# Patient Record
Sex: MALE | Race: WHITE | Employment: OTHER | URBAN - METROPOLITAN AREA
[De-identification: names, ages, dates, MRNs, and addresses within clinical notes are randomized per-mention and may not be internally consistent; named-entity substitution may affect disease eponyms.]

---

## 2018-04-03 ENCOUNTER — OFFICE VISIT (OUTPATIENT)
Dept: CARDIOLOGY CLINIC | Age: 63
End: 2018-04-03
Payer: COMMERCIAL

## 2018-04-03 VITALS
BODY MASS INDEX: 34.3 KG/M2 | SYSTOLIC BLOOD PRESSURE: 108 MMHG | HEIGHT: 71 IN | HEART RATE: 64 BPM | WEIGHT: 245 LBS | DIASTOLIC BLOOD PRESSURE: 60 MMHG

## 2018-04-03 DIAGNOSIS — J44.9 CHRONIC OBSTRUCTIVE PULMONARY DISEASE, UNSPECIFIED COPD TYPE (HCC): ICD-10-CM

## 2018-04-03 DIAGNOSIS — I10 ESSENTIAL HYPERTENSION: ICD-10-CM

## 2018-04-03 DIAGNOSIS — I31.39 PERICARDIAL EFFUSION: ICD-10-CM

## 2018-04-03 DIAGNOSIS — I25.810 CORONARY ARTERY DISEASE INVOLVING CORONARY BYPASS GRAFT OF NATIVE HEART WITHOUT ANGINA PECTORIS: Primary | ICD-10-CM

## 2018-04-03 DIAGNOSIS — E66.8 MODERATE OBESITY: ICD-10-CM

## 2018-04-03 DIAGNOSIS — E78.00 PURE HYPERCHOLESTEROLEMIA: ICD-10-CM

## 2018-04-03 PROBLEM — E66.9 MODERATE OBESITY: Status: ACTIVE | Noted: 2018-04-03

## 2018-04-03 PROCEDURE — 3023F SPIROM DOC REV: CPT | Performed by: INTERNAL MEDICINE

## 2018-04-03 PROCEDURE — 99214 OFFICE O/P EST MOD 30 MIN: CPT | Performed by: INTERNAL MEDICINE

## 2018-04-03 PROCEDURE — G8598 ASA/ANTIPLAT THER USED: HCPCS | Performed by: INTERNAL MEDICINE

## 2018-04-03 PROCEDURE — 93000 ELECTROCARDIOGRAM COMPLETE: CPT | Performed by: INTERNAL MEDICINE

## 2018-04-03 PROCEDURE — G8417 CALC BMI ABV UP PARAM F/U: HCPCS | Performed by: INTERNAL MEDICINE

## 2018-04-03 PROCEDURE — G8427 DOCREV CUR MEDS BY ELIG CLIN: HCPCS | Performed by: INTERNAL MEDICINE

## 2018-04-03 PROCEDURE — 4004F PT TOBACCO SCREEN RCVD TLK: CPT | Performed by: INTERNAL MEDICINE

## 2018-04-03 PROCEDURE — G8926 SPIRO NO PERF OR DOC: HCPCS | Performed by: INTERNAL MEDICINE

## 2018-04-03 PROCEDURE — 3017F COLORECTAL CA SCREEN DOC REV: CPT | Performed by: INTERNAL MEDICINE

## 2018-04-13 ENCOUNTER — HOSPITAL ENCOUNTER (OUTPATIENT)
Dept: CARDIOLOGY | Age: 63
Discharge: HOME | End: 2018-04-13
Payer: COMMERCIAL

## 2018-04-13 DIAGNOSIS — I25.810 CORONARY ARTERY DISEASE INVOLVING CORONARY BYPASS GRAFT OF NATIVE HEART WITHOUT ANGINA PECTORIS: ICD-10-CM

## 2018-04-13 DIAGNOSIS — I31.39 PERICARDIAL EFFUSION: ICD-10-CM

## 2018-04-13 LAB
LV EF: 58 %
LVEF MODALITY: NORMAL

## 2018-04-13 PROCEDURE — 93308 TTE F-UP OR LMTD: CPT

## 2018-04-16 ENCOUNTER — TELEPHONE (OUTPATIENT)
Dept: CARDIOLOGY CLINIC | Age: 63
End: 2018-04-16

## 2018-05-07 ENCOUNTER — OFFICE VISIT (OUTPATIENT)
Dept: ORTHOPEDIC SURGERY | Age: 63
End: 2018-05-07
Payer: COMMERCIAL

## 2018-05-07 VITALS — WEIGHT: 240 LBS | HEIGHT: 71 IN | BODY MASS INDEX: 33.6 KG/M2

## 2018-05-07 DIAGNOSIS — M19.039 WRIST ARTHRITIS: ICD-10-CM

## 2018-05-07 DIAGNOSIS — G56.02 LEFT CARPAL TUNNEL SYNDROME: Primary | ICD-10-CM

## 2018-05-07 PROCEDURE — 99204 OFFICE O/P NEW MOD 45 MIN: CPT | Performed by: ORTHOPAEDIC SURGERY

## 2018-05-16 ENCOUNTER — OFFICE VISIT (OUTPATIENT)
Dept: PHYSICAL MEDICINE AND REHAB | Age: 63
End: 2018-05-16
Payer: COMMERCIAL

## 2018-05-16 VITALS — WEIGHT: 230 LBS | BODY MASS INDEX: 32.2 KG/M2 | HEIGHT: 71 IN

## 2018-05-16 DIAGNOSIS — G56.02 LEFT CARPAL TUNNEL SYNDROME: ICD-10-CM

## 2018-05-16 PROCEDURE — 95909 NRV CNDJ TST 5-6 STUDIES: CPT | Performed by: PHYSICAL MEDICINE & REHABILITATION

## 2018-05-16 PROCEDURE — 95886 MUSC TEST DONE W/N TEST COMP: CPT | Performed by: PHYSICAL MEDICINE & REHABILITATION

## 2018-05-16 PROCEDURE — 99202 OFFICE O/P NEW SF 15 MIN: CPT | Performed by: PHYSICAL MEDICINE & REHABILITATION

## 2018-05-22 ENCOUNTER — TELEPHONE (OUTPATIENT)
Dept: ADMINISTRATIVE | Age: 63
End: 2018-05-22

## 2018-05-31 ENCOUNTER — OFFICE VISIT (OUTPATIENT)
Dept: ORTHOPEDIC SURGERY | Age: 63
End: 2018-05-31
Payer: COMMERCIAL

## 2018-05-31 DIAGNOSIS — G56.02 LEFT CARPAL TUNNEL SYNDROME: Primary | ICD-10-CM

## 2018-05-31 PROCEDURE — 99214 OFFICE O/P EST MOD 30 MIN: CPT | Performed by: ORTHOPAEDIC SURGERY

## 2019-03-26 ENCOUNTER — TELEPHONE (OUTPATIENT)
Dept: ENT CLINIC | Age: 64
End: 2019-03-26

## 2019-04-01 ENCOUNTER — TELEPHONE (OUTPATIENT)
Dept: ENT CLINIC | Age: 64
End: 2019-04-01

## 2019-04-01 NOTE — TELEPHONE ENCOUNTER
Patient missed his appointment on 3/25/19 and is rescheduled for 6/3/19 at 2:00 pm.  He had previously cancelled 1/15/19 appointment.   He requests wait list.

## 2019-04-11 ENCOUNTER — APPOINTMENT (OUTPATIENT)
Dept: GENERAL RADIOLOGY | Age: 64
End: 2019-04-11
Payer: MEDICARE

## 2019-04-11 ENCOUNTER — HOSPITAL ENCOUNTER (EMERGENCY)
Age: 64
Discharge: HOME | End: 2019-04-11
Payer: MEDICARE

## 2019-04-11 VITALS
BODY MASS INDEX: 32.08 KG/M2 | WEIGHT: 230 LBS | HEART RATE: 68 BPM | RESPIRATION RATE: 22 BRPM | TEMPERATURE: 98.2 F | OXYGEN SATURATION: 95 % | SYSTOLIC BLOOD PRESSURE: 100 MMHG | DIASTOLIC BLOOD PRESSURE: 61 MMHG

## 2019-04-11 DIAGNOSIS — S60.419A ABRASION OF FINGER, INITIAL ENCOUNTER: Primary | ICD-10-CM

## 2019-04-11 DIAGNOSIS — S62.622A CLOSED DISPLACED FRACTURE OF MIDDLE PHALANX OF RIGHT MIDDLE FINGER, INITIAL ENCOUNTER: ICD-10-CM

## 2019-04-11 DIAGNOSIS — J44.9 CHRONIC OBSTRUCTIVE PULMONARY DISEASE, UNSPECIFIED COPD TYPE (HCC): ICD-10-CM

## 2019-04-11 PROCEDURE — 99213 OFFICE O/P EST LOW 20 MIN: CPT

## 2019-04-11 PROCEDURE — 6370000000 HC RX 637 (ALT 250 FOR IP): Performed by: PHYSICIAN ASSISTANT

## 2019-04-11 PROCEDURE — 94640 AIRWAY INHALATION TREATMENT: CPT

## 2019-04-11 PROCEDURE — 29130 APPL FINGER SPLINT STATIC: CPT

## 2019-04-11 PROCEDURE — 73130 X-RAY EXAM OF HAND: CPT

## 2019-04-11 PROCEDURE — 71046 X-RAY EXAM CHEST 2 VIEWS: CPT

## 2019-04-11 RX ORDER — IPRATROPIUM BROMIDE AND ALBUTEROL SULFATE 2.5; .5 MG/3ML; MG/3ML
1 SOLUTION RESPIRATORY (INHALATION) ONCE
Status: COMPLETED | OUTPATIENT
Start: 2019-04-11 | End: 2019-04-11

## 2019-04-11 RX ORDER — ACETAMINOPHEN AND CODEINE PHOSPHATE 300; 30 MG/1; MG/1
1 TABLET ORAL EVERY 8 HOURS PRN
Qty: 12 TABLET | Refills: 0 | Status: SHIPPED | OUTPATIENT
Start: 2019-04-11 | End: 2019-04-15

## 2019-04-11 RX ADMIN — IPRATROPIUM BROMIDE AND ALBUTEROL SULFATE 1 AMPULE: .5; 3 SOLUTION RESPIRATORY (INHALATION) at 16:52

## 2019-04-11 ASSESSMENT — PAIN SCALES - GENERAL: PAINLEVEL_OUTOF10: 8

## 2019-04-11 ASSESSMENT — PAIN DESCRIPTION - LOCATION: LOCATION: HAND

## 2019-04-11 ASSESSMENT — PAIN DESCRIPTION - ORIENTATION: ORIENTATION: LEFT

## 2019-04-11 NOTE — ED PROVIDER NOTES
This is a 59year old male that presents to urgent care with a complaint of injury to the left third finger today. Says he lost his balance and fell. Patient denies head or neck injury. No abdominal pain. States he has COPD. Says he has a mild cough. Is a smoker. Says he has harseness in his throat but says this has been going on for some time. Says he was supposed to see ENT last week for this but missed his appointment. Has another appointment coming up. Review of Systems   Constitutional:        Pertinent positives and negatives are stated within HPI, all other systems reviewed and are negative. Physical Exam   Constitutional: He is oriented to person, place, and time. He appears well-developed and well-nourished. HENT:   Head: Normocephalic and atraumatic. Right Ear: Hearing, tympanic membrane, external ear and ear canal normal. No mastoid tenderness. Left Ear: Hearing, tympanic membrane, external ear and ear canal normal. No mastoid tenderness. Nose: Nose normal. Right sinus exhibits no maxillary sinus tenderness and no frontal sinus tenderness. Left sinus exhibits no maxillary sinus tenderness and no frontal sinus tenderness. Mouth/Throat: Uvula is midline, oropharynx is clear and moist and mucous membranes are normal. No trismus in the jaw. No uvula swelling. Eyes: Pupils are equal, round, and reactive to light. Conjunctivae, EOM and lids are normal.   Neck: Normal range of motion. Neck supple. Cardiovascular: Normal rate, regular rhythm and normal heart sounds. No murmur heard. Pulmonary/Chest: Effort normal.   Abdominal: Soft. Bowel sounds are normal. There is no tenderness. There is no rigidity, no rebound, no guarding and no CVA tenderness. Musculoskeletal: He exhibits no edema. Tenderness, mildl swelling and small abrasion to middle finger. No deep wound. Finger has mild deformity. Brisk cap refill. No other hand pain.     Neurological: He is alert and oriented to person, place, and time. He has normal strength. No cranial nerve deficit or sensory deficit. Coordination and gait normal. GCS eye subscore is 4. GCS verbal subscore is 5. GCS motor subscore is 6. Skin: Skin is warm and dry. No abrasion and no rash noted. Nursing note and vitals reviewed. Procedures    MDM  Number of Diagnoses or Management Options  Abrasion of finger, initial encounter:   Chronic obstructive pulmonary disease, unspecified COPD type [facility]):   Closed displaced fracture of middle phalanx of right middle finger, initial encounter:   Diagnosis management comments: Splint  Patient observed and given food. Vitals improved. Told to follow up with PCP and ENT and Ortho. If worse told to go to ED.         --------------------------------------------- PAST HISTORY ---------------------------------------------  Past Medical History:  has a past medical history of CAD (coronary artery disease), Depression, and Hypertension. Past Surgical History:  has a past surgical history that includes hernia repair; Coronary artery bypass graft (3/2/05); and Diagnostic Cardiac Cath Lab Procedure (3/02/05). Social History:  reports that he has been smoking cigars. He has a 45.00 pack-year smoking history. He has never used smokeless tobacco. He reports that he drinks alcohol. He reports that he does not use drugs. Family History: family history is not on file. The patients home medications have been reviewed. Allergies: Patient has no known allergies. -------------------------------------------------- RESULTS -------------------------------------------------  No results found for this visit on 04/11/19. XR HAND LEFT (MIN 3 VIEWS)   Final Result   Addendum 1 of 1   Additional imaging of the PIP joint demonstrates bone fragmentation   consistent with a fracture  Subluxation present      Final      XR CHEST STANDARD (2 VW)   Final Result   1. No active cardiopulmonary disease. ------------------------- NURSING NOTES AND VITALS REVIEWED ---------------------------   The nursing notes within the ED encounter and vital signs as below have been reviewed. /61   Pulse 68   Temp 98.2 °F (36.8 °C) (Oral)   Resp 22   Wt 230 lb (104.3 kg)   SpO2 95%   BMI 32.08 kg/m²   Oxygen Saturation Interpretation: Normal      ------------------------------------------ PROGRESS NOTES ------------------------------------------   I have spoken with the patient and discussed todays results, in addition to providing specific details for the plan of care and counseling regarding the diagnosis and prognosis. Their questions are answered at this time and they are agreeable with the plan.      --------------------------------- ADDITIONAL PROVIDER NOTES ---------------------------------      This patient is stable for discharge. I have shared the specific conditions for return, as well as the importance of follow-up. * NOTE: This report was transcribed using voice recognition software. Every effort was made to ensure accuracy; however, inadvertent computerized transcription errors may be present.    --------------------------------- IMPRESSION AND DISPOSITION ---------------------------------    IMPRESSION  1. Abrasion of finger, initial encounter    2. Closed displaced fracture of middle phalanx of right middle finger, initial encounter    3.  Chronic obstructive pulmonary disease, unspecified COPD type ([facility]        DISPOSITION  Disposition: Discharge to home  Patient condition is good         Domo Borjas PA-C  04/13/19 7101

## 2019-04-11 NOTE — ED NOTES
Aerosol treatment ended. Pt tolerated well.  Pulse ox 92% Pulse 72     Abril Stern LPN  44/40/64 [id number]

## 2019-04-11 NOTE — ED NOTES
Wound cleansed, antibiotic ointment applied with dressing and finger splint to 3rd digit with salvador tape to 4th digit. Wound care instructions reviewed with pt. Pt also instructed to monitor BP at home and go to the ED if symptoms change or worsen. Verbalized understanding.      Tesha Martinez, FRANKLINN  47/59/61 [id number]

## 2019-04-13 ENCOUNTER — HOSPITAL ENCOUNTER (EMERGENCY)
Age: 64
Discharge: HOME | End: 2019-04-13
Payer: MEDICARE

## 2019-04-13 ENCOUNTER — APPOINTMENT (OUTPATIENT)
Dept: CT IMAGING | Age: 64
End: 2019-04-13
Payer: MEDICARE

## 2019-04-13 VITALS
RESPIRATION RATE: 16 BRPM | DIASTOLIC BLOOD PRESSURE: 109 MMHG | TEMPERATURE: 98.2 F | SYSTOLIC BLOOD PRESSURE: 155 MMHG | BODY MASS INDEX: 35 KG/M2 | HEART RATE: 90 BPM | OXYGEN SATURATION: 96 % | WEIGHT: 250 LBS | HEIGHT: 71 IN

## 2019-04-13 DIAGNOSIS — J44.1 COPD EXACERBATION (HCC): Primary | ICD-10-CM

## 2019-04-13 DIAGNOSIS — S62.623D DISPLACED FRACTURE OF MIDDLE PHALANX OF LEFT MIDDLE FINGER, SUBSEQUENT ENCOUNTER FOR FRACTURE WITH ROUTINE HEALING: ICD-10-CM

## 2019-04-13 DIAGNOSIS — J18.9 PNEUMONIA DUE TO ORGANISM: ICD-10-CM

## 2019-04-13 LAB
ANION GAP SERPL CALCULATED.3IONS-SCNC: 13 MMOL/L (ref 7–16)
BASOPHILS ABSOLUTE: 0.08 E9/L (ref 0–0.2)
BASOPHILS RELATIVE PERCENT: 1 % (ref 0–2)
BUN BLDV-MCNC: 13 MG/DL (ref 8–23)
CALCIUM SERPL-MCNC: 9.5 MG/DL (ref 8.6–10.2)
CHLORIDE BLD-SCNC: 104 MMOL/L (ref 98–107)
CO2: 23 MMOL/L (ref 22–29)
CREAT SERPL-MCNC: 1.1 MG/DL (ref 0.7–1.2)
EOSINOPHILS ABSOLUTE: 0.07 E9/L (ref 0.05–0.5)
EOSINOPHILS RELATIVE PERCENT: 0.9 % (ref 0–6)
GFR AFRICAN AMERICAN: >60
GFR NON-AFRICAN AMERICAN: >60 ML/MIN/1.73
GLUCOSE BLD-MCNC: 117 MG/DL (ref 74–99)
HCT VFR BLD CALC: 42.8 % (ref 37–54)
HEMOGLOBIN: 14.1 G/DL (ref 12.5–16.5)
IMMATURE GRANULOCYTES #: 0.04 E9/L
IMMATURE GRANULOCYTES %: 0.5 % (ref 0–5)
LACTIC ACID: 1.2 MMOL/L (ref 0.5–2.2)
LYMPHOCYTES ABSOLUTE: 1.64 E9/L (ref 1.5–4)
LYMPHOCYTES RELATIVE PERCENT: 21.5 % (ref 20–42)
MCH RBC QN AUTO: 32.1 PG (ref 26–35)
MCHC RBC AUTO-ENTMCNC: 32.9 % (ref 32–34.5)
MCV RBC AUTO: 97.5 FL (ref 80–99.9)
MONOCYTES ABSOLUTE: 0.6 E9/L (ref 0.1–0.95)
MONOCYTES RELATIVE PERCENT: 7.9 % (ref 2–12)
NEUTROPHILS ABSOLUTE: 5.21 E9/L (ref 1.8–7.3)
NEUTROPHILS RELATIVE PERCENT: 68.2 % (ref 43–80)
PDW BLD-RTO: 13.7 FL (ref 11.5–15)
PLATELET # BLD: 262 E9/L (ref 130–450)
PMV BLD AUTO: 9.5 FL (ref 7–12)
POTASSIUM SERPL-SCNC: 4.5 MMOL/L (ref 3.5–5)
PRO-BNP: 452 PG/ML (ref 0–125)
RBC # BLD: 4.39 E12/L (ref 3.8–5.8)
SODIUM BLD-SCNC: 140 MMOL/L (ref 132–146)
STREP GRP A PCR: NEGATIVE
TROPONIN: <0.01 NG/ML (ref 0–0.03)
WBC # BLD: 7.6 E9/L (ref 4.5–11.5)

## 2019-04-13 PROCEDURE — 71275 CT ANGIOGRAPHY CHEST: CPT

## 2019-04-13 PROCEDURE — 6360000004 HC RX CONTRAST MEDICATION: Performed by: RADIOLOGY

## 2019-04-13 PROCEDURE — 83605 ASSAY OF LACTIC ACID: CPT

## 2019-04-13 PROCEDURE — 80048 BASIC METABOLIC PNL TOTAL CA: CPT

## 2019-04-13 PROCEDURE — 93005 ELECTROCARDIOGRAM TRACING: CPT | Performed by: PHYSICIAN ASSISTANT

## 2019-04-13 PROCEDURE — 36415 COLL VENOUS BLD VENIPUNCTURE: CPT

## 2019-04-13 PROCEDURE — 70491 CT SOFT TISSUE NECK W/DYE: CPT

## 2019-04-13 PROCEDURE — 83880 ASSAY OF NATRIURETIC PEPTIDE: CPT

## 2019-04-13 PROCEDURE — 6370000000 HC RX 637 (ALT 250 FOR IP): Performed by: PHYSICIAN ASSISTANT

## 2019-04-13 PROCEDURE — 85025 COMPLETE CBC W/AUTO DIFF WBC: CPT

## 2019-04-13 PROCEDURE — 99285 EMERGENCY DEPT VISIT HI MDM: CPT

## 2019-04-13 PROCEDURE — 87880 STREP A ASSAY W/OPTIC: CPT

## 2019-04-13 PROCEDURE — 84484 ASSAY OF TROPONIN QUANT: CPT

## 2019-04-13 RX ORDER — PREDNISONE 20 MG/1
60 TABLET ORAL ONCE
Status: COMPLETED | OUTPATIENT
Start: 2019-04-13 | End: 2019-04-13

## 2019-04-13 RX ORDER — DOXYCYCLINE HYCLATE 100 MG
100 TABLET ORAL 2 TIMES DAILY
Qty: 20 TABLET | Refills: 0 | Status: SHIPPED | OUTPATIENT
Start: 2019-04-13 | End: 2019-04-23

## 2019-04-13 RX ORDER — CEFDINIR 300 MG/1
300 CAPSULE ORAL ONCE
Status: COMPLETED | OUTPATIENT
Start: 2019-04-13 | End: 2019-04-13

## 2019-04-13 RX ORDER — PREDNISONE 10 MG/1
TABLET ORAL
Qty: 20 TABLET | Refills: 0 | Status: SHIPPED | OUTPATIENT
Start: 2019-04-13 | End: 2019-04-23

## 2019-04-13 RX ORDER — DOXYCYCLINE HYCLATE 100 MG/1
100 CAPSULE ORAL ONCE
Status: COMPLETED | OUTPATIENT
Start: 2019-04-13 | End: 2019-04-13

## 2019-04-13 RX ORDER — CEFDINIR 300 MG/1
300 CAPSULE ORAL 2 TIMES DAILY
Qty: 20 CAPSULE | Refills: 0 | Status: SHIPPED | OUTPATIENT
Start: 2019-04-13 | End: 2019-04-23

## 2019-04-13 RX ADMIN — IOPAMIDOL 80 ML: 755 INJECTION, SOLUTION INTRAVENOUS at 18:08

## 2019-04-13 RX ADMIN — CEFDINIR 300 MG: 300 CAPSULE ORAL at 21:03

## 2019-04-13 RX ADMIN — PREDNISONE 60 MG: 20 TABLET ORAL at 21:03

## 2019-04-13 RX ADMIN — DOXYCYCLINE HYCLATE 100 MG: 100 CAPSULE ORAL at 21:03

## 2019-04-13 ASSESSMENT — PAIN SCALES - GENERAL: PAINLEVEL_OUTOF10: 4

## 2019-04-13 NOTE — ED PROVIDER NOTES
Independent MLP      HPI:  4/13/19,   Time: 4:47 PM         Kaye Heck is a 59 y.o. male presenting to the ED for sore throat, difficulty swallowing, finger fracture, beginning few days ago. The complaint has been persistent, moderate in severity, and worsened by movement of left middle finger . The patient comes to the emergency room with several complaints. He states that he was seen at urgent care today for a few days ago after falling at home. He sustained injuries to his left hand and his right upper chest. He states that he had x-rays and was told that his finger is fractured. He states that they told him to call Dr. Cid Latin he wanted to come in today here to see if he could be seen by the orthopedist sooner. The patient has ongoing pain and swelling in his left middle finger. He states that he was helping someone here could help put his finger back into place. The patient is also complaining of a sore throat. He states that it's been painful for about a month now. He's had hoarseness and a sense of swelling and pain on the left side of his neck. He reports that he was supposed to follow up with ENT but missed his appointment. He reports intermittent shortness of breath as well. The patient is a chronic smoker. He states he coughs frequently and brings up productive phlegm as well. The patient states that when he eats he sometimes coughs and chokes on his food. It feels like there is something stuck on the left side of his throat and esophagus. The other day at urgent care he states that they gave him a breathing treatment and did a chest x-ray.       ROS:     Constitutional: Negative for fever and chills  HENT:  See HPI  Eyes: Negative for pain, discharge and redness  Respiratory: See HPI  Cardiovascular: Negative for CP, edema or palpitations  Gastrointestinal: Negative for nausea, vomiting, diarrhea and abdominal distention  Genitourinary: Negative for dysuria and frequency  Musculoskeletal: Negative for back pain and arthralgia  Skin: Negative for rash and wound  Neurological: Negative for weakness and headaches  Hematological: Negative for adenopathy    All other systems reviewed and are negative      -------------------------------- PAST HISTORY ----------------------------------  Past Medical History:  has a past medical history of CAD (coronary artery disease), Depression, and Hypertension. Past Surgical History:  has a past surgical history that includes hernia repair; Coronary artery bypass graft (3/2/05); and Diagnostic Cardiac Cath Lab Procedure (3/02/05). Social History:  reports that he has been smoking cigars. He has a 45.00 pack-year smoking history. He has never used smokeless tobacco. He reports that he drinks alcohol. He reports that he does not use drugs. Family History: family history is not on file. The patients home medications have been reviewed. Allergies: Patient has no known allergies.     --------------------------------- RESULTS ------------------------------------------  All laboratory and radiology results have been personally reviewed by myself   LABS:  Results for orders placed or performed during the hospital encounter of 04/13/19   Strep Screen Group A Throat   Result Value Ref Range    Strep Grp A PCR Negative Negative   CBC Auto Differential   Result Value Ref Range    WBC 7.6 4.5 - 11.5 E9/L    RBC 4.39 3.80 - 5.80 E12/L    Hemoglobin 14.1 12.5 - 16.5 g/dL    Hematocrit 42.8 37.0 - 54.0 %    MCV 97.5 80.0 - 99.9 fL    MCH 32.1 26.0 - 35.0 pg    MCHC 32.9 32.0 - 34.5 %    RDW 13.7 11.5 - 15.0 fL    Platelets 682 487 - 272 E9/L    MPV 9.5 7.0 - 12.0 fL    Neutrophils % 68.2 43.0 - 80.0 %    Immature Granulocytes % 0.5 0.0 - 5.0 %    Lymphocytes % 21.5 20.0 - 42.0 %    Monocytes % 7.9 2.0 - 12.0 %    Eosinophils % 0.9 0.0 - 6.0 %    Basophils % 1.0 0.0 - 2.0 %    Neutrophils # 5.21 1.80 - 7.30 E9/L    Immature Granulocytes # 0.04 E9/L    Lymphocytes # 1.64 1.50 - Hoarse voice. Head: NC/AT  Eyes: PERRL, EOMI  Mouth:  No obvious masses. Mild/moderate erythema in posterior pharynx, slightly more swollen on left than right. Neck: Supple, full ROM, no meningeal signs  Pulmonary: Lungs decreased but clear to auscultation bilaterally, no wheezes, rales, or rhonchi. Not in respiratory distress  Cardiovascular:  Regular rate and rhythm, no murmurs, gallops, or rubs. 2+ distal pulses  Abdomen: Soft, + BS. No distension. Nontender. No palpable rigidity, rebound or guarding  Extremities: Moves all extremities x 4. Warm and well perfused  Skin: warm and dry without rash  Neurologic: GCS 15,  Intact. No focal deficits  Psych: Normal Affect      ------------------------ ED COURSE/MEDICAL DECISION MAKING----------------------  Medications   iopamidol (ISOVUE-370) 76 % injection 80 mL (80 mLs Intravenous Given 4/13/19 1808)   cefdinir (OMNICEF) capsule 300 mg (300 mg Oral Given 4/13/19 2103)   doxycycline hyclate (VIBRAMYCIN) capsule 100 mg (100 mg Oral Given 4/13/19 2103)   predniSONE (DELTASONE) tablet 60 mg (60 mg Oral Given 4/13/19 2103)         Medical Decision Making:    I did review the patient's x-rays of his left hand. He has a fracture of the left 3rd finger middle phalanx with subluxation. It certainly appears deformed swollen and bruised. I put a call out to Dr. Dinorah Tavarez and the orthopedic resident. They called back initially and indicated that Dr. Jim Pacheco was on-call. I had called multiple more times to let them know that this was a Dr. Lana Tse patient and unfortunately they were involved in an OR case and stated they would call when this was complete. At 910 the patient insisted that he had to leave and did not wish to stay. Certainly this finger needs put back into a better position as it is subluxed. Again the patient refused to stay. We put him in a splint and I advised him to call Dr. Lana Tse on Monday.   (The initial consult to Ortho was placed at 6:30)        The rest of the patient's workup demonstrated no evidence of mass or abscess in the left side of his neck or throat. He was reassured. There was questionable infiltrate in the lung and the patient will be discharged home with treatment for outpatient pneumonia. He is a chronic smoker. Plan discharge home with Omnicef, doxycycline, and steroids. He needs to follow up with his PCP this week as well. Was supposed to see ENT for the hoarseness which has been going on for a month. Missed the appointment. Needs to call to reschedule. Counseling: The emergency provider has spoken with the patient and discussed todays results, in addition to providing specific details for the plan of care and counseling regarding the diagnosis and prognosis. Questions are answered at this time and they are agreeable with the plan.      ------------------------ IMPRESSION AND DISPOSITION -------------------------------    IMPRESSION  1. COPD exacerbation ([address]    2. Pneumonia due to organism    3.  Displaced fracture of middle phalanx of left middle finger, subsequent encounter for fracture with routine healing        DISPOSITION  Disposition: Discharge to home  Patient condition is stable                   Homa Almeida PA-C  04/13/19 7201

## 2019-04-14 LAB
EKG ATRIAL RATE: 416 BPM
EKG Q-T INTERVAL: 374 MS
EKG QRS DURATION: 96 MS
EKG QTC CALCULATION (BAZETT): 428 MS
EKG R AXIS: -31 DEGREES
EKG T AXIS: 60 DEGREES
EKG VENTRICULAR RATE: 79 BPM

## 2019-04-17 DIAGNOSIS — M79.642 LEFT HAND PAIN: Primary | ICD-10-CM

## 2019-04-18 ENCOUNTER — OFFICE VISIT (OUTPATIENT)
Dept: ORTHOPEDIC SURGERY | Age: 64
End: 2019-04-18
Payer: COMMERCIAL

## 2019-04-18 VITALS — HEIGHT: 71 IN | WEIGHT: 240 LBS | BODY MASS INDEX: 33.6 KG/M2

## 2019-04-18 DIAGNOSIS — S53.30XA: ICD-10-CM

## 2019-04-18 DIAGNOSIS — S63.259A DISLOCATION OF FINGER, INITIAL ENCOUNTER: ICD-10-CM

## 2019-04-18 DIAGNOSIS — S63.253A CLOSED DISLOCATION OF LEFT MIDDLE FINGER: Primary | ICD-10-CM

## 2019-04-18 PROCEDURE — 99213 OFFICE O/P EST LOW 20 MIN: CPT | Performed by: ORTHOPAEDIC SURGERY

## 2019-04-18 NOTE — PROGRESS NOTES
Chief Complaint   Patient presents with    Hand Pain     Left hand middle finger fx. INjury on 4/11/19       Kishore Sawant is a 59y.o. year old  male who presents for evaluation of left hand pain. he reports this started on 4/11/2019.  he does remember a specific injury that started the pain. He notes he tripped and fell and jammed the left hand. The injury was direct trauma, fall. His pain is located mainly at the left long finger. The pain is worse with movement and better with rest.  The patient has tried splint but patient removed The treatment has not been effective. The patient is Right hand dominant. Past Medical History:   Diagnosis Date    CAD (coronary artery disease)     Depression     Hypertension      Past Surgical History:   Procedure Laterality Date    CORONARY ARTERY BYPASS GRAFT  3/2/05    x3: LIMA to LAD, SVG to RCA, SVG to diagonal    DIAGNOSTIC CARDIAC CATH LAB PROCEDURE  3/02/05    [facility]: Severe multivessel CAD in patient who presents with STEMI and total occlusion of diagonal branch. Significant disease of proximal LAD and severe disease of dominant RCA.      HERNIA REPAIR      double       Current Outpatient Medications:     cefdinir (OMNICEF) 300 MG capsule, Take 1 capsule by mouth 2 times daily for 10 days, Disp: 20 capsule, Rfl: 0    doxycycline hyclate (VIBRA-TABS) 100 MG tablet, Take 1 tablet by mouth 2 times daily for 10 days, Disp: 20 tablet, Rfl: 0    predniSONE (DELTASONE) 10 MG tablet, Take 40mg po qd x 5 days QS for 5 days, Disp: 20 tablet, Rfl: 0    busPIRone (BUSPAR) 5 MG tablet, Take 15 mg by mouth 2 times daily , Disp: , Rfl:     gabapentin (NEURONTIN) 300 MG capsule, Take 300 mg by mouth 3 times daily, Disp: , Rfl:     cyclobenzaprine (FLEXERIL) 10 MG tablet, Take 10 mg by mouth 3 times daily as needed for Muscle spasms, Disp: , Rfl:     ibuprofen (ADVIL;MOTRIN) 600 MG tablet, Take 600 mg by mouth, Disp: , Rfl:     omeprazole (PRILOSEC) 20 MG capsule, , Disp: , Rfl:     PROAIR  (90 BASE) MCG/ACT inhaler, , Disp: , Rfl:     ASPIRIN LOW DOSE 81 MG EC tablet, Take 81 mg by mouth daily , Disp: , Rfl:     nicotine (NICODERM CQ) 14 MG/24HR, , Disp: , Rfl: 0    sertraline (ZOLOFT) 100 MG tablet, , Disp: , Rfl:     sertraline (ZOLOFT) 50 MG tablet, Take 100 mg by mouth daily , Disp: , Rfl:     lisinopril (PRINIVIL;ZESTRIL) 5 MG tablet, Take 5 mg by mouth daily, Disp: , Rfl:     nitroGLYCERIN (NITROSTAT) 0.4 MG SL tablet, Place 1 tablet under the tongue every 5 minutes as needed for Chest pain, Disp: 25 tablet, Rfl: 3    pravastatin (PRAVACHOL) 80 MG tablet, Take 1 tablet by mouth daily, Disp: 30 tablet, Rfl: 3  No Known Allergies  Social History     Socioeconomic History    Marital status:      Spouse name: Not on file    Number of children: Not on file    Years of education: Not on file    Highest education level: Not on file   Occupational History    Not on file   Social Needs    Financial resource strain: Not on file    Food insecurity:     Worry: Not on file     Inability: Not on file    Transportation needs:     Medical: Not on file     Non-medical: Not on file   Tobacco Use    Smoking status: Current Every Day Smoker     Packs/day: 1.00     Years: 45.00     Pack years: 45.00     Types: Cigars    Smokeless tobacco: Never Used    Tobacco comment: SMOKES CIGARS    Substance and Sexual Activity    Alcohol use: Yes     Comment: 1 beer daily. ..roughly    Drug use: No    Sexual activity: Yes     Partners: Female   Lifestyle    Physical activity:     Days per week: Not on file     Minutes per session: Not on file    Stress: Not on file   Relationships    Social connections:     Talks on phone: Not on file     Gets together: Not on file     Attends Confucianist service: Not on file     Active member of club or organization: Not on file     Attends meetings of clubs or organizations: Not on file     Relationship status: Not on file    Intimate partner violence:     Fear of current or ex partner: Not on file     Emotionally abused: Not on file     Physically abused: Not on file     Forced sexual activity: Not on file   Other Topics Concern    Not on file   Social History Narrative    Not on file     No family history on file. REVIEW OF SYSTEMS:     General/Constitution:  (-)weight loss, (-)fever, (-)chills, (-)weakness. Skin: (-) rash,(-) psoriasis,(-) eczema, (-)skin cancer. Musculoskeletal: (-) fractures,  (-) dislocations,(-) collagen vascular disease, (-) fibromyalgia, (-) multiple sclerosis, (-) muscular dystrophy, (-) RSD,(-) joint pain (-)swelling, (-) joint pain,swelling. Neurologic: (-) epilepsy, (-)seizures,(-) brain tumor,(-) TIA, (-)stroke, (-)headaches, (-)Parkinson disease,(-) memory loss, (-) LOC. Cardiovascular: (-) Chest pain, (-) swelling in legs/feet, (-) SOB, (-) cramping in legs/feet with walking. Respiratory: (-) SOB, (-) Coughing, (-) night sweats. GI: (-) nausea, (-) vomiting, (-) diarrhea, (-) blood in stool, (-) gastric ulcer. Psychiatric: (-) Depression, (-) Anxiety, (-) bipolar disease, (-) Alzheimer's Disease  Allergic/Immunologic: (-) allergies latex, (-) allergies metal, (-) skin sensitivity. Hematlogic: (-) anemia, (-) blood transfusion, (-) DVT/PE, (-) Clotting disorders    SUBJECTIVE:    Constitution:    Ht 5' 11\" (1.803 m)   Wt 240 lb (108.9 kg)   BMI 33.47 kg/m²     Psycihatric:  The patient is alert and oriented x 3, appears to be stated age and in no distress. Respiratory:  ReSpiratory effort is not labored. Patient is not gasping. Palpation of the chest reveals no tactile fremitus. Skin:  Upon inspection: the skin appears warm, dry and intact. There is not a previous scar over the affected area. There is not any cellulitis, lymphedema or cutaneous lesions noted in the lower extremities. Upon palpation there is no induration noted.       Neurologic:    Gait: normal;  Motor index finger  Fu Monday for XR

## 2019-04-22 ENCOUNTER — OFFICE VISIT (OUTPATIENT)
Dept: ORTHOPEDIC SURGERY | Age: 64
End: 2019-04-22
Payer: COMMERCIAL

## 2019-04-22 VITALS — HEIGHT: 71 IN | WEIGHT: 240 LBS | BODY MASS INDEX: 33.6 KG/M2

## 2019-04-22 DIAGNOSIS — S63.253A CLOSED DISLOCATION OF LEFT MIDDLE FINGER: Primary | ICD-10-CM

## 2019-04-22 PROCEDURE — 99213 OFFICE O/P EST LOW 20 MIN: CPT | Performed by: ORTHOPAEDIC SURGERY

## 2019-04-22 NOTE — PROGRESS NOTES
Chief Complaint   Patient presents with    Hand Pain     Left hand XR follow up. Left hand, sharp pains up the arm. denies numbness. Kwan Pina is a 59y.o. year old  male who presents for evaluation of left hand pain. he reports this started on 4/11/2019.  he does remember a specific injury that started the pain. He notes he tripped and fell and jammed the left hand. Patient has buddy taped fingers for the last 4 days. Past Medical History:   Diagnosis Date    CAD (coronary artery disease)     Depression     Hypertension      Past Surgical History:   Procedure Laterality Date    CORONARY ARTERY BYPASS GRAFT  3/2/05    x3: LIMA to LAD, SVG to RCA, SVG to diagonal    DIAGNOSTIC CARDIAC CATH LAB PROCEDURE  3/02/05    [facility]: Severe multivessel CAD in patient who presents with STEMI and total occlusion of diagonal branch. Significant disease of proximal LAD and severe disease of dominant RCA.      HERNIA REPAIR      double       Current Outpatient Medications:     cefdinir (OMNICEF) 300 MG capsule, Take 1 capsule by mouth 2 times daily for 10 days, Disp: 20 capsule, Rfl: 0    doxycycline hyclate (VIBRA-TABS) 100 MG tablet, Take 1 tablet by mouth 2 times daily for 10 days, Disp: 20 tablet, Rfl: 0    predniSONE (DELTASONE) 10 MG tablet, Take 40mg po qd x 5 days QS for 5 days, Disp: 20 tablet, Rfl: 0    busPIRone (BUSPAR) 5 MG tablet, Take 15 mg by mouth 2 times daily , Disp: , Rfl:     gabapentin (NEURONTIN) 300 MG capsule, Take 300 mg by mouth 3 times daily, Disp: , Rfl:     cyclobenzaprine (FLEXERIL) 10 MG tablet, Take 10 mg by mouth 3 times daily as needed for Muscle spasms, Disp: , Rfl:     ibuprofen (ADVIL;MOTRIN) 600 MG tablet, Take 600 mg by mouth, Disp: , Rfl:     omeprazole (PRILOSEC) 20 MG capsule, , Disp: , Rfl:     PROAIR  (90 BASE) MCG/ACT inhaler, , Disp: , Rfl:     ASPIRIN LOW DOSE 81 MG EC tablet, Take 81 mg by mouth daily , Disp: , Rfl:     nicotine (NICODERM CQ) 14 MG/24HR, , Disp: , Rfl: 0    sertraline (ZOLOFT) 100 MG tablet, , Disp: , Rfl:     sertraline (ZOLOFT) 50 MG tablet, Take 100 mg by mouth daily , Disp: , Rfl:     lisinopril (PRINIVIL;ZESTRIL) 5 MG tablet, Take 5 mg by mouth daily, Disp: , Rfl:     nitroGLYCERIN (NITROSTAT) 0.4 MG SL tablet, Place 1 tablet under the tongue every 5 minutes as needed for Chest pain, Disp: 25 tablet, Rfl: 3    pravastatin (PRAVACHOL) 80 MG tablet, Take 1 tablet by mouth daily, Disp: 30 tablet, Rfl: 3  No Known Allergies  Social History     Socioeconomic History    Marital status:      Spouse name: Not on file    Number of children: Not on file    Years of education: Not on file    Highest education level: Not on file   Occupational History    Not on file   Social Needs    Financial resource strain: Not on file    Food insecurity:     Worry: Not on file     Inability: Not on file    Transportation needs:     Medical: Not on file     Non-medical: Not on file   Tobacco Use    Smoking status: Current Every Day Smoker     Packs/day: 1.00     Years: 45.00     Pack years: 45.00     Types: Cigars    Smokeless tobacco: Never Used    Tobacco comment: SMOKES CIGARS    Substance and Sexual Activity    Alcohol use: Yes     Comment: 1 beer daily. ..roughly    Drug use: No    Sexual activity: Yes     Partners: Female   Lifestyle    Physical activity:     Days per week: Not on file     Minutes per session: Not on file    Stress: Not on file   Relationships    Social connections:     Talks on phone: Not on file     Gets together: Not on file     Attends Druze service: Not on file     Active member of club or organization: Not on file     Attends meetings of clubs or organizations: Not on file     Relationship status: Not on file    Intimate partner violence:     Fear of current or ex partner: Not on file     Emotionally abused: Not on file     Physically abused: Not on file     Forced sexual activity: Not on file   Other Topics Concern    Not on file   Social History Narrative    Not on file     History reviewed. No pertinent family history. REVIEW OF SYSTEMS:     General/Constitution:  (-)weight loss, (-)fever, (-)chills, (-)weakness. Skin: (-) rash,(-) psoriasis,(-) eczema, (-)skin cancer. Musculoskeletal: (-) fractures,  (-) dislocations,(-) collagen vascular disease, (-) fibromyalgia, (-) multiple sclerosis, (-) muscular dystrophy, (-) RSD,(-) joint pain (-)swelling, (-) joint pain,swelling. Neurologic: (-) epilepsy, (-)seizures,(-) brain tumor,(-) TIA, (-)stroke, (-)headaches, (-)Parkinson disease,(-) memory loss, (-) LOC. Cardiovascular: (-) Chest pain, (-) swelling in legs/feet, (-) SOB, (-) cramping in legs/feet with walking. Respiratory: (-) SOB, (-) Coughing, (-) night sweats. GI: (-) nausea, (-) vomiting, (-) diarrhea, (-) blood in stool, (-) gastric ulcer. Psychiatric: (-) Depression, (-) Anxiety, (-) bipolar disease, (-) Alzheimer's Disease  Allergic/Immunologic: (-) allergies latex, (-) allergies metal, (-) skin sensitivity. Hematlogic: (-) anemia, (-) blood transfusion, (-) DVT/PE, (-) Clotting disorders    SUBJECTIVE:    Constitution:    Ht 5' 11\" (1.803 m)   Wt 240 lb (108.9 kg)   BMI 33.47 kg/m²     Psycihatric:  The patient is alert and oriented x 3, appears to be stated age and in no distress. Respiratory:  ReSpiratory effort is not labored. Patient is not gasping. Palpation of the chest reveals no tactile fremitus. Skin:  Upon inspection: the skin appears warm, dry and intact. There is not a previous scar over the affected area. There is not any cellulitis, lymphedema or cutaneous lesions noted in the lower extremities. Upon palpation there is no induration noted. Neurologic:    Gait: normal;  Motor exam of the upper extremities show: The reflexes in biceps/triceps/brachioradialis are equal and symmetric. Sensory exam C5-T1 are normal bilaterally. Cardiovascular: The vascular exam is normal and is well perfused to distal extremities. There are 2+ radial pulses bilaterally, and motor and sensation is intact to median, ulnar, and radial, musclocutaneus, and axillary nerve distribution and grossly symmetric bilaterally. There is cap refill noted less than two seconds in all digits. There is not edema of the bilateral lower extremities. There is not varicosities noted in the distal extremities. Lymph:  Upon palpation,  there is no lymphadenopathy noted in bilateral lower extremities. Musculoskeletal:  Gait: normal; examination of the nails and digits reveal no cyanosis or clubbing. Cervical Exam:  On physical exam, Nelli Castro is well-developed, well-nourished, oriented to person, place and time. his gait is normal.  On evaluation of his cervical spine, he has full range of motion of the cervical spine without pain. There is no cervical tenderness to palpation. Shoulder Exam:  On evaluation of his bilaterally upper extremities, his bilateral shoulder has no deformity. There is not evidence of scapular dyskinesis. There is not muscle atrophy in shoulder girdle. The range of motion for the Right Shoulder is 150/45/t10 and for the Left shoulder is 150/45/t10. Right shoulder Motor strength is 5/5 in the supraspinatus, 5/5 internal rotation and 5/5 in external rotation, and Left shoulder motor strength 5/5 in supraspinatus, 5/5 in internal rotation, 5/5 in external rotation. Elbow exam:  Evaluation of the elbow, reveals no signs of swelling or deformity. ROM is 0-140. There is not instability with varus/valgus stresses. Motor strength is 5/5 with flexion/extension. Wrist exam:  Inspection of the bilateral upper extremities, there is no evidence of deformity of the wrist.  ROM Wrist ROM R wrist DF 90, VF 90, L wrist DF 90, VF 90, R pronation 90/ supination 90, L pronation 90/supination 90.   Motor strength is 5/5 with Dorsiflexion/Volarflexion/Supination/Pronation. Motor and sensation is intact and symmetric throughout the bilateral upper extremities in the median, ulnar and radial , musclcutaneous, and axillary nerve distributions. Hand exam:  The skin overlying the hand is  intact. There is  evidence of scar, lesion, laceration, or abrasion. The motion in the small joints of the hand are intact with no stiffness or deformity. The ROM of left middle in the MCP flexion 90/ extension 0 , PIP flexion 30/ extension 0, DIP flexion 50/ extension 0. There is  rotational deformity. There is no masses or adenopathy in bilateral upper extremities. Radial pulses are 2+ and symmetric bilaterally. Capillary refill is intact and < 2 seconds. Motor strength is 5/5 with flexion and extension of the small finger joints. + Laxity with ulnar collateral ligament of left long finger. Moderate swelling to PIP middle finger      Right:  Phallens sign negative, Tinnells sign negative, Median nerve compression test negative ,  Finklesteins negative, CMC Grind test negative, Piano Key Test negative. Left:  Phallens sign negative, Tinnells sign negative, Median nerve compression test negative ,  Finklesteins negative, CMC Grind test negative, Piano Key Test negative. Xrays:   1. Mild arthrosis at the PIP joint of the middle finger without   evidence of acute fracture or change in alignment. 2. Mild soft tissue swelling of the middle finger. Radiographic findings reviewed with patient    Impression:   Encounter Diagnosis   Name Primary?  Closed dislocation of left middle finger Yes       Plan: Natural history and expected course discussed. Questions answered. Educational materials distributed. Rest, ice, compression, and elevation (RICE) therapy. Reduction in offending activity discussed.    Hussain tape middle and index fingers  [place] referral

## 2019-04-24 ENCOUNTER — TELEPHONE (OUTPATIENT)
Dept: ORTHOPEDIC SURGERY | Age: 64
End: 2019-04-24

## 2019-04-24 DIAGNOSIS — S63.253A CLOSED DISLOCATION OF LEFT MIDDLE FINGER: Primary | ICD-10-CM

## 2019-04-25 ENCOUNTER — OFFICE VISIT (OUTPATIENT)
Dept: ORTHOPEDIC SURGERY | Age: 64
End: 2019-04-25
Payer: COMMERCIAL

## 2019-04-25 VITALS
BODY MASS INDEX: 33.6 KG/M2 | RESPIRATION RATE: 18 BRPM | DIASTOLIC BLOOD PRESSURE: 87 MMHG | WEIGHT: 240 LBS | HEART RATE: 91 BPM | HEIGHT: 71 IN | SYSTOLIC BLOOD PRESSURE: 147 MMHG

## 2019-04-25 DIAGNOSIS — S63.253A CLOSED DISLOCATION OF LEFT MIDDLE FINGER: Primary | ICD-10-CM

## 2019-04-25 DIAGNOSIS — S62.623A CLOSED DISPLACED FRACTURE OF MIDDLE PHALANX OF LEFT MIDDLE FINGER, INITIAL ENCOUNTER: ICD-10-CM

## 2019-04-25 PROCEDURE — 99214 OFFICE O/P EST MOD 30 MIN: CPT | Performed by: ORTHOPAEDIC SURGERY

## 2019-04-25 SDOH — HEALTH STABILITY: MENTAL HEALTH: HOW OFTEN DO YOU HAVE A DRINK CONTAINING ALCOHOL?: MONTHLY OR LESS

## 2019-04-25 NOTE — PROGRESS NOTES
Department of Orthopedic Surgery/Hand Surgery  Resident Office Note        CHIEF COMPLAINT:    Chief Complaint   Patient presents with    Finger Injury     Left middle finger        History Obtained From:  patient    HISTORY OF PRESENT ILLNESS:                Sharita Felix is a 59y.o. year old  male who presents for evaluation of left middle finger pain. he reports this started 2 weeks ago. Pt states he tripped and jammed the middle finger at home Patient seen in the ER 2 weeks ago and place in a splint with no reduction. Referral by Dr. Darlin Cruz   The patient is Right hand dominant. The patients occupation is retired. Past Medical History:    Past Medical History:   Diagnosis Date    CAD (coronary artery disease)     Depression     Hypertension      Past Surgical History:    Past Surgical History:   Procedure Laterality Date    CORONARY ARTERY BYPASS GRAFT  3/2/05    x3: LIMA to LAD, SVG to RCA, SVG to diagonal    DIAGNOSTIC CARDIAC CATH LAB PROCEDURE  3/02/05    [facility]: Severe multivessel CAD in patient who presents with STEMI and total occlusion of diagonal branch. Significant disease of proximal LAD and severe disease of dominant RCA.  HERNIA REPAIR      double     Current Medications:   No current facility-administered medications for this visit.    Allergies:  No Known Allergies    Social History:   Social History     Socioeconomic History    Marital status:      Spouse name: Not on file    Number of children: Not on file    Years of education: Not on file    Highest education level: Not on file   Occupational History    Not on file   Social Needs    Financial resource strain: Not on file    Food insecurity:     Worry: Not on file     Inability: Not on file    Transportation needs:     Medical: Not on file     Non-medical: Not on file   Tobacco Use    Smoking status: Current Every Day Smoker     Packs/day: 1.00     Years: 45.00     Pack years: 45.00     Types: Cigars    Smokeless tobacco: Never Used    Tobacco comment: SMOKES CIGARS    Substance and Sexual Activity    Alcohol use: Yes     Frequency: Monthly or less     Comment: 1 beer daily. ..roughly    Drug use: No    Sexual activity: Yes     Partners: Female   Lifestyle    Physical activity:     Days per week: Not on file     Minutes per session: Not on file    Stress: Not on file   Relationships    Social connections:     Talks on phone: Not on file     Gets together: Not on file     Attends Protestant service: Not on file     Active member of club or organization: Not on file     Attends meetings of clubs or organizations: Not on file     Relationship status: Not on file    Intimate partner violence:     Fear of current or ex partner: Not on file     Emotionally abused: Not on file     Physically abused: Not on file     Forced sexual activity: Not on file   Other Topics Concern    Not on file   Social History Narrative    Not on file     Family History:   History reviewed. No pertinent family history.     REVIEW OF SYSTEMS:    CONSTITUTIONAL:  negative for  fevers, chills, sweats and fatigue  RESPIRATORY:  negative for  dry cough, cough with sputum, dyspnea, wheezing and chest pain  CARDIOVASCULAR:  negative for  chest pain, dyspnea, palpitations, syncope  GASTROINTESTINAL:  negative for nausea, vomiting, change in bowel habits, diarrhea, constipation and abdominal pain  BEHAVIOR/PSYCH:  negative for poor appetite, increased appetite, decreased sleep and poor concentration  MUSCULOSKELETAL:  positive for  arthralgias, joint swelling and stiff joints      PHYSICAL EXAM:    VITALS:  BP (!) 147/87 (Site: Right Upper Arm, Position: Sitting)   Pulse 91   Resp 18   Ht 5' 11\" (1.803 m)   Wt 240 lb (108.9 kg)   BMI 33.47 kg/m²     CONSTITUTIONAL:  awake, alert, cooperative, no apparent distress, and appears stated age    LUNGS:  No increased work of breathing, good air exchange, clear to auscultation bilaterally, no crackles or wheezing    CARDIOVASCULAR:  Normal apical impulse, regular rate and rhythm, normal S1 and S2, no S3 or S4, and no murmur noted    ABDOMEN: Soft, Non-tender to palpation     left Hand exam:    The skin overlying the hand is  intact. There is  evidence of scar, lesion, laceration, or abrasion. There is no masses or adenopathy in bilateral upper extremities. Radial pulses are 2+ and symmetric bilaterally. Capillary refill is intact and < 2 seconds. + tenderness to the PIP joint middle finger, + deformity with ulnar angulation to the middle finger, + swelling and stiffness to the middle finger       DATA:    Radiology Review:  left finger XR - 3 views - AP/Lateral/Oblique    Showing left middle pip dorsal fracture dislocation with 40 % of articular surface fracture volar lip. With ulnar subluxation   Impression : Left middle finger PIP fx / dislocation    IMPRESSION/RECOMMENDATIONS:      Impression:   Encounter Diagnosis   Name Primary?  Closed dislocation of left middle finger Yes     Discussed Diagnosis and treatment options with patient. Recommend Closed reduction vs open reduction with possible left middle finger arthroplasty. Patient is in agreement with plan. Risks, benefits, and alternatives were discussed with the patient and their family. Risks include but are not limited to infection, blood loss, damage to neurovascular and musculoskeletal structures, malunion, nonunion, symptomatic hardware, need for further surgery, possible arthritis despite surgical stabilization, stiffness, and catastrophic anesthesia complications. They understand and wish to proceed. I have seen and evaluated the patient and agree with the above assessment and plan on today's visit.  I have performed the key components of the history and physical examination with significant findings of left middle finger closed displaced fracture dislocation dorsally involving middle phalanx and proximal interphalangeal joint with significant bone loss involving the articular surface of the middle phalanx. Discussed treatment options including closed versus open reduction and possible need for a PIP joint arthroplasty. After discussion he was interested in proceeding with closed possible open reduction with volar plate arthroplasty versus PIP joint arthroplasty with Silastic implant. . I concur with the findings and plan as documented.     Yehuda Mcnally MD  4/25/2019

## 2019-04-29 ENCOUNTER — PREP FOR PROCEDURE (OUTPATIENT)
Dept: ORTHOPEDIC SURGERY | Age: 64
End: 2019-04-29

## 2019-04-29 RX ORDER — SODIUM CHLORIDE 9 MG/ML
INJECTION, SOLUTION INTRAVENOUS CONTINUOUS
Status: CANCELLED | OUTPATIENT
Start: 2019-04-29

## 2019-04-29 RX ORDER — SODIUM CHLORIDE 0.9 % (FLUSH) 0.9 %
10 SYRINGE (ML) INJECTION EVERY 12 HOURS SCHEDULED
Status: CANCELLED | OUTPATIENT
Start: 2019-04-29

## 2019-04-29 RX ORDER — SODIUM CHLORIDE 0.9 % (FLUSH) 0.9 %
10 SYRINGE (ML) INJECTION PRN
Status: CANCELLED | OUTPATIENT
Start: 2019-04-29

## 2019-04-30 NOTE — PROGRESS NOTES
Reviewed pt's EKG done on 4/13/2019 with Dr. Oswaldo Gonsalez. Pt will need cardiac clearance prior to OR. Message left on Cherelle Rios, at Dr. Hakeem Salazar office voicemail.

## 2019-05-01 ENCOUNTER — OFFICE VISIT (OUTPATIENT)
Dept: CARDIOLOGY CLINIC | Age: 64
End: 2019-05-01
Payer: MEDICARE

## 2019-05-01 VITALS
WEIGHT: 252 LBS | DIASTOLIC BLOOD PRESSURE: 60 MMHG | BODY MASS INDEX: 35.28 KG/M2 | SYSTOLIC BLOOD PRESSURE: 112 MMHG | HEART RATE: 76 BPM | HEIGHT: 71 IN

## 2019-05-01 DIAGNOSIS — I25.810 CORONARY ARTERY DISEASE INVOLVING CORONARY BYPASS GRAFT OF NATIVE HEART WITHOUT ANGINA PECTORIS: Primary | ICD-10-CM

## 2019-05-01 DIAGNOSIS — Z01.810 PREOP CARDIOVASCULAR EXAM: ICD-10-CM

## 2019-05-01 DIAGNOSIS — I31.39 PERICARDIAL EFFUSION: ICD-10-CM

## 2019-05-01 PROCEDURE — 99213 OFFICE O/P EST LOW 20 MIN: CPT | Performed by: NURSE PRACTITIONER

## 2019-05-01 PROCEDURE — 93000 ELECTROCARDIOGRAM COMPLETE: CPT | Performed by: NURSE PRACTITIONER

## 2019-05-01 NOTE — PROGRESS NOTES
[facility] PHYSICIAN CARDIOLOGY   OFFICE VISIT        PRIMARY CARE PHYSICIAN:      MARTY Cooper NP         ALLERGIES / SENSITIVITIES:      No Known Allergies       REVIEWED MEDICATIONS:       Current Outpatient Medications   Medication Sig Dispense Refill    busPIRone (BUSPAR) 5 MG tablet Take 15 mg by mouth 2 times daily       gabapentin (NEURONTIN) 300 MG capsule Take 300 mg by mouth 3 times daily      cyclobenzaprine (FLEXERIL) 10 MG tablet Take 10 mg by mouth 3 times daily as needed for Muscle spasms      ibuprofen (ADVIL;MOTRIN) 600 MG tablet Take 600 mg by mouth      omeprazole (PRILOSEC) 20 MG capsule       PROAIR  (90 BASE) MCG/ACT inhaler       ASPIRIN LOW DOSE 81 MG EC tablet Take 81 mg by mouth daily       nicotine (NICODERM CQ) 14 MG/24HR   0    sertraline (ZOLOFT) 100 MG tablet 100 mg daily       lisinopril (PRINIVIL;ZESTRIL) 5 MG tablet Take 5 mg by mouth daily      nitroGLYCERIN (NITROSTAT) 0.4 MG SL tablet Place 1 tablet under the tongue every 5 minutes as needed for Chest pain 25 tablet 3    pravastatin (PRAVACHOL) 80 MG tablet Take 1 tablet by mouth daily 30 tablet 3     No current facility-administered medications for this visit. S: REASON FOR VISIT:  Cardiac risk stratification prior to upcoming outpatient finger surgery. Management coronary artery disease history of pericardial effusion, he is followed here by Lili Rose. Since his last visit here a year ago, He was admitted to [facility] on April 13 with a sore throat difficulty swallowing and a finger fracture. He had fallen at home after tripping while walking his dog and injured his left hand. There was no syncope. He went to urgent care and there was a fracture of 3rd finger, left hand. He was sent to the emergency room for orthopedic consult he was to be evaluated by orthopedic surgery. However he refused to wait for the orthopedic consult, refused to stay , so the finger was splinted .  He was then seen by Orthopedic surgery as an outpatient and surgery is planned for Friday. He denies that there was any loss of consciousness with this fall. He denies any chest pain currently but had an episode about 2 weeks ago. He is recovering from pneumonia and had a right-sided chest pain which lasted about 10 minutes and was relieved with nitroglycerin. the pain occurred at rest and was different from his usual heart pain which is on the left side of his chest.  He denies dyspnea, palpitations, dizziness, presyncope and syncope and swelling of the lower extremities. He continues to smoke. REVIEW OF SYSTEMS:       Constitutional: no fevers or chills or fatigue   HEENT: denies changes in hearing or vision, No difficulty swallowing   Endocrine: no weight changes   Musculoskeletal: no arthralgias or myalgias   Skin: denies rashes or itching or lesions   Heme/Lymph: denies bleeding   Heart: as above   Lungs: as above   GI: denies abdominal pain, nausea, vomiting, diarrhea, rectal bleeding, tarry stools   : denies hematuria, dysuria   Psych: denies mood change, anxiety, depression. Neuro: denies numbness, tingling, tremors. CARDIOVASCULAR HISTORY:     1. Obesity   2.  tobacco abuse which is ongoing   3.  hypertension   4.   CABG in 2005 with a LIMA to the LAD saphenous vein graft to the RCA and saphenous vein graft to the diagonal   5.  stress test March 2015, treadmill without Myoview  6. 2-D echo February 5, 2018  EF 64% and mild left ventricular concentric her trip and reduced quality due to poor acoustic window and left atrium mild to moderately dilated and mild right ventricular hypertrophy and mildly enlarged right atrial size and borderline dilated aortic root small-to-moderate circumferential pericardial effusion    7. 2-D echo April 13, 2018  EF 55-60%   left ventricle is normal in eyes with mild left ventricular concentric hypertrophy and normal left ventricular systolic function and trivial pericardial effusion    8. Past Medical History:   Diagnosis Date    CAD (coronary artery disease)     COPD (chronic obstructive pulmonary disease) ([facility] [address]     Depression     Hypertension        Past Surgical History:   Procedure Laterality Date    CORONARY ARTERY BYPASS GRAFT  3/2/05    x3: LIMA to LAD, SVG to RCA, SVG to diagonal    DIAGNOSTIC CARDIAC CATH LAB PROCEDURE  3/02/05    [facility]: Severe multivessel CAD in patient who presents with STEMI and total occlusion of diagonal branch. Significant disease of proximal LAD and severe disease of dominant RCA.  HERNIA REPAIR      double       O:  COMPLETE PHYSICAL EXAM:       /60 (Cuff Size: Large Adult)   Pulse 76   Ht 5' 11\" (1.803 m)   Wt 252 lb (114.3 kg)   BMI 35.15 kg/m²       General:   in no acute distress. Well-nourished well-developed   Skin                  Warm and dry, no rashes   Head & Neck:  No JVD. No carotid bruits. Mucous membranes moist.   Chest:   No deformities. Symmetrical and nontender. No respiratory distress   Lungs:   Clear to auscultation bilaterally. Heart:  Normal S1 and S2. No S3 or S4. No Murmur. No gallops no rubs   Abdomen: Soft without organomegaly or masses. Nontender, Bowel sound     normoactive   Extremities:  No edema of lower extremities .  No cyanosis and moves all extremities   Neuro:  Alert & orient x 3 no focal deficits     REVIEW OF DIAGNOSTIC TESTS:     EKG today sinus rhythm prominent R in V1 and left axis and nonspecific T-wave inversion laterally    Chest x-ray from April 11 shows no active pulmonary disease   CT of the chest from April 13, 2009    Lab Results   Component Value Date     04/13/2019     08/08/2016     05/23/2016    K 4.5 04/13/2019    K 4.7 08/08/2016    K 4.8 05/23/2016     04/13/2019     08/08/2016    CL 98 05/23/2016    CO2 23 04/13/2019    CO2 23 08/08/2016    CO2 23 05/23/2016    BUN 13 04/13/2019    BUN 13 08/08/2016    BUN 12 05/23/2016 CREATININE 1.1 04/13/2019    CREATININE 0.8 08/08/2016    CREATININE 1.1 05/23/2016         Lab Results   Component Value Date    PROBNP 452 (H) 04/13/2019         ASSESSMENT / DIAGNOSIS:   1. Coronary artery disease is stable. Recent chest pain was atypical and in the setting of pneumonia and different from his usual angina which has not occurred in months  2. Small-to-moderate Moderate pericardial effusion in February of last year but by echocardiogram in April of last year it was trivial   3. Fractured fingers of the left hand  4. Hypertension is well controlled  5. Hyperlipidemia  6. Obesity  7. Tobacco abuse     TREATMENT PLAN:  1. He is cleared for the closed possible open left middle finger reduction/internal fixation without any further cardiac testing   2. continue current medication   3.  counseled to stop smoking   4.  return to the office in 6 months or sooner if needed  5. Consider a beta blocker in the future due to his history of coronary artery disease      The patient's current medication list, allergies, problem list and results of all previously ordered tests were reviewed at today's visit      MARTY Arango - CNP        [facility] CARDIOLOGY  [address] [facility] [address]  [phone]      Cardiology staff: Assessment and recommendations reviewed, agree low risk of ischemic events for proposed noncardiac surgery. Needs continued life style modification and risk factor modification    Ace Ordonez M.D.   [facility]) Cardiology

## 2019-05-02 NOTE — PROGRESS NOTES
[facility] PRE-ADMISSION TESTING INSTRUCTIONS    The Preadmission Testing patient is instructed accordingly using the following criteria (check applicable):    ARRIVAL INSTRUCTIONS:  [x] Parking the day of Surgery is located in the Main Entrance lot. Upon entering the door, make an immediate right to the surgery reception desk    [] [phone] is available Monday through Friday 6 am to 6 pm    [x] Bring photo ID and insurance card    [] Bring in a copy of Living will or Durable Power of  papers. [x] Please be sure to arrange for responsible adult to provide transportation to and from the hospital    [x] Please arrange for responsible adult to be with you for the 24 hour period post procedure due to having anesthesia      GENERAL INSTRUCTIONS:    [x] Nothing by mouth after midnight, including gum, candy, mints or water    [x] You may brush your teeth, but do not swallow any water    [x] Take medications as instructed with 1-2 oz of water    [] Stop herbal supplements and vitamins 5 days prior to procedure    [] Follow preop dosing of blood thinners per physician instructions    [] Take 1/2 dose of evening insulin, but no insulin after midnight    [] No oral diabetic medications after midnight    [] If diabetic and have low blood sugar or feel symptomatic, take 1-2oz apple juice only    [] Bring inhalers day of surgery    [] Bring C-PAP/ Bi-Pap day of surgery    [] Bring urine specimen day of surgery    [x] Shower or bath with soap, lather and rinse well, AM of Surgery, no lotion, powders or creams to surgical site    [] Follow bowel prep as instructed per surgeon    [x] No tobacco products within 24 hours of surgery     [x] No alcohol or illegal drug use within 24 hours of surgery.     [x] Jewelry, body piercing's, eyeglasses, contact lenses and dentures are not permitted into surgery (bring cases)      [] Please do not wear any nail polish, make up or hair products on the day of surgery    [] If not already done, you can expect a call from registration    [] You can expect a call the business day prior to procedure to notify you if your arrival time changes    [x] If you receive a survey after surgery we would greatly appreciate your comments    [] Parent/guardian of a minor must accompany their child and remain on the premises  the entire time they are under our care     [] Pediatric patients may bring favorite toy, blanket or comfort item with them    [] A caregiver or family member must remain with the patient during their stay if they are mentally handicapped, have dementia, disoriented or unable to use a call light or would be a safety concern if left unattended    [] Please notify surgeon if you develop any illness between now and time of surgery (cold, cough, sore throat, fever, nausea, vomiting) or any signs of infections  including skin, wounds, and dental.    []  The Outpatient Pharmacy is available to fill your prescription here on your day of surgery, ask your preop nurse for details    [] Other instructions  EDUCATIONAL MATERIALS PROVIDED:    [] PAT Preoperative Education Packet/Booklet     [] Medication List    [] Fluoroscopy Information Pamphlet    [] Transfusion bracelet applied with instructions    [] Joint replacement video reviewed    [] Shower with soap, lather and rinse well, and use CHG wipes provided the evening before surgery as instructed

## 2019-05-03 ENCOUNTER — HOSPITAL ENCOUNTER (OUTPATIENT)
Age: 64
Setting detail: OUTPATIENT SURGERY
Discharge: HOME | End: 2019-05-03
Attending: ORTHOPAEDIC SURGERY | Admitting: ORTHOPAEDIC SURGERY
Payer: MEDICARE

## 2019-05-03 ENCOUNTER — ANESTHESIA (OUTPATIENT)
Dept: OPERATING ROOM | Age: 64
End: 2019-05-03
Payer: MEDICARE

## 2019-05-03 ENCOUNTER — ANESTHESIA EVENT (OUTPATIENT)
Dept: OPERATING ROOM | Age: 64
End: 2019-05-03
Payer: MEDICARE

## 2019-05-03 ENCOUNTER — HOSPITAL ENCOUNTER (OUTPATIENT)
Dept: GENERAL RADIOLOGY | Age: 64
Discharge: HOME | End: 2019-05-05
Attending: ORTHOPAEDIC SURGERY
Payer: MEDICARE

## 2019-05-03 VITALS
RESPIRATION RATE: 18 BRPM | WEIGHT: 250 LBS | BODY MASS INDEX: 35 KG/M2 | DIASTOLIC BLOOD PRESSURE: 79 MMHG | OXYGEN SATURATION: 96 % | SYSTOLIC BLOOD PRESSURE: 158 MMHG | HEART RATE: 78 BPM | TEMPERATURE: 97.5 F | HEIGHT: 71 IN

## 2019-05-03 VITALS — SYSTOLIC BLOOD PRESSURE: 112 MMHG | DIASTOLIC BLOOD PRESSURE: 64 MMHG | TEMPERATURE: 96.8 F | OXYGEN SATURATION: 91 %

## 2019-05-03 DIAGNOSIS — R52 PAIN: ICD-10-CM

## 2019-05-03 DIAGNOSIS — S63.259S DISLOCATION OF FINGER, SEQUELA: Primary | ICD-10-CM

## 2019-05-03 PROCEDURE — 7100000000 HC PACU RECOVERY - FIRST 15 MIN: Performed by: ORTHOPAEDIC SURGERY

## 2019-05-03 PROCEDURE — 2709999900 HC NON-CHARGEABLE SUPPLY: Performed by: ORTHOPAEDIC SURGERY

## 2019-05-03 PROCEDURE — 7100000011 HC PHASE II RECOVERY - ADDTL 15 MIN: Performed by: ORTHOPAEDIC SURGERY

## 2019-05-03 PROCEDURE — 7100000010 HC PHASE II RECOVERY - FIRST 15 MIN: Performed by: ORTHOPAEDIC SURGERY

## 2019-05-03 PROCEDURE — 3600000012 HC SURGERY LEVEL 2 ADDTL 15MIN: Performed by: ORTHOPAEDIC SURGERY

## 2019-05-03 PROCEDURE — C1776 JOINT DEVICE (IMPLANTABLE): HCPCS | Performed by: ORTHOPAEDIC SURGERY

## 2019-05-03 PROCEDURE — 7100000001 HC PACU RECOVERY - ADDTL 15 MIN: Performed by: ORTHOPAEDIC SURGERY

## 2019-05-03 PROCEDURE — 2580000003 HC RX 258: Performed by: PHYSICIAN ASSISTANT

## 2019-05-03 PROCEDURE — 26536 REVISE/IMPLANT FINGER JOINT: CPT | Performed by: ORTHOPAEDIC SURGERY

## 2019-05-03 PROCEDURE — 6360000002 HC RX W HCPCS: Performed by: NURSE ANESTHETIST, CERTIFIED REGISTERED

## 2019-05-03 PROCEDURE — 6360000002 HC RX W HCPCS: Performed by: PHYSICIAN ASSISTANT

## 2019-05-03 PROCEDURE — 6370000000 HC RX 637 (ALT 250 FOR IP): Performed by: ANESTHESIOLOGY

## 2019-05-03 PROCEDURE — 6360000002 HC RX W HCPCS: Performed by: ANESTHESIOLOGY

## 2019-05-03 PROCEDURE — 2500000003 HC RX 250 WO HCPCS: Performed by: ORTHOPAEDIC SURGERY

## 2019-05-03 PROCEDURE — 3600000002 HC SURGERY LEVEL 2 BASE: Performed by: ORTHOPAEDIC SURGERY

## 2019-05-03 PROCEDURE — 2500000003 HC RX 250 WO HCPCS: Performed by: NURSE ANESTHETIST, CERTIFIED REGISTERED

## 2019-05-03 PROCEDURE — 3700000001 HC ADD 15 MINUTES (ANESTHESIA): Performed by: ORTHOPAEDIC SURGERY

## 2019-05-03 PROCEDURE — 3209999900 FLUORO FOR SURGICAL PROCEDURES

## 2019-05-03 PROCEDURE — 3700000000 HC ANESTHESIA ATTENDED CARE: Performed by: ORTHOPAEDIC SURGERY

## 2019-05-03 DEVICE — FLEXSPAN FINGER JOINT IMPLANT W/GROMMETS
Type: IMPLANTABLE DEVICE | Site: FINGERS | Status: FUNCTIONAL
Brand: SWANSON

## 2019-05-03 RX ORDER — KETOROLAC TROMETHAMINE 30 MG/ML
INJECTION, SOLUTION INTRAMUSCULAR; INTRAVENOUS PRN
Status: DISCONTINUED | OUTPATIENT
Start: 2019-05-03 | End: 2019-05-03 | Stop reason: SDUPTHER

## 2019-05-03 RX ORDER — PROMETHAZINE HYDROCHLORIDE 25 MG/ML
25 INJECTION, SOLUTION INTRAMUSCULAR; INTRAVENOUS PRN
Status: DISCONTINUED | OUTPATIENT
Start: 2019-05-03 | End: 2019-05-03 | Stop reason: HOSPADM

## 2019-05-03 RX ORDER — ONDANSETRON 2 MG/ML
INJECTION INTRAMUSCULAR; INTRAVENOUS PRN
Status: DISCONTINUED | OUTPATIENT
Start: 2019-05-03 | End: 2019-05-03 | Stop reason: SDUPTHER

## 2019-05-03 RX ORDER — MIDAZOLAM HYDROCHLORIDE 1 MG/ML
INJECTION INTRAMUSCULAR; INTRAVENOUS PRN
Status: DISCONTINUED | OUTPATIENT
Start: 2019-05-03 | End: 2019-05-03 | Stop reason: SDUPTHER

## 2019-05-03 RX ORDER — BUPIVACAINE HYDROCHLORIDE 5 MG/ML
INJECTION, SOLUTION EPIDURAL; INTRACAUDAL PRN
Status: DISCONTINUED | OUTPATIENT
Start: 2019-05-03 | End: 2019-05-03 | Stop reason: ALTCHOICE

## 2019-05-03 RX ORDER — SODIUM CHLORIDE 0.9 % (FLUSH) 0.9 %
10 SYRINGE (ML) INJECTION EVERY 12 HOURS SCHEDULED
Status: DISCONTINUED | OUTPATIENT
Start: 2019-05-03 | End: 2019-05-03 | Stop reason: HOSPADM

## 2019-05-03 RX ORDER — OXYCODONE HYDROCHLORIDE AND ACETAMINOPHEN 5; 325 MG/1; MG/1
1 TABLET ORAL EVERY 6 HOURS PRN
Qty: 28 TABLET | Refills: 0 | Status: SHIPPED | OUTPATIENT
Start: 2019-05-03 | End: 2019-05-10

## 2019-05-03 RX ORDER — SODIUM CHLORIDE 0.9 % (FLUSH) 0.9 %
10 SYRINGE (ML) INJECTION PRN
Status: DISCONTINUED | OUTPATIENT
Start: 2019-05-03 | End: 2019-05-03 | Stop reason: HOSPADM

## 2019-05-03 RX ORDER — LABETALOL HYDROCHLORIDE 5 MG/ML
5 INJECTION, SOLUTION INTRAVENOUS EVERY 10 MIN PRN
Status: DISCONTINUED | OUTPATIENT
Start: 2019-05-03 | End: 2019-05-03 | Stop reason: HOSPADM

## 2019-05-03 RX ORDER — PROPOFOL 10 MG/ML
INJECTION, EMULSION INTRAVENOUS PRN
Status: DISCONTINUED | OUTPATIENT
Start: 2019-05-03 | End: 2019-05-03 | Stop reason: SDUPTHER

## 2019-05-03 RX ORDER — SODIUM CHLORIDE 9 MG/ML
INJECTION, SOLUTION INTRAVENOUS CONTINUOUS
Status: DISCONTINUED | OUTPATIENT
Start: 2019-05-03 | End: 2019-05-03 | Stop reason: HOSPADM

## 2019-05-03 RX ORDER — LIDOCAINE HYDROCHLORIDE 20 MG/ML
INJECTION, SOLUTION EPIDURAL; INFILTRATION; INTRACAUDAL; PERINEURAL PRN
Status: DISCONTINUED | OUTPATIENT
Start: 2019-05-03 | End: 2019-05-03 | Stop reason: SDUPTHER

## 2019-05-03 RX ORDER — DEXAMETHASONE SODIUM PHOSPHATE 4 MG/ML
INJECTION, SOLUTION INTRA-ARTICULAR; INTRALESIONAL; INTRAMUSCULAR; INTRAVENOUS; SOFT TISSUE PRN
Status: DISCONTINUED | OUTPATIENT
Start: 2019-05-03 | End: 2019-05-03 | Stop reason: SDUPTHER

## 2019-05-03 RX ORDER — FENTANYL CITRATE 50 UG/ML
INJECTION, SOLUTION INTRAMUSCULAR; INTRAVENOUS PRN
Status: DISCONTINUED | OUTPATIENT
Start: 2019-05-03 | End: 2019-05-03 | Stop reason: SDUPTHER

## 2019-05-03 RX ORDER — MEPERIDINE HYDROCHLORIDE 25 MG/ML
12.5 INJECTION INTRAMUSCULAR; INTRAVENOUS; SUBCUTANEOUS EVERY 5 MIN PRN
Status: DISCONTINUED | OUTPATIENT
Start: 2019-05-03 | End: 2019-05-03 | Stop reason: HOSPADM

## 2019-05-03 RX ORDER — OXYCODONE HYDROCHLORIDE AND ACETAMINOPHEN 5; 325 MG/1; MG/1
1 TABLET ORAL
Status: COMPLETED | OUTPATIENT
Start: 2019-05-03 | End: 2019-05-03

## 2019-05-03 RX ADMIN — OXYCODONE HYDROCHLORIDE AND ACETAMINOPHEN 1 TABLET: 5; 325 TABLET ORAL at 14:49

## 2019-05-03 RX ADMIN — LIDOCAINE HYDROCHLORIDE 100 MG: 20 INJECTION, SOLUTION EPIDURAL; INFILTRATION; INTRACAUDAL; PERINEURAL at 12:23

## 2019-05-03 RX ADMIN — KETOROLAC TROMETHAMINE 30 MG: 30 INJECTION, SOLUTION INTRAMUSCULAR; INTRAVENOUS at 13:35

## 2019-05-03 RX ADMIN — MIDAZOLAM HYDROCHLORIDE 2 MG: 1 INJECTION, SOLUTION INTRAMUSCULAR; INTRAVENOUS at 12:19

## 2019-05-03 RX ADMIN — PROPOFOL 150 MG: 10 INJECTION, EMULSION INTRAVENOUS at 12:23

## 2019-05-03 RX ADMIN — FENTANYL CITRATE 100 MCG: 50 INJECTION, SOLUTION INTRAMUSCULAR; INTRAVENOUS at 12:23

## 2019-05-03 RX ADMIN — HYDROMORPHONE HYDROCHLORIDE 0.25 MG: 1 INJECTION, SOLUTION INTRAMUSCULAR; INTRAVENOUS; SUBCUTANEOUS at 14:07

## 2019-05-03 RX ADMIN — Medication 2 G: at 12:19

## 2019-05-03 RX ADMIN — FENTANYL CITRATE 50 MCG: 50 INJECTION, SOLUTION INTRAMUSCULAR; INTRAVENOUS at 12:40

## 2019-05-03 RX ADMIN — HYDROMORPHONE HYDROCHLORIDE: 1 INJECTION, SOLUTION INTRAMUSCULAR; INTRAVENOUS; SUBCUTANEOUS at 13:59

## 2019-05-03 RX ADMIN — SODIUM CHLORIDE: 9 INJECTION, SOLUTION INTRAVENOUS at 12:19

## 2019-05-03 RX ADMIN — FENTANYL CITRATE 50 MCG: 50 INJECTION, SOLUTION INTRAMUSCULAR; INTRAVENOUS at 12:51

## 2019-05-03 RX ADMIN — ONDANSETRON HYDROCHLORIDE 4 MG: 2 INJECTION, SOLUTION INTRAMUSCULAR; INTRAVENOUS at 13:36

## 2019-05-03 RX ADMIN — DEXAMETHASONE SODIUM PHOSPHATE 10 MG: 4 INJECTION, SOLUTION INTRAMUSCULAR; INTRAVENOUS at 12:32

## 2019-05-03 ASSESSMENT — PULMONARY FUNCTION TESTS
PIF_VALUE: 15
PIF_VALUE: 2
PIF_VALUE: 11
PIF_VALUE: 13
PIF_VALUE: 16
PIF_VALUE: 19
PIF_VALUE: 11
PIF_VALUE: 14
PIF_VALUE: 22
PIF_VALUE: 21
PIF_VALUE: 14
PIF_VALUE: 14
PIF_VALUE: 25
PIF_VALUE: 12
PIF_VALUE: 17
PIF_VALUE: 14
PIF_VALUE: 16
PIF_VALUE: 12
PIF_VALUE: 2
PIF_VALUE: 18
PIF_VALUE: 18
PIF_VALUE: 17
PIF_VALUE: 17
PIF_VALUE: 21
PIF_VALUE: 16
PIF_VALUE: 11
PIF_VALUE: 16
PIF_VALUE: 21
PIF_VALUE: 22
PIF_VALUE: 30
PIF_VALUE: 28
PIF_VALUE: 21
PIF_VALUE: 11
PIF_VALUE: 11
PIF_VALUE: 2
PIF_VALUE: 15
PIF_VALUE: 21
PIF_VALUE: 21
PIF_VALUE: 22
PIF_VALUE: 12
PIF_VALUE: 21
PIF_VALUE: 21
PIF_VALUE: 17
PIF_VALUE: 27
PIF_VALUE: 22
PIF_VALUE: 17
PIF_VALUE: 11
PIF_VALUE: 14
PIF_VALUE: 16
PIF_VALUE: 11
PIF_VALUE: 11
PIF_VALUE: 21
PIF_VALUE: 18
PIF_VALUE: 21
PIF_VALUE: 21
PIF_VALUE: 11
PIF_VALUE: 16
PIF_VALUE: 16
PIF_VALUE: 22
PIF_VALUE: 17
PIF_VALUE: 13
PIF_VALUE: 21
PIF_VALUE: 2
PIF_VALUE: 14
PIF_VALUE: 11
PIF_VALUE: 0
PIF_VALUE: 17
PIF_VALUE: 17
PIF_VALUE: 3
PIF_VALUE: 11
PIF_VALUE: 11
PIF_VALUE: 21
PIF_VALUE: 16
PIF_VALUE: 2
PIF_VALUE: 11
PIF_VALUE: 31
PIF_VALUE: 20
PIF_VALUE: 16
PIF_VALUE: 17
PIF_VALUE: 14
PIF_VALUE: 11

## 2019-05-03 ASSESSMENT — PAIN SCALES - GENERAL
PAINLEVEL_OUTOF10: 5
PAINLEVEL_OUTOF10: 5
PAINLEVEL_OUTOF10: 4
PAINLEVEL_OUTOF10: 6
PAINLEVEL_OUTOF10: 6
PAINLEVEL_OUTOF10: 5
PAINLEVEL_OUTOF10: 3

## 2019-05-03 ASSESSMENT — PAIN DESCRIPTION - FREQUENCY
FREQUENCY: CONTINUOUS
FREQUENCY: CONTINUOUS

## 2019-05-03 ASSESSMENT — PAIN DESCRIPTION - PROGRESSION
CLINICAL_PROGRESSION: NOT CHANGED
CLINICAL_PROGRESSION: GRADUALLY IMPROVING

## 2019-05-03 ASSESSMENT — PAIN DESCRIPTION - PAIN TYPE
TYPE: SURGICAL PAIN

## 2019-05-03 ASSESSMENT — PAIN DESCRIPTION - LOCATION
LOCATION: HAND;FINGER (COMMENT WHICH ONE)
LOCATION: HAND;FINGER (COMMENT WHICH ONE)
LOCATION: HAND

## 2019-05-03 ASSESSMENT — PAIN - FUNCTIONAL ASSESSMENT
PAIN_FUNCTIONAL_ASSESSMENT: 0-10
PAIN_FUNCTIONAL_ASSESSMENT: PREVENTS OR INTERFERES SOME ACTIVE ACTIVITIES AND ADLS
PAIN_FUNCTIONAL_ASSESSMENT: PREVENTS OR INTERFERES SOME ACTIVE ACTIVITIES AND ADLS

## 2019-05-03 ASSESSMENT — PAIN DESCRIPTION - ORIENTATION
ORIENTATION: LEFT

## 2019-05-03 ASSESSMENT — LIFESTYLE VARIABLES: SMOKING_STATUS: 1

## 2019-05-03 ASSESSMENT — PAIN DESCRIPTION - ONSET: ONSET: ON-GOING

## 2019-05-03 ASSESSMENT — PAIN DESCRIPTION - DESCRIPTORS
DESCRIPTORS: DISCOMFORT
DESCRIPTORS: NUMBNESS;DISCOMFORT

## 2019-05-03 NOTE — BRIEF OP NOTE
Brief Postoperative Note  ______________________________________________________________    Patient: Rosita Hebert  YOB: 1955  MRN: [id number]  Date of Procedure: 5/3/2019    Pre-Op Diagnosis: CLOSED DISLOCATION OF LEFT MIDDLE FINGER    Post-Op Diagnosis: Same       Procedure(s):  CLOSED POSSIBLE LEFT MIDDLE FINGER OPEN REDUCTION INTERNAL FIXATION POSSIBLE PROXIMAL INTERPHALANGEAL JOINT ARTHROPLASTY   ++[employer]++    Anesthesia: General    Surgeon(s):  Felicia Ayala MD    Assistant: none    Estimated Blood Loss (mL): less than 50     Complications: None    Specimens:   * No specimens in log *    Implants:  Implant Name Type Inv.  Item Serial No.  Lot No. LRB No. Used   IMPL FINGER JOINT  W/GROMMET Tristan Catching Shoulder/Arm/Wrist/Hand IMPL FINGER JOINT  W/GROMMET Tristan Catching  [address] [facility] [id number] Left 1         Drains: * No LDAs found *    Findings: see op note    Felicia Ayala MD  Date: 5/3/2019  Time: 1:36 PM

## 2019-05-03 NOTE — H&P
Department of Orthopedic Surgery/Hand Surgery  History and Physical           CHIEF COMPLAINT:         Chief Complaint   Patient presents with    Finger Injury       Left middle finger          History Obtained From:  patient     HISTORY OF PRESENT ILLNESS:                 Luciana Haas is a 59y.o. year old  male who presents for evaluation of left middle finger pain. he reports this started 2 weeks ago. Pt states he tripped and jammed the middle finger at home Patient seen in the ER 2 weeks ago and place in a splint with no reduction. Referral by Dr. Sharmila Dyer   The patient is Right hand dominant. The patients occupation is retired.     Past Medical History:    Past Medical History        Past Medical History:   Diagnosis Date    CAD (coronary artery disease)      Depression      Hypertension           Past Surgical History:    Past Surgical History         Past Surgical History:   Procedure Laterality Date    CORONARY ARTERY BYPASS GRAFT   3/2/05     x3: LIMA to LAD, SVG to RCA, SVG to diagonal    DIAGNOSTIC CARDIAC CATH LAB PROCEDURE   3/02/05     [facility]: Severe multivessel CAD in patient who presents with STEMI and total occlusion of diagonal branch. Significant disease of proximal LAD and severe disease of dominant RCA.  HERNIA REPAIR         double         Current Medications:   Current Hospital Medications   No current facility-administered medications for this visit.       Allergies:  No Known Allergies     Social History:   Social History               Socioeconomic History    Marital status:        Spouse name: Not on file    Number of children: Not on file    Years of education: Not on file    Highest education level: Not on file   Occupational History    Not on file   Social Needs    Financial resource strain: Not on file    Food insecurity:       Worry: Not on file       Inability: Not on file    Transportation needs:       Medical: Not on file       Non-medical: Not on file   Tobacco Use    Smoking status: Current Every Day Smoker       Packs/day: 1.00       Years: 45.00       Pack years: 45.00       Types: Cigars    Smokeless tobacco: Never Used    Tobacco comment: SMOKES CIGARS    Substance and Sexual Activity    Alcohol use: Yes       Frequency: Monthly or less       Comment: 1 beer daily. ..roughly    Drug use: No    Sexual activity: Yes       Partners: Female   Lifestyle    Physical activity:       Days per week: Not on file       Minutes per session: Not on file    Stress: Not on file   Relationships    Social connections:       Talks on phone: Not on file       Gets together: Not on file       Attends Tenriism service: Not on file       Active member of club or organization: Not on file       Attends meetings of clubs or organizations: Not on file       Relationship status: Not on file    Intimate partner violence:       Fear of current or ex partner: Not on file       Emotionally abused: Not on file       Physically abused: Not on file       Forced sexual activity: Not on file   Other Topics Concern    Not on file   Social History Narrative    Not on file         Family History:   Family History   History reviewed.  No pertinent family history.        REVIEW OF SYSTEMS:    CONSTITUTIONAL:  negative for  fevers, chills, sweats and fatigue  RESPIRATORY:  negative for  dry cough, cough with sputum, dyspnea, wheezing and chest pain  CARDIOVASCULAR:  negative for  chest pain, dyspnea, palpitations, syncope  GASTROINTESTINAL:  negative for nausea, vomiting, change in bowel habits, diarrhea, constipation and abdominal pain  BEHAVIOR/PSYCH:  negative for poor appetite, increased appetite, decreased sleep and poor concentration  MUSCULOSKELETAL:  positive for  arthralgias, joint swelling and stiff joints        PHYSICAL EXAM:    VITALS:  BP (!) 147/87 (Site: Right Upper Arm, Position: Sitting)   Pulse 91   Resp 18   Ht 5' 11\" (1.803 m)   Wt 240 lb (108.9 kg)   BMI with the patient. There has been no significant interval changes.       Electronically signed by Nancy Jefferson MD on 5/3/2019 at 11:38 AM

## 2019-05-03 NOTE — PROGRESS NOTES
1440 nourishment provided;ex wife to bedside  1510 assisted to restroom then to chair  1530 discharge instructions reviewed;verbalizes understanding

## 2019-05-03 NOTE — ANESTHESIA PRE PROCEDURE
Department of Anesthesiology  Preprocedure Note       Name:  Sary Parent   Age:  59 y.o.  :  1955                                          MRN:  [id number]         Date:  5/3/2019      Surgeon: Jesse Mustafa):  Tigre Onofre MD    Procedure: CLOSED POSSIBLE LEFT MIDDLE FINGER OPEN REDUCTION INTERNAL FIXATION POSSIBLE PROXIMAL INTERPHALANGEAL JOINT ARTHROPLASTY   ++[employer]++ (Left )    Medications prior to admission:   Prior to Admission medications    Medication Sig Start Date End Date Taking?  Authorizing Provider   busPIRone (BUSPAR) 5 MG tablet Take 15 mg by mouth 2 times daily  16  Yes Historical Provider, MD   gabapentin (NEURONTIN) 300 MG capsule Take 300 mg by mouth 3 times daily   Yes Historical Provider, MD   ibuprofen (ADVIL;MOTRIN) 600 MG tablet Take 600 mg by mouth 10/7/13  Yes Historical Provider, MD   omeprazole (PRILOSEC) 20 MG capsule Take 20 mg by mouth Daily  16  Yes Historical Provider, MD   PROAIR  (90 BASE) MCG/ACT inhaler  16  Yes Historical Provider, MD   ASPIRIN LOW DOSE 81 MG EC tablet Take 81 mg by mouth daily  16  Yes Historical Provider, MD   sertraline (ZOLOFT) 100 MG tablet 100 mg nightly  16  Yes Historical Provider, MD   lisinopril (PRINIVIL;ZESTRIL) 5 MG tablet Take 5 mg by mouth daily   Yes Historical Provider, MD   pravastatin (PRAVACHOL) 80 MG tablet Take 1 tablet by mouth daily 3/23/16  Yes Ron Sanchez MD   nitroGLYCERIN (NITROSTAT) 0.4 MG SL tablet Place 1 tablet under the tongue every 5 minutes as needed for Chest pain 3/23/16   Ron Sanchez MD       Current medications:    Current Facility-Administered Medications   Medication Dose Route Frequency Provider Last Rate Last Dose    0.9 % sodium chloride infusion   Intravenous Continuous TRENT Rubio        ceFAZolin (ANCEF) 2 g in dextrose 5 % 100 mL IVPB  2 g Intravenous On Call to [address]        sodium chloride flush 0.9 % injection 10 mL  10 mL Intravenous 2 times per day TRENT Amado        sodium chloride flush 0.9 % injection 10 mL  10 mL Intravenous PRN TRENT Amado           Allergies:  No Known Allergies    Problem List:    Patient Active Problem List   Diagnosis Code    Essential hypertension I10    Coronary artery disease involving coronary bypass graft of native heart without angina pectoris I25.810    Atypical chest pain R07.89    Pure hypercholesterolemia E78.00    Pericardial effusion I31.3    Chronic obstructive pulmonary disease (HCC) J44.9    Moderate obesity E66.8       Past Medical History:        Diagnosis Date    CAD (coronary artery disease)     COPD (chronic obstructive pulmonary disease) ([facility] [address]     Depression     Fracture of unspecified phalanx of left middle finger, initial encounter for closed fracture     GERD (gastroesophageal reflux disease)     Hypertension        Past Surgical History:        Procedure Laterality Date    COLONOSCOPY      CORONARY ARTERY BYPASS GRAFT  3/2/05    x3: LIMA to LAD, SVG to RCA, SVG to diagonal    DIAGNOSTIC CARDIAC CATH LAB PROCEDURE  3/02/05    [facility]: Severe multivessel CAD in patient who presents with STEMI and total occlusion of diagonal branch. Significant disease of proximal LAD and severe disease of dominant RCA.      HERNIA REPAIR      double       Social History:    Social History     Tobacco Use    Smoking status: Current Every Day Smoker     Packs/day: 1.00     Years: 45.00     Pack years: 45.00     Types: Cigars    Smokeless tobacco: Never Used    Tobacco comment: SMOKES CIGARS    Substance Use Topics    Alcohol use: Yes     Frequency: Monthly or less     Comment: occassiona                                Ready to quit: Not Answered  Counseling given: Not Answered  Comment: SMOKES CIGARS       Vital Signs (Current):   Vitals:    05/02/19 1251 05/03/19 1058   BP:  (!) 152/75   Pulse:  72   Resp:  18   Temp:  97.8 °F (36.6 °C)   TempSrc:  Temporal   SpO2:  94% Weight: 250 lb (113.4 kg)    Height: 5' 11\" (1.803 m)                                               BP Readings from Last 3 Encounters:   05/03/19 (!) 152/75   05/01/19 112/60   04/25/19 (!) 147/87       NPO Status: Time of last liquid consumption: 2200                        Time of last solid consumption: 2200                        Date of last liquid consumption: 05/02/19                        Date of last solid food consumption: 05/02/19    BMI:   Wt Readings from Last 3 Encounters:   05/02/19 250 lb (113.4 kg)   05/01/19 252 lb (114.3 kg)   04/25/19 240 lb (108.9 kg)     Body mass index is 34.87 kg/m². CBC:   Lab Results   Component Value Date    WBC 7.6 04/13/2019    RBC 4.39 04/13/2019    HGB 14.1 04/13/2019    HCT 42.8 04/13/2019    MCV 97.5 04/13/2019    RDW 13.7 04/13/2019     04/13/2019       CMP:   Lab Results   Component Value Date     04/13/2019    K 4.5 04/13/2019     04/13/2019    CO2 23 04/13/2019    BUN 13 04/13/2019    CREATININE 1.1 04/13/2019    GFRAA >60 04/13/2019    LABGLOM >60 04/13/2019    GLUCOSE 117 04/13/2019    PROT 6.6 02/27/2016    CALCIUM 9.5 04/13/2019    BILITOT 0.6 02/27/2016    ALKPHOS 75 02/27/2016    AST 11 02/27/2016    ALT 11 02/27/2016       POC Tests: No results for input(s): POCGLU, POCNA, POCK, POCCL, POCBUN, POCHEMO, POCHCT in the last 72 hours.     Coags: No results found for: PROTIME, INR, APTT    HCG (If Applicable): No results found for: PREGTESTUR, PREGSERUM, HCG, HCGQUANT     ABGs: No results found for: PHART, PO2ART, ZSI0ACN, MEL3VLY, BEART, C0JWXMNC     Type & Screen (If Applicable):  No results found for: LABABO, [address]    Anesthesia Evaluation  Patient summary reviewed no history of anesthetic complications:   Airway: Mallampati: II  TM distance: >3 FB   Neck ROM: full  Mouth opening: > = 3 FB Dental:          Pulmonary:   (+) COPD:  decreased breath sounds,  current smoker                           Cardiovascular:    (+) hypertension:, CAD:, CABG/stent (CABG):,         Rhythm: regular  Rate: normal                    Neuro/Psych:   (+) psychiatric history (Depression):            GI/Hepatic/Renal:   (+) GERD:,           Endo/Other: Negative Endo/Other ROS                    Abdominal:   (+) obese,         Vascular: negative vascular ROS. Anesthesia Plan      general     ASA 3       Induction: intravenous. MIPS: Postoperative opioids intended and Prophylactic antiemetics administered. Anesthetic plan and risks discussed with patient. Plan discussed with CRNA.                   Randolph Santos MD   5/3/2019

## 2019-05-04 NOTE — ANESTHESIA POSTPROCEDURE EVALUATION
Department of Anesthesiology  Postprocedure Note    Patient: Tanya Garza  MRN: [id number]  YOB: 1955  Date of evaluation: 5/4/2019  Time:  10:26 AM     Procedure Summary     Date:  05/03/19 Room / Location:  [facility] OR 02 / [facility] OR    Anesthesia Start:  1219 Anesthesia Stop:  6506    Procedure:  CLOSED POSSIBLE LEFT MIDDLE FINGER OPEN REDUCTION INTERNAL FIXATION POSSIBLE PROXIMAL INTERPHALANGEAL JOINT ARTHROPLASTY   ++[employer]++ (Left ) Diagnosis:  (CLOSED DISLOCATION OF LEFT MIDDLE FINGER)    Surgeon:  Gualberto Madrigal MD Responsible Provider:  Fortunato Jean MD    Anesthesia Type:  general ASA Status:  3          Anesthesia Type: general    Shalini Phase I: Shalini Score: 9    Shalini Phase II:      Last vitals: Reviewed and per EMR flowsheets.        Anesthesia Post Evaluation    Patient location during evaluation: PACU  Patient participation: complete - patient participated  Level of consciousness: awake  Airway patency: patent  Nausea & Vomiting: no nausea and no vomiting  Complications: no  Cardiovascular status: hemodynamically stable  Respiratory status: acceptable  Hydration status: stable

## 2019-05-04 NOTE — OP NOTE
[address] [address]                                OPERATIVE REPORT    PATIENT NAME: Maye Bird                   :        1955  MED REC NO:   [id number]                            ROOM:  ACCOUNT NO:   [de-identified]                           ADMIT DATE: 2019  PROVIDER:     Fallon Vidales MD    DATE OF PROCEDURE:  2019    PREOPERATIVE DIAGNOSIS:  Left middle finger chronic comminuted dorsal  proximal interphalangeal joint fracture dislocation. POSTOPERATIVE DIAGNOSIS:  Left middle finger chronic comminuted dorsal  proximal interphalangeal joint fracture dislocation. PROCEDURE PERFORMED:  Left middle finger proximal interphalangeal joint  arthroplasty using the [facility] size 3 Silastic implant. ANESTHESIA:  1. General.  2.  Local anesthetic by surgeon consisting of approximately 10 mL of  0.25% Marcaine plain. ASSISTANT:  None. TOURNIQUET TIME:  Approximately 50 minutes at 250 mmHg of brachial  tourniquet. FINDINGS:  1. Intraoperative fluoroscopy was used to confirm fracture dislocation  with a dorsal ulnar dislocation of the middle phalanx on the proximal  phalanx with severe comminution and bone loss involving the middle  phalanx. 2.  Status post debridement of the fracture, there was severe bone loss  and a [facility] Silastic implant arthroplasty was selected. 3.  Status post implant arthroplasty, the PIP joint was stable with full  extension and full flexion achieved intraoperatively as well as  correction of the ulnar deviation deformity with repair of the radial  collateral ligament. COMPLICATIONS:  None. DISPOSITION:  The patient remained stable throughout the procedure.     OPERATIVE INDICATIONS:  The patient is a 81-year-old gentleman who  sustained injury to his left middle finger, presented in the office in a  delayed fashion with a chronic PIP joint fracture dislocation. After  extensive discussion, he wished to proceed with PIP joint arthroplasty  involving a volar plate arthroplasty versus a Silastic implant  arthroplasty. All questions were answered. Risks included but not  limited to the risks of infection; damage to nerves, vessels, tendons;  failure improve his symptoms or worsening symptoms, recurrence of  symptoms; implant failure; stiffness; loss of range of motion; and  unforeseen complications. He understood and wished to proceed. DESCRIPTION OF PROCEDURE:  The patient was identified in the holding  area. The left middle finger was identified as the surgical site. He  was then seen by Anesthesia, taken to the operating room, placed supine  on the table, underwent general anesthesia per Anesthesia Department. All bony prominences were well padded. A well-padded arm tourniquet was  placed. The patient received perioperative antibiotics. Arm was  exsanguinated. Tourniquet was inflated to 250 mmHg. Total tourniquet  time was approximately 50 minutes. Fluoroscopy was then brought in. Closed reduction was unsuccessful. Fluoroscopic imaging confirmed a significant dorsal ulnar fracture  dislocation involving the PIP joint with significant volar bone loss of  the middle phalanx. A volar approach was then undertaken through a Brunner incision centered  over the PIP joint. Flaps were elevated. Neurovascular bundles were  identified and preserved. The A3 pulley sheath was opened up and the  flexor tendons mobilized to expose the scarred volar plate. This was  released distally from the scar tissue and retracted proximally. This  still did not allow dislocation reduction. A Tiline elevator was  inserted into the fracture dislocation, and bone debris and comminuted  bone fragments were removed. The collateral ligaments were taken down  particularly of the ulnar aspect and scar tissue.   This allowed  mobilization and ultimately reduction of the dislocation. However,  there was severe instability and very little remaining articular  surface. Given the patient's advanced age and significant bone loss,  PIP joint arthroplasty was undertaken. Further release of the collateral ligaments allowed _____ of the PIP  joint with hyperextension to expose the proximal distal phalanges. An  oscillating saw while protecting the neurovascular structures was used  to remove the head of the proximal phalanx followed by use of an awl  under fluoroscopic imaging and sequential broaching up to a size 3  [facility] implant. Distally, high-speed bur was used to remove the  remaining cartilage from the middle phalanx. This was then followed by  sequential broaching again under fluoroscopy to a size 3. Size 3 trial  implant was inserted and the finger reduced, found to have good  stability. However, there was still a radial to ulnar deviation  deformity to the PIP joint. The PIP joint was re-dislocated, the wound  copiously irrigated out. The radial collateral ligament was then  reached for repair after implant arthroplasty. The final implant [employer]  size 3 Silastic PIP joint implant was selected, and using a _____  forceps, the implant was seated and its joint reduced, and the radial  collateral ligament reduced and repaired. This improved overall  alignment of the PIP joint. There was full passive extension without  dislocation, and full flexion. The volar plate was repaired in a  similar fashion around the implant as well. The wound was copiously  irrigated. Tourniquet was deflated. Total tourniquet time was 50  minutes. There was brisk capillary refill to the middle finger. Digital block placed with 10 mL of 0.25% Marcaine plain. Volar soft  tissues were repaired followed by sterile dressing and a dorsal splint  with the middle, ring and small fingers in flexion.   The patient  tolerated the procedure well and was taken to the

## 2019-05-10 ENCOUNTER — TELEPHONE (OUTPATIENT)
Dept: ORTHOPEDIC SURGERY | Age: 64
End: 2019-05-10

## 2019-05-10 DIAGNOSIS — S63.253A CLOSED DISLOCATION OF LEFT MIDDLE FINGER: Primary | ICD-10-CM

## 2019-05-16 ENCOUNTER — OFFICE VISIT (OUTPATIENT)
Dept: ORTHOPEDIC SURGERY | Age: 64
End: 2019-05-16

## 2019-05-16 VITALS
RESPIRATION RATE: 18 BRPM | DIASTOLIC BLOOD PRESSURE: 99 MMHG | TEMPERATURE: 98.4 F | HEART RATE: 97 BPM | SYSTOLIC BLOOD PRESSURE: 168 MMHG

## 2019-05-16 DIAGNOSIS — S63.253A CLOSED DISLOCATION OF LEFT MIDDLE FINGER: Primary | ICD-10-CM

## 2019-05-16 PROCEDURE — 99024 POSTOP FOLLOW-UP VISIT: CPT | Performed by: ORTHOPAEDIC SURGERY

## 2019-05-16 NOTE — PROGRESS NOTES
Seen back today nearly 2 weeks out left middle finger PIP joint arthroplasty for chronic fracture dislocation. Overall is doing well. Physical exam: Volar incisions healing nicely. Diffuse swelling is present. PIP joint held in slight flexion. Passively can be nearly fully extended. Remains distally neurovascular intact. X-rays in office today: AP lateral obliques the left hand demonstrate reduced PIP joint with Silastic implant in place. Impression office x-rays: Reduced left middle finger PIP joint arthroplasty    Assessment: Two weeks out left middle finger PIP joint arthroplasty for chronic fracture dislocation    Plan    Patient is referred to therapy for active range of motion exercises. Splint fabrication. Improved PIP joint extension. Follow-up in one month with new x-rays.

## 2019-05-20 ENCOUNTER — OFFICE VISIT (OUTPATIENT)
Dept: ENT CLINIC | Age: 64
End: 2019-05-20
Payer: MEDICARE

## 2019-05-20 ENCOUNTER — PROCEDURE VISIT (OUTPATIENT)
Dept: AUDIOLOGY | Age: 64
End: 2019-05-20
Payer: MEDICARE

## 2019-05-20 VITALS
HEIGHT: 71 IN | WEIGHT: 247 LBS | DIASTOLIC BLOOD PRESSURE: 81 MMHG | HEART RATE: 55 BPM | SYSTOLIC BLOOD PRESSURE: 123 MMHG | BODY MASS INDEX: 34.58 KG/M2

## 2019-05-20 DIAGNOSIS — H93.13 TINNITUS OF BOTH EARS: ICD-10-CM

## 2019-05-20 DIAGNOSIS — H90.3 SENSORINEURAL HEARING LOSS, BILATERAL: ICD-10-CM

## 2019-05-20 DIAGNOSIS — H93.13 TINNITUS AURIUM, BILATERAL: Primary | ICD-10-CM

## 2019-05-20 DIAGNOSIS — H90.3 SENSORINEURAL HEARING LOSS (SNHL) OF BOTH EARS: Primary | ICD-10-CM

## 2019-05-20 PROCEDURE — 92567 TYMPANOMETRY: CPT | Performed by: AUDIOLOGIST

## 2019-05-20 PROCEDURE — 99203 OFFICE O/P NEW LOW 30 MIN: CPT | Performed by: OTOLARYNGOLOGY

## 2019-05-20 PROCEDURE — 92557 COMPREHENSIVE HEARING TEST: CPT | Performed by: AUDIOLOGIST

## 2019-05-20 RX ORDER — FLUTICASONE PROPIONATE 50 MCG
2 SPRAY, SUSPENSION (ML) NASAL DAILY
Qty: 1 BOTTLE | Refills: 3 | Status: ON HOLD
Start: 2019-05-20 | End: 2021-11-13 | Stop reason: HOSPADM

## 2019-05-20 NOTE — PROGRESS NOTES
[facility] Otolaryngology  Dr. Joseph Cruz. AG Talbert Ms.Ed. New Consult       Patient Name:  Rossy Vogt  :  1955     CHIEF C/O:    Chief Complaint   Patient presents with    Tinnitus     ringing in ears; for past few years consistant; ringing bad right now       HISTORY OBTAINED FROM:  patient    HISTORY OF PRESENT ILLNESS:       Senthil Marmolejo is a 59y.o. year old male, here today for seen for new onset history of bilateral ear tinnitus slightly louder in the right. Patient states is been ongoing progressive in the last 2 to 3 months without any known associated new hearing loss or vertigo. Patient states the tinnitus is single tone nonpulsatile and increasing in loudness. No associated ear drainage, no history of ear trauma, patient was recently started on gabapentin about the time the complaint started as well as the patient does take daily aspirin pill. No other complaints of nasal congestion fevers chills epistaxis sore throat nausea or vomiting at this time. There has been no audiogram at this point, no medical therapy has been initiated. Past Medical History:   Diagnosis Date    CAD (coronary artery disease)     COPD (chronic obstructive pulmonary disease) ([facility] [address]     Depression     Fracture of unspecified phalanx of left middle finger, initial encounter for closed fracture     GERD (gastroesophageal reflux disease)     Hypertension      Past Surgical History:   Procedure Laterality Date    COLONOSCOPY      CORONARY ARTERY BYPASS GRAFT  3/2/05    x3: LIMA to LAD, SVG to RCA, SVG to diagonal    DIAGNOSTIC CARDIAC CATH LAB PROCEDURE  3/02/05    [facility]: Severe multivessel CAD in patient who presents with STEMI and total occlusion of diagonal branch. Significant disease of proximal LAD and severe disease of dominant RCA.      FINGER SURGERY Left 5/3/2019    CLOSED POSSIBLE LEFT MIDDLE FINGER OPEN REDUCTION INTERNAL FIXATION POSSIBLE PROXIMAL INTERPHALANGEAL JOINT ARTHROPLASTY   ++[facility]++ performed by Margarita Roman MD at [facility].      double       Current Outpatient Medications:     fluticasone (FLONASE) 50 MCG/ACT nasal spray, 2 sprays by Nasal route daily 2 sprays each nostril once a day, Disp: 1 Bottle, Rfl: 3    busPIRone (BUSPAR) 5 MG tablet, Take 15 mg by mouth 2 times daily , Disp: , Rfl:     gabapentin (NEURONTIN) 300 MG capsule, Take 300 mg by mouth 3 times daily, Disp: , Rfl:     ibuprofen (ADVIL;MOTRIN) 600 MG tablet, Take 600 mg by mouth, Disp: , Rfl:     omeprazole (PRILOSEC) 20 MG capsule, Take 20 mg by mouth Daily , Disp: , Rfl:     PROAIR  (90 BASE) MCG/ACT inhaler, , Disp: , Rfl:     ASPIRIN LOW DOSE 81 MG EC tablet, Take 81 mg by mouth daily , Disp: , Rfl:     sertraline (ZOLOFT) 100 MG tablet, 100 mg nightly , Disp: , Rfl:     lisinopril (PRINIVIL;ZESTRIL) 5 MG tablet, Take 5 mg by mouth daily, Disp: , Rfl:     nitroGLYCERIN (NITROSTAT) 0.4 MG SL tablet, Place 1 tablet under the tongue every 5 minutes as needed for Chest pain, Disp: 25 tablet, Rfl: 3    pravastatin (PRAVACHOL) 80 MG tablet, Take 1 tablet by mouth daily, Disp: 30 tablet, Rfl: 3  Patient has no known allergies. Social History     Tobacco Use    Smoking status: Current Every Day Smoker     Packs/day: 1.00     Years: 45.00     Pack years: 45.00     Types: Cigars    Smokeless tobacco: Never Used    Tobacco comment: SMOKES CIGARS    Substance Use Topics    Alcohol use: Yes     Frequency: Monthly or less     Comment: occassiona    Drug use: No     History reviewed. No pertinent family history. Review of Systems   Constitutional: Negative for chills and fever. HENT: Negative for ear discharge and hearing loss. Respiratory: Negative for cough and shortness of breath. Cardiovascular: Negative for chest pain and palpitations. Gastrointestinal: Negative for vomiting. Skin: Negative for rash. Allergic/Immunologic: Negative for environmental allergies. Neurological: Negative for dizziness and headaches. Hematological: Does not bruise/bleed easily. All other systems reviewed and are negative. /81 (Site: Left Upper Arm, Position: Sitting, Cuff Size: Large Adult)   Pulse 55   Ht 5' 11\" (1.803 m)   Wt 247 lb (112 kg)   BMI 34.45 kg/m²   Physical Exam   Constitutional: He is oriented to person, place, and time. He appears well-developed and well-nourished. HENT:   Right Ear: External ear normal.   Left Ear: External ear normal.   Mouth/Throat: Oropharynx is clear and moist.   Eyes: Pupils are equal, round, and reactive to light. Conjunctivae and EOM are normal.   Neck: Normal range of motion. Neck supple. Cardiovascular: Normal rate and intact distal pulses. Pulmonary/Chest: Effort normal and breath sounds normal. No respiratory distress. Abdominal: He exhibits no distension. There is no tenderness. There is no guarding. Musculoskeletal: Normal range of motion. Neurological: He is alert and oriented to person, place, and time. Skin: Skin is warm and dry. Psychiatric: He has a normal mood and affect. His behavior is normal. Judgment and thought content normal.   Nursing note and vitals reviewed. IMPRESSION/PLAN:  Patient is seen and examined, bilateral high-frequency sensorineural hearing loss with associated loud and progressive tinnitus likely secondary to medical therapy. Consider possible drug holiday from both gabapentin and the aspirin if okay with the primary care physician and at the very least the aspirin as patient does not have a tremendous heart history. Patient may follow-up with oral laryngology in 1 year for repeat audiogram or sooner with any noted changes in symptoms. Dr. Reyna Frias Otolaryngology/Facial Plastic Surgery Residency  Associate Clinical Professor:  Major Urena, [facility]

## 2019-05-20 NOTE — PROGRESS NOTES
This patient was referred for audiometric/tympanometric testing by Dr. Kia Palmer due to tinnitus. Patient reported tinnitus, which started several years ago. He denied pain, pressure, and hearing loss. Audiometry revealed hearing sensitivity within normal limits through 2000 Hz sloping to a moderate sensorineural hearing loss bilaterally. Reliability was good. Speech reception thresholds were in good agreement with the pure tone averages, bilaterally. Speech discrimination scores were excellent, bilaterally. Tympanometry revealed normal middle ear peak pressure and compliance, bilaterally. The results were reviewed with the patient. Recommendations for follow up will be made pending physician consult.     [address] [facility] Audiology Doctoral Student

## 2019-06-04 ENCOUNTER — EVALUATION (OUTPATIENT)
Dept: OCCUPATIONAL THERAPY | Age: 64
End: 2019-06-04
Payer: MEDICARE

## 2019-06-04 DIAGNOSIS — S63.253A CLOSED DISLOCATION OF LEFT MIDDLE FINGER: Primary | ICD-10-CM

## 2019-06-04 PROCEDURE — 97165 OT EVAL LOW COMPLEX 30 MIN: CPT | Performed by: OCCUPATIONAL THERAPIST

## 2019-06-04 NOTE — PROGRESS NOTES
OCCUPATIONAL THERAPY INITIAL EVALUATION    Phone: [phone]  Fax: [phone]     Date:  2019  Initial Evaluation Date: 19    Patient Name:  Lee Ann Figueredo    :  1955    Restrictions/Precautions:  Digital jt arthroplasty protocol, Low fall risk  Diagnosis:  Left middle finger closed dislocation       Insurance/Certification information:  [employer] Medicare  Referring Physician:  Dr Aleida Tam, [facility], [facility]  Date of Surgery/Injury: surgery 5-3-19  Plan of care signed (Y/N):  N  Visit# / total visits:     Past Medical History:   Past Medical History:   Diagnosis Date    CAD (coronary artery disease)     COPD (chronic obstructive pulmonary disease) ([facility]     Depression     Fracture of unspecified phalanx of left middle finger, initial encounter for closed fracture     GERD (gastroesophageal reflux disease)     Hypertension      Past Surgical History:   Past Surgical History:   Procedure Laterality Date    COLONOSCOPY      CORONARY ARTERY BYPASS GRAFT  3/2/05    x3: LIMA to LAD, SVG to RCA, SVG to diagonal    DIAGNOSTIC CARDIAC CATH LAB PROCEDURE  3/02/05    [facility]: Severe multivessel CAD in patient who presents with STEMI and total occlusion of diagonal branch. Significant disease of proximal LAD and severe disease of dominant RCA.  FINGER SURGERY Left 5/3/2019    CLOSED POSSIBLE LEFT MIDDLE FINGER OPEN REDUCTION INTERNAL FIXATION POSSIBLE PROXIMAL INTERPHALANGEAL JOINT ARTHROPLASTY   ++[facility]++ performed by Eddie Garcia MD at [facility].      double       Reason for Referral: Patient presents for outpatient Occupational Therapy following an injury to his non-dominant left middle finger (DOI 2019). Pt states he tripped and fell, jamming his left middle finger. In the ED, the pt was splinted without reduction and was diagnosed with a closed dislocation.  Due to significant trauma to the finger, he later required a LMF PIP arthroplasty with Silastic implant on 5-3-19. His main concerns today include decreased ROM, digital pain and numbness in the tip of the LMF. Comments: Significant swelling is present in the LMF, especially across the PIP. A volar digital scar is present in the finger with tightness in the distal portion. Residual scabbing remains along parts of the scar as well. Home Living: Lives with his ex-wife  Prior Level of Function: Independent with some limitations due to COPD  IADL History  Homemaking Responsibility: shared  Shopping Responsibility: secondary  Mode of Transportation: car  Leisure & Hobbies: none reported  Work: Retired    Pain Level: 3 on scale of 1-10, aching, tight (pulling) and uncomfortable in the LMF/ increases to 7/10 when bumped    Cognition:   Alert/Oriented x3     ADL  Feeding: I  Grooming:  I  Bathing:  I  UE Dressing: I  LE Dressing:  I  Toileting:  I  Transfer:  I  Comments: Pt states he is doing well with his personal care. However, he has trouble doing tasks about the house that involve a resistive  and/ or lifting with the left hand, such as opening jars and managing laundry. UE Assessment: RHD    Left hand AROM: Exceptiosns to WNL  Left middle finger MCP 0-70, PIP 35-60, DIP 0-30 and DPC (cm) -3    Sensation: Severe decrease in light touch and moderate deep touch limitation noted at the LMF distal tip. Mild generalized light touch impairment noted in the remainder of the middle finger and the left index finger. Tolerance for pressure to the affected finger is decreased. Circumferential Measurements: left MF  MP-PIP 9.2 cm  X PIP 9.4 cm  PIP- DIP 7.5 cm     Dynamometer (setting 2):  TBA when appropriate         Pinch Meter: TBA when appropriate      Coordination: Fair, awkward due to limitation in the left middle finger    Assessment of current deficits   Functional mobility []  ADLs [] Strength [x]  Cognition []  Functional transfers  [] IADLs [x] Safety Awareness [] back to us at our [facility] fax # [phone].

## 2019-06-06 PROBLEM — S63.253A: Status: ACTIVE | Noted: 2019-06-06

## 2019-06-12 ENCOUNTER — TELEPHONE (OUTPATIENT)
Dept: ORTHOPEDIC SURGERY | Age: 64
End: 2019-06-12

## 2019-06-12 DIAGNOSIS — S63.253A CLOSED DISLOCATION OF LEFT MIDDLE FINGER: Primary | ICD-10-CM

## 2019-06-13 ASSESSMENT — ENCOUNTER SYMPTOMS
SHORTNESS OF BREATH: 0
VOMITING: 0
COUGH: 0

## 2019-12-05 ENCOUNTER — HOSPITAL ENCOUNTER (OUTPATIENT)
Dept: GENERAL RADIOLOGY | Age: 64
Discharge: HOME | End: 2019-12-07
Payer: MEDICARE

## 2019-12-05 ENCOUNTER — HOSPITAL ENCOUNTER (OUTPATIENT)
Age: 64
Discharge: HOME | End: 2019-12-07
Payer: MEDICARE

## 2019-12-05 DIAGNOSIS — M54.32 LEFT SIDED SCIATICA: ICD-10-CM

## 2019-12-05 PROCEDURE — 72100 X-RAY EXAM L-S SPINE 2/3 VWS: CPT

## 2021-09-30 ENCOUNTER — TELEPHONE (OUTPATIENT)
Dept: CASE MANAGEMENT | Age: 66
End: 2021-09-30

## 2021-09-30 NOTE — TELEPHONE ENCOUNTER
I called the patient and he confirmed his CT lung screening at [facility] on 10/1/2021 at 1:30 pm.  I reminded the patient to arrive at 1:00 pm, enter through the main entrance, and register. Patient confirmed.             Electronically signed by Swetha Mayfield on 9/30/21 at 9:09 AM EDT

## 2021-10-01 ENCOUNTER — HOSPITAL ENCOUNTER (OUTPATIENT)
Dept: CT IMAGING | Age: 66
Discharge: HOME | End: 2021-10-01
Payer: MEDICARE

## 2021-10-01 DIAGNOSIS — Z12.2 ENCOUNTER FOR SCREENING FOR MALIGNANT NEOPLASM OF RESPIRATORY ORGANS: ICD-10-CM

## 2021-10-01 PROCEDURE — 71271 CT THORAX LUNG CANCER SCR C-: CPT

## 2021-10-04 ENCOUNTER — TELEPHONE (OUTPATIENT)
Dept: CASE MANAGEMENT | Age: 66
End: 2021-10-04

## 2021-10-04 NOTE — TELEPHONE ENCOUNTER
No call, encounter opened to process CT Lung Screening. CT Lung Screen: 10/01/2021    Impression   No pulmonary nodules.       LUNG RADS:   Per ACR Lung-RADS Version 1.1       Lung rads 1.  Continue annual low-dose CT screening of the chest..       RECOMMENDATIONS:   If you would like to register your patient with the [facility], please contact the Nurse Navigator at   [phone]. Pack years: 39    Social History     Tobacco Use  Smoking Status: Current Every Day Smoker    Start Date:    Quit Date:    Types: Cigarettes   Packs/Day: 1   Years: 39   Pack Years: 39   Smokeless Tobacco: Never Used         Results letter sent to patient via my chart or mailed.      [address] [facility]

## 2021-10-12 ENCOUNTER — APPOINTMENT (OUTPATIENT)
Dept: GENERAL RADIOLOGY | Age: 66
DRG: 004 | End: 2021-10-12
Payer: MEDICARE

## 2021-10-12 ENCOUNTER — HOSPITAL ENCOUNTER (INPATIENT)
Age: 66
LOS: 31 days | Discharge: NURSING, REHAB OR LONG-TERM CARE | DRG: 004 | End: 2021-11-13
Attending: EMERGENCY MEDICINE | Admitting: INTERNAL MEDICINE
Payer: MEDICARE

## 2021-10-12 ENCOUNTER — APPOINTMENT (OUTPATIENT)
Dept: CT IMAGING | Age: 66
DRG: 004 | End: 2021-10-12
Payer: MEDICARE

## 2021-10-12 DIAGNOSIS — J96.00 ACUTE RESPIRATORY FAILURE, UNSPECIFIED WHETHER WITH HYPOXIA OR HYPERCAPNIA (HCC): ICD-10-CM

## 2021-10-12 DIAGNOSIS — Z46.59 ENCOUNTER FOR NASOGASTRIC (NG) TUBE PLACEMENT: ICD-10-CM

## 2021-10-12 DIAGNOSIS — F10.931 DELIRIUM TREMENS (HCC): Primary | ICD-10-CM

## 2021-10-12 DIAGNOSIS — I48.91 ATRIAL FIBRILLATION, UNSPECIFIED TYPE (HCC): ICD-10-CM

## 2021-10-12 LAB
ALBUMIN SERPL-MCNC: 4.2 G/DL (ref 3.5–5.2)
ALP BLD-CCNC: 69 U/L (ref 40–129)
ALT SERPL-CCNC: 26 U/L (ref 0–40)
AMMONIA: 27 UMOL/L (ref 16–60)
ANION GAP SERPL CALCULATED.3IONS-SCNC: 15 MMOL/L (ref 7–16)
APTT: 26.4 SEC (ref 24.5–35.1)
AST SERPL-CCNC: 40 U/L (ref 0–39)
B.E.: -5.5 MMOL/L (ref -3–3)
B.E.: -9.2 MMOL/L (ref -3–3)
BASOPHILS ABSOLUTE: 0.06 E9/L (ref 0–0.2)
BASOPHILS RELATIVE PERCENT: 0.9 % (ref 0–2)
BILIRUB SERPL-MCNC: 1 MG/DL (ref 0–1.2)
BILIRUBIN DIRECT: 0.7 MG/DL (ref 0–0.3)
BILIRUBIN, INDIRECT: 0.3 MG/DL (ref 0–1)
BUN BLDV-MCNC: 17 MG/DL (ref 6–23)
CALCIUM SERPL-MCNC: 9.8 MG/DL (ref 8.6–10.2)
CHLORIDE BLD-SCNC: 100 MMOL/L (ref 98–107)
CO2: 21 MMOL/L (ref 22–29)
COHB: 0.8 % (ref 0–1.5)
CREAT SERPL-MCNC: 1.4 MG/DL (ref 0.7–1.2)
CRITICAL NOTIFICATION: YES
CRITICAL: ABNORMAL
DATE ANALYZED: ABNORMAL
DATE OF COLLECTION: ABNORMAL
DELIVERY SYSTEMS: ABNORMAL
DEVICE: ABNORMAL
EKG ATRIAL RATE: 120 BPM
EKG Q-T INTERVAL: 286 MS
EKG QRS DURATION: 90 MS
EKG QTC CALCULATION (BAZETT): 387 MS
EKG R AXIS: -12 DEGREES
EKG T AXIS: 60 DEGREES
EKG VENTRICULAR RATE: 110 BPM
EOSINOPHILS ABSOLUTE: 0.04 E9/L (ref 0.05–0.5)
EOSINOPHILS RELATIVE PERCENT: 0.6 % (ref 0–6)
FIO2 ARTERIAL: 100
FIO2: 60 %
FOLATE: 5.9 NG/ML (ref 4.8–24.2)
GFR AFRICAN AMERICAN: >60
GFR NON-AFRICAN AMERICAN: 51 ML/MIN/1.73
GLUCOSE BLD-MCNC: 108 MG/DL (ref 74–99)
HCO3 ARTERIAL: 21.3 MMOL/L (ref 22–26)
HCO3: 19.2 MMOL/L (ref 22–26)
HCT VFR BLD CALC: 40 % (ref 37–54)
HEMOGLOBIN: 13.3 G/DL (ref 12.5–16.5)
HHB: 2.2 % (ref 0–5)
IMMATURE GRANULOCYTES #: 0.03 E9/L
IMMATURE GRANULOCYTES %: 0.5 % (ref 0–5)
INR BLD: 1.1
LAB: ABNORMAL
LACTIC ACID: 2 MMOL/L (ref 0.5–2.2)
LIPASE: 24 U/L (ref 13–60)
LYMPHOCYTES ABSOLUTE: 0.76 E9/L (ref 1.5–4)
LYMPHOCYTES RELATIVE PERCENT: 11.6 % (ref 20–42)
Lab: ABNORMAL
MAGNESIUM: 1.2 MG/DL (ref 1.6–2.6)
MCH RBC QN AUTO: 32.4 PG (ref 26–35)
MCHC RBC AUTO-ENTMCNC: 33.3 % (ref 32–34.5)
MCV RBC AUTO: 97.3 FL (ref 80–99.9)
METHB: 0 % (ref 0–1.5)
MODE: AC
MODE: AC
MONOCYTES ABSOLUTE: 0.41 E9/L (ref 0.1–0.95)
MONOCYTES RELATIVE PERCENT: 6.2 % (ref 2–12)
NEUTROPHILS ABSOLUTE: 5.28 E9/L (ref 1.8–7.3)
NEUTROPHILS RELATIVE PERCENT: 80.2 % (ref 43–80)
O2 CONTENT: 16.5 ML/DL
O2 SATURATION: 97.8 % (ref 92–98.5)
O2 SATURATION: 98.1 % (ref 92–98.5)
O2HB: 97 % (ref 94–97)
OPERATOR ID: 2658
OPERATOR ID: 2658
PATIENT TEMP: 37
PCO2 ARTERIAL: 66.3 MMHG (ref 35–45)
PCO2: 34.9 MMHG (ref 35–45)
PDW BLD-RTO: 16.3 FL (ref 11.5–15)
PEEP/CPAP: 5 CMH2O
PFO2: 2.52 MMHG/%
PH BLOOD GAS: 7.11 (ref 7.35–7.45)
PH BLOOD GAS: 7.36 (ref 7.35–7.45)
PLATELET # BLD: 185 E9/L (ref 130–450)
PMV BLD AUTO: 9.7 FL (ref 7–12)
PO2 ARTERIAL: 143.4 MMHG (ref 60–80)
PO2: 151.3 MMHG (ref 75–100)
POTASSIUM REFLEX MAGNESIUM: 4.3 MMOL/L (ref 3.5–5)
PRO-BNP: 1889 PG/ML (ref 0–125)
PROTHROMBIN TIME: 12.4 SEC (ref 9.3–12.4)
RBC # BLD: 4.11 E12/L (ref 3.8–5.8)
REASON FOR REJECTION: NORMAL
REJECTED TEST: NORMAL
RESPIRATORY RATE: 15 B/MIN
RR MECHANICAL: 17 B/MIN
SARS-COV-2, NAAT: NOT DETECTED
SODIUM BLD-SCNC: 136 MMOL/L (ref 132–146)
SOURCE, BLOOD GAS: ABNORMAL
SOURCE, BLOOD GAS: ABNORMAL
THB: 11.9 G/DL (ref 11.5–16.5)
TIDAL VOLUME: 500 ML
TIME ANALYZED: 2100
TOTAL CK: 740 U/L (ref 20–200)
TOTAL PROTEIN: 7 G/DL (ref 6.4–8.3)
TROPONIN, HIGH SENSITIVITY: 16 NG/L (ref 0–11)
VITAMIN B-12: 474 PG/ML (ref 211–946)
VT MECHANICAL: 500 ML
WBC # BLD: 6.6 E9/L (ref 4.5–11.5)

## 2021-10-12 PROCEDURE — 82140 ASSAY OF AMMONIA: CPT

## 2021-10-12 PROCEDURE — 96376 TX/PRO/DX INJ SAME DRUG ADON: CPT

## 2021-10-12 PROCEDURE — 99285 EMERGENCY DEPT VISIT HI MDM: CPT

## 2021-10-12 PROCEDURE — 2580000003 HC RX 258: Performed by: INTERNAL MEDICINE

## 2021-10-12 PROCEDURE — 70450 CT HEAD/BRAIN W/O DYE: CPT

## 2021-10-12 PROCEDURE — 6360000002 HC RX W HCPCS: Performed by: EMERGENCY MEDICINE

## 2021-10-12 PROCEDURE — 80076 HEPATIC FUNCTION PANEL: CPT

## 2021-10-12 PROCEDURE — 2500000003 HC RX 250 WO HCPCS: Performed by: EMERGENCY MEDICINE

## 2021-10-12 PROCEDURE — 2580000003 HC RX 258: Performed by: NURSE PRACTITIONER

## 2021-10-12 PROCEDURE — 80143 DRUG ASSAY ACETAMINOPHEN: CPT

## 2021-10-12 PROCEDURE — 83605 ASSAY OF LACTIC ACID: CPT

## 2021-10-12 PROCEDURE — 80048 BASIC METABOLIC PNL TOTAL CA: CPT

## 2021-10-12 PROCEDURE — 82805 BLOOD GASES W/O2 SATURATION: CPT

## 2021-10-12 PROCEDURE — 85610 PROTHROMBIN TIME: CPT

## 2021-10-12 PROCEDURE — 2580000003 HC RX 258: Performed by: EMERGENCY MEDICINE

## 2021-10-12 PROCEDURE — 51702 INSERT TEMP BLADDER CATH: CPT

## 2021-10-12 PROCEDURE — 5A1955Z RESPIRATORY VENTILATION, GREATER THAN 96 CONSECUTIVE HOURS: ICD-10-PCS | Performed by: EMERGENCY MEDICINE

## 2021-10-12 PROCEDURE — 93005 ELECTROCARDIOGRAM TRACING: CPT | Performed by: EMERGENCY MEDICINE

## 2021-10-12 PROCEDURE — 6370000000 HC RX 637 (ALT 250 FOR IP): Performed by: EMERGENCY MEDICINE

## 2021-10-12 PROCEDURE — 80307 DRUG TEST PRSMV CHEM ANLYZR: CPT

## 2021-10-12 PROCEDURE — 6360000002 HC RX W HCPCS

## 2021-10-12 PROCEDURE — 85025 COMPLETE CBC W/AUTO DIFF WBC: CPT

## 2021-10-12 PROCEDURE — 83690 ASSAY OF LIPASE: CPT

## 2021-10-12 PROCEDURE — 96375 TX/PRO/DX INJ NEW DRUG ADDON: CPT

## 2021-10-12 PROCEDURE — 96366 THER/PROPH/DIAG IV INF ADDON: CPT

## 2021-10-12 PROCEDURE — 82077 ASSAY SPEC XCP UR&BREATH IA: CPT

## 2021-10-12 PROCEDURE — 36415 COLL VENOUS BLD VENIPUNCTURE: CPT

## 2021-10-12 PROCEDURE — 71045 X-RAY EXAM CHEST 1 VIEW: CPT

## 2021-10-12 PROCEDURE — 83880 ASSAY OF NATRIURETIC PEPTIDE: CPT

## 2021-10-12 PROCEDURE — 84484 ASSAY OF TROPONIN QUANT: CPT

## 2021-10-12 PROCEDURE — 82550 ASSAY OF CK (CPK): CPT

## 2021-10-12 PROCEDURE — 6360000002 HC RX W HCPCS: Performed by: STUDENT IN AN ORGANIZED HEALTH CARE EDUCATION/TRAINING PROGRAM

## 2021-10-12 PROCEDURE — 80179 DRUG ASSAY SALICYLATE: CPT

## 2021-10-12 PROCEDURE — 2500000003 HC RX 250 WO HCPCS

## 2021-10-12 PROCEDURE — 2580000003 HC RX 258: Performed by: STUDENT IN AN ORGANIZED HEALTH CARE EDUCATION/TRAINING PROGRAM

## 2021-10-12 PROCEDURE — 94002 VENT MGMT INPAT INIT DAY: CPT

## 2021-10-12 PROCEDURE — 99223 1ST HOSP IP/OBS HIGH 75: CPT | Performed by: INTERNAL MEDICINE

## 2021-10-12 PROCEDURE — 83735 ASSAY OF MAGNESIUM: CPT

## 2021-10-12 PROCEDURE — 82803 BLOOD GASES ANY COMBINATION: CPT

## 2021-10-12 PROCEDURE — 96367 TX/PROPH/DG ADDL SEQ IV INF: CPT

## 2021-10-12 PROCEDURE — C9113 INJ PANTOPRAZOLE SODIUM, VIA: HCPCS | Performed by: STUDENT IN AN ORGANIZED HEALTH CARE EDUCATION/TRAINING PROGRAM

## 2021-10-12 PROCEDURE — 82607 VITAMIN B-12: CPT

## 2021-10-12 PROCEDURE — 96372 THER/PROPH/DIAG INJ SC/IM: CPT

## 2021-10-12 PROCEDURE — 0BH18EZ INSERTION OF ENDOTRACHEAL AIRWAY INTO TRACHEA, VIA NATURAL OR ARTIFICIAL OPENING ENDOSCOPIC: ICD-10-PCS | Performed by: EMERGENCY MEDICINE

## 2021-10-12 PROCEDURE — 85730 THROMBOPLASTIN TIME PARTIAL: CPT

## 2021-10-12 PROCEDURE — 36600 WITHDRAWAL OF ARTERIAL BLOOD: CPT

## 2021-10-12 PROCEDURE — 6360000002 HC RX W HCPCS: Performed by: NURSE PRACTITIONER

## 2021-10-12 PROCEDURE — 31500 INSERT EMERGENCY AIRWAY: CPT

## 2021-10-12 PROCEDURE — 87635 SARS-COV-2 COVID-19 AMP PRB: CPT

## 2021-10-12 PROCEDURE — 36556 INSERT NON-TUNNEL CV CATH: CPT

## 2021-10-12 PROCEDURE — 82746 ASSAY OF FOLIC ACID SERUM: CPT

## 2021-10-12 PROCEDURE — 96365 THER/PROPH/DIAG IV INF INIT: CPT

## 2021-10-12 RX ORDER — MAGNESIUM SULFATE IN WATER 40 MG/ML
2000 INJECTION, SOLUTION INTRAVENOUS ONCE
Status: COMPLETED | OUTPATIENT
Start: 2021-10-12 | End: 2021-10-12

## 2021-10-12 RX ORDER — POLYETHYLENE GLYCOL 3350 17 G/17G
17 POWDER, FOR SOLUTION ORAL DAILY PRN
Status: DISCONTINUED | OUTPATIENT
Start: 2021-10-12 | End: 2021-11-13 | Stop reason: HOSPADM

## 2021-10-12 RX ORDER — ACETAMINOPHEN 325 MG/1
650 TABLET ORAL EVERY 6 HOURS PRN
Status: DISCONTINUED | OUTPATIENT
Start: 2021-10-12 | End: 2021-11-06

## 2021-10-12 RX ORDER — LORAZEPAM 1 MG/1
3 TABLET ORAL
Status: DISCONTINUED | OUTPATIENT
Start: 2021-10-12 | End: 2021-10-14

## 2021-10-12 RX ORDER — LORAZEPAM 1 MG/1
2 TABLET ORAL
Status: DISCONTINUED | OUTPATIENT
Start: 2021-10-12 | End: 2021-10-14

## 2021-10-12 RX ORDER — SODIUM CHLORIDE 0.9 % (FLUSH) 0.9 %
5-40 SYRINGE (ML) INJECTION PRN
Status: DISCONTINUED | OUTPATIENT
Start: 2021-10-12 | End: 2021-10-19 | Stop reason: SDUPTHER

## 2021-10-12 RX ORDER — THIAMINE HYDROCHLORIDE 100 MG/ML
500 INJECTION, SOLUTION INTRAMUSCULAR; INTRAVENOUS DAILY
Status: DISCONTINUED | OUTPATIENT
Start: 2021-10-12 | End: 2021-10-12 | Stop reason: CLARIF

## 2021-10-12 RX ORDER — LORAZEPAM 1 MG/1
1 TABLET ORAL
Status: DISCONTINUED | OUTPATIENT
Start: 2021-10-12 | End: 2021-10-14

## 2021-10-12 RX ORDER — FOLIC ACID 1 MG/1
1 TABLET ORAL ONCE
Status: DISCONTINUED | OUTPATIENT
Start: 2021-10-12 | End: 2021-10-12

## 2021-10-12 RX ORDER — ONDANSETRON 2 MG/ML
4 INJECTION INTRAMUSCULAR; INTRAVENOUS EVERY 6 HOURS PRN
Status: DISCONTINUED | OUTPATIENT
Start: 2021-10-12 | End: 2021-11-13 | Stop reason: HOSPADM

## 2021-10-12 RX ORDER — PHENOBARBITAL SODIUM 65 MG/ML
130 INJECTION INTRAMUSCULAR ONCE
Status: COMPLETED | OUTPATIENT
Start: 2021-10-12 | End: 2021-10-12

## 2021-10-12 RX ORDER — SODIUM CHLORIDE 0.9 % (FLUSH) 0.9 %
5-40 SYRINGE (ML) INJECTION EVERY 12 HOURS SCHEDULED
Status: DISCONTINUED | OUTPATIENT
Start: 2021-10-12 | End: 2021-10-19 | Stop reason: SDUPTHER

## 2021-10-12 RX ORDER — DILTIAZEM HYDROCHLORIDE 5 MG/ML
15 INJECTION INTRAVENOUS ONCE
Status: COMPLETED | OUTPATIENT
Start: 2021-10-12 | End: 2021-10-12

## 2021-10-12 RX ORDER — GAUZE BANDAGE 2" X 2"
100 BANDAGE TOPICAL DAILY
Status: DISCONTINUED | OUTPATIENT
Start: 2021-10-12 | End: 2021-10-12

## 2021-10-12 RX ORDER — SODIUM CHLORIDE 9 MG/ML
25 INJECTION, SOLUTION INTRAVENOUS PRN
Status: DISCONTINUED | OUTPATIENT
Start: 2021-10-12 | End: 2021-10-19 | Stop reason: SDUPTHER

## 2021-10-12 RX ORDER — ACETAMINOPHEN 650 MG/1
650 SUPPOSITORY RECTAL EVERY 6 HOURS PRN
Status: DISCONTINUED | OUTPATIENT
Start: 2021-10-12 | End: 2021-11-06

## 2021-10-12 RX ORDER — LORAZEPAM 2 MG/ML
2 INJECTION INTRAMUSCULAR
Status: DISCONTINUED | OUTPATIENT
Start: 2021-10-12 | End: 2021-10-14

## 2021-10-12 RX ORDER — LORAZEPAM 2 MG/ML
3 INJECTION INTRAMUSCULAR
Status: DISCONTINUED | OUTPATIENT
Start: 2021-10-12 | End: 2021-10-14

## 2021-10-12 RX ORDER — PHENOBARBITAL SODIUM 65 MG/ML
130 INJECTION INTRAMUSCULAR ONCE
Status: DISCONTINUED | OUTPATIENT
Start: 2021-10-12 | End: 2021-10-12

## 2021-10-12 RX ORDER — LORAZEPAM 1 MG/1
4 TABLET ORAL
Status: DISCONTINUED | OUTPATIENT
Start: 2021-10-12 | End: 2021-10-14

## 2021-10-12 RX ORDER — IPRATROPIUM BROMIDE AND ALBUTEROL SULFATE 2.5; .5 MG/3ML; MG/3ML
1 SOLUTION RESPIRATORY (INHALATION) EVERY 4 HOURS
Status: DISCONTINUED | OUTPATIENT
Start: 2021-10-12 | End: 2021-11-13 | Stop reason: HOSPADM

## 2021-10-12 RX ORDER — FENTANYL CITRATE 50 UG/ML
100 INJECTION, SOLUTION INTRAMUSCULAR; INTRAVENOUS ONCE
Status: COMPLETED | OUTPATIENT
Start: 2021-10-12 | End: 2021-10-12

## 2021-10-12 RX ORDER — LORAZEPAM 2 MG/ML
4 INJECTION INTRAMUSCULAR
Status: DISCONTINUED | OUTPATIENT
Start: 2021-10-12 | End: 2021-10-14

## 2021-10-12 RX ORDER — LORAZEPAM 2 MG/ML
1 INJECTION INTRAMUSCULAR
Status: DISCONTINUED | OUTPATIENT
Start: 2021-10-12 | End: 2021-10-14

## 2021-10-12 RX ORDER — 0.9 % SODIUM CHLORIDE 0.9 %
1000 INTRAVENOUS SOLUTION INTRAVENOUS ONCE
Status: COMPLETED | OUTPATIENT
Start: 2021-10-12 | End: 2021-10-12

## 2021-10-12 RX ORDER — ALBUTEROL SULFATE 2.5 MG/3ML
2.5 SOLUTION RESPIRATORY (INHALATION) EVERY 4 HOURS PRN
Status: DISCONTINUED | OUTPATIENT
Start: 2021-10-12 | End: 2021-10-16

## 2021-10-12 RX ORDER — ONDANSETRON 4 MG/1
4 TABLET, ORALLY DISINTEGRATING ORAL EVERY 8 HOURS PRN
Status: DISCONTINUED | OUTPATIENT
Start: 2021-10-12 | End: 2021-11-13 | Stop reason: HOSPADM

## 2021-10-12 RX ORDER — FENTANYL CITRATE 50 UG/ML
INJECTION, SOLUTION INTRAMUSCULAR; INTRAVENOUS
Status: COMPLETED
Start: 2021-10-12 | End: 2021-10-12

## 2021-10-12 RX ADMIN — DILTIAZEM HYDROCHLORIDE 15 MG: 5 INJECTION, SOLUTION INTRAVENOUS at 13:42

## 2021-10-12 RX ADMIN — LORAZEPAM 4 MG: 2 INJECTION INTRAMUSCULAR at 12:58

## 2021-10-12 RX ADMIN — SODIUM CHLORIDE 80 MG: 9 INJECTION, SOLUTION INTRAVENOUS at 22:54

## 2021-10-12 RX ADMIN — LORAZEPAM 4 MG: 2 INJECTION INTRAMUSCULAR at 09:55

## 2021-10-12 RX ADMIN — SODIUM CHLORIDE 1000 ML: 9 INJECTION, SOLUTION INTRAVENOUS at 09:53

## 2021-10-12 RX ADMIN — THIAMINE HCL TAB 100 MG 100 MG: 100 TAB at 10:01

## 2021-10-12 RX ADMIN — FENTANYL CITRATE 100 MCG: 50 INJECTION, SOLUTION INTRAMUSCULAR; INTRAVENOUS at 21:50

## 2021-10-12 RX ADMIN — Medication 10 ML: at 22:29

## 2021-10-12 RX ADMIN — LORAZEPAM 4 MG: 2 INJECTION INTRAMUSCULAR at 11:16

## 2021-10-12 RX ADMIN — MIDAZOLAM 4 MG/HR: 5 INJECTION INTRAMUSCULAR; INTRAVENOUS at 14:57

## 2021-10-12 RX ADMIN — PHENOBARBITAL SODIUM 130 MG: 65 INJECTION INTRAMUSCULAR; INTRAVENOUS at 16:12

## 2021-10-12 RX ADMIN — KETAMINE HYDROCHLORIDE 2 MG/KG/HR: 50 INJECTION, SOLUTION INTRAMUSCULAR; INTRAVENOUS at 23:35

## 2021-10-12 RX ADMIN — FOLIC ACID 1 MG: 5 INJECTION, SOLUTION INTRAMUSCULAR; INTRAVENOUS; SUBCUTANEOUS at 15:23

## 2021-10-12 RX ADMIN — Medication 10 ML: at 10:03

## 2021-10-12 RX ADMIN — KETAMINE HYDROCHLORIDE 1 MG/KG/HR: 50 INJECTION, SOLUTION INTRAMUSCULAR; INTRAVENOUS at 14:50

## 2021-10-12 RX ADMIN — DILTIAZEM HYDROCHLORIDE 5 MG/HR: 5 INJECTION, SOLUTION INTRAVENOUS at 13:46

## 2021-10-12 RX ADMIN — MAGNESIUM SULFATE HEPTAHYDRATE 2000 MG: 40 INJECTION, SOLUTION INTRAVENOUS at 12:59

## 2021-10-12 RX ADMIN — SODIUM CHLORIDE 1000 ML: 9 INJECTION, SOLUTION INTRAVENOUS at 13:03

## 2021-10-12 RX ADMIN — PHENOBARBITAL SODIUM 130 MG: 65 INJECTION INTRAMUSCULAR; INTRAVENOUS at 21:52

## 2021-10-12 RX ADMIN — THIAMINE HYDROCHLORIDE 500 MG: 100 INJECTION, SOLUTION INTRAMUSCULAR; INTRAVENOUS at 20:41

## 2021-10-12 ASSESSMENT — ENCOUNTER SYMPTOMS
ABDOMINAL PAIN: 0
SORE THROAT: 0
VOMITING: 0
NAUSEA: 0
BACK PAIN: 0
SHORTNESS OF BREATH: 1
WHEEZING: 0
DIARRHEA: 0
SINUS PAIN: 0
EYE PAIN: 0
COUGH: 0
EYE REDNESS: 0

## 2021-10-12 ASSESSMENT — PULMONARY FUNCTION TESTS: PIF_VALUE: 38

## 2021-10-12 NOTE — ED PROVIDER NOTES
43-year-old male with past medical history of hypertension, CAD, COPD and alcohol abuse presenting with acute onset of hallucinations and tremors. This began around 6:00 this morning, has not been alleviated or worsened by anything, associated with tachycardia and shortness of breath. He admits to going through phases of heavy alcohol use, for over the past 1-2 weeks he has been drinking 1/5 of [address] a day and his last drink was on Saturday. He also admits to smoking 1-1/2 packs of cigarettes a day and no other recreational drug use. He said around 6:00 this morning he woke up with visions of his  wife walking around his house and his 2 children, he says that they were stealing from him. He sprayed him with insect spray and called the police. He said that he has gone through alcohol withdrawal before but has never felt like this, he has been having tremors that have been constant since 6:00 today. He does not have a history of seizures and denies hitting his head. He is not having any numbness or weakness anywhere. He has noticed some swelling in his legs that began on Saturday as well. He is not having any chest pain currently, no current headache or vision changes. Review of Systems   Constitutional: Negative for chills and fever. HENT: Negative for ear pain, sinus pain and sore throat. Eyes: Negative for pain and redness. Respiratory: Positive for shortness of breath. Negative for cough and wheezing. Cardiovascular: Positive for palpitations. Negative for chest pain. Gastrointestinal: Negative for abdominal pain, diarrhea, nausea and vomiting. Genitourinary: Negative for dysuria and flank pain. Musculoskeletal: Negative for back pain and neck pain. Skin: Negative for rash. Neurological: Positive for tremors. Negative for seizures and headaches. Hematological: Negative for adenopathy. Psychiatric/Behavioral: Positive for hallucinations.  The patient is nervous/anxious. All other systems reviewed and are negative. Physical Exam  Vitals and nursing note reviewed. Constitutional:       Appearance: Normal appearance. He is not ill-appearing. HENT:      Head: Normocephalic and atraumatic. Right Ear: External ear normal.      Left Ear: External ear normal.      Nose: Nose normal.      Mouth/Throat:      Mouth: Mucous membranes are moist.      Pharynx: Oropharynx is clear. Eyes:      Conjunctiva/sclera: Conjunctivae normal.      Pupils: Pupils are equal, round, and reactive to light. Cardiovascular:      Rate and Rhythm: Regular rhythm. Tachycardia present. Pulmonary:      Breath sounds: Normal breath sounds. Abdominal:      General: Abdomen is flat. Palpations: Abdomen is soft. Musculoskeletal:         General: No deformity or signs of injury. Cervical back: Neck supple. Skin:     General: Skin is warm and dry. Neurological:      General: No focal deficit present. Mental Status: He is alert. Mental status is at baseline. Procedures   [id number]: This EKG is signed and interpreted by me. Rate: 110  Rhythm: Atrial fibrillation with RVR  Interpretation: atrial fibrillation (new onset), no acute ST elevations, A. fib is new from prior studies. Comparison: changes compared to previous EKG        Intubation Procedure Note    Indication: Respiratory failure, impending airway compromise, hypoxia and airway protection    Consent: Unable to be obtained due to patient's condition. Medications Used: etomidate intravenously and rocuronium intravenously    Procedure: The patient was placed in the appropriate position. Cricoid pressure was utilized. Intubation was performed by direct laryngoscopy using a laryngoscope and a 7.0 cuffed endotracheal tube. The cuff was then inflated and the tube was secured appropriately at a distance of 23 cm to the dental ridge.   Initial confirmation of placement included bilateral breath sounds, an end tidal CO2 detector, absence of sounds over the stomach, adequate chest rise, adequate pulse oximetry reading and improved skin color. A chest x-ray to verify correct placement of the tube has been ordered but is still pending. The patient tolerated the procedure well. Complications: None    Central Line Placement Procedure Note    Indication: vascular access    Consent: Unable to be obtained due to the emergent nature of this procedure. Procedure: The patient was positioned appropriately and the skin over the right internal jugular vein was prepped with betadine and draped in a sterile fashion. Local anesthesia was obtained by infiltration using 1% Lidocaine without epinephrine. A large bore needle was used to identify the vein. A guide wire was then inserted into the vein through the needle. A triple lumen catheter was then inserted into the vessel over the guide wire using the Seldinger technique. All ports showed good, free flowing blood return and were flushed with saline solution. The catheter was then securely fastened to the skin with suture at 16 cm. Two sutures were placed into the kit included tube clamp, proximal eyelets and a suture end from each of the securing sutures was extended around the catheter and tied to the proximal eyelets as an added measure to prevent dislodgement. An antibiotic disk was placed and the site was then covered with a sterile dressing. A post procedure X-ray was ordered and is still pending at this time. The patient tolerated the procedure well. Complications: None              MDM  Number of Diagnoses or Management Options  Acute respiratory failure, unspecified whether with hypoxia or hypercapnia (HCC)  Delirium tremens ([facility] [address]  Diagnosis management comments: 70-year-old male with past medical history of hypertension, CAD, COPD and alcohol abuse presented today with acute onset of hallucinations and tremors.   He was demonstrating new onset [MM]   [id number] Dr. Nohemy Nichols at [facility] will accept the patient once a bed is available. [MM]      ED Course User Index  [MM] Kaylene Hough DO      ED Course as of Oct 13 1354   Tue Oct 12, 2021   1325 Patient agitated not tolerating oxygen, respiratory is trying to get his saturation back up. [MM]   5834 Patient's heart rate is still over 150 and irregularly irregular after the fluid boluses, we will go into Cardizem bolus and drip. [MM]   1401 Oxygen saturations are bumping all over the place, we will try phenobarbital.    [MM]   1520 Patient's heart rate and blood pressure are better controled. [MM]   [id number] Dr. Jhon Alonso will follow the patient until they get transferred. [MM]   5 Dr. Karyle Holt will admit the patient. [MM]   [id number] Patient was intubated around 1430. He is doing well. Triple-lumen was placed. [MM]   [id number] 84 12 Dr. Nohemy Nichols at [facility] will accept the patient once a bed is available. [MM]      ED Course User Index  [MM] Kaylene Hough DO       --------------------------------------------- PAST HISTORY ---------------------------------------------  Past Medical History:  has a past medical history of CAD (coronary artery disease), COPD (chronic obstructive pulmonary disease) ([facility] [address] Depression, Fracture of unspecified phalanx of left middle finger, initial encounter for closed fracture, GERD (gastroesophageal reflux disease), and Hypertension. Past Surgical History:  has a past surgical history that includes hernia repair; Coronary artery bypass graft (3/2/05); Diagnostic Cardiac Cath Lab Procedure (3/02/05); Colonoscopy; and Finger surgery (Left, 5/3/2019). Social History:  reports that he has been smoking cigars. He has a 45.00 pack-year smoking history. He has never used smokeless tobacco. He reports current alcohol use. He reports that he does not use drugs. Family History: family history is not on file. The patients home medications have been reviewed.     Allergies: Patient has no known allergies.     -------------------------------------------------- RESULTS -------------------------------------------------    LABS:  Results for orders placed or performed during the hospital encounter of 10/12/21   COVID-19, Rapid    Specimen: Nasopharyngeal Swab   Result Value Ref Range    SARS-CoV-2, NAAT Not Detected Not Detected   VITAMIN B12 & FOLATE   Result Value Ref Range    Vitamin B-12 474 211 - 946 pg/mL    Folate 5.9 4.8 - 24.2 ng/mL   CBC Auto Differential   Result Value Ref Range    WBC 6.6 4.5 - 11.5 E9/L    RBC 4.11 3.80 - 5.80 E12/L    Hemoglobin 13.3 12.5 - 16.5 g/dL    Hematocrit 40.0 37.0 - 54.0 %    MCV 97.3 80.0 - 99.9 fL    MCH 32.4 26.0 - 35.0 pg    MCHC 33.3 32.0 - 34.5 %    RDW 16.3 (H) 11.5 - 15.0 fL    Platelets 031 053 - 785 E9/L    MPV 9.7 7.0 - 12.0 fL    Neutrophils % 80.2 (H) 43.0 - 80.0 %    Immature Granulocytes % 0.5 0.0 - 5.0 %    Lymphocytes % 11.6 (L) 20.0 - 42.0 %    Monocytes % 6.2 2.0 - 12.0 %    Eosinophils % 0.6 0.0 - 6.0 %    Basophils % 0.9 0.0 - 2.0 %    Neutrophils Absolute 5.28 1.80 - 7.30 E9/L    Immature Granulocytes # 0.03 E9/L    Lymphocytes Absolute 0.76 (L) 1.50 - 4.00 E9/L    Monocytes Absolute 0.41 0.10 - 0.95 E9/L    Eosinophils Absolute 0.04 (L) 0.05 - 0.50 E9/L    Basophils Absolute 0.06 0.00 - 0.20 P3/E   Basic Metabolic Panel w/ Reflex to MG   Result Value Ref Range    Sodium 136 132 - 146 mmol/L    Potassium reflex Magnesium 4.3 3.5 - 5.0 mmol/L    Chloride 100 98 - 107 mmol/L    CO2 21 (L) 22 - 29 mmol/L    Anion Gap 15 7 - 16 mmol/L    Glucose 108 (H) 74 - 99 mg/dL    BUN 17 6 - 23 mg/dL    CREATININE 1.4 (H) 0.7 - 1.2 mg/dL    GFR Non-African American 51 >=60 mL/min/1.73    GFR African American >60     Calcium 9.8 8.6 - 10.2 mg/dL   Hepatic Function Panel   Result Value Ref Range    Total Protein 7.0 6.4 - 8.3 g/dL    Albumin 4.2 3.5 - 5.2 g/dL    Alkaline Phosphatase 69 40 - 129 U/L    ALT 26 0 - 40 U/L    AST 40 (H) 0 - 39 U/L    Total Bilirubin 1.0 0.0 - 1.2 mg/dL    Bilirubin, Direct 0.7 (H) 0.0 - 0.3 mg/dL    Bilirubin, Indirect 0.3 0.0 - 1.0 mg/dL   Troponin   Result Value Ref Range    Troponin, High Sensitivity 16 (H) 0 - 11 ng/L   Brain Natriuretic Peptide   Result Value Ref Range    Pro-BNP 1,889 (H) 0 - 125 pg/mL   Urinalysis, reflex to microscopic   Result Value Ref Range    Color, UA Yellow Straw/Yellow    Clarity, UA CLOUDY (A) Clear    Glucose, Ur Negative Negative mg/dL    Bilirubin Urine SMALL (A) Negative    Ketones, Urine TRACE (A) Negative mg/dL    Specific Gravity, UA >=1.030 1.005 - 1.030    Blood, Urine Negative Negative    pH, UA 5.5 5.0 - 9.0    Protein, UA TRACE Negative mg/dL    Urobilinogen, Urine 1.0 <2.0 E.U./dL    Nitrite, Urine POSITIVE (A) Negative    Leukocyte Esterase, Urine Negative Negative   URINE DRUG SCREEN   Result Value Ref Range    Amphetamine Screen, Urine NOT DETECTED Negative <1000 ng/mL    Barbiturate Screen, Ur POSITIVE (A) Negative < 200 ng/mL    Benzodiazepine Screen, Urine POSITIVE (A) Negative < 200 ng/mL    Cannabinoid Scrn, Ur NOT DETECTED Negative < 50ng/mL    Cocaine Metabolite Screen, Urine NOT DETECTED Negative < 300 ng/mL    Opiate Scrn, Ur NOT DETECTED Negative < 300ng/mL    PCP Screen, Urine NOT DETECTED Negative < 25 ng/mL    Methadone Screen, Urine NOT DETECTED Negative <300 ng/mL    Oxycodone Urine POSITIVE (A) Negative <100 ng/mL    FENTANYL SCREEN, URINE POSITIVE (A) Negative <1 ng/mL    Drug Screen Comment: see below    Serum Drug Screen   Result Value Ref Range    Ethanol Lvl <10 mg/dL    Acetaminophen Level <5.0 (L) 10.0 - 20.3 mcg/mL    Salicylate, Serum <3.0 0.0 - 30.0 mg/dL   Lactic Acid, Plasma   Result Value Ref Range    Lactic Acid 2.0 0.5 - 2.2 mmol/L   CK   Result Value Ref Range    Total  (H) 20 - 200 U/L   Lipase   Result Value Ref Range    Lipase 24 13 - 60 U/L   Magnesium   Result Value Ref Range    Magnesium 1.2 (L) 1.6 - 2.6 mg/dL   Ammonia   Result Value Ref Range    Ammonia 27.0 16.0 - 60.0 umol/L   Protime-INR   Result Value Ref Range    Protime 12.4 9.3 - 12.4 sec    INR 1.1    APTT   Result Value Ref Range    aPTT 26.4 24.5 - 35.1 sec   SPECIMEN REJECTION   Result Value Ref Range    Rejected Test trp5     Reason for Rejection see below    Arterial Blood Gas, Respiratory Only   Result Value Ref Range    Source: Arterial     FIO2 Arterial 100.0     pH, Blood Gas 7.114 (LL) 7.350 - 7.450    pCO2, Arterial 66.3 (H) 35.0 - 45.0 mmHg    pO2, Arterial 143.4 (H) 60.0 - 80.0 mmHg    HCO3, Arterial 21.3 (L) 22.0 - 26.0 mmol/L    B.E. -9.2 (L) -3.0 - 3.0 mmol/L    O2 Sat 98.1 92.0 - 98.5 %     ID 2,658     DEVICE [id number]     Critical Notification Yes     Mode AC     Tidal Volume 500 mL    Respiratory Rate 15 b/min    Delivery Systems Adult[employer]ent    Basic Metabolic Panel   Result Value Ref Range    Sodium 135 132 - 146 mmol/L    Potassium 4.2 3.5 - 5.0 mmol/L    Chloride 102 98 - 107 mmol/L    CO2 24 22 - 29 mmol/L    Anion Gap 9 7 - 16 mmol/L    Glucose 105 (H) 74 - 99 mg/dL    BUN 17 6 - 23 mg/dL    CREATININE 1.0 0.7 - 1.2 mg/dL    GFR Non-African American >60 >=60 mL/min/1.73    GFR African American >60     Calcium 8.4 (L) 8.6 - 10.2 mg/dL   CBC   Result Value Ref Range    WBC 8.6 4.5 - 11.5 E9/L    RBC 3.82 3.80 - 5.80 E12/L    Hemoglobin 12.3 (L) 12.5 - 16.5 g/dL    Hematocrit 38.4 37.0 - 54.0 %    .5 (H) 80.0 - 99.9 fL    MCH 32.2 26.0 - 35.0 pg    MCHC 32.0 32.0 - 34.5 %    RDW 16.6 (H) 11.5 - 15.0 fL    Platelets 695 203 - 814 E9/L    MPV 9.8 7.0 - 12.0 fL   Blood Gas, Arterial   Result Value Ref Range    Date Analyzed [id number]     Time Analyzed 2100     Source: Blood Arterial     pH, Blood Gas 7.358 7.350 - 7.450    PCO2 34.9 (L) 35.0 - 45.0 mmHg    PO2 151.3 (H) 75.0 - 100.0 mmHg    HCO3 19.2 (L) 22.0 - 26.0 mmol/L    B.E. -5.5 (L) -3.0 - 3.0 mmol/L    O2 Sat 97.8 92.0 - 98.5 %    PO2/FIO2 2.52 mmHg/%    O2Hb 97.0 94.0 - 97.0 %    COHb 0.8 0.0 - 1.5 %    MetHb 0.0 0.0 - 1.5 %    O2 Content 16.5 mL/dL    HHb 2.2 0.0 - 5.0 %    tHb (est) 11.9 11.5 - 16.5 g/dL    Mode AC     FIO2 60.0 %    Rr Mechanical 17.0 b/min    Vt Mechanical 500.0 mL    Peep/Cpap 5.0 cmH2O    Date Of Collection      Time Collected      Pt Temp 37      ID [id number]     Lab [id number]     Critical(s) Notified . No Critical Values    MAGNESIUM   Result Value Ref Range    Magnesium 1.6 1.6 - 2.6 mg/dL   Phosphorus   Result Value Ref Range    Phosphorus 2.7 2.5 - 4.5 mg/dL   Triglyceride   Result Value Ref Range    Triglycerides 123 0 - 149 mg/dL   Microscopic Urinalysis   Result Value Ref Range    WBC, UA 0-1 0 - 5 /HPF    RBC, UA NONE 0 - 2 /HPF    Epithelial Cells, UA NONE SEEN /HPF    Bacteria, UA RARE (A) None Seen /HPF    Amorphous, UA MANY    Troponin   Result Value Ref Range    Troponin, High Sensitivity 10 0 - 11 ng/L   CK   Result Value Ref Range    Total  (H) 20 - 200 U/L   EKG 12 Lead   Result Value Ref Range    Ventricular Rate 110 BPM    Atrial Rate 120 BPM    QRS Duration 90 ms    Q-T Interval 286 ms    QTc Calculation (Bazett) 387 ms    R Axis -12 degrees    T Axis 60 degrees       RADIOLOGY:  XR CHEST PORTABLE   Final Result   New right IJ line with the distal tip in the SVC and no pneumothorax. Slightly more confluent airspace disease in the right lung base. No other significant interval change. XR CHEST PORTABLE   Final Result   1. Satisfactory position of the NG tube and endotracheal tube   2. New opacity within the right lung base which could represent a pleural   effusion and or right lung base infiltrate. XR CHEST PORTABLE   Final Result   1. Cardiomegaly. There is no evidence of failure or pneumonia. CT Head WO Contrast   Final Result   1. There is no acute intracranial abnormality. Specifically, there is no   intracranial hemorrhage.    2. Atrophy and periventricular leukomalacia,   .               ------------------------- NURSING NOTES AND VITALS REVIEWED ---------------------------  Date / Time Roomed:  10/12/2021  9:01 AM  ED Bed Assignment:  02/02    The nursing notes within the ED encounter and vital signs as below have been reviewed.      Patient Vitals for the past 24 hrs:   BP Temp Temp src Pulse Resp SpO2 Height Weight   10/13/21 1311 127/87          10/13/21 1306 127/87 97.8 °F (36.6 °C) Oral 75 18 100 %     10/13/21 1238    78 18 100 %     10/13/21 1215 121/79   77 18 100 %     10/13/21 1200 126/79   71 18 100 %     10/13/21 1145 125/83   80 18 100 %     10/13/21 1130 132/83   80 18 100 %     10/13/21 1115 (!) 139/90   79 18 100 %     10/13/21 1051    84 18 100 %     10/13/21 1027 (!) 157/101   80       10/13/21 1002 121/79   74       10/13/21 0945 121/79   69 18 100 %     10/13/21 0854 115/74   84       10/13/21 0845 115/74   86 18 100 %     10/13/21 0830 117/79   78 18 100 %     10/13/21 0815 120/89   81 18 100 %     10/13/21 0800 111/76   75 18 100 %     10/13/21 0745 110/75   82 18 100 %     10/13/21 0744    74 18 100 %     10/13/21 0730 118/83   79 18 100 %     10/13/21 0715 127/85   98 14 100 %     10/13/21 0700 (!) 130/96   112 19 100 %     10/13/21 0646 129/83   84       10/13/21 0630 129/83   84 19 100 %     10/13/21 0530 124/83   75 18 100 %     10/13/21 0520 (!) 135/90   77       10/13/21 0515 (!) 135/90   77 18 100 %     10/13/21 0505    69 15 100 %     10/13/21 0445 132/84   73 19 100 %     10/13/21 0420 117/84   75 20 100 %     10/13/21 0404 127/85   69 20 100 %     10/13/21 0352 121/85 97.6 °F (36.4 °C) Oral 71 20 100 %     10/13/21 0337 131/85   74 20 100 %     10/13/21 0335 (!) 133/96   92       10/13/21 0214 132/74   87       10/13/21 0136    78 20 94 %     10/13/21 0131     14 100 %     10/13/21 0100 121/85   60 18 100 %     10/13/21 0045 124/79   61 18 100 %     10/13/21 0030 113/79   58 18 100 %     10/13/21 0015 113/81   57 18 100 %     10/13/21 0000 110/75   57 18 100 %     10/12/21 2345 108/78   61 18 100 %     10/12/21 2330 105/74   63 18 100 %     10/12/21 2030 108/75   57 18 98 %     10/12/21 2015 105/71   56 18 98 %     10/12/21 2000 87/65   55 18 98 %     10/12/21 1945 95/72   55 18 98 %     10/12/21 1930 107/75   58 18 98 %     10/12/21 1915 100/64   61 16 97 %     10/12/21 1830 100/67   60 15 98 %     10/12/21 1815 (!) 96/59   54 15 98 %     10/12/21 1800 (!) 95/58   57 15 97 %     10/12/21 1744 109/76   62 14 98 %     10/12/21 1742 108/82   69 15 97 %     10/12/21 1740 109/78   68 16 97 %     10/12/21 1738 114/79   70 15 97 %     10/12/21 1736 113/77   68 17 97 %     10/12/21 1734 116/75   61 14 97 %     10/12/21 1732 107/74   65 15 95 %     10/12/21 1730 100/81   66 20 97 %     10/12/21 1728 103/73   66 15 97 %     10/12/21 1726 101/70   59 15 97 %     10/12/21 1724 97/72   74 16 97 %     10/12/21 1721 107/74   63 15 97 %     10/12/21 1719 95/80   62 14 97 %     10/12/21 1718 95/75   69 20 95 %     10/12/21 1715 98/67   69 15 97 %     10/12/21 1713 103/70   70 16 97 %     10/12/21 1711 96/64   63 15 97 %     10/12/21 1710 103/64   68 15 97 %     10/12/21 1708 103/72   64 16 97 %     10/12/21 1706 99/70   75 13 97 %     10/12/21 1704 113/73   73 14 97 %     10/12/21 1700 109/87   78 13 97 %     10/12/21 1645 97/65   79 18 98 %     10/12/21 1630 102/78   71 14 98 %     10/12/21 1615 108/74   85 13 98 %     10/12/21 1600 (!) 129/94   86 16 97 %     10/12/21 1522 111/78   100 15 99 %     10/12/21 1520 106/82   94 15 99 %     10/12/21 1518 115/78   106 17 99 %     10/12/21 1516 107/84   105 16 98 %     10/12/21 1513 117/87   118 15 98 %     10/12/21 1510 108/82   100 15 99 %     10/12/21 1508 119/82   112 17      10/12/21 1506 124/81   113 15 98 %     10/12/21 1504 129/84   112 15 98 %     10/12/21 1502 (!) 127/95   113 18 98 %     10/12/21 1500 (!) 128/95   102 15 98 %     10/12/21 1458 (!) 130/97   118 15      10/12/21 1456 (!) 151/104   114 15 98 %     10/12/21 1454 (!) 143/91   121 15 98 %     10/12/21 1452 (!) 142/96   122 15 96 %     10/12/21 1450 (!) 143/124   113 16 99 %     10/12/21 1448 (!) 144/112   132  98 %     10/12/21 1447    141 15 97 %     10/12/21 1446 (!) 144/112   128 13 96 %     10/12/21 1445    149 16 97 %     10/12/21 1444    134 15 97 %     10/12/21 1443    131 15 97 %     10/12/21 1442 (!) 145/121          10/12/21 1436 (!) 166/107   140 17 92 %     10/12/21 1434 (!) 149/101   152 20 97 %     10/12/21 1432 (!) 158/120   137 26 95 %     10/12/21 1430 (!) 167/116   140 19 93 %     10/12/21 1427 (!) 170/110   153 20 98 %     10/12/21 1426 (!) 164/102   150 18 96 %     10/12/21 1424 (!) 173/112   145 15 96 % 5' 10.87\" (1.8 m) 277 lb 3.2 oz (125.7 kg)   10/12/21 1420 (!) 166/125   147 20 97 %     10/12/21 1419 (!) 166/125   157 20 96 %     10/12/21 1417 (!) 189/125   154  95 %     10/12/21 1415 (!) 169/112   154 19 (!) 81 %     10/12/21 1414 (!) 169/112   149  (!) 81 %     10/12/21 1412 (!) 189/149   157 14 (!) 79 %     10/12/21 1411 (!) 196/167   147 25 (!) 87 %     10/12/21 1409 (!) 186/132   135 28 (!) 81 %     10/12/21 1407 (!) 186/132   135 22 (!) 80 %         Oxygen Saturation Interpretation: Normal and Improved after treatment    ------------------------------------------ PROGRESS NOTES ------------------------------------------  Re-evaluation(s):  Time: 1600  Patients symptoms show no change  Repeat physical examination is not changed    Counseling:  I have spoken with the patient about the plan for admission earlier in the day, he had since been intubated. Their questions had been answered at the time and they were agreeable with the plan of admission.    --------------------------------- ADDITIONAL PROVIDER NOTES ---------------------------------  Consultations:  Time: 1710. Spoke with Dr. Rasheed Haas. Discussed case. They will admit the patient. Dr. Lu Carreno will follow pending transfer. This patient's ED course included: a personal history and physicial examination, re-evaluation prior to disposition, multiple bedside re-evaluations, IV medications, cardiac monitoring, continuous pulse oximetry and complex medical decision making and emergency management    This patient has remained hemodynamically stable during their ED course. Diagnosis:  1. Delirium tremens ([address]    2. Acute respiratory failure, unspecified whether with hypoxia or hypercapnia ([address]    3. Atrial fibrillation, unspecified type ([address]        Disposition:  Patient's disposition: Admit to CCU/ICU  Patient's condition is stable. Anusha Kearns DO  Resident  10/12/21 1750      ATTENDING PROVIDER ATTESTATION:     Sandy Doss presented to the emergency department for evaluation of Delirium Tremens (DTS) (Pt states he usually drinks a fifth of kilo beam a day states he quit saturday. states he has been having visual hallucinations since yesterday. )   and was initially evaluated by the Medical Resident. See Original ED Note for H&P and ED course above. I have reviewed and discussed the case, including pertinent history (medical, surgical, family and social) and exam findings with the Medical Resident assigned to Sandy Doss. I have personally performed and/or participated in the history, exam, medical decision making, and procedures and agree with all pertinent clinical information.      Critical care: 40 minutes     I have reviewed my findings and recommendations with the assigned Medical Resident, Luis Manuel Victor Paula Mendoza and members of family present at the time of disposition. My findings/plan: The primary encounter diagnosis was Delirium tremens ([facility] [address] Diagnoses of Acute respiratory failure, unspecified whether with hypoxia or hypercapnia ([facility]) and Atrial fibrillation, unspecified type [facility]) were also pertinent to this visit.   New Prescriptions    No medications on file     MD Fernandez Burns MD  10/13/21 1571

## 2021-10-12 NOTE — ED NOTES
This Rn received report, pt in bed, seizure pads in place, pt vitalized, versed rate changed, pt suctioned, and placed on intermittent suction OG tube     Huseyin Roman, RAMYA  10/12/21 1392

## 2021-10-12 NOTE — ED NOTES
Bed: H1  Expected date:   Expected time:   Means of arrival:   Comments:  [address], RN  10/12/21 8640

## 2021-10-12 NOTE — PROGRESS NOTES
Patient was waiting for transfer to ICU in ED patient did have seizure and was given versed of 4mg bolus and rate of versed was increased to 6mg patient did not have seizure activity after bolus

## 2021-10-12 NOTE — ED NOTES
Dr Wilson, please advise re: thyroid US results. Thank you.   Snoring respirations gaging coughing and flaling all extremities grunting and [facility] HEART AND SURGICAL [facility] ABOUT WORK INCOHERENT     [place]  10/12/21 6985

## 2021-10-12 NOTE — ED NOTES
FLAILING GRUNTING MOVING THRASHING ARMS LEGS DIAPHORETIC TELEMTRY st [address] dOCTOR AT BEDSIDE SUCTIONED ORALLY FOR SMALL AMT [employer] FLUID     Katie Lopez RN  10/12/21 2414

## 2021-10-12 NOTE — LETTER
[place] [employer] Medicaid  CERTIFICATION OF NECESSITY  FOR NON-EMERGENCY TRANSPORTATION   BY GROUND AMBULANCE      Individual Information   1. Name: Helen Gamble 2. [place] Medicaid Billing Number:    3. Address: [place] [address]      Transportation Provider Information   4. Provider Name: [facility]    5. [place] Provider Number: [id number] [facility] Provider Identifier (HRZ):[id number]      Certification  7. Criteria:  During transport, this individual requires:  [x] Medical treatment or continuous     supervision by an EMT. [x] The administration or regulation of oxygen by another person. [] Supervised protective restraint. 8. Period Beginning Date: 11/13/21   9. Length  [x] Not more than 1 day(s)  [] One Year     Additional Information Relevant to Certification   10. Comments or Explanations, If Necessary or Appropriate   Trach mask 6L, requires frequent suctioning, dependent for all transfers, unable to sit, poor trunk support due to extended hospital stay,debility. Certifying Practitioner Information   11. Name of Practitioner: Dr Vinod Blood MD   12. [place] Medicaid Provider Number, If Applicable:  [address] Provider Identifier (NPI):      Signature Information   14. Date of Signature: 11/12/21 15. Name of Person Signing: Dariana MEANS    16.  Signature and Professional Designation: Electronically signed by CLARY Trevizo on 11/12/2021 at 4:01 PM     OD [id number]  Rev. 7/2015

## 2021-10-12 NOTE — LETTER
[place] [employer] Medicaid  CERTIFICATION OF NECESSITY  FOR NON-EMERGENCY TRANSPORTATION   BY GROUND AMBULANCE      Individual Information   1. Name: Justin Lynch 2. [place] Medicaid Billing Number: [id number]   3. Address: [place] [address]      Transportation Provider Information   4. Provider Name:    5. [place] Provider Number:  National Provider Identifier (NPI):      Certification  7. Criteria:  During transport, this individual requires:  [x] Medical treatment or continuous     supervision by an EMT. [x] The administration or regulation of oxygen by another person. [] Supervised protective restraint. 8. Period Beginning Date:    5. Length  [x] Not more than 1 day(s)  [] One Year     Additional Information Relevant to Certification   10. Comments or Explanations, If Necessary or Appropriate   AMS, acute respiratory with hypoxia- inability to clear own secretions- tracheostomy and peg new. Ventilator, cardiac monitor enroute needed        Certifying Practitioner Information   11. Name of Practitioner: Riya Alicea MD   12. [place] Medicaid Provider Number, If Applicable:  [address] Provider Identifier (NPI): [id number]     Signature Information   14. Date of Signature: 11/2/21 15. Name of Person Signing: SIMEON Vasquez   16. Signature and Professional Designation: Electronically signed by SIMEON Vasquez on 11/2/2021 at 1:42 PM       [facility] [phone]  Rev. 7/2015       [facility] Encounter Date/Time: 10/12/2021 0901    Hospital Account: [de-identified]    MRN: [id number]    Patient: Justin Lynch    Contact Serial #: [id number]      ENCOUNTER          Patient Class: I Private Enc? No Unit RM BD: [facility] ICU IC06/IC06-01   Hospital Service:  INM   Encounter DX: Delirium tremens ([facility] [address] [*   ADM Provider: Romana Emmer, MD   Procedure:     ATT Provider: Riya Alicea MD   REF Provider:        Admission DX: Delirium tremens ([address] Seizures ([address] Acute

## 2021-10-12 NOTE — CARE COORDINATION
SS Note: Pt in ED-2, Covid negative 10/12/21. SW Consult received for \"For consideration of rehab\". SW attempted to visit pt now however staff in process of intubating pt.   Electronically signed by CLARY Granado on 10/12/2021 at 2:09 PM

## 2021-10-12 NOTE — H&P
[address] [place], [place] [address]ist Group   History and Physical      CHIEF COMPLAINT:  tremor    History of Present Illness:  73 y.o. male with a history of alcoholism depression hypertension coronary artery disease GERD COPD morbid obesity presents with signs of alcohol withdrawal.  Before he was intubated he gave the ER the history that he began having tremors and hallucinations at 6 AM.  For the past 1 or 2 weeks he has been drinking 1/5 of gin being a day but has not had any drinks since Saturday the only other substances of abuse that he admits to is smoking 1 and half packs of cigarettes per day. His tremors are getting worse on Saturday he started having leg swelling. In the ER he was known to be having tremors he received Ativan but even before that when he was grabbing at invisible objects he was snoring per the nurse. He desatted down to 70%. He was intubated to protect his airway. He was noted to have A. fib RVR and was given Cardizem drip to good effect    Informant(s) for H&P: Chart review discussion with bedside nurse and ER resident    REVIEW OF SYSTEMS:  no fevers, chills, cp, sob, n/v, ha, vision/hearing changes, wt changes, hot/cold flashes, other open skin lesions, diarrhea, constipation, dysuria/hematuria unless noted in HPI. Complete ROS performed with the patient and is otherwise negative.       PMH:  Past Medical History:   Diagnosis Date    CAD (coronary artery disease)     COPD (chronic obstructive pulmonary disease) ([facility] [address]     Depression     Fracture of unspecified phalanx of left middle finger, initial encounter for closed fracture     GERD (gastroesophageal reflux disease)     Hypertension        Surgical History:  Past Surgical History:   Procedure Laterality Date    COLONOSCOPY      CORONARY ARTERY BYPASS GRAFT  3/2/05    x3: LIMA to LAD, SVG to RCA, SVG to diagonal    DIAGNOSTIC CARDIAC CATH LAB PROCEDURE  3/02/05    [facility]: Severe multivessel CAD in patient who presents with STEMI and total occlusion of diagonal branch. Significant disease of proximal LAD and severe disease of dominant RCA.  FINGER SURGERY Left 5/3/2019    CLOSED POSSIBLE LEFT MIDDLE FINGER OPEN REDUCTION INTERNAL FIXATION POSSIBLE PROXIMAL INTERPHALANGEAL JOINT ARTHROPLASTY   ++[facility] performed by Delane Romberg, MD at [facility] [place] [address]      double       Medications Prior to Admission:    Prior to Admission medications    Medication Sig Start Date End Date Taking? Authorizing Provider   fluticasone (FLONASE) 50 MCG/ACT nasal spray 2 sprays by Nasal route daily 2 sprays each nostril once a day 5/20/19   Batool Talbert DO   busPIRone (BUSPAR) 5 MG tablet Take 15 mg by mouth 2 times daily  7/11/16   Historical Provider, MD   gabapentin (NEURONTIN) 300 MG capsule Take 300 mg by mouth 3 times daily    Historical Provider, MD   ibuprofen (ADVIL;MOTRIN) 600 MG tablet Take 600 mg by mouth 10/7/13   Historical Provider, MD   omeprazole (PRILOSEC) 20 MG capsule Take 20 mg by mouth Daily  4/21/16   Historical Provider, MD   PROAIR  (90 BASE) MCG/ACT inhaler  4/21/16   Historical Provider, MD   ASPIRIN LOW DOSE 81 MG EC tablet Take 81 mg by mouth daily  4/21/16   Historical Provider, MD   sertraline (ZOLOFT) 100 MG tablet 100 mg nightly  4/8/16   Historical Provider, MD   lisinopril (PRINIVIL;ZESTRIL) 5 MG tablet Take 5 mg by mouth daily    Historical Provider, MD   nitroGLYCERIN (NITROSTAT) 0.4 MG SL tablet Place 1 tablet under the tongue every 5 minutes as needed for Chest pain 3/23/16   Leda Mayberry MD   pravastatin (PRAVACHOL) 80 MG tablet Take 1 tablet by mouth daily 3/23/16   Leda Mayberry MD       Allergies:    Patient has no known allergies. Social History:    reports that he has been smoking cigars. He has a 45.00 pack-year smoking history. He has never used smokeless tobacco. He reports current alcohol use. He reports that he does not use drugs.       Family History:   family history is not on file. PHYSICAL EXAM:  Vitals:  /76   Pulse 62   Temp 97.3 °F (36.3 °C) (Temporal)   Resp 14   Ht 5' 10.87\" (1.8 m)   Wt 277 lb 3.2 oz (125.7 kg)   SpO2 98%   BMI 38.81 kg/m²     General Appearance: alert and oriented to person, place and time and in no acute distress  Skin: warm and dry  Head: normocephalic and atraumatic  Eyes: pupils equal, round, and reactive to light, extraocular eye movements intact, conjunctivae normal  Neck: neck supple and non tender without mass   Pulmonary/Chest: clear to auscultation bilaterally- no wheezes, rales or rhonchi, normal air movement, no respiratory distress  Cardiovascular: normal rate, normal S1 and S2 and no carotid bruits  Abdomen: soft, non-tender, non-distended, normal bowel sounds, no masses or organomegaly  Extremities: no cyanosis, no clubbing and no edema  Neurologic: no cranial nerve deficit and speech normal    LABS:  Recent Labs     10/12/21  0934      K 4.3      CO2 21*   BUN 17   CREATININE 1.4*   GLUCOSE 108*   CALCIUM 9.8       Recent Labs     10/12/21  0934   WBC 6.6   RBC 4.11   HGB 13.3   HCT 40.0   MCV 97.3   MCH 32.4   MCHC 33.3   RDW 16.3*      MPV 9.7       No results for input(s): POCGLU in the last 72 hours. Radiology: CT Head WO Contrast    Result Date: 10/12/2021  EXAMINATION: CT OF THE HEAD WITHOUT CONTRAST  10/12/2021 9:34 am TECHNIQUE: CT of the head was performed without the administration of intravenous contrast. Dose modulation, iterative reconstruction, and/or weight based adjustment of the mA/kV was utilized to reduce the radiation dose to as low as reasonably achievable. COMPARISON: None. HISTORY: ORDERING SYSTEM PROVIDED HISTORY: hallucinations, new onset TECHNOLOGIST PROVIDED HISTORY: Reason for exam:->hallucinations, new onset Has a \"code stroke\" or \"stroke alert\" been called? ->No Decision Support Exception - unselect if not a suspected or confirmed emergency medical condition->Emergency Medical Condition (MA) FINDINGS: BRAIN/VENTRICLES: There is no acute intracranial hemorrhage, mass effect or midline shift. No abnormal extra-axial fluid collection. The gray-white differentiation is maintained without evidence of an acute infarct. There is no evidence of hydrocephalus. The ventricles, cisterns and sulci are prominent consistent with atrophy. There is decreased attenuation within the periventricular white matter consistent with periventricular leukomalacia. ORBITS: The visualized portion of the orbits demonstrate no acute abnormality. SINUSES: The visualized paranasal sinuses and mastoid air cells demonstrate no acute abnormality. SOFT TISSUES/SKULL:  No acute abnormality of the visualized skull or soft tissues. 1. There is no acute intracranial abnormality. Specifically, there is no intracranial hemorrhage. 2. Atrophy and periventricular leukomalacia, . XR CHEST PORTABLE    Result Date: 10/12/2021  EXAMINATION: ONE XRAY VIEW OF THE CHEST 10/12/2021 4:10 pm COMPARISON: October 12 at 1428 hours HISTORY: ORDERING SYSTEM PROVIDED HISTORY: central line palcement TECHNOLOGIST PROVIDED HISTORY: Reason for exam:->central line palcement FINDINGS: There is a new right IJ line with the distal tip in the SVC. No evidence of pneumothorax. ET tube and NG tube remain in place. Prominent cardiomegaly, unchanged. Status post median sternotomy. Mild elevation of the right hemidiaphragm and airspace disease in the right lung base somewhat more confluent than on the prior examination. The pulmonary vasculature is within normal limits. No other significant interval change. New right IJ line with the distal tip in the SVC and no pneumothorax. Slightly more confluent airspace disease in the right lung base. No other significant interval change.      XR CHEST PORTABLE    Result Date: 10/12/2021  EXAMINATION: ONE XRAY VIEW OF THE CHEST 10/12/2021 2:51 pm COMPARISON: Prior chest x-ray obtained earlier today. HISTORY: ORDERING SYSTEM PROVIDED HISTORY: tube placement TECHNOLOGIST PROVIDED HISTORY: Reason for exam:->tube placement FINDINGS: The endotracheal tube is located 6 cm proximal to the karo. There is an NG tube within the stomach. The heart is enlarged. There is new airspace disease seen within the right lung base when compared with the earlier study. This could represent an effusion and or infiltrate. 1. Satisfactory position of the NG tube and endotracheal tube 2. New opacity within the right lung base which could represent a pleural effusion and or right lung base infiltrate. XR CHEST PORTABLE    Result Date: 10/12/2021  EXAMINATION: ONE XRAY VIEW OF THE CHEST 10/12/2021 11:50 am COMPARISON: Previous CT of the chest of 10/01/2021 HISTORY: ORDERING SYSTEM PROVIDED HISTORY: altered mental status TECHNOLOGIST PROVIDED HISTORY: Reason for exam:->altered mental status FINDINGS: The heart is enlarged. Postoperative sternotomy changes are noted. The lungs are clear. There is no focal infiltrate or effusion. 1. Cardiomegaly. There is no evidence of failure or pneumonia. EKG: A. fib RVR    ASSESSMENT:      Active Problems:    * No active hospital problems. *  Resolved Problems:    * No resolved hospital problems. *      PLAN:    1. Acute alcohol withdrawal now intubated to protect his airway per intensivist he is awaiting a bed here and elsewhere for the ICU more being housed in the emergency room. 2.  A. fib RVR Cardizem drip likely due to alcohol consult cardiology. 3.No change to outpatient treatment regimen for the existing stable combordities including: depression hypertension coronary artery disease GERD COPD morbid obesity  4. Thrombocytopenia related to alcohol use monitor  5. Full code  6. DVT prophylaxis with SCDs nonpharmacological due to thrombocytopenia  7.   Disposition Home when ready he would benefit from 12-step program      NOTE: This report was transcribed using voice recognition software.  Every effort was made to ensure accuracy; however, inadvertent computerized transcription errors may be present.     Electronically signed by Sadaf Ugalde MD on 10/12/2021 at 5:57 PM

## 2021-10-13 ENCOUNTER — APPOINTMENT (OUTPATIENT)
Dept: GENERAL RADIOLOGY | Age: 66
DRG: 004 | End: 2021-10-13
Payer: MEDICARE

## 2021-10-13 PROBLEM — R56.9 SEIZURES (HCC): Status: ACTIVE | Noted: 2021-10-13

## 2021-10-13 LAB
ACETAMINOPHEN LEVEL: <5 MCG/ML (ref 10–30)
AMORPHOUS: ABNORMAL
AMPHETAMINE SCREEN, URINE: NOT DETECTED
ANION GAP SERPL CALCULATED.3IONS-SCNC: 9 MMOL/L (ref 7–16)
BACTERIA: ABNORMAL /HPF
BARBITURATE SCREEN URINE: POSITIVE
BENZODIAZEPINE SCREEN, URINE: POSITIVE
BILIRUBIN URINE: ABNORMAL
BLOOD, URINE: NEGATIVE
BUN BLDV-MCNC: 17 MG/DL (ref 6–23)
CALCIUM SERPL-MCNC: 8.4 MG/DL (ref 8.6–10.2)
CANNABINOID SCREEN URINE: NOT DETECTED
CHLORIDE BLD-SCNC: 102 MMOL/L (ref 98–107)
CLARITY: ABNORMAL
CO2: 24 MMOL/L (ref 22–29)
COCAINE METABOLITE SCREEN URINE: NOT DETECTED
COLOR: YELLOW
CREAT SERPL-MCNC: 1 MG/DL (ref 0.7–1.2)
EPITHELIAL CELLS, UA: ABNORMAL /HPF
ETHANOL: <10 MG/DL (ref 0–0.08)
FENTANYL SCREEN, URINE: POSITIVE
GFR AFRICAN AMERICAN: >60
GFR NON-AFRICAN AMERICAN: >60 ML/MIN/1.73
GLUCOSE BLD-MCNC: 105 MG/DL (ref 74–99)
GLUCOSE URINE: NEGATIVE MG/DL
HCT VFR BLD CALC: 38.4 % (ref 37–54)
HEMOGLOBIN: 12.3 G/DL (ref 12.5–16.5)
KETONES, URINE: ABNORMAL MG/DL
LEUKOCYTE ESTERASE, URINE: NEGATIVE
Lab: ABNORMAL
MAGNESIUM: 1.6 MG/DL (ref 1.6–2.6)
MCH RBC QN AUTO: 32.2 PG (ref 26–35)
MCHC RBC AUTO-ENTMCNC: 32 % (ref 32–34.5)
MCV RBC AUTO: 100.5 FL (ref 80–99.9)
METHADONE SCREEN, URINE: NOT DETECTED
NITRITE, URINE: POSITIVE
OPIATE SCREEN URINE: NOT DETECTED
OXYCODONE URINE: POSITIVE
PDW BLD-RTO: 16.6 FL (ref 11.5–15)
PH UA: 5.5 (ref 5–9)
PHENCYCLIDINE SCREEN URINE: NOT DETECTED
PHOSPHORUS: 2.7 MG/DL (ref 2.5–4.5)
PLATELET # BLD: 137 E9/L (ref 130–450)
PMV BLD AUTO: 9.8 FL (ref 7–12)
POTASSIUM SERPL-SCNC: 4.2 MMOL/L (ref 3.5–5)
PROTEIN UA: ABNORMAL MG/DL
RBC # BLD: 3.82 E12/L (ref 3.8–5.8)
RBC UA: ABNORMAL /HPF (ref 0–2)
SALICYLATE, SERUM: <0.3 MG/DL (ref 0–30)
SODIUM BLD-SCNC: 135 MMOL/L (ref 132–146)
SPECIFIC GRAVITY UA: >=1.03 (ref 1–1.03)
TOTAL CK: 415 U/L (ref 20–200)
TRICYCLIC ANTIDEPRESSANTS SCREEN SERUM: NEGATIVE NG/ML
TRIGL SERPL-MCNC: 123 MG/DL (ref 0–149)
TROPONIN, HIGH SENSITIVITY: 10 NG/L (ref 0–11)
UROBILINOGEN, URINE: 1 E.U./DL
WBC # BLD: 8.6 E9/L (ref 4.5–11.5)
WBC UA: ABNORMAL /HPF (ref 0–5)

## 2021-10-13 PROCEDURE — 84100 ASSAY OF PHOSPHORUS: CPT

## 2021-10-13 PROCEDURE — APPSS60 APP SPLIT SHARED TIME 46-60 MINUTES: Performed by: PHYSICIAN ASSISTANT

## 2021-10-13 PROCEDURE — 71045 X-RAY EXAM CHEST 1 VIEW: CPT

## 2021-10-13 PROCEDURE — 6360000002 HC RX W HCPCS: Performed by: EMERGENCY MEDICINE

## 2021-10-13 PROCEDURE — 99232 SBSQ HOSP IP/OBS MODERATE 35: CPT | Performed by: INTERNAL MEDICINE

## 2021-10-13 PROCEDURE — 2580000003 HC RX 258: Performed by: NURSE PRACTITIONER

## 2021-10-13 PROCEDURE — 85027 COMPLETE CBC AUTOMATED: CPT

## 2021-10-13 PROCEDURE — 2580000003 HC RX 258: Performed by: EMERGENCY MEDICINE

## 2021-10-13 PROCEDURE — 87088 URINE BACTERIA CULTURE: CPT

## 2021-10-13 PROCEDURE — 83735 ASSAY OF MAGNESIUM: CPT

## 2021-10-13 PROCEDURE — 2580000003 HC RX 258: Performed by: INTERNAL MEDICINE

## 2021-10-13 PROCEDURE — 2500000003 HC RX 250 WO HCPCS: Performed by: INTERNAL MEDICINE

## 2021-10-13 PROCEDURE — 81001 URINALYSIS AUTO W/SCOPE: CPT

## 2021-10-13 PROCEDURE — 84478 ASSAY OF TRIGLYCERIDES: CPT

## 2021-10-13 PROCEDURE — C9113 INJ PANTOPRAZOLE SODIUM, VIA: HCPCS | Performed by: NURSE PRACTITIONER

## 2021-10-13 PROCEDURE — 6360000002 HC RX W HCPCS: Performed by: NURSE PRACTITIONER

## 2021-10-13 PROCEDURE — 94640 AIRWAY INHALATION TREATMENT: CPT

## 2021-10-13 PROCEDURE — 2500000003 HC RX 250 WO HCPCS

## 2021-10-13 PROCEDURE — 6370000000 HC RX 637 (ALT 250 FOR IP): Performed by: PHYSICIAN ASSISTANT

## 2021-10-13 PROCEDURE — 80048 BASIC METABOLIC PNL TOTAL CA: CPT

## 2021-10-13 PROCEDURE — 80307 DRUG TEST PRSMV CHEM ANLYZR: CPT

## 2021-10-13 PROCEDURE — 2500000003 HC RX 250 WO HCPCS: Performed by: EMERGENCY MEDICINE

## 2021-10-13 PROCEDURE — 94003 VENT MGMT INPAT SUBQ DAY: CPT

## 2021-10-13 PROCEDURE — 84484 ASSAY OF TROPONIN QUANT: CPT

## 2021-10-13 PROCEDURE — 2580000003 HC RX 258

## 2021-10-13 PROCEDURE — 6370000000 HC RX 637 (ALT 250 FOR IP): Performed by: NURSE PRACTITIONER

## 2021-10-13 PROCEDURE — 82550 ASSAY OF CK (CPK): CPT

## 2021-10-13 PROCEDURE — 99223 1ST HOSP IP/OBS HIGH 75: CPT | Performed by: INTERNAL MEDICINE

## 2021-10-13 PROCEDURE — 2000000000 HC ICU R&B

## 2021-10-13 RX ORDER — SUCCINYLCHOLINE CHLORIDE 20 MG/ML
100 INJECTION INTRAMUSCULAR; INTRAVENOUS ONCE
Status: DISCONTINUED | OUTPATIENT
Start: 2021-10-13 | End: 2021-10-14

## 2021-10-13 RX ORDER — SUCCINYLCHOLINE/SOD CL,ISO/PF 200MG/10ML
SYRINGE (ML) INTRAVENOUS
Status: COMPLETED
Start: 2021-10-13 | End: 2021-10-13

## 2021-10-13 RX ORDER — PANTOPRAZOLE SODIUM 40 MG/10ML
40 INJECTION, POWDER, LYOPHILIZED, FOR SOLUTION INTRAVENOUS 2 TIMES DAILY
Status: DISCONTINUED | OUTPATIENT
Start: 2021-10-13 | End: 2021-10-30

## 2021-10-13 RX ORDER — SODIUM CHLORIDE, SODIUM LACTATE, POTASSIUM CHLORIDE, CALCIUM CHLORIDE 600; 310; 30; 20 MG/100ML; MG/100ML; MG/100ML; MG/100ML
INJECTION, SOLUTION INTRAVENOUS
Status: COMPLETED
Start: 2021-10-13 | End: 2021-10-13

## 2021-10-13 RX ORDER — PROPOFOL 10 MG/ML
5-50 INJECTION, EMULSION INTRAVENOUS
Status: DISCONTINUED | OUTPATIENT
Start: 2021-10-13 | End: 2021-10-14

## 2021-10-13 RX ORDER — SODIUM CHLORIDE, SODIUM LACTATE, POTASSIUM CHLORIDE, CALCIUM CHLORIDE 600; 310; 30; 20 MG/100ML; MG/100ML; MG/100ML; MG/100ML
INJECTION, SOLUTION INTRAVENOUS CONTINUOUS
Status: DISCONTINUED | OUTPATIENT
Start: 2021-10-13 | End: 2021-10-14

## 2021-10-13 RX ORDER — SODIUM CHLORIDE 9 MG/ML
10 INJECTION INTRAVENOUS 2 TIMES DAILY
Status: DISCONTINUED | OUTPATIENT
Start: 2021-10-13 | End: 2021-10-30

## 2021-10-13 RX ORDER — THIAMINE HYDROCHLORIDE 100 MG/ML
500 INJECTION, SOLUTION INTRAMUSCULAR; INTRAVENOUS DAILY
Status: DISCONTINUED | OUTPATIENT
Start: 2021-10-13 | End: 2021-10-13 | Stop reason: SDUPTHER

## 2021-10-13 RX ADMIN — LORAZEPAM 3 MG: 2 INJECTION INTRAMUSCULAR; INTRAVENOUS at 13:17

## 2021-10-13 RX ADMIN — IPRATROPIUM BROMIDE AND ALBUTEROL SULFATE 1 AMPULE: .5; 2.5 SOLUTION RESPIRATORY (INHALATION) at 18:01

## 2021-10-13 RX ADMIN — LORAZEPAM 2 MG: 2 INJECTION INTRAMUSCULAR; INTRAVENOUS at 02:19

## 2021-10-13 RX ADMIN — METOPROLOL TARTRATE 12.5 MG: 25 TABLET, FILM COATED ORAL at 10:27

## 2021-10-13 RX ADMIN — IPRATROPIUM BROMIDE AND ALBUTEROL SULFATE 1 AMPULE: .5; 2.5 SOLUTION RESPIRATORY (INHALATION) at 07:49

## 2021-10-13 RX ADMIN — SODIUM CHLORIDE, SODIUM LACTATE, POTASSIUM CHLORIDE, CALCIUM CHLORIDE 1000 ML: 600; 310; 30; 20 INJECTION, SOLUTION INTRAVENOUS at 16:49

## 2021-10-13 RX ADMIN — Medication 50 MCG/HR: at 16:43

## 2021-10-13 RX ADMIN — MIDAZOLAM 10 MG/HR: 5 INJECTION INTRAMUSCULAR; INTRAVENOUS at 03:38

## 2021-10-13 RX ADMIN — THIAMINE HYDROCHLORIDE 500 MG: 100 INJECTION, SOLUTION INTRAMUSCULAR; INTRAVENOUS at 10:14

## 2021-10-13 RX ADMIN — Medication 100 MG: at 15:55

## 2021-10-13 RX ADMIN — IPRATROPIUM BROMIDE AND ALBUTEROL SULFATE 1 AMPULE: .5; 2.5 SOLUTION RESPIRATORY (INHALATION) at 10:51

## 2021-10-13 RX ADMIN — KETAMINE HYDROCHLORIDE 3 MG/KG/HR: 50 INJECTION, SOLUTION INTRAMUSCULAR; INTRAVENOUS at 05:04

## 2021-10-13 RX ADMIN — LORAZEPAM 3 MG: 2 INJECTION INTRAMUSCULAR; INTRAVENOUS at 06:50

## 2021-10-13 RX ADMIN — KETAMINE HYDROCHLORIDE 3 MG/KG/HR: 50 INJECTION, SOLUTION INTRAMUSCULAR; INTRAVENOUS at 03:53

## 2021-10-13 RX ADMIN — Medication 10 ML: at 08:22

## 2021-10-13 RX ADMIN — SODIUM CHLORIDE, PRESERVATIVE FREE 10 ML: 5 INJECTION INTRAVENOUS at 08:22

## 2021-10-13 RX ADMIN — ENOXAPARIN SODIUM 40 MG: 100 INJECTION SUBCUTANEOUS at 08:17

## 2021-10-13 RX ADMIN — Medication 10 ML: at 08:21

## 2021-10-13 RX ADMIN — FOLIC ACID 1 MG: 5 INJECTION, SOLUTION INTRAMUSCULAR; INTRAVENOUS; SUBCUTANEOUS at 09:23

## 2021-10-13 RX ADMIN — MIDAZOLAM 10 MG/HR: 5 INJECTION INTRAMUSCULAR; INTRAVENOUS at 12:33

## 2021-10-13 RX ADMIN — IPRATROPIUM BROMIDE AND ALBUTEROL SULFATE 1 AMPULE: .5; 2.5 SOLUTION RESPIRATORY (INHALATION) at 01:31

## 2021-10-13 RX ADMIN — LORAZEPAM 2 MG: 2 INJECTION INTRAMUSCULAR; INTRAVENOUS at 16:20

## 2021-10-13 RX ADMIN — PANTOPRAZOLE SODIUM 40 MG: 40 INJECTION, POWDER, FOR SOLUTION INTRAVENOUS at 08:15

## 2021-10-13 RX ADMIN — LORAZEPAM 4 MG: 2 INJECTION INTRAMUSCULAR at 05:32

## 2021-10-13 RX ADMIN — PROPOFOL INJECTABLE EMULSION 10 MCG/KG/MIN: 10 INJECTION, EMULSION INTRAVENOUS at 06:08

## 2021-10-13 RX ADMIN — SODIUM CHLORIDE, POTASSIUM CHLORIDE, SODIUM LACTATE AND CALCIUM CHLORIDE 1000 ML: 600; 310; 30; 20 INJECTION, SOLUTION INTRAVENOUS at 16:49

## 2021-10-13 RX ADMIN — IPRATROPIUM BROMIDE AND ALBUTEROL SULFATE 1 AMPULE: .5; 2.5 SOLUTION RESPIRATORY (INHALATION) at 22:31

## 2021-10-13 RX ADMIN — LORAZEPAM 4 MG: 2 INJECTION INTRAMUSCULAR at 03:39

## 2021-10-13 RX ADMIN — PROPOFOL INJECTABLE EMULSION 20 MCG/KG/MIN: 10 INJECTION, EMULSION INTRAVENOUS at 18:05

## 2021-10-13 RX ADMIN — LORAZEPAM 2 MG: 2 INJECTION INTRAMUSCULAR; INTRAVENOUS at 21:51

## 2021-10-13 RX ADMIN — IPRATROPIUM BROMIDE AND ALBUTEROL SULFATE 1 AMPULE: .5; 2.5 SOLUTION RESPIRATORY (INHALATION) at 12:53

## 2021-10-13 ASSESSMENT — PULMONARY FUNCTION TESTS
PIF_VALUE: 43
PIF_VALUE: 34
PIF_VALUE: 35
PIF_VALUE: 37
PIF_VALUE: 32
PIF_VALUE: 44
PIF_VALUE: 43
PIF_VALUE: 50

## 2021-10-13 NOTE — ED NOTES
Dr. Trisha Guo would like pt's ET tube advanced from 21 at the lip to 24 at the lip. RT notified.      Phoebe Chacon RN  10/13/21 1653

## 2021-10-13 NOTE — CONSULTS
Inpatient [id number] [facility] Cardiology Consultation      Reason for Consult: Atrial fibrillation with RVR    Consulting Physician: Dr. Gabrielle Chacon    Requesting Physician: Dr. Janay Stubbs    Date of Consultation: 10/13/2021    HISTORY OF PRESENT ILLNESS:   Patient is a 73year old WM who is known to Dr. Champ Centeno. Patient is being seen in consultation this hospital admission by Dr. Gabrielle Chacon for evaluation and recommendations regarding atrial fibrillation with RVR. Of note, patient is currently sedated and intubated in the ED. No family present at bedside during my time of interview/exam.  As a result, most of history obtained through chart review and discussion with medical staff. Case was discussed with patient's nurse at bedside. Patient has a known past medical history of obesity, tobacco abuse, alcohol abuse, hypertension, hyperlipidemia, history of pericardial effusion by echocardiogram, GERD, depression, medical non-compliance, chronic obstructive pulmonary disease and coronary artery disease s/p CABG x 3 (LIMA-LAD, SVG-RCA, and SVG-diagonal) in . Patient presented to [address] [place] [address] on 2021 with acute onset tremors and hallucinations. He reportedly woke up the day of hospital presentation with hallucinations of his wife and two children walking around the house and attempting to steal from him. His wife is supposedly . He additionally admitted to shortness of breath, peripheral edema and palpitations. Reportedly through chart review, patient admitted to drinking heavily as of recent, typically drinking \"1/5 of [address] [place] per day with his last drink being this past Saturday\". While in the ED, patient was noted to be hypoxic, and was intubated and sedated in the ED in order to protect his airway. He has been noted to have seizure like activity while in the ED as well per patient's nurse. No known prior history of seizure disorder or cardiac arrhythmia.   Also noted to have hematuria in Woo catheter. I am unable to obtain further review of systems, family history or social history at this time due to patient being sedated and intubated. Labs and diagnostic testing as noted below. Please note: past medical records were reviewed per electronic medical record (EMR) - see detailed reports under Past Medical/ Surgical History. PAST MEDICAL HISTORY:    1. Obesity. 2. Current tobacco abuse. 3. Alcohol abuse. 4. Hypertension. 5. Hyperlipidemia, on statin therapy. 6. History of pericardial effusion by echocardiogram.   7. Coronary artery disease s/p CABG x 3 (LIMA-LAD, SVG-RCA, and SVG-diagonal) in 2005. 8. GERD. 9. Depression. 10. Medical non-compliance. 11. Chronic obstructive pulmonary disease. CARDIAC TESTING    Left heart catheterization ([facility], 2005)  CORONARY ARTERIOGRAMS:   LEFT MAIN:  The left main is of normal size and caliber, it originated off   the left sinus of Valsalva.  There was mild disease noted within the left   main with eccentric obstructed noted up to 20%. LEFT ANTERIOR DESCENDING:   The left anterior descending was a   moderate-sized vessel.  The left anterior descending revealed an 80%   obstruction proximally within the left anterior descending at the site of   the first diagonal branch.  The first diagonal branch was totally occluded   in its midportion, but did fill slightly.  The distal LAD also showed   multiple tandem lesions near the apex. LEFT CIRCUMFLEX:  The left circumflex was nondominant.  The left   circumflex revealed no significant obstruction for occlusive disease   within the left circumflex. RIGHT CORONARY ARTERY:  Was dominant.  There was diffuse disease noted   throughout the right coronary artery.  There was a tight, 90%, discreet   lesion noted after the take-off of the acute marginal branch.  There was   an additional 70-80% lesion noted beyond this more proximal tight lesion.    Both lesions were noted to be prior to the takeoff of the posterior   descending coronary artery.  The rest of the distal right coronary artery   and posterolateral branches were free of any significant obstructive or   occlusive disease. CONCLUSION:   Severe multi-vessel coronary artery disease in a patient who presents with   ST elevation myocardial infarction and total occlusion of his diagonal   branch.  The patient also has significant disease of his proximal LAD as   well as severe disease of his dominant right coronary artery. RECOMMENDATIONS:  At this time are for coronary artery bypass surgery. Treadmill Non-Nuclear Stress Test (3/2016)  Exercised 7 minutes. Reached 76% MPHR. 8.5 METS. [facility] treadmill score 7 - low risk of ischemic events. Shortness of breath and fatigue noted with exercise. Non-diagnostic EKG, submaximal HR was recorded. Normal exercise stress with clinically non-ischemic response. TTE (Dr. Sal Hebert, 4/2018)   Summary:   Left ventricle is normal in size . Mild left ventricular concentric hypertrophy noted. No regional wall motion abnormalities seen. Normal left ventricular ejection fraction. There is a trivial circumferential pericardial effusion noted. Ejection fraction is visually estimated at 55-60%. PAST SURGICAL HISTORY:    Past Surgical History:   Procedure Laterality Date    COLONOSCOPY      CORONARY ARTERY BYPASS GRAFT  3/2/05    x3: LIMA to LAD, SVG to RCA, SVG to diagonal    DIAGNOSTIC CARDIAC CATH LAB PROCEDURE  3/02/05    [facility]: Severe multivessel CAD in patient who presents with STEMI and total occlusion of diagonal branch. Significant disease of proximal LAD and severe disease of dominant RCA.      FINGER SURGERY Left 5/3/2019    CLOSED POSSIBLE LEFT MIDDLE FINGER OPEN REDUCTION INTERNAL FIXATION POSSIBLE PROXIMAL INTERPHALANGEAL JOINT ARTHROPLASTY   ++[facility]++ performed by Wyatt Holt MD at [facility].      double       HOME MEDICATIONS:  Prior to Admission medications    Medication Sig Start Date End Date Taking?  Authorizing Provider   fluticasone (FLONASE) 50 MCG/ACT nasal spray 2 sprays by Nasal route daily 2 sprays each nostril once a day 5/20/19   Munira Talbert DO   busPIRone (BUSPAR) 5 MG tablet Take 15 mg by mouth 2 times daily  7/11/16   Historical Provider, MD   gabapentin (NEURONTIN) 300 MG capsule Take 300 mg by mouth 3 times daily    Historical Provider, MD   ibuprofen (ADVIL;MOTRIN) 600 MG tablet Take 600 mg by mouth 10/7/13   Historical Provider, MD   omeprazole (PRILOSEC) 20 MG capsule Take 20 mg by mouth Daily  4/21/16   Historical Provider, MD   PROAIR  (90 BASE) MCG/ACT inhaler  4/21/16   Historical Provider, MD   ASPIRIN LOW DOSE 81 MG EC tablet Take 81 mg by mouth daily  4/21/16   Historical Provider, MD   sertraline (ZOLOFT) 100 MG tablet 100 mg nightly  4/8/16   Historical Provider, MD   lisinopril (PRINIVIL;ZESTRIL) 5 MG tablet Take 5 mg by mouth daily    Historical Provider, MD   nitroGLYCERIN (NITROSTAT) 0.4 MG SL tablet Place 1 tablet under the tongue every 5 minutes as needed for Chest pain 3/23/16   Elvia Cuellar MD   pravastatin (PRAVACHOL) 80 MG tablet Take 1 tablet by mouth daily 3/23/16   Elvia Cuellar MD       CURRENT MEDICATIONS:      Current Facility-Administered Medications:     propofol injection, 5-50 mcg/kg/min, IntraVENous, Titrated, MARTY Zambrano CNP, Last Rate: 7.5 mL/hr at 10/13/21 0608, 10 mcg/kg/min at 10/13/21 0608    thiamine (B-1) 500 mg in sodium chloride 0.9 % 100 mL IVPB, 500 mg, IntraVENous, Q24H, MARTY Jay - CNP    pantoprazole (PROTONIX) injection 40 mg, 40 mg, IntraVENous, BID **AND** sodium chloride (PF) 0.9 % injection 10 mL, 10 mL, IntraVENous, BID, Artem Mcleod APRN - CNP    enoxaparin (LOVENOX) injection 40 mg, 40 mg, SubCUTAneous, Daily, MARTY Jay - CNP    sodium chloride flush 0.9 % injection 5-40 mL, 5-40 mL, IntraVENous, 2 times per day, Thelma Bravo DO Stefany, 10 mL at 10/12/21 2229    sodium chloride flush 0.9 % injection 5-40 mL, 5-40 mL, IntraVENous, PRN, Joyce Mccall DO    0.9 % sodium chloride infusion, 25 mL, IntraVENous, PRN, Joyce Mccall DO    LORazepam (ATIVAN) tablet 1 mg, 1 mg, Oral, Q1H PRN **OR** LORazepam (ATIVAN) injection 1 mg, 1 mg, IntraVENous, Q1H PRN **OR** LORazepam (ATIVAN) tablet 2 mg, 2 mg, Oral, Q1H PRN **OR** LORazepam (ATIVAN) injection 2 mg, 2 mg, IntraVENous, Q1H PRN, 2 mg at 10/13/21 0219 **OR** LORazepam (ATIVAN) tablet 3 mg, 3 mg, Oral, Q1H PRN **OR** LORazepam (ATIVAN) injection 3 mg, 3 mg, IntraVENous, Q1H PRN, 3 mg at 10/13/21 0650 **OR** LORazepam (ATIVAN) tablet 4 mg, 4 mg, Oral, Q1H PRN **OR** LORazepam (ATIVAN) injection 4 mg, 4 mg, IntraVENous, Q1H PRN, Joyce Mccall DO, 4 mg at 10/13/21 0532    [COMPLETED] dilTIAZem injection 15 mg, 15 mg, IntraVENous, Once, 15 mg at 10/12/21 1342 **FOLLOWED BY** dilTIAZem 125 mg in dextrose 5 % 125 mL infusion, 5-15 mg/hr, IntraVENous, Continuous, Joyce Mccall DO, Stopped at 11/81/49 8837    folic acid 1 mg in dextrose 5 % 50 mL IVPB, 1 mg, IntraVENous, Daily, Magdaleno Leventhal, MD, Stopped at 10/12/21 1823    midazolam (VERSED) 100 mg in dextrose 5 % 100 mL infusion, 1-10 mg/hr, IntraVENous, Continuous, Magdaleno Leventhal, MD, Last Rate: 10 mL/hr at 10/13/21 0338, 10 mg/hr at 10/13/21 0338    sodium chloride flush 0.9 % injection 5-40 mL, 5-40 mL, IntraVENous, 2 times per day, Tatyana Herndon MD, 10 mL at 10/12/21 2229    sodium chloride flush 0.9 % injection 5-40 mL, 5-40 mL, IntraVENous, PRN, Tatyana Herndno MD    0.9 % sodium chloride infusion, 25 mL, IntraVENous, PRN, Tatyana Herndon MD    ondansetron (ZOFRAN-ODT) disintegrating tablet 4 mg, 4 mg, Oral, Q8H PRN **OR** ondansetron (ZOFRAN) injection 4 mg, 4 mg, IntraVENous, Q6H PRN, Tatyana Herndon MD    polyethylene glycol (GLYCOLAX) packet 17 g, 17 g, Oral, Daily PRN, Tatyana Herndon MD    acetaminophen (TYLENOL) tablet 650 mg, 650 mg, Oral, Q6H PRN **OR** acetaminophen (TYLENOL) suppository 650 mg, 650 mg, Rectal, Q6H PRN, Harsha Pugh MD    albuterol (PROVENTIL) nebulizer solution 2.5 mg, 2.5 mg, Nebulization, Q4H PRN, Harsha Pugh MD    ipratropium-albuterol (DUONEB) nebulizer solution 1 ampule, 1 ampule, Inhalation, Q4H, MARTY Escalante - CNP, 1 ampule at 10/13/21 0131    Current Outpatient Medications:     fluticasone (FLONASE) 50 MCG/ACT nasal spray, 2 sprays by Nasal route daily 2 sprays each nostril once a day, Disp: 1 Bottle, Rfl: 3    busPIRone (BUSPAR) 5 MG tablet, Take 15 mg by mouth 2 times daily , Disp: , Rfl:     gabapentin (NEURONTIN) 300 MG capsule, Take 300 mg by mouth 3 times daily, Disp: , Rfl:     ibuprofen (ADVIL;MOTRIN) 600 MG tablet, Take 600 mg by mouth, Disp: , Rfl:     omeprazole (PRILOSEC) 20 MG capsule, Take 20 mg by mouth Daily , Disp: , Rfl:     PROAIR  (90 BASE) MCG/ACT inhaler, , Disp: , Rfl:     ASPIRIN LOW DOSE 81 MG EC tablet, Take 81 mg by mouth daily , Disp: , Rfl:     sertraline (ZOLOFT) 100 MG tablet, 100 mg nightly , Disp: , Rfl:     lisinopril (PRINIVIL;ZESTRIL) 5 MG tablet, Take 5 mg by mouth daily, Disp: , Rfl:     nitroGLYCERIN (NITROSTAT) 0.4 MG SL tablet, Place 1 tablet under the tongue every 5 minutes as needed for Chest pain, Disp: 25 tablet, Rfl: 3    pravastatin (PRAVACHOL) 80 MG tablet, Take 1 tablet by mouth daily, Disp: 30 tablet, Rfl: 3      ALLERGIES:  Patient has no known allergies. SOCIAL HISTORY:    Unable to be obtained at this time as patient is sedated and intubated. FAMILY HISTORY:   Unable to be obtained at this time as patient is sedated and intubated. REVIEW OF SYSTEMS:     Unable to be obtained at this time as patient is sedated and intubated.       PHYSICAL EXAM:   /83   Pulse 84   Temp 97.3 °F (36.3 °C) (Temporal)   Resp 19   Ht 5' 10.87\" (1.8 m)   Wt 277 lb 3.2 oz (125.7 kg)   SpO2 100%   BMI 38.81 kg/m²   CONST:  Well developed, obese WM who appears stated age. Sedated and intubated. HEENT:   Head- Normocephalic, atraumatic. Neck-  No stridor, trachea midline, no apparent jugular venous distention. CHEST: Chest symmetrical and non-tender to palpation. No accessory muscle use or intercostal retractions. RESPIRATORY: Lung sounds - mechanically ventilated. CARDIOVASCULAR:     No noted carotid bruit. Heart Ausculation- Irregularly irregular rhythm with normal rate, no apparent murmur. PV: Trace -1+ bilateral lower extremity edema. Pedal pulses palpable, no clubbing or cyanosis. ABDOMEN: Soft, non-tender to light palpation. Bowel sounds present. SKIN: Warm and dry. NEURO / PSYCH: Sedated and intubated. DATA:    Telemetry: Atrial fibrillation with HR in the 70-80s    Diagnostic:  All diagnostic testing and lab work thus far this admission reviewed in detail. CT Head 10/12/2021:  Impression:  1. There is no acute intracranial abnormality.  Specifically, there is no  intracranial hemorrhage. 2. Atrophy and periventricular leukomalacia,    CXR 10/12/2021:  Impression:  1. Cardiomegaly. Ambreen Evy is no evidence of failure or pneumonia. CXR 10/12/2021:  Impression:  1. Satisfactory position of the NG tube and endotracheal tube  2. New opacity within the right lung base which could represent a pleural  effusion and or right lung base infiltrate.         Intake/Output Summary (Last 24 hours) at 10/13/2021 0729  Last data filed at 10/12/2021 1319  Gross per 24 hour   Intake 1050 ml   Output    Net 1050 ml       Labs:   CBC:   Recent Labs     10/12/21  0934 10/13/21  0559   WBC 6.6 8.6   HGB 13.3 12.3*   HCT 40.0 38.4    137     BMP:   Recent Labs     10/12/21  0934 10/13/21  0559    135   K 4.3 4.2   CO2 21* 24   BUN 17 17   CREATININE 1.4* 1.0   LABGLOM 51 >60   CALCIUM 9.8 8.4*     Mag:   Recent Labs     10/12/21  0934 10/13/21  0559   MG 1.2* 1.6     Phos:   Recent Labs 10/13/21  0559   PHOS 2.7     PT/INR:   Recent Labs     10/12/21  0934   PROTIME 12.4   INR 1.1     APTT:  Recent Labs     10/12/21  0934   APTT 26.4     CARDIAC ENZYMES:  Recent Labs     10/12/21  0934   CKTOTAL 740*     FASTING LIPID PANEL:  Lab Results   Component Value Date    TRIG 123 10/13/2021     LIVER PROFILE:  Recent Labs     10/12/21  0934   AST 40*   ALT 26   LABALBU 4.2     Ref Range & Units 10/12/21 0934    Troponin, High Sensitivity 0 - 11 ng/L 16High        Ref Range & Units 10/12/21 0934   Pro-BNP 0 - 125 pg/mL 1,889High        Ref Range & Units 10/12/21 0934   Total CK 20 - 200 U/L 740High        Ref Range & Units 10/12/21 0934   Lactic Acid 0.5 - 2.2 mmol/L 2.0         Total Bilirubin 0.0 - 1.2 mg/dL 1.0    Bilirubin, Direct 0.0 - 0.3 mg/dL 0.7High     Bilirubin, Indirect 0.0 - 1.0 mg/dL 0.3       Ref Range & Units 10/12/21 0934   Ethanol Lvl mg/dL <10    Comment: Not Detected   Acetaminophen Level 10.0 - 30.0 mcg/mL <8.2EHI     Salicylate, Serum 0.0 - 30.0 mg/dL <0.3          Ref Range & Units 10/13/21 0559   Amphetamine Screen, Urine Negative <1000 ng/mL NOT DETECTED    Barbiturate Screen, Ur Negative < 200 ng/mL POSITIVEAbnormal     Benzodiazepine Screen, Urine Negative < 200 ng/mL POSITIVEAbnormal     Cannabinoid Scrn, Ur Negative < 50ng/mL NOT DETECTED    Cocaine Metabolite Screen, Urine Negative < 300 ng/mL NOT DETECTED    Opiate Scrn, Ur Negative < 300ng/mL NOT DETECTED    Comment: Note:  The Opiate Screen is not intended to detect Oxycodone.    PCP Screen, Urine Negative < 25 ng/mL NOT DETECTED    Methadone Screen, Urine Negative <300 ng/mL NOT DETECTED    Oxycodone Urine Negative <100 ng/mL POSITIVEAbnormal     FENTANYL SCREEN, URINE Negative <1 ng/mL POSITIVEAbnormal       Ref Range & Units 10/12/21 1005    SARS-CoV-2, NAAT Not Detected Not Detected       10/13/21 0559    Color, UA Straw/Yellow Yellow    Clarity, UA Clear CLOUDYAbnormal     Glucose, Ur Negative mg/dL Negative    Bilirubin Urine Negative SMALLAbnormal     Ketones, Urine Negative mg/dL TRACEAbnormal     Specific Gravity, UA 1.005 - 1.030 >=1.030    Blood, Urine Negative Negative    pH, UA 5.0 - 9.0 5.5    Protein, UA Negative mg/dL TRACE    Urobilinogen, Urine <2.0 E.U./dL 1.0    Nitrite, Urine Negative POSITIVEAbnormal     Leukocyte Esterase, Urine Negative Negative       10/12/21 2100 10/12/21 1508    Date Analyzed  [id number]     Time Analyzed  2100     Source:  Blood Arterial  Arterial    pH, Blood Gas 7.350 - 7.450 7.358  7.114Low Panic     PCO2 35.0 - 45.0 mmHg 34. 9Low      PO2 75.0 - 100.0 mmHg 151. 3High      HCO3 22.0 - 26.0 mmol/L 19.2Low      B.E. -3.0 - 3.0 mmol/L -5.5Low   -9.2Low     O2 Sat 92.0 - 98.5 % 97.8  98.1        ASSESSMENT:  1. Atrial fibrillation with RVR, now HR controlled off Cardizem drip. Newly diagnosed, onset unknown. Likely in setting of alcohol abuse/withdrawal.  CHADsVASc score of at least 3 (age, HTN, CAD). 2. Acute hypoxic respiratory failure, currently sedated and intubated. 3. Alcohol abuse and withdrawal.   4. Acute kidney injury, improved. 5. Elevated total CK. 6. Elevated AST. 7. Hypomagnesemia, supplemented. 8. Anemia. 9. Coronary artery disease s/p CABG x 3 (LIMA-LAD, SVG-RCA, and SVG-diagonal) in 2005. Non-diagnostic Exercise Non-nuclear Stress Test in 2016. 10. Trivial pericardial effusion, mild LVH, with normal LV size and EF 55-60% on TTE in April 2018. 11. Hypertension, now decently controlled. 12. Hyperlipidemia, on statin therapy. 13. Current tobacco abuse. 14. GERD. 15. Depression. 16. COPD. 17. Obesity. 18. Medical non-compliance with follow-up. RECOMMENDATIONS:  1. Lopressor 12.5 mg BID through NG tube. 2. Recommend initiation of oral anticoagulation due to high CHADsVASc score once H/H stable. 3. Echocardiogram once patient is extubated. 4. Continue other cardiac medications the same at this time.    5. Further recommendations to follow as per Dr. Lazara Norwood. The above case and recommendations have been discussed and made in collaboration with Dr. Lazara Norwood. Further recommendations to follow as per him. Cameron Roach, [address] [place] [address], [facility] Cardiology    Electronically signed by Elieser Anderson PA-C on 10/13/2021 at 7:29 AM   ______________________________________________________________________  Cardiology attending attestation:  I have independently interviewed and examined the patient. I personally reviewed pertinent laboratory values and diagnostic testing (including, if applicable, chest xray, electrocardiogram, telemetry, echocardiogram, stress testing, and coronary angiography). I have reviewed the above documentation completed by Cameron Roach PA-C including past medical, social, and family history and agree with the findings, assessment and plan except where noted. Plan formulated under my direct supervision. I participated in all aspects of the medical decision making. Please see my additional contributions to the HPI, physical exam, and assessment / medical decision making:  _______________________________________________________________________    Patient remains intubated and sedated, history obtained from review of the medical record. Remains in atrial fibrillation but rates are controlled. He is not on diltiazem infusion. Physical Exam:  BP (!) 157/101   Pulse 84   Temp 97.6 °F (36.4 °C) (Oral)   Resp 18   Ht 5' 10.87\" (1.8 m)   Wt 277 lb 3.2 oz (125.7 kg)   SpO2 100%   BMI 38.81 kg/m²   General appearance: Intubated and sedated  Skin: Intact, no rash  ENMT: Moist mucous membranes  Neck: Supple, no carotid bruits. Normal jugular venous pressure  Lungs: Clear to auscultation bilaterally.  No wheezes, rales, or rhonchi  Cardiac: Irregular rhythm with a normal rate, normal S1&S2, no murmurs apparent  Abdomen: Soft, positive bowel sounds, nontender  : Red-tinged urine in Woo catheter  Extremities: Moves all extremities x 4, trace lower extremity edema  Neurologic: No focal motor deficits apparent, normal mood and affect    Telemetry: A. fib 70s and 80s currently, up to 160s initially  EKG:  10/12/2021: Atrial fibrillation 110 bpm.  Normal axis normal intervals. Nonspecific T wave changes    Impression:   1. New diagnosis of A. fib, unknown duration. Now rate controlled. Elevated stroke risk  2. CAD with CABG  3. Alcohol withdrawal/respiratory failure/intubated    Recommendations:     Beta-blocker via OG tube to help rate control   Diltiazem infusion as needed   Anticoagulation if no contraindication   Eventual echo once extubated, not urgent   Outpatient follow-up with Dr. Ricardo Tovar Patient awaiting transfer to somewhere with an ICU bed   Aggressive risk factor modification    Thank you for the consultation. Please do not hesitate to call with questions.     Hansa Neri MD, [facility] Cardiology

## 2021-10-13 NOTE — ED NOTES
Xray notified for tube placement. Dr. Alex Finch would like to be notified of xray results. Ginny Warner RN aware.      Luisito Floyd RN  10/13/21 8604

## 2021-10-13 NOTE — ED NOTES
Per Elissa Castrejon Np, turn fio2 down in increments of 10, and keep pt SPo2 above 90%     Jesus Arechiga RN  10/13/21 0015

## 2021-10-13 NOTE — ED NOTES
Pt Spo2 86%, Pt suctioned for scant return, fio2 increased to 100%, resp notified     Clary Morel RN  10/12/21 4844

## 2021-10-13 NOTE — ED NOTES
Dr. Carlean Schilder at bedside with resp therapist advancing tube to 26 at the lip, xray called for portable, this RN soft restrained pt according to orders, pt having visual tremors, CIWA completed and 2 mg of ativan were given, restraint assessment in flowsheet      RAMYA Fernando  10/13/21 [address]  10/13/21 3229

## 2021-10-13 NOTE — PROGRESS NOTES
Admitting Date and Time: 10/12/2021  9:01 AM  Admit Dx: Seizures ([facility] [R56.9]    Subjective: Intubated sedated    ROS: denies fever, chills, cp, sob, n/v, HA unless stated above.  thiamine (VITAMIN B1) IVPB  500 mg IntraVENous Q24H    pantoprazole  40 mg IntraVENous BID    And    sodium chloride (PF)  10 mL IntraVENous BID    enoxaparin  40 mg SubCUTAneous BID    metoprolol tartrate  25 mg Per NG tube Q6H    succinylcholine  100 mg IntraVENous Once    sodium chloride flush  5-40 mL IntraVENous 2 times per day    folic acid IVPB  1 mg IntraVENous Daily    sodium chloride flush  5-40 mL IntraVENous 2 times per day    ipratropium-albuterol  1 ampule Inhalation Q4H     sodium chloride flush, 5-40 mL, PRN  sodium chloride, 25 mL, PRN  LORazepam, 1 mg, Q1H PRN   Or  LORazepam, 1 mg, Q1H PRN   Or  LORazepam, 2 mg, Q1H PRN   Or  LORazepam, 2 mg, Q1H PRN   Or  LORazepam, 3 mg, Q1H PRN   Or  LORazepam, 3 mg, Q1H PRN   Or  LORazepam, 4 mg, Q1H PRN   Or  LORazepam, 4 mg, Q1H PRN  sodium chloride flush, 5-40 mL, PRN  sodium chloride, 25 mL, PRN  ondansetron, 4 mg, Q8H PRN   Or  ondansetron, 4 mg, Q6H PRN  polyethylene glycol, 17 g, Daily PRN  acetaminophen, 650 mg, Q6H PRN   Or  acetaminophen, 650 mg, Q6H PRN  albuterol, 2.5 mg, Q4H PRN         Objective:    BP (!) 143/70   Pulse 95   Temp 97.8 °F (36.6 °C) (Oral)   Resp 18   Ht 5' 10.87\" (1.8 m)   Wt 277 lb 3.2 oz (125.7 kg)   SpO2 99%   BMI 38.81 kg/m²   Patient is sedated and ventilated  Normocephalic, without obvious abnormality, atraumatic, external ears without lesions,   Neck  cervical lymphadenopathy with limited exam for motion due to ET tube  Heart has a regular rate and rhythm no murmur  Lungs are clear to auscultation bilaterally with equal movements with the additional sounds of transmitted airway sounds from the ventilator. Abdomen is soft nontender nondistended no rebound or guarding. edema good peripheral perfusion.   No significant having tremors and hallucinations at 6 AM.  For the past 1 or 2 weeks he has been drinking 1/5 of gin being a day but has not had any drinks since Saturday the only other substances of abuse that he admits to is smoking 1 and half packs of cigarettes per day. His tremors are getting worse on Saturday he started having leg swelling. In the ER he was known to be having tremors he received Ativan but even before that when he was grabbing at invisible objects he was snoring per the nurse. He desatted down to 70%. He was intubated to protect his airway. He was noted to have A. fib RVR and was given Cardizem drip to good effect    1. Acute alcohol withdrawal now intubated to protect his airway per intensivist he is awaiting a bed here and elsewhere for the ICU more being housed in the emergency room. 2.  A. fib RVR Cardizem drip likely due to alcohol cardiology on. 3.No change to outpatient treatment regimen for the existing stable combordities including: depression hypertension coronary artery disease GERD COPD morbid obesity  4. Thrombocytopenia related to alcohol use monitor  5. Full code  6. DVT prophylaxis with SCDs nonpharmacological due to thrombocytopenia  7. Disposition Home when ready he would benefit from 12-step program    NOTE: This report was transcribed using voice recognition software. Every effort was made to ensure accuracy; however, inadvertent computerized transcription errors may be present.      Electronically signed by Alessandra Kan MD on 10/13/2021 at 5:07 PM

## 2021-10-13 NOTE — CARE COORDINATION
SS Note: Covid negative 10/12/21. Pt is Inpatient in ED. SW Consult received for \"For consideration of rehab\". Pt is still sedated and intubated. SW was unable to locate any previous SW/CM notes in Epic. SW/CM will continue to follow for transition of care planning when pt is stable.   Electronically signed by CLARY Alanis on 10/13/2021 at 10:55 AM

## 2021-10-13 NOTE — CONSULTS
Pulmonary/Critical Care Consult Note    CHIEF COMPLAINT: Acute alcohol withdrawal and delirium tremens    HISTORY OF PRESENT ILLNESS: Patient is a 21-year-old with history of hypertension, CAD, COPD, and alcohol abuse. Patient presented to the ED on 10/12/2021 after developing hallucinations and shortness of breath. Patient apparently called police after seeing his  wife and 2 children walking in his house. He apparently attempted to spray them with bug spray before calling police. Patient has a history of alcohol abuse and drinks 1/5 of them being daily. According to ED physician's note the patient's last drink was on Saturday. The patient is currently intubated, sedated, and unable to provide any history. Plans are to transfer this patient to [facility] and he is awaiting a bed there. Pulmonology was consulted for vent management after his initial ABG showed respiratory acidosis pH of 7.114, PCO2 66.3, PO2 143.4, HCO3 21.3, and SPO2 98.1 onn AC mode rate-15 PEEP of 5 tidal volume 500 and FiO2 100%. .    ALLERGY:  Patient has no known allergies. FAMILY HISTORY:  No family history on file.     SOCIAL HISTORY:  Social History     Socioeconomic History    Marital status:      Spouse name: Not on file    Number of children: Not on file    Years of education: Not on file    Highest education level: Not on file   Occupational History    Not on file   Tobacco Use    Smoking status: Current Every Day Smoker     Packs/day: 1.00     Years: 45.00     Pack years: 45.00     Types: Cigars    Smokeless tobacco: Never Used    Tobacco comment: SMOKES CIGARS    Vaping Use    Vaping Use: Never used   Substance and Sexual Activity    Alcohol use: Yes     Comment: occassiona    Drug use: No    Sexual activity: Not on file   Other Topics Concern    Not on file   Social History Narrative    Not on file     Social Determinants of Health     Financial Resource Strain:     Difficulty of Paying Living patient is sedated and intubated    PHYSICAL EXAM:  Vitals:    10/12/21 1830   BP: 100/67   Pulse: 60   Resp: 15   Temp:    SpO2: 98%     FiO2 : 90 %  O2 Flow Rate (L/min): 5 L/min  O2 Device: Ventilator    Constitutional: No fever, chills, diaphoresis  HEENT: No head lesions, PERRL, EOMI, mouth without lesions, no nasal lesions, no cervical adenopathy palpated   Respiratory: Patient is mechanically ventilated, lungs with inspiratory and expiratory wheezes bilaterally,  no accessory muscle use   CV: Regular rate and irregular rhythm, no murmurs, JVD, no leg edema   Abdomen: Soft, obese, increased bowel sounds, no lesions   Skin: Adequate turgor, no rash, capillary refill <2 seconds   Extremities: Able to assess extremity strength due to sedation, distal pulses present   Neurology: Sedated,  neck is supple, no meningitic signs present.      LABS:  WBC   Date Value Ref Range Status   10/12/2021 6.6 4.5 - 11.5 E9/L Final   04/13/2019 7.6 4.5 - 11.5 E9/L Final   05/23/2016 7.1 4.5 - 11.5 E9/L Final     Hemoglobin   Date Value Ref Range Status   10/12/2021 13.3 12.5 - 16.5 g/dL Final   04/13/2019 14.1 12.5 - 16.5 g/dL Final   05/23/2016 13.9 12.5 - 16.5 g/dL Final     Hematocrit   Date Value Ref Range Status   10/12/2021 40.0 37.0 - 54.0 % Final   04/13/2019 42.8 37.0 - 54.0 % Final   05/23/2016 40.1 37.0 - 54.0 % Final     MCV   Date Value Ref Range Status   10/12/2021 97.3 80.0 - 99.9 fL Final   04/13/2019 97.5 80.0 - 99.9 fL Final   05/23/2016 94.2 80.0 - 99.9 fL Final     Platelets   Date Value Ref Range Status   10/12/2021 185 130 - 450 E9/L Final   04/13/2019 262 130 - 450 E9/L Final   05/23/2016 189 130 - 450 E9/L Final     Sodium   Date Value Ref Range Status   10/12/2021 136 132 - 146 mmol/L Final   04/13/2019 140 132 - 146 mmol/L Final   08/08/2016 136 132 - 146 mmol/L Final     Potassium   Date Value Ref Range Status   04/13/2019 4.5 3.5 - 5.0 mmol/L Final   08/08/2016 4.7 3.5 - 5.0 mmol/L Final   05/23/2016 4.8 3.5 - 5.0 mmol/L Final     Potassium reflex Magnesium   Date Value Ref Range Status   10/12/2021 4.3 3.5 - 5.0 mmol/L Final     Chloride   Date Value Ref Range Status   10/12/2021 100 98 - 107 mmol/L Final   04/13/2019 104 98 - 107 mmol/L Final   08/08/2016 100 98 - 107 mmol/L Final     CO2   Date Value Ref Range Status   10/12/2021 21 (L) 22 - 29 mmol/L Final   04/13/2019 23 22 - 29 mmol/L Final   08/08/2016 23 22 - 29 mmol/L Final     BUN   Date Value Ref Range Status   10/12/2021 17 6 - 23 mg/dL Final   04/13/2019 13 8 - 23 mg/dL Final   08/08/2016 13 6 - 20 mg/dL Final     CREATININE   Date Value Ref Range Status   10/12/2021 1.4 (H) 0.7 - 1.2 mg/dL Final   04/13/2019 1.1 0.7 - 1.2 mg/dL Final   08/08/2016 0.8 0.7 - 1.2 mg/dL Final     Glucose   Date Value Ref Range Status   10/12/2021 108 (H) 74 - 99 mg/dL Final   04/13/2019 117 (H) 74 - 99 mg/dL Final   08/08/2016 100 74 - 109 mg/dL Final     Calcium   Date Value Ref Range Status   10/12/2021 9.8 8.6 - 10.2 mg/dL Final   04/13/2019 9.5 8.6 - 10.2 mg/dL Final   08/08/2016 9.1 8.6 - 10.2 mg/dL Final     Total Protein   Date Value Ref Range Status   10/12/2021 7.0 6.4 - 8.3 g/dL Final   02/27/2016 6.6 6.4 - 8.3 g/dL Final     Albumin   Date Value Ref Range Status   10/12/2021 4.2 3.5 - 5.2 g/dL Final   02/27/2016 4.2 3.5 - 5.2 g/dL Final     Total Bilirubin   Date Value Ref Range Status   10/12/2021 1.0 0.0 - 1.2 mg/dL Final   02/27/2016 0.6 0.0 - 1.2 mg/dL Final     Alkaline Phosphatase   Date Value Ref Range Status   10/12/2021 69 40 - 129 U/L Final   02/27/2016 75 40 - 129 U/L Final     AST   Date Value Ref Range Status   10/12/2021 40 (H) 0 - 39 U/L Final   02/27/2016 11 0 - 39 U/L Final     ALT   Date Value Ref Range Status   10/12/2021 26 0 - 40 U/L Final   02/27/2016 11 0 - 40 U/L Final     GFR Non-   Date Value Ref Range Status   10/12/2021 51 >=60 mL/min/1.73 Final     Comment:     Chronic Kidney Disease: less than 60 ml/min/1.73 sq.m.          Kidney Failure: less than 15 ml/min/1.73 sq.m. Results valid for patients 18 years and older. 04/13/2019 >60 >=60 mL/min/1.73 Final     Comment:     Chronic Kidney Disease: less than 60 ml/min/1.73 sq.m. Kidney Failure: less than 15 ml/min/1.73 sq.m. Results valid for patients 18 years and older. 08/08/2016 >60 >=60 mL/min/1.73 Final     Comment:     Chronic Kidney Disease: less than 60 ml/min/1.73 sq.m. Kidney Failure: less than 15 ml/min/1.73 sq.m. Results valid for patients 18 years and older. GFR    Date Value Ref Range Status   10/12/2021 >60  Final   04/13/2019 >60  Final   08/08/2016 >60  Final     Magnesium   Date Value Ref Range Status   10/12/2021 1.2 (L) 1.6 - 2.6 mg/dL Final     No results found for: PHOS  Recent Labs     10/12/21  1508   PH 7.114*   BE -9.2*   O2SAT 98.1       RADIOLOGY:  XR CHEST PORTABLE   Final Result   New right IJ line with the distal tip in the SVC and no pneumothorax. Slightly more confluent airspace disease in the right lung base. No other significant interval change. XR CHEST PORTABLE   Final Result   1. Satisfactory position of the NG tube and endotracheal tube   2. New opacity within the right lung base which could represent a pleural   effusion and or right lung base infiltrate. XR CHEST PORTABLE   Final Result   1. Cardiomegaly. There is no evidence of failure or pneumonia. CT Head WO Contrast   Final Result   1. There is no acute intracranial abnormality. Specifically, there is no   intracranial hemorrhage. 2. Atrophy and periventricular leukomalacia,   . IMPRESSION:  1. Acute hypercapnic respiratory failure  2. Respiratory acidosis  3. COPD  4. Nicotine abuse    PLAN:  1. Continue mechanical ventilation with lung protective strategy  2. Increase respiratory rate to 18 and decrease FiO2 to 60%, obtain ABG after 1 hour on the settings  3.  DuoNeb every 4 hours around-the-clock  4.  We will intervene if the patient's respiratory status changes      Electronically signed by MARTY Ross CNP on 10/12/2021 at 8:21 PM

## 2021-10-13 NOTE — PROGRESS NOTES
Assessment and Plan  Patient is a 73 y.o. male with the following medical Problems:   1. Acute on chronic hypoxic and hypercapnic respiratory failure  2. Alcohol withdrawal  3. New onset atrial fibrillation  4. Thrombocytopenia  5. Morbid obesity with obesity hypoventilation syndrome  6. Metabolic encephalopathy  7. Acute kidney injury between (resolved)  8. Aspiration pneumonia    Plan of care:  1. C/W IV fluid  2. Continue with thiamine and folic acid  3. Continue sedation  4. Advance ET tube 2 cm and repeat chest x-ray  5. Increase metoprolol to 25 every 6 hours and wean Cardizem to off  6. IV fluid  7. Discontinue Versed  8. Daily sedation vacation, awakening trial, and spontaneous breathing trials. 9.  GI prophylaxis and DVT prophylaxis  10. Unasyn for aspiration pneumonia    History of Present Illness:   Patient is a 56-year-old with history of hypertension, CAD, COPD, and alcohol abuse. Patient presented to the ED on 10/12/2021 after developing hallucinations and shortness of breath. Patient apparently called police after seeing his  wife and 2 children walking in his house. He apparently attempted to spray them with bug spray before calling police. Patient has a history of alcohol abuse and drinks 1/5 of them being daily. According to ED physician's note the patient's last drink was on Saturday. The patient is currently intubated, sedated, and unable to provide any history. Daily Progress:  2021: Patient remains sedated intubated and mechanically ventilated. He had an uneventful night. He is currently on a Cardizem drip for atrial fibrillation with RVR.   He has no fever, chills, or rigors.         Past Medical History:  Past Medical History:   Diagnosis Date    CAD (coronary artery disease)     COPD (chronic obstructive pulmonary disease) ([facility]     Depression     Fracture of unspecified phalanx of left middle finger, initial encounter for closed fracture  GERD (gastroesophageal reflux disease)     Hypertension         Family History:   No family history on file. Allergies:         Patient has no known allergies. Social history:  Social History     Socioeconomic History    Marital status:      Spouse name: Not on file    Number of children: Not on file    Years of education: Not on file    Highest education level: Not on file   Occupational History    Not on file   Tobacco Use    Smoking status: Current Every Day Smoker     Packs/day: 1.00     Years: 45.00     Pack years: 45.00     Types: Cigars    Smokeless tobacco: Never Used    Tobacco comment: SMOKES CIGARS    Vaping Use    Vaping Use: Never used   Substance and Sexual Activity    Alcohol use: Yes     Comment: occassiona    Drug use: No    Sexual activity: Not on file   Other Topics Concern    Not on file   Social History Narrative    Not on file     Social Determinants of Health     Financial Resource Strain:     Difficulty of Paying Living Expenses:    Food Insecurity:     Worried About Running Out of Food in the Last Year:     Ran Out of Food in the Last Year:    Transportation Needs:     Lack of Transportation (Medical):      Lack of Transportation (Non-Medical):    Physical Activity:     Days of Exercise per Week:     Minutes of Exercise per Session:    Stress:     Feeling of Stress :    Social Connections:     Frequency of Communication with Friends and Family:     Frequency of Social Gatherings with Friends and Family:     Attends Scientologist Services:     Active Member of Clubs or Organizations:     Attends Club or Organization Meetings:     Marital Status:    Intimate Partner Violence:     Fear of Current or Ex-Partner:     Emotionally Abused:     Physically Abused:     Sexually Abused:        Current Medications:     Current Facility-Administered Medications:     propofol injection, 5-50 mcg/kg/min, IntraVENous, Titrated, AMRTY Pham CNP, Last Rate: 7.5 mL/hr at 10/13/21 0608, 10 mcg/kg/min at 10/13/21 0608    thiamine (B-1) 500 mg in sodium chloride 0.9 % 100 mL IVPB, 500 mg, IntraVENous, Q24H, Rozareji Memos, APRN - CNP, Stopped at 10/13/21 1114    pantoprazole (PROTONIX) injection 40 mg, 40 mg, IntraVENous, BID, 40 mg at 10/13/21 0815 **AND** sodium chloride (PF) 0.9 % injection 10 mL, 10 mL, IntraVENous, BID, Rozann Memos, APRN - CNP, 10 mL at 10/13/21 9163    enoxaparin (LOVENOX) injection 40 mg, 40 mg, SubCUTAneous, Daily, Rozann Memos, APRN - CNP, 40 mg at 10/13/21 0817    metoprolol tartrate (LOPRESSOR) tablet 12.5 mg, 12.5 mg, Per NG tube, BID, [facility] AALIYAH Sun, 12.5 mg at 10/13/21 1027    sodium chloride flush 0.9 % injection 5-40 mL, 5-40 mL, IntraVENous, 2 times per day, Joyce Mccall, DO, 10 mL at 10/13/21 0822    sodium chloride flush 0.9 % injection 5-40 mL, 5-40 mL, IntraVENous, PRN, Joyce Mccall,     0.9 % sodium chloride infusion, 25 mL, IntraVENous, PRN, Joyce Mccall, DO    LORazepam (ATIVAN) tablet 1 mg, 1 mg, Oral, Q1H PRN **OR** LORazepam (ATIVAN) injection 1 mg, 1 mg, IntraVENous, Q1H PRN **OR** LORazepam (ATIVAN) tablet 2 mg, 2 mg, Oral, Q1H PRN **OR** LORazepam (ATIVAN) injection 2 mg, 2 mg, IntraVENous, Q1H PRN, 2 mg at 10/13/21 0219 **OR** LORazepam (ATIVAN) tablet 3 mg, 3 mg, Oral, Q1H PRN **OR** LORazepam (ATIVAN) injection 3 mg, 3 mg, IntraVENous, Q1H PRN, 3 mg at 10/13/21 1317 **OR** LORazepam (ATIVAN) tablet 4 mg, 4 mg, Oral, Q1H PRN **OR** LORazepam (ATIVAN) injection 4 mg, 4 mg, IntraVENous, Q1H PRN, Joyce Mccall DO, 4 mg at 10/13/21 0532    [COMPLETED] dilTIAZem injection 15 mg, 15 mg, IntraVENous, Once, 15 mg at 10/12/21 1342 **FOLLOWED BY** dilTIAZem 125 mg in dextrose 5 % 125 mL infusion, 5-15 mg/hr, IntraVENous, Continuous, Joyce Mccall DO, Stopped at 61/18/76 5986    folic acid 1 mg in dextrose 5 % 50 mL IVPB, 1 mg, IntraVENous, Daily, Rosemarie Smith MD, Stopped at 10/13/21 1013    midazolam (VERSED) 100 mg in dextrose 5 % 100 mL infusion, 1-10 mg/hr, IntraVENous, Continuous, Cabrera Resendez MD, Last Rate: 10 mL/hr at 10/13/21 1233, 10 mg/hr at 10/13/21 1233    sodium chloride flush 0.9 % injection 5-40 mL, 5-40 mL, IntraVENous, 2 times per day, Brennan Downey MD, 10 mL at 10/13/21 3722    sodium chloride flush 0.9 % injection 5-40 mL, 5-40 mL, IntraVENous, PRN, Brennan Downey MD    0.9 % sodium chloride infusion, 25 mL, IntraVENous, PRN, Brennan Downey MD    ondansetron (ZOFRAN-ODT) disintegrating tablet 4 mg, 4 mg, Oral, Q8H PRN **OR** ondansetron (ZOFRAN) injection 4 mg, 4 mg, IntraVENous, Q6H PRN, Brennan Downey MD    polyethylene glycol (GLYCOLAX) packet 17 g, 17 g, Oral, Daily PRN, Brennan Downey MD    acetaminophen (TYLENOL) tablet 650 mg, 650 mg, Oral, Q6H PRN **OR** acetaminophen (TYLENOL) suppository 650 mg, 650 mg, Rectal, Q6H PRN, Brennan Downey MD    albuterol (PROVENTIL) nebulizer solution 2.5 mg, 2.5 mg, Nebulization, Q4H PRN, Brennan Downey MD    ipratropium-albuterol (DUONEB) nebulizer solution 1 ampule, 1 ampule, Inhalation, Q4H, Clois Avers, APRN - CNP, 1 ampule at 10/13/21 1253    Current Outpatient Medications:     fluticasone (FLONASE) 50 MCG/ACT nasal spray, 2 sprays by Nasal route daily 2 sprays each nostril once a day, Disp: 1 Bottle, Rfl: 3    busPIRone (BUSPAR) 5 MG tablet, Take 15 mg by mouth 2 times daily , Disp: , Rfl:     gabapentin (NEURONTIN) 300 MG capsule, Take 300 mg by mouth 3 times daily, Disp: , Rfl:     ibuprofen (ADVIL;MOTRIN) 600 MG tablet, Take 600 mg by mouth, Disp: , Rfl:     omeprazole (PRILOSEC) 20 MG capsule, Take 20 mg by mouth Daily , Disp: , Rfl:     PROAIR  (90 BASE) MCG/ACT inhaler, , Disp: , Rfl:     ASPIRIN LOW DOSE 81 MG EC tablet, Take 81 mg by mouth daily , Disp: , Rfl:     sertraline (ZOLOFT) 100 MG tablet, 100 mg nightly , Disp: , Rfl:     lisinopril (PRINIVIL;ZESTRIL) 5 MG tablet, Take 5 mg by mouth daily, Disp: , Rfl:     nitroGLYCERIN (NITROSTAT) 0.4 MG SL tablet, Place 1 tablet under the tongue every 5 minutes as needed for Chest pain, Disp: 25 tablet, Rfl: 3    pravastatin (PRAVACHOL) 80 MG tablet, Take 1 tablet by mouth daily, Disp: 30 tablet, Rfl: 3    Review of Systems:   Unable to obtain due to medical condition    Physical Exam:   Vital Signs:  BP (!) 154/83   Pulse 94   Temp 97.8 °F (36.6 °C) (Oral)   Resp 20   Ht 5' 10.87\" (1.8 m)   Wt 277 lb 3.2 oz (125.7 kg)   SpO2 100%   BMI 38.81 kg/m²     Input/Output: In: 30 [I.V.:30]  Out: 700     Oxygen requirements: MV    Ventilator Information:  Vent Information  $Ventilation: $Subsequent Day  Vent Type: 980  Vent Mode: AC/VC  Vt Ordered: 500 mL  Rate Set: 18 bmp  Peak Flow: 78 L/min  Pressure Support: 0 cmH20  FiO2 : 50 %  SpO2: 100 %  SpO2/FiO2 ratio: 200  Sensitivity: 3  PEEP/CPAP: 5  I Time/ I Time %: 0 s  Humidification Source: Heated wire    General appearance: , not in pain or distress, in no respiratory distress    HEENT: Intubated  Neck: Supple, no jugular venous distension, lymphadenopathy, thyromegaly or carotid bruits  Chest: Decreased breath sounds, no wheezing, no crackles and no tenderness over ribs   Cardiovascular: Normal S1 , S2, regular rate and rhythm, no murmur, rub or gallop  Abdomen: Normal sounds present, soft, lax with no tenderness, no hepatosplenomegaly, and no masses  Extremities: No edema. Pulses are equally present. Skin: intact, no rashes   Neurologic: Sedated and mechanically ventilated     Investigations:  Labs, radiological imaging and cardiac work up were reviewed        ICU STAFF PHYSICIAN NOTE OF PERSONAL INVOLVEMENT IN CARE  As the attending physician, I certify that I personally reviewed the patient's history and personally examined the patient to confirm the physical findings described above, and that I reviewed the relevant imaging studies and available reports.   I also discussed the differential diagnosis and all of the proposed management plans with the patient and individuals accompanying the patient to this visit. They had the opportunity to ask questions about the proposed management plans and to have those questions answered. This patient has a high probability of sudden, clinically significant deterioration, which requires the highest level of physician preparedness to intervene urgently. I managed/supervised life or organ supporting interventions that required frequent physician assessment. I devoted my full attention to the direct care of this patient for the amount of time indicated below. Time I spent with family or surrogate(s) is included only if the patient was incapable of providing the necessary information or participating in medical decision-making. Time devoted to teaching and to any procedures I billed separately is not included.   Critical Care Time: 35 minutes      Electronically signed by Og Carney MD on 10/13/2021 at 2:39 PM

## 2021-10-13 NOTE — ED NOTES
Spoke with pts daughter Eleno Neighbours), daughter wants updated on any pt change.  Contact information is in demographics ([phone])     Brandee Maier RN  10/13/21 0041

## 2021-10-14 ENCOUNTER — APPOINTMENT (OUTPATIENT)
Dept: GENERAL RADIOLOGY | Age: 66
DRG: 004 | End: 2021-10-14
Payer: MEDICARE

## 2021-10-14 LAB
AMMONIA: 14 UMOL/L (ref 16–60)
ANION GAP SERPL CALCULATED.3IONS-SCNC: 8 MMOL/L (ref 7–16)
B.E.: -2.8 MMOL/L (ref -3–3)
BUN BLDV-MCNC: 14 MG/DL (ref 6–23)
CALCIUM SERPL-MCNC: 8.7 MG/DL (ref 8.6–10.2)
CHLORIDE BLD-SCNC: 99 MMOL/L (ref 98–107)
CO2: 24 MMOL/L (ref 22–29)
COHB: 0.7 % (ref 0–1.5)
CREAT SERPL-MCNC: 0.8 MG/DL (ref 0.7–1.2)
CRITICAL: ABNORMAL
DATE ANALYZED: ABNORMAL
DATE OF COLLECTION: ABNORMAL
FIO2: 40 %
GFR AFRICAN AMERICAN: >60
GFR NON-AFRICAN AMERICAN: >60 ML/MIN/1.73
GLUCOSE BLD-MCNC: 106 MG/DL (ref 74–99)
HCO3: 21.7 MMOL/L (ref 22–26)
HCT VFR BLD CALC: 38.1 % (ref 37–54)
HEMOGLOBIN: 12.2 G/DL (ref 12.5–16.5)
HHB: 4.7 % (ref 0–5)
LAB: ABNORMAL
LACTIC ACID: 1.3 MMOL/L (ref 0.5–2.2)
LV EF: 55 %
LVEF MODALITY: NORMAL
Lab: ABNORMAL
MCH RBC QN AUTO: 31.9 PG (ref 26–35)
MCHC RBC AUTO-ENTMCNC: 32 % (ref 32–34.5)
MCV RBC AUTO: 99.5 FL (ref 80–99.9)
METHB: 0.1 % (ref 0–1.5)
MODE: AC
O2 CONTENT: 17.2 ML/DL
O2 SATURATION: 95.3 % (ref 92–98.5)
O2HB: 94.5 % (ref 94–97)
OPERATOR ID: 274
PATIENT TEMP: 37
PCO2: 37 MMHG (ref 35–45)
PDW BLD-RTO: 16.7 FL (ref 11.5–15)
PEEP/CPAP: 5 CMH2O
PFO2: 2.14 MMHG/%
PH BLOOD GAS: 7.39 (ref 7.35–7.45)
PLATELET # BLD: 144 E9/L (ref 130–450)
PMV BLD AUTO: 9.6 FL (ref 7–12)
PO2: 85.6 MMHG (ref 75–100)
POTASSIUM SERPL-SCNC: 3.7 MMOL/L (ref 3.5–5)
RBC # BLD: 3.83 E12/L (ref 3.8–5.8)
RR MECHANICAL: 18 B/MIN
SODIUM BLD-SCNC: 131 MMOL/L (ref 132–146)
SOURCE, BLOOD GAS: ABNORMAL
THB: 12.9 G/DL (ref 11.5–16.5)
TIME ANALYZED: 1050
VT MECHANICAL: 500 ML
WBC # BLD: 7 E9/L (ref 4.5–11.5)

## 2021-10-14 PROCEDURE — 2580000003 HC RX 258: Performed by: INTERNAL MEDICINE

## 2021-10-14 PROCEDURE — 85027 COMPLETE CBC AUTOMATED: CPT

## 2021-10-14 PROCEDURE — C8929 TTE W OR WO FOL WCON,DOPPLER: HCPCS

## 2021-10-14 PROCEDURE — 2580000003 HC RX 258: Performed by: NURSE PRACTITIONER

## 2021-10-14 PROCEDURE — 6360000002 HC RX W HCPCS: Performed by: INTERNAL MEDICINE

## 2021-10-14 PROCEDURE — 82805 BLOOD GASES W/O2 SATURATION: CPT

## 2021-10-14 PROCEDURE — C9113 INJ PANTOPRAZOLE SODIUM, VIA: HCPCS | Performed by: NURSE PRACTITIONER

## 2021-10-14 PROCEDURE — 83605 ASSAY OF LACTIC ACID: CPT

## 2021-10-14 PROCEDURE — 87081 CULTURE SCREEN ONLY: CPT

## 2021-10-14 PROCEDURE — 51702 INSERT TEMP BLADDER CATH: CPT

## 2021-10-14 PROCEDURE — 87186 SC STD MICRODIL/AGAR DIL: CPT

## 2021-10-14 PROCEDURE — 6360000002 HC RX W HCPCS: Performed by: NURSE PRACTITIONER

## 2021-10-14 PROCEDURE — 87206 SMEAR FLUORESCENT/ACID STAI: CPT

## 2021-10-14 PROCEDURE — 87070 CULTURE OTHR SPECIMN AEROBIC: CPT

## 2021-10-14 PROCEDURE — 6370000000 HC RX 637 (ALT 250 FOR IP): Performed by: INTERNAL MEDICINE

## 2021-10-14 PROCEDURE — 2580000003 HC RX 258: Performed by: EMERGENCY MEDICINE

## 2021-10-14 PROCEDURE — 71045 X-RAY EXAM CHEST 1 VIEW: CPT

## 2021-10-14 PROCEDURE — 6370000000 HC RX 637 (ALT 250 FOR IP): Performed by: NURSE PRACTITIONER

## 2021-10-14 PROCEDURE — 2000000000 HC ICU R&B

## 2021-10-14 PROCEDURE — 99232 SBSQ HOSP IP/OBS MODERATE 35: CPT | Performed by: INTERNAL MEDICINE

## 2021-10-14 PROCEDURE — 2580000003 HC RX 258

## 2021-10-14 PROCEDURE — 94640 AIRWAY INHALATION TREATMENT: CPT

## 2021-10-14 PROCEDURE — 36556 INSERT NON-TUNNEL CV CATH: CPT

## 2021-10-14 PROCEDURE — 82140 ASSAY OF AMMONIA: CPT

## 2021-10-14 PROCEDURE — 87077 CULTURE AEROBIC IDENTIFY: CPT

## 2021-10-14 PROCEDURE — 2500000003 HC RX 250 WO HCPCS: Performed by: EMERGENCY MEDICINE

## 2021-10-14 PROCEDURE — 99233 SBSQ HOSP IP/OBS HIGH 50: CPT | Performed by: INTERNAL MEDICINE

## 2021-10-14 PROCEDURE — 6360000002 HC RX W HCPCS: Performed by: EMERGENCY MEDICINE

## 2021-10-14 PROCEDURE — 80048 BASIC METABOLIC PNL TOTAL CA: CPT

## 2021-10-14 PROCEDURE — 94003 VENT MGMT INPAT SUBQ DAY: CPT

## 2021-10-14 RX ORDER — BACLOFEN 10 MG/1
5 TABLET ORAL 2 TIMES DAILY
Status: DISCONTINUED | OUTPATIENT
Start: 2021-10-14 | End: 2021-10-26

## 2021-10-14 RX ORDER — GABAPENTIN 250 MG/5ML
600 SOLUTION ORAL 3 TIMES DAILY
Status: DISCONTINUED | OUTPATIENT
Start: 2021-10-14 | End: 2021-10-22

## 2021-10-14 RX ORDER — SODIUM CHLORIDE, SODIUM LACTATE, POTASSIUM CHLORIDE, CALCIUM CHLORIDE 600; 310; 30; 20 MG/100ML; MG/100ML; MG/100ML; MG/100ML
INJECTION, SOLUTION INTRAVENOUS
Status: COMPLETED
Start: 2021-10-14 | End: 2021-10-14

## 2021-10-14 RX ORDER — DIAZEPAM 5 MG/1
5 TABLET ORAL EVERY 12 HOURS
Status: DISCONTINUED | OUTPATIENT
Start: 2021-10-14 | End: 2021-10-26

## 2021-10-14 RX ORDER — PROPOFOL 10 MG/ML
5-50 INJECTION, EMULSION INTRAVENOUS
Status: DISCONTINUED | OUTPATIENT
Start: 2021-10-14 | End: 2021-10-20 | Stop reason: SDUPTHER

## 2021-10-14 RX ORDER — CLONIDINE 0.2 MG/24H
1 PATCH, EXTENDED RELEASE TRANSDERMAL WEEKLY
Status: DISCONTINUED | OUTPATIENT
Start: 2021-10-14 | End: 2021-10-14

## 2021-10-14 RX ORDER — VALPROIC ACID 250 MG/5ML
500 SOLUTION ORAL EVERY 6 HOURS SCHEDULED
Status: DISCONTINUED | OUTPATIENT
Start: 2021-10-14 | End: 2021-10-16

## 2021-10-14 RX ORDER — TOPIRAMATE 25 MG/1
50 TABLET ORAL 2 TIMES DAILY
Status: DISCONTINUED | OUTPATIENT
Start: 2021-10-14 | End: 2021-10-26

## 2021-10-14 RX ADMIN — IPRATROPIUM BROMIDE AND ALBUTEROL SULFATE 1 AMPULE: .5; 2.5 SOLUTION RESPIRATORY (INHALATION) at 06:39

## 2021-10-14 RX ADMIN — Medication 10 ML: at 02:30

## 2021-10-14 RX ADMIN — LORAZEPAM 2 MG: 2 INJECTION INTRAMUSCULAR; INTRAVENOUS at 02:04

## 2021-10-14 RX ADMIN — PROPOFOL INJECTABLE EMULSION 30 MCG/KG/MIN: 10 INJECTION, EMULSION INTRAVENOUS at 06:04

## 2021-10-14 RX ADMIN — Medication 10 ML: at 20:21

## 2021-10-14 RX ADMIN — PANTOPRAZOLE SODIUM 40 MG: 40 INJECTION, POWDER, FOR SOLUTION INTRAVENOUS at 02:07

## 2021-10-14 RX ADMIN — FOLIC ACID 1 MG: 5 INJECTION, SOLUTION INTRAMUSCULAR; INTRAVENOUS; SUBCUTANEOUS at 11:39

## 2021-10-14 RX ADMIN — PROPOFOL INJECTABLE EMULSION 50 MCG/KG/MIN: 10 INJECTION, EMULSION INTRAVENOUS at 11:55

## 2021-10-14 RX ADMIN — PROPOFOL 40 MCG/KG/MIN: 10 INJECTION, EMULSION INTRAVENOUS at 17:58

## 2021-10-14 RX ADMIN — PROPOFOL INJECTABLE EMULSION 40 MCG/KG/MIN: 10 INJECTION, EMULSION INTRAVENOUS at 14:38

## 2021-10-14 RX ADMIN — Medication 10 ML: at 10:25

## 2021-10-14 RX ADMIN — Medication 50 MCG/HR: at 15:32

## 2021-10-14 RX ADMIN — ENOXAPARIN SODIUM 40 MG: 40 INJECTION SUBCUTANEOUS at 09:36

## 2021-10-14 RX ADMIN — DIAZEPAM 5 MG: 5 TABLET ORAL at 22:23

## 2021-10-14 RX ADMIN — TOPIRAMATE 50 MG: 25 TABLET, FILM COATED ORAL at 11:44

## 2021-10-14 RX ADMIN — THIAMINE HYDROCHLORIDE 500 MG: 100 INJECTION, SOLUTION INTRAMUSCULAR; INTRAVENOUS at 11:39

## 2021-10-14 RX ADMIN — PROPOFOL INJECTABLE EMULSION 30 MCG/KG/MIN: 10 INJECTION, EMULSION INTRAVENOUS at 02:11

## 2021-10-14 RX ADMIN — SODIUM CHLORIDE, POTASSIUM CHLORIDE, SODIUM LACTATE AND CALCIUM CHLORIDE: 600; 310; 30; 20 INJECTION, SOLUTION INTRAVENOUS at 05:56

## 2021-10-14 RX ADMIN — APIXABAN 5 MG: 5 TABLET, FILM COATED ORAL at 20:21

## 2021-10-14 RX ADMIN — VALPROIC ACID 500 MG: 250 SOLUTION ORAL at 11:44

## 2021-10-14 RX ADMIN — IPRATROPIUM BROMIDE AND ALBUTEROL SULFATE 1 AMPULE: .5; 2.5 SOLUTION RESPIRATORY (INHALATION) at 01:27

## 2021-10-14 RX ADMIN — PROPOFOL INJECTABLE EMULSION 50 MCG/KG/MIN: 10 INJECTION, EMULSION INTRAVENOUS at 09:36

## 2021-10-14 RX ADMIN — DIAZEPAM 5 MG: 5 TABLET ORAL at 11:44

## 2021-10-14 RX ADMIN — TOPIRAMATE 50 MG: 25 TABLET, FILM COATED ORAL at 20:20

## 2021-10-14 RX ADMIN — Medication 10 ML: at 19:59

## 2021-10-14 RX ADMIN — BACLOFEN 5 MG: 10 TABLET ORAL at 20:20

## 2021-10-14 RX ADMIN — GABAPENTIN 600 MG: 250 SOLUTION ORAL at 17:58

## 2021-10-14 RX ADMIN — LORAZEPAM 4 MG: 2 INJECTION INTRAMUSCULAR at 03:59

## 2021-10-14 RX ADMIN — IPRATROPIUM BROMIDE AND ALBUTEROL SULFATE 1 AMPULE: .5; 2.5 SOLUTION RESPIRATORY (INHALATION) at 15:35

## 2021-10-14 RX ADMIN — SODIUM CHLORIDE, SODIUM LACTATE, POTASSIUM CHLORIDE, CALCIUM CHLORIDE: 600; 310; 30; 20 INJECTION, SOLUTION INTRAVENOUS at 05:56

## 2021-10-14 RX ADMIN — VALPROIC ACID 500 MG: 250 SOLUTION ORAL at 17:54

## 2021-10-14 RX ADMIN — IPRATROPIUM BROMIDE AND ALBUTEROL SULFATE 1 AMPULE: .5; 2.5 SOLUTION RESPIRATORY (INHALATION) at 09:41

## 2021-10-14 RX ADMIN — IPRATROPIUM BROMIDE AND ALBUTEROL SULFATE 1 AMPULE: .5; 2.5 SOLUTION RESPIRATORY (INHALATION) at 18:48

## 2021-10-14 RX ADMIN — SODIUM CHLORIDE, PRESERVATIVE FREE 10 ML: 5 INJECTION INTRAVENOUS at 20:00

## 2021-10-14 RX ADMIN — ENOXAPARIN SODIUM 40 MG: 40 INJECTION SUBCUTANEOUS at 02:09

## 2021-10-14 RX ADMIN — IPRATROPIUM BROMIDE AND ALBUTEROL SULFATE 1 AMPULE: .5; 2.5 SOLUTION RESPIRATORY (INHALATION) at 22:20

## 2021-10-14 RX ADMIN — PANTOPRAZOLE SODIUM 40 MG: 40 INJECTION, POWDER, FOR SOLUTION INTRAVENOUS at 09:36

## 2021-10-14 RX ADMIN — BACLOFEN 5 MG: 10 TABLET ORAL at 11:43

## 2021-10-14 RX ADMIN — PANTOPRAZOLE SODIUM 40 MG: 40 INJECTION, POWDER, FOR SOLUTION INTRAVENOUS at 19:59

## 2021-10-14 RX ADMIN — SODIUM CHLORIDE, PRESERVATIVE FREE 10 ML: 5 INJECTION INTRAVENOUS at 10:25

## 2021-10-14 RX ADMIN — GABAPENTIN 600 MG: 250 SOLUTION ORAL at 11:42

## 2021-10-14 ASSESSMENT — PULMONARY FUNCTION TESTS
PIF_VALUE: 22
PIF_VALUE: 23
PIF_VALUE: 44
PIF_VALUE: 25
PIF_VALUE: 32
PIF_VALUE: 31
PIF_VALUE: 13
PIF_VALUE: 49
PIF_VALUE: 23
PIF_VALUE: 49
PIF_VALUE: 32
PIF_VALUE: 19
PIF_VALUE: 35
PIF_VALUE: 29
PIF_VALUE: 22
PIF_VALUE: 34
PIF_VALUE: 25
PIF_VALUE: 22

## 2021-10-14 ASSESSMENT — PAIN SCALES - GENERAL
PAINLEVEL_OUTOF10: 0
PAINLEVEL_OUTOF10: 5

## 2021-10-14 NOTE — CARE COORDINATION
10/14/21 Negative covid 10/12/21. Cm transition of care: pt transferred from Emergency dept to ICU. Vent/sedation/isolation. fio2 at 40%, peep 5. SS consult for rehab consideration. Will review with pt and/or family once off the vent. CM/SS following.  Electronically signed by SIMEON Moreno on 10/14/2021 at 11:26 AM

## 2021-10-14 NOTE — ED NOTES
Suctioned (PO) for large amount thick mucous. Po care given. Pt. Tolerating vent well.      Jose Nova RN  10/14/21 8937

## 2021-10-14 NOTE — PROGRESS NOTES
sodium chloride (PF) 0.9 % injection 10 mL, 10 mL, IntraVENous, BID, Leon Hernadez, APRN - CNP, 10 mL at 10/13/21 4724    enoxaparin (LOVENOX) injection 40 mg, 40 mg, SubCUTAneous, BID, Getachew Cherry MD, 40 mg at 10/14/21 0209    lactated ringers infusion, , IntraVENous, Continuous, Getachew Cherry MD, Last Rate: 100 mL/hr at 10/14/21 0556, New Bag at 10/14/21 0556    metoprolol tartrate (LOPRESSOR) tablet 25 mg, 25 mg, Per NG tube, Q6H, Getachew Cherry MD    succinylcholine (ANECTINE) injection 100 mg, 100 mg, IntraVENous, Once, Getachew Cherry MD    fentaNYL 5 mcg/ml in 0.9%  ml infusion, 12.5-200 mcg/hr, IntraVENous, Continuous, Getachew Cherry MD, Last Rate: 10 mL/hr at 10/13/21 1643, 50 mcg/hr at 10/13/21 1643    sodium chloride flush 0.9 % injection 5-40 mL, 5-40 mL, IntraVENous, 2 times per day, Milla Breath, DO, 10 mL at 10/13/21 0822    sodium chloride flush 0.9 % injection 5-40 mL, 5-40 mL, IntraVENous, PRN, Joyce Mccall DO    0.9 % sodium chloride infusion, 25 mL, IntraVENous, PRN, Milla Breath, DO    [COMPLETED] dilTIAZem injection 15 mg, 15 mg, IntraVENous, Once, 15 mg at 10/12/21 1342 **FOLLOWED BY** dilTIAZem 125 mg in dextrose 5 % 125 mL infusion, 5-15 mg/hr, IntraVENous, Continuous, Joyce Mccall DO, Stopped at 95/60/71 1415    folic acid 1 mg in dextrose 5 % 50 mL IVPB, 1 mg, IntraVENous, Daily, Navi Manzano MD, Stopped at 10/13/21 1013    sodium chloride flush 0.9 % injection 5-40 mL, 5-40 mL, IntraVENous, 2 times per day, Patti Lugo MD, 10 mL at 10/14/21 0230    sodium chloride flush 0.9 % injection 5-40 mL, 5-40 mL, IntraVENous, PRN, Patti Lugo MD    0.9 % sodium chloride infusion, 25 mL, IntraVENous, PRN, Patti Lugo MD    ondansetron (ZOFRAN-ODT) disintegrating tablet 4 mg, 4 mg, Oral, Q8H PRN **OR** ondansetron (ZOFRAN) injection 4 mg, 4 mg, IntraVENous, Q6H PRN, Patti Lugo MD  [facility] polyethylene glycol (GLYCOLAX) packet 17 g, 17 g, Oral, Daily PRN, Edi Townsend MD    acetaminophen (TYLENOL) tablet 650 mg, 650 mg, Oral, Q6H PRN **OR** acetaminophen (TYLENOL) suppository 650 mg, 650 mg, Rectal, Q6H PRN, Edi Townsend MD    albuterol (PROVENTIL) nebulizer solution 2.5 mg, 2.5 mg, Nebulization, Q4H PRN, Edi Townsend MD    ipratropium-albuterol (DUONEB) nebulizer solution 1 ampule, 1 ampule, Inhalation, Q4H, Lilly Stephen, APRN - CNP, 1 ampule at 10/14/21 3880    Physical Exam:  BP (!) 137/105   Pulse 99   Temp 98.7 °F (37.1 °C) (Axillary)   Resp 18   Ht 5' 10.87\" (1.8 m)   Wt 277 lb 3.2 oz (125.7 kg)   SpO2 98%   BMI 38.81 kg/m²   Wt Readings from Last 3 Encounters:   10/12/21 277 lb 3.2 oz (125.7 kg)   05/20/19 247 lb (112 kg)   05/02/19 250 lb (113.4 kg)     Appearance: Intubated, sedated  Skin: Intact, no rash  Head: Normocephalic, atraumatic  Eyes: EOMI, no conjunctival erythema  ENMT: No pharyngeal erythema, MMM, no rhinorrhea. Thick mucus noted in the nares  Neck: Supple, no elevated JVP, no carotid bruits  Lungs: Wheezing  Cardiac: PMI nondisplaced, irregular rhythm with a normal rate, S1 & S2 normal, no murmurs  Abdomen: Soft, nontender, +bowel sounds  Extremities: Moves all extremities x 4, 1+ pitting lower extremity edema  Neurologic: Twitching of the extremities noted  Peripheral Pulses: Intact posterior tibial pulses bilaterally    Intake/Output:    Intake/Output Summary (Last 24 hours) at 10/14/2021 0932  Last data filed at 10/13/2021 1320  Gross per 24 hour   Intake    Output 700 ml   Net -700 ml     No intake/output data recorded.     Laboratory Tests:  Recent Labs     10/12/21  0934 10/13/21  0559 10/14/21  0456    135 131*   K 4.3 4.2 3.7    102 99   CO2 21* 24 24   BUN 17 17 14   CREATININE 1.4* 1.0 0.8   GLUCOSE 108* 105* 106*   CALCIUM 9.8 8.4* 8.7     Lab Results   Component Value Date    MG 1.6 10/13/2021     Recent Labs     10/12/21  0934 ALKPHOS 69   ALT 26   AST 40*   PROT 7.0   BILITOT 1.0   BILIDIR 0.7*   LABALBU 4.2     Recent Labs     10/12/21  0934 10/13/21  0559 10/14/21  0456   WBC 6.6 8.6 7.0   RBC 4.11 3.82 3.83   HGB 13.3 12.3* 12.2*   HCT 40.0 38.4 38.1   MCV 97.3 100.5* 99.5   MCH 32.4 32.2 31.9   MCHC 33.3 32.0 32.0   RDW 16.3* 16.6* 16.7*    137 144   MPV 9.7 9.8 9.6     Lab Results   Component Value Date    CKTOTAL 415 (H) 10/13/2021    TROPONINI <0.01 04/13/2019    TROPONINI <0.01 05/23/2016    TROPONINI <0.01 02/27/2016     Lab Results   Component Value Date    INR 1.1 10/12/2021    PROTIME 12.4 10/12/2021     No results found for: TSHFT4, TSH  No results found for: LABA1C  No results found for: EAG  No results found for: CHOL  Lab Results   Component Value Date    TRIG 123 10/13/2021     No results found for: HDL  No results found for: LDLCALC, LDLCHOLESTEROL  No results found for: LABVLDL, VLDL  No results found for: CHOLHDLRATIO  Recent Labs     10/12/21  0934   PROBNP 1,889*       Cardiac Tests:    EKG:   10/12/2021: Atrial fibrillation 110 bpm.  Normal axis normal intervals. Nonspecific T wave changes    Telemetry: Atrial fibrillation 60s to 80s    Chest X-ray:   10/13/21  Impression   ET tube terminates between the clavicular heads and the karo.       Enteric tube is poorly visualized at the GE junction and below; if position   of enteric tube tip needs to be known then KUB would better evaluate for it.       Stable mild pulmonary vascular congestion.       Stable small pleural effusions.       Stable cardiomegaly. Echocardiogram:    Summary   Left ventricle is normal in size . Mild left ventricular concentric hypertrophy noted. No regional wall motion abnormalities seen. Normal left ventricular ejection fraction. EF 55-60%   There is a trivial circumferential pericardial effusion noted. Stress test:      Treadmill Non-Nuclear Stress Test (3/2016)  Exercised 7 minutes. Reached 76% MPHR.  8.5 METS.  [facility] treadmill score 7 - low risk of ischemic events. Shortness of breath and fatigue noted with exercise. Non-diagnostic EKG, submaximal HR was recorded. Normal exercise stress with clinically non-ischemic response. Cardiac catheterization:     Left heart catheterization ([facility], 2005)  CORONARY ARTERIOGRAMS:   LEFT MAIN:  The left main is of normal size and caliber, it originated off   the left sinus of Valsalva.  There was mild disease noted within the left   main with eccentric obstructed noted up to 20%. LEFT ANTERIOR DESCENDING:   The left anterior descending was a   moderate-sized vessel.  The left anterior descending revealed an 80%   obstruction proximally within the left anterior descending at the site of   the first diagonal branch.  The first diagonal branch was totally occluded   in its midportion, but did fill slightly.  The distal LAD also showed   multiple tandem lesions near the apex. LEFT CIRCUMFLEX:  The left circumflex was nondominant.  The left   circumflex revealed no significant obstruction for occlusive disease   within the left circumflex. RIGHT CORONARY ARTERY:  Was dominant.  There was diffuse disease noted   throughout the right coronary artery.  There was a tight, 90%, discreet   lesion noted after the take-off of the acute marginal branch.  There was   an additional 70-80% lesion noted beyond this more proximal tight lesion. Both lesions were noted to be prior to the takeoff of the posterior   descending coronary artery.  The rest of the distal right coronary artery   and posterolateral branches were free of any significant obstructive or   occlusive disease. CONCLUSION:   Severe multi-vessel coronary artery disease in a patient who presents with   ST elevation myocardial infarction and total occlusion of his diagonal   branch.  The patient also has significant disease of his proximal LAD as   well as severe disease of his dominant right coronary artery. RECOMMENDATIONS:  At this time are for coronary artery bypass surgery. ----------------------------------------------------------------------------------------------------------------------------------------------------------------  IMPRESSION:  1. Atrial fibrillation with RVR, new onset. Initially RVR now rate controlled. Duration unknown. CHADsVASc score of at least 3 (age, HTN, CAD). 2. Coronary artery disease s/p CABG x 3 (LIMA-LAD, SVG-RCA, and SVG-diagonal) in 2005. 3. Trivial pericardial effusion 2018  4. Acute hypoxic respiratory failure, currently sedated and intubated. 5. Alcohol abuse and withdrawal.   6. Acute kidney injury, resolved. 7. Elevated total CK, trending down. 8. Hypomagnesemia, supplemented. 9. Anemia Hgb 12.2  10. Hypertension, now decently controlled. 11. Hyperlipidemia, on statin therapy. 12. Current tobacco abuse. 13. GERD. 14. Depression. 15. COPD. 16. Obesity. 17. Medical non-compliance with follow-up. RECOMMENDATIONS:     Continue metoprolol via OG tube for rate control, increased to 25 mg every 6 hours per critical care   Given elevated stroke risk, recommend anticoagulation if hemoglobin remains stable   Nonurgent echocardiogram, should be done when he is extubated   Further care deferred to  primary, critical care   Outpatient follow-up with Nury Rojas   Will see as needed, please call with questions    Hansa Neri MD, [address] [facility]    NOTE: This report was transcribed using voice recognition software. Every effort was made to ensure accuracy; however, inadvertent computerized transcription errors may be present.

## 2021-10-14 NOTE — PROGRESS NOTES
Assessment and Plan  Patient is a 73 y.o. male with the following medical Problems:   1. Acute on chronic hypoxic and hypercapnic respiratory failure  2. Alcohol withdrawal  3. New onset atrial fibrillation  4. Thrombocytopenia  5. Morbid obesity with obesity hypoventilation syndrome  6. Metabolic encephalopathy  7. Acute kidney injury between (resolved)  8. Aspiration pneumonia  9. UTI    Plan of care:  1. Daily sedation vacation and spontaneous breathing trial  2. Continue with thiamine and folic acid  3. Patient will be started on valproic acid, gabapentin, baclofen, scheduled diazepam, clonidine, and Topamax for alcohol withdrawal.  4.  Cardizem drip for atrial fibrillation. 5.  Increase metoprolol to 25 every 6 hours and wean Cardizem to off  6. Discontinue IV fluid and start tube feeding. 7.  Propofol and fentanyl for sedation   8. Daily sedation vacation, awakening trial, and spontaneous breathing trials. 9.  GI prophylaxis and DVT prophylaxis  10. Echocardiography. 11.  Eliquis for atrial fibrillation with RVR  12. Ammonia level. History of Present Illness:   Patient is a 50-year-old with history of hypertension, CAD, COPD, and alcohol abuse. Patient presented to the ED on 10/12/2021 after developing hallucinations and shortness of breath. Patient apparently called police after seeing his  wife and 2 children walking in his house. He apparently attempted to spray them with bug spray before calling police. Patient has a history of alcohol abuse and drinks 1/5 of them being daily. According to ED physician's note the patient's last drink was on Saturday. The patient is currently intubated, sedated, and unable to provide any history. Daily Progress:  2021: Patient remains sedated intubated and mechanically ventilated. He had an uneventful night. He is currently on a Cardizem drip for atrial fibrillation with RVR.   He has no fever, chills, or rigors. October 14, 2021: Patient remained sedated intubated and mechanically ventilated. He had atrial fibrillation with RVR. Patient failed awakening trial today. He was agitated and confused. He has no fever, chills, rigors.         Past Medical History:  Past Medical History:   Diagnosis Date    CAD (coronary artery disease)     COPD (chronic obstructive pulmonary disease) ([facility] [address]     Depression     Fracture of unspecified phalanx of left middle finger, initial encounter for closed fracture     GERD (gastroesophageal reflux disease)     Hypertension         Family History:   No family history on file. Allergies:         Patient has no known allergies. Social history:  Social History     Socioeconomic History    Marital status:      Spouse name: Not on file    Number of children: Not on file    Years of education: Not on file    Highest education level: Not on file   Occupational History    Not on file   Tobacco Use    Smoking status: Current Every Day Smoker     Packs/day: 1.00     Years: 45.00     Pack years: 45.00     Types: Cigars    Smokeless tobacco: Never Used    Tobacco comment: SMOKES CIGARS    Vaping Use    Vaping Use: Never used   Substance and Sexual Activity    Alcohol use: Yes     Comment: occassiona    Drug use: No    Sexual activity: Not on file   Other Topics Concern    Not on file   Social History Narrative    Not on file     Social Determinants of Health     Financial Resource Strain:     Difficulty of Paying Living Expenses:    Food Insecurity:     Worried About Running Out of Food in the Last Year:     Ran Out of Food in the Last Year:    Transportation Needs:     Lack of Transportation (Medical):      Lack of Transportation (Non-Medical):    Physical Activity:     Days of Exercise per Week:     Minutes of Exercise per Session:    Stress:     Feeling of Stress :    Social Connections:     Frequency of Communication with Friends and Family:     Frequency of Social Gatherings with Friends and Family:     Attends [employer] Services:     Active Member of Clubs or Organizations:     Attends Club or Organization Meetings:     Marital Status:    Intimate Partner Violence:     Fear of Current or Ex-Partner:     Emotionally Abused:     Physically Abused:     Sexually Abused:        Current Medications:     Current Facility-Administered Medications:     Gabapentin SOLN 600 mg, 600 mg, Oral, TID, Pepe Linares MD, 600 mg at 10/14/21 1142    valproic acid (DEPAKENE) 250 MG/5ML oral solution 500 mg, 500 mg, Oral, 4 times per day, Pepe Linares MD, 500 mg at 10/14/21 1144    cloNIDine (CATAPRES) 0.2 MG/24HR 1 patch, 1 patch, TransDERmal, Weekly, Pepe Linares MD    topiramate (TOPAMAX) tablet 50 mg, 50 mg, Oral, BID, Pepe Linares MD, 50 mg at 10/14/21 1144    baclofen (LIORESAL) tablet 5 mg, 5 mg, Oral, BID, Pepe Linares MD, 5 mg at 10/14/21 1143    diazePAM (VALIUM) tablet 5 mg, 5 mg, Oral, Q12H, Pepe Linares MD, 5 mg at 10/14/21 1144    propofol injection, 5-50 mcg/kg/min, IntraVENous, Titrated, Artem Mcleod APRN - CNP, Last Rate: 37.7 mL/hr at 10/14/21 0936, 50 mcg/kg/min at 10/14/21 0936    thiamine (B-1) 500 mg in sodium chloride 0.9 % 100 mL IVPB, 500 mg, IntraVENous, Q24H, Artem Mcleod APRN - CNP, Last Rate: 200 mL/hr at 10/14/21 1139, 500 mg at 10/14/21 1139    pantoprazole (PROTONIX) injection 40 mg, 40 mg, IntraVENous, BID, 40 mg at 10/14/21 0936 **AND** sodium chloride (PF) 0.9 % injection 10 mL, 10 mL, IntraVENous, BID, Artem Mcleod APRN - CNP, 10 mL at 10/14/21 1025    enoxaparin (LOVENOX) injection 40 mg, 40 mg, SubCUTAneous, BID, Pepe Linares MD, 40 mg at 10/14/21 0755    lactated ringers infusion, , IntraVENous, Continuous, Pepe Linares MD, Last Rate: 100 mL/hr at 10/14/21 0902, Rate Verify at 10/14/21 0902   metoprolol tartrate (LOPRESSOR) tablet 25 mg, 25 mg, Per NG tube, Q6H, Marsha Dumont MD    succinylcholine (ANECTINE) injection 100 mg, 100 mg, IntraVENous, Once, Marsha Dumont MD    fentaNYL 5 mcg/ml in 0.9%  ml infusion, 12.5-200 mcg/hr, IntraVENous, Continuous, Marsha Dumont MD, Last Rate: 10 mL/hr at 10/14/21 0902, 50 mcg/hr at 10/14/21 0902    sodium chloride flush 0.9 % injection 5-40 mL, 5-40 mL, IntraVENous, 2 times per day, Sonny Aleman DO, 10 mL at 10/14/21 1025    sodium chloride flush 0.9 % injection 5-40 mL, 5-40 mL, IntraVENous, PRN, Joyce Mccall DO    0.9 % sodium chloride infusion, 25 mL, IntraVENous, PRN, Sonny Aleman DO    [COMPLETED] dilTIAZem injection 15 mg, 15 mg, IntraVENous, Once, 15 mg at 10/12/21 1342 **FOLLOWED BY** dilTIAZem 125 mg in dextrose 5 % 125 mL infusion, 5-15 mg/hr, IntraVENous, Continuous, Joyce Mccall DO, Stopped at 18/60/88 5281    folic acid 1 mg in dextrose 5 % 50 mL IVPB, 1 mg, IntraVENous, Daily, Lio Durand MD, Last Rate: 100 mL/hr at 10/14/21 1139, 1 mg at 10/14/21 1139    sodium chloride flush 0.9 % injection 5-40 mL, 5-40 mL, IntraVENous, 2 times per day, Gemma Fallon MD, 10 mL at 10/14/21 1025    sodium chloride flush 0.9 % injection 5-40 mL, 5-40 mL, IntraVENous, PRN, Gemma Fallon MD    0.9 % sodium chloride infusion, 25 mL, IntraVENous, PRN, Gemma Fallon MD    ondansetron (ZOFRAN-ODT) disintegrating tablet 4 mg, 4 mg, Oral, Q8H PRN **OR** ondansetron (ZOFRAN) injection 4 mg, 4 mg, IntraVENous, Q6H PRN, Gemma Fallon MD    polyethylene glycol (GLYCOLAX) packet 17 g, 17 g, Oral, Daily PRN, Gemma Fallon MD    acetaminophen (TYLENOL) tablet 650 mg, 650 mg, Oral, Q6H PRN **OR** acetaminophen (TYLENOL) suppository 650 mg, 650 mg, Rectal, Q6H PRN, Gemma Fallon MD    albuterol (PROVENTIL) nebulizer solution 2.5 mg, 2.5 mg, Nebulization, Q4H PRN, MD Francisco Corrales Cha ipratropium-albuterol (DUONEB) nebulizer solution 1 ampule, 1 ampule, Inhalation, Q4H, Palomo Russell APRN - CNP, 1 ampule at 10/14/21 0941    Review of Systems:   Unable to obtain due to medical condition    Physical Exam:   Vital Signs:  BP 97/61   Pulse 98   Temp 98.7 °F (37.1 °C) (Axillary)   Resp 18   Ht 5' 11\" (1.803 m)   Wt 270 lb 1.6 oz (122.5 kg)   SpO2 100%   BMI 37.67 kg/m²     Input/Output:  No intake/output data recorded. Oxygen requirements: MV    Ventilator Information:  Vent Information  $Ventilation: $Subsequent Day  Vent Type: 980  Vent Mode: AC/VC  Vt Ordered: 500 mL  Rate Set: 18 bmp  Peak Flow: 88 L/min  Pressure Support: 0 cmH20  FiO2 : 40 %  SpO2: 100 %  SpO2/FiO2 ratio: 250  Sensitivity: 3  PEEP/CPAP: 5  I Time/ I Time %: 0 s  Humidification Source: Heated wire    General appearance: , not in pain or distress, in no respiratory distress    HEENT: Intubated  Neck: Supple, no jugular venous distension, lymphadenopathy, thyromegaly or carotid bruits  Chest: Decreased breath sounds, no wheezing, no crackles and no tenderness over ribs   Cardiovascular: Normal S1 , S2, regular rate and rhythm, no murmur, rub or gallop  Abdomen: Normal sounds present, soft, lax with no tenderness, no hepatosplenomegaly, and no masses  Extremities: No edema. Pulses are equally present. Skin: intact, no rashes   Neurologic: Sedated and mechanically ventilated     Investigations:  Labs, radiological imaging and cardiac work up were reviewed        ICU STAFF PHYSICIAN NOTE OF PERSONAL INVOLVEMENT IN CARE  As the attending physician, I certify that I personally reviewed the patient's history and personally examined the patient to confirm the physical findings described above, and that I reviewed the relevant imaging studies and available reports. I also discussed the differential diagnosis and all of the proposed management plans with the patient and individuals accompanying the patient to this visit. They had the opportunity to ask questions about the proposed management plans and to have those questions answered. This patient has a high probability of sudden, clinically significant deterioration, which requires the highest level of physician preparedness to intervene urgently. I managed/supervised life or organ supporting interventions that required frequent physician assessment. I devoted my full attention to the direct care of this patient for the amount of time indicated below. Time I spent with family or surrogate(s) is included only if the patient was incapable of providing the necessary information or participating in medical decision-making. Time devoted to teaching and to any procedures I billed separately is not included.   Critical Care Time: 35 minutes      Electronically signed by Chuyita Osborn MD on 10/14/2021 at 11:46 AM

## 2021-10-14 NOTE — PROGRESS NOTES
Admitting Date and Time: 10/12/2021  9:01 AM  Admit Dx: Delirium tremens ([facility] [F10.231]  Seizures ([facility] [R56.9]  Acute respiratory failure, unspecified whether with hypoxia or hypercapnia ([facility] [J96.00]  Atrial fibrillation, unspecified type ([facility] [I48.91]    Subjective: Intubated sedated    ROS: denies fever, chills, cp, sob, n/v, HA unless stated above.  Gabapentin  600 mg Oral TID    valproic acid  500 mg Oral 4 times per day    topiramate  50 mg Oral BID    baclofen  5 mg Oral BID    diazePAM  5 mg Oral Q12H    apixaban  5 mg Oral BID    thiamine (VITAMIN B1) IVPB  500 mg IntraVENous Q24H    pantoprazole  40 mg IntraVENous BID    And    sodium chloride (PF)  10 mL IntraVENous BID    sodium chloride flush  5-40 mL IntraVENous 2 times per day    folic acid IVPB  1 mg IntraVENous Daily    sodium chloride flush  5-40 mL IntraVENous 2 times per day    ipratropium-albuterol  1 ampule Inhalation Q4H     perflutren lipid microspheres, 1.5 mL, ONCE PRN  sodium chloride flush, 5-40 mL, PRN  sodium chloride, 25 mL, PRN  sodium chloride flush, 5-40 mL, PRN  sodium chloride, 25 mL, PRN  ondansetron, 4 mg, Q8H PRN   Or  ondansetron, 4 mg, Q6H PRN  polyethylene glycol, 17 g, Daily PRN  acetaminophen, 650 mg, Q6H PRN   Or  acetaminophen, 650 mg, Q6H PRN  albuterol, 2.5 mg, Q4H PRN         Objective:    BP (!) 131/93   Pulse 99   Temp 98.4 °F (36.9 °C) (Bladder)   Resp 18   Ht 5' 11\" (1.803 m)   Wt 270 lb 1.6 oz (122.5 kg)   SpO2 100%   BMI 37.67 kg/m²   Patient is sedated and ventilated  Normocephalic, without obvious abnormality, atraumatic, external ears without lesions,   Neck  cervical lymphadenopathy with limited exam for motion due to ET tube  Heart has a regular rate and rhythm no murmur  Lungs are clear to auscultation bilaterally with equal movements with the additional sounds of transmitted airway sounds from the ventilator.   Abdomen is soft nontender nondistended no rebound or guarding. Some edema good peripheral perfusion. No significant rashes or new skin lesions. Affect and neuro exam limited since he is sedated on the ventilator        Recent Labs     10/12/21  0934 10/13/21  0559 10/14/21  0456    135 131*   K 4.3 4.2 3.7    102 99   CO2 21* 24 24   BUN 17 17 14   CREATININE 1.4* 1.0 0.8   GLUCOSE 108* 105* 106*   CALCIUM 9.8 8.4* 8.7       Recent Labs     10/12/21  0934   ALKPHOS 69   PROT 7.0   LABALBU 4.2   BILITOT 1.0   AST 40*   ALT 26       Recent Labs     10/12/21  0934 10/13/21  0559 10/14/21  0456   WBC 6.6 8.6 7.0   RBC 4.11 3.82 3.83   HGB 13.3 12.3* 12.2*   HCT 40.0 38.4 38.1   MCV 97.3 100.5* 99.5   MCH 32.4 32.2 31.9   MCHC 33.3 32.0 32.0   RDW 16.3* 16.6* 16.7*    137 144   MPV 9.7 9.8 9.6           Radiology:   XR CHEST ABDOMEN NG PLACEMENT   Final Result   The enteric tube terminates in the region of the proximal stomach. Recommend   consideration of advancement by at least 6 cm to ensure intragastric location   of the side hole. XR CHEST PORTABLE   Final Result   ET tube terminates between the clavicular heads and the karo. Enteric tube is poorly visualized at the GE junction and below; if position   of enteric tube tip needs to be known then KUB would better evaluate for it. Stable mild pulmonary vascular congestion. Stable small pleural effusions. Stable cardiomegaly. XR CHEST PORTABLE   Final Result   No interval change compared to prior exam.      Persistent small bilateral pleural effusions. Ongoing mild-to-moderate pulmonary edema. XR CHEST PORTABLE   Final Result   New right IJ line with the distal tip in the SVC and no pneumothorax. Slightly more confluent airspace disease in the right lung base. No other significant interval change. XR CHEST PORTABLE   Final Result   1. Satisfactory position of the NG tube and endotracheal tube   2.  New opacity within the right lung base which could represent a pleural   effusion and or right lung base infiltrate. XR CHEST PORTABLE   Final Result   1. Cardiomegaly. There is no evidence of failure or pneumonia. CT Head WO Contrast   Final Result   1. There is no acute intracranial abnormality. Specifically, there is no   intracranial hemorrhage. 2. Atrophy and periventricular leukomalacia,   . Assessment:  Active Problems:    Seizures ([address]  Resolved Problems:    * No resolved hospital problems. *      Plan: 73 y.o. male with a history of alcoholism depression hypertension coronary artery disease GERD COPD morbid obesity presents with signs of alcohol withdrawal.  Before he was intubated he gave the ER the history that he began having tremors and hallucinations at 6 AM.  For the past 1 or 2 weeks he has been drinking 1/5 of gin being a day but has not had any drinks since Saturday the only other substances of abuse that he admits to is smoking 1 and half packs of cigarettes per day. His tremors are getting worse on Saturday he started having leg swelling. In the ER he was known to be having tremors he received Ativan but even before that when he was grabbing at invisible objects he was snoring per the nurse. He desatted down to 70%. He was intubated to protect his airway. He was noted to have A. fib RVR and was given Cardizem drip to good effect    1. Acute alcohol withdrawal  intubated  per intensivist who is adjusting his sedation   2. A. fib RVR Cardizem drip likely due to alcohol. cardiology on. 3.No change to outpatient treatment regimen for the existing stable combordities including: depression hypertension coronary artery disease GERD COPD morbid obesity  4. Thrombocytopenia related to alcohol use monitor  5. Full code  6. DVT prophylaxis with SCDs nonpharmacological due to thrombocytopenia  7.   Disposition Home when ready he would benefit from 12-step program    NOTE: This report was transcribed using voice recognition software. Every effort was made to ensure accuracy; however, inadvertent computerized transcription errors may be present.      Electronically signed by Jose Luis Alcantar MD on 10/14/2021 at 5:49 PM

## 2021-10-15 ENCOUNTER — APPOINTMENT (OUTPATIENT)
Dept: GENERAL RADIOLOGY | Age: 66
DRG: 004 | End: 2021-10-15
Payer: MEDICARE

## 2021-10-15 LAB
ANION GAP SERPL CALCULATED.3IONS-SCNC: 9 MMOL/L (ref 7–16)
BUN BLDV-MCNC: 14 MG/DL (ref 6–23)
CALCIUM SERPL-MCNC: 8.5 MG/DL (ref 8.6–10.2)
CHLORIDE BLD-SCNC: 99 MMOL/L (ref 98–107)
CO2: 24 MMOL/L (ref 22–29)
CREAT SERPL-MCNC: 0.8 MG/DL (ref 0.7–1.2)
GFR AFRICAN AMERICAN: >60
GFR NON-AFRICAN AMERICAN: >60 ML/MIN/1.73
GLUCOSE BLD-MCNC: 140 MG/DL (ref 74–99)
HCT VFR BLD CALC: 37.2 % (ref 37–54)
HEMOGLOBIN: 12.1 G/DL (ref 12.5–16.5)
MAGNESIUM: 1.7 MG/DL (ref 1.6–2.6)
MCH RBC QN AUTO: 31.8 PG (ref 26–35)
MCHC RBC AUTO-ENTMCNC: 32.5 % (ref 32–34.5)
MCV RBC AUTO: 97.6 FL (ref 80–99.9)
MRSA CULTURE ONLY: NORMAL
PDW BLD-RTO: 16.7 FL (ref 11.5–15)
PHOSPHORUS: 2.2 MG/DL (ref 2.5–4.5)
PLATELET # BLD: 148 E9/L (ref 130–450)
PMV BLD AUTO: 9.6 FL (ref 7–12)
POTASSIUM SERPL-SCNC: 3.9 MMOL/L (ref 3.5–5)
RBC # BLD: 3.81 E12/L (ref 3.8–5.8)
SODIUM BLD-SCNC: 132 MMOL/L (ref 132–146)
TRIGL SERPL-MCNC: 116 MG/DL (ref 0–149)
URINE CULTURE, ROUTINE: NORMAL
WBC # BLD: 6.8 E9/L (ref 4.5–11.5)

## 2021-10-15 PROCEDURE — 2580000003 HC RX 258: Performed by: EMERGENCY MEDICINE

## 2021-10-15 PROCEDURE — 2580000003 HC RX 258: Performed by: NURSE PRACTITIONER

## 2021-10-15 PROCEDURE — 36592 COLLECT BLOOD FROM PICC: CPT

## 2021-10-15 PROCEDURE — 2580000003 HC RX 258: Performed by: INTERNAL MEDICINE

## 2021-10-15 PROCEDURE — C9113 INJ PANTOPRAZOLE SODIUM, VIA: HCPCS | Performed by: NURSE PRACTITIONER

## 2021-10-15 PROCEDURE — 99232 SBSQ HOSP IP/OBS MODERATE 35: CPT | Performed by: INTERNAL MEDICINE

## 2021-10-15 PROCEDURE — 36415 COLL VENOUS BLD VENIPUNCTURE: CPT

## 2021-10-15 PROCEDURE — 84100 ASSAY OF PHOSPHORUS: CPT

## 2021-10-15 PROCEDURE — 2500000003 HC RX 250 WO HCPCS: Performed by: EMERGENCY MEDICINE

## 2021-10-15 PROCEDURE — 6370000000 HC RX 637 (ALT 250 FOR IP): Performed by: NURSE PRACTITIONER

## 2021-10-15 PROCEDURE — 85027 COMPLETE CBC AUTOMATED: CPT

## 2021-10-15 PROCEDURE — 74018 RADEX ABDOMEN 1 VIEW: CPT

## 2021-10-15 PROCEDURE — 6360000002 HC RX W HCPCS: Performed by: INTERNAL MEDICINE

## 2021-10-15 PROCEDURE — 94003 VENT MGMT INPAT SUBQ DAY: CPT

## 2021-10-15 PROCEDURE — 83735 ASSAY OF MAGNESIUM: CPT

## 2021-10-15 PROCEDURE — 2500000003 HC RX 250 WO HCPCS: Performed by: NURSE PRACTITIONER

## 2021-10-15 PROCEDURE — 80048 BASIC METABOLIC PNL TOTAL CA: CPT

## 2021-10-15 PROCEDURE — 84478 ASSAY OF TRIGLYCERIDES: CPT

## 2021-10-15 PROCEDURE — 6370000000 HC RX 637 (ALT 250 FOR IP): Performed by: INTERNAL MEDICINE

## 2021-10-15 PROCEDURE — 6360000002 HC RX W HCPCS: Performed by: NURSE PRACTITIONER

## 2021-10-15 PROCEDURE — 94640 AIRWAY INHALATION TREATMENT: CPT

## 2021-10-15 PROCEDURE — 2000000000 HC ICU R&B

## 2021-10-15 PROCEDURE — 2500000003 HC RX 250 WO HCPCS: Performed by: INTERNAL MEDICINE

## 2021-10-15 RX ORDER — METOPROLOL TARTRATE 5 MG/5ML
5 INJECTION INTRAVENOUS ONCE
Status: DISCONTINUED | OUTPATIENT
Start: 2021-10-15 | End: 2021-10-16

## 2021-10-15 RX ORDER — METOCLOPRAMIDE HYDROCHLORIDE 5 MG/ML
10 INJECTION INTRAMUSCULAR; INTRAVENOUS EVERY 6 HOURS
Status: DISCONTINUED | OUTPATIENT
Start: 2021-10-15 | End: 2021-10-17

## 2021-10-15 RX ORDER — MAGNESIUM SULFATE IN WATER 40 MG/ML
4000 INJECTION, SOLUTION INTRAVENOUS ONCE
Status: COMPLETED | OUTPATIENT
Start: 2021-10-15 | End: 2021-10-15

## 2021-10-15 RX ORDER — METHYLPREDNISOLONE SODIUM SUCCINATE 40 MG/ML
40 INJECTION, POWDER, LYOPHILIZED, FOR SOLUTION INTRAMUSCULAR; INTRAVENOUS EVERY 6 HOURS
Status: DISCONTINUED | OUTPATIENT
Start: 2021-10-15 | End: 2021-10-18

## 2021-10-15 RX ADMIN — METHYLPREDNISOLONE SODIUM SUCCINATE 40 MG: 40 INJECTION, POWDER, FOR SOLUTION INTRAMUSCULAR; INTRAVENOUS at 23:25

## 2021-10-15 RX ADMIN — SODIUM CHLORIDE, PRESERVATIVE FREE 10 ML: 5 INJECTION INTRAVENOUS at 21:30

## 2021-10-15 RX ADMIN — IPRATROPIUM BROMIDE AND ALBUTEROL SULFATE 1 AMPULE: .5; 2.5 SOLUTION RESPIRATORY (INHALATION) at 01:59

## 2021-10-15 RX ADMIN — PROPOFOL 50 MCG/KG/MIN: 10 INJECTION, EMULSION INTRAVENOUS at 14:46

## 2021-10-15 RX ADMIN — MAGNESIUM SULFATE HEPTAHYDRATE 4000 MG: 40 INJECTION, SOLUTION INTRAVENOUS at 11:57

## 2021-10-15 RX ADMIN — THIAMINE HYDROCHLORIDE 500 MG: 100 INJECTION, SOLUTION INTRAMUSCULAR; INTRAVENOUS at 11:25

## 2021-10-15 RX ADMIN — IPRATROPIUM BROMIDE AND ALBUTEROL SULFATE 1 AMPULE: .5; 2.5 SOLUTION RESPIRATORY (INHALATION) at 09:02

## 2021-10-15 RX ADMIN — VALPROIC ACID 500 MG: 250 SOLUTION ORAL at 05:57

## 2021-10-15 RX ADMIN — TOPIRAMATE 50 MG: 25 TABLET, FILM COATED ORAL at 11:28

## 2021-10-15 RX ADMIN — PROPOFOL 35 MCG/KG/MIN: 10 INJECTION, EMULSION INTRAVENOUS at 04:30

## 2021-10-15 RX ADMIN — FOLIC ACID 1 MG: 5 INJECTION, SOLUTION INTRAMUSCULAR; INTRAVENOUS; SUBCUTANEOUS at 11:25

## 2021-10-15 RX ADMIN — VALPROIC ACID 500 MG: 250 SOLUTION ORAL at 13:07

## 2021-10-15 RX ADMIN — Medication 10 ML: at 09:03

## 2021-10-15 RX ADMIN — GABAPENTIN 600 MG: 250 SOLUTION ORAL at 11:26

## 2021-10-15 RX ADMIN — Medication 10 ML: at 21:41

## 2021-10-15 RX ADMIN — SODIUM CHLORIDE, PRESERVATIVE FREE 10 ML: 5 INJECTION INTRAVENOUS at 09:01

## 2021-10-15 RX ADMIN — DIAZEPAM 5 MG: 5 TABLET ORAL at 21:30

## 2021-10-15 RX ADMIN — Medication 75 MCG/HR: at 14:45

## 2021-10-15 RX ADMIN — Medication 10 ML: at 09:02

## 2021-10-15 RX ADMIN — IPRATROPIUM BROMIDE AND ALBUTEROL SULFATE 1 AMPULE: .5; 2.5 SOLUTION RESPIRATORY (INHALATION) at 22:24

## 2021-10-15 RX ADMIN — VALPROIC ACID 500 MG: 250 SOLUTION ORAL at 17:52

## 2021-10-15 RX ADMIN — BACLOFEN 5 MG: 10 TABLET ORAL at 11:26

## 2021-10-15 RX ADMIN — VALPROIC ACID 500 MG: 250 SOLUTION ORAL at 00:16

## 2021-10-15 RX ADMIN — IPRATROPIUM BROMIDE AND ALBUTEROL SULFATE 1 AMPULE: .5; 2.5 SOLUTION RESPIRATORY (INHALATION) at 05:47

## 2021-10-15 RX ADMIN — BACLOFEN 5 MG: 10 TABLET ORAL at 21:41

## 2021-10-15 RX ADMIN — PROPOFOL 50 MCG/KG/MIN: 10 INJECTION, EMULSION INTRAVENOUS at 22:31

## 2021-10-15 RX ADMIN — METOCLOPRAMIDE HYDROCHLORIDE 10 MG: 5 INJECTION INTRAMUSCULAR; INTRAVENOUS at 17:53

## 2021-10-15 RX ADMIN — GABAPENTIN 600 MG: 250 SOLUTION ORAL at 17:53

## 2021-10-15 RX ADMIN — IPRATROPIUM BROMIDE AND ALBUTEROL SULFATE 1 AMPULE: .5; 2.5 SOLUTION RESPIRATORY (INHALATION) at 16:20

## 2021-10-15 RX ADMIN — Medication 10 ML: at 21:42

## 2021-10-15 RX ADMIN — METOCLOPRAMIDE HYDROCHLORIDE 10 MG: 5 INJECTION INTRAMUSCULAR; INTRAVENOUS at 22:31

## 2021-10-15 RX ADMIN — METOCLOPRAMIDE HYDROCHLORIDE 10 MG: 5 INJECTION INTRAMUSCULAR; INTRAVENOUS at 11:27

## 2021-10-15 RX ADMIN — APIXABAN 5 MG: 5 TABLET, FILM COATED ORAL at 09:01

## 2021-10-15 RX ADMIN — POTASSIUM PHOSPHATE, MONOBASIC POTASSIUM PHOSPHATE, DIBASIC 30 MMOL: 224; 236 INJECTION, SOLUTION, CONCENTRATE INTRAVENOUS at 11:29

## 2021-10-15 RX ADMIN — DIAZEPAM 5 MG: 5 TABLET ORAL at 09:01

## 2021-10-15 RX ADMIN — PANTOPRAZOLE SODIUM 40 MG: 40 INJECTION, POWDER, FOR SOLUTION INTRAVENOUS at 21:30

## 2021-10-15 RX ADMIN — IPRATROPIUM BROMIDE AND ALBUTEROL SULFATE 1 AMPULE: .5; 2.5 SOLUTION RESPIRATORY (INHALATION) at 12:41

## 2021-10-15 RX ADMIN — TOPIRAMATE 50 MG: 25 TABLET, FILM COATED ORAL at 21:30

## 2021-10-15 RX ADMIN — GABAPENTIN 600 MG: 250 SOLUTION ORAL at 05:57

## 2021-10-15 RX ADMIN — PANTOPRAZOLE SODIUM 40 MG: 40 INJECTION, POWDER, FOR SOLUTION INTRAVENOUS at 09:01

## 2021-10-15 ASSESSMENT — PULMONARY FUNCTION TESTS
PIF_VALUE: 38
PIF_VALUE: 24
PIF_VALUE: 22
PIF_VALUE: 20
PIF_VALUE: 20
PIF_VALUE: 51
PIF_VALUE: 60
PIF_VALUE: 29
PIF_VALUE: 23
PIF_VALUE: 19
PIF_VALUE: 57
PIF_VALUE: 49
PIF_VALUE: 52
PIF_VALUE: 39
PIF_VALUE: 20
PIF_VALUE: 51
PIF_VALUE: 26
PIF_VALUE: 31
PIF_VALUE: 19
PIF_VALUE: 47
PIF_VALUE: 31

## 2021-10-15 ASSESSMENT — PAIN SCALES - GENERAL
PAINLEVEL_OUTOF10: 0

## 2021-10-15 NOTE — PROGRESS NOTES
Admitting Date and Time: 10/12/2021  9:01 AM  Admit Dx: Delirium tremens ([facility] [F10.231]  Seizures ([facility] [R56.9]  Acute respiratory failure, unspecified whether with hypoxia or hypercapnia ([facility] [J96.00]  Atrial fibrillation, unspecified type ([facility] [I48.91]    Subjective: Intubated sedated    ROS: denies fever, chills, cp, sob, n/v, HA unless stated above.      metoclopramide  10 mg IntraVENous Q6H    magnesium sulfate  4,000 mg IntraVENous Once    potassium phosphate IVPB  30 mmol IntraVENous Once    Gabapentin  600 mg Oral TID    valproic acid  500 mg Oral 4 times per day    topiramate  50 mg Oral BID    baclofen  5 mg Oral BID    diazePAM  5 mg Oral Q12H    [Held by provider] apixaban  5 mg Oral BID    thiamine (VITAMIN B1) IVPB  500 mg IntraVENous Q24H    pantoprazole  40 mg IntraVENous BID    And    sodium chloride (PF)  10 mL IntraVENous BID    sodium chloride flush  5-40 mL IntraVENous 2 times per day    folic acid IVPB  1 mg IntraVENous Daily    sodium chloride flush  5-40 mL IntraVENous 2 times per day    ipratropium-albuterol  1 ampule Inhalation Q4H     perflutren lipid microspheres, 1.5 mL, ONCE PRN  sodium chloride flush, 5-40 mL, PRN  sodium chloride, 25 mL, PRN  sodium chloride flush, 5-40 mL, PRN  sodium chloride, 25 mL, PRN  ondansetron, 4 mg, Q8H PRN   Or  ondansetron, 4 mg, Q6H PRN  polyethylene glycol, 17 g, Daily PRN  acetaminophen, 650 mg, Q6H PRN   Or  acetaminophen, 650 mg, Q6H PRN  albuterol, 2.5 mg, Q4H PRN         Objective:    BP 98/67   Pulse 111   Temp 100.2 °F (37.9 °C) (Bladder)   Resp 18   Ht 5' 11\" (1.803 m)   Wt 270 lb 1.6 oz (122.5 kg)   SpO2 95%   BMI 37.67 kg/m²   Patient is sedated and ventilated  Normocephalic, without obvious abnormality, atraumatic, external ears without lesions,   Neck  cervical lymphadenopathy with limited exam for motion due to ET tube  Heart has a regular rate and rhythm no murmur  Lungs are clear to auscultation bilaterally with equal movements with the additional sounds of transmitted airway sounds from the ventilator. Abdomen is soft nontender nondistended no rebound or guarding. Some edema good peripheral perfusion. No significant rashes or new skin lesions. Affect and neuro exam limited since he is sedated on the ventilator        Recent Labs     10/13/21  0559 10/14/21  0456 10/15/21  0548    131* 132   K 4.2 3.7 3.9    99 99   CO2 24 24 24   BUN 17 14 14   CREATININE 1.0 0.8 0.8   GLUCOSE 105* 106* 140*   CALCIUM 8.4* 8.7 8.5*       No results for input(s): ALKPHOS, PROT, LABALBU, BILITOT, AST, ALT in the last 72 hours. Recent Labs     10/13/21  0559 10/14/21  0456 10/15/21  0548   WBC 8.6 7.0 6.8   RBC 3.82 3.83 3.81   HGB 12.3* 12.2* 12.1*   HCT 38.4 38.1 37.2   .5* 99.5 97.6   MCH 32.2 31.9 31.8   MCHC 32.0 32.0 32.5   RDW 16.6* 16.7* 16.7*    144 148   MPV 9.8 9.6 9.6           Radiology:   XR CHEST ABDOMEN NG PLACEMENT   Final Result   The enteric tube terminates in the region of the proximal stomach. Recommend   consideration of advancement by at least 6 cm to ensure intragastric location   of the side hole. XR CHEST PORTABLE   Final Result   ET tube terminates between the clavicular heads and the karo. Enteric tube is poorly visualized at the GE junction and below; if position   of enteric tube tip needs to be known then KUB would better evaluate for it. Stable mild pulmonary vascular congestion. Stable small pleural effusions. Stable cardiomegaly. XR CHEST PORTABLE   Final Result   No interval change compared to prior exam.      Persistent small bilateral pleural effusions. Ongoing mild-to-moderate pulmonary edema. XR CHEST PORTABLE   Final Result   New right IJ line with the distal tip in the SVC and no pneumothorax. Slightly more confluent airspace disease in the right lung base. No other significant interval change.          XR CHEST PORTABLE   Final Result   1. Satisfactory position of the NG tube and endotracheal tube   2. New opacity within the right lung base which could represent a pleural   effusion and or right lung base infiltrate. XR CHEST PORTABLE   Final Result   1. Cardiomegaly. There is no evidence of failure or pneumonia. CT Head WO Contrast   Final Result   1. There is no acute intracranial abnormality. Specifically, there is no   intracranial hemorrhage. 2. Atrophy and periventricular leukomalacia,   . Assessment:  Active Problems:    Seizures ([address]  Resolved Problems:    * No resolved hospital problems. *      Plan: 73 y.o. male with a history of alcoholism depression hypertension coronary artery disease GERD COPD morbid obesity presents with signs of alcohol withdrawal.  Before he was intubated he gave the ER the history that he began having tremors and hallucinations at 6 AM.  For the past 1 or 2 weeks he has been drinking 1/5 of gin being a day but has not had any drinks since Saturday the only other substances of abuse that he admits to is smoking 1 and half packs of cigarettes per day. His tremors are getting worse on Saturday he started having leg swelling. In the ER he was known to be having tremors he received Ativan but even before that when he was grabbing at invisible objects he was snoring per the nurse. He desatted down to 70%. He was intubated to protect his airway. He was noted to have A. fib RVR and was given Cardizem drip to good effect    1. Acute alcohol withdrawal  intubated  per intensivist who is adjusting his sedation of fentanyl and propofol, but considering changing to Precedex later  2. A. fib RVR Cardizem drip likely due to alcohol. cardiology adjsuting, plan for echo when extubated, recommendaing anticoagulation  If hb stable  3. No change to outpatient treatment regimen for the existing stable combordities including: depression hypertension coronary artery disease GERD COPD morbid obesity  4. Thrombocytopenia related to alcohol use monitor  5. Full code  6. DVT prophylaxis with SCDs nonpharmacological due to thrombocytopenia  7. Disposition Home when ready he would benefit from 12-step program    NOTE: This report was transcribed using voice recognition software. Every effort was made to ensure accuracy; however, inadvertent computerized transcription errors may be present.      Electronically signed by Moises Barger MD on 10/15/2021 at 11:38 AM

## 2021-10-15 NOTE — PLAN OF CARE
Problem: Non-Violent Restraints  Goal: No harm/injury to patient while restraints in use  10/15/2021 0421 by Ally Chawla RN  Outcome: Met This Shift     Problem: Non-Violent Restraints  Goal: Patient's dignity will be maintained  10/15/2021 0421 by Ally Chawla RN  Outcome: Met This Shift     Problem: Non-Violent Restraints  Goal: Removal from restraints as soon as assessed to be safe  10/15/2021 0421 by Ally Chawla RN  Outcome: Not Met This Shift     Problem: Skin Integrity:  Goal: Absence of new skin breakdown  Description: Absence of new skin breakdown  Outcome: Met This Shift

## 2021-10-15 NOTE — CARE COORDINATION
10/15/21 Negative covid 10/12/21. Cm transition of care: vent/sedation/icu. fio2 at 40%, peep 5. New A-fib, Eliquis free savings card with directions in soft blue chart for discharge needs. Pt with history of alcoholism- SS consulted for rehab consideration. Will review with patient when off vent- for meeting with [facility]. Left message with first listed contact Rachana-other. Waiting return call for informational session. CM/SS to follow.  Electronically signed by Mario Gaitan RN-BC on 10/15/2021 at 9:58 AM

## 2021-10-15 NOTE — PROGRESS NOTES
Assessment and Plan  Patient is a 73 y.o. male with the following medical Problems:   1. Acute on chronic hypoxic and hypercapnic respiratory failure  2. Alcohol withdrawal  3. New onset atrial fibrillation  4. Thrombocytopenia  5. Morbid obesity with obesity hypoventilation syndrome  6. Metabolic encephalopathy  7. Acute kidney injury between (resolved)  8. Aspiration pneumonia  9. UTI  10. Small pericardial effusion  11. Hematuria    Plan of care:  1. Daily sedation vacation and spontaneous breathing trial if possible  2. Continue with thiamine and folic acid  3. C/W valproic acid, gabapentin, baclofen, scheduled diazepam, clonidine, and Topamax for alcohol withdrawal.  4.  Off Cardizem drip for atrial fibrillation. 5.  Continue with metoprolol to 25 every 6 hours   6. Discontinue IV fluid and start tube feeding. 7.  Propofol and fentanyl for sedation   8. Daily sedation vacation, awakening trial, and spontaneous breathing trials. 9.  GI prophylaxis and DVT prophylaxis  10. Echocardiography with normal EF  11. Hold Eliquis for atrial fibrillation with RVR  12. Ammonia level within normal limit    History of Present Illness:   Patient is a 71-year-old with history of hypertension, CAD, COPD, and alcohol abuse. Patient presented to the ED on 10/12/2021 after developing hallucinations and shortness of breath. Patient apparently called police after seeing his  wife and 2 children walking in his house. He apparently attempted to spray them with bug spray before calling police. Patient has a history of alcohol abuse and drinks 1/5 of them being daily. According to ED physician's note the patient's last drink was on Saturday. The patient is currently intubated, sedated, and unable to provide any history. Daily Progress:  2021: Patient remains sedated intubated and mechanically ventilated. He had an uneventful night.   He is currently on a Cardizem drip for atrial fibrillation with RVR. He has no fever, chills, or rigors. October 14, 2021: Patient remained sedated intubated and mechanically ventilated. He had atrial fibrillation with RVR. Patient failed awakening trial today. He was agitated and confused. He has no fever, chills, rigors. October 15, 2021: Patient remains sedated intubated and mechanically ventilated. He gets easily agitated and combative off sedation. He failed awakening trial.  He has no fever, chills, or rigors. Heart rate is controlled. He developed hematuria overnight and Eliquis was put on hold. Past Medical History:  Past Medical History:   Diagnosis Date    CAD (coronary artery disease)     COPD (chronic obstructive pulmonary disease) ([facility]     Depression     Fracture of unspecified phalanx of left middle finger, initial encounter for closed fracture     GERD (gastroesophageal reflux disease)     Hypertension         Family History:   No family history on file. Allergies:         Patient has no known allergies.     Social history:  Social History     Socioeconomic History    Marital status:      Spouse name: Not on file    Number of children: Not on file    Years of education: Not on file    Highest education level: Not on file   Occupational History    Not on file   Tobacco Use    Smoking status: Current Every Day Smoker     Packs/day: 1.00     Years: 45.00     Pack years: 45.00     Types: Cigars    Smokeless tobacco: Never Used    Tobacco comment: SMOKES CIGARS    Vaping Use    Vaping Use: Never used   Substance and Sexual Activity    Alcohol use: Yes     Comment: occassiona    Drug use: No    Sexual activity: Not on file   Other Topics Concern    Not on file   Social History Narrative    Not on file     Social Determinants of Health     Financial Resource Strain:     Difficulty of Paying Living Expenses:    Food Insecurity:     Worried About Running Out of Food in the Last Year:     Sheri of [employer] in the Last Year:    Transportation Needs:     Lack of Transportation (Medical):      Lack of Transportation (Non-Medical):    Physical Activity:     Days of Exercise per Week:     Minutes of Exercise per Session:    Stress:     Feeling of Stress :    Social Connections:     Frequency of Communication with Friends and Family:     Frequency of Social Gatherings with Friends and Family:     Attends [employer] Services:     Active Member of Clubs or Organizations:     Attends Club or Organization Meetings:     Marital Status:    Intimate Partner Violence:     Fear of Current or Ex-Partner:     Emotionally Abused:     Physically Abused:     Sexually Abused:        Current Medications:     Current Facility-Administered Medications:     metoclopramide (REGLAN) injection 10 mg, 10 mg, IntraVENous, Q6H, [facility] MD Jet, 10 mg at 10/15/21 1127    magnesium sulfate 4000 mg in 100 mL IVPB premix, 4,000 mg, IntraVENous, Once, Luis Purdy MD, Last Rate: 25 mL/hr at 10/15/21 1157, 4,000 mg at 10/15/21 1157    potassium phosphate 30 mmol in dextrose 5 % 250 mL IVPB, 30 mmol, IntraVENous, Once, Luis Purdy MD, Last Rate: 41.7 mL/hr at 10/15/21 1129, 30 mmol at 10/15/21 1129    Gabapentin SOLN 600 mg, 600 mg, Oral, TID, Luis Purdy MD, 600 mg at 10/15/21 1126    valproic acid (DEPAKENE) 250 MG/5ML oral solution 500 mg, 500 mg, Oral, 4 times per day, Luis Purdy MD, 500 mg at 10/15/21 0557    topiramate (TOPAMAX) tablet 50 mg, 50 mg, Oral, BID, Luis Purdy MD, 50 mg at 10/15/21 1128    baclofen (LIORESAL) tablet 5 mg, 5 mg, Oral, BID, Luis Purdy MD, 5 mg at 10/15/21 1126    diazePAM (VALIUM) tablet 5 mg, 5 mg, Oral, Q12H, Luis Purdy MD, 5 mg at 10/15/21 0901    perflutren lipid microspheres (DEFINITY) injection 1.65 mg, 1.5 mL, IntraVENous, ONCE PRN, Luis Purdy MD  [facility] [Held by provider] apixaban (ELIQUIS) tablet 5 mg, 5 mg, Oral, BID, Chuyita Osborn MD, 5 mg at 10/15/21 0901    propofol 3,000 mg in 300 mL infusion, 5-50 mcg/kg/min, IntraVENous, Titrated, Chuyita Osborn MD, Last Rate: 29.4 mL/hr at 10/15/21 0800, 40 mcg/kg/min at 10/15/21 0800    thiamine (B-1) 500 mg in sodium chloride 0.9 % 100 mL IVPB, 500 mg, IntraVENous, Q24H, MARTY Jay CNP, Last Rate: 200 mL/hr at 10/15/21 1125, 500 mg at 10/15/21 1125    pantoprazole (PROTONIX) injection 40 mg, 40 mg, IntraVENous, BID, 40 mg at 10/15/21 0901 **AND** sodium chloride (PF) 0.9 % injection 10 mL, 10 mL, IntraVENous, BID, MARTY Jay CNP, 10 mL at 10/15/21 0901    fentaNYL 5 mcg/ml in 0.9%  ml infusion, 12.5-200 mcg/hr, IntraVENous, Continuous, Chuyita Osborn MD, Last Rate: 15 mL/hr at 10/15/21 0800, 75 mcg/hr at 10/15/21 0800    sodium chloride flush 0.9 % injection 5-40 mL, 5-40 mL, IntraVENous, 2 times per day, Tiffanie Amin DO, 10 mL at 10/15/21 0903    sodium chloride flush 0.9 % injection 5-40 mL, 5-40 mL, IntraVENous, PRN, Joyce Mccall DO    0.9 % sodium chloride infusion, 25 mL, IntraVENous, PRN, Tiffanie Amin DO    [COMPLETED] dilTIAZem injection 15 mg, 15 mg, IntraVENous, Once, 15 mg at 10/12/21 1342 **FOLLOWED BY** dilTIAZem 125 mg in dextrose 5 % 125 mL infusion, 5-15 mg/hr, IntraVENous, Continuous, Joyce Mccall DO, Stopped at 78/83/98 3538    folic acid 1 mg in dextrose 5 % 50 mL IVPB, 1 mg, IntraVENous, Daily, Piero Gonzalez MD, Last Rate: 100 mL/hr at 10/15/21 1125, 1 mg at 10/15/21 1125    sodium chloride flush 0.9 % injection 5-40 mL, 5-40 mL, IntraVENous, 2 times per day, Reshma Rothman MD, 10 mL at 10/15/21 0902    sodium chloride flush 0.9 % injection 5-40 mL, 5-40 mL, IntraVENous, PRN, Reshma Rothman MD    0.9 % sodium chloride infusion, 25 mL, IntraVENous, PRN, Reshma Rothman MD    ondansetron (ZOFRAN-ODT) radiological imaging and cardiac work up were reviewed        ICU STAFF PHYSICIAN NOTE OF PERSONAL INVOLVEMENT IN CARE  As the attending physician, I certify that I personally reviewed the patient's history and personally examined the patient to confirm the physical findings described above, and that I reviewed the relevant imaging studies and available reports. I also discussed the differential diagnosis and all of the proposed management plans with the patient and individuals accompanying the patient to this visit. They had the opportunity to ask questions about the proposed management plans and to have those questions answered. This patient has a high probability of sudden, clinically significant deterioration, which requires the highest level of physician preparedness to intervene urgently. I managed/supervised life or organ supporting interventions that required frequent physician assessment. I devoted my full attention to the direct care of this patient for the amount of time indicated below. Time I spent with family or surrogate(s) is included only if the patient was incapable of providing the necessary information or participating in medical decision-making. Time devoted to teaching and to any procedures I billed separately is not included.   Critical Care Time: 35 minutes      Electronically signed by Krish Bridges MD on 10/15/2021 at 12:41 PM

## 2021-10-16 ENCOUNTER — APPOINTMENT (OUTPATIENT)
Dept: ULTRASOUND IMAGING | Age: 66
DRG: 004 | End: 2021-10-16
Payer: MEDICARE

## 2021-10-16 ENCOUNTER — APPOINTMENT (OUTPATIENT)
Dept: CT IMAGING | Age: 66
DRG: 004 | End: 2021-10-16
Payer: MEDICARE

## 2021-10-16 ENCOUNTER — APPOINTMENT (OUTPATIENT)
Dept: GENERAL RADIOLOGY | Age: 66
DRG: 004 | End: 2021-10-16
Payer: MEDICARE

## 2021-10-16 LAB
ADENOVIRUS BY PCR: NOT DETECTED
ANION GAP SERPL CALCULATED.3IONS-SCNC: 12 MMOL/L (ref 7–16)
BORDETELLA PARAPERTUSSIS BY PCR: NOT DETECTED
BORDETELLA PERTUSSIS BY PCR: NOT DETECTED
BUN BLDV-MCNC: 15 MG/DL (ref 6–23)
CALCIUM SERPL-MCNC: 8.5 MG/DL (ref 8.6–10.2)
CHLAMYDOPHILIA PNEUMONIAE BY PCR: NOT DETECTED
CHLORIDE BLD-SCNC: 97 MMOL/L (ref 98–107)
CO2: 21 MMOL/L (ref 22–29)
CORONAVIRUS 229E BY PCR: NOT DETECTED
CORONAVIRUS HKU1 BY PCR: NOT DETECTED
CORONAVIRUS NL63 BY PCR: NOT DETECTED
CORONAVIRUS OC43 BY PCR: NOT DETECTED
CREAT SERPL-MCNC: 1 MG/DL (ref 0.7–1.2)
GFR AFRICAN AMERICAN: >60
GFR NON-AFRICAN AMERICAN: >60 ML/MIN/1.73
GLUCOSE BLD-MCNC: 130 MG/DL (ref 74–99)
HCT VFR BLD CALC: 36 % (ref 37–54)
HEMOGLOBIN: 12 G/DL (ref 12.5–16.5)
HUMAN METAPNEUMOVIRUS BY PCR: NOT DETECTED
HUMAN RHINOVIRUS/ENTEROVIRUS BY PCR: NOT DETECTED
INFLUENZA A BY PCR: NOT DETECTED
INFLUENZA B BY PCR: NOT DETECTED
MCH RBC QN AUTO: 32.1 PG (ref 26–35)
MCHC RBC AUTO-ENTMCNC: 33.3 % (ref 32–34.5)
MCV RBC AUTO: 96.3 FL (ref 80–99.9)
MYCOPLASMA PNEUMONIAE BY PCR: NOT DETECTED
PARAINFLUENZA VIRUS 1 BY PCR: NOT DETECTED
PARAINFLUENZA VIRUS 2 BY PCR: NOT DETECTED
PARAINFLUENZA VIRUS 3 BY PCR: NOT DETECTED
PARAINFLUENZA VIRUS 4 BY PCR: NOT DETECTED
PDW BLD-RTO: 16.4 FL (ref 11.5–15)
PHOSPHORUS: 4.7 MG/DL (ref 2.5–4.5)
PLATELET # BLD: 153 E9/L (ref 130–450)
PMV BLD AUTO: 9.3 FL (ref 7–12)
POTASSIUM SERPL-SCNC: 5 MMOL/L (ref 3.5–5)
RBC # BLD: 3.74 E12/L (ref 3.8–5.8)
RESPIRATORY SYNCYTIAL VIRUS BY PCR: NOT DETECTED
SARS-COV-2, PCR: NOT DETECTED
SODIUM BLD-SCNC: 130 MMOL/L (ref 132–146)
WBC # BLD: 7.6 E9/L (ref 4.5–11.5)

## 2021-10-16 PROCEDURE — 84100 ASSAY OF PHOSPHORUS: CPT

## 2021-10-16 PROCEDURE — 99222 1ST HOSP IP/OBS MODERATE 55: CPT | Performed by: INTERNAL MEDICINE

## 2021-10-16 PROCEDURE — 94003 VENT MGMT INPAT SUBQ DAY: CPT

## 2021-10-16 PROCEDURE — 2580000003 HC RX 258: Performed by: EMERGENCY MEDICINE

## 2021-10-16 PROCEDURE — 2580000003 HC RX 258: Performed by: INTERNAL MEDICINE

## 2021-10-16 PROCEDURE — 80048 BASIC METABOLIC PNL TOTAL CA: CPT

## 2021-10-16 PROCEDURE — 6360000002 HC RX W HCPCS: Performed by: INTERNAL MEDICINE

## 2021-10-16 PROCEDURE — 2000000000 HC ICU R&B

## 2021-10-16 PROCEDURE — 74177 CT ABD & PELVIS W/CONTRAST: CPT

## 2021-10-16 PROCEDURE — 6370000000 HC RX 637 (ALT 250 FOR IP): Performed by: NURSE PRACTITIONER

## 2021-10-16 PROCEDURE — 6360000004 HC RX CONTRAST MEDICATION: Performed by: RADIOLOGY

## 2021-10-16 PROCEDURE — 2500000003 HC RX 250 WO HCPCS: Performed by: INTERNAL MEDICINE

## 2021-10-16 PROCEDURE — 71045 X-RAY EXAM CHEST 1 VIEW: CPT

## 2021-10-16 PROCEDURE — 94640 AIRWAY INHALATION TREATMENT: CPT

## 2021-10-16 PROCEDURE — 36592 COLLECT BLOOD FROM PICC: CPT

## 2021-10-16 PROCEDURE — 6360000002 HC RX W HCPCS: Performed by: NURSE PRACTITIONER

## 2021-10-16 PROCEDURE — C9113 INJ PANTOPRAZOLE SODIUM, VIA: HCPCS | Performed by: NURSE PRACTITIONER

## 2021-10-16 PROCEDURE — 2500000003 HC RX 250 WO HCPCS: Performed by: EMERGENCY MEDICINE

## 2021-10-16 PROCEDURE — 6370000000 HC RX 637 (ALT 250 FOR IP): Performed by: INTERNAL MEDICINE

## 2021-10-16 PROCEDURE — 2580000003 HC RX 258: Performed by: NURSE PRACTITIONER

## 2021-10-16 PROCEDURE — 85027 COMPLETE CBC AUTOMATED: CPT

## 2021-10-16 PROCEDURE — 76705 ECHO EXAM OF ABDOMEN: CPT

## 2021-10-16 PROCEDURE — 0202U NFCT DS 22 TRGT SARS-COV-2: CPT

## 2021-10-16 PROCEDURE — 2500000003 HC RX 250 WO HCPCS

## 2021-10-16 RX ORDER — VECURONIUM BROMIDE 20 MG/20ML
INJECTION, POWDER, LYOPHILIZED, FOR SOLUTION INTRAVENOUS
Status: COMPLETED
Start: 2021-10-16 | End: 2021-10-16

## 2021-10-16 RX ORDER — SODIUM CHLORIDE 9 MG/ML
25 INJECTION, SOLUTION INTRAVENOUS EVERY 8 HOURS
Status: DISCONTINUED | OUTPATIENT
Start: 2021-10-16 | End: 2021-11-03

## 2021-10-16 RX ORDER — FONDAPARINUX SODIUM 10 MG/.8ML
10 INJECTION SUBCUTANEOUS DAILY
Status: DISCONTINUED | OUTPATIENT
Start: 2021-10-16 | End: 2021-10-17

## 2021-10-16 RX ORDER — SODIUM CHLORIDE, SODIUM LACTATE, POTASSIUM CHLORIDE, CALCIUM CHLORIDE 600; 310; 30; 20 MG/100ML; MG/100ML; MG/100ML; MG/100ML
INJECTION, SOLUTION INTRAVENOUS CONTINUOUS
Status: DISCONTINUED | OUTPATIENT
Start: 2021-10-16 | End: 2021-10-18

## 2021-10-16 RX ORDER — VECURONIUM BROMIDE 1 MG/ML
10 INJECTION, POWDER, LYOPHILIZED, FOR SOLUTION INTRAVENOUS ONCE
Status: COMPLETED | OUTPATIENT
Start: 2021-10-16 | End: 2021-10-16

## 2021-10-16 RX ADMIN — VALPROATE SODIUM 500 MG: 100 INJECTION, SOLUTION INTRAVENOUS at 14:56

## 2021-10-16 RX ADMIN — IPRATROPIUM BROMIDE AND ALBUTEROL SULFATE 1 AMPULE: .5; 2.5 SOLUTION RESPIRATORY (INHALATION) at 02:00

## 2021-10-16 RX ADMIN — SODIUM CHLORIDE, PRESERVATIVE FREE 10 ML: 5 INJECTION INTRAVENOUS at 21:40

## 2021-10-16 RX ADMIN — METHYLPREDNISOLONE SODIUM SUCCINATE 40 MG: 40 INJECTION, POWDER, FOR SOLUTION INTRAMUSCULAR; INTRAVENOUS at 05:30

## 2021-10-16 RX ADMIN — IPRATROPIUM BROMIDE AND ALBUTEROL SULFATE 1 AMPULE: .5; 2.5 SOLUTION RESPIRATORY (INHALATION) at 09:19

## 2021-10-16 RX ADMIN — VECURONIUM BROMIDE 10 MG: 1 INJECTION, POWDER, LYOPHILIZED, FOR SOLUTION INTRAVENOUS at 10:54

## 2021-10-16 RX ADMIN — CEFEPIME HYDROCHLORIDE 1000 MG: 1 INJECTION, POWDER, FOR SOLUTION INTRAMUSCULAR; INTRAVENOUS at 12:16

## 2021-10-16 RX ADMIN — PANTOPRAZOLE SODIUM 40 MG: 40 INJECTION, POWDER, FOR SOLUTION INTRAVENOUS at 21:40

## 2021-10-16 RX ADMIN — Medication 10 ML: at 09:01

## 2021-10-16 RX ADMIN — Medication 125 MCG/HR: at 04:39

## 2021-10-16 RX ADMIN — VANCOMYCIN HYDROCHLORIDE 1250 MG: 10 INJECTION, POWDER, LYOPHILIZED, FOR SOLUTION INTRAVENOUS at 22:12

## 2021-10-16 RX ADMIN — PROPOFOL 50 MCG/KG/MIN: 10 INJECTION, EMULSION INTRAVENOUS at 15:51

## 2021-10-16 RX ADMIN — METHYLPREDNISOLONE SODIUM SUCCINATE 40 MG: 40 INJECTION, POWDER, FOR SOLUTION INTRAMUSCULAR; INTRAVENOUS at 23:30

## 2021-10-16 RX ADMIN — METOCLOPRAMIDE HYDROCHLORIDE 10 MG: 5 INJECTION INTRAMUSCULAR; INTRAVENOUS at 18:26

## 2021-10-16 RX ADMIN — VALPROATE SODIUM 500 MG: 100 INJECTION, SOLUTION INTRAVENOUS at 22:12

## 2021-10-16 RX ADMIN — IPRATROPIUM BROMIDE AND ALBUTEROL SULFATE 1 AMPULE: .5; 2.5 SOLUTION RESPIRATORY (INHALATION) at 13:43

## 2021-10-16 RX ADMIN — SODIUM CHLORIDE 25 ML: 9 INJECTION, SOLUTION INTRAVENOUS at 22:02

## 2021-10-16 RX ADMIN — IPRATROPIUM BROMIDE AND ALBUTEROL SULFATE 1 AMPULE: .5; 2.5 SOLUTION RESPIRATORY (INHALATION) at 17:01

## 2021-10-16 RX ADMIN — THIAMINE HYDROCHLORIDE 500 MG: 100 INJECTION, SOLUTION INTRAMUSCULAR; INTRAVENOUS at 09:34

## 2021-10-16 RX ADMIN — SODIUM CHLORIDE, POTASSIUM CHLORIDE, SODIUM LACTATE AND CALCIUM CHLORIDE: 600; 310; 30; 20 INJECTION, SOLUTION INTRAVENOUS at 23:30

## 2021-10-16 RX ADMIN — METHYLPREDNISOLONE SODIUM SUCCINATE 40 MG: 40 INJECTION, POWDER, FOR SOLUTION INTRAMUSCULAR; INTRAVENOUS at 18:27

## 2021-10-16 RX ADMIN — SODIUM CHLORIDE, PRESERVATIVE FREE 10 ML: 5 INJECTION INTRAVENOUS at 09:35

## 2021-10-16 RX ADMIN — METOCLOPRAMIDE HYDROCHLORIDE 10 MG: 5 INJECTION INTRAMUSCULAR; INTRAVENOUS at 12:56

## 2021-10-16 RX ADMIN — FONDAPARINUX SODIUM 10 MG: 10 INJECTION SUBCUTANEOUS at 14:56

## 2021-10-16 RX ADMIN — METOCLOPRAMIDE HYDROCHLORIDE 10 MG: 5 INJECTION INTRAMUSCULAR; INTRAVENOUS at 05:30

## 2021-10-16 RX ADMIN — FOLIC ACID 1 MG: 5 INJECTION, SOLUTION INTRAMUSCULAR; INTRAVENOUS; SUBCUTANEOUS at 09:35

## 2021-10-16 RX ADMIN — Medication 10 ML: at 21:40

## 2021-10-16 RX ADMIN — SODIUM CHLORIDE, POTASSIUM CHLORIDE, SODIUM LACTATE AND CALCIUM CHLORIDE: 600; 310; 30; 20 INJECTION, SOLUTION INTRAVENOUS at 15:51

## 2021-10-16 RX ADMIN — PANTOPRAZOLE SODIUM 40 MG: 40 INJECTION, POWDER, FOR SOLUTION INTRAVENOUS at 09:35

## 2021-10-16 RX ADMIN — IPRATROPIUM BROMIDE AND ALBUTEROL SULFATE 1 AMPULE: .5; 2.5 SOLUTION RESPIRATORY (INHALATION) at 21:23

## 2021-10-16 RX ADMIN — METHYLPREDNISOLONE SODIUM SUCCINATE 40 MG: 40 INJECTION, POWDER, FOR SOLUTION INTRAMUSCULAR; INTRAVENOUS at 12:15

## 2021-10-16 RX ADMIN — PROPOFOL 50 MCG/KG/MIN: 10 INJECTION, EMULSION INTRAVENOUS at 06:11

## 2021-10-16 RX ADMIN — METOCLOPRAMIDE HYDROCHLORIDE 10 MG: 5 INJECTION INTRAMUSCULAR; INTRAVENOUS at 23:30

## 2021-10-16 RX ADMIN — SODIUM CHLORIDE 25 ML: 9 INJECTION, SOLUTION INTRAVENOUS at 14:57

## 2021-10-16 RX ADMIN — Medication 10 ML: at 21:00

## 2021-10-16 RX ADMIN — IOPAMIDOL 75 ML: 755 INJECTION, SOLUTION INTRAVENOUS at 12:01

## 2021-10-16 RX ADMIN — VANCOMYCIN HYDROCHLORIDE 1250 MG: 10 INJECTION, POWDER, LYOPHILIZED, FOR SOLUTION INTRAVENOUS at 12:15

## 2021-10-16 RX ADMIN — SODIUM CHLORIDE, POTASSIUM CHLORIDE, SODIUM LACTATE AND CALCIUM CHLORIDE: 600; 310; 30; 20 INJECTION, SOLUTION INTRAVENOUS at 06:06

## 2021-10-16 RX ADMIN — Medication 100 MCG/HR: at 16:08

## 2021-10-16 RX ADMIN — IPRATROPIUM BROMIDE AND ALBUTEROL SULFATE 1 AMPULE: .5; 2.5 SOLUTION RESPIRATORY (INHALATION) at 04:39

## 2021-10-16 RX ADMIN — Medication 10 ML: at 09:00

## 2021-10-16 RX ADMIN — VECURONIUM BROMIDE 10 MG: 20 INJECTION, POWDER, LYOPHILIZED, FOR SOLUTION INTRAVENOUS at 10:54

## 2021-10-16 ASSESSMENT — PULMONARY FUNCTION TESTS
PIF_VALUE: 48
PIF_VALUE: 46
PIF_VALUE: 30
PIF_VALUE: 46
PIF_VALUE: 24
PIF_VALUE: 36
PIF_VALUE: 37
PIF_VALUE: 28
PIF_VALUE: 34
PIF_VALUE: 29
PIF_VALUE: 33
PIF_VALUE: 47
PIF_VALUE: 24
PIF_VALUE: 34
PIF_VALUE: 34
PIF_VALUE: 40
PIF_VALUE: 55
PIF_VALUE: 34
PIF_VALUE: 49

## 2021-10-16 NOTE — PROGRESS NOTES
Admitting Date and Time: 10/12/2021  9:01 AM  Admit Dx: Delirium tremens ([facility] [F10.231]  Seizures ([facility] [R56.9]  Acute respiratory failure, unspecified whether with hypoxia or hypercapnia ([facility] [J96.00]  Atrial fibrillation, unspecified type ([facility] [I48.91]    Subjective: Intubated sedated    ROS: denies fever, chills, cp, sob, n/v, HA unless stated above.      cefepime  1,000 mg IntraVENous Q8H    vancomycin  1,250 mg IntraVENous Q12H    valproate sodium (DEPACON) IVPB  500 mg IntraVENous Q8H    fondaparinux  10 mg SubCUTAneous Daily    metoclopramide  10 mg IntraVENous Q6H    methylPREDNISolone  40 mg IntraVENous Q6H    [Held by provider] Gabapentin  600 mg Oral TID    topiramate  50 mg Oral BID    [Held by provider] baclofen  5 mg Oral BID    [Held by provider] diazePAM  5 mg Oral Q12H    thiamine (VITAMIN B1) IVPB  500 mg IntraVENous Q24H    pantoprazole  40 mg IntraVENous BID    And    sodium chloride (PF)  10 mL IntraVENous BID    sodium chloride flush  5-40 mL IntraVENous 2 times per day    folic acid IVPB  1 mg IntraVENous Daily    sodium chloride flush  5-40 mL IntraVENous 2 times per day    ipratropium-albuterol  1 ampule Inhalation Q4H     perflutren lipid microspheres, 1.5 mL, ONCE PRN  sodium chloride flush, 5-40 mL, PRN  sodium chloride, 25 mL, PRN  sodium chloride flush, 5-40 mL, PRN  sodium chloride, 25 mL, PRN  ondansetron, 4 mg, Q8H PRN   Or  ondansetron, 4 mg, Q6H PRN  polyethylene glycol, 17 g, Daily PRN  acetaminophen, 650 mg, Q6H PRN   Or  acetaminophen, 650 mg, Q6H PRN         Objective:    BP (!) 93/57   Pulse 84   Temp 99 °F (37.2 °C) (Core)   Resp 25   Ht 5' 11\" (1.803 m)   Wt 270 lb 1.6 oz (122.5 kg)   SpO2 92%   BMI 37.67 kg/m²   Patient is sedated and ventilated  Normocephalic, without obvious abnormality, atraumatic, external ears without lesions,   Neck  cervical lymphadenopathy with limited exam for motion due to ET tube  Heart has a regular rate and rhythm no murmur  Lungs are clear to auscultation bilaterally with equal movements with the additional sounds of transmitted airway sounds from the ventilator. Abdomen is soft nontender nondistended no rebound or guarding. Some edema good peripheral perfusion. No significant rashes or new skin lesions. Affect and neuro exam limited since he is sedated on the ventilator        Recent Labs     10/14/21  0456 10/15/21  0548 10/16/21  0519   * 132 130*   K 3.7 3.9 5.0   CL 99 99 97*   CO2 24 24 21*   BUN 14 14 15   CREATININE 0.8 0.8 1.0   GLUCOSE 106* 140* 130*   CALCIUM 8.7 8.5* 8.5*       No results for input(s): ALKPHOS, PROT, LABALBU, BILITOT, AST, ALT in the last 72 hours. Recent Labs     10/14/21  0456 10/15/21  0548 10/16/21  0519   WBC 7.0 6.8 7.6   RBC 3.83 3.81 3.74*   HGB 12.2* 12.1* 12.0*   HCT 38.1 37.2 36.0*   MCV 99.5 97.6 96.3   MCH 31.9 31.8 32.1   MCHC 32.0 32.5 33.3   RDW 16.7* 16.7* 16.4*    148 153   MPV 9.6 9.6 9.3           Radiology:   US GALLBLADDER RUQ   Final Result   Mild fatty liver. No sonographic evidence of acute cholecystitis. CT ABDOMEN PELVIS W IV CONTRAST Additional Contrast? None   Final Result   Cardiomegaly with infiltrates and pleural effusions in the lung bases likely   pneumonia or mild CHF. There is no hiatal hernia. Distended gallbladder with mild wall thickening. Consider ultrasonography to   evaluate for cholecystitis. Diverticulosis of the colon with mild thickening of the sigmoid colon which   could be due to spasm or uncomplicated diverticulitis. 2.8 x 2.8 cm abdominal aortic aneurysm. RECOMMENDATIONS:   Abdominal aortic aneurysm. 5 year surveillance is recommended. XR CHEST PORTABLE   Final Result   Worsening left greater than right perihilar opacities which may represent   asymmetric edema or pneumonia. Continued follow-up recommended.          XR ABDOMEN (KUB) (SINGLE AP VIEW)   Final Result   No significant changes detailed above. XR CHEST ABDOMEN NG PLACEMENT   Final Result   The enteric tube terminates in the region of the proximal stomach. Recommend   consideration of advancement by at least 6 cm to ensure intragastric location   of the side hole. XR CHEST PORTABLE   Final Result   ET tube terminates between the clavicular heads and the karo. Enteric tube is poorly visualized at the GE junction and below; if position   of enteric tube tip needs to be known then KUB would better evaluate for it. Stable mild pulmonary vascular congestion. Stable small pleural effusions. Stable cardiomegaly. XR CHEST PORTABLE   Final Result   No interval change compared to prior exam.      Persistent small bilateral pleural effusions. Ongoing mild-to-moderate pulmonary edema. XR CHEST PORTABLE   Final Result   New right IJ line with the distal tip in the SVC and no pneumothorax. Slightly more confluent airspace disease in the right lung base. No other significant interval change. XR CHEST PORTABLE   Final Result   1. Satisfactory position of the NG tube and endotracheal tube   2. New opacity within the right lung base which could represent a pleural   effusion and or right lung base infiltrate. XR CHEST PORTABLE   Final Result   1. Cardiomegaly. There is no evidence of failure or pneumonia. CT Head WO Contrast   Final Result   1. There is no acute intracranial abnormality. Specifically, there is no   intracranial hemorrhage. 2. Atrophy and periventricular leukomalacia,   . Assessment:  Active Problems:    Seizures ([address]  Resolved Problems:    * No resolved hospital problems.  *      Plan: 73 y.o. male with a history of alcoholism depression hypertension coronary artery disease GERD COPD morbid obesity presents with signs of alcohol withdrawal.  Before he was intubated he gave the ER the history that he began having tremors and hallucinations at 6 AM.  For the past 1 or 2 weeks he has been drinking 1/5 of gin being a day but has not had any drinks since Saturday the only other substances of abuse that he admits to is smoking 1 and half packs of cigarettes per day. His tremors are getting worse on Saturday he started having leg swelling. In the ER he was known to be having tremors he received Ativan but even before that when he was grabbing at invisible objects he was snoring per the nurse. He desatted down to 70%. He was intubated to protect his airway. He was noted to have A. fib RVR and was given Cardizem drip to good effect    1. Acute alcohol withdrawal  intubated  per intensivist who is adjusting his sedation still fentanyl and propofol. 2.  A. fib RVR Cardizem drip likely due to alcohol. cardiology adjusting, plan for echo when extubated, recommendaing anticoagulation  If hb stable  3. Acute on chronic hypoxic hypercapnic respiratory failure: vented per pulmonology. FIO2 and CXR worse. Now trying to r/o covid  4. No change to outpatient treatment regimen for the existing stable combordities including: depression hypertension coronary artery disease GERD COPD morbid obesity  5. Thrombocytopenia related to alcohol use monitor  6. uti (+) UA on 10/13, with foster? Already on vanco and cefipime  7. Full code  8. DVT prophylaxis with SCDs non-pharmacological due to thrombocytopenia  9. Disposition facillity? when ready he would benefit from 12-step program    NOTE: This report was transcribed using voice recognition software. Every effort was made to ensure accuracy; however, inadvertent computerized transcription errors may be present.      Electronically signed by August Ordoñez MD on 10/16/2021 at 3:59 PM

## 2021-10-16 NOTE — PROGRESS NOTES
Pharmacy Consultation Note  (Antibiotic Dosing and Monitoring)    Initial consult date: 10/16/2021  Consulting physician/provider: Dr. Bellamy Proper  Drug: Vancomycin  Indication: Pneumonia (HAP)    Age/  Gender Height Weight IBW  Allergy Information   66 y.o./male 5' 10.87\" (180 cm) 277 lb 3.2 oz (125.7 kg)     Ideal body weight: 75.3 kg (166 lb 0.1 oz)  Adjusted ideal body weight: 94.2 kg (207 lb 10.3 oz)   Patient has no known allergies. Renal Function:  Recent Labs     10/14/21  0456 10/15/21  0548 10/16/21  0519   BUN 14 14 15   CREATININE 0.8 0.8 1.0       Intake/Output Summary (Last 24 hours) at 10/16/2021 0932  Last data filed at 10/16/2021 0534  Gross per 24 hour   Intake 1240.76 ml   Output 725 ml   Net 515.76 ml       Vancomycin Monitoring:  Trough:  No results for input(s): VANCOTROUGH in the last 72 hours. Random:  No results for input(s): VANCORANDOM in the last 72 hours. Vancomycin Administration Times:  Recent vancomycin administrations      No vancomycin IV orders with administrations found. Assessment:  · Patient is a 73 y.o. male who has been initiated on vancomycin for pneumonia (respiratory cx growing Staph aureus, S pending). · Estimated Creatinine Clearance: 98 mL/min (based on SCr of 1 mg/dL).   · To dose vancomycin, pharmacy will be utilizing Kaola[employer] calculation software for goal AUC/GRIFFIN 400-600 mg/L-hr    Plan:  · Will initiate vancomycin 1250 IV every 12 hours  · Will check vancomycin levels when appropriate  · Will continue to monitor renal function   · Clinical pharmacy to follow    Mandi Ordaz PharmD, BCPS 10/16/2021 9:32 AM   Ext: [phone]

## 2021-10-16 NOTE — PLAN OF CARE
Problem: Skin Integrity:  Goal: Will show no infection signs and symptoms  Description: Will show no infection signs and symptoms  Outcome: Met This Shift     Problem: Skin Integrity:  Goal: Absence of new skin breakdown  Description: Absence of new skin breakdown  Outcome: Met This Shift     Problem: Non-Violent Restraints  Goal: No harm/injury to patient while restraints in use  10/16/2021 1114 by Tasia Sal RN  Outcome: Met This Shift     Problem: Non-Violent Restraints  Goal: Patient's dignity will be maintained  10/16/2021 1114 by Tasia Sal RN  Outcome: Met This Shift     Problem: Falls - Risk of:  Goal: Will remain free from falls  Description: Will remain free from falls  Outcome: Met This Shift     Problem: Falls - Risk of:  Goal: Absence of physical injury  Description: Absence of physical injury  Outcome: Met This Shift     Problem: Non-Violent Restraints  Goal: Removal from restraints as soon as assessed to be safe  10/16/2021 1114 by Tasia Sal RN  Outcome: Not Met This Shift

## 2021-10-16 NOTE — PROGRESS NOTES
Assessment and Plan  Patient is a 73 y.o. male with the following medical Problems:   1. Acute on chronic hypoxic and hypercapnic respiratory failure  2. Alcohol withdrawal  3. New onset atrial fibrillation  4. History of thrombocytopenia  5. Morbid obesity with obesity hypoventilation syndrome  6. Metabolic encephalopathy  7. Acute kidney injury  (resolved)  8. Aspiration pneumonia  9. UTI  10. Small pericardial effusion  11. Hematuria  12. 2.8 X 2.8 cm AAA  13. Small bilateral pleural effusions    Plan of care:  1. Daily sedation vacation and spontaneous breathing trial if possible  2. Continue with thiamine and folic acid  3. C/W valproic acid, gabapentin, baclofen, scheduled diazepam, clonidine, and Topamax for alcohol withdrawal.  4.  Off Cardizem drip for atrial fibrillation. 5.  Continue with metoprolol to 25 every 6 hours   6. Discontinue IV fluid and start tube feeding. 7.  Propofol and fentanyl for sedation   8.  GI consult for OGT placement. 9.  GI prophylaxis and DVT prophylaxis  10. Echocardiography with normal EF  11. Resume Eliquis  12. Ammonia level within normal limit    History of Present Illness:   Patient is a 72-year-old with history of hypertension, CAD, COPD, and alcohol abuse. Patient presented to the ED on 10/12/2021 after developing hallucinations and shortness of breath. Patient apparently called police after seeing his  wife and 2 children walking in his house. He apparently attempted to spray them with bug spray before calling police. Patient has a history of alcohol abuse and drinks 1/5 of them being daily. According to ED physician's note the patient's last drink was on Saturday. The patient is currently intubated, sedated, and unable to provide any history. Daily Progress:  2021: Patient remains sedated intubated and mechanically ventilated. He had an uneventful night.   He is currently on a Cardizem drip for atrial fibrillation with RVR. He has no fever, chills, or rigors. October 14, 2021: Patient remained sedated intubated and mechanically ventilated. He had atrial fibrillation with RVR. Patient failed awakening trial today. He was agitated and confused. He has no fever, chills, rigors. October 15, 2021: Patient remains sedated intubated and mechanically ventilated. He gets easily agitated and combative off sedation. He failed awakening trial.  He has no fever, chills, or rigors. Heart rate is controlled. He developed hematuria overnight and Eliquis was put on hold. October 16, 2021: Patient had repetitive episodes of vomiting. Multiple attempts to place an OGT probably failed. CT scan of the abdomen and pelvis was obtained. There was no evidence of hiatal hernia. Patient has no fever, chills, rigors. He becomes agitated and combative off sedation. He has no fever, chills, rigors. He is currently on 75% FiO2. Past Medical History:  Past Medical History:   Diagnosis Date    CAD (coronary artery disease)     COPD (chronic obstructive pulmonary disease) ([facility]     Depression     Fracture of unspecified phalanx of left middle finger, initial encounter for closed fracture     GERD (gastroesophageal reflux disease)     Hypertension         Family History:   No family history on file. Allergies:         Patient has no known allergies.     Social history:  Social History     Socioeconomic History    Marital status:      Spouse name: Not on file    Number of children: Not on file    Years of education: Not on file    Highest education level: Not on file   Occupational History    Not on file   Tobacco Use    Smoking status: Current Every Day Smoker     Packs/day: 1.00     Years: 45.00     Pack years: 45.00     Types: Cigars    Smokeless tobacco: Never Used    Tobacco comment: SMOKES CIGARS    Vaping Use    Vaping Use: Never used   Substance and Sexual Activity    Alcohol use: Yes     Comment: Yared Badillo Drug use: No    Sexual activity: Not on file   Other Topics Concern    Not on file   Social History Narrative    Not on file     Social Determinants of Health     Financial Resource Strain:     Difficulty of Paying Living Expenses:    Food Insecurity:     Worried About Running Out of Food in the Last Year:     [address] in the Last Year:    Transportation Needs:     Lack of Transportation (Medical):      Lack of Transportation (Non-Medical):    Physical Activity:     Days of Exercise per Week:     Minutes of Exercise per Session:    Stress:     Feeling of Stress :    Social Connections:     Frequency of Communication with Friends and Family:     Frequency of Social Gatherings with Friends and Family:     Attends [employer] Services:     Active Member of Clubs or Organizations:     Attends Club or Organization Meetings:     Marital Status:    Intimate Partner Violence:     Fear of Current or Ex-Partner:     Emotionally Abused:     Physically Abused:     Sexually Abused:        Current Medications:     Current Facility-Administered Medications:     lactated ringers infusion, , IntraVENous, Continuous, MARTY Jay CNP, Last Rate: 100 mL/hr at 10/16/21 0606, New Bag at 10/16/21 0606    cefepime (MAXIPIME) 1000 mg IVPB minibag, 1,000 mg, IntraVENous, Q8H, Nelson Benitez MD, Last Rate: 12.5 mL/hr at 10/16/21 1216, 1,000 mg at 10/16/21 1216    0.9 % sodium chloride infusion, 25 mL, IntraVENous, Q8H, Nelson Benitez MD    vancomycin (VANCOCIN) 1,250 mg in dextrose 5 % 250 mL IVPB, 1,250 mg, IntraVENous, Q12H, Nelson Benitez MD, Last Rate: 166.7 mL/hr at 10/16/21 1215, 1,250 mg at 10/16/21 1215    metoclopramide (REGLAN) injection 10 mg, 10 mg, IntraVENous, Q6H, Nelson Benitez MD, 10 mg at 10/16/21 1256    metoprolol (LOPRESSOR) injection 5 mg, 5 mg, IntraVENous, Once, MARTY Gray CNP    methylPREDNISolone sodium (SOLU-MEDROL) injection 40 mg, 40 mg, IntraVENous, Q6H, MARTY Jay - CNP, 40 mg at 10/16/21 1215    Gabapentin SOLN 600 mg, 600 mg, Oral, TID, Getachew Cherry MD, 600 mg at 10/15/21 1753    valproic acid (DEPAKENE) 250 MG/5ML oral solution 500 mg, 500 mg, Oral, 4 times per day, Getachew Cehrry MD, 500 mg at 10/15/21 1752    topiramate (TOPAMAX) tablet 50 mg, 50 mg, Oral, BID, Getachew Cherry MD, 50 mg at 10/15/21 2130    baclofen (LIORESAL) tablet 5 mg, 5 mg, Oral, BID, Getachew Cherry MD, 5 mg at 10/15/21 2141    diazePAM (VALIUM) tablet 5 mg, 5 mg, Oral, Q12H, Getachew Cherry MD, 5 mg at 10/15/21 2130    perflutren lipid microspheres (DEFINITY) injection 1.65 mg, 1.5 mL, IntraVENous, ONCE PRN, Getachew Cherry MD    apixaban [facility]) tablet 5 mg, 5 mg, Oral, BID, Getachew Cherry MD, 5 mg at 10/15/21 0901    propofol 3,000 mg in 300 mL infusion, 5-50 mcg/kg/min, IntraVENous, Titrated, Getachew Cherry MD, Last Rate: 36.8 mL/hr at 10/16/21 0611, 50 mcg/kg/min at 10/16/21 0611    thiamine (B-1) 500 mg in sodium chloride 0.9 % 100 mL IVPB, 500 mg, IntraVENous, Q24H, MARTY Starkey - CNP, Stopped at 10/16/21 1117    pantoprazole (PROTONIX) injection 40 mg, 40 mg, IntraVENous, BID, 40 mg at 10/16/21 0935 **AND** sodium chloride (PF) 0.9 % injection 10 mL, 10 mL, IntraVENous, BID, Artem Mcleod APRN - CNP, 10 mL at 10/16/21 0935    fentaNYL 5 mcg/ml in 0.9%  ml infusion, 12.5-200 mcg/hr, IntraVENous, Continuous, Getachew Cherry MD, Last Rate: 25 mL/hr at 10/16/21 0439, 125 mcg/hr at 10/16/21 0439    sodium chloride flush 0.9 % injection 5-40 mL, 5-40 mL, IntraVENous, 2 times per day, Joyce Mccall DO, 10 mL at 10/16/21 0901    sodium chloride flush 0.9 % injection 5-40 mL, 5-40 mL, IntraVENous, PRN, Joyce Mccall DO    0.9 % sodium chloride infusion, 25 mL, IntraVENous, PRN, Carmen Morrow DO    folic acid 1 mg in dextrose 5 % 50 mL IVPB, 1 mg, IntraVENous, Daily, Shanita Ramos MD, Stopped at 10/16/21 1117    sodium chloride flush 0.9 % injection 5-40 mL, 5-40 mL, IntraVENous, 2 times per day, Hernesto Mcneal MD, 10 mL at 10/16/21 0900    sodium chloride flush 0.9 % injection 5-40 mL, 5-40 mL, IntraVENous, PRN, Hernesto Mcneal MD    0.9 % sodium chloride infusion, 25 mL, IntraVENous, PRN, Hernesto Mcneal MD    ondansetron (ZOFRAN-ODT) disintegrating tablet 4 mg, 4 mg, Oral, Q8H PRN **OR** ondansetron (ZOFRAN) injection 4 mg, 4 mg, IntraVENous, Q6H PRN, Hernesto Mcneal MD    polyethylene glycol (GLYCOLAX) packet 17 g, 17 g, Oral, Daily PRN, Hernesto Mcneal MD    acetaminophen (TYLENOL) tablet 650 mg, 650 mg, Oral, Q6H PRN **OR** acetaminophen (TYLENOL) suppository 650 mg, 650 mg, Rectal, Q6H PRN, Hernesto Mcneal MD, 650 mg at 10/16/21 0044    ipratropium-albuterol (DUONEB) nebulizer solution 1 ampule, 1 ampule, Inhalation, Q4H, MARTY Paez - CNP, 1 ampule at 10/16/21 0919    Review of Systems:   Unable to obtain due to medical condition    Physical Exam:   Vital Signs:  BP (!) 86/55   Pulse 95   Temp 99.3 °F (37.4 °C) (Core)   Resp 25   Ht 5' 11\" (1.803 m)   Wt 270 lb 1.6 oz (122.5 kg)   SpO2 95%   BMI 37.67 kg/m²     Input/Output:   In: 1240.8 [I.V.:1240.8]  Out: 625     Oxygen requirements: MV    Ventilator Information:  Vent Information  $Ventilation: $Subsequent Day  Skin Assessment: Clean, dry, & intact  Vent Type: 980  Vent Mode: AC/VC  Vt Ordered: 500 mL  Rate Set: 25 bmp  Peak Flow: 50 L/min  Pressure Support: 0 cmH20  FiO2 : 75 %  SpO2: 95 %  SpO2/FiO2 ratio: 126.67  Sensitivity: 4  PEEP/CPAP: 5  I Time/ I Time %: 0 s  Humidification Source: Heated wire    General appearance: , not in pain or distress, in no respiratory distress    HEENT: Intubated  Neck: Supple, no jugular venous distension, lymphadenopathy, thyromegaly or carotid bruits  Chest: Decreased breath sounds, no wheezing, no crackles and no tenderness over ribs   Cardiovascular: Normal S1 , S2, regular rate and rhythm, no murmur, rub or gallop  Abdomen: Normal sounds present, soft, lax with no tenderness, no hepatosplenomegaly, and no masses  Extremities: No edema. Pulses are equally present. Skin: intact, no rashes   Neurologic: Sedated and mechanically ventilated     Investigations:  Labs, radiological imaging and cardiac work up were reviewed        ICU STAFF PHYSICIAN NOTE OF PERSONAL INVOLVEMENT IN CARE  As the attending physician, I certify that I personally reviewed the patient's history and personally examined the patient to confirm the physical findings described above, and that I reviewed the relevant imaging studies and available reports. I also discussed the differential diagnosis and all of the proposed management plans with the patient and individuals accompanying the patient to this visit. They had the opportunity to ask questions about the proposed management plans and to have those questions answered. This patient has a high probability of sudden, clinically significant deterioration, which requires the highest level of physician preparedness to intervene urgently. I managed/supervised life or organ supporting interventions that required frequent physician assessment. I devoted my full attention to the direct care of this patient for the amount of time indicated below. Time I spent with family or surrogate(s) is included only if the patient was incapable of providing the necessary information or participating in medical decision-making. Time devoted to teaching and to any procedures I billed separately is not included.   Critical Care Time: 35 minutes      Electronically signed by Isis King MD on 10/16/2021 at 1:00 PM

## 2021-10-17 LAB
ANION GAP SERPL CALCULATED.3IONS-SCNC: 9 MMOL/L (ref 7–16)
BUN BLDV-MCNC: 24 MG/DL (ref 6–23)
CALCIUM SERPL-MCNC: 8.5 MG/DL (ref 8.6–10.2)
CHLORIDE BLD-SCNC: 96 MMOL/L (ref 98–107)
CO2: 24 MMOL/L (ref 22–29)
CREAT SERPL-MCNC: 1 MG/DL (ref 0.7–1.2)
CULTURE, RESPIRATORY: ABNORMAL
GFR AFRICAN AMERICAN: >60
GFR NON-AFRICAN AMERICAN: >60 ML/MIN/1.73
GLUCOSE BLD-MCNC: 132 MG/DL (ref 74–99)
HCT VFR BLD CALC: 31.8 % (ref 37–54)
HEMOGLOBIN: 10.5 G/DL (ref 12.5–16.5)
MCH RBC QN AUTO: 31.4 PG (ref 26–35)
MCHC RBC AUTO-ENTMCNC: 33 % (ref 32–34.5)
MCV RBC AUTO: 95.2 FL (ref 80–99.9)
ORGANISM: ABNORMAL
ORGANISM: ABNORMAL
PDW BLD-RTO: 16.4 FL (ref 11.5–15)
PHOSPHORUS: 3 MG/DL (ref 2.5–4.5)
PLATELET # BLD: 148 E9/L (ref 130–450)
PMV BLD AUTO: 9.4 FL (ref 7–12)
POTASSIUM SERPL-SCNC: 5.3 MMOL/L (ref 3.5–5)
RBC # BLD: 3.34 E12/L (ref 3.8–5.8)
SMEAR, RESPIRATORY: ABNORMAL
SODIUM BLD-SCNC: 129 MMOL/L (ref 132–146)
TRIGL SERPL-MCNC: 153 MG/DL (ref 0–149)
WBC # BLD: 6.3 E9/L (ref 4.5–11.5)

## 2021-10-17 PROCEDURE — 99232 SBSQ HOSP IP/OBS MODERATE 35: CPT | Performed by: INTERNAL MEDICINE

## 2021-10-17 PROCEDURE — 2580000003 HC RX 258: Performed by: INTERNAL MEDICINE

## 2021-10-17 PROCEDURE — 2500000003 HC RX 250 WO HCPCS: Performed by: INTERNAL MEDICINE

## 2021-10-17 PROCEDURE — 2580000003 HC RX 258: Performed by: NURSE PRACTITIONER

## 2021-10-17 PROCEDURE — 94003 VENT MGMT INPAT SUBQ DAY: CPT

## 2021-10-17 PROCEDURE — 85027 COMPLETE CBC AUTOMATED: CPT

## 2021-10-17 PROCEDURE — 6360000002 HC RX W HCPCS: Performed by: INTERNAL MEDICINE

## 2021-10-17 PROCEDURE — 6360000002 HC RX W HCPCS: Performed by: NURSE PRACTITIONER

## 2021-10-17 PROCEDURE — 84100 ASSAY OF PHOSPHORUS: CPT

## 2021-10-17 PROCEDURE — 2580000003 HC RX 258: Performed by: EMERGENCY MEDICINE

## 2021-10-17 PROCEDURE — 84478 ASSAY OF TRIGLYCERIDES: CPT

## 2021-10-17 PROCEDURE — C9113 INJ PANTOPRAZOLE SODIUM, VIA: HCPCS | Performed by: NURSE PRACTITIONER

## 2021-10-17 PROCEDURE — 2000000000 HC ICU R&B

## 2021-10-17 PROCEDURE — 2500000003 HC RX 250 WO HCPCS: Performed by: EMERGENCY MEDICINE

## 2021-10-17 PROCEDURE — 36592 COLLECT BLOOD FROM PICC: CPT

## 2021-10-17 PROCEDURE — 94640 AIRWAY INHALATION TREATMENT: CPT

## 2021-10-17 PROCEDURE — 6370000000 HC RX 637 (ALT 250 FOR IP): Performed by: NURSE PRACTITIONER

## 2021-10-17 PROCEDURE — 80048 BASIC METABOLIC PNL TOTAL CA: CPT

## 2021-10-17 RX ORDER — PROPOFOL 10 MG/ML
INJECTION, EMULSION INTRAVENOUS
Status: DISPENSED
Start: 2021-10-17 | End: 2021-10-18

## 2021-10-17 RX ADMIN — IPRATROPIUM BROMIDE AND ALBUTEROL SULFATE 1 AMPULE: .5; 2.5 SOLUTION RESPIRATORY (INHALATION) at 22:32

## 2021-10-17 RX ADMIN — METOCLOPRAMIDE HYDROCHLORIDE 10 MG: 5 INJECTION INTRAMUSCULAR; INTRAVENOUS at 05:56

## 2021-10-17 RX ADMIN — CEFEPIME HYDROCHLORIDE 1000 MG: 1 INJECTION, POWDER, FOR SOLUTION INTRAMUSCULAR; INTRAVENOUS at 02:00

## 2021-10-17 RX ADMIN — PROPOFOL 45 MCG/KG/MIN: 10 INJECTION, EMULSION INTRAVENOUS at 02:00

## 2021-10-17 RX ADMIN — SODIUM CHLORIDE 25 ML: 9 INJECTION, SOLUTION INTRAVENOUS at 15:11

## 2021-10-17 RX ADMIN — THIAMINE HYDROCHLORIDE 500 MG: 100 INJECTION, SOLUTION INTRAMUSCULAR; INTRAVENOUS at 10:43

## 2021-10-17 RX ADMIN — SODIUM CHLORIDE 25 ML: 9 INJECTION, SOLUTION INTRAVENOUS at 22:57

## 2021-10-17 RX ADMIN — CEFEPIME HYDROCHLORIDE 1000 MG: 1 INJECTION, POWDER, FOR SOLUTION INTRAMUSCULAR; INTRAVENOUS at 17:30

## 2021-10-17 RX ADMIN — METHYLPREDNISOLONE SODIUM SUCCINATE 40 MG: 40 INJECTION, POWDER, FOR SOLUTION INTRAMUSCULAR; INTRAVENOUS at 22:58

## 2021-10-17 RX ADMIN — PROPOFOL 35 MCG/KG/MIN: 10 INJECTION, EMULSION INTRAVENOUS at 16:07

## 2021-10-17 RX ADMIN — ENOXAPARIN SODIUM 120 MG: 150 INJECTION SUBCUTANEOUS at 10:28

## 2021-10-17 RX ADMIN — VALPROATE SODIUM 500 MG: 100 INJECTION, SOLUTION INTRAVENOUS at 22:30

## 2021-10-17 RX ADMIN — Medication 10 ML: at 10:40

## 2021-10-17 RX ADMIN — METOCLOPRAMIDE HYDROCHLORIDE 10 MG: 5 INJECTION INTRAMUSCULAR; INTRAVENOUS at 10:37

## 2021-10-17 RX ADMIN — VALPROATE SODIUM 500 MG: 100 INJECTION, SOLUTION INTRAVENOUS at 05:56

## 2021-10-17 RX ADMIN — IPRATROPIUM BROMIDE AND ALBUTEROL SULFATE 1 AMPULE: .5; 2.5 SOLUTION RESPIRATORY (INHALATION) at 06:27

## 2021-10-17 RX ADMIN — PANTOPRAZOLE SODIUM 40 MG: 40 INJECTION, POWDER, FOR SOLUTION INTRAVENOUS at 20:45

## 2021-10-17 RX ADMIN — IPRATROPIUM BROMIDE AND ALBUTEROL SULFATE 1 AMPULE: .5; 2.5 SOLUTION RESPIRATORY (INHALATION) at 13:29

## 2021-10-17 RX ADMIN — IPRATROPIUM BROMIDE AND ALBUTEROL SULFATE 1 AMPULE: .5; 2.5 SOLUTION RESPIRATORY (INHALATION) at 09:17

## 2021-10-17 RX ADMIN — CEFEPIME HYDROCHLORIDE 1000 MG: 1 INJECTION, POWDER, FOR SOLUTION INTRAMUSCULAR; INTRAVENOUS at 10:42

## 2021-10-17 RX ADMIN — FOLIC ACID 1 MG: 5 INJECTION, SOLUTION INTRAMUSCULAR; INTRAVENOUS; SUBCUTANEOUS at 10:43

## 2021-10-17 RX ADMIN — PANTOPRAZOLE SODIUM 40 MG: 40 INJECTION, POWDER, FOR SOLUTION INTRAVENOUS at 10:35

## 2021-10-17 RX ADMIN — Medication 100 MCG/HR: at 05:39

## 2021-10-17 RX ADMIN — SODIUM CHLORIDE, PRESERVATIVE FREE 10 ML: 5 INJECTION INTRAVENOUS at 20:46

## 2021-10-17 RX ADMIN — IPRATROPIUM BROMIDE AND ALBUTEROL SULFATE 1 AMPULE: .5; 2.5 SOLUTION RESPIRATORY (INHALATION) at 01:20

## 2021-10-17 RX ADMIN — METHYLPREDNISOLONE SODIUM SUCCINATE 40 MG: 40 INJECTION, POWDER, FOR SOLUTION INTRAMUSCULAR; INTRAVENOUS at 17:29

## 2021-10-17 RX ADMIN — VANCOMYCIN HYDROCHLORIDE 1250 MG: 10 INJECTION, POWDER, LYOPHILIZED, FOR SOLUTION INTRAVENOUS at 22:30

## 2021-10-17 RX ADMIN — METHYLPREDNISOLONE SODIUM SUCCINATE 40 MG: 40 INJECTION, POWDER, FOR SOLUTION INTRAMUSCULAR; INTRAVENOUS at 05:56

## 2021-10-17 RX ADMIN — IPRATROPIUM BROMIDE AND ALBUTEROL SULFATE 1 AMPULE: .5; 2.5 SOLUTION RESPIRATORY (INHALATION) at 17:25

## 2021-10-17 RX ADMIN — SODIUM CHLORIDE 25 ML: 9 INJECTION, SOLUTION INTRAVENOUS at 06:59

## 2021-10-17 RX ADMIN — PROPOFOL 35 MCG/KG/MIN: 10 INJECTION, EMULSION INTRAVENOUS at 12:05

## 2021-10-17 RX ADMIN — VALPROATE SODIUM 500 MG: 100 INJECTION, SOLUTION INTRAVENOUS at 15:11

## 2021-10-17 RX ADMIN — SODIUM CHLORIDE, POTASSIUM CHLORIDE, SODIUM LACTATE AND CALCIUM CHLORIDE: 600; 310; 30; 20 INJECTION, SOLUTION INTRAVENOUS at 10:26

## 2021-10-17 RX ADMIN — ENOXAPARIN SODIUM 120 MG: 150 INJECTION SUBCUTANEOUS at 20:45

## 2021-10-17 RX ADMIN — SODIUM CHLORIDE, PRESERVATIVE FREE 10 ML: 5 INJECTION INTRAVENOUS at 10:49

## 2021-10-17 RX ADMIN — METHYLPREDNISOLONE SODIUM SUCCINATE 40 MG: 40 INJECTION, POWDER, FOR SOLUTION INTRAMUSCULAR; INTRAVENOUS at 10:30

## 2021-10-17 RX ADMIN — Medication 10 ML: at 21:00

## 2021-10-17 RX ADMIN — Medication 10 ML: at 20:45

## 2021-10-17 RX ADMIN — VANCOMYCIN HYDROCHLORIDE 1250 MG: 10 INJECTION, POWDER, LYOPHILIZED, FOR SOLUTION INTRAVENOUS at 10:42

## 2021-10-17 ASSESSMENT — PULMONARY FUNCTION TESTS
PIF_VALUE: 29
PIF_VALUE: 32
PIF_VALUE: 39
PIF_VALUE: 25
PIF_VALUE: 27
PIF_VALUE: 28
PIF_VALUE: 33
PIF_VALUE: 33
PIF_VALUE: 22
PIF_VALUE: 31
PIF_VALUE: 39
PIF_VALUE: 32
PIF_VALUE: 28
PIF_VALUE: 32
PIF_VALUE: 29
PIF_VALUE: 25
PIF_VALUE: 22
PIF_VALUE: 29

## 2021-10-17 NOTE — PROGRESS NOTES
with the additional sounds of transmitted airway sounds from the ventilator. Abdomen is soft nontender nondistended no rebound or guarding. Some edema good peripheral perfusion. No significant rashes or new skin lesions. Affect and neuro exam limited since he is sedated on the ventilator        Recent Labs     10/15/21  0548 10/16/21  0519 10/17/21  0508    130* 129*   K 3.9 5.0 5.3*   CL 99 97* 96*   CO2 24 21* 24   BUN 14 15 24*   CREATININE 0.8 1.0 1.0   GLUCOSE 140* 130* 132*   CALCIUM 8.5* 8.5* 8.5*       No results for input(s): ALKPHOS, PROT, LABALBU, BILITOT, AST, ALT in the last 72 hours. Recent Labs     10/15/21  0548 10/16/21  0519 10/17/21  0508   WBC 6.8 7.6 6.3   RBC 3.81 3.74* 3.34*   HGB 12.1* 12.0* 10.5*   HCT 37.2 36.0* 31.8*   MCV 97.6 96.3 95.2   MCH 31.8 32.1 31.4   MCHC 32.5 33.3 33.0   RDW 16.7* 16.4* 16.4*    153 148   MPV 9.6 9.3 9.4           Radiology:   US GALLBLADDER RUQ   Final Result   Mild fatty liver. No sonographic evidence of acute cholecystitis. CT ABDOMEN PELVIS W IV CONTRAST Additional Contrast? None   Final Result   Cardiomegaly with infiltrates and pleural effusions in the lung bases likely   pneumonia or mild CHF. There is no hiatal hernia. Distended gallbladder with mild wall thickening. Consider ultrasonography to   evaluate for cholecystitis. Diverticulosis of the colon with mild thickening of the sigmoid colon which   could be due to spasm or uncomplicated diverticulitis. 2.8 x 2.8 cm abdominal aortic aneurysm. RECOMMENDATIONS:   Abdominal aortic aneurysm. 5 year surveillance is recommended. XR CHEST PORTABLE   Final Result   Worsening left greater than right perihilar opacities which may represent   asymmetric edema or pneumonia. Continued follow-up recommended. XR ABDOMEN (KUB) (SINGLE AP VIEW)   Final Result   No significant changes detailed above.          XR CHEST ABDOMEN NG PLACEMENT   Final Result   The enteric tube terminates in the region of the proximal stomach. Recommend   consideration of advancement by at least 6 cm to ensure intragastric location   of the side hole. XR CHEST PORTABLE   Final Result   ET tube terminates between the clavicular heads and the karo. Enteric tube is poorly visualized at the GE junction and below; if position   of enteric tube tip needs to be known then KUB would better evaluate for it. Stable mild pulmonary vascular congestion. Stable small pleural effusions. Stable cardiomegaly. XR CHEST PORTABLE   Final Result   No interval change compared to prior exam.      Persistent small bilateral pleural effusions. Ongoing mild-to-moderate pulmonary edema. XR CHEST PORTABLE   Final Result   New right IJ line with the distal tip in the SVC and no pneumothorax. Slightly more confluent airspace disease in the right lung base. No other significant interval change. XR CHEST PORTABLE   Final Result   1. Satisfactory position of the NG tube and endotracheal tube   2. New opacity within the right lung base which could represent a pleural   effusion and or right lung base infiltrate. XR CHEST PORTABLE   Final Result   1. Cardiomegaly. There is no evidence of failure or pneumonia. CT Head WO Contrast   Final Result   1. There is no acute intracranial abnormality. Specifically, there is no   intracranial hemorrhage. 2. Atrophy and periventricular leukomalacia,   . Assessment:  Active Problems:    Seizures ([address]  Resolved Problems:    * No resolved hospital problems.  *      Plan: 73 y.o. male with a history of alcoholism depression hypertension coronary artery disease GERD COPD morbid obesity presents with signs of alcohol withdrawal.  Before he was intubated he gave the ER the history that he began having tremors and hallucinations at 6 AM.  For the past 1 or 2 weeks he has been drinking 1/5 of gin being a day but has not had any drinks since Saturday the only other substances of abuse that he admits to is smoking 1 and half packs of cigarettes per day. His tremors are getting worse on Saturday he started having leg swelling. In the ER he was known to be having tremors he received Ativan but even before that when he was grabbing at invisible objects he was snoring per the nurse. He desatted down to 70%. He was intubated to protect his airway. He was noted to have A. fib RVR and was given Cardizem drip to good effect    1. Acute alcohol withdrawal  intubated  per intensivist who is adjusting his sedation still fentanyl and propofol. 2.  A. fib RVR s/p Cardizem drip likely due to alcohol. cardiology on, plan for echo when extubated, recommendaing anticoagulation  If hb stable so on lovenox 120 bid  3. Acute on chronic hypoxic hypercapnic respiratory failure: vented per pulmonology. FIO2 and CXR worse. Now trying to r/o covid  4. No change to outpatient treatment regimen for the existing stable combordities including: depression hypertension coronary artery disease GERD COPD morbid obesity  5. Thrombocytopenia related to alcohol use monitor  6. uti (+) UA on 10/13, with foster? Already on vanco and cefipime  7. Full code  8. DVT prophylaxis vis lovenox9. Disposition facillity? when ready he would benefit from 12-step program    NOTE: This report was transcribed using voice recognition software. Every effort was made to ensure accuracy; however, inadvertent computerized transcription errors may be present.      Electronically signed by Linda Matute MD on 10/17/2021 at 6:20 PM

## 2021-10-17 NOTE — PROGRESS NOTES
Assessment and Plan  Patient is a 73 y.o. male with the following medical Problems:   1. Acute on chronic hypoxic and hypercapnic respiratory failure  2. Alcohol withdrawal  3. New onset atrial fibrillation  4. History of thrombocytopenia  5. Morbid obesity with obesity hypoventilation syndrome  6. Metabolic encephalopathy  7. Acute kidney injury  (resolved)  8. Aspiration pneumonia  9. UTI  10. Small pericardial effusion  11. Hematuria  12. 2.8 X 2.8 cm AAA  13. Small bilateral pleural effusions    Plan of care:  1. Daily sedation vacation and spontaneous breathing trial if possible  2. Continue with thiamine and folic acid  3. Hold oral medication since the patient is unable to take orally. NG could not be advanced. .  4.  Off Cardizem drip for atrial fibrillation. 5.  Continue with metoprolol to 25 every 6 hours   6. Discontinue IV fluid and start tube feeding. 7.  Propofol and fentanyl for sedation and analgesia. 8.  GI consult for OGT placement. Patient may have esophageal stricture. Inserting OGT failed multiple times even with the use of glide scope. 9.  GI prophylaxis and DVT prophylaxis  10. Echocardiography with normal EF  11. Patient is currently on Lovenox 1 mg/kg twice a day for atrial fibrillation. 12.  Ammonia level within normal limit    History of Present Illness:   Patient is a 26-year-old with history of hypertension, CAD, COPD, and alcohol abuse. Patient presented to the ED on 10/12/2021 after developing hallucinations and shortness of breath. Patient apparently called police after seeing his  wife and 2 children walking in his house. He apparently attempted to spray them with bug spray before calling police. Patient has a history of alcohol abuse and drinks 1/5 of them being daily. According to ED physician's note the patient's last drink was on Saturday. The patient is currently intubated, sedated, and unable to provide any history.       Daily Progress:  October 13, 2021: Patient remains sedated intubated and mechanically ventilated. He had an uneventful night. He is currently on a Cardizem drip for atrial fibrillation with RVR. He has no fever, chills, or rigors. October 14, 2021: Patient remained sedated intubated and mechanically ventilated. He had atrial fibrillation with RVR. Patient failed awakening trial today. He was agitated and confused. He has no fever, chills, rigors. October 15, 2021: Patient remains sedated intubated and mechanically ventilated. He gets easily agitated and combative off sedation. He failed awakening trial.  He has no fever, chills, or rigors. Heart rate is controlled. He developed hematuria overnight and Eliquis was put on hold. October 16, 2021: Patient had repetitive episodes of vomiting. Multiple attempts to place an OGT probably failed. CT scan of the abdomen and pelvis was obtained. There was no evidence of hiatal hernia. Patient has no fever, chills, rigors. He becomes agitated and combative off sedation. He has no fever, chills, rigors. He is currently on 75% FiO2. October 17, 2021: Patient remains sedated intubated and mechanically ventilated. We will attempt sedation vacation and spontaneous breathing trials today if tolerated. He remains n.p.o. since he has no NG tube. NG tube could not be advanced. He remained hemodynamically stable. He has no fever, chills, rigors. Sugar is mostly controlled. Past Medical History:  Past Medical History:   Diagnosis Date    CAD (coronary artery disease)     COPD (chronic obstructive pulmonary disease) ([facility] [address]     Depression     Fracture of unspecified phalanx of left middle finger, initial encounter for closed fracture     GERD (gastroesophageal reflux disease)     Hypertension         Family History:   No family history on file. Allergies:         Patient has no known allergies.     Social history:  Social History     Socioeconomic History    Marital status:      Spouse name: Not on file    Number of children: Not on file    Years of education: Not on file    Highest education level: Not on file   Occupational History    Not on file   Tobacco Use    Smoking status: Current Every Day Smoker     Packs/day: 1.00     Years: 45.00     Pack years: 45.00     Types: Cigars    Smokeless tobacco: Never Used    Tobacco comment: SMOKES CIGARS    Vaping Use    Vaping Use: Never used   Substance and Sexual Activity    Alcohol use: Yes     Comment: occassiona    Drug use: No    Sexual activity: Not on file   Other Topics Concern    Not on file   Social History Narrative    Not on file     Social Determinants of Health     Financial Resource Strain:     Difficulty of Paying Living Expenses:    Food Insecurity:     Worried About Running Out of Food in the Last Year:     [address] in the Last Year:    Transportation Needs:     Lack of Transportation (Medical):      Lack of Transportation (Non-Medical):    Physical Activity:     Days of Exercise per Week:     Minutes of Exercise per Session:    Stress:     Feeling of Stress :    Social Connections:     Frequency of Communication with Friends and Family:     Frequency of Social Gatherings with Friends and Family:     Attends Islam Services:     Active Member of Clubs or Organizations:     Attends Club or Organization Meetings:     Marital Status:    Intimate Partner Violence:     Fear of Current or Ex-Partner:     Emotionally Abused:     Physically Abused:     Sexually Abused:        Current Medications:     Current Facility-Administered Medications:     enoxaparin (LOVENOX) injection 120 mg, 1 mg/kg, SubCUTAneous, BID, Nichol Grubbs MD, 120 mg at 10/17/21 1028    lactated ringers infusion, , IntraVENous, Continuous, MARTY Jay CNP, Last Rate: 100 mL/hr at 10/17/21 1026, New Bag at 10/17/21 1026    cefepime (MAXIPIME) 1000 mg IVPB minibag, 1,000 mg, IntraVENous, Q8H, Charli Penaloza MD, Stopped at 10/17/21 7491    0.9 % sodium chloride infusion, 25 mL, IntraVENous, Q8H, Charli Penaloza MD, Stopped at 10/17/21 0900    vancomycin (VANCOCIN) 1,250 mg in dextrose 5 % 250 mL IVPB, 1,250 mg, IntraVENous, Q12H, Charli Penaloza MD, Stopped at 10/16/21 2334    valproate (DEPACON) 500 mg in dextrose 5 % 100 mL IVPB, 500 mg, IntraVENous, Q8H, Charli Penaloza MD, Stopped at 10/17/21 0712    metoclopramide (REGLAN) injection 10 mg, 10 mg, IntraVENous, Q6H, Charli Penaloza MD, 10 mg at 10/17/21 1037    methylPREDNISolone sodium (SOLU-MEDROL) injection 40 mg, 40 mg, IntraVENous, Q6H, MARTY Jay - CNP, 40 mg at 10/17/21 1030    [Held by provider] Gabapentin SOLN 600 mg, 600 mg, Oral, TID, Charli Penaloza MD, 600 mg at 10/15/21 1753    topiramate (TOPAMAX) tablet 50 mg, 50 mg, Oral, BID, Charli Penaloza MD, 50 mg at 10/15/21 2130    [Held by provider] baclofen (LIORESAL) tablet 5 mg, 5 mg, Oral, BID, Charli Penaloza MD, 5 mg at 10/15/21 2141    [Held by provider] diazePAM (VALIUM) tablet 5 mg, 5 mg, Oral, Q12H, Charli Penaloza MD, 5 mg at 10/15/21 2130    perflutren lipid microspheres (DEFINITY) injection 1.65 mg, 1.5 mL, IntraVENous, ONCE PRN, Charli Penaloza MD    propofol 3,000 mg in 300 mL infusion, 5-50 mcg/kg/min, IntraVENous, Titrated, Charli Penaloza MD, Last Rate: 25.7 mL/hr at 10/17/21 0748, 35 mcg/kg/min at 10/17/21 0748    thiamine (B-1) 500 mg in sodium chloride 0.9 % 100 mL IVPB, 500 mg, IntraVENous, Q24H, MARTY Pham - CNP, Stopped at 10/16/21 1117    pantoprazole (PROTONIX) injection 40 mg, 40 mg, IntraVENous, BID, 40 mg at 10/17/21 1035 **AND** sodium chloride (PF) 0.9 % injection 10 mL, 10 mL, IntraVENous, BID, MARTY Pham - CNP, 10 mL at 10/16/21 2140    fentaNYL 5 mcg/ml in 0.9%  ml infusion, 12.5-200 mcg/hr, IntraVENous, Continuous, Og Carney MD, Last Rate: 15 mL/hr at 10/17/21 0747, 75 mcg/hr at 10/17/21 0747    sodium chloride flush 0.9 % injection 5-40 mL, 5-40 mL, IntraVENous, 2 times per day, Sobia Hidalgo DO, 10 mL at 10/17/21 1040    sodium chloride flush 0.9 % injection 5-40 mL, 5-40 mL, IntraVENous, PRN, Joyce Mccall DO    0.9 % sodium chloride infusion, 25 mL, IntraVENous, PRN, Sobia Hidalgo DO    folic acid 1 mg in dextrose 5 % 50 mL IVPB, 1 mg, IntraVENous, Daily, Annalise Hinton MD, Stopped at 10/16/21 1117    sodium chloride flush 0.9 % injection 5-40 mL, 5-40 mL, IntraVENous, 2 times per day, Joe Alfonso MD, 10 mL at 10/17/21 1040    sodium chloride flush 0.9 % injection 5-40 mL, 5-40 mL, IntraVENous, PRN, Joe Alfonso MD    0.9 % sodium chloride infusion, 25 mL, IntraVENous, PRN, Joe Alfonso MD    ondansetron (ZOFRAN-ODT) disintegrating tablet 4 mg, 4 mg, Oral, Q8H PRN **OR** ondansetron (ZOFRAN) injection 4 mg, 4 mg, IntraVENous, Q6H PRN, Joe Alfonso MD    polyethylene glycol (GLYCOLAX) packet 17 g, 17 g, Oral, Daily PRN, Joe Alfonso MD    acetaminophen (TYLENOL) tablet 650 mg, 650 mg, Oral, Q6H PRN **OR** acetaminophen (TYLENOL) suppository 650 mg, 650 mg, Rectal, Q6H PRN, Joe Alfonso MD, 650 mg at 10/16/21 0044    ipratropium-albuterol (DUONEB) nebulizer solution 1 ampule, 1 ampule, Inhalation, Q4H, Gino Kerns, APRN - CNP, 1 ampule at 10/17/21 4284    Review of Systems:   Unable to obtain due to medical condition    Physical Exam:   Vital Signs:  BP 99/67   Pulse 103   Temp 98.1 °F (36.7 °C) (Core)   Resp 21   Ht 5' 11\" (1.803 m)   Wt 270 lb 1.6 oz (122.5 kg)   SpO2 94%   BMI 37.67 kg/m²     Input/Output:   In: 3529 [I.V.:2675.4]  Out: 1550     Oxygen requirements: MV    Ventilator Information:  Vent Information  $Ventilation: $Subsequent Day  Skin Assessment: Clean, dry, & intact  Vent Type: 980  Vent Mode: AC/VC  Vt Ordered: 500 mL  Rate Set: 25 bmp  Peak Flow: 60 L/min  Pressure Support: 0 cmH20  FiO2 : 60 %  SpO2: 94 %  SpO2/FiO2 ratio: 125.33  Sensitivity: 4  PEEP/CPAP: 5  I Time/ I Time %: 0 s  Humidification Source: Heated wire    General appearance: , not in pain or distress, in no respiratory distress    HEENT: Intubated  Neck: Supple, no jugular venous distension, lymphadenopathy, thyromegaly or carotid bruits  Chest: Decreased breath sounds, no wheezing, no crackles and no tenderness over ribs   Cardiovascular: Normal S1 , S2, regular rate and rhythm, no murmur, rub or gallop  Abdomen: Normal sounds present, soft, lax with no tenderness, no hepatosplenomegaly, and no masses  Extremities: No edema. Pulses are equally present. Skin: intact, no rashes   Neurologic: Sedated and mechanically ventilated     Investigations:  Labs, radiological imaging and cardiac work up were reviewed        ICU STAFF PHYSICIAN NOTE OF PERSONAL INVOLVEMENT IN CARE  As the attending physician, I certify that I personally reviewed the patient's history and personally examined the patient to confirm the physical findings described above, and that I reviewed the relevant imaging studies and available reports. I also discussed the differential diagnosis and all of the proposed management plans with the patient and individuals accompanying the patient to this visit. They had the opportunity to ask questions about the proposed management plans and to have those questions answered. This patient has a high probability of sudden, clinically significant deterioration, which requires the highest level of physician preparedness to intervene urgently. I managed/supervised life or organ supporting interventions that required frequent physician assessment. I devoted my full attention to the direct care of this patient for the amount of time indicated below.   Time I spent with family or surrogate(s) is included only if the patient was incapable of providing the necessary information or participating in medical decision-making. Time devoted to teaching and to any procedures I billed separately is not included.   Critical Care Time: 35 minutes      Electronically signed by Luis Purdy MD on 10/17/2021 at 10:41 AM

## 2021-10-17 NOTE — CONSULTS
GI CONSULT NOTE    [facility]  Dr. Yohana Lott M.D., Dr. Elizabeth Pulido M.D., Dr. Mimi Griffiths D.O., Dr. Farhana Kohli M.D., Dr. Kenneth Tao D.O., GI fellow      Date:11:55 AM 10/17/2021    Cora Hoffmaning  73 y.o.  male    HPI:    81-year-old male with PMHx of hypertension, CAD, COPD, GERD, morbid obesity, and alcohol abuse who presented to the ED with new onset hallucinations and tremors. Per records, patient endorses a history of heavy drinking for the past few weeks up to 1/5 of gin a day and stated he has had withdrawals. Once he presented to the ED patient was given Ativan and continued to hallucinate and started to desat and required intubation to protect his airway. He was also noted to have A. fib and was in RVR. Over the course of his hospital stay patient remains intubated and sedated, multiple attempts to insert OG tube have been unsuccessful and GI consulted for endoscopic placement. Last colonoscopy: Unable to obtain  Last EGD: Unable to obtain    PAST MEDICAL Hx:  Past Medical History:   Diagnosis Date    CAD (coronary artery disease)     COPD (chronic obstructive pulmonary disease) ([facility] [address]     Depression     Fracture of unspecified phalanx of left middle finger, initial encounter for closed fracture     GERD (gastroesophageal reflux disease)     Hypertension        PAST SURGICAL Hx:   Past Surgical History:   Procedure Laterality Date    COLONOSCOPY      CORONARY ARTERY BYPASS GRAFT  3/2/05    x3: LIMA to LAD, SVG to RCA, SVG to diagonal    DIAGNOSTIC CARDIAC CATH LAB PROCEDURE  3/02/05    [facility]: Severe multivessel CAD in patient who presents with STEMI and total occlusion of diagonal branch. Significant disease of proximal LAD and severe disease of dominant RCA.      FINGER SURGERY Left 5/3/2019    CLOSED POSSIBLE LEFT MIDDLE FINGER OPEN REDUCTION INTERNAL FIXATION POSSIBLE PROXIMAL INTERPHALANGEAL JOINT ARTHROPLASTY   ++[facility]++ performed by Jennifer Newell MD at [facility].      double       FAMILY Hx:  No family history on file. HOME MEDICATIONS:  Prior to Admission medications    Medication Sig Start Date End Date Taking? Authorizing Provider   fluticasone (FLONASE) 50 MCG/ACT nasal spray 2 sprays by Nasal route daily 2 sprays each nostril once a day 5/20/19   Shayna GildariusDO   busPIRone (BUSPAR) 5 MG tablet Take 15 mg by mouth 2 times daily  7/11/16   Historical Provider, MD   gabapentin (NEURONTIN) 300 MG capsule Take 300 mg by mouth 3 times daily    Historical Provider, MD   ibuprofen (ADVIL;MOTRIN) 600 MG tablet Take 600 mg by mouth 10/7/13   Historical Provider, MD   omeprazole (PRILOSEC) 20 MG capsule Take 20 mg by mouth Daily  4/21/16   Historical Provider, MD   PROAIR  (90 BASE) MCG/ACT inhaler  4/21/16   Historical Provider, MD   ASPIRIN LOW DOSE 81 MG EC tablet Take 81 mg by mouth daily  4/21/16   Historical Provider, MD   sertraline (ZOLOFT) 100 MG tablet 100 mg nightly  4/8/16   Historical Provider, MD   lisinopril (PRINIVIL;ZESTRIL) 5 MG tablet Take 5 mg by mouth daily    Historical Provider, MD   nitroGLYCERIN (NITROSTAT) 0.4 MG SL tablet Place 1 tablet under the tongue every 5 minutes as needed for Chest pain 3/23/16   Judy Rivera MD   pravastatin (PRAVACHOL) 80 MG tablet Take 1 tablet by mouth daily 3/23/16   Judy Rivera MD       ALLERGIES:  Patient has no known allergies.     SOCIAL Hx:  Social History     Socioeconomic History    Marital status:      Spouse name: Not on file    Number of children: Not on file    Years of education: Not on file    Highest education level: Not on file   Occupational History    Not on file   Tobacco Use    Smoking status: Current Every Day Smoker     Packs/day: 1.00     Years: 45.00     Pack years: 45.00     Types: Cigars    Smokeless tobacco: Never Used    Tobacco comment: SMOKES CIGARS    Vaping Use    Vaping Use: Never used   Substance and Sexual Activity    Alcohol use: Yes     Comment: occassiona    Drug use: No    Sexual activity: Not on file   Other Topics Concern    Not on file   Social History Narrative    Not on file     Social Determinants of Health     Financial Resource Strain:     Difficulty of Paying Living Expenses:    Food Insecurity:     Worried About Running Out of Food in the Last Year:     [address] in the Last Year:    Transportation Needs:     Lack of Transportation (Medical):  Lack of Transportation (Non-Medical):    Physical Activity:     Days of Exercise per Week:     Minutes of Exercise per Session:    Stress:     Feeling of Stress :    Social Connections:     Frequency of Communication with Friends and Family:     Frequency of Social Gatherings with Friends and Family:     Attends Adventism Services:     Active Member of Clubs or Organizations:     Attends Club or Organization Meetings:     Marital Status:    Intimate Partner Violence:     Fear of Current or Ex-Partner:     Emotionally Abused:     Physically Abused:     Sexually Abused:        PE:  BP 99/67   Pulse 103   Temp 98.1 °F (36.7 °C) (Core)   Resp 21   Ht 5' 11\" (1.803 m)   Wt 270 lb 1.6 oz (122.5 kg)   SpO2 94%   BMI 37.67 kg/m²     General: A&Ox 3, friendly, NAD  HEENT: Atraumatic, symmetric, no anterior/posterior lymphadenopathy, moist mucous membranes, PERRL, EOM intact, fair dentition. Pulm: CTAB, Neg w/r/r, normal chest expansion, no crackles noted  Cardio: RRR, neg m/r/g, nl S1 and S2, no extra heart sounds  Abd.: soft, NT, ND, BS+, no G/R, no HSM  Ext: +2/4 pulse Dp and radial b/l, LE and UE ROM intact,   Skin: No lesions, excoriations, petechiae, or ecchymoses noted    Neuro: normal sensation throughout, DTRs patellar, tricept, and bicept 2/4 b/l and equal, normal muscle strength throughout.       DATA:     Lab Results   Component Value Date    WBC 6.3 10/17/2021    RBC 3.34 10/17/2021    HGB 10.5 10/17/2021    HCT 31.8 10/17/2021    MCV 95.2 10/17/2021    MCH 31.4 10/17/2021    MCHC 33.0 10/17/2021    RDW 16.4 10/17/2021     10/17/2021    MPV 9.4 10/17/2021     Lab Results   Component Value Date     10/17/2021    K 5.3 10/17/2021    K 4.3 10/12/2021    CL 96 10/17/2021    CO2 24 10/17/2021    BUN 24 10/17/2021    CREATININE 1.0 10/17/2021    CALCIUM 8.5 10/17/2021    PROT 7.0 10/12/2021    LABALBU 4.2 10/12/2021    BILITOT 1.0 10/12/2021    ALKPHOS 69 10/12/2021    AST 40 10/12/2021    ALT 26 10/12/2021     Lab Results   Component Value Date    LIPASE 24 10/12/2021     No results found for: AMYLASE      ASSESSMENT/PLAN:  1. Encounter for endoscopic nasogastric tube placement  Patient requires NG tube placement for enteral nutrition after multiple unsuccessful bedside attempts  Currently on therapeutic Lovenox every 12. Hold a.m. Lovenox  N. p.o.  Plan for EGD and tube placement tomorrow    2. Acute on chronic hypoxic and hypercapnic respiratory failure  Per primary and intensivist    3. Alcohol withdrawal  Per primary    4. A. fib with RVR  Per primary    5. Aspiration pneumonia  Per primary    6.   Metabolic encephalopathy  Per primary      Will discuss with attending     Florencia Villa DO  10/17/2021  11:55 AM

## 2021-10-17 NOTE — PROGRESS NOTES
Pharmacy Consultation Note  (Antibiotic Dosing and Monitoring)    Initial consult date: 10/16/2021  Consulting physician/provider: Dr. Jhon Alonso  Drug: Vancomycin  Indication: Pneumonia (HAP)    Age/  Gender Height Weight IBW  Allergy Information   66 y.o./male 5' 10.87\" (180 cm) 277 lb 3.2 oz (125.7 kg)     Ideal body weight: 75.3 kg (166 lb 0.1 oz)  Adjusted ideal body weight: 94.2 kg (207 lb 10.3 oz)   Patient has no known allergies. Renal Function:  Recent Labs     10/15/21  0548 10/16/21  0519 10/17/21  0508   BUN 14 15 24*   CREATININE 0.8 1.0 1.0       Intake/Output Summary (Last 24 hours) at 10/17/2021 1455  Last data filed at 10/17/2021 0657  Gross per 24 hour   Intake 3585.03 ml   Output 1550 ml   Net 2035.03 ml       Vancomycin Monitoring:  Trough:  No results for input(s): VANCOTROUGH in the last 72 hours. Random:  No results for input(s): VANCORANDOM in the last 72 hours. Vancomycin Administration Times:  Recent vancomycin administrations      No vancomycin IV orders with administrations found. Assessment:  · Patient is a 73 y.o. male who has been initiated on vancomycin for pneumonia (respiratory cx growing Staph aureus, S pending). Estimated Creatinine Clearance: 98 mL/min (based on SCr of 1 mg/dL).   · To dose vancomycin, pharmacy will be utilizing [employer] calculation software for goal AUC/GRIFFIN 400-600 mg/L-hr    Plan:  · Vancomycin 1250 IV every 12 hours  · Trough tonight @ 2130; Please hold the dose if level > 20 mcg/mL  · Will continue to monitor renal function   · Clinical pharmacy to follow    Thank you for the consult,    Lauren Lazaro, PharmD 10/17/2021 2:55 PM   [phone]

## 2021-10-17 NOTE — PLAN OF CARE
Problem: Non-Violent Restraints  Goal: Removal from restraints as soon as assessed to be safe  10/17/2021 0107 by Nathan Busby RN  Outcome: Not Met This Shift     Problem: Non-Violent Restraints  Goal: No harm/injury to patient while restraints in use  10/17/2021 0107 by Nathan Busby RN  Outcome: Met This Shift     Problem: Non-Violent Restraints  Goal: Patient's dignity will be maintained  10/17/2021 0107 by Nathan Busby RN  Outcome: Met This Shift

## 2021-10-18 LAB
ANION GAP SERPL CALCULATED.3IONS-SCNC: 9 MMOL/L (ref 7–16)
BUN BLDV-MCNC: 25 MG/DL (ref 6–23)
CALCIUM SERPL-MCNC: 8.7 MG/DL (ref 8.6–10.2)
CHLORIDE BLD-SCNC: 97 MMOL/L (ref 98–107)
CO2: 24 MMOL/L (ref 22–29)
CREAT SERPL-MCNC: 0.9 MG/DL (ref 0.7–1.2)
GFR AFRICAN AMERICAN: >60
GFR NON-AFRICAN AMERICAN: >60 ML/MIN/1.73
GLUCOSE BLD-MCNC: 114 MG/DL (ref 74–99)
HCT VFR BLD CALC: 32.1 % (ref 37–54)
HEMOGLOBIN: 10.9 G/DL (ref 12.5–16.5)
MCH RBC QN AUTO: 31.9 PG (ref 26–35)
MCHC RBC AUTO-ENTMCNC: 34 % (ref 32–34.5)
MCV RBC AUTO: 93.9 FL (ref 80–99.9)
PDW BLD-RTO: 15.9 FL (ref 11.5–15)
PHOSPHORUS: 2.8 MG/DL (ref 2.5–4.5)
PLATELET # BLD: 200 E9/L (ref 130–450)
PMV BLD AUTO: 9.2 FL (ref 7–12)
POTASSIUM SERPL-SCNC: 5.2 MMOL/L (ref 3.5–5)
RBC # BLD: 3.42 E12/L (ref 3.8–5.8)
SODIUM BLD-SCNC: 130 MMOL/L (ref 132–146)
VANCOMYCIN TROUGH: 14 MCG/ML (ref 5–16)
WBC # BLD: 7 E9/L (ref 4.5–11.5)

## 2021-10-18 PROCEDURE — 2580000003 HC RX 258: Performed by: INTERNAL MEDICINE

## 2021-10-18 PROCEDURE — 85027 COMPLETE CBC AUTOMATED: CPT

## 2021-10-18 PROCEDURE — 94640 AIRWAY INHALATION TREATMENT: CPT

## 2021-10-18 PROCEDURE — 99232 SBSQ HOSP IP/OBS MODERATE 35: CPT | Performed by: INTERNAL MEDICINE

## 2021-10-18 PROCEDURE — 6360000002 HC RX W HCPCS: Performed by: INTERNAL MEDICINE

## 2021-10-18 PROCEDURE — 2000000000 HC ICU R&B

## 2021-10-18 PROCEDURE — 80202 ASSAY OF VANCOMYCIN: CPT

## 2021-10-18 PROCEDURE — C9113 INJ PANTOPRAZOLE SODIUM, VIA: HCPCS | Performed by: NURSE PRACTITIONER

## 2021-10-18 PROCEDURE — 6370000000 HC RX 637 (ALT 250 FOR IP): Performed by: NURSE PRACTITIONER

## 2021-10-18 PROCEDURE — 2500000003 HC RX 250 WO HCPCS: Performed by: INTERNAL MEDICINE

## 2021-10-18 PROCEDURE — 2580000003 HC RX 258: Performed by: EMERGENCY MEDICINE

## 2021-10-18 PROCEDURE — 2500000003 HC RX 250 WO HCPCS

## 2021-10-18 PROCEDURE — 2580000003 HC RX 258: Performed by: NURSE PRACTITIONER

## 2021-10-18 PROCEDURE — 84100 ASSAY OF PHOSPHORUS: CPT

## 2021-10-18 PROCEDURE — 94003 VENT MGMT INPAT SUBQ DAY: CPT

## 2021-10-18 PROCEDURE — 80048 BASIC METABOLIC PNL TOTAL CA: CPT

## 2021-10-18 PROCEDURE — 6360000002 HC RX W HCPCS: Performed by: NURSE PRACTITIONER

## 2021-10-18 PROCEDURE — 36592 COLLECT BLOOD FROM PICC: CPT

## 2021-10-18 PROCEDURE — 2500000003 HC RX 250 WO HCPCS: Performed by: EMERGENCY MEDICINE

## 2021-10-18 RX ORDER — METOPROLOL TARTRATE 5 MG/5ML
INJECTION INTRAVENOUS
Status: COMPLETED
Start: 2021-10-18 | End: 2021-10-18

## 2021-10-18 RX ORDER — DIAZEPAM 5 MG/ML
5 INJECTION, SOLUTION INTRAMUSCULAR; INTRAVENOUS EVERY 8 HOURS
Status: DISCONTINUED | OUTPATIENT
Start: 2021-10-18 | End: 2021-10-21

## 2021-10-18 RX ORDER — METOPROLOL TARTRATE 5 MG/5ML
5 INJECTION INTRAVENOUS ONCE
Status: COMPLETED | OUTPATIENT
Start: 2021-10-18 | End: 2021-10-18

## 2021-10-18 RX ORDER — PROPOFOL 10 MG/ML
INJECTION, EMULSION INTRAVENOUS
Status: DISPENSED
Start: 2021-10-18 | End: 2021-10-18

## 2021-10-18 RX ORDER — FUROSEMIDE 10 MG/ML
40 INJECTION INTRAMUSCULAR; INTRAVENOUS ONCE
Status: COMPLETED | OUTPATIENT
Start: 2021-10-18 | End: 2021-10-18

## 2021-10-18 RX ADMIN — IPRATROPIUM BROMIDE AND ALBUTEROL SULFATE 1 AMPULE: .5; 2.5 SOLUTION RESPIRATORY (INHALATION) at 07:09

## 2021-10-18 RX ADMIN — VANCOMYCIN HYDROCHLORIDE 1250 MG: 10 INJECTION, POWDER, LYOPHILIZED, FOR SOLUTION INTRAVENOUS at 11:01

## 2021-10-18 RX ADMIN — DIAZEPAM 5 MG: 5 INJECTION, SOLUTION INTRAMUSCULAR; INTRAVENOUS at 19:44

## 2021-10-18 RX ADMIN — IPRATROPIUM BROMIDE AND ALBUTEROL SULFATE 1 AMPULE: .5; 2.5 SOLUTION RESPIRATORY (INHALATION) at 22:10

## 2021-10-18 RX ADMIN — Medication 10 ML: at 21:14

## 2021-10-18 RX ADMIN — PROPOFOL 50 MCG/KG/MIN: 10 INJECTION, EMULSION INTRAVENOUS at 01:27

## 2021-10-18 RX ADMIN — THIAMINE HYDROCHLORIDE 500 MG: 100 INJECTION, SOLUTION INTRAMUSCULAR; INTRAVENOUS at 10:27

## 2021-10-18 RX ADMIN — SODIUM CHLORIDE 25 ML: 9 INJECTION, SOLUTION INTRAVENOUS at 14:10

## 2021-10-18 RX ADMIN — SODIUM CHLORIDE 25 ML: 9 INJECTION, SOLUTION INTRAVENOUS at 22:51

## 2021-10-18 RX ADMIN — PANTOPRAZOLE SODIUM 40 MG: 40 INJECTION, POWDER, FOR SOLUTION INTRAVENOUS at 21:14

## 2021-10-18 RX ADMIN — PROPOFOL 50 MCG/KG/MIN: 10 INJECTION, EMULSION INTRAVENOUS at 11:44

## 2021-10-18 RX ADMIN — VALPROATE SODIUM 500 MG: 100 INJECTION, SOLUTION INTRAVENOUS at 05:47

## 2021-10-18 RX ADMIN — DIAZEPAM 5 MG: 5 INJECTION, SOLUTION INTRAMUSCULAR; INTRAVENOUS at 11:43

## 2021-10-18 RX ADMIN — PROPOFOL 50 MCG/KG/MIN: 10 INJECTION, EMULSION INTRAVENOUS at 19:16

## 2021-10-18 RX ADMIN — SODIUM CHLORIDE 25 ML: 9 INJECTION, SOLUTION INTRAVENOUS at 05:56

## 2021-10-18 RX ADMIN — IPRATROPIUM BROMIDE AND ALBUTEROL SULFATE 1 AMPULE: .5; 2.5 SOLUTION RESPIRATORY (INHALATION) at 10:40

## 2021-10-18 RX ADMIN — METOPROLOL TARTRATE 5 MG: 1 INJECTION, SOLUTION INTRAVENOUS at 01:37

## 2021-10-18 RX ADMIN — VANCOMYCIN HYDROCHLORIDE 1250 MG: 10 INJECTION, POWDER, LYOPHILIZED, FOR SOLUTION INTRAVENOUS at 22:15

## 2021-10-18 RX ADMIN — Medication 25 MCG/HR: at 01:23

## 2021-10-18 RX ADMIN — Medication 10 ML: at 08:29

## 2021-10-18 RX ADMIN — PROPOFOL 50 MCG/KG/MIN: 10 INJECTION, EMULSION INTRAVENOUS at 09:02

## 2021-10-18 RX ADMIN — METHYLPREDNISOLONE SODIUM SUCCINATE 40 MG: 40 INJECTION, POWDER, FOR SOLUTION INTRAMUSCULAR; INTRAVENOUS at 05:47

## 2021-10-18 RX ADMIN — FUROSEMIDE 40 MG: 10 INJECTION, SOLUTION INTRAMUSCULAR; INTRAVENOUS at 10:56

## 2021-10-18 RX ADMIN — PANTOPRAZOLE SODIUM 40 MG: 40 INJECTION, POWDER, FOR SOLUTION INTRAVENOUS at 08:30

## 2021-10-18 RX ADMIN — CEFEPIME HYDROCHLORIDE 1000 MG: 1 INJECTION, POWDER, FOR SOLUTION INTRAMUSCULAR; INTRAVENOUS at 18:41

## 2021-10-18 RX ADMIN — FOLIC ACID 1 MG: 5 INJECTION, SOLUTION INTRAMUSCULAR; INTRAVENOUS; SUBCUTANEOUS at 10:27

## 2021-10-18 RX ADMIN — IPRATROPIUM BROMIDE AND ALBUTEROL SULFATE 1 AMPULE: .5; 2.5 SOLUTION RESPIRATORY (INHALATION) at 14:28

## 2021-10-18 RX ADMIN — SODIUM CHLORIDE, PRESERVATIVE FREE 10 ML: 5 INJECTION INTRAVENOUS at 21:14

## 2021-10-18 RX ADMIN — CEFEPIME HYDROCHLORIDE 1000 MG: 1 INJECTION, POWDER, FOR SOLUTION INTRAMUSCULAR; INTRAVENOUS at 10:27

## 2021-10-18 RX ADMIN — SODIUM CHLORIDE, POTASSIUM CHLORIDE, SODIUM LACTATE AND CALCIUM CHLORIDE: 600; 310; 30; 20 INJECTION, SOLUTION INTRAVENOUS at 05:47

## 2021-10-18 RX ADMIN — SODIUM CHLORIDE, PRESERVATIVE FREE 10 ML: 5 INJECTION INTRAVENOUS at 08:30

## 2021-10-18 RX ADMIN — METOPROLOL TARTRATE 5 MG: 5 INJECTION INTRAVENOUS at 01:37

## 2021-10-18 RX ADMIN — ENOXAPARIN SODIUM 120 MG: 150 INJECTION SUBCUTANEOUS at 21:14

## 2021-10-18 RX ADMIN — VALPROATE SODIUM 500 MG: 100 INJECTION, SOLUTION INTRAVENOUS at 14:10

## 2021-10-18 RX ADMIN — CEFEPIME HYDROCHLORIDE 1000 MG: 1 INJECTION, POWDER, FOR SOLUTION INTRAMUSCULAR; INTRAVENOUS at 01:41

## 2021-10-18 RX ADMIN — ENOXAPARIN SODIUM 120 MG: 150 INJECTION SUBCUTANEOUS at 10:55

## 2021-10-18 RX ADMIN — IPRATROPIUM BROMIDE AND ALBUTEROL SULFATE 1 AMPULE: .5; 2.5 SOLUTION RESPIRATORY (INHALATION) at 01:09

## 2021-10-18 RX ADMIN — VALPROATE SODIUM 500 MG: 100 INJECTION, SOLUTION INTRAVENOUS at 21:19

## 2021-10-18 RX ADMIN — IPRATROPIUM BROMIDE AND ALBUTEROL SULFATE 1 AMPULE: .5; 2.5 SOLUTION RESPIRATORY (INHALATION) at 18:24

## 2021-10-18 ASSESSMENT — PULMONARY FUNCTION TESTS
PIF_VALUE: 33
PIF_VALUE: 26
PIF_VALUE: 28
PIF_VALUE: 25
PIF_VALUE: 26
PIF_VALUE: 25
PIF_VALUE: 26
PIF_VALUE: 31

## 2021-10-18 NOTE — PROGRESS NOTES
Pharmacy Consultation Note  (Antibiotic Dosing and Monitoring)    Initial consult date: 10/16/2021  Consulting physician/provider: Dr. Burns   Drug: Vancomycin  Indication: Pneumonia (HAP)    Age/  Gender Height Weight IBW  Allergy Information   66 y.o./male 5' 10.87\" (180 cm) 277 lb 3.2 oz (125.7 kg)     Ideal body weight: 75.3 kg (166 lb 0.1 oz)  Adjusted ideal body weight: 94.2 kg (207 lb 10.3 oz)   Patient has no known allergies. Renal Function:  Recent Labs     10/16/21  0519 10/17/21  0508 10/18/21  0405   BUN 15 24* 25*   CREATININE 1.0 1.0 0.9       Intake/Output Summary (Last 24 hours) at 10/18/2021 1541  Last data filed at 10/18/2021 1410  Gross per 24 hour   Intake 3378 ml   Output 6325 ml   Net -2947 ml       Vancomycin Monitoring:  Trough:  No results for input(s): VANCOTROUGH in the last 72 hours. Random:  No results for input(s): VANCORANDOM in the last 72 hours. Recent vancomycin administrations                   vancomycin (VANCOCIN) 1,250 mg in dextrose 5 % 250 mL IVPB (mg) 1,250 mg New Bag 10/18/21 1101     1,250 mg New Bag 10/17/21 2230     1,250 mg New Bag  1042     1,250 mg New Bag 10/16/21 2212     1,250 mg New Bag  1215              Assessment:  · Patient is a 73 y.o. male who has been initiated on vancomycin for pneumonia (respiratory cx growing Staph aureus, S pending). Estimated Creatinine Clearance: 109 mL/min (based on SCr of 0.9 mg/dL). · To dose vancomycin, pharmacy will be utilizing [employer] calculation software for goal AUC/GRIFFIN 400-600 mg/L-hr   · Trough ordered for last night was never drawn.      Plan:  · Vancomycin 1250 IV every 12 hours  · Trough tonight @ 2130; Please hold the dose if level > 20 mcg/mL  · Will continue to monitor renal function   · Clinical pharmacy to follow    Thank you for the consult,    Annetta Thomas PharmD, BCPS 10/18/2021 3:46 PM   Ext: [phone]

## 2021-10-18 NOTE — PLAN OF CARE
Problem: Non-Violent Restraints  Goal: Removal from restraints as soon as assessed to be safe  10/18/2021 0040 by Samantha Gibbs RN  Outcome: Not Met This Shift     Problem: Non-Violent Restraints  Goal: No harm/injury to patient while restraints in use  10/18/2021 0040 by Samantha Gibbs RN  Outcome: Met This Shift     Problem: Non-Violent Restraints  Goal: Patient's dignity will be maintained  10/18/2021 0040 by Samantha Gibbs RN  Outcome: Met This Shift

## 2021-10-18 NOTE — PLAN OF CARE
Problem: Non-Violent Restraints  Goal: No harm/injury to patient while restraints in use  Outcome: Met This Shift  Goal: Patient's dignity will be maintained  Outcome: Met This Shift     Problem: Non-Violent Restraints  Goal: Removal from restraints as soon as assessed to be safe  Outcome: Not Met This Shift

## 2021-10-18 NOTE — H&P
Patient seen and examined. Pertinent notes/labs reviewed. Remains intubated and sedated. H&P reviewed -no new changes.   Vitals:    10/18/21 1100   BP: (!) 157/105   Pulse: 108   Resp: 28   Temp:    SpO2: 96%       Plan: Proceed with endoscopic placement of NG tube    Keerthi Abreu MD

## 2021-10-18 NOTE — PROGRESS NOTES
Assessment and Plan  Patient is a 73 y.o. male with the following medical Problems:   1. Acute on chronic hypoxic and hypercapnic respiratory failure  2. Alcohol withdrawal  3. New onset atrial fibrillation  4. History of thrombocytopenia  5. Morbid obesity with obesity hypoventilation syndrome  6. Metabolic encephalopathy  7. Acute kidney injury  (resolved)  8. Aspiration pneumonia  9. UTI  10. Small pericardial effusion  11. Hematuria  12. 2.8 X 2.8 cm AAA  13. Small bilateral pleural effusions    Plan of care:  1. Daily sedation vacation and spontaneous breathing trial if possible  2. Continue with thiamine and folic acid  3.  GI consult for OGT placement  4. Off Cardizem drip for atrial fibrillation. 5.  Continue with metoprolol to 25 every 6 hours   6. Discontinue IV fluid and start tube feeding. , diuresed   7. Propofol and fentanyl for sedation and analgesia. 9.  GI prophylaxis and DVT prophylaxis  10. Echocardiography with normal EF  11. Patient is currently on Lovenox 1 mg/kg twice a day for atrial fibrillation. 12.  Ammonia level within normal limit    History of Present Illness:   Patient is a 71-year-old with history of hypertension, CAD, COPD, and alcohol abuse. Patient presented to the ED on 10/12/2021 after developing hallucinations and shortness of breath. Patient apparently called police after seeing his  wife and 2 children walking in his house. He apparently attempted to spray them with bug spray before calling police. Patient has a history of alcohol abuse and drinks 1/5 of them being daily. According to ED physician's note the patient's last drink was on Saturday. The patient is currently intubated, sedated, and unable to provide any history. Daily Progress:  2021: Patient remains sedated intubated and mechanically ventilated. He had an uneventful night. He is currently on a Cardizem drip for atrial fibrillation with RVR.   He has no fever, chills, or rigors. October 14, 2021: Patient remained sedated intubated and mechanically ventilated. He had atrial fibrillation with RVR. Patient failed awakening trial today. He was agitated and confused. He has no fever, chills, rigors. October 15, 2021: Patient remains sedated intubated and mechanically ventilated. He gets easily agitated and combative off sedation. He failed awakening trial.  He has no fever, chills, or rigors. Heart rate is controlled. He developed hematuria overnight and Eliquis was put on hold. October 16, 2021: Patient had repetitive episodes of vomiting. Multiple attempts to place an OGT probably failed. CT scan of the abdomen and pelvis was obtained. There was no evidence of hiatal hernia. Patient has no fever, chills, rigors. He becomes agitated and combative off sedation. He has no fever, chills, rigors. He is currently on 75% FiO2. October 17, 2021: Patient remains sedated intubated and mechanically ventilated. We will attempt sedation vacation and spontaneous breathing trials today if tolerated. He remains n.p.o. since he has no NG tube. NG tube could not be advanced. He remained hemodynamically stable. He has no fever, chills, rigors. Sugar is mostly controlled. October 18, 2021:vent 60%, plan for EGD today re feeding tube insertion, SBT in am if stable    Past Medical History:  Past Medical History:   Diagnosis Date    CAD (coronary artery disease)     COPD (chronic obstructive pulmonary disease) ([facility] [address]     Depression     Fracture of unspecified phalanx of left middle finger, initial encounter for closed fracture     GERD (gastroesophageal reflux disease)     Hypertension         Family History:   No family history on file. Allergies:         Patient has no known allergies.     Social history:  Social History     Socioeconomic History    Marital status:      Spouse name: Not on file    Number of children: Not on file    Years of education: Not on file    Highest education level: Not on file   Occupational History    Not on file   Tobacco Use    Smoking status: Current Every Day Smoker     Packs/day: 1.00     Years: 45.00     Pack years: 45.00     Types: Cigars    Smokeless tobacco: Never Used    Tobacco comment: SMOKES CIGARS    Vaping Use    Vaping Use: Never used   Substance and Sexual Activity    Alcohol use: Yes     Comment: occassiona    Drug use: No    Sexual activity: Not on file   Other Topics Concern    Not on file   Social History Narrative    Not on file     Social Determinants of Health     Financial Resource Strain:     Difficulty of Paying Living Expenses:    Food Insecurity:     Worried About Running Out of Food in the Last Year:     Ran Out of Food in the Last Year:    Transportation Needs:     Lack of Transportation (Medical):      Lack of Transportation (Non-Medical):    Physical Activity:     Days of Exercise per Week:     Minutes of Exercise per Session:    Stress:     Feeling of Stress :    Social Connections:     Frequency of Communication with Friends and Family:     Frequency of Social Gatherings with Friends and Family:     Attends Bahai Services:     Active Member of Clubs or Organizations:     Attends Club or Organization Meetings:     Marital Status:    Intimate Partner Violence:     Fear of Current or Ex-Partner:     Emotionally Abused:     Physically Abused:     Sexually Abused:        Current Medications:     Current Facility-Administered Medications:     propofol 1000 MG/100ML injection, , , ,     diazePAM (VALIUM) injection 5 mg, 5 mg, IntraVENous, Q8H, Dmitriy Newell MD, 5 mg at 10/18/21 1143    enoxaparin (LOVENOX) injection 120 mg, 1 mg/kg, SubCUTAneous, BID, Mary Villanueva MD, 120 mg at 10/18/21 1055    cefepime (MAXIPIME) 1000 mg IVPB minibag, 1,000 mg, IntraVENous, Q8H, Mary Villanueva MD, Last Rate: 12.5 mL/hr at 10/18/21 1841, 1,000 mg at 10/18/21 1841    0.9 % sodium chloride infusion, 25 mL, IntraVENous, Q8H, Ingrid Diaz MD, Stopped at 10/18/21 1714    vancomycin (VANCOCIN) 1,250 mg in dextrose 5 % 250 mL IVPB, 1,250 mg, IntraVENous, Q12H, Ingrid Diaz MD, Stopped at 10/18/21 1250    valproate (DEPACON) 500 mg in dextrose 5 % 100 mL IVPB, 500 mg, IntraVENous, Q8H, Ingrid Diaz MD, Stopped at 10/18/21 1547    [Held by provider] Gabapentin SOLN 600 mg, 600 mg, Oral, TID, Ingrid Diaz MD, 600 mg at 10/15/21 1753    [Held by provider] topiramate (TOPAMAX) tablet 50 mg, 50 mg, Oral, BID, Ingrid Diaz MD, 50 mg at 10/15/21 2130    [Held by provider] baclofen (LIORESAL) tablet 5 mg, 5 mg, Oral, BID, Ingrid Diaz MD, 5 mg at 10/15/21 2141    [Held by provider] diazePAM (VALIUM) tablet 5 mg, 5 mg, Oral, Q12H, Ingrid Diaz MD, 5 mg at 10/15/21 2130    perflutren lipid microspheres (DEFINITY) injection 1.65 mg, 1.5 mL, IntraVENous, ONCE PRN, Ingrid Diaz MD    propofol 3,000 mg in 300 mL infusion, 5-50 mcg/kg/min, IntraVENous, Titrated, Ingrid Diaz MD, Last Rate: 36.8 mL/hr at 10/18/21 1916, 50 mcg/kg/min at 10/18/21 1916    thiamine (B-1) 500 mg in sodium chloride 0.9 % 100 mL IVPB, 500 mg, IntraVENous, Q24H, Severo Bills, APRN - CNP, Stopped at 10/18/21 1139    pantoprazole (PROTONIX) injection 40 mg, 40 mg, IntraVENous, BID, 40 mg at 10/18/21 0830 **AND** sodium chloride (PF) 0.9 % injection 10 mL, 10 mL, IntraVENous, BID, MARTY Jay CNP, 10 mL at 10/18/21 0830    fentaNYL 5 mcg/ml in 0.9%  ml infusion, 12.5-200 mcg/hr, IntraVENous, Continuous, Ingrid Diaz MD, Last Rate: 5 mL/hr at 10/18/21 0123, 25 mcg/hr at 10/18/21 0123    sodium chloride flush 0.9 % injection 5-40 mL, 5-40 mL, IntraVENous, 2 times per day, Anusha Kearns DO, 10 mL at 10/18/21 0829    sodium chloride flush 0.9 % injection 5-40 mL, 5-40 mL, IntraVENous, PRN, Joyce Mccall DO    0.9 % sodium chloride infusion, 25 mL, IntraVENous, PRN, Gricelda Galloway DO    folic acid 1 mg in dextrose 5 % 50 mL IVPB, 1 mg, IntraVENous, Daily, Víctor Herrera MD, Stopped at 10/18/21 1139    sodium chloride flush 0.9 % injection 5-40 mL, 5-40 mL, IntraVENous, 2 times per day, Kaela Dunbar MD, 10 mL at 10/18/21 0829    sodium chloride flush 0.9 % injection 5-40 mL, 5-40 mL, IntraVENous, PRN, Kaela Dunbar MD    0.9 % sodium chloride infusion, 25 mL, IntraVENous, PRN, Kaela Dunbar MD    ondansetron (ZOFRAN-ODT) disintegrating tablet 4 mg, 4 mg, Oral, Q8H PRN **OR** ondansetron (ZOFRAN) injection 4 mg, 4 mg, IntraVENous, Q6H PRN, Kaela Dunbar MD    polyethylene glycol (GLYCOLAX) packet 17 g, 17 g, Oral, Daily PRN, Kaela Dunbar MD    acetaminophen (TYLENOL) tablet 650 mg, 650 mg, Oral, Q6H PRN **OR** acetaminophen (TYLENOL) suppository 650 mg, 650 mg, Rectal, Q6H PRN, Kaela Dunbar MD, 650 mg at 10/16/21 0044    ipratropium-albuterol (DUONEB) nebulizer solution 1 ampule, 1 ampule, Inhalation, Q4H, MARTY Gray CNP, 1 ampule at 10/18/21 1824    Review of Systems:   Unable to obtain due to medical condition    Physical Exam:   Vital Signs:  /82   Pulse 85   Temp 99.5 °F (37.5 °C) (Core)   Resp 25   Ht 5' 11\" (1.803 m)   Wt 270 lb 1.6 oz (122.5 kg)   SpO2 95%   BMI 37.67 kg/m²     Input/Output:   In: 3428 [I.V.:2828]  Out: 4775     Oxygen requirements: MV    Ventilator Information:  Vent Information  $Ventilation: $Subsequent Day  Skin Assessment: Clean, dry, & intact  Equipment Changed: Humidification  Vent Type: 980  Vent Mode: AC/VC  Vt Ordered: 500 mL  Rate Set: 25 bmp  Peak Flow: 70 L/min  Pressure Support: 0 cmH20  FiO2 : 40 %  SpO2: 95 %  SpO2/FiO2 ratio: 237.5  Sensitivity: 4  PEEP/CPAP: 5  I Time/ I Time %: 0 s  Humidification Source: Heated wire  Humidification Temp: 37  Humidification Temp Measured: 37    General appearance: , vented, sedated   HEENT: Intubated  Neck: Supple, no jugular venous distension, lymphadenopathy, thyromegaly or carotid bruits  Chest: Decreased breath sounds, + crackles and no tenderness over ribs   Cardiovascular: Normal S1 , S2, irregular , tachy, no murmur, rub or gallop  Abdomen: Normal sounds present, soft, lax with no tenderness, no hepatosplenomegaly, and no masses  Extremities: +edema. Pulses are equally present. Skin: intact, warm, dry   Neurologic: Sedated and mechanically ventilated     Investigations:  Labs, radiological imaging and cardiac work up were reviewed        ICU STAFF PHYSICIAN NOTE OF PERSONAL INVOLVEMENT IN CARE  As the attending physician, I certify that I personally reviewed the patient's history and personally examined the patient to confirm the physical findings described above, and that I reviewed the relevant imaging studies and available reports. I also discussed the differential diagnosis and all of the proposed management plans with the patient and individuals accompanying the patient to this visit. They had the opportunity to ask questions about the proposed management plans and to have those questions answered. This patient has a high probability of sudden, clinically significant deterioration, which requires the highest level of physician preparedness to intervene urgently. I managed/supervised life or organ supporting interventions that required frequent physician assessment. I devoted my full attention to the direct care of this patient for the amount of time indicated below. Time I spent with family or surrogate(s) is included only if the patient was incapable of providing the necessary information or participating in medical decision-making. Time devoted to teaching and to any procedures I billed separately is not included.   Critical Care Time: 42 minutes      Electronically signed by Christine Lund MD

## 2021-10-18 NOTE — PROGRESS NOTES
0.9 % injection 5-40 mL, 5-40 mL, IntraVENous, 2 times per day  sodium chloride flush 0.9 % injection 5-40 mL, 5-40 mL, IntraVENous, PRN  0.9 % sodium chloride infusion, 25 mL, IntraVENous, PRN  ondansetron (ZOFRAN-ODT) disintegrating tablet 4 mg, 4 mg, Oral, Q8H PRN **OR** ondansetron (ZOFRAN) injection 4 mg, 4 mg, IntraVENous, Q6H PRN  polyethylene glycol (GLYCOLAX) packet 17 g, 17 g, Oral, Daily PRN  acetaminophen (TYLENOL) tablet 650 mg, 650 mg, Oral, Q6H PRN **OR** acetaminophen (TYLENOL) suppository 650 mg, 650 mg, Rectal, Q6H PRN  ipratropium-albuterol (DUONEB) nebulizer solution 1 ampule, 1 ampule, Inhalation, Q4H  Physical    VITALS:  /76   Pulse 77   Temp 98.8 °F (37.1 °C) (Core)   Resp 25   Ht 5' 11\" (1.803 m)   Wt 270 lb 1.6 oz (122.5 kg)   SpO2 96%   BMI 37.67 kg/m²   CONSTITUTIONAL:  unarousable  EYES:  sclera clear  ENT:  normocepalic, without obvious abnormality  BACK:  symmetric  LUNGS:  no increased work of breathing and wheeze right anterior and left anterior  CARDIOVASCULAR:  normal S1 and S2 and no edema  ABDOMEN:  soft, non-tender and no masses palpated  MUSCULOSKELETAL:  there is no redness, warmth, or swelling of the joints  NEUROLOGIC:  Mental Status Exam:  Level of Alertness:   awake  SKIN:  no bruising or bleeding  Data    CBC:   Lab Results   Component Value Date    WBC 7.0 10/18/2021    RBC 3.42 10/18/2021    HGB 10.9 10/18/2021    HCT 32.1 10/18/2021    MCV 93.9 10/18/2021    MCH 31.9 10/18/2021    MCHC 34.0 10/18/2021    RDW 15.9 10/18/2021     10/18/2021    MPV 9.2 10/18/2021     BMP:    Lab Results   Component Value Date     10/18/2021    K 5.2 10/18/2021    K 4.3 10/12/2021    CL 97 10/18/2021    CO2 24 10/18/2021    BUN 25 10/18/2021    LABALBU 4.2 10/12/2021    CREATININE 0.9 10/18/2021    CALCIUM 8.7 10/18/2021    GFRAA >60 10/18/2021    LABGLOM >60 10/18/2021    GLUCOSE 114 10/18/2021       ASSESSMENT AND PLAN      1.  Acute Alcohol withdrawal/DT: Currently intubated in ICU. Sedation and vent management by intensivist  Withdrawal protocol. Continue to monitor    2. Atrial fibrillation with RVR:  Rate is better controlled  Continue to monitor    3. Acute respiratory failure with hypoxia: currently on vent    4. Thrombocytopenia: related to etoh use    5.   GERD/COPD/Hypertension Essential/ Depression:  Resume home medications

## 2021-10-18 NOTE — PLAN OF CARE
Problem: Skin Integrity:  Goal: Will show no infection signs and symptoms  Description: Will show no infection signs and symptoms  Outcome: Met This Shift     Problem: Non-Violent Restraints  Goal: Removal from restraints as soon as assessed to be safe  Outcome: Not Met This Shift  Goal: No harm/injury to patient while restraints in use  Outcome: Met This Shift  Goal: Patient's dignity will be maintained  Outcome: Met This Shift

## 2021-10-18 NOTE — CARE COORDINATION
10/18/21 Negative covid. Infection: Haemophilus influenza. CM transition of care: icu/vent (10/13/21)/sedation. SW Consult received for \"For consideration of rehab\". Pt is still sedated and intubated.  SW/CM will continue to follow for transition of care planning when pt is stable.  Electronically signed by SIMEON Avalos on 10/18/2021 at 10:49 AM

## 2021-10-19 LAB
ANION GAP SERPL CALCULATED.3IONS-SCNC: 9 MMOL/L (ref 7–16)
BUN BLDV-MCNC: 25 MG/DL (ref 6–23)
CALCIUM SERPL-MCNC: 8.7 MG/DL (ref 8.6–10.2)
CHLORIDE BLD-SCNC: 96 MMOL/L (ref 98–107)
CO2: 25 MMOL/L (ref 22–29)
CREAT SERPL-MCNC: 0.9 MG/DL (ref 0.7–1.2)
GFR AFRICAN AMERICAN: >60
GFR NON-AFRICAN AMERICAN: >60 ML/MIN/1.73
GLUCOSE BLD-MCNC: 96 MG/DL (ref 74–99)
HCT VFR BLD CALC: 33.8 % (ref 37–54)
HEMOGLOBIN: 11.5 G/DL (ref 12.5–16.5)
MCH RBC QN AUTO: 31.4 PG (ref 26–35)
MCHC RBC AUTO-ENTMCNC: 34 % (ref 32–34.5)
MCV RBC AUTO: 92.3 FL (ref 80–99.9)
PDW BLD-RTO: 15.9 FL (ref 11.5–15)
PHOSPHORUS: 3.1 MG/DL (ref 2.5–4.5)
PLATELET # BLD: 242 E9/L (ref 130–450)
PMV BLD AUTO: 8.7 FL (ref 7–12)
POTASSIUM SERPL-SCNC: 4 MMOL/L (ref 3.5–5)
RBC # BLD: 3.66 E12/L (ref 3.8–5.8)
SODIUM BLD-SCNC: 130 MMOL/L (ref 132–146)
TRIGL SERPL-MCNC: 253 MG/DL (ref 0–149)
WBC # BLD: 5.5 E9/L (ref 4.5–11.5)

## 2021-10-19 PROCEDURE — 94640 AIRWAY INHALATION TREATMENT: CPT

## 2021-10-19 PROCEDURE — 2580000003 HC RX 258: Performed by: INTERNAL MEDICINE

## 2021-10-19 PROCEDURE — 6360000002 HC RX W HCPCS: Performed by: NURSE PRACTITIONER

## 2021-10-19 PROCEDURE — 2580000003 HC RX 258: Performed by: EMERGENCY MEDICINE

## 2021-10-19 PROCEDURE — 2000000000 HC ICU R&B

## 2021-10-19 PROCEDURE — 2580000003 HC RX 258: Performed by: NURSE PRACTITIONER

## 2021-10-19 PROCEDURE — 80048 BASIC METABOLIC PNL TOTAL CA: CPT

## 2021-10-19 PROCEDURE — 2500000003 HC RX 250 WO HCPCS: Performed by: EMERGENCY MEDICINE

## 2021-10-19 PROCEDURE — 84478 ASSAY OF TRIGLYCERIDES: CPT

## 2021-10-19 PROCEDURE — 6360000002 HC RX W HCPCS: Performed by: INTERNAL MEDICINE

## 2021-10-19 PROCEDURE — 2500000003 HC RX 250 WO HCPCS

## 2021-10-19 PROCEDURE — 94003 VENT MGMT INPAT SUBQ DAY: CPT

## 2021-10-19 PROCEDURE — 99232 SBSQ HOSP IP/OBS MODERATE 35: CPT | Performed by: INTERNAL MEDICINE

## 2021-10-19 PROCEDURE — 85027 COMPLETE CBC AUTOMATED: CPT

## 2021-10-19 PROCEDURE — 84100 ASSAY OF PHOSPHORUS: CPT

## 2021-10-19 PROCEDURE — 36415 COLL VENOUS BLD VENIPUNCTURE: CPT

## 2021-10-19 PROCEDURE — C9113 INJ PANTOPRAZOLE SODIUM, VIA: HCPCS | Performed by: NURSE PRACTITIONER

## 2021-10-19 PROCEDURE — 6370000000 HC RX 637 (ALT 250 FOR IP): Performed by: INTERNAL MEDICINE

## 2021-10-19 PROCEDURE — 6370000000 HC RX 637 (ALT 250 FOR IP): Performed by: NURSE PRACTITIONER

## 2021-10-19 PROCEDURE — 2580000003 HC RX 258: Performed by: SURGERY

## 2021-10-19 PROCEDURE — 2500000003 HC RX 250 WO HCPCS: Performed by: INTERNAL MEDICINE

## 2021-10-19 RX ORDER — FUROSEMIDE 10 MG/ML
40 INJECTION INTRAMUSCULAR; INTRAVENOUS ONCE
Status: COMPLETED | OUTPATIENT
Start: 2021-10-19 | End: 2021-10-19

## 2021-10-19 RX ORDER — METOPROLOL TARTRATE 5 MG/5ML
5 INJECTION INTRAVENOUS ONCE
Status: COMPLETED | OUTPATIENT
Start: 2021-10-19 | End: 2021-10-19

## 2021-10-19 RX ORDER — SODIUM CHLORIDE 0.9 % (FLUSH) 0.9 %
5-40 SYRINGE (ML) INJECTION PRN
Status: DISCONTINUED | OUTPATIENT
Start: 2021-10-19 | End: 2021-10-26 | Stop reason: SDUPTHER

## 2021-10-19 RX ORDER — METOPROLOL TARTRATE 5 MG/5ML
INJECTION INTRAVENOUS
Status: COMPLETED
Start: 2021-10-19 | End: 2021-10-19

## 2021-10-19 RX ORDER — SODIUM CHLORIDE 0.9 % (FLUSH) 0.9 %
5-40 SYRINGE (ML) INJECTION EVERY 12 HOURS SCHEDULED
Status: DISCONTINUED | OUTPATIENT
Start: 2021-10-19 | End: 2021-10-26 | Stop reason: SDUPTHER

## 2021-10-19 RX ORDER — SODIUM CHLORIDE 9 MG/ML
25 INJECTION, SOLUTION INTRAVENOUS PRN
Status: DISCONTINUED | OUTPATIENT
Start: 2021-10-19 | End: 2021-10-26 | Stop reason: SDUPTHER

## 2021-10-19 RX ADMIN — VALPROATE SODIUM 500 MG: 100 INJECTION, SOLUTION INTRAVENOUS at 21:35

## 2021-10-19 RX ADMIN — PANTOPRAZOLE SODIUM 40 MG: 40 INJECTION, POWDER, FOR SOLUTION INTRAVENOUS at 11:05

## 2021-10-19 RX ADMIN — DIAZEPAM 5 MG: 5 INJECTION, SOLUTION INTRAMUSCULAR; INTRAVENOUS at 20:20

## 2021-10-19 RX ADMIN — ENOXAPARIN SODIUM 120 MG: 150 INJECTION SUBCUTANEOUS at 20:27

## 2021-10-19 RX ADMIN — Medication 10 ML: at 11:06

## 2021-10-19 RX ADMIN — IPRATROPIUM BROMIDE AND ALBUTEROL SULFATE 1 AMPULE: .5; 2.5 SOLUTION RESPIRATORY (INHALATION) at 09:48

## 2021-10-19 RX ADMIN — FOLIC ACID 1 MG: 5 INJECTION, SOLUTION INTRAMUSCULAR; INTRAVENOUS; SUBCUTANEOUS at 11:12

## 2021-10-19 RX ADMIN — VALPROATE SODIUM 500 MG: 100 INJECTION, SOLUTION INTRAVENOUS at 16:06

## 2021-10-19 RX ADMIN — PANTOPRAZOLE SODIUM 40 MG: 40 INJECTION, POWDER, FOR SOLUTION INTRAVENOUS at 20:20

## 2021-10-19 RX ADMIN — VALPROATE SODIUM 500 MG: 100 INJECTION, SOLUTION INTRAVENOUS at 05:58

## 2021-10-19 RX ADMIN — FUROSEMIDE 40 MG: 10 INJECTION, SOLUTION INTRAMUSCULAR; INTRAVENOUS at 11:05

## 2021-10-19 RX ADMIN — PROPOFOL 20 MCG/KG/MIN: 10 INJECTION, EMULSION INTRAVENOUS at 15:50

## 2021-10-19 RX ADMIN — SODIUM CHLORIDE, PRESERVATIVE FREE 10 ML: 5 INJECTION INTRAVENOUS at 11:06

## 2021-10-19 RX ADMIN — ENOXAPARIN SODIUM 120 MG: 150 INJECTION SUBCUTANEOUS at 11:05

## 2021-10-19 RX ADMIN — IPRATROPIUM BROMIDE AND ALBUTEROL SULFATE 1 AMPULE: .5; 2.5 SOLUTION RESPIRATORY (INHALATION) at 01:28

## 2021-10-19 RX ADMIN — IPRATROPIUM BROMIDE AND ALBUTEROL SULFATE 1 AMPULE: .5; 2.5 SOLUTION RESPIRATORY (INHALATION) at 14:20

## 2021-10-19 RX ADMIN — SODIUM CHLORIDE 25 ML: 9 INJECTION, SOLUTION INTRAVENOUS at 05:58

## 2021-10-19 RX ADMIN — IPRATROPIUM BROMIDE AND ALBUTEROL SULFATE 1 AMPULE: .5; 2.5 SOLUTION RESPIRATORY (INHALATION) at 04:43

## 2021-10-19 RX ADMIN — METOPROLOL TARTRATE 5 MG: 1 INJECTION, SOLUTION INTRAVENOUS at 02:10

## 2021-10-19 RX ADMIN — PROPOFOL 50 MCG/KG/MIN: 10 INJECTION, EMULSION INTRAVENOUS at 04:14

## 2021-10-19 RX ADMIN — Medication 25 MCG/HR: at 11:00

## 2021-10-19 RX ADMIN — IPRATROPIUM BROMIDE AND ALBUTEROL SULFATE 1 AMPULE: .5; 2.5 SOLUTION RESPIRATORY (INHALATION) at 22:07

## 2021-10-19 RX ADMIN — CEFTRIAXONE 2000 MG: 2 INJECTION, POWDER, FOR SOLUTION INTRAMUSCULAR; INTRAVENOUS at 11:05

## 2021-10-19 RX ADMIN — Medication 10 ML: at 20:28

## 2021-10-19 RX ADMIN — THIAMINE HYDROCHLORIDE 500 MG: 100 INJECTION, SOLUTION INTRAMUSCULAR; INTRAVENOUS at 11:07

## 2021-10-19 RX ADMIN — DIAZEPAM 5 MG: 5 INJECTION, SOLUTION INTRAMUSCULAR; INTRAVENOUS at 11:06

## 2021-10-19 RX ADMIN — METOPROLOL TARTRATE 5 MG: 5 INJECTION INTRAVENOUS at 02:10

## 2021-10-19 RX ADMIN — CEFEPIME HYDROCHLORIDE 1000 MG: 1 INJECTION, POWDER, FOR SOLUTION INTRAMUSCULAR; INTRAVENOUS at 01:51

## 2021-10-19 RX ADMIN — DIAZEPAM 5 MG: 5 INJECTION, SOLUTION INTRAMUSCULAR; INTRAVENOUS at 01:51

## 2021-10-19 RX ADMIN — IPRATROPIUM BROMIDE AND ALBUTEROL SULFATE 1 AMPULE: .5; 2.5 SOLUTION RESPIRATORY (INHALATION) at 18:23

## 2021-10-19 ASSESSMENT — PAIN SCALES - GENERAL
PAINLEVEL_OUTOF10: 0

## 2021-10-19 ASSESSMENT — PULMONARY FUNCTION TESTS
PIF_VALUE: 21
PIF_VALUE: 26
PIF_VALUE: 23
PIF_VALUE: 26
PIF_VALUE: 25
PIF_VALUE: 22

## 2021-10-19 NOTE — PROGRESS NOTES
(1500) phone consent witnessed by 2 RN's for signature from patients daughter. I did not explain procedure nor did I explain benefits/risks of procedure to family member. Witness was for signature only.     Electronically signed by Farideh Aguilar RN on 10/19/2021 at 3:17 PM

## 2021-10-19 NOTE — H&P
Patient seen and examined. Remains intubated and sedated. Pertinent notes/labs reviewed. H&P reviewed -no new changes except as described above.   Vitals:    10/19/21 0949   BP:    Pulse: 99   Resp: 25   Temp:    SpO2: 95%     Plan: Proceed with endoscopic assistance for NG tube placement    Pelon Mendoza MD

## 2021-10-19 NOTE — CARE COORDINATION
10-19-Cm note: ( covid neg 10-16) pt will undergo SBT today, CM will follow for possible ETOH rehab as outpt when able to discuss with pt.  Electronically signed by Mireille Anderson RN on 10/19/2021 at 11:37 AM

## 2021-10-19 NOTE — PROGRESS NOTES
Patient unable to be put on SBT. HR in the 140's, BP up and patent not awake enough to follow commands. Think copious secretions continuously being suctioned out.      Electronically signed by Uriel Miles RN on 10/19/2021 at 1:30 PM

## 2021-10-19 NOTE — PROGRESS NOTES
Department of Internal Medicine  General Internal Medicine  Attending Progress Note  Chief Complaint   Patient presents with    Delirium Tremens (DTS)     Pt states he usually drinks a fifth of kilo beam a day states he quit saturday. states he has been having visual hallucinations since yesterday. SUBJECTIVE:    Still intubated and sedated. Unable to obtain history.     OBJECTIVE      Medications    Current Facility-Administered Medications: sodium chloride flush 0.9 % injection 5-40 mL, 5-40 mL, IntraVENous, 2 times per day  sodium chloride flush 0.9 % injection 5-40 mL, 5-40 mL, IntraVENous, PRN  0.9 % sodium chloride infusion, 25 mL, IntraVENous, PRN  cefTRIAXone (ROCEPHIN) 2,000 mg in sterile water 20 mL IV syringe, 2,000 mg, IntraVENous, Q24H  diazePAM (VALIUM) injection 5 mg, 5 mg, IntraVENous, Q8H  enoxaparin (LOVENOX) injection 120 mg, 1 mg/kg, SubCUTAneous, BID  0.9 % sodium chloride infusion, 25 mL, IntraVENous, Q8H  valproate (DEPACON) 500 mg in dextrose 5 % 100 mL IVPB, 500 mg, IntraVENous, Q8H  [Held by provider] Gabapentin SOLN 600 mg, 600 mg, Oral, TID  [Held by provider] topiramate (TOPAMAX) tablet 50 mg, 50 mg, Oral, BID  [Held by provider] baclofen (LIORESAL) tablet 5 mg, 5 mg, Oral, BID  [Held by provider] diazePAM (VALIUM) tablet 5 mg, 5 mg, Oral, Q12H  perflutren lipid microspheres (DEFINITY) injection 1.65 mg, 1.5 mL, IntraVENous, ONCE PRN  propofol 3,000 mg in 300 mL infusion, 5-50 mcg/kg/min, IntraVENous, Titrated  thiamine (B-1) 500 mg in sodium chloride 0.9 % 100 mL IVPB, 500 mg, IntraVENous, Q24H  pantoprazole (PROTONIX) injection 40 mg, 40 mg, IntraVENous, BID **AND** sodium chloride (PF) 0.9 % injection 10 mL, 10 mL, IntraVENous, BID  fentaNYL 5 mcg/ml in 0.9%  ml infusion, 12.5-200 mcg/hr, IntraVENous, Continuous  folic acid 1 mg in dextrose 5 % 50 mL IVPB, 1 mg, IntraVENous, Daily  ondansetron (ZOFRAN-ODT) disintegrating tablet 4 mg, 4 mg, Oral, Q8H PRN **OR** ondansetron

## 2021-10-19 NOTE — PROGRESS NOTES
Pharmacy Consultation Note  (Antibiotic Dosing and Monitoring)    Vancomycin has been discontinued; pharmacy will sign-off. Please reconsult if needed.     Thank you,  Alcira KingD, BCPS 10/19/2021 10:48 AM

## 2021-10-19 NOTE — PROGRESS NOTES
Assessment and Plan  Patient is a 73 y.o. male with the following medical Problems:   1. Acute on chronic hypoxic and hypercapnic respiratory failure  2. Alcohol withdrawal  3. New onset atrial fibrillation  4. History of thrombocytopenia  5. Morbid obesity with obesity hypoventilation syndrome  6. Metabolic encephalopathy  7. Acute kidney injury  (resolved)  8. Aspiration pneumonia  9. UTI  10. Small pericardial effusion  11. Hematuria  12. 2.8 X 2.8 cm AAA  13. Small bilateral pleural effusions    Plan of care:  1. Daily sedation vacation and spontaneous breathing trial if possible  2. Continue with thiamine and folic acid  3.  GI consult for OGT placement  4. Off Cardizem drip for atrial fibrillation. 5.  Continue with metoprolol to 25 every 6 hours   6. Discontinue IV fluid and start tube feeding. , diuresed   7. Propofol and fentanyl for sedation and analgesia. 9.  GI prophylaxis and DVT prophylaxis  10. Echocardiography with normal EF  11. Patient is currently on Lovenox 1 mg/kg twice a day for atrial fibrillation. 12.  Ammonia level within normal limit    History of Present Illness:   Patient is a 60-year-old with history of hypertension, CAD, COPD, and alcohol abuse. Patient presented to the ED on 10/12/2021 after developing hallucinations and shortness of breath. Patient apparently called police after seeing his  wife and 2 children walking in his house. He apparently attempted to spray them with bug spray before calling police. Patient has a history of alcohol abuse and drinks 1/5 of them being daily. According to ED physician's note the patient's last drink was on Saturday. The patient is currently intubated, sedated, and unable to provide any history. Daily Progress:  2021: Patient remains sedated intubated and mechanically ventilated. He had an uneventful night. He is currently on a Cardizem drip for atrial fibrillation with RVR.   He has no fever, chills, or rigors. October 14, 2021: Patient remained sedated intubated and mechanically ventilated. He had atrial fibrillation with RVR. Patient failed awakening trial today. He was agitated and confused. He has no fever, chills, rigors. October 15, 2021: Patient remains sedated intubated and mechanically ventilated. He gets easily agitated and combative off sedation. He failed awakening trial.  He has no fever, chills, or rigors. Heart rate is controlled. He developed hematuria overnight and Eliquis was put on hold. October 16, 2021: Patient had repetitive episodes of vomiting. Multiple attempts to place an OGT probably failed. CT scan of the abdomen and pelvis was obtained. There was no evidence of hiatal hernia. Patient has no fever, chills, rigors. He becomes agitated and combative off sedation. He has no fever, chills, rigors. He is currently on 75% FiO2. October 17, 2021: Patient remains sedated intubated and mechanically ventilated. We will attempt sedation vacation and spontaneous breathing trials today if tolerated. He remains n.p.o. since he has no NG tube. NG tube could not be advanced. He remained hemodynamically stable. He has no fever, chills, rigors. Sugar is mostly controlled. October 18, 2021:vent 60%, plan for EGD today re feeding tube insertion, SBT in am if stable    October 19, 2021:vent 40% better, diuresing, SBT today    Past Medical History:  Past Medical History:   Diagnosis Date    CAD (coronary artery disease)     COPD [facility]chronic obstructive pulmonary disease) ([facility] [address]     Depression     Fracture of unspecified phalanx of left middle finger, initial encounter for closed fracture     GERD (gastroesophageal reflux disease)     Hypertension         Family History:   No family history on file. Allergies:         Patient has no known allergies.     Social history:  Social History     Socioeconomic History    Marital status:      Spouse name: Not on file    Number of children: Not on file    Years of education: Not on file    Highest education level: Not on file   Occupational History    Not on file   Tobacco Use    Smoking status: Current Every Day Smoker     Packs/day: 1.00     Years: 45.00     Pack years: 45.00     Types: Cigars    Smokeless tobacco: Never Used    Tobacco comment: SMOKES CIGARS    Vaping Use    Vaping Use: Never used   Substance and Sexual Activity    Alcohol use: Yes     Comment: occassiona    Drug use: No    Sexual activity: Not on file   Other Topics Concern    Not on file   Social History Narrative    Not on file     Social Determinants of Health     Financial Resource Strain:     Difficulty of Paying Living Expenses:    Food Insecurity:     Worried About Running Out of Food in the Last Year:     Ran Out of Food in the Last Year:    Transportation Needs:     Lack of Transportation (Medical):      Lack of Transportation (Non-Medical):    Physical Activity:     Days of Exercise per Week:     Minutes of Exercise per Session:    Stress:     Feeling of Stress :    Social Connections:     Frequency of Communication with Friends and Family:     Frequency of Social Gatherings with Friends and Family:     Attends [employer] Services:     Active Member of Clubs or Organizations:     Attends Club or Organization Meetings:     Marital Status:    Intimate Partner Violence:     Fear of Current or Ex-Partner:     Emotionally Abused:     Physically Abused:     Sexually Abused:        Current Medications:     Current Facility-Administered Medications:     cefTRIAXone (ROCEPHIN) 2,000 mg in sterile water 20 mL IV syringe, 2,000 mg, IntraVENous, Q24H, Dmitriy Newell MD, 2,000 mg at 10/19/21 1105    diazePAM (VALIUM) injection 5 mg, 5 mg, IntraVENous, Q8H, Dmitriy Newell MD, 5 mg at 10/19/21 1106    enoxaparin (LOVENOX) injection 120 mg, 1 mg/kg, SubCUTAneous, BID, Brenda Roach MD, 120 mg at 10/19/21 1105    0.9 % sodium chloride infusion, 25 mL, IntraVENous, Q8H, Brenda Roach MD, Stopped at 10/19/21 0708    valproate (DEPACON) 500 mg in dextrose 5 % 100 mL IVPB, 500 mg, IntraVENous, Q8H, Brenda Roach MD, Stopped at 10/19/21 0708    [Held by provider] Gabapentin SOLN 600 mg, 600 mg, Oral, TID, Brenda Roach MD, 600 mg at 10/15/21 1753    [Held by provider] topiramate (TOPAMAX) tablet 50 mg, 50 mg, Oral, BID, Brenda Roach MD, 50 mg at 10/15/21 2130    [Held by provider] baclofen (LIORESAL) tablet 5 mg, 5 mg, Oral, BID, Brenda Roach MD, 5 mg at 10/15/21 2141    [Held by provider] diazePAM (VALIUM) tablet 5 mg, 5 mg, Oral, Q12H, Brenda Roach MD, 5 mg at 10/15/21 2130    perflutren lipid microspheres (DEFINITY) injection 1.65 mg, 1.5 mL, IntraVENous, ONCE PRN, Brenda Roach MD    propofol 3,000 mg in 300 mL infusion, 5-50 mcg/kg/min, IntraVENous, Titrated, Brenda Roach MD, Last Rate: 36.8 mL/hr at 10/19/21 0414, 50 mcg/kg/min at 10/19/21 0414    thiamine (B-1) 500 mg in sodium chloride 0.9 % 100 mL IVPB, 500 mg, IntraVENous, Q24H, MARTY Kahn CNP, Stopped at 10/18/21 1139    pantoprazole (PROTONIX) injection 40 mg, 40 mg, IntraVENous, BID, 40 mg at 10/19/21 1105 **AND** sodium chloride (PF) 0.9 % injection 10 mL, 10 mL, IntraVENous, BID, MARTY Jay CNP, 10 mL at 10/19/21 1106    fentaNYL 5 mcg/ml in 0.9%  ml infusion, 12.5-200 mcg/hr, IntraVENous, Continuous, Brenda Roach MD, Last Rate: 5 mL/hr at 10/18/21 0123, 25 mcg/hr at 10/18/21 0123    sodium chloride flush 0.9 % injection 5-40 mL, 5-40 mL, IntraVENous, 2 times per day, Joyce Mccall DO, 10 mL at 10/19/21 1106    sodium chloride flush 0.9 % injection 5-40 mL, 5-40 mL, IntraVENous, PRN, Joyce Mccall DO    0.9 % sodium chloride infusion, 25 mL, IntraVENous, PRN, Kaylene Hough DO   folic acid 1 mg in dextrose 5 % 50 mL IVPB, 1 mg, IntraVENous, Daily, Bairon Cabrera MD, Stopped at 10/18/21 1139    sodium chloride flush 0.9 % injection 5-40 mL, 5-40 mL, IntraVENous, 2 times per day, Shawnee Carson MD, 10 mL at 10/19/21 1106    sodium chloride flush 0.9 % injection 5-40 mL, 5-40 mL, IntraVENous, PRN, Shawnee Carson MD    0.9 % sodium chloride infusion, 25 mL, IntraVENous, PRN, Shawnee Carson MD    ondansetron (ZOFRAN-ODT) disintegrating tablet 4 mg, 4 mg, Oral, Q8H PRN **OR** ondansetron (ZOFRAN) injection 4 mg, 4 mg, IntraVENous, Q6H PRN, Shawnee Carson MD    polyethylene glycol (GLYCOLAX) packet 17 g, 17 g, Oral, Daily PRN, Shawnee Carson MD    acetaminophen (TYLENOL) tablet 650 mg, 650 mg, Oral, Q6H PRN **OR** acetaminophen (TYLENOL) suppository 650 mg, 650 mg, Rectal, Q6H PRN, Shawnee Carson MD, 650 mg at 10/16/21 0044    ipratropium-albuterol (DUONEB) nebulizer solution 1 ampule, 1 ampule, Inhalation, Q4H, MARTY Glasgow - CNP, 1 ampule at 10/19/21 0948    Review of Systems:   Unable to obtain due to medical condition    Physical Exam:   Vital Signs:  /81   Pulse 99   Temp 98.2 °F (36.8 °C) (Bladder)   Resp 25   Ht 5' 11\" (1.803 m)   Wt 281 lb (127.5 kg)   SpO2 95%   BMI 39.19 kg/m²     Input/Output:   In: 425 [I.V.:25]  Out: 1850     Oxygen requirements: MV    Ventilator Information:  Vent Information  $Ventilation: $Subsequent Day  Skin Assessment: Clean, dry, & intact  Equipment Changed: Humidification  Vent Type: 980  Vent Mode: AC/VC  Vt Ordered: 500 mL  Rate Set: 25 bmp  Peak Flow: 70 L/min  Pressure Support: 0 cmH20  FiO2 : 40 %  SpO2: 95 %  SpO2/FiO2 ratio: 237.5  Sensitivity: 4  PEEP/CPAP: 5  I Time/ I Time %: 0 s  Humidification Source: Heated wire  Humidification Temp: 37  Humidification Temp Measured: 37  Circuit Condensation: Drained    General appearance: , vented, sedated   HEENT: Intubated  Neck: Supple, no jugular venous distension, lymphadenopathy, thyromegaly or carotid bruits  Chest: Decreased breath sounds, + crackles and no tenderness over ribs   Cardiovascular: Normal S1 , S2, irregular , tachy, no murmur, rub or gallop  Abdomen: Normal sounds present, soft, lax with no tenderness, no hepatosplenomegaly, and no masses  Extremities: +edema. Pulses are equally present. Skin: intact, warm, dry   Neurologic: Sedated and mechanically ventilated     Investigations:  Labs, radiological imaging and cardiac work up were reviewed        ICU STAFF PHYSICIAN NOTE OF PERSONAL INVOLVEMENT IN CARE  As the attending physician, I certify that I personally reviewed the patient's history and personally examined the patient to confirm the physical findings described above, and that I reviewed the relevant imaging studies and available reports. I also discussed the differential diagnosis and all of the proposed management plans with the patient and individuals accompanying the patient to this visit. They had the opportunity to ask questions about the proposed management plans and to have those questions answered. This patient has a high probability of sudden, clinically significant deterioration, which requires the highest level of physician preparedness to intervene urgently. I managed/supervised life or organ supporting interventions that required frequent physician assessment. I devoted my full attention to the direct care of this patient for the amount of time indicated below. Time I spent with family or surrogate(s) is included only if the patient was incapable of providing the necessary information or participating in medical decision-making. Time devoted to teaching and to any procedures I billed separately is not included.   Critical Care Time: 39 minutes      Electronically signed by Stewart Herrera MD on 10/19/2021 at 11:07 AM

## 2021-10-20 ENCOUNTER — APPOINTMENT (OUTPATIENT)
Dept: GENERAL RADIOLOGY | Age: 66
DRG: 004 | End: 2021-10-20
Payer: MEDICARE

## 2021-10-20 LAB
ANION GAP SERPL CALCULATED.3IONS-SCNC: 8 MMOL/L (ref 7–16)
BUN BLDV-MCNC: 24 MG/DL (ref 6–23)
CALCIUM SERPL-MCNC: 8.7 MG/DL (ref 8.6–10.2)
CHLORIDE BLD-SCNC: 95 MMOL/L (ref 98–107)
CO2: 29 MMOL/L (ref 22–29)
CREAT SERPL-MCNC: 0.8 MG/DL (ref 0.7–1.2)
GFR AFRICAN AMERICAN: >60
GFR NON-AFRICAN AMERICAN: >60 ML/MIN/1.73
GLUCOSE BLD-MCNC: 108 MG/DL (ref 74–99)
HBA1C MFR BLD: 5.6 % (ref 4–5.6)
PHOSPHORUS: 4 MG/DL (ref 2.5–4.5)
POTASSIUM SERPL-SCNC: 4.1 MMOL/L (ref 3.5–5)
SODIUM BLD-SCNC: 132 MMOL/L (ref 132–146)
TSH SERPL DL<=0.05 MIU/L-ACNC: 7.45 UIU/ML (ref 0.27–4.2)

## 2021-10-20 PROCEDURE — 6360000002 HC RX W HCPCS: Performed by: NURSE PRACTITIONER

## 2021-10-20 PROCEDURE — 2580000003 HC RX 258: Performed by: INTERNAL MEDICINE

## 2021-10-20 PROCEDURE — 0DH68UZ INSERTION OF FEEDING DEVICE INTO STOMACH, VIA NATURAL OR ARTIFICIAL OPENING ENDOSCOPIC: ICD-10-PCS | Performed by: INTERNAL MEDICINE

## 2021-10-20 PROCEDURE — 2709999900 HC NON-CHARGEABLE SUPPLY: Performed by: INTERNAL MEDICINE

## 2021-10-20 PROCEDURE — 2580000003 HC RX 258: Performed by: SURGERY

## 2021-10-20 PROCEDURE — 83036 HEMOGLOBIN GLYCOSYLATED A1C: CPT

## 2021-10-20 PROCEDURE — C9113 INJ PANTOPRAZOLE SODIUM, VIA: HCPCS | Performed by: NURSE PRACTITIONER

## 2021-10-20 PROCEDURE — 2500000003 HC RX 250 WO HCPCS: Performed by: EMERGENCY MEDICINE

## 2021-10-20 PROCEDURE — 2500000003 HC RX 250 WO HCPCS: Performed by: INTERNAL MEDICINE

## 2021-10-20 PROCEDURE — 71045 X-RAY EXAM CHEST 1 VIEW: CPT

## 2021-10-20 PROCEDURE — 84100 ASSAY OF PHOSPHORUS: CPT

## 2021-10-20 PROCEDURE — 94003 VENT MGMT INPAT SUBQ DAY: CPT

## 2021-10-20 PROCEDURE — 74018 RADEX ABDOMEN 1 VIEW: CPT

## 2021-10-20 PROCEDURE — 80048 BASIC METABOLIC PNL TOTAL CA: CPT

## 2021-10-20 PROCEDURE — 94640 AIRWAY INHALATION TREATMENT: CPT

## 2021-10-20 PROCEDURE — 99232 SBSQ HOSP IP/OBS MODERATE 35: CPT | Performed by: INTERNAL MEDICINE

## 2021-10-20 PROCEDURE — 36415 COLL VENOUS BLD VENIPUNCTURE: CPT

## 2021-10-20 PROCEDURE — 2580000003 HC RX 258: Performed by: NURSE PRACTITIONER

## 2021-10-20 PROCEDURE — 6360000002 HC RX W HCPCS: Performed by: INTERNAL MEDICINE

## 2021-10-20 PROCEDURE — 6360000002 HC RX W HCPCS

## 2021-10-20 PROCEDURE — 2000000000 HC ICU R&B

## 2021-10-20 PROCEDURE — 84443 ASSAY THYROID STIM HORMONE: CPT

## 2021-10-20 PROCEDURE — 2580000003 HC RX 258: Performed by: EMERGENCY MEDICINE

## 2021-10-20 PROCEDURE — 3E0G76Z INTRODUCTION OF NUTRITIONAL SUBSTANCE INTO UPPER GI, VIA NATURAL OR ARTIFICIAL OPENING: ICD-10-PCS | Performed by: INTERNAL MEDICINE

## 2021-10-20 PROCEDURE — 3609013300 HC EGD TUBE PLACEMENT: Performed by: INTERNAL MEDICINE

## 2021-10-20 PROCEDURE — 6370000000 HC RX 637 (ALT 250 FOR IP): Performed by: NURSE PRACTITIONER

## 2021-10-20 RX ORDER — DEXMEDETOMIDINE HYDROCHLORIDE 4 UG/ML
.2-1.4 INJECTION, SOLUTION INTRAVENOUS CONTINUOUS
Status: DISCONTINUED | OUTPATIENT
Start: 2021-10-20 | End: 2021-11-05

## 2021-10-20 RX ORDER — PROPOFOL 10 MG/ML
5-50 INJECTION, EMULSION INTRAVENOUS
Status: DISCONTINUED | OUTPATIENT
Start: 2021-10-20 | End: 2021-10-21

## 2021-10-20 RX ORDER — FUROSEMIDE 10 MG/ML
40 INJECTION INTRAMUSCULAR; INTRAVENOUS ONCE
Status: COMPLETED | OUTPATIENT
Start: 2021-10-20 | End: 2021-10-20

## 2021-10-20 RX ADMIN — VALPROATE SODIUM 500 MG: 100 INJECTION, SOLUTION INTRAVENOUS at 06:07

## 2021-10-20 RX ADMIN — FUROSEMIDE 40 MG: 10 INJECTION, SOLUTION INTRAMUSCULAR; INTRAVENOUS at 09:48

## 2021-10-20 RX ADMIN — ENOXAPARIN SODIUM 120 MG: 150 INJECTION SUBCUTANEOUS at 09:38

## 2021-10-20 RX ADMIN — PROPOFOL 30 MCG/KG/MIN: 10 INJECTION, EMULSION INTRAVENOUS at 03:58

## 2021-10-20 RX ADMIN — SODIUM CHLORIDE, PRESERVATIVE FREE 10 ML: 5 INJECTION INTRAVENOUS at 09:46

## 2021-10-20 RX ADMIN — DIAZEPAM 5 MG: 5 INJECTION, SOLUTION INTRAMUSCULAR; INTRAVENOUS at 17:16

## 2021-10-20 RX ADMIN — Medication 1.4 MCG/KG/HR: at 18:48

## 2021-10-20 RX ADMIN — Medication 10 ML: at 09:46

## 2021-10-20 RX ADMIN — DIAZEPAM 5 MG: 5 INJECTION, SOLUTION INTRAMUSCULAR; INTRAVENOUS at 02:29

## 2021-10-20 RX ADMIN — IPRATROPIUM BROMIDE AND ALBUTEROL SULFATE 1 AMPULE: .5; 2.5 SOLUTION RESPIRATORY (INHALATION) at 08:36

## 2021-10-20 RX ADMIN — Medication 1.4 MCG/KG/HR: at 15:48

## 2021-10-20 RX ADMIN — Medication 1.4 MCG/KG/HR: at 23:59

## 2021-10-20 RX ADMIN — PANTOPRAZOLE SODIUM 40 MG: 40 INJECTION, POWDER, FOR SOLUTION INTRAVENOUS at 20:38

## 2021-10-20 RX ADMIN — THIAMINE HYDROCHLORIDE 500 MG: 100 INJECTION, SOLUTION INTRAMUSCULAR; INTRAVENOUS at 09:46

## 2021-10-20 RX ADMIN — Medication 75 MCG/HR: at 14:52

## 2021-10-20 RX ADMIN — CEFTRIAXONE 2000 MG: 2 INJECTION, POWDER, FOR SOLUTION INTRAMUSCULAR; INTRAVENOUS at 11:37

## 2021-10-20 RX ADMIN — Medication 1.4 MCG/KG/HR: at 21:12

## 2021-10-20 RX ADMIN — Medication 0.6 MCG/KG/HR: at 09:45

## 2021-10-20 RX ADMIN — IPRATROPIUM BROMIDE AND ALBUTEROL SULFATE 1 AMPULE: .5; 2.5 SOLUTION RESPIRATORY (INHALATION) at 01:38

## 2021-10-20 RX ADMIN — IPRATROPIUM BROMIDE AND ALBUTEROL SULFATE 1 AMPULE: .5; 2.5 SOLUTION RESPIRATORY (INHALATION) at 04:39

## 2021-10-20 RX ADMIN — FOLIC ACID 1 MG: 5 INJECTION, SOLUTION INTRAMUSCULAR; INTRAVENOUS; SUBCUTANEOUS at 09:46

## 2021-10-20 RX ADMIN — VALPROATE SODIUM 500 MG: 100 INJECTION, SOLUTION INTRAVENOUS at 21:07

## 2021-10-20 RX ADMIN — ALTEPLASE 1 MG: 2.2 INJECTION, POWDER, LYOPHILIZED, FOR SOLUTION INTRAVENOUS at 23:00

## 2021-10-20 RX ADMIN — Medication 1.4 MCG/KG/HR: at 12:47

## 2021-10-20 RX ADMIN — IPRATROPIUM BROMIDE AND ALBUTEROL SULFATE 1 AMPULE: .5; 2.5 SOLUTION RESPIRATORY (INHALATION) at 12:31

## 2021-10-20 RX ADMIN — PANTOPRAZOLE SODIUM 40 MG: 40 INJECTION, POWDER, FOR SOLUTION INTRAVENOUS at 09:38

## 2021-10-20 RX ADMIN — VALPROATE SODIUM 500 MG: 100 INJECTION, SOLUTION INTRAVENOUS at 14:37

## 2021-10-20 RX ADMIN — ENOXAPARIN SODIUM 120 MG: 150 INJECTION SUBCUTANEOUS at 20:38

## 2021-10-20 RX ADMIN — IPRATROPIUM BROMIDE AND ALBUTEROL SULFATE 1 AMPULE: .5; 2.5 SOLUTION RESPIRATORY (INHALATION) at 16:27

## 2021-10-20 RX ADMIN — DIAZEPAM 5 MG: 5 INJECTION, SOLUTION INTRAMUSCULAR; INTRAVENOUS at 11:05

## 2021-10-20 RX ADMIN — SODIUM CHLORIDE, PRESERVATIVE FREE 10 ML: 5 INJECTION INTRAVENOUS at 20:38

## 2021-10-20 RX ADMIN — IPRATROPIUM BROMIDE AND ALBUTEROL SULFATE 1 AMPULE: .5; 2.5 SOLUTION RESPIRATORY (INHALATION) at 22:36

## 2021-10-20 ASSESSMENT — PULMONARY FUNCTION TESTS
PIF_VALUE: 21
PIF_VALUE: 18
PIF_VALUE: 25
PIF_VALUE: 20
PIF_VALUE: 19
PIF_VALUE: 24
PIF_VALUE: 26
PIF_VALUE: 19
PIF_VALUE: 23
PIF_VALUE: 20
PIF_VALUE: 23
PIF_VALUE: 23
PIF_VALUE: 22
PIF_VALUE: 21
PIF_VALUE: 19
PIF_VALUE: 23
PIF_VALUE: 23
PIF_VALUE: 21
PIF_VALUE: 24
PIF_VALUE: 22
PIF_VALUE: 19
PIF_VALUE: 19
PIF_VALUE: 20
PIF_VALUE: 21

## 2021-10-20 ASSESSMENT — PAIN SCALES - GENERAL
PAINLEVEL_OUTOF10: 0
PAINLEVEL_OUTOF10: 0

## 2021-10-20 NOTE — PROGRESS NOTES
Comprehensive Nutrition Assessment    Type and Reason for Visit:  Reassess/Consult order 10/21 for TF/order/managemnt    Nutrition Recommendations/Plan: Immune Enhancing(Pivot 1.5) @45ml/hr w/1 protein modular/d to provide 1080mlTV/2307 includes propofol&prot. Mod./1gm pro/128gm pro/820ml FW. Additional water flushes per critical care    Nutrition Assessment:  Pt admits w/ Dx: Seizures, Hallucinations/Tremors, New Onset A-fib w/ H/o ETOH abuse, COPD, CAD. GI consult for NGT placement. TF order and manage. Malnutrition Assessment:  Malnutrition Status: At risk for malnutrition (Comment)    Context:  Chronic Illness     Findings of the 6 clinical characteristics of malnutrition:  Energy Intake:  Mild decrease in energy intake (Comment)  Weight Loss:  Unable to assess (no wt hx)     Body Fat Loss:  No significant body fat loss     Muscle Mass Loss:  No significant muscle mass loss    Fluid Accumulation:        Strength:  Not Performed    Estimated Daily Nutrient Needs:  Energy (kcal):  2350-2450kcal/d  Protein (g):  115-130 (x1.5-1.7gm/kg);  Weight Used for Protein Requirements:  Ideal        Fluid (ml/day):  per critical care; Method Used for Fluid Requirements:         Nutrition Related Findings:  Intubated, NPO at this time, Propofol on hold per physician order, abd WDL, hypoactive BS, BLE +2 pitting edema, -I/O -2.3L      Wounds:  None       Current Nutrition Therapies:    Diet NPO Exceptions are: Sips of Water with Meds    Anthropometric Measures:  · Height: 5' 11\" (180.3 cm)  · Current Body Weight: 271 lb (122.9 kg) (10/20 bed scale)   · Usual Body Weight:  (No wt hx per EMR)     · Ideal Body Weight: 172 lbs; % Ideal Body Weight 157.6 %   · BMI: 37.8  · Adjusted Body Weight:  ; No Adjustment   · BMI Categories: Obese Class 2 (BMI 35.0 -39.9)       Nutrition Diagnosis:   · Inadequate protein-energy intake related to impaired respiratory function as evidenced by NPO or clear liquid status due to medical condition, intubation      Nutrition Interventions:   Food and/or Nutrient Delivery:  Continue NPO (advance diet/or tube feeding when medically feasible) SEE ABOVE  Nutrition Education/Counseling:  No recommendation at this time   Coordination of Nutrition Care:  Continue to monitor while inpatient    Goals:  Nutrittion Progression       Nutrition Monitoring and Evaluation:   Behavioral-Environmental Outcomes:  None Identified   Food/Nutrient Intake Outcomes:  Diet Advancement/Tolerance, Enteral Nutrition Intake/Tolerance  Physical Signs/Symptoms Outcomes:  Biochemical Data, Fluid Status or Edema, Hemodynamic Status, Nutrition Focused Physical Findings, Skin, Weight     Discharge Planning:     Too soon to determine     Electronically signed by Danielle Ansari RD, LD on 10/20/21 at 9:32 AM EDT    Contact: [phone]

## 2021-10-20 NOTE — OP NOTE
Operative Note      Patient: Alisha Thorpe  YOB: 1955  MRN: [id number]    Date of Procedure: 10/20/2021    Pre-Op Diagnosis: DT, NEED OF NG TUBE    Post-Op Diagnosis: Same       Procedure(s):  EGD WITH NG TUBE PLACEMENT **BEDSIDE**    Surgeon(s):  RENETTA Minor MD    Assistant:   Surgical Assistant: Kelly Hoffman RN  Fellow: Tyrell Gilmore DO    Anesthesia: None    Estimated Blood Loss (mL): Minimal    Complications: None    Specimens:   * No specimens in log *    Implants:  * No implants in log *      Drains:   Urethral Catheter (Active)   $ Urethral catheter insertion $ Not inserted for procedure 10/14/21 0902   Catheter Indications Need for fluid volume management of the critically ill patient in a critical care setting 10/20/21 1600   Site Assessment No urethral drainage 10/20/21 1600   Urine Color Yellow;Brown 10/20/21 1600   Urine Appearance Clear 10/20/21 1600   Output (mL) 1525 mL 10/20/21 1444       [REMOVED] NG/OG/NJ/NE Tube Orogastric 16 fr Right mouth (Removed)       [REMOVED] NG/OG/NJ/NE Tube Orogastric Right nostril (Removed)   Surrounding Skin Dry; Intact 10/15/21 0800   Securement device Yes 10/15/21 0800   Status Other (Comment) 10/15/21 0800   Placement Verified by X-Ray (Initial);by External Catheter Length 10/15/21 0800   NG/OG/NJ/NE External Measurement (cm) 77 cm 10/15/21 1200   Drainage Appearance Tan 10/15/21 0800   Tube Feeding Immune Enhancing 10/15/21 0800   Tube Feeding Status Continuous 10/15/21 0800   Rate/Schedule 0 mL/hr 10/15/21 1600   Tube Feeding Intake (mL) 423 ml 10/15/21 0612   Free Water Flush (mL) 30 mL 10/15/21 0612   Free Water Rate 30 cc q/6hr 10/15/21 0800   Residual Volume (ml) 0 ml 10/15/21 0800       Findings: Normal EGD    Detailed Description of Procedure:   Procedure:  Esophagogastroduodenoscopy    Indication: Endoscopic placement of NG tube    Consent:   Informed consent was obtained from the patient including and not limited to risk of perforation, aspiration of gastric contents or teeth, bleeding, infection, dental breakage, ileus, need for surgery, or worst case death. Sedation: 5 mg of diazepam while patient was sedated and intubated for mechanical ventilation with Precedex and fentanyl    Estimated Blood Loss: Minimal    Endoscope was advanced easily through mouth to second portion of duodenum      Oropharynx views are limited but grossly normal.    Esophagus:   Mucosa is normal.  GEJ at 38 cm. Stomach:   Antrum is normal    Gastric body is normal.    Retroflexed views show normal fundus and cardia. Duodenum: Bulb is normal.    Second portion of duodenum is normal.    IMPRESSION AND PLAN:     1. Normal upper endoscopy. 2.  No mechanical obstruction or strictures were seen in today's exam.  NG tube was easily advanced through the nasopharyngeal cavity all the way to the esophagus. NG tube was seen via the endoscope and advancing to the gastric body without any difficulties. NG tube was secure and stat chest x-ray was ordered to confirm placement of the NG tube.       Pt was seen and procedure was performed with Dr. Alem Bailey present for the entire procedure      Electronically signed by Radha Hastings DO on 10/20/2021 at 5:57 PM

## 2021-10-20 NOTE — PROGRESS NOTES
Department of Internal Medicine  General Internal Medicine  Attending Progress Note  Chief Complaint   Patient presents with    Delirium Tremens (DTS)     Pt states he usually drinks a fifth of kilo beam a day states he quit saturday. states he has been having visual hallucinations since yesterday. SUBJECTIVE:    Still intubated and sedated.      OBJECTIVE      Medications    Current Facility-Administered Medications: [Held by provider] propofol 2,000 mg in 200 mL infusion, 5-50 mcg/kg/min, IntraVENous, Titrated  dexmedetomidine (PRECEDEX) 400 mcg in sodium chloride 0.9 % 100 mL infusion, 0.2-1.4 mcg/kg/hr, IntraVENous, Continuous  sodium chloride flush 0.9 % injection 5-40 mL, 5-40 mL, IntraVENous, 2 times per day  sodium chloride flush 0.9 % injection 5-40 mL, 5-40 mL, IntraVENous, PRN  0.9 % sodium chloride infusion, 25 mL, IntraVENous, PRN  cefTRIAXone (ROCEPHIN) 2,000 mg in sterile water 20 mL IV syringe, 2,000 mg, IntraVENous, Q24H  diazePAM (VALIUM) injection 5 mg, 5 mg, IntraVENous, Q8H  enoxaparin (LOVENOX) injection 120 mg, 1 mg/kg, SubCUTAneous, BID  0.9 % sodium chloride infusion, 25 mL, IntraVENous, Q8H  valproate (DEPACON) 500 mg in dextrose 5 % 100 mL IVPB, 500 mg, IntraVENous, Q8H  [Held by provider] Gabapentin SOLN 600 mg, 600 mg, Oral, TID  [Held by provider] topiramate (TOPAMAX) tablet 50 mg, 50 mg, Oral, BID  [Held by provider] baclofen (LIORESAL) tablet 5 mg, 5 mg, Oral, BID  [Held by provider] diazePAM (VALIUM) tablet 5 mg, 5 mg, Oral, Q12H  perflutren lipid microspheres (DEFINITY) injection 1.65 mg, 1.5 mL, IntraVENous, ONCE PRN  thiamine (B-1) 500 mg in sodium chloride 0.9 % 100 mL IVPB, 500 mg, IntraVENous, Q24H  pantoprazole (PROTONIX) injection 40 mg, 40 mg, IntraVENous, BID **AND** sodium chloride (PF) 0.9 % injection 10 mL, 10 mL, IntraVENous, BID  fentaNYL 5 mcg/ml in 0.9%  ml infusion, 12.5-200 mcg/hr, IntraVENous, Continuous  folic acid 1 mg in dextrose 5 % 50 mL IVPB, 1 mg, IntraVENous, Daily  ondansetron (ZOFRAN-ODT) disintegrating tablet 4 mg, 4 mg, Oral, Q8H PRN **OR** ondansetron (ZOFRAN) injection 4 mg, 4 mg, IntraVENous, Q6H PRN  polyethylene glycol (GLYCOLAX) packet 17 g, 17 g, Oral, Daily PRN  acetaminophen (TYLENOL) tablet 650 mg, 650 mg, Oral, Q6H PRN **OR** acetaminophen (TYLENOL) suppository 650 mg, 650 mg, Rectal, Q6H PRN  ipratropium-albuterol (DUONEB) nebulizer solution 1 ampule, 1 ampule, Inhalation, Q4H  Physical    VITALS:  /70   Pulse 77   Temp 99.5 °F (37.5 °C) (Core)   Resp 18   Ht 5' 11\" (1.803 m)   Wt 271 lb (122.9 kg)   SpO2 96%   BMI 37.80 kg/m²   CONSTITUTIONAL: intubated  EYES:  extra-ocular muscles intact and vision intact  ENT:  normocepalic, without obvious abnormality  NECK:  skin normal  LUNGS:  no increased work of breathing and clear to auscultation  CARDIOVASCULAR:  normal apical pulses, normal S1 and S2 and no edema  ABDOMEN:  normal bowel sounds, non-tender and no masses palpated  MUSCULOSKELETAL:  there is no redness, warmth, or swelling of the joints  NEUROLOGIC:  Unable to assess    Data    CBC:   Lab Results   Component Value Date    WBC 5.5 10/19/2021    RBC 3.66 10/19/2021    HGB 11.5 10/19/2021    HCT 33.8 10/19/2021    MCV 92.3 10/19/2021    MCH 31.4 10/19/2021    MCHC 34.0 10/19/2021    RDW 15.9 10/19/2021     10/19/2021    MPV 8.7 10/19/2021     BMP:    Lab Results   Component Value Date     10/19/2021    K 4.0 10/19/2021    K 4.3 10/12/2021    CL 96 10/19/2021    CO2 25 10/19/2021    BUN 25 10/19/2021    LABALBU 4.2 10/12/2021    CREATININE 0.9 10/19/2021    CALCIUM 8.7 10/19/2021    GFRAA >60 10/19/2021    LABGLOM >60 10/19/2021    GLUCOSE 96 10/19/2021       ASSESSMENT AND PLAN      1.  Seizures ([address]  2. Acute respiratory failure with hypoxia  3. Alcohol withdrawal  4. Acute encephalopathy ( metabolic):  5.  ? PNA. Likely due to aspiration:  6. Morbid Obesity:  7.   Mild Hyponatremia:    Plans:  pulm hygiene   Vent management per ICU  Continue withdrawal protocol  Continue current abx  Continue to monitor na.  Check TSH

## 2021-10-20 NOTE — CARE COORDINATION
10-21-Cm note: ( covid neg 10-16) ptremains on vent support, undergoing daily sedation vacation with SBT. Cm/ss will follow for dc planning, possible ETOH rehab as outpt when able to discuss with pt.  Electronically signed by Gita Beckwith RN on 10/21/2021 at 11:11 AM

## 2021-10-20 NOTE — PROGRESS NOTES
Assessment and Plan  Patient is a 73 y.o. male with the following medical Problems:   1. Acute on chronic hypoxic and hypercapnic respiratory failure  2. Alcohol withdrawal  3. New onset atrial fibrillation  4. History of thrombocytopenia  5. Morbid obesity with obesity hypoventilation syndrome  6. Metabolic encephalopathy  7. Acute kidney injury  (resolved)  8. Aspiration pneumonia  9. UTI  10. Small pericardial effusion  11. Hematuria  12. 2.8 X 2.8 cm AAA  13. Small bilateral pleural effusions    Plan of care:  1. Daily sedation vacation and spontaneous breathing trial if possible  2. Continue with thiamine and folic acid  3.  GI consult for OGT placement  4. Off Cardizem drip for atrial fibrillation. 5.  Continue with metoprolol to 25 every 6 hours   6. Discontinue IV fluid and start tube feeding. , diuresed   7. Propofol and fentanyl for sedation and analgesia. 9.  GI prophylaxis and DVT prophylaxis  10. Echocardiography with normal EF  11. Patient is currently on Lovenox 1 mg/kg twice a day for atrial fibrillation. 12.  Ammonia level within normal limit    History of Present Illness:   Patient is a 60-year-old with history of hypertension, CAD, COPD, and alcohol abuse. Patient presented to the ED on 10/12/2021 after developing hallucinations and shortness of breath. Patient apparently called police after seeing his  wife and 2 children walking in his house. He apparently attempted to spray them with bug spray before calling police. Patient has a history of alcohol abuse and drinks 1/5 of them being daily. According to ED physician's note the patient's last drink was on Saturday. The patient is currently intubated, sedated, and unable to provide any history. Daily Progress:  2021: Patient remains sedated intubated and mechanically ventilated. He had an uneventful night. He is currently on a Cardizem drip for atrial fibrillation with RVR.   He has no fever, chills, or rigors. October 14, 2021: Patient remained sedated intubated and mechanically ventilated. He had atrial fibrillation with RVR. Patient failed awakening trial today. He was agitated and confused. He has no fever, chills, rigors. October 15, 2021: Patient remains sedated intubated and mechanically ventilated. He gets easily agitated and combative off sedation. He failed awakening trial.  He has no fever, chills, or rigors. Heart rate is controlled. He developed hematuria overnight and Eliquis was put on hold. October 16, 2021: Patient had repetitive episodes of vomiting. Multiple attempts to place an OGT probably failed. CT scan of the abdomen and pelvis was obtained. There was no evidence of hiatal hernia. Patient has no fever, chills, rigors. He becomes agitated and combative off sedation. He has no fever, chills, rigors. He is currently on 75% FiO2. October 17, 2021: Patient remains sedated intubated and mechanically ventilated. We will attempt sedation vacation and spontaneous breathing trials today if tolerated. He remains n.p.o. since he has no NG tube. NG tube could not be advanced. He remained hemodynamically stable. He has no fever, chills, rigors. Sugar is mostly controlled. October 18, 2021:vent 60%, plan for EGD today re feeding tube insertion, SBT in am if stable    October 19, 2021:vent 40% better, diuresing, SBT today    October 20, 2021:vent 40% , diuresing, high secreations from ETT, trial of precedex sedation, OG per GI eventually, SBT once less secretions      Past Medical History:  Past Medical History:   Diagnosis Date    CAD (coronary artery disease)     COPD (chronic obstructive pulmonary disease) ([facility] [address]     Depression     Fracture of unspecified phalanx of left middle finger, initial encounter for closed fracture     GERD (gastroesophageal reflux disease)     Hypertension         Family History:   No family history on file.     Allergies: (PRECEDEX) 400 mcg in sodium chloride 0.9 % 100 mL infusion, 0.2-1.4 mcg/kg/hr, IntraVENous, Continuous, Dmitriy Newell MD, Last Rate: 43 mL/hr at 10/20/21 1044, 1.4 mcg/kg/hr at 10/20/21 1044    sodium chloride flush 0.9 % injection 5-40 mL, 5-40 mL, IntraVENous, 2 times per day, Thadeus Dapash, DO, 10 mL at 10/20/21 0946    sodium chloride flush 0.9 % injection 5-40 mL, 5-40 mL, IntraVENous, PRN, Thadeus Dapash, DO    0.9 % sodium chloride infusion, 25 mL, IntraVENous, PRN, Thadeus Dapash, DO    cefTRIAXone (ROCEPHIN) 2,000 mg in sterile water 20 mL IV syringe, 2,000 mg, IntraVENous, Q24H, Carlos Enrique Everett MD, 2,000 mg at 10/19/21 1105    diazePAM (VALIUM) injection 5 mg, 5 mg, IntraVENous, Q8H, Dmitriy Newell MD, 5 mg at 10/20/21 0229    enoxaparin (LOVENOX) injection 120 mg, 1 mg/kg, SubCUTAneous, BID, Jeni Chaudhary MD, 120 mg at 10/20/21 0938    0.9 % sodium chloride infusion, 25 mL, IntraVENous, Q8H, Brenda Gómez MD, Stopped at 10/19/21 0708    valproate (DEPACON) 500 mg in dextrose 5 % 100 mL IVPB, 500 mg, IntraVENous, Q8H, Brenda Gómez MD, Stopped at 10/20/21 0704    [Held by provider] Gabapentin SOLN 600 mg, 600 mg, Oral, TID, Brenda Gómez MD, 600 mg at 10/15/21 1753    [Held by provider] topiramate (TOPAMAX) tablet 50 mg, 50 mg, Oral, BID, Brenda Gómez MD, 50 mg at 10/15/21 2130    [Held by provider] baclofen (LIORESAL) tablet 5 mg, 5 mg, Oral, BID, Brenda Gómez MD, 5 mg at 10/15/21 2141    [Held by provider] diazePAM (VALIUM) tablet 5 mg, 5 mg, Oral, Q12H, Brenda Gómez MD, 5 mg at 10/15/21 2130    perflutren lipid microspheres (DEFINITY) injection 1.65 mg, 1.5 mL, IntraVENous, ONCE PRN, Brenda Gómez MD    thiamine (B-1) 500 mg in sodium chloride 0.9 % 100 mL IVPB, 500 mg, IntraVENous, Q24H, MARTY Jay - CNP, Last Rate: 200 mL/hr at 10/20/21 0946, 500 mg at 10/20/21 4839    pantoprazole (PROTONIX) injection 40 mg, 40 mg, IntraVENous, BID, 40 mg at 10/20/21 0938 **AND** sodium chloride (PF) 0.9 % injection 10 mL, 10 mL, IntraVENous, BID, MARTY Jay CNP, 10 mL at 10/20/21 0946    fentaNYL 5 mcg/ml in 0.9%  ml infusion, 12.5-200 mcg/hr, IntraVENous, Continuous, Gutierrez Cassidy MD, Last Rate: 15 mL/hr at 10/20/21 1042, 75 mcg/hr at 30/17/89 7645    folic acid 1 mg in dextrose 5 % 50 mL IVPB, 1 mg, IntraVENous, Daily, Bairon Cabrera MD, Last Rate: 100 mL/hr at 10/20/21 0946, 1 mg at 10/20/21 0946    ondansetron (ZOFRAN-ODT) disintegrating tablet 4 mg, 4 mg, Oral, Q8H PRN **OR** ondansetron (ZOFRAN) injection 4 mg, 4 mg, IntraVENous, Q6H PRN, Shawnee Carson MD    polyethylene glycol (GLYCOLAX) packet 17 g, 17 g, Oral, Daily PRN, Shawnee Carson MD    acetaminophen (TYLENOL) tablet 650 mg, 650 mg, Oral, Q6H PRN **OR** acetaminophen (TYLENOL) suppository 650 mg, 650 mg, Rectal, Q6H PRN, Shawnee Carson MD, 650 mg at 10/19/21 2020    ipratropium-albuterol (DUONEB) nebulizer solution 1 ampule, 1 ampule, Inhalation, Q4H, MARTY Glasgow CNP, 1 ampule at 10/20/21 6520    Review of Systems:   Unable to obtain due to medical condition    Physical Exam:   Vital Signs:  BP (!) 126/99   Pulse 109   Temp 99.1 °F (37.3 °C) (Core)   Resp 25   Ht 5' 11\" (1.803 m)   Wt 271 lb (122.9 kg)   SpO2 97%   BMI 37.80 kg/m²     Input/Output:   In: 100   Out: 700     Oxygen requirements: MV    Ventilator Information:  Vent Information  $Ventilation: $Subsequent Day  Skin Assessment: Clean, dry, & intact  Equipment Changed: Humidification  Vent Type: 980  Vent Mode: AC/VC  Vt Ordered: 500 mL  Rate Set: 25 bmp  Peak Flow: 70 L/min  Pressure Support: 0 cmH20  FiO2 : 40 %  SpO2: 97 %  SpO2/FiO2 ratio: 242.5  Sensitivity: 4  PEEP/CPAP: 5  I Time/ I Time %: 0 s  Humidification Source: Heated wire  Humidification Temp: 37  Humidification Temp Measured: on 10/20/2021 at 10:55 AM

## 2021-10-21 LAB
ANION GAP SERPL CALCULATED.3IONS-SCNC: 10 MMOL/L (ref 7–16)
B.E.: 1.2 MMOL/L (ref -3–3)
BASOPHILS ABSOLUTE: 0.15 E9/L (ref 0–0.2)
BASOPHILS RELATIVE PERCENT: 2 % (ref 0–2)
BUN BLDV-MCNC: 28 MG/DL (ref 6–23)
BURR CELLS: ABNORMAL
CALCIUM SERPL-MCNC: 9.3 MG/DL (ref 8.6–10.2)
CHLORIDE BLD-SCNC: 97 MMOL/L (ref 98–107)
CO2: 27 MMOL/L (ref 22–29)
COHB: 1 % (ref 0–1.5)
CREAT SERPL-MCNC: 0.9 MG/DL (ref 0.7–1.2)
CRITICAL: ABNORMAL
DATE ANALYZED: ABNORMAL
DATE OF COLLECTION: ABNORMAL
EOSINOPHILS ABSOLUTE: 0.08 E9/L (ref 0.05–0.5)
EOSINOPHILS RELATIVE PERCENT: 1 % (ref 0–6)
FIO2: 40 %
GFR AFRICAN AMERICAN: >60
GFR NON-AFRICAN AMERICAN: >60 ML/MIN/1.73
GLUCOSE BLD-MCNC: 104 MG/DL (ref 74–99)
HCO3: 23.5 MMOL/L (ref 22–26)
HCT VFR BLD CALC: 35.9 % (ref 37–54)
HEMOGLOBIN: 12.1 G/DL (ref 12.5–16.5)
HHB: 4.9 % (ref 0–5)
LAB: ABNORMAL
LYMPHOCYTES ABSOLUTE: 1.37 E9/L (ref 1.5–4)
LYMPHOCYTES RELATIVE PERCENT: 18 % (ref 20–42)
Lab: ABNORMAL
MCH RBC QN AUTO: 31.3 PG (ref 26–35)
MCHC RBC AUTO-ENTMCNC: 33.7 % (ref 32–34.5)
MCV RBC AUTO: 92.8 FL (ref 80–99.9)
METHB: 0.3 % (ref 0–1.5)
MODE: AC
MONOCYTES ABSOLUTE: 0.53 E9/L (ref 0.1–0.95)
MONOCYTES RELATIVE PERCENT: 7 % (ref 2–12)
MYELOCYTE PERCENT: 1 % (ref 0–0)
NEUTROPHILS ABSOLUTE: 5.47 E9/L (ref 1.8–7.3)
NEUTROPHILS RELATIVE PERCENT: 71 % (ref 43–80)
O2 CONTENT: 17.1 ML/DL
O2 SATURATION: 95 % (ref 92–98.5)
O2HB: 93.8 % (ref 94–97)
OPERATOR ID: 516
OVALOCYTES: ABNORMAL
PATIENT TEMP: 37 C
PCO2: 30.5 MMHG (ref 35–45)
PDW BLD-RTO: 15.8 FL (ref 11.5–15)
PEEP/CPAP: 5 CMH2O
PFO2: 2.05 MMHG/%
PH BLOOD GAS: 7.5 (ref 7.35–7.45)
PHOSPHORUS: 3 MG/DL (ref 2.5–4.5)
PLATELET # BLD: 271 E9/L (ref 130–450)
PMV BLD AUTO: 9.1 FL (ref 7–12)
PO2: 82 MMHG (ref 75–100)
POIKILOCYTES: ABNORMAL
POTASSIUM SERPL-SCNC: 4 MMOL/L (ref 3.5–5)
RBC # BLD: 3.87 E12/L (ref 3.8–5.8)
RI(T): 1.93
RR MECHANICAL: 18 B/MIN
SODIUM BLD-SCNC: 134 MMOL/L (ref 132–146)
SOURCE, BLOOD GAS: ABNORMAL
T4 FREE: 0.82 NG/DL (ref 0.93–1.7)
THB: 12.9 G/DL (ref 11.5–16.5)
TIME ANALYZED: 1933
TRIGL SERPL-MCNC: 214 MG/DL (ref 0–149)
VT MECHANICAL: 500 ML
WBC # BLD: 7.6 E9/L (ref 4.5–11.5)

## 2021-10-21 PROCEDURE — 2580000003 HC RX 258: Performed by: SURGERY

## 2021-10-21 PROCEDURE — 6370000000 HC RX 637 (ALT 250 FOR IP): Performed by: INTERNAL MEDICINE

## 2021-10-21 PROCEDURE — 80048 BASIC METABOLIC PNL TOTAL CA: CPT

## 2021-10-21 PROCEDURE — 84439 ASSAY OF FREE THYROXINE: CPT

## 2021-10-21 PROCEDURE — 94640 AIRWAY INHALATION TREATMENT: CPT

## 2021-10-21 PROCEDURE — 2580000003 HC RX 258: Performed by: INTERNAL MEDICINE

## 2021-10-21 PROCEDURE — 2500000003 HC RX 250 WO HCPCS: Performed by: INTERNAL MEDICINE

## 2021-10-21 PROCEDURE — 82805 BLOOD GASES W/O2 SATURATION: CPT

## 2021-10-21 PROCEDURE — 2580000003 HC RX 258: Performed by: NURSE PRACTITIONER

## 2021-10-21 PROCEDURE — 2580000003 HC RX 258: Performed by: EMERGENCY MEDICINE

## 2021-10-21 PROCEDURE — 85025 COMPLETE CBC W/AUTO DIFF WBC: CPT

## 2021-10-21 PROCEDURE — 6360000002 HC RX W HCPCS: Performed by: NURSE PRACTITIONER

## 2021-10-21 PROCEDURE — 6360000002 HC RX W HCPCS: Performed by: INTERNAL MEDICINE

## 2021-10-21 PROCEDURE — 94003 VENT MGMT INPAT SUBQ DAY: CPT

## 2021-10-21 PROCEDURE — 36592 COLLECT BLOOD FROM PICC: CPT

## 2021-10-21 PROCEDURE — 84478 ASSAY OF TRIGLYCERIDES: CPT

## 2021-10-21 PROCEDURE — C9113 INJ PANTOPRAZOLE SODIUM, VIA: HCPCS | Performed by: NURSE PRACTITIONER

## 2021-10-21 PROCEDURE — 2000000000 HC ICU R&B

## 2021-10-21 PROCEDURE — 99232 SBSQ HOSP IP/OBS MODERATE 35: CPT | Performed by: INTERNAL MEDICINE

## 2021-10-21 PROCEDURE — 6370000000 HC RX 637 (ALT 250 FOR IP): Performed by: NURSE PRACTITIONER

## 2021-10-21 PROCEDURE — 36415 COLL VENOUS BLD VENIPUNCTURE: CPT

## 2021-10-21 PROCEDURE — 84100 ASSAY OF PHOSPHORUS: CPT

## 2021-10-21 PROCEDURE — 2500000003 HC RX 250 WO HCPCS: Performed by: EMERGENCY MEDICINE

## 2021-10-21 RX ORDER — DIAZEPAM 5 MG/1
5 TABLET ORAL EVERY 8 HOURS
Status: DISCONTINUED | OUTPATIENT
Start: 2021-10-21 | End: 2021-10-23

## 2021-10-21 RX ORDER — FUROSEMIDE 10 MG/ML
40 INJECTION INTRAMUSCULAR; INTRAVENOUS ONCE
Status: COMPLETED | OUTPATIENT
Start: 2021-10-21 | End: 2021-10-21

## 2021-10-21 RX ADMIN — Medication 0.6 MCG/KG/HR: at 18:40

## 2021-10-21 RX ADMIN — Medication 0.8 MCG/KG/HR: at 23:13

## 2021-10-21 RX ADMIN — SODIUM CHLORIDE, PRESERVATIVE FREE 10 ML: 5 INJECTION INTRAVENOUS at 20:16

## 2021-10-21 RX ADMIN — VALPROATE SODIUM 500 MG: 100 INJECTION, SOLUTION INTRAVENOUS at 22:46

## 2021-10-21 RX ADMIN — ENOXAPARIN SODIUM 120 MG: 150 INJECTION SUBCUTANEOUS at 20:14

## 2021-10-21 RX ADMIN — FOLIC ACID 1 MG: 5 INJECTION, SOLUTION INTRAMUSCULAR; INTRAVENOUS; SUBCUTANEOUS at 08:28

## 2021-10-21 RX ADMIN — IPRATROPIUM BROMIDE AND ALBUTEROL SULFATE 1 AMPULE: .5; 2.5 SOLUTION RESPIRATORY (INHALATION) at 10:24

## 2021-10-21 RX ADMIN — IPRATROPIUM BROMIDE AND ALBUTEROL SULFATE 1 AMPULE: .5; 2.5 SOLUTION RESPIRATORY (INHALATION) at 02:48

## 2021-10-21 RX ADMIN — VALPROATE SODIUM 500 MG: 100 INJECTION, SOLUTION INTRAVENOUS at 14:37

## 2021-10-21 RX ADMIN — PANTOPRAZOLE SODIUM 40 MG: 40 INJECTION, POWDER, FOR SOLUTION INTRAVENOUS at 20:14

## 2021-10-21 RX ADMIN — SODIUM CHLORIDE, PRESERVATIVE FREE 10 ML: 5 INJECTION INTRAVENOUS at 08:28

## 2021-10-21 RX ADMIN — IPRATROPIUM BROMIDE AND ALBUTEROL SULFATE 1 AMPULE: .5; 2.5 SOLUTION RESPIRATORY (INHALATION) at 19:05

## 2021-10-21 RX ADMIN — Medication 1.4 MCG/KG/HR: at 03:01

## 2021-10-21 RX ADMIN — DIAZEPAM 5 MG: 5 TABLET ORAL at 17:56

## 2021-10-21 RX ADMIN — IPRATROPIUM BROMIDE AND ALBUTEROL SULFATE 1 AMPULE: .5; 2.5 SOLUTION RESPIRATORY (INHALATION) at 07:04

## 2021-10-21 RX ADMIN — DIAZEPAM 5 MG: 5 TABLET ORAL at 10:44

## 2021-10-21 RX ADMIN — Medication 10 ML: at 08:30

## 2021-10-21 RX ADMIN — IPRATROPIUM BROMIDE AND ALBUTEROL SULFATE 1 AMPULE: .5; 2.5 SOLUTION RESPIRATORY (INHALATION) at 23:02

## 2021-10-21 RX ADMIN — Medication 1.4 MCG/KG/HR: at 05:54

## 2021-10-21 RX ADMIN — FUROSEMIDE 40 MG: 10 INJECTION, SOLUTION INTRAMUSCULAR; INTRAVENOUS at 10:44

## 2021-10-21 RX ADMIN — DIAZEPAM 5 MG: 5 INJECTION, SOLUTION INTRAMUSCULAR; INTRAVENOUS at 03:41

## 2021-10-21 RX ADMIN — VALPROATE SODIUM 500 MG: 100 INJECTION, SOLUTION INTRAVENOUS at 05:14

## 2021-10-21 RX ADMIN — ENOXAPARIN SODIUM 120 MG: 150 INJECTION SUBCUTANEOUS at 08:30

## 2021-10-21 RX ADMIN — CEFTRIAXONE 2000 MG: 2 INJECTION, POWDER, FOR SOLUTION INTRAMUSCULAR; INTRAVENOUS at 11:06

## 2021-10-21 RX ADMIN — PANTOPRAZOLE SODIUM 40 MG: 40 INJECTION, POWDER, FOR SOLUTION INTRAVENOUS at 08:28

## 2021-10-21 RX ADMIN — THIAMINE HYDROCHLORIDE 500 MG: 100 INJECTION, SOLUTION INTRAMUSCULAR; INTRAVENOUS at 09:16

## 2021-10-21 ASSESSMENT — PULMONARY FUNCTION TESTS
PIF_VALUE: 20
PIF_VALUE: 17
PIF_VALUE: 22
PIF_VALUE: 23
PIF_VALUE: 20
PIF_VALUE: 20
PIF_VALUE: 22
PIF_VALUE: 20
PIF_VALUE: 22
PIF_VALUE: 21
PIF_VALUE: 21
PIF_VALUE: 19
PIF_VALUE: 22
PIF_VALUE: 24
PIF_VALUE: 21
PIF_VALUE: 23
PIF_VALUE: 23
PIF_VALUE: 18
PIF_VALUE: 21
PIF_VALUE: 20
PIF_VALUE: 21
PIF_VALUE: 20
PIF_VALUE: 17
PIF_VALUE: 20
PIF_VALUE: 21
PIF_VALUE: 23
PIF_VALUE: 22
PIF_VALUE: 23
PIF_VALUE: 18

## 2021-10-21 ASSESSMENT — PAIN SCALES - GENERAL: PAINLEVEL_OUTOF10: 0

## 2021-10-21 NOTE — PROGRESS NOTES
Assessment and Plan  Patient is a 73 y.o. male with the following medical Problems:   1. Acute on chronic hypoxic and hypercapnic respiratory failure  2. Alcohol withdrawal  3. New onset atrial fibrillation  4. History of thrombocytopenia  5. Morbid obesity with obesity hypoventilation syndrome  6. Metabolic encephalopathy  7. Acute kidney injury  (resolved)  8. Aspiration pneumonia  9. UTI  10. Small pericardial effusion  11. Hematuria  12. 2.8 X 2.8 cm AAA  13. Small bilateral pleural effusions    Plan of care:  1. Daily sedation vacation and spontaneous breathing trial if possible  2. Continue with thiamine and folic acid  3.  GI consult for OGT placement  4. Off Cardizem drip for atrial fibrillation. 5.  Continue with metoprolol to 25 every 6 hours   6. Discontinue IV fluid and start tube feeding. , diuresed   7. Propofol and fentanyl for sedation and analgesia. 9.  GI prophylaxis and DVT prophylaxis  10. Echocardiography with normal EF  11. Patient is currently on Lovenox 1 mg/kg twice a day for atrial fibrillation. 12.  Ammonia level within normal limit    History of Present Illness:   Patient is a 71-year-old with history of hypertension, CAD, COPD, and alcohol abuse. Patient presented to the ED on 10/12/2021 after developing hallucinations and shortness of breath. Patient apparently called police after seeing his  wife and 2 children walking in his house. He apparently attempted to spray them with bug spray before calling police. Patient has a history of alcohol abuse and drinks 1/5 of them being daily. According to ED physician's note the patient's last drink was on Saturday. The patient is currently intubated, sedated, and unable to provide any history. Daily Progress:  2021: Patient remains sedated intubated and mechanically ventilated. He had an uneventful night. He is currently on a Cardizem drip for atrial fibrillation with RVR.   He has no fever, chills, or rigors. October 14, 2021: Patient remained sedated intubated and mechanically ventilated. He had atrial fibrillation with RVR. Patient failed awakening trial today. He was agitated and confused. He has no fever, chills, rigors. October 15, 2021: Patient remains sedated intubated and mechanically ventilated. He gets easily agitated and combative off sedation. He failed awakening trial.  He has no fever, chills, or rigors. Heart rate is controlled. He developed hematuria overnight and Eliquis was put on hold. October 16, 2021: Patient had repetitive episodes of vomiting. Multiple attempts to place an OGT probably failed. CT scan of the abdomen and pelvis was obtained. There was no evidence of hiatal hernia. Patient has no fever, chills, rigors. He becomes agitated and combative off sedation. He has no fever, chills, rigors. He is currently on 75% FiO2. October 17, 2021: Patient remains sedated intubated and mechanically ventilated. We will attempt sedation vacation and spontaneous breathing trials today if tolerated. He remains n.p.o. since he has no NG tube. NG tube could not be advanced. He remained hemodynamically stable. He has no fever, chills, rigors. Sugar is mostly controlled.       October 18, 2021:vent 60%, plan for EGD today re feeding tube insertion, SBT in am if stable    October 19, 2021:vent 40% better, diuresing, SBT today    October 20, 2021:vent 40% , diuresing, high secreations from ETT, trial of precedex sedation, OG per GI eventually, SBT once less secretions    October 21, 2021: vent 40%, high secretions, diuresing, wake trial/SBT, tube feeding if remains on vent      Past Medical History:  Past Medical History:   Diagnosis Date    CAD (coronary artery disease)     COPD (chronic obstructive pulmonary disease) ([facility] [address]     Depression     Fracture of unspecified phalanx of left middle finger, initial encounter for closed fracture     GERD (gastroesophageal reflux disease)     Hypertension         Family History:   No family history on file. Allergies:         Patient has no known allergies. Social history:  Social History     Socioeconomic History    Marital status:      Spouse name: Not on file    Number of children: Not on file    Years of education: Not on file    Highest education level: Not on file   Occupational History    Not on file   Tobacco Use    Smoking status: Current Every Day Smoker     Packs/day: 1.00     Years: 45.00     Pack years: 45.00     Types: Cigars    Smokeless tobacco: Never Used    Tobacco comment: SMOKES CIGARS    Vaping Use    Vaping Use: Never used   Substance and Sexual Activity    Alcohol use: Yes     Comment: occassiona    Drug use: No    Sexual activity: Not on file   Other Topics Concern    Not on file   Social History Narrative    Not on file     Social Determinants of Health     Financial Resource Strain:     Difficulty of Paying Living Expenses:    Food Insecurity:     Worried About Running Out of Food in the Last Year:     Ran Out of Food in the Last Year:    Transportation Needs:     Lack of Transportation (Medical):      Lack of Transportation (Non-Medical):    Physical Activity:     Days of Exercise per Week:     Minutes of Exercise per Session:    Stress:     Feeling of Stress :    Social Connections:     Frequency of Communication with Friends and Family:     Frequency of Social Gatherings with Friends and Family:     Attends [employer] Services:     Active Member of Clubs or Organizations:     Attends Club or Organization Meetings:     Marital Status:    Intimate Partner Violence:     Fear of Current or Ex-Partner:     Emotionally Abused:     Physically Abused:     Sexually Abused:        Current Medications:     Current Facility-Administered Medications:     diazePAM (VALIUM) tablet 5 mg, 5 mg, Oral, Q8H, Dmitriy Newell MD, 5 mg at 10/21/21 1043   dexmedetomidine (PRECEDEX) 400 mcg in sodium chloride 0.9 % 100 mL infusion, 0.2-1.4 mcg/kg/hr, IntraVENous, Continuous, Dmitriy Newell MD, Last Rate: 43 mL/hr at 10/21/21 0554, 1.4 mcg/kg/hr at 10/21/21 0554    sodium chloride flush 0.9 % injection 5-40 mL, 5-40 mL, IntraVENous, 2 times per day, Thadeus Dapash, DO, 10 mL at 10/21/21 0830    sodium chloride flush 0.9 % injection 5-40 mL, 5-40 mL, IntraVENous, PRN, Thadeus Dapash, DO    0.9 % sodium chloride infusion, 25 mL, IntraVENous, PRN, Thadeus Dapash, DO    cefTRIAXone (ROCEPHIN) 2,000 mg in sterile water 20 mL IV syringe, 2,000 mg, IntraVENous, Q24H, Alma Mcgee MD, 2,000 mg at 10/21/21 1106    enoxaparin (LOVENOX) injection 120 mg, 1 mg/kg, SubCUTAneous, BID, Abhi Chaudhary MD, 120 mg at 10/21/21 0830    0.9 % sodium chloride infusion, 25 mL, IntraVENous, Q8H, Krish Bridges MD, Stopped at 10/19/21 0708    valproate (DEPACON) 500 mg in dextrose 5 % 100 mL IVPB, 500 mg, IntraVENous, Q8H, Krish Bridges MD, Stopped at 10/21/21 5242    [Held by provider] Gabapentin SOLN 600 mg, 600 mg, Oral, TID, Krish Bridges MD, 600 mg at 10/15/21 1753    [Held by provider] topiramate (TOPAMAX) tablet 50 mg, 50 mg, Oral, BID, Krish Bridges MD, 50 mg at 10/15/21 2130    [Held by provider] baclofen (LIORESAL) tablet 5 mg, 5 mg, Oral, BID, Krish Bridges MD, 5 mg at 10/15/21 2141    [Held by provider] diazePAM (VALIUM) tablet 5 mg, 5 mg, Oral, Q12H, Krish Bridges MD, 5 mg at 10/15/21 2130    perflutren lipid microspheres (DEFINITY) injection 1.65 mg, 1.5 mL, IntraVENous, ONCE PRN, Krish Bridges MD    thiamine (B-1) 500 mg in sodium chloride 0.9 % 100 mL IVPB, 500 mg, IntraVENous, Q24H, Artem Mcleod APRN - CNP, Stopped at 10/21/21 0957    pantoprazole (PROTONIX) injection 40 mg, 40 mg, IntraVENous, BID, 40 mg at 10/21/21 0828 **AND** sodium chloride (PF) 0.9 % injection 10 mL, 10 mL, IntraVENous, BID, MARTY Mcgowan CNP, 10 mL at 10/21/21 0828    fentaNYL 5 mcg/ml in 0.9%  ml infusion, 12.5-200 mcg/hr, IntraVENous, Continuous, Shona Aguila MD, Stopped at 14/80/47 1984    folic acid 1 mg in dextrose 5 % 50 mL IVPB, 1 mg, IntraVENous, Daily, Nicolasa Mcconnell MD, Stopped at 10/21/21 0919    ondansetron (ZOFRAN-ODT) disintegrating tablet 4 mg, 4 mg, Oral, Q8H PRN **OR** ondansetron (ZOFRAN) injection 4 mg, 4 mg, IntraVENous, Q6H PRN, Michele Mckeon MD    polyethylene glycol (GLYCOLAX) packet 17 g, 17 g, Oral, Daily PRN, Michele Mckeon MD    acetaminophen (TYLENOL) tablet 650 mg, 650 mg, Oral, Q6H PRN **OR** acetaminophen (TYLENOL) suppository 650 mg, 650 mg, Rectal, Q6H PRN, Michele Mckeon MD, 650 mg at 10/19/21 2020    ipratropium-albuterol (DUONEB) nebulizer solution 1 ampule, 1 ampule, Inhalation, Q4H, MARTY Mcgowan CNP, 1 ampule at 10/21/21 1024    Review of Systems:   Unable to obtain due to medical condition    Physical Exam:   Vital Signs:  BP 97/67   Pulse 93   Temp 100.9 °F (38.3 °C) (Core)   Resp 22   Ht 5' 11\" (1.803 m)   Wt 271 lb (122.9 kg)   SpO2 91%   BMI 37.80 kg/m²     Input/Output:   In: 1257 [I.V.:1257]  Out: 1050     Oxygen requirements: MV    Ventilator Information:  Vent Information  $Ventilation: $Subsequent Day  Skin Assessment: Clean, dry, & intact  Equipment Changed: Humidification  Vent Type: 980  Vent Mode: AC/VC  Vt Ordered: 500 mL  Rate Set: 18 bmp  Peak Flow: 70 L/min  Pressure Support: 0 cmH20  FiO2 : 40 %  SpO2: 91 %  SpO2/FiO2 ratio: 227.5  Sensitivity: 4  PEEP/CPAP: 5  I Time/ I Time %: 0 s  Humidification Source: Heated wire  Humidification Temp: 37  Humidification Temp Measured: 37  Circuit Condensation: Drained    General appearance: , vented, sedated   HEENT: Intubated  Neck: Supple, no jugular venous distension, lymphadenopathy, thyromegaly or carotid bruits  Chest: Decreased breath sounds, + crackles and no tenderness over ribs   Cardiovascular: Normal S1 , S2, irregular , tachy, no murmur, rub or gallop  Abdomen: Normal sounds present, soft, lax with no tenderness, no hepatosplenomegaly, and no masses  Extremities: +edema. Pulses are equally present. Skin: intact, warm, dry   Neurologic: Sedated and mechanically ventilated     Investigations:  Labs, radiological imaging and cardiac work up were reviewed        ICU STAFF PHYSICIAN NOTE OF PERSONAL INVOLVEMENT IN CARE  As the attending physician, I certify that I personally reviewed the patient's history and personally examined the patient to confirm the physical findings described above, and that I reviewed the relevant imaging studies and available reports. I also discussed the differential diagnosis and all of the proposed management plans with the patient and individuals accompanying the patient to this visit. They had the opportunity to ask questions about the proposed management plans and to have those questions answered. This patient has a high probability of sudden, clinically significant deterioration, which requires the highest level of physician preparedness to intervene urgently. I managed/supervised life or organ supporting interventions that required frequent physician assessment. I devoted my full attention to the direct care of this patient for the amount of time indicated below. Time I spent with family or surrogate(s) is included only if the patient was incapable of providing the necessary information or participating in medical decision-making. Time devoted to teaching and to any procedures I billed separately is not included.   Critical Care Time: 48 minutes      Electronically signed by Maureen Loomis MD on 10/21/2021 at 12:04 PM

## 2021-10-21 NOTE — PROGRESS NOTES
Department of Internal Medicine  General Internal Medicine  Attending Progress Note  Chief Complaint   Patient presents with    Delirium Tremens (DTS)     Pt states he usually drinks a fifth of kilo beam a day states he quit saturday. states he has been having visual hallucinations since yesterday. SUBJECTIVE:    Still intubated. Withdrawals from pain noted.      OBJECTIVE      Medications    Current Facility-Administered Medications: diazePAM (VALIUM) tablet 5 mg, 5 mg, Oral, Q8H  dexmedetomidine (PRECEDEX) 400 mcg in sodium chloride 0.9 % 100 mL infusion, 0.2-1.4 mcg/kg/hr, IntraVENous, Continuous  sodium chloride flush 0.9 % injection 5-40 mL, 5-40 mL, IntraVENous, 2 times per day  sodium chloride flush 0.9 % injection 5-40 mL, 5-40 mL, IntraVENous, PRN  0.9 % sodium chloride infusion, 25 mL, IntraVENous, PRN  cefTRIAXone (ROCEPHIN) 2,000 mg in sterile water 20 mL IV syringe, 2,000 mg, IntraVENous, Q24H  enoxaparin (LOVENOX) injection 120 mg, 1 mg/kg, SubCUTAneous, BID  0.9 % sodium chloride infusion, 25 mL, IntraVENous, Q8H  valproate (DEPACON) 500 mg in dextrose 5 % 100 mL IVPB, 500 mg, IntraVENous, Q8H  [Held by provider] Gabapentin SOLN 600 mg, 600 mg, Oral, TID  [Held by provider] topiramate (TOPAMAX) tablet 50 mg, 50 mg, Oral, BID  [Held by provider] baclofen (LIORESAL) tablet 5 mg, 5 mg, Oral, BID  [Held by provider] diazePAM (VALIUM) tablet 5 mg, 5 mg, Oral, Q12H  perflutren lipid microspheres (DEFINITY) injection 1.65 mg, 1.5 mL, IntraVENous, ONCE PRN  thiamine (B-1) 500 mg in sodium chloride 0.9 % 100 mL IVPB, 500 mg, IntraVENous, Q24H  pantoprazole (PROTONIX) injection 40 mg, 40 mg, IntraVENous, BID **AND** sodium chloride (PF) 0.9 % injection 10 mL, 10 mL, IntraVENous, BID  fentaNYL 5 mcg/ml in 0.9%  ml infusion, 12.5-200 mcg/hr, IntraVENous, Continuous  folic acid 1 mg in dextrose 5 % 50 mL IVPB, 1 mg, IntraVENous, Daily  ondansetron (ZOFRAN-ODT) disintegrating tablet 4 mg, 4 mg, Oral, Q8H PRN **OR** ondansetron (ZOFRAN) injection 4 mg, 4 mg, IntraVENous, Q6H PRN  polyethylene glycol (GLYCOLAX) packet 17 g, 17 g, Oral, Daily PRN  acetaminophen (TYLENOL) tablet 650 mg, 650 mg, Oral, Q6H PRN **OR** acetaminophen (TYLENOL) suppository 650 mg, 650 mg, Rectal, Q6H PRN  ipratropium-albuterol (DUONEB) nebulizer solution 1 ampule, 1 ampule, Inhalation, Q4H  Physical    VITALS:  /79   Pulse 116   Temp 100.6 °F (38.1 °C) (Core)   Resp 20   Ht 5' 11\" (1.803 m)   Wt 271 lb (122.9 kg)   SpO2 96%   BMI 37.80 kg/m²   CONSTITUTIONAL:  intubated  EYES:  extra-ocular muscles intact and vision intact  ENT:  atraumatic  NECK:  supple, symmetrical, trachea midline  LUNGS:  good air exchange and clear to auscultation, anteriorly  CARDIOVASCULAR:  normal apical pulses and normal S1 and S2  ABDOMEN:  normal bowel sounds and non-tender  MUSCULOSKELETAL:  No redness, mild edema  NEUROLOGIC:  Unable to determine    Data    CBC:   Lab Results   Component Value Date    WBC 7.6 10/21/2021    RBC 3.87 10/21/2021    HGB 12.1 10/21/2021    HCT 35.9 10/21/2021    MCV 92.8 10/21/2021    MCH 31.3 10/21/2021    MCHC 33.7 10/21/2021    RDW 15.8 10/21/2021     10/21/2021    MPV 9.1 10/21/2021     BMP:    Lab Results   Component Value Date     10/21/2021    K 4.0 10/21/2021    K 4.3 10/12/2021    CL 97 10/21/2021    CO2 27 10/21/2021    BUN 28 10/21/2021    LABALBU 4.2 10/12/2021    CREATININE 0.9 10/21/2021    CALCIUM 9.3 10/21/2021    GFRAA >60 10/21/2021    LABGLOM >60 10/21/2021    GLUCOSE 104 10/21/2021       ASSESSMENT AND PLAN      1.  Seizures ([address]  2. Acute Respiratory Failure with Hypoxia  3. Alcohol Withdrawal  4. Atrial Fibrillation: rate controlled  5. Encephalopathy  6. Aspiration PNA. 7.  Encephalopathy:   8. Obesity morbid    Plans:  Still on vent and managed by ICU team  GELY resolved  Continue Anticoagulation. pulm Hygiene  Continue current abx  TSH mildly elevated.    May be ?euthyroid sick syndrome

## 2021-10-22 ENCOUNTER — APPOINTMENT (OUTPATIENT)
Dept: CT IMAGING | Age: 66
DRG: 004 | End: 2021-10-22
Payer: MEDICARE

## 2021-10-22 LAB
ALBUMIN SERPL-MCNC: 2.8 G/DL (ref 3.5–5.2)
ALP BLD-CCNC: 57 U/L (ref 40–129)
ALT SERPL-CCNC: 11 U/L (ref 0–40)
ANION GAP SERPL CALCULATED.3IONS-SCNC: 10 MMOL/L (ref 7–16)
AST SERPL-CCNC: 18 U/L (ref 0–39)
BASOPHILS ABSOLUTE: 0 E9/L (ref 0–0.2)
BASOPHILS RELATIVE PERCENT: 0 % (ref 0–2)
BILIRUB SERPL-MCNC: 0.8 MG/DL (ref 0–1.2)
BUN BLDV-MCNC: 34 MG/DL (ref 6–23)
CALCIUM SERPL-MCNC: 9 MG/DL (ref 8.6–10.2)
CHLORIDE BLD-SCNC: 97 MMOL/L (ref 98–107)
CO2: 26 MMOL/L (ref 22–29)
CREAT SERPL-MCNC: 0.8 MG/DL (ref 0.7–1.2)
EOSINOPHILS ABSOLUTE: 0.2 E9/L (ref 0.05–0.5)
EOSINOPHILS RELATIVE PERCENT: 2 % (ref 0–6)
GFR AFRICAN AMERICAN: >60
GFR NON-AFRICAN AMERICAN: >60 ML/MIN/1.73
GLUCOSE BLD-MCNC: 123 MG/DL (ref 74–99)
HCT VFR BLD CALC: 37 % (ref 37–54)
HEMOGLOBIN: 12.3 G/DL (ref 12.5–16.5)
LYMPHOCYTES ABSOLUTE: 0.8 E9/L (ref 1.5–4)
LYMPHOCYTES RELATIVE PERCENT: 8 % (ref 20–42)
MAGNESIUM: 1.9 MG/DL (ref 1.6–2.6)
MCH RBC QN AUTO: 30.9 PG (ref 26–35)
MCHC RBC AUTO-ENTMCNC: 33.2 % (ref 32–34.5)
MCV RBC AUTO: 93 FL (ref 80–99.9)
METAMYELOCYTES RELATIVE PERCENT: 1 % (ref 0–1)
MONOCYTES ABSOLUTE: 0.5 E9/L (ref 0.1–0.95)
MONOCYTES RELATIVE PERCENT: 5 % (ref 2–12)
MYELOCYTE PERCENT: 2 % (ref 0–0)
NEUTROPHILS ABSOLUTE: 8.5 E9/L (ref 1.8–7.3)
NEUTROPHILS RELATIVE PERCENT: 82 % (ref 43–80)
PDW BLD-RTO: 15.7 FL (ref 11.5–15)
PHOSPHORUS: 3.5 MG/DL (ref 2.5–4.5)
PLATELET # BLD: 255 E9/L (ref 130–450)
PMV BLD AUTO: 8.8 FL (ref 7–12)
POTASSIUM SERPL-SCNC: 3.7 MMOL/L (ref 3.5–5)
RBC # BLD: 3.98 E12/L (ref 3.8–5.8)
SODIUM BLD-SCNC: 133 MMOL/L (ref 132–146)
TOTAL PROTEIN: 6.2 G/DL (ref 6.4–8.3)
WBC # BLD: 10 E9/L (ref 4.5–11.5)

## 2021-10-22 PROCEDURE — 80053 COMPREHEN METABOLIC PANEL: CPT

## 2021-10-22 PROCEDURE — 2580000003 HC RX 258: Performed by: INTERNAL MEDICINE

## 2021-10-22 PROCEDURE — 2580000003 HC RX 258: Performed by: NURSE PRACTITIONER

## 2021-10-22 PROCEDURE — 2500000003 HC RX 250 WO HCPCS: Performed by: INTERNAL MEDICINE

## 2021-10-22 PROCEDURE — 6360000002 HC RX W HCPCS: Performed by: INTERNAL MEDICINE

## 2021-10-22 PROCEDURE — 99232 SBSQ HOSP IP/OBS MODERATE 35: CPT | Performed by: INTERNAL MEDICINE

## 2021-10-22 PROCEDURE — 94003 VENT MGMT INPAT SUBQ DAY: CPT

## 2021-10-22 PROCEDURE — 6370000000 HC RX 637 (ALT 250 FOR IP): Performed by: NURSE PRACTITIONER

## 2021-10-22 PROCEDURE — 6360000002 HC RX W HCPCS: Performed by: NURSE PRACTITIONER

## 2021-10-22 PROCEDURE — 6370000000 HC RX 637 (ALT 250 FOR IP): Performed by: INTERNAL MEDICINE

## 2021-10-22 PROCEDURE — 2000000000 HC ICU R&B

## 2021-10-22 PROCEDURE — C9113 INJ PANTOPRAZOLE SODIUM, VIA: HCPCS | Performed by: NURSE PRACTITIONER

## 2021-10-22 PROCEDURE — 2580000003 HC RX 258: Performed by: SURGERY

## 2021-10-22 PROCEDURE — 2580000003 HC RX 258: Performed by: EMERGENCY MEDICINE

## 2021-10-22 PROCEDURE — 85025 COMPLETE CBC W/AUTO DIFF WBC: CPT

## 2021-10-22 PROCEDURE — 36415 COLL VENOUS BLD VENIPUNCTURE: CPT

## 2021-10-22 PROCEDURE — 2500000003 HC RX 250 WO HCPCS: Performed by: EMERGENCY MEDICINE

## 2021-10-22 PROCEDURE — 84100 ASSAY OF PHOSPHORUS: CPT

## 2021-10-22 PROCEDURE — 70450 CT HEAD/BRAIN W/O DYE: CPT

## 2021-10-22 PROCEDURE — 94640 AIRWAY INHALATION TREATMENT: CPT

## 2021-10-22 PROCEDURE — 83735 ASSAY OF MAGNESIUM: CPT

## 2021-10-22 RX ORDER — FUROSEMIDE 10 MG/ML
20 INJECTION INTRAMUSCULAR; INTRAVENOUS ONCE
Status: COMPLETED | OUTPATIENT
Start: 2021-10-22 | End: 2021-10-22

## 2021-10-22 RX ORDER — GABAPENTIN 300 MG/1
300 CAPSULE ORAL 3 TIMES DAILY
Status: DISCONTINUED | OUTPATIENT
Start: 2021-10-22 | End: 2021-10-26

## 2021-10-22 RX ORDER — POTASSIUM CHLORIDE 29.8 MG/ML
20 INJECTION INTRAVENOUS ONCE
Status: COMPLETED | OUTPATIENT
Start: 2021-10-22 | End: 2021-10-22

## 2021-10-22 RX ADMIN — IPRATROPIUM BROMIDE AND ALBUTEROL SULFATE 1 AMPULE: .5; 2.5 SOLUTION RESPIRATORY (INHALATION) at 02:19

## 2021-10-22 RX ADMIN — VALPROATE SODIUM 500 MG: 100 INJECTION, SOLUTION INTRAVENOUS at 06:45

## 2021-10-22 RX ADMIN — GABAPENTIN 300 MG: 300 CAPSULE ORAL at 20:44

## 2021-10-22 RX ADMIN — ENOXAPARIN SODIUM 120 MG: 150 INJECTION SUBCUTANEOUS at 20:44

## 2021-10-22 RX ADMIN — SODIUM CHLORIDE, PRESERVATIVE FREE 10 ML: 5 INJECTION INTRAVENOUS at 09:31

## 2021-10-22 RX ADMIN — DIAZEPAM 5 MG: 5 TABLET ORAL at 18:23

## 2021-10-22 RX ADMIN — IPRATROPIUM BROMIDE AND ALBUTEROL SULFATE 1 AMPULE: .5; 2.5 SOLUTION RESPIRATORY (INHALATION) at 18:32

## 2021-10-22 RX ADMIN — Medication 10 ML: at 20:43

## 2021-10-22 RX ADMIN — DIAZEPAM 5 MG: 5 TABLET ORAL at 10:36

## 2021-10-22 RX ADMIN — IPRATROPIUM BROMIDE AND ALBUTEROL SULFATE 1 AMPULE: .5; 2.5 SOLUTION RESPIRATORY (INHALATION) at 13:31

## 2021-10-22 RX ADMIN — FUROSEMIDE 20 MG: 10 INJECTION, SOLUTION INTRAMUSCULAR; INTRAVENOUS at 10:09

## 2021-10-22 RX ADMIN — PANTOPRAZOLE SODIUM 40 MG: 40 INJECTION, POWDER, FOR SOLUTION INTRAVENOUS at 09:31

## 2021-10-22 RX ADMIN — FOLIC ACID 1 MG: 5 INJECTION, SOLUTION INTRAMUSCULAR; INTRAVENOUS; SUBCUTANEOUS at 09:32

## 2021-10-22 RX ADMIN — ENOXAPARIN SODIUM 120 MG: 150 INJECTION SUBCUTANEOUS at 09:31

## 2021-10-22 RX ADMIN — GABAPENTIN 300 MG: 300 CAPSULE ORAL at 10:11

## 2021-10-22 RX ADMIN — THIAMINE HYDROCHLORIDE 500 MG: 100 INJECTION, SOLUTION INTRAMUSCULAR; INTRAVENOUS at 09:32

## 2021-10-22 RX ADMIN — GABAPENTIN 300 MG: 300 CAPSULE ORAL at 14:43

## 2021-10-22 RX ADMIN — Medication 10 ML: at 09:33

## 2021-10-22 RX ADMIN — SODIUM CHLORIDE, PRESERVATIVE FREE 10 ML: 5 INJECTION INTRAVENOUS at 20:45

## 2021-10-22 RX ADMIN — Medication 0.9 MCG/KG/HR: at 08:22

## 2021-10-22 RX ADMIN — IPRATROPIUM BROMIDE AND ALBUTEROL SULFATE 1 AMPULE: .5; 2.5 SOLUTION RESPIRATORY (INHALATION) at 09:07

## 2021-10-22 RX ADMIN — POTASSIUM CHLORIDE 20 MEQ: 400 INJECTION, SOLUTION INTRAVENOUS at 10:10

## 2021-10-22 RX ADMIN — Medication 0.6 MCG/KG/HR: at 20:38

## 2021-10-22 RX ADMIN — DIAZEPAM 5 MG: 5 TABLET ORAL at 01:43

## 2021-10-22 RX ADMIN — IPRATROPIUM BROMIDE AND ALBUTEROL SULFATE 1 AMPULE: .5; 2.5 SOLUTION RESPIRATORY (INHALATION) at 22:13

## 2021-10-22 RX ADMIN — CEFTRIAXONE 2000 MG: 2 INJECTION, POWDER, FOR SOLUTION INTRAMUSCULAR; INTRAVENOUS at 11:40

## 2021-10-22 RX ADMIN — SERTRALINE HYDROCHLORIDE 100 MG: 50 TABLET ORAL at 10:10

## 2021-10-22 RX ADMIN — PANTOPRAZOLE SODIUM 40 MG: 40 INJECTION, POWDER, FOR SOLUTION INTRAVENOUS at 20:44

## 2021-10-22 RX ADMIN — Medication 0.8 MCG/KG/HR: at 03:47

## 2021-10-22 RX ADMIN — IPRATROPIUM BROMIDE AND ALBUTEROL SULFATE 1 AMPULE: .5; 2.5 SOLUTION RESPIRATORY (INHALATION) at 04:55

## 2021-10-22 ASSESSMENT — PULMONARY FUNCTION TESTS
PIF_VALUE: 24
PIF_VALUE: 24
PIF_VALUE: 17
PIF_VALUE: 21
PIF_VALUE: 23
PIF_VALUE: 19
PIF_VALUE: 19
PIF_VALUE: 50
PIF_VALUE: 16
PIF_VALUE: 17
PIF_VALUE: 16
PIF_VALUE: 22
PIF_VALUE: 21
PIF_VALUE: 24
PIF_VALUE: 40
PIF_VALUE: 20
PIF_VALUE: 19
PIF_VALUE: 16
PIF_VALUE: 21
PIF_VALUE: 24
PIF_VALUE: 20
PIF_VALUE: 21
PIF_VALUE: 41
PIF_VALUE: 24
PIF_VALUE: 20
PIF_VALUE: 21
PIF_VALUE: 18
PIF_VALUE: 17

## 2021-10-22 ASSESSMENT — PAIN SCALES - GENERAL
PAINLEVEL_OUTOF10: 0

## 2021-10-22 NOTE — CARE COORDINATION
10-22-Cm note: ( covid neg 10-16) pt continues on vent in icu, precedex gtt, pt has failed waking trials, has been unable to do SBT's, spoke to pt's dtr R[facility] Founds for LTAC choices vs. SNF choices dependent on LOC needed at discharge, Render Founds lives in [place] , emailed a list of facilities to [email or web address], will await preferences . Cm/ss will follow for direction of discharge .  Electronically signed by Jaison Beach RN on 10/22/2021 at 3:16 PM

## 2021-10-22 NOTE — PROGRESS NOTES
Assessment and Plan  Patient is a 73 y.o. male with the following medical Problems:   1. Acute on chronic hypoxic and hypercapnic respiratory failure  2. Alcohol withdrawal  3. New onset atrial fibrillation  4. History of thrombocytopenia  5. Morbid obesity with obesity hypoventilation syndrome  6. Metabolic encephalopathy  7. Acute kidney injury  (resolved)  8. Aspiration pneumonia  9. UTI  10. Small pericardial effusion  11. Hematuria  12. 2.8 X 2.8 cm AAA  13. Small bilateral pleural effusions    Plan of care:  1. Daily sedation vacation and spontaneous breathing trial if possible  2. Continue with thiamine and folic acid  3. GI  for OGT placement  4. Off Cardizem drip for atrial fibrillation. 5.  Continue with metoprolol to 25 every 6 hours   6. Discontinue IV fluid and start tube feeding. , diuresed   7. Propofol and fentanyl for sedation and analgesia. 9.  GI prophylaxis and DVT prophylaxis  10. Echocardiography with normal EF  11. Patient is currently on Lovenox 1 mg/kg twice a day for atrial fibrillation. 12.  Ammonia level within normal limit    History of Present Illness:   Patient is a 72-year-old with history of hypertension, CAD, COPD, and alcohol abuse. Patient presented to the ED on 10/12/2021 after developing hallucinations and shortness of breath. Patient apparently called police after seeing his  wife and 2 children walking in his house. He apparently attempted to spray them with bug spray before calling police. Patient has a history of alcohol abuse and drinks 1/5 of them being daily. According to ED physician's note the patient's last drink was on Saturday. The patient is currently intubated, sedated, and unable to provide any history. Daily Progress:  2021: Patient remains sedated intubated and mechanically ventilated. He had an uneventful night. He is currently on a Cardizem drip for atrial fibrillation with RVR.   He has no fever, chills, or rigors. October 14, 2021: Patient remained sedated intubated and mechanically ventilated. He had atrial fibrillation with RVR. Patient failed awakening trial today. He was agitated and confused. He has no fever, chills, rigors. October 15, 2021: Patient remains sedated intubated and mechanically ventilated. He gets easily agitated and combative off sedation. He failed awakening trial.  He has no fever, chills, or rigors. Heart rate is controlled. He developed hematuria overnight and Eliquis was put on hold. October 16, 2021: Patient had repetitive episodes of vomiting. Multiple attempts to place an OGT probably failed. CT scan of the abdomen and pelvis was obtained. There was no evidence of hiatal hernia. Patient has no fever, chills, rigors. He becomes agitated and combative off sedation. He has no fever, chills, rigors. He is currently on 75% FiO2. October 17, 2021: Patient remains sedated intubated and mechanically ventilated. We will attempt sedation vacation and spontaneous breathing trials today if tolerated. He remains n.p.o. since he has no NG tube. NG tube could not be advanced. He remained hemodynamically stable. He has no fever, chills, rigors. Sugar is mostly controlled. October 18, 2021:vent 60%, plan for EGD today re feeding tube insertion, SBT in am if stable    October 19, 2021:vent 40% better, diuresing, SBT today    October 20, 2021:vent 40% , diuresing, high secreations from ETT, trial of precedex sedation, OG per GI eventually, SBT once less secretions    October 21, 2021: vent 40%, high secretions, diuresing, wake trial/SBT, tube feeding if remains on vent    October 22, 2021: vent 40%, not fully waking remains on precedex, plan SBT if more awake/secretions not prohibitive. Diuresed x 1 today. CT head if remains with minimal response.       Past Medical History:  Past Medical History:   Diagnosis Date    CAD (coronary artery disease)     COPD (chronic obstructive pulmonary disease) (HCC)     Depression     Fracture of unspecified phalanx of left middle finger, initial encounter for closed fracture     GERD (gastroesophageal reflux disease)     Hypertension         Family History:   No family history on file. Allergies:         Patient has no known allergies. Social history:  Social History     Socioeconomic History    Marital status:      Spouse name: Not on file    Number of children: Not on file    Years of education: Not on file    Highest education level: Not on file   Occupational History    Not on file   Tobacco Use    Smoking status: Current Every Day Smoker     Packs/day: 1.00     Years: 45.00     Pack years: 45.00     Types: Cigars    Smokeless tobacco: Never Used    Tobacco comment: SMOKES CIGARS    Vaping Use    Vaping Use: Never used   Substance and Sexual Activity    Alcohol use: Yes     Comment: occassiona    Drug use: No    Sexual activity: Not on file   Other Topics Concern    Not on file   Social History Narrative    Not on file     Social Determinants of Health     Financial Resource Strain:     Difficulty of Paying Living Expenses:    Food Insecurity:     Worried About Running Out of Food in the Last Year:     Ran Out of Food in the Last Year:    Transportation Needs:     Lack of Transportation (Medical):      Lack of Transportation (Non-Medical):    Physical Activity:     Days of Exercise per Week:     Minutes of Exercise per Session:    Stress:     Feeling of Stress :    Social Connections:     Frequency of Communication with Friends and Family:     Frequency of Social Gatherings with Friends and Family:     Attends Congregation Services:     Active Member of Clubs or Organizations:     Attends Club or Organization Meetings:     Marital Status:    Intimate Partner Violence:     Fear of Current or Ex-Partner:     Emotionally Abused:     Physically Abused:     Sexually Abused:        Current Medications:     Current Facility-Administered Medications:     gabapentin (NEURONTIN) capsule 300 mg, 300 mg, Oral, TID, Dmitriy Newell MD, 300 mg at 10/22/21 1011    sertraline (ZOLOFT) tablet 100 mg, 100 mg, Oral, Daily, Dmitriy Newell MD, 100 mg at 10/22/21 1010    diazePAM (VALIUM) tablet 5 mg, 5 mg, Oral, Q8H, Dmitriy Newell MD, 5 mg at 10/22/21 1036    dexmedetomidine (PRECEDEX) 400 mcg in sodium chloride 0.9 % 100 mL infusion, 0.2-1.4 mcg/kg/hr, IntraVENous, Continuous, Dmitriy Newell MD, Last Rate: 27.7 mL/hr at 10/22/21 0822, 0.9 mcg/kg/hr at 10/22/21 0822    sodium chloride flush 0.9 % injection 5-40 mL, 5-40 mL, IntraVENous, 2 times per day, Thadeus Dapash, DO, 10 mL at 10/22/21 0933    sodium chloride flush 0.9 % injection 5-40 mL, 5-40 mL, IntraVENous, PRN, Thadeus Dapash, DO    0.9 % sodium chloride infusion, 25 mL, IntraVENous, PRN, Thadeus Dapash, DO    cefTRIAXone (ROCEPHIN) 2,000 mg in sterile water 20 mL IV syringe, 2,000 mg, IntraVENous, Q24H, Dmitriy Newell MD, 2,000 mg at 10/21/21 1106    enoxaparin (LOVENOX) injection 120 mg, 1 mg/kg, SubCUTAneous, BID, Abhi Chaudhary MD, 120 mg at 10/22/21 0931    0.9 % sodium chloride infusion, 25 mL, IntraVENous, Q8H, Krish Bridges MD, Stopped at 10/19/21 0708    [Held by provider] topiramate (TOPAMAX) tablet 50 mg, 50 mg, Oral, BID, Krish Bridges MD, 50 mg at 10/15/21 2130    [Held by provider] baclofen (LIORESAL) tablet 5 mg, 5 mg, Oral, BID, Krish Bridges MD, 5 mg at 10/15/21 2141    [Held by provider] diazePAM (VALIUM) tablet 5 mg, 5 mg, Oral, Q12H, Krish Bridges MD, 5 mg at 10/15/21 2130    perflutren lipid microspheres (DEFINITY) injection 1.65 mg, 1.5 mL, IntraVENous, ONCE PRN, Krish Bridges MD    thiamine (B-1) 500 mg in sodium chloride 0.9 % 100 mL IVPB, 500 mg, IntraVENous, Q24H, MARTY Romeo - CNP, Stopped at 10/22/21 1057    pantoprazole (PROTONIX) injection 40 mg, 40 mg, IntraVENous, BID, 40 mg at 10/22/21 0931 **AND** sodium chloride (PF) 0.9 % injection 10 mL, 10 mL, IntraVENous, BID, MARTY Doan CNP, 10 mL at 63/26/81 6251    folic acid 1 mg in dextrose 5 % 50 mL IVPB, 1 mg, IntraVENous, Daily, Rita Lowery MD, Stopped at 10/22/21 1057    ondansetron (ZOFRAN-ODT) disintegrating tablet 4 mg, 4 mg, Oral, Q8H PRN **OR** ondansetron (ZOFRAN) injection 4 mg, 4 mg, IntraVENous, Q6H PRN, Rosealee Sandifer, MD    polyethylene glycol (GLYCOLAX) packet 17 g, 17 g, Oral, Daily PRN, Rosealee Sandifer, MD    acetaminophen (TYLENOL) tablet 650 mg, 650 mg, Oral, Q6H PRN, 650 mg at 10/21/21 1756 **OR** acetaminophen (TYLENOL) suppository 650 mg, 650 mg, Rectal, Q6H PRN, Rosealee Sandifer, MD, 650 mg at 10/19/21 2020    ipratropium-albuterol (DUONEB) nebulizer solution 1 ampule, 1 ampule, Inhalation, Q4H, MARTY Doan CNP, 1 ampule at 10/22/21 2828    Review of Systems:   Unable to obtain due to medical condition    Physical Exam:   Vital Signs:  BP 99/68   Pulse 88   Temp 100.2 °F (37.9 °C) (Core)   Resp 21   Ht 5' 11\" (1.803 m)   Wt 271 lb (122.9 kg)   SpO2 95%   BMI 37.80 kg/m²     Input/Output:   In: 1184 [I.V.:711; NG/GT:223]  Out: 350     Oxygen requirements: MV    Ventilator Information:  Vent Information  $Ventilation: $Subsequent Day  Skin Assessment: Clean, dry, & intact  Equipment Changed: Suction catheter  Vent Type: 980  Vent Mode: AC/VC  Vt Ordered: 500 mL  Rate Set: 18 bmp  Peak Flow: 70 L/min  Pressure Support: 0 cmH20  FiO2 : 40 %  SpO2: 95 %  SpO2/FiO2 ratio: 237.5  Sensitivity: 4  PEEP/CPAP: 5  I Time/ I Time %: 0 s  Humidification Source: Heated wire  Humidification Temp: 37  Humidification Temp Measured: 37  Circuit Condensation: Drained    General appearance: , vented, sedated   HEENT: Intubated  Neck: Supple, no jugular venous distension, lymphadenopathy, thyromegaly or carotid bruits  Chest: Decreased breath sounds, + crackles and no tenderness over ribs   Cardiovascular: Normal S1 , S2, irregular , tachy, no murmur, rub or gallop  Abdomen: Normal sounds present, soft, lax with no tenderness, no hepatosplenomegaly, and no masses  Extremities: +edema. Pulses are equally present. Skin: intact, warm, dry   Neurologic: Sedated and mechanically ventilated     Investigations:  Labs, radiological imaging and cardiac work up were reviewed        ICU STAFF PHYSICIAN NOTE OF PERSONAL INVOLVEMENT IN CARE  As the attending physician, I certify that I personally reviewed the patient's history and personally examined the patient to confirm the physical findings described above, and that I reviewed the relevant imaging studies and available reports. I also discussed the differential diagnosis and all of the proposed management plans with the patient and individuals accompanying the patient to this visit. They had the opportunity to ask questions about the proposed management plans and to have those questions answered. This patient has a high probability of sudden, clinically significant deterioration, which requires the highest level of physician preparedness to intervene urgently. I managed/supervised life or organ supporting interventions that required frequent physician assessment. I devoted my full attention to the direct care of this patient for the amount of time indicated below. Time I spent with family or surrogate(s) is included only if the patient was incapable of providing the necessary information or participating in medical decision-making. Time devoted to teaching and to any procedures I billed separately is not included.   Critical Care Time: 39 minutes      Electronically signed by Melissa Lopez MD on 10/22/2021 at 11:28 AM

## 2021-10-22 NOTE — PROGRESS NOTES
Department of Internal Medicine  General Internal Medicine  Attending Progress Note  Chief Complaint   Patient presents with    Delirium Tremens (DTS)     Pt states he usually drinks a fifth of kilo beam a day states he quit saturday. states he has been having visual hallucinations since yesterday. SUBJECTIVE:    Still intubated.     OBJECTIVE      Medications    Current Facility-Administered Medications: gabapentin (NEURONTIN) capsule 300 mg, 300 mg, Oral, TID  sertraline (ZOLOFT) tablet 100 mg, 100 mg, Oral, Daily  diazePAM (VALIUM) tablet 5 mg, 5 mg, Oral, Q8H  dexmedetomidine (PRECEDEX) 400 mcg in sodium chloride 0.9 % 100 mL infusion, 0.2-1.4 mcg/kg/hr, IntraVENous, Continuous  sodium chloride flush 0.9 % injection 5-40 mL, 5-40 mL, IntraVENous, 2 times per day  sodium chloride flush 0.9 % injection 5-40 mL, 5-40 mL, IntraVENous, PRN  0.9 % sodium chloride infusion, 25 mL, IntraVENous, PRN  cefTRIAXone (ROCEPHIN) 2,000 mg in sterile water 20 mL IV syringe, 2,000 mg, IntraVENous, Q24H  enoxaparin (LOVENOX) injection 120 mg, 1 mg/kg, SubCUTAneous, BID  0.9 % sodium chloride infusion, 25 mL, IntraVENous, Q8H  [Held by provider] topiramate (TOPAMAX) tablet 50 mg, 50 mg, Oral, BID  [Held by provider] baclofen (LIORESAL) tablet 5 mg, 5 mg, Oral, BID  [Held by provider] diazePAM (VALIUM) tablet 5 mg, 5 mg, Oral, Q12H  perflutren lipid microspheres (DEFINITY) injection 1.65 mg, 1.5 mL, IntraVENous, ONCE PRN  thiamine (B-1) 500 mg in sodium chloride 0.9 % 100 mL IVPB, 500 mg, IntraVENous, Q24H  pantoprazole (PROTONIX) injection 40 mg, 40 mg, IntraVENous, BID **AND** sodium chloride (PF) 0.9 % injection 10 mL, 10 mL, IntraVENous, BID  folic acid 1 mg in dextrose 5 % 50 mL IVPB, 1 mg, IntraVENous, Daily  ondansetron (ZOFRAN-ODT) disintegrating tablet 4 mg, 4 mg, Oral, Q8H PRN **OR** ondansetron (ZOFRAN) injection 4 mg, 4 mg, IntraVENous, Q6H PRN  polyethylene glycol (GLYCOLAX) packet 17 g, 17 g, Oral, Daily

## 2021-10-22 NOTE — PROGRESS NOTES
PROGRESS NOTE  By Jana Chester M.D. [facility]  Dr. Jenny Ahuja M.D.,  Dr. Nagi Hearn M.D.,   Dr. Jorge Miguel D.O.,  Dr. Jackie Rico M.D.,  Dr. Aidan Tse D.O.,  Mar Madrigal D.O. Dom Pérez  73 y.o.  male    SUBJECTIVE:  Remains intubated. Sedation is off. Patient is nonresponsive    OBJECTIVE:    BP 99/68   Pulse 88   Temp 100.2 °F (37.9 °C) (Core)   Resp 21   Ht 5' 11\" (1.803 m)   Wt 271 lb (122.9 kg)   SpO2 95%   BMI 37.80 kg/m²     General: Intubated  male. NAD/unresponsive  HEENT: ETT/NGT  Neck: Supple with trachea midline  Chest: Symmetric excursion/mechanically ventilated  Cor: Regular  Abd.: Soft and obese  Extr.:  Decreased muscle tone and bulk  Skin: Warm and dry  Other: Woo catheter    DATA:    Monitor data reviewed -sinus rhythm noted. Lab Results   Component Value Date    WBC 10.0 10/22/2021    RBC 3.98 10/22/2021    HGB 12.3 10/22/2021    HCT 37.0 10/22/2021    MCV 93.0 10/22/2021    MCH 30.9 10/22/2021    MCHC 33.2 10/22/2021    RDW 15.7 10/22/2021     10/22/2021    MPV 8.8 10/22/2021     Lab Results   Component Value Date     10/22/2021    K 3.7 10/22/2021    K 4.3 10/12/2021    CL 97 10/22/2021    CO2 26 10/22/2021    BUN 34 10/22/2021    CREATININE 0.8 10/22/2021    CALCIUM 9.0 10/22/2021    PROT 6.2 10/22/2021    LABALBU 2.8 10/22/2021    BILITOT 0.8 10/22/2021    ALKPHOS 57 10/22/2021    AST 18 10/22/2021    ALT 11 10/22/2021     Lab Results   Component Value Date    LIPASE 24 10/12/2021     No results found for: AMYLASE      ASSESSMENT/PLAN:    1. Encounter for endoscopic nasogastric tube placement   NG tube placed  Tube feedings per admitting     2. Acute on chronic hypoxic and hypercapnic respiratory failure  -Remains intubated  Per admitting/CCM     3. Alcohol withdrawal  Per admitting/CCM also    4. A. fib with RVR  Per admitting/CCM     5.   Aspiration pneumonia/respiratory failure  As above     6. Metabolic encephalopathy  Per primary     7. Anemia  -Stable H&H without evidence of overt bleed  -Unremarkable EGD    No immediate plans for intervention from GI POV. Will see PRN in the hospital -please, call with questions/concerns. Follow-up in the office in 2 to 3 weeks after discharge -call for appointment and with questions/concerns at [phone]. Thank you for the opportunity to participate in the care of  Grabiel Godinez MD  10/22/2021  10:42 AM    NOTE:  This report was transcribed using voice recognition software. Every effort was made to ensure accuracy; however, inadvertent computerized transcription errors may be present.

## 2021-10-22 NOTE — PLAN OF CARE
Problem: Skin Integrity:  Goal: Will show no infection signs and symptoms  Description: Will show no infection signs and symptoms  Outcome: Met This Shift     Problem: Skin Integrity:  Goal: Absence of new skin breakdown  Description: Absence of new skin breakdown  Outcome: Met This Shift     Problem: Non-Violent Restraints  Goal: Removal from restraints as soon as assessed to be safe  Outcome: Met This Shift     Problem: Non-Violent Restraints  Goal: No harm/injury to patient while restraints in use  Outcome: Met This Shift     Problem: Non-Violent Restraints  Goal: Patient's dignity will be maintained  Outcome: Met This Shift     Problem: Falls - Risk of:  Goal: Will remain free from falls  Description: Will remain free from falls  Outcome: Met This Shift     Problem: Falls - Risk of:  Goal: Absence of physical injury  Description: Absence of physical injury  Outcome: Met This Shift     Problem: Skin Integrity:  Goal: Will show no infection signs and symptoms  Description: Will show no infection signs and symptoms  10/22/2021 1153 by Ingrid Farias RN  Outcome: Met This Shift     Problem: Skin Integrity:  Goal: Absence of new skin breakdown  Description: Absence of new skin breakdown  Outcome: Met This Shift     Problem: Falls - Risk of:  Goal: Will remain free from falls  Description: Will remain free from falls  10/22/2021 1153 by Ingrid Farias RN  Outcome: Met This Shift     Problem: Falls - Risk of:  Goal: Absence of physical injury  Description: Absence of physical injury  10/22/2021 1153 by Ingrid Farias RN  Outcome: Met This Shift

## 2021-10-23 LAB
ALBUMIN SERPL-MCNC: 2.8 G/DL (ref 3.5–5.2)
ALP BLD-CCNC: 58 U/L (ref 40–129)
ALT SERPL-CCNC: 33 U/L (ref 0–40)
ANION GAP SERPL CALCULATED.3IONS-SCNC: 8 MMOL/L (ref 7–16)
AST SERPL-CCNC: 55 U/L (ref 0–39)
BASOPHILS ABSOLUTE: 0.09 E9/L (ref 0–0.2)
BASOPHILS RELATIVE PERCENT: 1 % (ref 0–2)
BILIRUB SERPL-MCNC: 0.5 MG/DL (ref 0–1.2)
BUN BLDV-MCNC: 35 MG/DL (ref 6–23)
CALCIUM SERPL-MCNC: 8.8 MG/DL (ref 8.6–10.2)
CHLORIDE BLD-SCNC: 99 MMOL/L (ref 98–107)
CO2: 29 MMOL/L (ref 22–29)
CREAT SERPL-MCNC: 0.8 MG/DL (ref 0.7–1.2)
EOSINOPHILS ABSOLUTE: 0 E9/L (ref 0.05–0.5)
EOSINOPHILS RELATIVE PERCENT: 0 % (ref 0–6)
GFR AFRICAN AMERICAN: >60
GFR NON-AFRICAN AMERICAN: >60 ML/MIN/1.73
GLUCOSE BLD-MCNC: 128 MG/DL (ref 74–99)
HCT VFR BLD CALC: 34.7 % (ref 37–54)
HEMOGLOBIN: 11.4 G/DL (ref 12.5–16.5)
LYMPHOCYTES ABSOLUTE: 1.03 E9/L (ref 1.5–4)
LYMPHOCYTES RELATIVE PERCENT: 12 % (ref 20–42)
MCH RBC QN AUTO: 31.3 PG (ref 26–35)
MCHC RBC AUTO-ENTMCNC: 32.9 % (ref 32–34.5)
MCV RBC AUTO: 95.3 FL (ref 80–99.9)
METAMYELOCYTES RELATIVE PERCENT: 1 % (ref 0–1)
MONOCYTES ABSOLUTE: 0.95 E9/L (ref 0.1–0.95)
MONOCYTES RELATIVE PERCENT: 11 % (ref 2–12)
MYELOCYTE PERCENT: 1 % (ref 0–0)
NEUTROPHILS ABSOLUTE: 6.54 E9/L (ref 1.8–7.3)
NEUTROPHILS RELATIVE PERCENT: 74 % (ref 43–80)
PDW BLD-RTO: 15.8 FL (ref 11.5–15)
PHOSPHORUS: 2.8 MG/DL (ref 2.5–4.5)
PLATELET # BLD: 220 E9/L (ref 130–450)
PMV BLD AUTO: 8.9 FL (ref 7–12)
POTASSIUM SERPL-SCNC: 3.8 MMOL/L (ref 3.5–5)
RBC # BLD: 3.64 E12/L (ref 3.8–5.8)
SODIUM BLD-SCNC: 136 MMOL/L (ref 132–146)
TOTAL PROTEIN: 6 G/DL (ref 6.4–8.3)
TRIGL SERPL-MCNC: 175 MG/DL (ref 0–149)
WBC # BLD: 8.6 E9/L (ref 4.5–11.5)

## 2021-10-23 PROCEDURE — 2500000003 HC RX 250 WO HCPCS: Performed by: INTERNAL MEDICINE

## 2021-10-23 PROCEDURE — 6360000002 HC RX W HCPCS: Performed by: INTERNAL MEDICINE

## 2021-10-23 PROCEDURE — 87040 BLOOD CULTURE FOR BACTERIA: CPT

## 2021-10-23 PROCEDURE — 80053 COMPREHEN METABOLIC PANEL: CPT

## 2021-10-23 PROCEDURE — 99232 SBSQ HOSP IP/OBS MODERATE 35: CPT | Performed by: INTERNAL MEDICINE

## 2021-10-23 PROCEDURE — 2500000003 HC RX 250 WO HCPCS: Performed by: EMERGENCY MEDICINE

## 2021-10-23 PROCEDURE — 6370000000 HC RX 637 (ALT 250 FOR IP): Performed by: NURSE PRACTITIONER

## 2021-10-23 PROCEDURE — 36415 COLL VENOUS BLD VENIPUNCTURE: CPT

## 2021-10-23 PROCEDURE — 84100 ASSAY OF PHOSPHORUS: CPT

## 2021-10-23 PROCEDURE — 6370000000 HC RX 637 (ALT 250 FOR IP): Performed by: INTERNAL MEDICINE

## 2021-10-23 PROCEDURE — 2580000003 HC RX 258: Performed by: NURSE PRACTITIONER

## 2021-10-23 PROCEDURE — 94003 VENT MGMT INPAT SUBQ DAY: CPT

## 2021-10-23 PROCEDURE — 6360000002 HC RX W HCPCS: Performed by: NURSE PRACTITIONER

## 2021-10-23 PROCEDURE — 2580000003 HC RX 258: Performed by: INTERNAL MEDICINE

## 2021-10-23 PROCEDURE — 84478 ASSAY OF TRIGLYCERIDES: CPT

## 2021-10-23 PROCEDURE — 2580000003 HC RX 258: Performed by: SURGERY

## 2021-10-23 PROCEDURE — 2000000000 HC ICU R&B

## 2021-10-23 PROCEDURE — C9113 INJ PANTOPRAZOLE SODIUM, VIA: HCPCS | Performed by: NURSE PRACTITIONER

## 2021-10-23 PROCEDURE — 2580000003 HC RX 258: Performed by: EMERGENCY MEDICINE

## 2021-10-23 PROCEDURE — 94640 AIRWAY INHALATION TREATMENT: CPT

## 2021-10-23 PROCEDURE — 85025 COMPLETE CBC W/AUTO DIFF WBC: CPT

## 2021-10-23 RX ORDER — LACTULOSE 10 G/15ML
20 SOLUTION ORAL 2 TIMES DAILY
Status: DISCONTINUED | OUTPATIENT
Start: 2021-10-23 | End: 2021-11-07

## 2021-10-23 RX ORDER — LACTULOSE 10 G/15ML
20 SOLUTION ORAL 3 TIMES DAILY
Status: DISCONTINUED | OUTPATIENT
Start: 2021-10-23 | End: 2021-10-23

## 2021-10-23 RX ADMIN — PANTOPRAZOLE SODIUM 40 MG: 40 INJECTION, POWDER, FOR SOLUTION INTRAVENOUS at 20:43

## 2021-10-23 RX ADMIN — DIAZEPAM 5 MG: 5 TABLET ORAL at 01:43

## 2021-10-23 RX ADMIN — Medication 10 ML: at 20:44

## 2021-10-23 RX ADMIN — SODIUM CHLORIDE, PRESERVATIVE FREE 10 ML: 5 INJECTION INTRAVENOUS at 08:59

## 2021-10-23 RX ADMIN — LACTULOSE 20 G: 20 SOLUTION ORAL at 20:43

## 2021-10-23 RX ADMIN — IPRATROPIUM BROMIDE AND ALBUTEROL SULFATE 1 AMPULE: .5; 2.5 SOLUTION RESPIRATORY (INHALATION) at 06:08

## 2021-10-23 RX ADMIN — ENOXAPARIN SODIUM 120 MG: 150 INJECTION SUBCUTANEOUS at 20:43

## 2021-10-23 RX ADMIN — GABAPENTIN 300 MG: 300 CAPSULE ORAL at 08:56

## 2021-10-23 RX ADMIN — Medication 0.7 MCG/KG/HR: at 12:21

## 2021-10-23 RX ADMIN — Medication 0.7 MCG/KG/HR: at 07:05

## 2021-10-23 RX ADMIN — GABAPENTIN 300 MG: 300 CAPSULE ORAL at 20:43

## 2021-10-23 RX ADMIN — IPRATROPIUM BROMIDE AND ALBUTEROL SULFATE 1 AMPULE: .5; 2.5 SOLUTION RESPIRATORY (INHALATION) at 09:00

## 2021-10-23 RX ADMIN — FOLIC ACID 1 MG: 5 INJECTION, SOLUTION INTRAMUSCULAR; INTRAVENOUS; SUBCUTANEOUS at 09:00

## 2021-10-23 RX ADMIN — SODIUM CHLORIDE, PRESERVATIVE FREE 10 ML: 5 INJECTION INTRAVENOUS at 20:43

## 2021-10-23 RX ADMIN — IPRATROPIUM BROMIDE AND ALBUTEROL SULFATE 1 AMPULE: .5; 2.5 SOLUTION RESPIRATORY (INHALATION) at 01:53

## 2021-10-23 RX ADMIN — THIAMINE HYDROCHLORIDE 500 MG: 100 INJECTION, SOLUTION INTRAMUSCULAR; INTRAVENOUS at 09:00

## 2021-10-23 RX ADMIN — IPRATROPIUM BROMIDE AND ALBUTEROL SULFATE 1 AMPULE: .5; 2.5 SOLUTION RESPIRATORY (INHALATION) at 22:08

## 2021-10-23 RX ADMIN — Medication 0.7 MCG/KG/HR: at 17:05

## 2021-10-23 RX ADMIN — DIAZEPAM 5 MG: 5 TABLET ORAL at 08:56

## 2021-10-23 RX ADMIN — SERTRALINE HYDROCHLORIDE 100 MG: 50 TABLET ORAL at 08:56

## 2021-10-23 RX ADMIN — GABAPENTIN 300 MG: 300 CAPSULE ORAL at 14:06

## 2021-10-23 RX ADMIN — Medication 0.7 MCG/KG/HR: at 02:06

## 2021-10-23 RX ADMIN — CEFTRIAXONE 2000 MG: 2 INJECTION, POWDER, FOR SOLUTION INTRAMUSCULAR; INTRAVENOUS at 11:57

## 2021-10-23 RX ADMIN — PANTOPRAZOLE SODIUM 40 MG: 40 INJECTION, POWDER, FOR SOLUTION INTRAVENOUS at 08:56

## 2021-10-23 RX ADMIN — IPRATROPIUM BROMIDE AND ALBUTEROL SULFATE 1 AMPULE: .5; 2.5 SOLUTION RESPIRATORY (INHALATION) at 15:27

## 2021-10-23 RX ADMIN — LACTULOSE 20 G: 20 SOLUTION ORAL at 14:06

## 2021-10-23 RX ADMIN — ENOXAPARIN SODIUM 120 MG: 150 INJECTION SUBCUTANEOUS at 08:59

## 2021-10-23 ASSESSMENT — PULMONARY FUNCTION TESTS
PIF_VALUE: 20
PIF_VALUE: 21
PIF_VALUE: 20
PIF_VALUE: 23
PIF_VALUE: 21
PIF_VALUE: 21
PIF_VALUE: 28
PIF_VALUE: 21
PIF_VALUE: 21
PIF_VALUE: 20
PIF_VALUE: 24
PIF_VALUE: 21
PIF_VALUE: 22
PIF_VALUE: 23
PIF_VALUE: 22
PIF_VALUE: 23
PIF_VALUE: 21
PIF_VALUE: 19

## 2021-10-23 ASSESSMENT — PAIN SCALES - GENERAL
PAINLEVEL_OUTOF10: 0
PAINLEVEL_OUTOF10: 0
PAINLEVEL_OUTOF10: 4

## 2021-10-23 NOTE — PROGRESS NOTES
CRITICAL CARE PROGRESS NOTE    The patient's case was discussed in multidisciplinary rounds including critical care specialist, nursing, RT and pharmacy. His evaluation is as follows:     1. Acute on chronic hypoxic and hypercapnic respiratory failure  2. Alcohol withdrawal  3. New onset atrial fibrillation  4. History of thrombocytopenia  5. Morbid obesity with obesity hypoventilation syndrome  6. Metabolic encephalopathy  7. Acute kidney injury  (resolved)  8. Aspiration pneumonia  9. UTI  10. Small pericardial effusion  11. Hematuria  12. 2.8 X 2.8 cm AAA  13. Small bilateral pleural effusions  14. Elevated liver enzymes      --On precedex  --Has H. Influenzae and MSSA in sputum, still febrile, will obtain blood cultures, will need LP if encephalopathy not improving. --Remains on mechanical ventilation with lung protective strategy  --Will start lactulose      DVT prophylaxis with enoxaparin  Nutrition: Tube feedings   Vascular catheters: TLC in right IJ  Urinary catheter present   Goals of care: Full code    /69   Pulse 75   Temp 99 °F (37.2 °C) (Core)   Resp 18   Ht 5' 11\" (1.803 m)   Wt 264 lb 12.8 oz (120.1 kg)   SpO2 96%   BMI 36.93 kg/m²   General:  Comatose, sedated and intubated  HEENT: No head lesions, PERRL, EOMI, mouth with ETT, no nasal lesions, no cervical adenopathy palpated  Respiratory: Lungs with equal breath sounds bilaterally, no adventitious sounds auscultated, no accessory muscle use  CV: Regular rate, no murmurs, no JVD, no leg edema  Abdomen: Soft, non tender, + bowel sounds, no lesions  Skin: Hydrated, adequate turgor, no rash, capillary refill <2 seconds  Extremities: Muscular strength: Flaccid limbsdistal pulses present  Neurology: Comatose, neck is supple, no meningitic signs present.       Last 3 CMP:  Recent Labs     10/21/21  0500 10/22/21  0432 10/23/21  0442    133 136   K 4.0 3.7 3.8   CL 97* 97* 99   CO2 27 26 29   BUN 28* 34* 35*   CREATININE 0.9 0.8 0.8   GLUCOSE 104* 123* 128*   CALCIUM 9.3 9.0 8.8   PROT  --  6.2* 6.0*   LABALBU  --  2.8* 2.8*   BILITOT  --  0.8 0.5   ALKPHOS  --  57 58   AST  --  18 55*   ALT  --  11 33     Recent Labs     10/23/21  0441   WBC 8.6   RBC 3.64*   HGB 11.4*   HCT 34.7*   MCV 95.3   MCH 31.3   MCHC 32.9   RDW 15.8*      MPV 8.9       No results for input(s): BC in the last 72 hours. No results for input(s): [place] in the last 72 hours.     24 HR INTAKE/OUTPUT:      Intake/Output Summary (Last 24 hours) at 10/23/2021 1237  Last data filed at 10/23/2021 1005  Gross per 24 hour   Intake 1463 ml   Output 1800 ml   Net -337 ml     MEDICATIONS:   lactulose  20 g Oral BID    gabapentin  300 mg Oral TID    sertraline  100 mg Oral Daily    sodium chloride flush  5-40 mL IntraVENous 2 times per day    cefTRIAXone (ROCEPHIN) IV  2,000 mg IntraVENous Q24H    enoxaparin  1 mg/kg SubCUTAneous BID    [Held by provider] topiramate  50 mg Oral BID    [Held by provider] baclofen  5 mg Oral BID    [Held by provider] diazePAM  5 mg Oral Q12H    thiamine (VITAMIN B1) IVPB  500 mg IntraVENous Q24H    pantoprazole  40 mg IntraVENous BID    And    sodium chloride (PF)  10 mL IntraVENous BID    folic acid IVPB  1 mg IntraVENous Daily    ipratropium-albuterol  1 ampule Inhalation Q4H      dexmedetomidine HCl in NaCl 0.7 mcg/kg/hr (10/23/21 1221)    sodium chloride      sodium chloride Stopped (10/19/21 0708)     sodium chloride flush, sodium chloride, perflutren lipid microspheres, ondansetron **OR** ondansetron, polyethylene glycol, acetaminophen **OR** acetaminophen    OBJECTIVE:  Vitals:    10/23/21 1200   BP: 108/69   Pulse: 75   Resp: 18   Temp: 99 °F (37.2 °C)   SpO2: 96%     FiO2 : 40 %  O2 Flow Rate (L/min): 5 L/min  O2 Device: Ventilator        LABS:  WBC   Date Value Ref Range Status   10/23/2021 8.6 4.5 - 11.5 E9/L Final   10/22/2021 10.0 4.5 - 11.5 E9/L Final   10/21/2021 7.6 4.5 - 11.5 E9/L Final Hemoglobin   Date Value Ref Range Status   10/23/2021 11.4 (L) 12.5 - 16.5 g/dL Final   10/22/2021 12.3 (L) 12.5 - 16.5 g/dL Final   10/21/2021 12.1 (L) 12.5 - 16.5 g/dL Final     Hematocrit   Date Value Ref Range Status   10/23/2021 34.7 (L) 37.0 - 54.0 % Final   10/22/2021 37.0 37.0 - 54.0 % Final   10/21/2021 35.9 (L) 37.0 - 54.0 % Final     MCV   Date Value Ref Range Status   10/23/2021 95.3 80.0 - 99.9 fL Final   10/22/2021 93.0 80.0 - 99.9 fL Final   10/21/2021 92.8 80.0 - 99.9 fL Final     Platelets   Date Value Ref Range Status   10/23/2021 220 130 - 450 E9/L Final   10/22/2021 255 130 - 450 E9/L Final   10/21/2021 271 130 - 450 E9/L Final     Sodium   Date Value Ref Range Status   10/23/2021 136 132 - 146 mmol/L Final   10/22/2021 133 132 - 146 mmol/L Final   10/21/2021 134 132 - 146 mmol/L Final     Potassium   Date Value Ref Range Status   10/23/2021 3.8 3.5 - 5.0 mmol/L Final   10/22/2021 3.7 3.5 - 5.0 mmol/L Final   10/21/2021 4.0 3.5 - 5.0 mmol/L Final     Potassium reflex Magnesium   Date Value Ref Range Status   10/12/2021 4.3 3.5 - 5.0 mmol/L Final     Chloride   Date Value Ref Range Status   10/23/2021 99 98 - 107 mmol/L Final   10/22/2021 97 (L) 98 - 107 mmol/L Final   10/21/2021 97 (L) 98 - 107 mmol/L Final     CO2   Date Value Ref Range Status   10/23/2021 29 22 - 29 mmol/L Final   10/22/2021 26 22 - 29 mmol/L Final   10/21/2021 27 22 - 29 mmol/L Final     BUN   Date Value Ref Range Status   10/23/2021 35 (H) 6 - 23 mg/dL Final   10/22/2021 34 (H) 6 - 23 mg/dL Final   10/21/2021 28 (H) 6 - 23 mg/dL Final     CREATININE   Date Value Ref Range Status   10/23/2021 0.8 0.7 - 1.2 mg/dL Final   10/22/2021 0.8 0.7 - 1.2 mg/dL Final   10/21/2021 0.9 0.7 - 1.2 mg/dL Final     Glucose   Date Value Ref Range Status   10/23/2021 128 (H) 74 - 99 mg/dL Final   10/22/2021 123 (H) 74 - 99 mg/dL Final   10/21/2021 104 (H) 74 - 99 mg/dL Final     Calcium   Date Value Ref Range Status   10/23/2021 8.8 8.6 - 10.2 mg/dL Final   10/22/2021 9.0 8.6 - 10.2 mg/dL Final   10/21/2021 9.3 8.6 - 10.2 mg/dL Final     Total Protein   Date Value Ref Range Status   10/23/2021 6.0 (L) 6.4 - 8.3 g/dL Final   10/22/2021 6.2 (L) 6.4 - 8.3 g/dL Final   10/12/2021 7.0 6.4 - 8.3 g/dL Final     Albumin   Date Value Ref Range Status   10/23/2021 2.8 (L) 3.5 - 5.2 g/dL Final   10/22/2021 2.8 (L) 3.5 - 5.2 g/dL Final   10/12/2021 4.2 3.5 - 5.2 g/dL Final     Total Bilirubin   Date Value Ref Range Status   10/23/2021 0.5 0.0 - 1.2 mg/dL Final   10/22/2021 0.8 0.0 - 1.2 mg/dL Final   10/12/2021 1.0 0.0 - 1.2 mg/dL Final     Alkaline Phosphatase   Date Value Ref Range Status   10/23/2021 58 40 - 129 U/L Final   10/22/2021 57 40 - 129 U/L Final   10/12/2021 69 40 - 129 U/L Final     AST   Date Value Ref Range Status   10/23/2021 55 (H) 0 - 39 U/L Final   10/22/2021 18 0 - 39 U/L Final   10/12/2021 40 (H) 0 - 39 U/L Final     ALT   Date Value Ref Range Status   10/23/2021 33 0 - 40 U/L Final   10/22/2021 11 0 - 40 U/L Final   10/12/2021 26 0 - 40 U/L Final     GFR Non-   Date Value Ref Range Status   10/23/2021 >60 >=60 mL/min/1.73 Final     Comment:     Chronic Kidney Disease: less than 60 ml/min/1.73 sq.m. Kidney Failure: less than 15 ml/min/1.73 sq.m. Results valid for patients 18 years and older. 10/22/2021 >60 >=60 mL/min/1.73 Final     Comment:     Chronic Kidney Disease: less than 60 ml/min/1.73 sq.m. Kidney Failure: less than 15 ml/min/1.73 sq.m. Results valid for patients 18 years and older. 10/21/2021 >60 >=60 mL/min/1.73 Final     Comment:     Chronic Kidney Disease: less than 60 ml/min/1.73 sq.m. Kidney Failure: less than 15 ml/min/1.73 sq.m. Results valid for patients 18 years and older.        GFR    Date Value Ref Range Status   10/23/2021 >60  Final   10/22/2021 >60  Final   10/21/2021 >60  Final     Magnesium   Date Value Ref Range Status   10/22/2021 1.9 1.6 - 2.6 mg/dL Final   10/15/2021 1.7 1.6 - 2.6 mg/dL Final   10/13/2021 1.6 1.6 - 2.6 mg/dL Final     Phosphorus   Date Value Ref Range Status   10/23/2021 2.8 2.5 - 4.5 mg/dL Final   10/22/2021 3.5 2.5 - 4.5 mg/dL Final   10/21/2021 3.0 2.5 - 4.5 mg/dL Final     Recent Labs     10/21/21  1933   PH 7.505*   PO2 82.0   PCO2 30.5*   HCO3 23.5   BE 1.2   O2SAT 95.0       RADIOLOGY:  CT HEAD WO CONTRAST   Final Result   1. No acute intracranial abnormality. 2. Mucosal disease of the paranasal sinuses. XR ABDOMEN FOR NG/OG/NE TUBE PLACEMENT   Final Result   Satisfactory position of nasogastric tube. XR CHEST PORTABLE   Final Result   1. No interval change in the vague bilateral lower lobe airspace disease. 2. Possible trace bilateral pleural effusions. 3. The NG tube has been removed. US GALLBLADDER RUQ   Final Result   Mild fatty liver. No sonographic evidence of acute cholecystitis. CT ABDOMEN PELVIS W IV CONTRAST Additional Contrast? None   Final Result   Cardiomegaly with infiltrates and pleural effusions in the lung bases likely   pneumonia or mild CHF. There is no hiatal hernia. Distended gallbladder with mild wall thickening. Consider ultrasonography to   evaluate for cholecystitis. Diverticulosis of the colon with mild thickening of the sigmoid colon which   could be due to spasm or uncomplicated diverticulitis. 2.8 x 2.8 cm abdominal aortic aneurysm. RECOMMENDATIONS:   Abdominal aortic aneurysm. 5 year surveillance is recommended. XR CHEST PORTABLE   Final Result   Worsening left greater than right perihilar opacities which may represent   asymmetric edema or pneumonia. Continued follow-up recommended. XR ABDOMEN (KUB) (SINGLE AP VIEW)   Final Result   No significant changes detailed above. XR CHEST ABDOMEN NG PLACEMENT   Final Result   The enteric tube terminates in the region of the proximal stomach.   Recommend consideration of advancement by at least 6 cm to ensure intragastric location   of the side hole. XR CHEST PORTABLE   Final Result   ET tube terminates between the clavicular heads and the karo. Enteric tube is poorly visualized at the GE junction and below; if position   of enteric tube tip needs to be known then KUB would better evaluate for it. Stable mild pulmonary vascular congestion. Stable small pleural effusions. Stable cardiomegaly. XR CHEST PORTABLE   Final Result   No interval change compared to prior exam.      Persistent small bilateral pleural effusions. Ongoing mild-to-moderate pulmonary edema. XR CHEST PORTABLE   Final Result   New right IJ line with the distal tip in the SVC and no pneumothorax. Slightly more confluent airspace disease in the right lung base. No other significant interval change. XR CHEST PORTABLE   Final Result   1. Satisfactory position of the NG tube and endotracheal tube   2. New opacity within the right lung base which could represent a pleural   effusion and or right lung base infiltrate. XR CHEST PORTABLE   Final Result   1. Cardiomegaly. There is no evidence of failure or pneumonia. CT Head WO Contrast   Final Result   1. There is no acute intracranial abnormality. Specifically, there is no   intracranial hemorrhage. 2. Atrophy and periventricular leukomalacia,   . PROBLEM LIST:  Active Problems:    Seizures ([address]  Resolved Problems:    * No resolved hospital problems. *      ATTESTATION:  ICU Staff Physician note of personal involvement in Care  As the attending physician, I certify that I personally reviewed the patients history and personnally examined the patient to confirm the physical findings described above,  And that I reviewed the relevant imaging studies and available reports.   I also discussed the differential diagnosis and all of the proposed management plans with the patient and individuals accompanying the patient to this visit. They had the opportunity to ask questions about the proposed management plans and to have those questions answered. This patient has a high probability of sudden, clinically significant deterioration, which requires the highest level of physician preparedness to intervene urgently. I managed/supervised life or organ supporting interventions that required frequent physician assessment. I devoted my full attention to the direct care of this patient for the amount of time indicated below. Time I spent with the family or surrogate(s) is included only if the patient was incapable of providing the necessary information or participating in medical decisions  Time devoted to teaching and to any procedures I billed separately is not included.      CRITICAL CARE TIME:  30 minutes    Husam Ivey MD  Pulmonary and Critical Care Medicine

## 2021-10-23 NOTE — PLAN OF CARE
Problem: Airway Clearance - Ineffective:  Goal: Ability to maintain a clear airway will improve  Description: Ability to maintain a clear airway will improve  10/23/2021 0411 by Alban Irvin RN  Outcome: Met This Shift  10/23/2021 0411 by Alban rIvin RN  Outcome: Met This Shift     Problem: Respiratory Function - Compromised:  Goal: Ability to maintain a clear airway will improve  Description: Ability to maintain a clear airway will improve  10/23/2021 0411 by Alban Irvin RN  Outcome: Met This Shift  10/23/2021 0411 by Alban Irvin RN  Outcome: Met This Shift     Problem: Respiratory Function - Compromised:  Goal: Levels of oxygenation will improve  Description: Levels of oxygenation will improve  Outcome: Met This Shift     Problem: Respiratory Function - Compromised:  Goal: Absence of pulmonary infection  Description: Absence of pulmonary infection  Outcome: Ongoing     Problem: Verbal Communication - Impaired:  Goal: Functional communication will improve  Description: Functional communication will improve  Outcome: Not Met This Shift     Problem: Verbal Communication - Impaired:  Goal: Ability to interact with others will improve  Description: Ability to interact with others will improve  Outcome: Not Met This Shift     Problem: Verbal Communication - Impaired:  Goal: Ability to establish a method of communication will improve  Description: Ability to establish a method of communication will improve  Outcome: Not Met This Shift     Problem: Respiratory Function - Compromised:  Goal: Increase in ventilator-free time  Description: Increase in ventilator-free time  Outcome: Not Met This Shift

## 2021-10-23 NOTE — PROGRESS NOTES
Department of Internal Medicine  General Internal Medicine  Attending Progress Note  Chief Complaint   Patient presents with    Delirium Tremens (DTS)     Pt states he usually drinks a fifth of kilo beam a day states he quit saturday. states he has been having visual hallucinations since yesterday. SUBJECTIVE:    Still intubated.      OBJECTIVE      Medications    Current Facility-Administered Medications: lactulose (CHRONULAC) 10 GM/15ML solution 20 g, 20 g, Oral, BID  gabapentin (NEURONTIN) capsule 300 mg, 300 mg, Oral, TID  sertraline (ZOLOFT) tablet 100 mg, 100 mg, Oral, Daily  dexmedetomidine (PRECEDEX) 400 mcg in sodium chloride 0.9 % 100 mL infusion, 0.2-1.4 mcg/kg/hr, IntraVENous, Continuous  sodium chloride flush 0.9 % injection 5-40 mL, 5-40 mL, IntraVENous, 2 times per day  sodium chloride flush 0.9 % injection 5-40 mL, 5-40 mL, IntraVENous, PRN  0.9 % sodium chloride infusion, 25 mL, IntraVENous, PRN  cefTRIAXone (ROCEPHIN) 2,000 mg in sterile water 20 mL IV syringe, 2,000 mg, IntraVENous, Q24H  enoxaparin (LOVENOX) injection 120 mg, 1 mg/kg, SubCUTAneous, BID  0.9 % sodium chloride infusion, 25 mL, IntraVENous, Q8H  [Held by provider] topiramate (TOPAMAX) tablet 50 mg, 50 mg, Oral, BID  [Held by provider] baclofen (LIORESAL) tablet 5 mg, 5 mg, Oral, BID  [Held by provider] diazePAM (VALIUM) tablet 5 mg, 5 mg, Oral, Q12H  perflutren lipid microspheres (DEFINITY) injection 1.65 mg, 1.5 mL, IntraVENous, ONCE PRN  thiamine (B-1) 500 mg in sodium chloride 0.9 % 100 mL IVPB, 500 mg, IntraVENous, Q24H  pantoprazole (PROTONIX) injection 40 mg, 40 mg, IntraVENous, BID **AND** sodium chloride (PF) 0.9 % injection 10 mL, 10 mL, IntraVENous, BID  folic acid 1 mg in dextrose 5 % 50 mL IVPB, 1 mg, IntraVENous, Daily  ondansetron (ZOFRAN-ODT) disintegrating tablet 4 mg, 4 mg, Oral, Q8H PRN **OR** ondansetron (ZOFRAN) injection 4 mg, 4 mg, IntraVENous, Q6H PRN  polyethylene glycol (GLYCOLAX) packet 17 g, 17 g, Oral, Daily PRN  acetaminophen (TYLENOL) tablet 650 mg, 650 mg, Oral, Q6H PRN **OR** acetaminophen (TYLENOL) suppository 650 mg, 650 mg, Rectal, Q6H PRN  ipratropium-albuterol (DUONEB) nebulizer solution 1 ampule, 1 ampule, Inhalation, Q4H  Physical    VITALS:  /81   Pulse 75   Temp 99 °F (37.2 °C) (Core)   Resp 18   Ht 5' 11\" (1.803 m)   Wt 264 lb 12.8 oz (120.1 kg)   SpO2 97%   BMI 36.93 kg/m²   CONSTITUTIONAL:  Intubated/sedated  ENT:  normocepalic, without obvious abnormality  NECK:  supple, symmetrical, trachea midline  LUNGS:  clear to auscultation, Anteriorly  CARDIOVASCULAR:  normal apical pulses and normal S1 and S2  ABDOMEN:  normal bowel sounds, non-distended and non-tender  MUSCULOSKELETAL:  Trace lower extremity edema  NEUROLOGIC:  Unable to assess  Data    CBC:   Lab Results   Component Value Date    WBC 8.6 10/23/2021    RBC 3.64 10/23/2021    HGB 11.4 10/23/2021    HCT 34.7 10/23/2021    MCV 95.3 10/23/2021    MCH 31.3 10/23/2021    MCHC 32.9 10/23/2021    RDW 15.8 10/23/2021     10/23/2021    MPV 8.9 10/23/2021     BMP:    Lab Results   Component Value Date     10/23/2021    K 3.8 10/23/2021    K 4.3 10/12/2021    CL 99 10/23/2021    CO2 29 10/23/2021    BUN 35 10/23/2021    LABALBU 2.8 10/23/2021    CREATININE 0.8 10/23/2021    CALCIUM 8.8 10/23/2021    GFRAA >60 10/23/2021    LABGLOM >60 10/23/2021    GLUCOSE 128 10/23/2021       ASSESSMENT AND PLAN        1. Acute respiratory failure with hypoxia  2. Alcohol abuse/withdrawal  3. Acute Encephalopathy  4. Atrial Fibrillation: rate controlled  5. Aspiration PNA:  6.  Morbid Obesity:  7.  ? Hx of Seizure:  8.   Malnutrition:    Plans:  Continue current abx  Vent by ICU  Tube feeding in place  GELY has resolved  On precedx

## 2021-10-24 LAB
ALBUMIN SERPL-MCNC: 2.7 G/DL (ref 3.5–5.2)
ALP BLD-CCNC: 62 U/L (ref 40–129)
ALT SERPL-CCNC: 61 U/L (ref 0–40)
ANION GAP SERPL CALCULATED.3IONS-SCNC: 10 MMOL/L (ref 7–16)
AST SERPL-CCNC: 73 U/L (ref 0–39)
BILIRUB SERPL-MCNC: 0.5 MG/DL (ref 0–1.2)
BUN BLDV-MCNC: 29 MG/DL (ref 6–23)
CALCIUM SERPL-MCNC: 8.8 MG/DL (ref 8.6–10.2)
CHLORIDE BLD-SCNC: 103 MMOL/L (ref 98–107)
CO2: 27 MMOL/L (ref 22–29)
CREAT SERPL-MCNC: 0.7 MG/DL (ref 0.7–1.2)
GFR AFRICAN AMERICAN: >60
GFR NON-AFRICAN AMERICAN: >60 ML/MIN/1.73
GLUCOSE BLD-MCNC: 134 MG/DL (ref 74–99)
HCT VFR BLD CALC: 35.6 % (ref 37–54)
HEMOGLOBIN: 11.5 G/DL (ref 12.5–16.5)
MCH RBC QN AUTO: 31.4 PG (ref 26–35)
MCHC RBC AUTO-ENTMCNC: 32.3 % (ref 32–34.5)
MCV RBC AUTO: 97.3 FL (ref 80–99.9)
PDW BLD-RTO: 15.9 FL (ref 11.5–15)
PHOSPHORUS: 3.1 MG/DL (ref 2.5–4.5)
PLATELET # BLD: 269 E9/L (ref 130–450)
PMV BLD AUTO: 9.2 FL (ref 7–12)
POTASSIUM SERPL-SCNC: 3.5 MMOL/L (ref 3.5–5)
RBC # BLD: 3.66 E12/L (ref 3.8–5.8)
SODIUM BLD-SCNC: 140 MMOL/L (ref 132–146)
TOTAL PROTEIN: 5.9 G/DL (ref 6.4–8.3)
WBC # BLD: 9.3 E9/L (ref 4.5–11.5)

## 2021-10-24 PROCEDURE — 99232 SBSQ HOSP IP/OBS MODERATE 35: CPT | Performed by: INTERNAL MEDICINE

## 2021-10-24 PROCEDURE — 85027 COMPLETE CBC AUTOMATED: CPT

## 2021-10-24 PROCEDURE — C9113 INJ PANTOPRAZOLE SODIUM, VIA: HCPCS | Performed by: NURSE PRACTITIONER

## 2021-10-24 PROCEDURE — 6360000002 HC RX W HCPCS: Performed by: NURSE PRACTITIONER

## 2021-10-24 PROCEDURE — 2500000003 HC RX 250 WO HCPCS: Performed by: EMERGENCY MEDICINE

## 2021-10-24 PROCEDURE — 6370000000 HC RX 637 (ALT 250 FOR IP): Performed by: NURSE PRACTITIONER

## 2021-10-24 PROCEDURE — 36415 COLL VENOUS BLD VENIPUNCTURE: CPT

## 2021-10-24 PROCEDURE — 6360000002 HC RX W HCPCS: Performed by: INTERNAL MEDICINE

## 2021-10-24 PROCEDURE — 2500000003 HC RX 250 WO HCPCS: Performed by: INTERNAL MEDICINE

## 2021-10-24 PROCEDURE — 84100 ASSAY OF PHOSPHORUS: CPT

## 2021-10-24 PROCEDURE — 2580000003 HC RX 258: Performed by: NURSE PRACTITIONER

## 2021-10-24 PROCEDURE — 80053 COMPREHEN METABOLIC PANEL: CPT

## 2021-10-24 PROCEDURE — 36592 COLLECT BLOOD FROM PICC: CPT

## 2021-10-24 PROCEDURE — 94640 AIRWAY INHALATION TREATMENT: CPT

## 2021-10-24 PROCEDURE — 2000000000 HC ICU R&B

## 2021-10-24 PROCEDURE — 6370000000 HC RX 637 (ALT 250 FOR IP): Performed by: INTERNAL MEDICINE

## 2021-10-24 PROCEDURE — 2580000003 HC RX 258: Performed by: EMERGENCY MEDICINE

## 2021-10-24 PROCEDURE — 2580000003 HC RX 258: Performed by: INTERNAL MEDICINE

## 2021-10-24 PROCEDURE — 94003 VENT MGMT INPAT SUBQ DAY: CPT

## 2021-10-24 PROCEDURE — 2580000003 HC RX 258: Performed by: SURGERY

## 2021-10-24 RX ADMIN — CEFTRIAXONE 2000 MG: 2 INJECTION, POWDER, FOR SOLUTION INTRAMUSCULAR; INTRAVENOUS at 11:29

## 2021-10-24 RX ADMIN — ENOXAPARIN SODIUM 120 MG: 150 INJECTION SUBCUTANEOUS at 10:14

## 2021-10-24 RX ADMIN — GABAPENTIN 300 MG: 300 CAPSULE ORAL at 20:16

## 2021-10-24 RX ADMIN — THIAMINE HYDROCHLORIDE 500 MG: 100 INJECTION, SOLUTION INTRAMUSCULAR; INTRAVENOUS at 09:55

## 2021-10-24 RX ADMIN — Medication 10 ML: at 09:55

## 2021-10-24 RX ADMIN — Medication 0.7 MCG/KG/HR: at 18:33

## 2021-10-24 RX ADMIN — IPRATROPIUM BROMIDE AND ALBUTEROL SULFATE 1 AMPULE: .5; 2.5 SOLUTION RESPIRATORY (INHALATION) at 17:02

## 2021-10-24 RX ADMIN — POTASSIUM BICARBONATE 40 MEQ: 782 TABLET, EFFERVESCENT ORAL at 11:29

## 2021-10-24 RX ADMIN — IPRATROPIUM BROMIDE AND ALBUTEROL SULFATE 1 AMPULE: .5; 2.5 SOLUTION RESPIRATORY (INHALATION) at 13:26

## 2021-10-24 RX ADMIN — IPRATROPIUM BROMIDE AND ALBUTEROL SULFATE 1 AMPULE: .5; 2.5 SOLUTION RESPIRATORY (INHALATION) at 01:54

## 2021-10-24 RX ADMIN — LACTULOSE 20 G: 20 SOLUTION ORAL at 20:16

## 2021-10-24 RX ADMIN — PANTOPRAZOLE SODIUM 40 MG: 40 INJECTION, POWDER, FOR SOLUTION INTRAVENOUS at 10:14

## 2021-10-24 RX ADMIN — Medication 0.7 MCG/KG/HR: at 09:52

## 2021-10-24 RX ADMIN — IPRATROPIUM BROMIDE AND ALBUTEROL SULFATE 1 AMPULE: .5; 2.5 SOLUTION RESPIRATORY (INHALATION) at 21:41

## 2021-10-24 RX ADMIN — IPRATROPIUM BROMIDE AND ALBUTEROL SULFATE 1 AMPULE: .5; 2.5 SOLUTION RESPIRATORY (INHALATION) at 06:15

## 2021-10-24 RX ADMIN — SERTRALINE HYDROCHLORIDE 100 MG: 50 TABLET ORAL at 10:45

## 2021-10-24 RX ADMIN — IPRATROPIUM BROMIDE AND ALBUTEROL SULFATE 1 AMPULE: .5; 2.5 SOLUTION RESPIRATORY (INHALATION) at 09:21

## 2021-10-24 RX ADMIN — GABAPENTIN 300 MG: 300 CAPSULE ORAL at 15:00

## 2021-10-24 RX ADMIN — ENOXAPARIN SODIUM 120 MG: 150 INJECTION SUBCUTANEOUS at 20:16

## 2021-10-24 RX ADMIN — Medication 10 ML: at 20:19

## 2021-10-24 RX ADMIN — LACTULOSE 20 G: 20 SOLUTION ORAL at 11:24

## 2021-10-24 RX ADMIN — PANTOPRAZOLE SODIUM 40 MG: 40 INJECTION, POWDER, FOR SOLUTION INTRAVENOUS at 20:16

## 2021-10-24 RX ADMIN — Medication 0.7 MCG/KG/HR: at 03:58

## 2021-10-24 RX ADMIN — GABAPENTIN 300 MG: 300 CAPSULE ORAL at 10:45

## 2021-10-24 RX ADMIN — Medication 0.7 MCG/KG/HR: at 13:50

## 2021-10-24 RX ADMIN — FOLIC ACID 1 MG: 5 INJECTION, SOLUTION INTRAMUSCULAR; INTRAVENOUS; SUBCUTANEOUS at 09:55

## 2021-10-24 RX ADMIN — SODIUM CHLORIDE, PRESERVATIVE FREE 10 ML: 5 INJECTION INTRAVENOUS at 09:55

## 2021-10-24 RX ADMIN — SODIUM CHLORIDE, PRESERVATIVE FREE 10 ML: 5 INJECTION INTRAVENOUS at 20:16

## 2021-10-24 ASSESSMENT — PULMONARY FUNCTION TESTS
PIF_VALUE: 19
PIF_VALUE: 23
PIF_VALUE: 20
PIF_VALUE: 25
PIF_VALUE: 34
PIF_VALUE: 17
PIF_VALUE: 23
PIF_VALUE: 27
PIF_VALUE: 21
PIF_VALUE: 22
PIF_VALUE: 51
PIF_VALUE: 25
PIF_VALUE: 21
PIF_VALUE: 20
PIF_VALUE: 20
PIF_VALUE: 23
PIF_VALUE: 29
PIF_VALUE: 23
PIF_VALUE: 21

## 2021-10-24 ASSESSMENT — PAIN SCALES - GENERAL
PAINLEVEL_OUTOF10: 0
PAINLEVEL_OUTOF10: 0
PAINLEVEL_OUTOF10: 4

## 2021-10-24 NOTE — PROGRESS NOTES
Department of Internal Medicine  General Internal Medicine  Attending Progress Note  Chief Complaint   Patient presents with    Delirium Tremens (DTS)     Pt states he usually drinks a fifth of kilo beam a day states he quit saturday. states he has been having visual hallucinations since yesterday. SUBJECTIVE:    Intubated.      OBJECTIVE      Medications    Current Facility-Administered Medications: lactulose (CHRONULAC) 10 GM/15ML solution 20 g, 20 g, Oral, BID  gabapentin (NEURONTIN) capsule 300 mg, 300 mg, Oral, TID  sertraline (ZOLOFT) tablet 100 mg, 100 mg, Oral, Daily  dexmedetomidine (PRECEDEX) 400 mcg in sodium chloride 0.9 % 100 mL infusion, 0.2-1.4 mcg/kg/hr, IntraVENous, Continuous  sodium chloride flush 0.9 % injection 5-40 mL, 5-40 mL, IntraVENous, 2 times per day  sodium chloride flush 0.9 % injection 5-40 mL, 5-40 mL, IntraVENous, PRN  0.9 % sodium chloride infusion, 25 mL, IntraVENous, PRN  cefTRIAXone (ROCEPHIN) 2,000 mg in sterile water 20 mL IV syringe, 2,000 mg, IntraVENous, Q24H  enoxaparin (LOVENOX) injection 120 mg, 1 mg/kg, SubCUTAneous, BID  0.9 % sodium chloride infusion, 25 mL, IntraVENous, Q8H  [Held by provider] topiramate (TOPAMAX) tablet 50 mg, 50 mg, Oral, BID  [Held by provider] baclofen (LIORESAL) tablet 5 mg, 5 mg, Oral, BID  [Held by provider] diazePAM (VALIUM) tablet 5 mg, 5 mg, Oral, Q12H  perflutren lipid microspheres (DEFINITY) injection 1.65 mg, 1.5 mL, IntraVENous, ONCE PRN  thiamine (B-1) 500 mg in sodium chloride 0.9 % 100 mL IVPB, 500 mg, IntraVENous, Q24H  pantoprazole (PROTONIX) injection 40 mg, 40 mg, IntraVENous, BID **AND** sodium chloride (PF) 0.9 % injection 10 mL, 10 mL, IntraVENous, BID  folic acid 1 mg in dextrose 5 % 50 mL IVPB, 1 mg, IntraVENous, Daily  ondansetron (ZOFRAN-ODT) disintegrating tablet 4 mg, 4 mg, Oral, Q8H PRN **OR** ondansetron (ZOFRAN) injection 4 mg, 4 mg, IntraVENous, Q6H PRN  polyethylene glycol (GLYCOLAX) packet 17 g, 17 g, Oral, Daily PRN  acetaminophen (TYLENOL) tablet 650 mg, 650 mg, Oral, Q6H PRN **OR** acetaminophen (TYLENOL) suppository 650 mg, 650 mg, Rectal, Q6H PRN  ipratropium-albuterol (DUONEB) nebulizer solution 1 ampule, 1 ampule, Inhalation, Q4H  Physical    VITALS:  BP (!) 128/90   Pulse 86   Temp 99.7 °F (37.6 °C) (Bladder)   Resp 18   Ht 5' 11\" (1.803 m)   Wt 262 lb 12.6 oz (119.2 kg)   SpO2 96%   BMI 36.65 kg/m²   CONSTITUTIONAL:  awake and alert  EYES:  extra-ocular muscles intact and vision intact  ENT:  normocepalic, without obvious abnormality  NECK:  supple, symmetrical, trachea midline  LUNGS:  no increased work of breathing and clear to auscultation  CARDIOVASCULAR:  normal apical pulses and normal S1 and S2  ABDOMEN:  normal bowel sounds, non-distended and non-tender  MUSCULOSKELETAL:  there is no redness, warmth, or swelling of the joints  NEUROLOGIC:  Mental Status Exam:  Level of Alertness:   awake  Orientation:   person, place, time  SKIN:  no bruising or bleeding  Data    CBC:   Lab Results   Component Value Date    WBC 9.3 10/24/2021    RBC 3.66 10/24/2021    HGB 11.5 10/24/2021    HCT 35.6 10/24/2021    MCV 97.3 10/24/2021    MCH 31.4 10/24/2021    MCHC 32.3 10/24/2021    RDW 15.9 10/24/2021     10/24/2021    MPV 9.2 10/24/2021     BMP:    Lab Results   Component Value Date     10/24/2021    K 3.5 10/24/2021    K 4.3 10/12/2021     10/24/2021    CO2 27 10/24/2021    BUN 29 10/24/2021    LABALBU 2.7 10/24/2021    CREATININE 0.7 10/24/2021    CALCIUM 8.8 10/24/2021    GFRAA >60 10/24/2021    LABGLOM >60 10/24/2021    GLUCOSE 134 10/24/2021       ASSESSMENT AND PLAN    Brief summary of Stay:  73 y. o. male with a history of alcoholism depression hypertension coronary artery disease GERD COPD morbid obesity presents with signs of alcohol withdrawal.  Before he was intubated he gave the ER the history that he began having tremors and hallucinations at 6 AM.  For the past 1 or 2 weeks he has been drinking 1/5 of gin being a day but has not had any drinks since Saturday the only other substances of abuse that he admits to is smoking 1 and half packs of cigarettes per day.  His tremors are getting worse on Saturday he started having leg swelling. In the ER he was known to be having tremors he received Ativan but even before that when he was grabbing at invisible objects he was snoring per the nurse. Augusto Ellington desatted down to 70%.  He was intubated to protect his airway.  He was noted to have A. fib RVR and was given Cardizem drip to good effect. Patient has been weaned off of Cardizem and heart rate has been controlled. Patient is still intubated. 1.  Acute respiratory failure with hypoxia:  2. Atrial fibrillation RVR. 3.  Alcohol withdrawal:  4.  UTI:  5. Acute encephalopathy. 6.  Aspiration pneumonia. 7.  Morbid obesity. 8.  Malnutrition: Currently on tube feeding. Plans:  Tube feeding in place. GELY has resolved. Continue current antibiotics. On Precedex for sedation.   Vent management per ICU team.

## 2021-10-24 NOTE — PLAN OF CARE
Problem: Non-Violent Restraints  Goal: No harm/injury to patient while restraints in use  10/24/2021 1932 by Salo Montalvo RN  Outcome: Met This Shift     Problem: Non-Violent Restraints  Goal: Patient's dignity will be maintained  10/24/2021 1932 by Salo Montalvo RN  Outcome: Met This Shift     Problem: Non-Violent Restraints  Goal: Removal from restraints as soon as assessed to be safe  10/24/2021 1932 by Salo Montalvo RN  Outcome: Not Met This Shift

## 2021-10-25 ENCOUNTER — APPOINTMENT (OUTPATIENT)
Dept: MRI IMAGING | Age: 66
DRG: 004 | End: 2021-10-25
Payer: MEDICARE

## 2021-10-25 LAB
HCT VFR BLD CALC: 35.5 % (ref 37–54)
HEMOGLOBIN: 11.6 G/DL (ref 12.5–16.5)
MCH RBC QN AUTO: 31.4 PG (ref 26–35)
MCHC RBC AUTO-ENTMCNC: 32.7 % (ref 32–34.5)
MCV RBC AUTO: 96.2 FL (ref 80–99.9)
PDW BLD-RTO: 15.9 FL (ref 11.5–15)
PHOSPHORUS: 3.2 MG/DL (ref 2.5–4.5)
PLATELET # BLD: 323 E9/L (ref 130–450)
PMV BLD AUTO: 9.2 FL (ref 7–12)
RBC # BLD: 3.69 E12/L (ref 3.8–5.8)
TRIGL SERPL-MCNC: 141 MG/DL (ref 0–149)
WBC # BLD: 9.3 E9/L (ref 4.5–11.5)

## 2021-10-25 PROCEDURE — 6370000000 HC RX 637 (ALT 250 FOR IP): Performed by: INTERNAL MEDICINE

## 2021-10-25 PROCEDURE — 94003 VENT MGMT INPAT SUBQ DAY: CPT

## 2021-10-25 PROCEDURE — 6360000002 HC RX W HCPCS: Performed by: INTERNAL MEDICINE

## 2021-10-25 PROCEDURE — 97165 OT EVAL LOW COMPLEX 30 MIN: CPT | Performed by: OCCUPATIONAL THERAPIST

## 2021-10-25 PROCEDURE — C9113 INJ PANTOPRAZOLE SODIUM, VIA: HCPCS | Performed by: NURSE PRACTITIONER

## 2021-10-25 PROCEDURE — 97530 THERAPEUTIC ACTIVITIES: CPT | Performed by: OCCUPATIONAL THERAPIST

## 2021-10-25 PROCEDURE — 2500000003 HC RX 250 WO HCPCS: Performed by: INTERNAL MEDICINE

## 2021-10-25 PROCEDURE — 2580000003 HC RX 258: Performed by: NURSE PRACTITIONER

## 2021-10-25 PROCEDURE — 85027 COMPLETE CBC AUTOMATED: CPT

## 2021-10-25 PROCEDURE — 6360000002 HC RX W HCPCS: Performed by: NURSE PRACTITIONER

## 2021-10-25 PROCEDURE — 2580000003 HC RX 258: Performed by: INTERNAL MEDICINE

## 2021-10-25 PROCEDURE — 6360000004 HC RX CONTRAST MEDICATION: Performed by: RADIOLOGY

## 2021-10-25 PROCEDURE — 99221 1ST HOSP IP/OBS SF/LOW 40: CPT | Performed by: SURGERY

## 2021-10-25 PROCEDURE — 2000000000 HC ICU R&B

## 2021-10-25 PROCEDURE — 70553 MRI BRAIN STEM W/O & W/DYE: CPT

## 2021-10-25 PROCEDURE — 2500000003 HC RX 250 WO HCPCS: Performed by: EMERGENCY MEDICINE

## 2021-10-25 PROCEDURE — 2580000003 HC RX 258: Performed by: SURGERY

## 2021-10-25 PROCEDURE — 36592 COLLECT BLOOD FROM PICC: CPT

## 2021-10-25 PROCEDURE — A9577 INJ MULTIHANCE: HCPCS | Performed by: RADIOLOGY

## 2021-10-25 PROCEDURE — 6370000000 HC RX 637 (ALT 250 FOR IP): Performed by: NURSE PRACTITIONER

## 2021-10-25 PROCEDURE — 84100 ASSAY OF PHOSPHORUS: CPT

## 2021-10-25 PROCEDURE — 99232 SBSQ HOSP IP/OBS MODERATE 35: CPT | Performed by: INTERNAL MEDICINE

## 2021-10-25 PROCEDURE — 84478 ASSAY OF TRIGLYCERIDES: CPT

## 2021-10-25 PROCEDURE — 36415 COLL VENOUS BLD VENIPUNCTURE: CPT

## 2021-10-25 PROCEDURE — 2580000003 HC RX 258: Performed by: EMERGENCY MEDICINE

## 2021-10-25 PROCEDURE — 94640 AIRWAY INHALATION TREATMENT: CPT

## 2021-10-25 RX ADMIN — ENOXAPARIN SODIUM 120 MG: 150 INJECTION SUBCUTANEOUS at 21:38

## 2021-10-25 RX ADMIN — GABAPENTIN 300 MG: 300 CAPSULE ORAL at 14:22

## 2021-10-25 RX ADMIN — PANTOPRAZOLE SODIUM 40 MG: 40 INJECTION, POWDER, FOR SOLUTION INTRAVENOUS at 21:39

## 2021-10-25 RX ADMIN — IPRATROPIUM BROMIDE AND ALBUTEROL SULFATE 1 AMPULE: .5; 2.5 SOLUTION RESPIRATORY (INHALATION) at 05:25

## 2021-10-25 RX ADMIN — DILTIAZEM HYDROCHLORIDE 5 MG/HR: 5 INJECTION, SOLUTION INTRAVENOUS at 15:34

## 2021-10-25 RX ADMIN — LACTULOSE 20 G: 20 SOLUTION ORAL at 08:18

## 2021-10-25 RX ADMIN — SERTRALINE HYDROCHLORIDE 100 MG: 50 TABLET ORAL at 08:17

## 2021-10-25 RX ADMIN — Medication 10 ML: at 22:20

## 2021-10-25 RX ADMIN — IPRATROPIUM BROMIDE AND ALBUTEROL SULFATE 1 AMPULE: .5; 2.5 SOLUTION RESPIRATORY (INHALATION) at 09:28

## 2021-10-25 RX ADMIN — GABAPENTIN 300 MG: 300 CAPSULE ORAL at 21:42

## 2021-10-25 RX ADMIN — THIAMINE HYDROCHLORIDE 500 MG: 100 INJECTION, SOLUTION INTRAMUSCULAR; INTRAVENOUS at 08:19

## 2021-10-25 RX ADMIN — Medication 0.7 MCG/KG/HR: at 01:00

## 2021-10-25 RX ADMIN — ENOXAPARIN SODIUM 120 MG: 150 INJECTION SUBCUTANEOUS at 08:18

## 2021-10-25 RX ADMIN — PANTOPRAZOLE SODIUM 40 MG: 40 INJECTION, POWDER, FOR SOLUTION INTRAVENOUS at 08:18

## 2021-10-25 RX ADMIN — GABAPENTIN 300 MG: 300 CAPSULE ORAL at 08:18

## 2021-10-25 RX ADMIN — FOLIC ACID 1 MG: 5 INJECTION, SOLUTION INTRAMUSCULAR; INTRAVENOUS; SUBCUTANEOUS at 08:49

## 2021-10-25 RX ADMIN — LACTULOSE 20 G: 20 SOLUTION ORAL at 21:38

## 2021-10-25 RX ADMIN — Medication 10 ML: at 08:20

## 2021-10-25 RX ADMIN — GADOBENATE DIMEGLUMINE 20 ML: 529 INJECTION, SOLUTION INTRAVENOUS at 16:53

## 2021-10-25 RX ADMIN — CEFTRIAXONE 2000 MG: 2 INJECTION, POWDER, FOR SOLUTION INTRAMUSCULAR; INTRAVENOUS at 11:08

## 2021-10-25 RX ADMIN — SODIUM CHLORIDE, PRESERVATIVE FREE 10 ML: 5 INJECTION INTRAVENOUS at 08:38

## 2021-10-25 RX ADMIN — IPRATROPIUM BROMIDE AND ALBUTEROL SULFATE 1 AMPULE: .5; 2.5 SOLUTION RESPIRATORY (INHALATION) at 01:15

## 2021-10-25 RX ADMIN — SODIUM CHLORIDE, PRESERVATIVE FREE 10 ML: 5 INJECTION INTRAVENOUS at 21:50

## 2021-10-25 RX ADMIN — IPRATROPIUM BROMIDE AND ALBUTEROL SULFATE 1 AMPULE: .5; 2.5 SOLUTION RESPIRATORY (INHALATION) at 22:24

## 2021-10-25 ASSESSMENT — PULMONARY FUNCTION TESTS
PIF_VALUE: 38
PIF_VALUE: 20
PIF_VALUE: 21
PIF_VALUE: 14
PIF_VALUE: 27
PIF_VALUE: 23
PIF_VALUE: 29
PIF_VALUE: 29
PIF_VALUE: 24
PIF_VALUE: 22
PIF_VALUE: 21
PIF_VALUE: 23
PIF_VALUE: 23
PIF_VALUE: 50
PIF_VALUE: 23
PIF_VALUE: 25
PIF_VALUE: 21
PIF_VALUE: 20
PIF_VALUE: 22
PIF_VALUE: 22
PIF_VALUE: 27
PIF_VALUE: 48
PIF_VALUE: 30
PIF_VALUE: 24
PIF_VALUE: 19
PIF_VALUE: 22
PIF_VALUE: 21
PIF_VALUE: 50
PIF_VALUE: 24
PIF_VALUE: 21
PIF_VALUE: 26

## 2021-10-25 ASSESSMENT — PAIN SCALES - GENERAL
PAINLEVEL_OUTOF10: 0

## 2021-10-25 NOTE — PLAN OF CARE
Problem: Skin Integrity:  Goal: Will show no infection signs and symptoms  Description: Will show no infection signs and symptoms  Outcome: Met This Shift     Problem: Skin Integrity:  Goal: Absence of new skin breakdown  Description: Absence of new skin breakdown  Outcome: Met This Shift     Problem: Non-Violent Restraints  Goal: No harm/injury to patient while restraints in use  10/25/2021 1055 by Yared Shields RN  Outcome: Met This Shift     Problem: Non-Violent Restraints  Goal: Patient's dignity will be maintained  10/25/2021 1055 by Yared Shields RN  Outcome: Met This Shift     Problem: Falls - Risk of:  Goal: Will remain free from falls  Description: Will remain free from falls  Outcome: Met This Shift     Problem: Falls - Risk of:  Goal: Absence of physical injury  Description: Absence of physical injury  Outcome: Met This Shift     Problem: Airway Clearance - Ineffective:  Goal: Ability to maintain a clear airway will improve  Description: Ability to maintain a clear airway will improve  Outcome: Met This Shift     Problem: Verbal Communication - Impaired:  Goal: Functional communication will improve  Description: Functional communication will improve  Outcome: Met This Shift     Problem: Verbal Communication - Impaired:  Goal: Ability to interact with others will improve  Description: Ability to interact with others will improve  Outcome: Met This Shift     Problem: Verbal Communication - Impaired:  Goal: Ability to establish a method of communication will improve  Description: Ability to establish a method of communication will improve  Outcome: Met This Shift     Problem: Pain:  Goal: Pain level will decrease  Description: Pain level will decrease  Outcome: Met This Shift     Problem: Autonomic Dysreflexia - Risk of:  Goal: Absence of autonomic dysreflexia signs and symptoms  Description: Absence of autonomic dysreflexia signs and symptoms  Outcome: Met This Shift     Problem: Injury - Risk of, Healthcare-Acquired Condition:  Goal: Will remain free from falls  Description: Will remain free from falls  Outcome: Met This Shift     Problem: Injury - Risk of, Healthcare-Acquired Condition:  Goal: Absence of healthcare acquired conditions  Description: Absence of healthcare acquired conditions  Outcome: Met This Shift     Problem: Injury - Risk of, Healthcare-Acquired Condition:  Goal: Absence of pressure ulcer  Description: Absence of pressure ulcer  Outcome: Met This Shift     Problem: Injury - Risk of, Healthcare-Acquired Condition:  Goal: Demonstration of wound healing without infection will improve  Description: Demonstration of wound healing without infection will improve  Outcome: Met This Shift     Problem: Injury - Risk of, Healthcare-Acquired Condition:  Goal: Absence of urinary tract infection signs and symptoms  Description: Absence of urinary tract infection signs and symptoms  Outcome: Met This Shift     Problem: Nutrition Deficit - Risk of:  Goal: Ability to achieve adequate nutritional intake will improve  Description: Ability to achieve adequate nutritional intake will improve  Outcome: Met This Shift     Problem: Nutrition Deficit - Risk of:  Goal: Maintenance of adequate weight for body size and type will improve to within specified parameters  Description: Maintenance of adequate weight for body size and type will improve to within specified parameters  Outcome: Met This Shift     Problem: Respiratory Function - Compromised:  Goal: Ability to maintain a clear airway will improve  Description: Ability to maintain a clear airway will improve  Outcome: Met This Shift     Problem: Bowel Function - Altered:  Goal: Bowel elimination is within specified parameters  Description: Bowel elimination is within specified parameters  Outcome: Met This Shift     Problem:  Bowel Function - Altered:  Goal: Control of bowel function will improve  Description: Control of bowel function will improve  Outcome: Met This Shift     Problem: Non-Violent Restraints  Goal: Removal from restraints as soon as assessed to be safe  10/25/2021 1055 by Tyler Hardy RN  Outcome: Not Met This Shift     Problem: Pain:  Goal: Recognizes and communicates pain  Description: Recognizes and communicates pain  Outcome: Not Met This Shift     Problem: Mobility - Impaired:  Goal: Able to ambulate independently  Description: Able to ambulate independently  Outcome: Not Met This Shift     Problem: Mobility - Impaired:  Goal: Able to ambulate with minimal assistance  Description: Able to ambulate with minimal assistance  Outcome: Not Met This Shift     Problem: Mobility - Impaired:  Goal: Ability to appropriately use an adaptive device for ambulation will improve  Description: Ability to appropriately use an adaptive device for ambulation will improve  Outcome: Not Met This Shift     Problem: Mobility - Impaired:  Goal: Able to verbalize acceptance of life and situations over which he or she has no control  Description: Absence of contracture deformity  Outcome: Not Met This Shift     Problem: Swallowing - Impaired:  Goal: Able to swallow without choking  Description: Able to swallow without choking  Outcome: Not Met This Shift     Problem: Swallowing - Impaired:  Goal: Absence of aspiration  Description: Absence of aspiration  Outcome: Not Met This Shift

## 2021-10-25 NOTE — PLAN OF CARE
Problem: Non-Violent Restraints  Goal: No harm/injury to patient while restraints in use  10/25/2021 0709 by Malu Mehta RN  Outcome: Met This Shift     Problem: Non-Violent Restraints  Goal: Patient's dignity will be maintained  10/25/2021 0709 by Malu Mehta RN  Outcome: Met This Shift     Problem: Non-Violent Restraints  Goal: Removal from restraints as soon as assessed to be safe  10/25/2021 0709 by Malu Mehta RN  Outcome: Not Met This Shift

## 2021-10-25 NOTE — PROGRESS NOTES
Physical Therapy Initial Evaluation/Plan of Care    Room #:  IC06/IC06-01  Patient Name: Susy Moyer  YOB: 1955  MRN: [id number]    Date of Service: 10/26/2021     Tentative placement recommendation: Subacute rehab or Long Term Acute Care  Equipment recommendation: To be determined      Evaluating Physical Therapist: Darian Tobias, PT #[id number]      Specific Provider Orders/Date/Referring Provider :  10/24/21 1115   PT eval and treat Start: 10/24/21 1115, End: 10/24/21 1115, ONE TIME, Standing Count: 1 Occurrences, Melanie Loomis MD      Admitting Diagnosis:   Delirium tremens ([address] [F10.231]  Seizures ([address] [R56.9]  Acute respiratory failure, unspecified whether with hypoxia or hypercapnia ([address] [J96.00]  Atrial fibrillation, unspecified type ([address] [I48.91]     hallucinations and tremors    Surgery: none  Visit Diagnoses       Codes    Delirium tremens ([address]    -  Primary F10.231    Acute respiratory failure, unspecified whether with hypoxia or hypercapnia ([address]     J96.00    Atrial fibrillation, unspecified type ([address]     I48.91    Encounter for nasogastric (NG) tube placement     Z46.59          Patient Active Problem List   Diagnosis    Essential hypertension    Coronary artery disease involving coronary bypass graft of native heart without angina pectoris    Atypical chest pain    Pure hypercholesterolemia    Pericardial effusion    Chronic obstructive pulmonary disease (HCC)    Moderate obesity    Dislocation of finger    Closed dislocation of left middle finger    Seizures ([address]    Withdrawal symptoms, alcohol, with delirium ([address]    Altered mental status        ASSESSMENT of Current Deficits Patient exhibits decreased strength, endurance and range of motion impairing functional mobility, transfers, gait , gait distance, tolerance to activity and participation unable to follow commands except for  Left upper extremity.  Developing slight plantar flexor contractures positioned in near neutral with pillow support. Patient requires continued skilled physical therapy to address concerns listed above for increased safety and function at discharge. PHYSICAL THERAPY  PLAN OF CARE       Physical therapy plan of care is established based on physician order,  patient diagnosis and clinical assessment    Current Treatment Recommendations:    -Bed Mobility: Lower extremity exercises , Upper extremity exercises  and Trunk control activities   -Sitting Balance: Hands on support to maintain midline  and Facilitate active trunk muscle engagement   -Standing Balance: Instruct patient on adequate base of support to maintain balance  -Transfers: Provide instruction on proper hand and foot position for adequate transfer of weight onto lower extremities and use of gait device if needed, Cues for hand placement, technique and safety. Provide stabilization to prevent fall , Facilitate weight shift forward on to lower extremities and provide necessary stabilization of bilateral lower extremities  and Assist with extension of knees trunk and hip to accept weight transfer   -Gait: Gait training, Standing activities to improve: base of support, weight shift, weight bearing , Exercises to improve trunk control and Pregait training to emphasize: Base of support, Device control, Upright and Safety   -Endurance: Utilize Supervised activities to increase level of endurance to allow for safe functional mobility including transfers and gait  and Use graduated activities to promote good breathing techniques and provide support and education to maximize respiratory function    PT long term treatment goals are located in below grid    Patient and or family understand(s) diagnosis, prognosis, and plan of care. Frequency of treatments: Patient will be seen  2-3 times/week.          Prior Level of Function: unknown patient unable to answer  Rehab Potential: fair    for baseline    Past medical history:   Past Medical History:   Diagnosis Date    CAD (coronary artery disease)     [facility]ronic obstructive [facility] disease) [facility]     Depression     Fracture of unspecified phalanx of left middle finger, initial encounter for closed fracture     GERD (gastroesophageal reflux disease)     Hypertension      Past Surgical History:   Procedure Laterality Date    COLONOSCOPY      CORONARY ARTERY BYPASS GRAFT  3/2/05    x3: LIMA to LAD, SVG to RCA, SVG to diagonal    DIAGNOSTIC CARDIAC CATH LAB PROCEDURE  3/02/05    [facility]: Severe multivessel CAD in patient who presents with STEMI and total occlusion of diagonal branch. Significant disease of proximal LAD and severe disease of dominant RCA.  FINGER SURGERY Left 5/3/2019    CLOSED POSSIBLE LEFT MIDDLE FINGER OPEN REDUCTION INTERNAL FIXATION POSSIBLE PROXIMAL INTERPHALANGEAL JOINT ARTHROPLASTY   ++[facility] performed by Sarah White MD at [place] [address] N/A 10/20/2021    EGD WITH NG TUBE PLACEMENT **BEDSIDE** performed by Eyal Resendez MD at [address] [place] [address]       SUBJECTIVE:    Precautions:  , falls, alarm, vent and 2 point restraint , seizure,  alcohol abuse     Social history: Patient unable   Per chart, his  is his daughter who lives in [place].       Equipment owned: unknown,       AM-[facility]AC Basic Mobility        AM-[facility] Mobility Inpatient   How much difficulty turning over in bed?: A Lot  How much difficulty sitting down on / standing up from a chair with arms?: Unable  How much difficulty moving from lying on back to sitting on side of bed?: Unable  How much help from another person moving to and from a bed to a chair?: Total  How much help from another person needed to walk in hospital room?: Total  How much help from another person for climbing 3-5 steps with a railing?: Total  AM-PAC Inpatient Mobility Raw Score : 7  AM-PAC Inpatient T-Scale Score : 26.42  Mobility Inpatient CMS 0-100% Score: 92.36  Mobility Inpatient CMS G-Code Modifier :     Nursing cleared patient for PT evaluation. The admitting diagnosis and active problem list as listed above have been reviewed prior to the initiation of this evaluation. OBJECTIVE;   Initial Evaluation  Date: 10/26/2021 Treatment Date:     Short Term/ Long Term   Goals   Was pt agreeable to Eval/treatment? Yes  To be met in 5 days   Pain level   Slight facial grimacing end range of motion stretch DF        Bed Mobility    Rolling: Not assessed     Supine to sit: Not assessed     Sit to supine: Not assessed     Scooting: Not assessed     Rolling: Moderate assist of 1    Supine to sit: Moderate assist of 1    Sit to supine: Moderate assist of 1    Scooting: Moderate assist of 1     Transfers Sit to stand: Not assessed     Sit to stand:  Moderate assist of 1     Ambulation    not assessed    10 feet using  wheeled walker with Moderate assist of 1    Stair negotiation: ascended and descended   Not assessed          ROM Slight bilateral plantar flexor contractures   Increase range of motion 10% of affected joints    Strength BUE:  refer to OT eval  bilateral lower extremities no active movement  Increase strength in affected mm groups by 1/3 grade   Balance Sitting EOB:  not assessed    Dynamic Standing:  not assessed    Sitting EOB:  fair    Dynamic Standing: fair -     Patient is Alert & Oriented x 0 and does not follow directions  Able to squeeze with left hand only  Sensation:   Unable to assess   Edema:  yes bilateral lower extremities and bilateral upper extremities   Endurance: poor      Vitals: ventilator   Blood Pressure at rest 138/84 Blood Pressure during session 158/75   Heart Rate at rest 115 Heart Rate during session 123   SPO2 at rest 95%  SPO2 during session 98%     Patient education  Patient educated on role of Physical Therapy, risks of immobility, safety and plan of care,  importance of mobility while in hospital  and ankle pumps, quad set and glut set for edema control, blood clot prevention     Patient response to education:   Pt verbalized understanding Pt demonstrated skill Pt requires further education in this area   No No Yes      Treatment:  Patient practiced and was instructed/facilitated in the following treatment: Patient unable to actively move bilateral lower extremities, slight squeeze left hand. Performed passive range of motion bilateral lower extremities end range of motion stretching. Positioning for skin and joint integrity  2 point restraint in place. Therapeutic Exercises:  ankle pumps, heel slide, hip abduction/adduction, elbow flexion/extension, supination/pronation, wrist flexion/extension and end range of motion stretching  x 15-20 reps. At end of session, patient in bed with alarm call light and phone within reach,  all lines and tubes intact, nursing notified. Patient would benefit from continued skilled Physical Therapy to improve functional independence and quality of life. Patient's/ family goals   none stated    Time in  1000  Time out  1030    Total Treatment Time  10 minutes    Evaluation time includes thorough review of current medical information, gathering information on past medical history/social history and prior level of function, completion of standardized testing/informal observation of tasks, assessment of data, and development of Plan of care and goals.      CPT codes:  Low Complexity PT evaluation (47655)  Therapeutic exercises (75139)   10 minutes  1 unit(s)    Michael Berger PT

## 2021-10-25 NOTE — PROGRESS NOTES
Assessment and Plan  Patient is a 73 y.o. male with the following medical Problems:   1. Acute on chronic hypoxic and hypercapnic respiratory failure  2. Alcohol withdrawal  3. New onset atrial fibrillation  4. History of thrombocytopenia  5. Morbid obesity with obesity hypoventilation syndrome  6. Metabolic encephalopathy  7. Acute kidney injury  (resolved)  8. Aspiration pneumonia  9. UTI  10. Small pericardial effusion  11. Hematuria  12. 2.8 X 2.8 cm AAA  13. Small bilateral pleural effusions    Plan of care:  1. Continue with Precedex and wean down to assess for response to commands. 2.  Continue with thiamine and folic acid  3. Surgery consult for tracheostomy and PEG tube placement  4. Lovenox 1 mg/kg twice a day for atrial fibrillation  5. Continue tube feedings. 6. EEG to rule out seizures and MRI to evaluate for encephalopathy. 7.  May need neurology consult for altered level of consciousness. 8.  GI prophylaxis  9. Ceftriaxone for 7 days for Klebsiella in the sputum. History of Present Illness:   Patient is a 71-year-old with history of hypertension, CAD, COPD, and alcohol abuse. Patient presented to the ED on 10/12/2021 after developing hallucinations and shortness of breath. Patient apparently called police after seeing his  wife and 2 children walking in his house. He apparently attempted to spray them with bug spray before calling police. Patient has a history of alcohol abuse and drinks 1/5 of them being daily. According to ED physician's note the patient's last drink was on Saturday. The patient is currently intubated, sedated, and unable to provide any history. Daily Progress:  2021: Patient remains sedated intubated and mechanically ventilated. He had an uneventful night. He is currently on a Cardizem drip for atrial fibrillation with RVR. He has no fever, chills, or rigors.     2021: Patient remained sedated intubated and mechanically ventilated. He had atrial fibrillation with RVR. Patient failed awakening trial today. He was agitated and confused. He has no fever, chills, rigors. October 15, 2021: Patient remains sedated intubated and mechanically ventilated. He gets easily agitated and combative off sedation. He failed awakening trial.  He has no fever, chills, or rigors. Heart rate is controlled. He developed hematuria overnight and Eliquis was put on hold. October 16, 2021: Patient had repetitive episodes of vomiting. Multiple attempts to place an OGT probably failed. CT scan of the abdomen and pelvis was obtained. There was no evidence of hiatal hernia. Patient has no fever, chills, rigors. He becomes agitated and combative off sedation. He has no fever, chills, rigors. He is currently on 75% FiO2. October 17, 2021: Patient remains sedated intubated and mechanically ventilated. We will attempt sedation vacation and spontaneous breathing trials today if tolerated. He remains n.p.o. since he has no NG tube. NG tube could not be advanced. He remained hemodynamically stable. He has no fever, chills, rigors. Sugar is mostly controlled. October 25, 2021: Patient remains lethargic and poorly responsive. She opens eyes but does not follow commands. He has no fever, chills, rigors. White blood cells count remained stable. Continues to tolerate tube feeding. He remained hemodynamically stable. Past Medical History:  Past Medical History:   Diagnosis Date    CAD (coronary artery disease)     COPD (chronic obstructive pulmonary disease) ([facility] [address]     Depression     Fracture of unspecified phalanx of left middle finger, initial encounter for closed fracture     GERD (gastroesophageal reflux disease)     Hypertension         Family History:   No family history on file. Allergies:         Patient has no known allergies.     Social history:  Social History     Socioeconomic History    Marital status:      Spouse name: Not on file    Number of children: Not on file    Years of education: Not on file    Highest education level: Not on file   Occupational History    Not on file   Tobacco Use    Smoking status: Current Every Day Smoker     Packs/day: 1.00     Years: 45.00     Pack years: 45.00     Types: Cigars    Smokeless tobacco: Never Used    Tobacco comment: SMOKES CIGARS    Vaping Use    Vaping Use: Never used   Substance and Sexual Activity    Alcohol use: Yes     Comment: occassiona    Drug use: No    Sexual activity: Not on file   Other Topics Concern    Not on file   Social History Narrative    Not on file     Social Determinants of Health     Financial Resource Strain:     Difficulty of Paying Living Expenses:    Food Insecurity:     Worried About Running Out of Food in the Last Year:     [address] in the Last Year:    Transportation Needs:     Lack of Transportation (Medical):      Lack of Transportation (Non-Medical):    Physical Activity:     Days of Exercise per Week:     Minutes of Exercise per Session:    Stress:     Feeling of Stress :    Social Connections:     Frequency of Communication with Friends and Family:     Frequency of Social Gatherings with Friends and Family:     Attends Hindu Services:     Active Member of Clubs or Organizations:     Attends Club or Organization Meetings:     Marital Status:    Intimate Partner Violence:     Fear of Current or Ex-Partner:     Emotionally Abused:     Physically Abused:     Sexually Abused:        Current Medications:     Current Facility-Administered Medications:     lactulose ([facility]) 10 GM/15ML solution 20 g, 20 g, Oral, BID, Sukhjinder Rodriguez MD, 20 g at 10/25/21 0818    gabapentin (NEURONTIN) capsule 300 mg, 300 mg, Oral, TID, Dmitriy Newell MD, 300 mg at 10/25/21 0818    sertraline (ZOLOFT) tablet 100 mg, 100 mg, Oral, Daily, Alexandra Lynch MD, 100 mg at 10/25/21 0817   dexmedetomidine (PRECEDEX) 400 mcg in sodium chloride 0.9 % 100 mL infusion, 0.2-1.4 mcg/kg/hr, IntraVENous, Continuous, Dmitriy Newell MD, Stopped at 10/25/21 0838    sodium chloride flush 0.9 % injection 5-40 mL, 5-40 mL, IntraVENous, 2 times per day, Thadeus Dapash, DO, 10 mL at 10/25/21 0820    sodium chloride flush 0.9 % injection 5-40 mL, 5-40 mL, IntraVENous, PRN, Thadeus Dapash, DO    0.9 % sodium chloride infusion, 25 mL, IntraVENous, PRN, Thadeus Dapash, DO    cefTRIAXone (ROCEPHIN) 2,000 mg in sterile water 20 mL IV syringe, 2,000 mg, IntraVENous, Q24H, Dmitriy Newell MD, 2,000 mg at 10/24/21 1129    enoxaparin (LOVENOX) injection 120 mg, 1 mg/kg, SubCUTAneous, BID, Brenda Roach MD, 120 mg at 10/25/21 0818    0.9 % sodium chloride infusion, 25 mL, IntraVENous, Q8H, Brenda Roach MD, Stopped at 10/19/21 0708    [Held by provider] topiramate (TOPAMAX) tablet 50 mg, 50 mg, Oral, BID, Brenda Roach MD, 50 mg at 10/15/21 2130    [Held by provider] baclofen (LIORESAL) tablet 5 mg, 5 mg, Oral, BID, Brenda Roach MD, 5 mg at 10/15/21 2141    [Held by provider] diazePAM (VALIUM) tablet 5 mg, 5 mg, Oral, Q12H, Brenda Roach MD, 5 mg at 10/15/21 2130    perflutren lipid microspheres (DEFINITY) injection 1.65 mg, 1.5 mL, IntraVENous, ONCE PRN, Brenda Roach MD    thiamine (B-1) 500 mg in sodium chloride 0.9 % 100 mL IVPB, 500 mg, IntraVENous, Q24H, MARTY Kahn - CNP, Stopped at 10/25/21 1007    pantoprazole (PROTONIX) injection 40 mg, 40 mg, IntraVENous, BID, 40 mg at 10/25/21 0818 **AND** sodium chloride (PF) 0.9 % injection 10 mL, 10 mL, IntraVENous, BID, Artem Mcleod, APRN - CNP, 10 mL at 63/81/74 4887    folic acid 1 mg in dextrose 5 % 50 mL IVPB, 1 mg, IntraVENous, Daily, Elsy Petty MD, Stopped at 10/25/21 1027    ondansetron (ZOFRAN-ODT) disintegrating tablet 4 mg, 4 mg, Oral, Q8H PRN **OR** ondansetron (ZOFRAN) injection 4 mg, 4 mg, IntraVENous, Q6H PRN, Eugene Camacho MD    polyethylene glycol [facility]) packet 17 g, 17 g, Oral, Daily PRN, Eugene Camacho MD    acetaminophen (TYLENOL) tablet 650 mg, 650 mg, Oral, Q6H PRN, 650 mg at 10/25/21 0120 **OR** acetaminophen (TYLENOL) suppository 650 mg, 650 mg, Rectal, Q6H PRN, Eugene Camacho MD, 650 mg at 10/19/21 2020    ipratropium-albuterol (DUONEB) nebulizer solution 1 ampule, 1 ampule, Inhalation, Q4H, MARTY Hamilton CNP, 1 ampule at 10/25/21 6524    Review of Systems:   Unable to obtain due to medical condition    Physical Exam:   Vital Signs:  /87   Pulse 118   Temp 99.7 °F (37.6 °C) (Core)   Resp 24   Ht 5' 11\" (1.803 m)   Wt 262 lb 12.6 oz (119.2 kg)   SpO2 95%   BMI 36.65 kg/m²     Input/Output: In: 4596 [I.V.:345; NG/GT:869]  Out: 600     Oxygen requirements: MV    Ventilator Information:  Vent Information  $Ventilation: $Subsequent Day  Skin Assessment: Clean, dry, & intact  Equipment Changed: Suction catheter  Vent Type: 980  Vent Mode: AC/VC  Vt Ordered: 500 mL  Rate Set: 18 bmp  Peak Flow: 70 L/min  Pressure Support: 0 cmH20  FiO2 : 40 %  SpO2: 95 %  SpO2/FiO2 ratio: 237.5  Sensitivity: 4  PEEP/CPAP: 5  I Time/ I Time %: 0 s  Humidification Source: Heated wire  Humidification Temp: 37  Humidification Temp Measured: 36.5  Circuit Condensation: Not drained    General appearance: , not in pain or distress, in no respiratory distress    HEENT: Intubated  Neck: Supple, no jugular venous distension, lymphadenopathy, thyromegaly or carotid bruits  Chest: Decreased breath sounds, no wheezing, no crackles and no tenderness over ribs   Cardiovascular: Normal S1 , S2, regular rate and rhythm, no murmur, rub or gallop  Abdomen: Normal sounds present, soft, lax with no tenderness, no hepatosplenomegaly, and no masses  Extremities: No edema. Pulses are equally present.    Skin: intact, no rashes   Neurologic: Sedated and mechanically ventilated but opens eyes. Investigations:  Labs, radiological imaging and cardiac work up were reviewed        ICU STAFF PHYSICIAN NOTE OF PERSONAL INVOLVEMENT IN CARE  As the attending physician, I certify that I personally reviewed the patient's history and personally examined the patient to confirm the physical findings described above, and that I reviewed the relevant imaging studies and available reports. I also discussed the differential diagnosis and all of the proposed management plans with the patient and individuals accompanying the patient to this visit. They had the opportunity to ask questions about the proposed management plans and to have those questions answered. This patient has a high probability of sudden, clinically significant deterioration, which requires the highest level of physician preparedness to intervene urgently. I managed/supervised life or organ supporting interventions that required frequent physician assessment. I devoted my full attention to the direct care of this patient for the amount of time indicated below. Time I spent with family or surrogate(s) is included only if the patient was incapable of providing the necessary information or participating in medical decision-making. Time devoted to teaching and to any procedures I billed separately is not included.   Critical Care Time: 35 minutes      Electronically signed by Julisa Fernando MD on 10/25/2021 at 10:51 AM

## 2021-10-25 NOTE — PROGRESS NOTES
Transported patient from ICU to MRI and back again using the MRI ventilator. No problems encountered. Total time 2 hours.

## 2021-10-25 NOTE — PROGRESS NOTES
Department of Internal Medicine  General Internal Medicine  Attending Progress Note  Chief Complaint   Patient presents with    Delirium Tremens (DTS)     Pt states he usually drinks a fifth of kilo beam a day states he quit saturday. states he has been having visual hallucinations since yesterday. SUBJECTIVE:    Intubated.      OBJECTIVE      Medications    Current Facility-Administered Medications: dilTIAZem 125 mg in dextrose 5 % 125 mL infusion, 5-15 mg/hr, IntraVENous, Continuous  lactulose (CHRONULAC) 10 GM/15ML solution 20 g, 20 g, Oral, BID  gabapentin (NEURONTIN) capsule 300 mg, 300 mg, Oral, TID  sertraline (ZOLOFT) tablet 100 mg, 100 mg, Oral, Daily  dexmedetomidine (PRECEDEX) 400 mcg in sodium chloride 0.9 % 100 mL infusion, 0.2-1.4 mcg/kg/hr, IntraVENous, Continuous  sodium chloride flush 0.9 % injection 5-40 mL, 5-40 mL, IntraVENous, 2 times per day  sodium chloride flush 0.9 % injection 5-40 mL, 5-40 mL, IntraVENous, PRN  0.9 % sodium chloride infusion, 25 mL, IntraVENous, PRN  cefTRIAXone (ROCEPHIN) 2,000 mg in sterile water 20 mL IV syringe, 2,000 mg, IntraVENous, Q24H  enoxaparin (LOVENOX) injection 120 mg, 1 mg/kg, SubCUTAneous, BID  0.9 % sodium chloride infusion, 25 mL, IntraVENous, Q8H  [Held by provider] topiramate (TOPAMAX) tablet 50 mg, 50 mg, Oral, BID  [Held by provider] baclofen (LIORESAL) tablet 5 mg, 5 mg, Oral, BID  [Held by provider] diazePAM (VALIUM) tablet 5 mg, 5 mg, Oral, Q12H  perflutren lipid microspheres (DEFINITY) injection 1.65 mg, 1.5 mL, IntraVENous, ONCE PRN  thiamine (B-1) 500 mg in sodium chloride 0.9 % 100 mL IVPB, 500 mg, IntraVENous, Q24H  pantoprazole (PROTONIX) injection 40 mg, 40 mg, IntraVENous, BID **AND** sodium chloride (PF) 0.9 % injection 10 mL, 10 mL, IntraVENous, BID  folic acid 1 mg in dextrose 5 % 50 mL IVPB, 1 mg, IntraVENous, Daily  ondansetron (ZOFRAN-ODT) disintegrating tablet 4 mg, 4 mg, Oral, Q8H PRN **OR** ondansetron (ZOFRAN) injection 4 mg, 4 mg, IntraVENous, Q6H PRN  polyethylene glycol (GLYCOLAX) packet 17 g, 17 g, Oral, Daily PRN  acetaminophen (TYLENOL) tablet 650 mg, 650 mg, Oral, Q6H PRN **OR** acetaminophen (TYLENOL) suppository 650 mg, 650 mg, Rectal, Q6H PRN  ipratropium-albuterol (DUONEB) nebulizer solution 1 ampule, 1 ampule, Inhalation, Q4H  Physical    VITALS:  BP 98/78   Pulse 144   Temp 100 °F (37.8 °C) (Core)   Resp 21   Ht 5' 11\" (1.803 m)   Wt 262 lb 12.6 oz (119.2 kg)   SpO2 98%   BMI 36.65 kg/m²   CONSTITUTIONAL:  awake and alert  EYES:  extra-ocular muscles intact and vision intact  ENT:  normocepalic, without obvious abnormality  NECK:  supple, symmetrical, trachea midline  LUNGS:  no increased work of breathing and clear to auscultation  CARDIOVASCULAR:  normal apical pulses and normal S1 and S2  ABDOMEN:  normal bowel sounds, non-distended and non-tender  MUSCULOSKELETAL:  there is no redness, warmth, or swelling of the joints  NEUROLOGIC:  Mental Status Exam:  Level of Alertness:   Awake  Orientation:   person, place, time  SKIN:  no bruising or bleeding  Data    CBC:   Lab Results   Component Value Date    WBC 9.3 10/25/2021    RBC 3.69 10/25/2021    HGB 11.6 10/25/2021    HCT 35.5 10/25/2021    MCV 96.2 10/25/2021    MCH 31.4 10/25/2021    MCHC 32.7 10/25/2021    RDW 15.9 10/25/2021     10/25/2021    MPV 9.2 10/25/2021     BMP:    Lab Results   Component Value Date     10/24/2021    K 3.5 10/24/2021    K 4.3 10/12/2021     10/24/2021    CO2 27 10/24/2021    BUN 29 10/24/2021    LABALBU 2.7 10/24/2021    CREATININE 0.7 10/24/2021    CALCIUM 8.8 10/24/2021    GFRAA >60 10/24/2021    LABGLOM >60 10/24/2021    GLUCOSE 134 10/24/2021       ASSESSMENT AND PLAN    Brief summary of Stay:  73 y. o. male with a history of alcoholism depression hypertension coronary artery disease GERD COPD morbid obesity presents with signs of alcohol withdrawal.  Before he was intubated he gave the ER the history that he began having tremors and hallucinations at 6 AM.  For the past 1 or 2 weeks he has been drinking 1/5 of gin being a day but has not had any drinks since Saturday the only other substances of abuse that he admits to is smoking 1 and half packs of cigarettes per day.  His tremors are getting worse on Saturday he started having leg swelling. In the ER he was known to be having tremors he received Ativan but even before that when he was grabbing at invisible objects he was snoring per the nurse. George Marin desatted down to 70%.  He was intubated to protect his airway.  He was noted to have A. fib RVR and was given Cardizem drip to good effect. Patient has been weaned off of Cardizem and heart rate has been controlled. Patient is still intubated. 1.  Acute on chronic hypoxic hypercapnic respiratory failure failing to wean planning for PEG and trach per pulmonary management. Precedex  2. Atrial fibrillation RVR. Cardizem drip  3. Alcohol withdrawal: This etiology of his acute encephalopathy but he seems to be mainly behind us by now need to consider other things. Infection could contribute however we are also waiting for results for EEG and MRI  4. Infection treatment for UT & Aspiration pneumonia ceftriaxone now to cover Klebsiella in the urine  5. Morbid obesity but also with   Malnutrition: Currently on tube feeding. 6.  Full code. 7.  DVT prophylaxis with enoxaparin at the anticoagulation dose 120 twice daily for A. fib.   8.  Disposition could consider LTAC later

## 2021-10-25 NOTE — PROGRESS NOTES
Comprehensive Nutrition Assessment    Type and Reason for Visit:  Reassess    Nutrition Recommendations/Plan: Continue NPO, Continue EN regimen    Current regimen meets ~82% est calorie & 100% est protein needs    Nutrition Assessment:  Pt remains intubated, NPO, NGT w/ EN intitiated. Pt w/ ETOH w/draw, metabolic encephalopathy and aspiration PNA. Noted A-fib w/ RVR. Hx COPD. Continue current EN regimen and monitor. Malnutrition Assessment:  Malnutrition Status: At risk for malnutrition (Comment)    Context:  Chronic Illness     Findings of the 6 clinical characteristics of malnutrition:  Energy Intake:  Mild decrease in energy intake (Comment)  Weight Loss:  Unable to assess (no wt hx)     Body Fat Loss:  No significant body fat loss     Muscle Mass Loss:  No significant muscle mass loss    Fluid Accumulation:        Strength:  Not Performed    Estimated Daily Nutrient Needs:  Energy (kcal):   (CY1144 = 2162); Weight Used for Energy Requirements:  Current     Protein (g):  120-140 (1.5-1.8 (monitor liver enzymes)); Weight Used for Protein Requirements:  Ideal        Fluid (ml/day):  per critical care; Method Used for Fluid Requirements:         Nutrition Related Findings:  intubated, -I&Os, BLE +2 edema, abd rounded, BS active, no propofol/pressor noted at this time, MAP 89, Trig WDL, elevated liver enzymes      Wounds:  Skin Tears       Current Nutrition Therapies:    Diet NPO Exceptions are: Sips of Water with Meds  ADULT TUBE FEEDING; Nasogastric; Immune Enhancing; Continuous; 20; Yes; 10; Q 8 hours; 45; 0; Other (specify);  Addtional water flushes per critical care; Protein; 1 protein modular/d flush w/ 30ml water before and after administration  Current Tube Feeding (TF) Orders:  · Feeding Route: Nasogastric  · Formula: Immune Enhancing  · Schedule: Continuous  · Additives/Modulars: Protein (1 protein modular daily)  · Water Flushes: 30 ml before and after pro mod = 60 ml  · Goal TF & Flush

## 2021-10-25 NOTE — CONSULTS
GENERAL SURGERY  CONSULT NOTE  10/25/2021    Physician Consulted: Dr. Matt Hayes  Reason for Consult: tracheostomy, PEG  Referring Physician: Dr. Heena Ariza    Chief Complaint   Patient presents with    Delirium Tremens (DTS)     Pt states he usually drinks a fifth of kilo beam a day states he quit saturday. states he has been having visual hallucinations since yesterday. CARA Alva is a 73 y.o. male who is admitted for respiratory failure. He presented two weeks ago in delirium tremens and was intubated in the ED. During this time he was diagnosed with new A fib and started on therapeutic lovenox, and had to undergo NG placement with EGD guidance by GI. He has been unable to wean from the ventilator because of poor mental status. He weakly follows some commands for the ICU staff. General Surgery consulted for tracheostomy and PEG. History of hernia repair. No known neck surgeries. Past Medical History:   Diagnosis Date    CAD (coronary artery disease)     COPD (chronic obstructive pulmonary disease) (HCC)     Depression     Fracture of unspecified phalanx of left middle finger, initial encounter for closed fracture     GERD (gastroesophageal reflux disease)     Hypertension        Past Surgical History:   Procedure Laterality Date    COLONOSCOPY      CORONARY ARTERY BYPASS GRAFT  3/2/05    x3: LIMA to LAD, SVG to RCA, SVG to diagonal    DIAGNOSTIC CARDIAC CATH LAB PROCEDURE  3/02/05    [facility]: Severe multivessel CAD in patient who presents with STEMI and total occlusion of diagonal branch. Significant disease of proximal LAD and severe disease of dominant RCA.      FINGER SURGERY Left 5/3/2019    CLOSED POSSIBLE LEFT MIDDLE FINGER OPEN REDUCTION INTERNAL FIXATION POSSIBLE PROXIMAL INTERPHALANGEAL JOINT ARTHROPLASTY   ++[facility] performed by Cynthia Kyle MD at [address] [place] [address] N/A 10/20/2021    EGD WITH NG TUBE PLACEMENT **BEDSIDE** performed by Ricardo Moreno MD at [facility]    HERNIA REPAIR      double       Medications Prior to Admission:    Prior to Admission medications    Medication Sig Start Date End Date Taking? Authorizing Provider   fluticasone (FLONASE) 50 MCG/ACT nasal spray 2 sprays by Nasal route daily 2 sprays each nostril once a day 5/20/19   Rosita Talbert DO   busPIRone (BUSPAR) 5 MG tablet Take 15 mg by mouth 2 times daily  7/11/16   Historical Provider, MD   gabapentin (NEURONTIN) 300 MG capsule Take 300 mg by mouth 3 times daily    Historical Provider, MD   ibuprofen (ADVIL;MOTRIN) 600 MG tablet Take 600 mg by mouth 10/7/13   Historical Provider, MD   omeprazole (PRILOSEC) 20 MG capsule Take 20 mg by mouth Daily  4/21/16   Historical Provider, MD   PROAIR  (90 BASE) MCG/ACT inhaler  4/21/16   Historical Provider, MD   ASPIRIN LOW DOSE 81 MG EC tablet Take 81 mg by mouth daily  4/21/16   Historical Provider, MD   sertraline (ZOLOFT) 100 MG tablet 100 mg nightly  4/8/16   Historical Provider, MD   lisinopril (PRINIVIL;ZESTRIL) 5 MG tablet Take 5 mg by mouth daily    Historical Provider, MD   nitroGLYCERIN (NITROSTAT) 0.4 MG SL tablet Place 1 tablet under the tongue every 5 minutes as needed for Chest pain 3/23/16   Tony Garcia MD   pravastatin (PRAVACHOL) 80 MG tablet Take 1 tablet by mouth daily 3/23/16   Tony Garcia MD       No Known Allergies    No family history on file.     Social History     Tobacco Use    Smoking status: Current Every Day Smoker     Packs/day: 1.00     Years: 45.00     Pack years: 45.00     Types: Cigars    Smokeless tobacco: Never Used    Tobacco comment: SMOKES CIGARS    Vaping Use    Vaping Use: Never used   Substance Use Topics    Alcohol use: Yes     Comment: occassiona    Drug use: No     Review of Systems unobtainable    PHYSICAL EXAM:    Vitals:    10/25/21 1400   BP: (!) 158/103   Pulse: 172   Resp: 18   Temp:    SpO2: 92%     General appearance: intubated, off sedation  Eyes: pupils equal  HENT: abnormality. SINUSES: The visualized paranasal sinuses and mastoid air cells demonstrate no acute abnormality. SOFT TISSUES/SKULL:  No acute abnormality of the visualized skull or soft tissues. 1. There is no acute intracranial abnormality. Specifically, there is no intracranial hemorrhage. 2. Atrophy and periventricular leukomalacia, . XR CHEST PORTABLE    Result Date: 10/13/2021  EXAMINATION: ONE XRAY VIEW OF THE CHEST 10/13/2021 1:20 pm COMPARISON: Chest x-ray same day HISTORY: ORDERING SYSTEM PROVIDED HISTORY: ET tube TECHNOLOGIST PROVIDED HISTORY: Reason for exam:->ET tube FINDINGS: ET tube terminates in the trachea, between the clavicular heads and the karo. Enteric tube is not well visualized at the GE junction and below. Right IJ CVC terminates at the superior cavoatrial junction. Stable mild pulmonary vascular congestion. Stable small pleural effusions. Stable cardiomegaly. Changes of median sternotomy. No evidence of pneumothorax. Bones are stable. ET tube terminates between the clavicular heads and the karo. Enteric tube is poorly visualized at the GE junction and below; if position of enteric tube tip needs to be known then KUB would better evaluate for it. Stable mild pulmonary vascular congestion. Stable small pleural effusions. Stable cardiomegaly. XR CHEST PORTABLE    Result Date: 10/13/2021  EXAMINATION: ONE XRAY VIEW OF THE CHEST 10/13/2021 3:13 pm COMPARISON: Chest radiograph October 12, 2021, 23 hour prior HISTORY: ORDERING SYSTEM PROVIDED HISTORY: ET tube placement TECHNOLOGIST PROVIDED HISTORY: Reason for exam:->ET tube placement FINDINGS: Endotracheal tube, enteric tube, and right IJ central venous catheter are unchanged in positions. Mild diffuse bilateral airspace opacities are unchanged small bilateral pleural effusions are unchanged. There is no pneumothorax. The cardiomediastinal silhouette is without acute process.  The osseous structures are without acute process. No interval change compared to prior exam. Persistent small bilateral pleural effusions. Ongoing mild-to-moderate pulmonary edema. XR CHEST PORTABLE    Result Date: 10/12/2021  EXAMINATION: ONE XRAY VIEW OF THE CHEST 10/12/2021 4:10 pm COMPARISON: October 12 at 1428 hours HISTORY: ORDERING SYSTEM PROVIDED HISTORY: central line palcement TECHNOLOGIST PROVIDED HISTORY: Reason for exam:->central line palcement FINDINGS: There is a new right IJ line with the distal tip in the SVC. No evidence of pneumothorax. ET tube and NG tube remain in place. Prominent cardiomegaly, unchanged. Status post median sternotomy. Mild elevation of the right hemidiaphragm and airspace disease in the right lung base somewhat more confluent than on the prior examination. The pulmonary vasculature is within normal limits. No other significant interval change. New right IJ line with the distal tip in the SVC and no pneumothorax. Slightly more confluent airspace disease in the right lung base. No other significant interval change. XR CHEST PORTABLE    Result Date: 10/12/2021  EXAMINATION: ONE XRAY VIEW OF THE CHEST 10/12/2021 2:51 pm COMPARISON: Prior chest x-ray obtained earlier today. HISTORY: ORDERING SYSTEM PROVIDED HISTORY: tube placement TECHNOLOGIST PROVIDED HISTORY: Reason for exam:->tube placement FINDINGS: The endotracheal tube is located 6 cm proximal to the karo. There is an NG tube within the stomach. The heart is enlarged. There is new airspace disease seen within the right lung base when compared with the earlier study. This could represent an effusion and or infiltrate. 1. Satisfactory position of the NG tube and endotracheal tube 2. New opacity within the right lung base which could represent a pleural effusion and or right lung base infiltrate.      XR CHEST PORTABLE    Result Date: 10/12/2021  EXAMINATION: ONE XRAY VIEW OF THE CHEST 10/12/2021 11:50 am COMPARISON: Previous CT of the chest of 10/01/2021 HISTORY: ORDERING SYSTEM PROVIDED HISTORY: altered mental status TECHNOLOGIST PROVIDED HISTORY: Reason for exam:->altered mental status FINDINGS: The heart is enlarged. Postoperative sternotomy changes are noted. The lungs are clear. There is no focal infiltrate or effusion. 1. Cardiomegaly. There is no evidence of failure or pneumonia. ASSESSMENT:  73 y.o. male with respiratory failure, unable to wean from vent    PLAN:  Family awaiting results of MRI brain today  Will be available for trach and PEG if/when family is agreeable  Hold tube feeds and lovenox prior to procedure    Electronically signed by Frances Meyer MD on 10/25/21 at 3:04 PM EDT    Surgery Progress Note            Chief complaint:   Chief Complaint   Patient presents with    Delirium Tremens (DTS)     Pt states he usually drinks a fifth of kilo beam a day states he quit saturday. states he has been having visual hallucinations since yesterday.        Patient Active Problem List   Diagnosis    Essential hypertension    Coronary artery disease involving coronary bypass graft of native heart without angina pectoris    Atypical chest pain    Pure hypercholesterolemia    Pericardial effusion    Chronic obstructive pulmonary disease (HCC)    Moderate obesity    Dislocation of finger    Closed dislocation of left middle finger    Seizures (HCC)    Withdrawal symptoms, alcohol, with delirium ([facility]    Altered mental status       S: as above    O:   Vitals:    10/26/21 1000   BP: (!) 158/75   Pulse: 124   Resp: (!) 31   Temp:    SpO2: 96%       Intake/Output Summary (Last 24 hours) at 10/26/2021 1110  Last data filed at 10/26/2021 9257  Gross per 24 hour   Intake 549.63 ml   Output 1300 ml   Net -750.37 ml           Labs:  Lab Results   Component Value Date    WBC 10.9 10/26/2021    WBC 9.3 10/25/2021    WBC 9.3 10/24/2021    HGB 11.6 10/26/2021    HGB 11.6 10/25/2021    HGB 11.5 10/24/2021    HCT 36.4 10/26/2021    HCT 35.5 10/25/2021    HCT 35.6 10/24/2021     Lab Results   Component Value Date    CREATININE 0.8 10/26/2021    BUN 28 (H) 10/26/2021     10/26/2021    K 3.8 10/26/2021     10/26/2021    CO2 27 10/26/2021     Lab Results   Component Value Date    LIPASE 24 10/12/2021    LIPASE 36 02/27/2016         Physical exam:   BP (!) 158/75   Pulse 124   Temp 100 °F (37.8 °C) (Axillary)   Resp (!) 31   Ht 5' 11\" (1.803 m)   Wt 262 lb 12.6 oz (119.2 kg)   SpO2 96%   BMI 36.65 kg/m²   General appearance: NAD  Head: NCAT  Neck: supple, no masses  Lungs: equal chest rise bilateral  Heart: S1S2 present  Abdomen: soft, nontender, nondistended  Skin; no lesions  Gu: no cva tenderness  Extremities: extremities normal, atraumatic, no cyanosis or edema    A:  respiratory failure and protein malnutrition    P: will plan for trach peg when family decision is made.       Dayanara Heard MD, MD  10/26/2021

## 2021-10-25 NOTE — CARE COORDINATION
10/25/21 Negative covid 10/16/21. Cm transition of care: isolation, vent/icu/precedex gtt, fio2 at 70%, peep 5. Daughter in [place] legal [id number] [place] [address]. Pt has ASA coverage- [facility] local [facility] in network. [facility] is following. SNF choices previously emailed by other CM to [facility] to review, as well. Pt in need of possible, EEG, neurology consult- pt with etoh abuse. CM/SS to follow.  Electronically signed by Suzy Meigs, RN-BC on 10/25/2021 at 1:26 PM

## 2021-10-25 NOTE — PROGRESS NOTES
[address]  [facility]         Date:10/25/2021                                                   Patient Name: Kathy Carlos     MRN: [id number]     : 1955     Room: [place] [address]       Evaluating OT: Cathleen Milligan, OTR/L; [id number]       Referring Provider and Orders/Date:   OT eval and treat Start: 10/24/21 1115, End: 10/24/21 1115, ONE TIME, Standing Count: 1 Occurrences, Mily Gonzalez MD     Diagnosis:   1. Delirium tremens ([address]    2. Acute respiratory failure, unspecified whether with hypoxia or hypercapnia ([address]    3. Atrial fibrillation, unspecified type ([address]    4. Encounter for nasogastric (NG) tube placement         Surgery: 10/20/21: EGD WITH NG TUBE PLACEMENT **BEDSIDE**      Pertinent Medical History:        Past Medical History:   Diagnosis Date    CAD (coronary artery disease)     COPD (chronic obstructive pulmonary disease) ([address]     Depression     Fracture of unspecified phalanx of left middle finger, initial encounter for closed fracture     GERD (gastroesophageal reflux disease)     Hypertension           Past Surgical History:   Procedure Laterality Date    COLONOSCOPY      CORONARY ARTERY BYPASS GRAFT  3/2/05    x3: LIMA to LAD, SVG to RCA, SVG to diagonal    DIAGNOSTIC CARDIAC CATH LAB PROCEDURE  3/02/05    [facility]: Severe multivessel CAD in patient who presents with STEMI and total occlusion of diagonal branch. Significant disease of proximal LAD and severe disease of dominant RCA.      FINGER SURGERY Left 5/3/2019    CLOSED POSSIBLE LEFT MIDDLE FINGER OPEN REDUCTION INTERNAL FIXATION POSSIBLE PROXIMAL INTERPHALANGEAL JOINT ARTHROPLASTY   ++[facility] performed by Víctor Barron MD at [facility] N/A 10/20/2021    EGD WITH NG TUBE PLACEMENT **BEDSIDE** performed by Jennifer Ann MD at [address] [place] [address] REPAIR      double       Precautions:  Fall Risk, ventilator FiO2 40, droplet iso    Recommended placement: subacute    Assessment of current deficits     [x] Functional mobility  [x]ADLs  [x] Strength               [x]Cognition     [x] Functional transfers   [x] IADLs         [x] Safety Awareness   [x]Endurance     [x] Fine Coordination              [x] Balance      [] Vision/perception   []Sensation      [x]Gross Motor Coordination  [x] ROM  [] Delirium                   [] Motor Control     OT PLAN OF CARE   OT POC based on physician orders, patient diagnosis and results of clinical assessment    Frequency/Duration 1-3 days/wk for 2 weeks PRN   Specific OT Treatment Interventions to include:   * Instruction/training on adapted ADL techniques and AE recommendations to increase functional independence within precautions       * Training on energy conservation strategies, correct breathing pattern and techniques to improve independence/tolerance for self-care routine  * Functional transfer/mobility training/DME recommendations for increased independence, safety, and fall prevention  * Patient/Family education to increase follow through with safety techniques and functional independence  * Recommendation of environmental modifications for increased safety with functional transfers/mobility and ADLs  * Cognitive retraining/development of therapeutic activities to improve problem solving, judgement, memory, and attention for increased safety/participation in ADL/IADL tasks  * Splinting/positioning for increased function, prevention of contractures, and improve skin integrity  * Therapeutic exercise to improve motor endurance, ROM, and functional strength for ADLs/functional transfers  * Therapeutic activities to facilitate/challenge dynamic balance, stand tolerance for increased safety and independence with ADLs  * Therapeutic activities to facilitate gross/fine motor skills for increased independence with ADLs  * Neuro-muscular re-education: facilitation of righting/equilibrium reactions, midline orientation, scapular stability/mobility, normalization of muscle tone, and facilitation of volitional active controled movement  * Positioning to improve skin integrity, interaction with environment and functional independence  * Manual techniques for edema management     Recommended Adaptive Equipment/DME:  TBD      Home Living: Pt unable to provide PLOF. Per chart, his  is his daughter who lives in [place]. Bathroom setup: unknown    DME owned: unknown     Prior Level of Function: indep with ADLs , indep with IADLs; ambulated indep   Driving: unknown   Occupation: unknown   Enjoys: unknown    Pain Level: some discomfort with proximal ROM exercises  Cognition: A&O: 1/4 to self;  Follows 1 step directions, but not consistent with yes/no, head shake or facial expression   Memory:  Difficult to assess with vent   Sequencing:  Difficult to assess with vent-fair-   Problem solving:  Difficult to assess with vent   Judgement/safety:  Difficult to assess with vent    AM-PAC Daily Activity Inpatient   How much help for putting on and taking off regular lower body clothing?: Total  How much help for Bathing?: Total  How much help for Toileting?: Total  How much help for putting on and taking off regular upper body clothing?: Total  How much help for taking care of personal grooming?: Total  How much help for eating meals?: Total  AM-PAC Inpatient Daily Activity Raw Score: 6  AM-PAC Inpatient ADL T-Scale Score : 17.07  ADL Inpatient CMS 0-100% Score: 100  ADL Inpatient CMS G-Code Modifier : CN     Functional Assessment:     Initial Eval Status  Date: 10/25/2021   Treatment Status  Date: STGs = LTGs  Time frame: 10-14 days   Feeding Dependent with NPO on eval.   Mod A   Grooming Dependent for hair comb, face and hand wash, nursing suctioned while therapist present  Moderate Assist    UB Dressing Dependent for clean gown from supine. Extended time required  Maximal Assist    LB Dressing Dependent suspected with LB clothing not appropriate on eval  Maximal Assist    Bathing Dependent with bathing from supine and A x 2 recommended for rolling  Maximal Assist    Toileting Dependent with foster in place and A x 2 recommended for rolling  Maximal Assist    Bed Mobility  Not tested on eval with vent in place and elevated heart rate with UE tasks. Recommending A x 2 for all bed mobility for safety. Supine to sit: Maximal Assist   Sit to supine: Maximal Assist    Functional Transfers NT due to pt overall debility, decreased activity tolerance, balance deficits, safety and fall risk. Maximal Assist for sit to stand   Functional Mobility NT due to pt overall debility, decreased activity tolerance, balance deficits, safety and fall risk. Not appropriate at this time   Balance Sitting:     Static:  NT    Dynamic:NT  Standing: NT  Sitting:     Static:  fair    Dynamic:fair-  Standing: poor+   Activity Tolerance Vitals with activity: supine at ; O2 96% 129/97. Following positioning and exercise with 98%;  with nursing assessment 139/81   Increase sitting tolerance for >15min with stable vital signs for carry over into toileting, functional tranfers and indep in ADLs   Visual/  Perceptual Glasses: not Present; Unable to thoroughly assist. Pt eyes twitching and he can open on command. Follows therapist visually from side to side with conversation. Reports change in vision since admission: No     NA   Francisco UE Strengthening, function and positioning   1+/5 generally; edema in francisco UE with fair- positioning interventions  2/5MMT generally with good positioning and min edema for carry over into self care and client centered tasks. Hand Dominance  [x] Right  [] Left    AROM (PROM) Strength Additional Info:    RUE  Noted weak grasp and tricep extension only.  PROM with limitations in shoulder with discomfort 1/5 Poor  and poor FMC/dexterity noted during ADL tasks  Opposition [] Intact [x] Impaired  Finger to nose [] Intact [x] Impaired     LUE Increased tolerance to PROM, but limitations proximally continue. 50% distal AROM with weakness 2-/5 Poor  and poor FMC/dexterity noted during ADL tasks  Opposition [] Intact [x] Impaired  Finger to nose [] Intact [x] Impaired     Hearing: WFL   Sensation:  No c/o numbness or tingling   Tone: hypotonia  Edema: in rikki UE and feet    Comments: Upon arrival patient supine with vent, nursing present and restraints. Pt required dep A for most UB ADLs and dep A x 2 LB ADLs tasks. The biggest barriers reflect that of functional transfers, functional mobility, UB/LB ADLs, cognition, activity tolerance, balance, safety and strengthening. At end of session, patient supine with call light and phone within reach, all lines and tubes intact. Overall patient demonstrated decreased independence and safety during completion of ADL/functional transfer/mobility tasks. Nursing updated on pt position and status following OT eval. Pt would benefit from continued skilled OT to increase safety and independence with completion of ADL/IADL tasks for functional independence and quality of life. Treatment: OT treatment provided this date includes:   Instruction, education and training on safe facilitation and adapted techniques for completion of ADLs with A x 2 recommended for safety with rolling. These include  neuromuscular facilitation of rikki UE functional movement/RROM/edema massage, proper positioning/alignment to improve interaction with environment and overall functionfor completion of ADLs. Extended time to complete all tasks, including skilled monitoring of patient's response during treatment session and vital signs. Prior to and at the end of session, environmental modifications / line management completed for patients safety and efficiency of treatment session. See above for further details.     Rehab Potential: Fair for established goals     Patient / Family Goal: none reported    Patient and/or family were instructed on functional diagnosis, prognosis/goals and OT plan of care. Demonstrated fair- understanding. Eval Complexity: Low  · History: Extensive review of medical records and additional review of physical, cognitive, or psychosocial history related to current functional performance  · Exam: 3+ performance deficits  · Assistance/Modification: Mod assistance or modifications required to perform tasks. May have comorbidities that affect occupational performance. Time In: 1110  Time Out: 1141  Total Treatment Time: 11    Min Units   OT Eval Low 97165  x  1   OT Eval Medium 96467      OT Eval High 14270      OT Re-Eval [id number]       Therapeutic Ex 09311       Therapeutic Activities 95888  11 1    ADL/Self Care 05353       Orthotic Management 54630       Manual [id number]     Neuro Re-Ed 09071       Non-Billable Time          Evaluation Time additionally includes thorough review of current medical information, gathering information on past medical history/social history and prior level of function, interpretation of standardized testing/informal observation of tasks, assessment of data and development of plan of care and goals.             Hillary Webster OTR/L; [id number]

## 2021-10-26 ENCOUNTER — APPOINTMENT (OUTPATIENT)
Dept: NEUROLOGY | Age: 66
DRG: 004 | End: 2021-10-26
Payer: MEDICARE

## 2021-10-26 ENCOUNTER — APPOINTMENT (OUTPATIENT)
Dept: GENERAL RADIOLOGY | Age: 66
DRG: 004 | End: 2021-10-26
Payer: MEDICARE

## 2021-10-26 PROBLEM — R41.82 ALTERED MENTAL STATUS: Status: ACTIVE | Noted: 2021-10-26

## 2021-10-26 LAB
ANION GAP SERPL CALCULATED.3IONS-SCNC: 9 MMOL/L (ref 7–16)
BUN BLDV-MCNC: 28 MG/DL (ref 6–23)
CALCIUM SERPL-MCNC: 9.4 MG/DL (ref 8.6–10.2)
CHLORIDE BLD-SCNC: 107 MMOL/L (ref 98–107)
CO2: 27 MMOL/L (ref 22–29)
CREAT SERPL-MCNC: 0.8 MG/DL (ref 0.7–1.2)
GFR AFRICAN AMERICAN: >60
GFR NON-AFRICAN AMERICAN: >60 ML/MIN/1.73
GLUCOSE BLD-MCNC: 137 MG/DL (ref 74–99)
HCT VFR BLD CALC: 36.4 % (ref 37–54)
HEMOGLOBIN: 11.6 G/DL (ref 12.5–16.5)
INR BLD: 1.3
MAGNESIUM: 1.8 MG/DL (ref 1.6–2.6)
MCH RBC QN AUTO: 31.2 PG (ref 26–35)
MCHC RBC AUTO-ENTMCNC: 31.9 % (ref 32–34.5)
MCV RBC AUTO: 97.8 FL (ref 80–99.9)
PDW BLD-RTO: 16.2 FL (ref 11.5–15)
PHOSPHORUS: 3.6 MG/DL (ref 2.5–4.5)
PLATELET # BLD: 380 E9/L (ref 130–450)
PMV BLD AUTO: 8.7 FL (ref 7–12)
POTASSIUM SERPL-SCNC: 3.8 MMOL/L (ref 3.5–5)
PROTHROMBIN TIME: 14.9 SEC (ref 9.3–12.4)
RBC # BLD: 3.72 E12/L (ref 3.8–5.8)
SODIUM BLD-SCNC: 143 MMOL/L (ref 132–146)
TROPONIN, HIGH SENSITIVITY: 17 NG/L (ref 0–11)
WBC # BLD: 10.9 E9/L (ref 4.5–11.5)

## 2021-10-26 PROCEDURE — 71045 X-RAY EXAM CHEST 1 VIEW: CPT

## 2021-10-26 PROCEDURE — 6360000002 HC RX W HCPCS: Performed by: PSYCHIATRY & NEUROLOGY

## 2021-10-26 PROCEDURE — 85027 COMPLETE CBC AUTOMATED: CPT

## 2021-10-26 PROCEDURE — 83735 ASSAY OF MAGNESIUM: CPT

## 2021-10-26 PROCEDURE — 97161 PT EVAL LOW COMPLEX 20 MIN: CPT | Performed by: PHYSICAL THERAPIST

## 2021-10-26 PROCEDURE — 2580000003 HC RX 258: Performed by: INTERNAL MEDICINE

## 2021-10-26 PROCEDURE — 2580000003 HC RX 258: Performed by: NURSE PRACTITIONER

## 2021-10-26 PROCEDURE — 99232 SBSQ HOSP IP/OBS MODERATE 35: CPT | Performed by: INTERNAL MEDICINE

## 2021-10-26 PROCEDURE — 6360000002 HC RX W HCPCS: Performed by: INTERNAL MEDICINE

## 2021-10-26 PROCEDURE — C9113 INJ PANTOPRAZOLE SODIUM, VIA: HCPCS | Performed by: NURSE PRACTITIONER

## 2021-10-26 PROCEDURE — 94640 AIRWAY INHALATION TREATMENT: CPT

## 2021-10-26 PROCEDURE — 6370000000 HC RX 637 (ALT 250 FOR IP): Performed by: INTERNAL MEDICINE

## 2021-10-26 PROCEDURE — 84100 ASSAY OF PHOSPHORUS: CPT

## 2021-10-26 PROCEDURE — 2580000003 HC RX 258: Performed by: SURGERY

## 2021-10-26 PROCEDURE — 2500000003 HC RX 250 WO HCPCS: Performed by: EMERGENCY MEDICINE

## 2021-10-26 PROCEDURE — 95816 EEG AWAKE AND DROWSY: CPT

## 2021-10-26 PROCEDURE — 84484 ASSAY OF TROPONIN QUANT: CPT

## 2021-10-26 PROCEDURE — 85610 PROTHROMBIN TIME: CPT

## 2021-10-26 PROCEDURE — 36415 COLL VENOUS BLD VENIPUNCTURE: CPT

## 2021-10-26 PROCEDURE — 36592 COLLECT BLOOD FROM PICC: CPT

## 2021-10-26 PROCEDURE — 2580000003 HC RX 258: Performed by: EMERGENCY MEDICINE

## 2021-10-26 PROCEDURE — 2000000000 HC ICU R&B

## 2021-10-26 PROCEDURE — 97110 THERAPEUTIC EXERCISES: CPT | Performed by: PHYSICAL THERAPIST

## 2021-10-26 PROCEDURE — 94003 VENT MGMT INPAT SUBQ DAY: CPT

## 2021-10-26 PROCEDURE — 2500000003 HC RX 250 WO HCPCS: Performed by: INTERNAL MEDICINE

## 2021-10-26 PROCEDURE — 6360000002 HC RX W HCPCS: Performed by: NURSE PRACTITIONER

## 2021-10-26 PROCEDURE — 80048 BASIC METABOLIC PNL TOTAL CA: CPT

## 2021-10-26 PROCEDURE — 6370000000 HC RX 637 (ALT 250 FOR IP): Performed by: NURSE PRACTITIONER

## 2021-10-26 RX ORDER — HEPARIN SODIUM (PORCINE) LOCK FLUSH IV SOLN 100 UNIT/ML 100 UNIT/ML
3 SOLUTION INTRAVENOUS EVERY 12 HOURS SCHEDULED
Status: DISCONTINUED | OUTPATIENT
Start: 2021-10-26 | End: 2021-11-13 | Stop reason: HOSPADM

## 2021-10-26 RX ORDER — SODIUM CHLORIDE 0.9 % (FLUSH) 0.9 %
5-40 SYRINGE (ML) INJECTION EVERY 12 HOURS SCHEDULED
Status: DISCONTINUED | OUTPATIENT
Start: 2021-10-26 | End: 2021-10-26 | Stop reason: SDUPTHER

## 2021-10-26 RX ORDER — SODIUM CHLORIDE 0.9 % (FLUSH) 0.9 %
5-40 SYRINGE (ML) INJECTION PRN
Status: DISCONTINUED | OUTPATIENT
Start: 2021-10-26 | End: 2021-10-26 | Stop reason: SDUPTHER

## 2021-10-26 RX ORDER — SODIUM CHLORIDE 9 MG/ML
25 INJECTION, SOLUTION INTRAVENOUS PRN
Status: DISCONTINUED | OUTPATIENT
Start: 2021-10-26 | End: 2021-11-13 | Stop reason: HOSPADM

## 2021-10-26 RX ORDER — GAUZE BANDAGE 2" X 2"
100 BANDAGE TOPICAL DAILY
Status: DISCONTINUED | OUTPATIENT
Start: 2021-10-26 | End: 2021-11-13 | Stop reason: HOSPADM

## 2021-10-26 RX ORDER — SODIUM CHLORIDE 0.9 % (FLUSH) 0.9 %
5-40 SYRINGE (ML) INJECTION PRN
Status: DISCONTINUED | OUTPATIENT
Start: 2021-10-26 | End: 2021-11-13 | Stop reason: HOSPADM

## 2021-10-26 RX ORDER — SODIUM CHLORIDE 0.9 % (FLUSH) 0.9 %
5-40 SYRINGE (ML) INJECTION EVERY 12 HOURS SCHEDULED
Status: DISCONTINUED | OUTPATIENT
Start: 2021-10-26 | End: 2021-11-13 | Stop reason: HOSPADM

## 2021-10-26 RX ORDER — LEVETIRACETAM 5 MG/ML
500 INJECTION INTRAVASCULAR EVERY 12 HOURS
Status: DISCONTINUED | OUTPATIENT
Start: 2021-10-26 | End: 2021-10-30

## 2021-10-26 RX ORDER — HEPARIN SODIUM (PORCINE) LOCK FLUSH IV SOLN 100 UNIT/ML 100 UNIT/ML
3 SOLUTION INTRAVENOUS PRN
Status: DISCONTINUED | OUTPATIENT
Start: 2021-10-26 | End: 2021-11-13 | Stop reason: HOSPADM

## 2021-10-26 RX ORDER — SODIUM CHLORIDE 9 MG/ML
25 INJECTION, SOLUTION INTRAVENOUS PRN
Status: DISCONTINUED | OUTPATIENT
Start: 2021-10-26 | End: 2021-10-26 | Stop reason: SDUPTHER

## 2021-10-26 RX ADMIN — DILTIAZEM HYDROCHLORIDE 10 MG/HR: 5 INJECTION, SOLUTION INTRAVENOUS at 15:28

## 2021-10-26 RX ADMIN — LEVETIRACETAM 500 MG: 5 INJECTION, SOLUTION INTRAVENOUS at 22:00

## 2021-10-26 RX ADMIN — PANTOPRAZOLE SODIUM 40 MG: 40 INJECTION, POWDER, FOR SOLUTION INTRAVENOUS at 20:32

## 2021-10-26 RX ADMIN — IPRATROPIUM BROMIDE AND ALBUTEROL SULFATE 1 AMPULE: .5; 2.5 SOLUTION RESPIRATORY (INHALATION) at 21:41

## 2021-10-26 RX ADMIN — SERTRALINE HYDROCHLORIDE 100 MG: 50 TABLET ORAL at 09:24

## 2021-10-26 RX ADMIN — PANTOPRAZOLE SODIUM 40 MG: 40 INJECTION, POWDER, FOR SOLUTION INTRAVENOUS at 09:24

## 2021-10-26 RX ADMIN — Medication 10 ML: at 09:26

## 2021-10-26 RX ADMIN — Medication 10 ML: at 20:34

## 2021-10-26 RX ADMIN — FOLIC ACID 1 MG: 5 INJECTION, SOLUTION INTRAMUSCULAR; INTRAVENOUS; SUBCUTANEOUS at 09:24

## 2021-10-26 RX ADMIN — ENOXAPARIN SODIUM 120 MG: 150 INJECTION SUBCUTANEOUS at 09:24

## 2021-10-26 RX ADMIN — SODIUM CHLORIDE, PRESERVATIVE FREE 10 ML: 5 INJECTION INTRAVENOUS at 20:32

## 2021-10-26 RX ADMIN — SODIUM CHLORIDE, PRESERVATIVE FREE 10 ML: 5 INJECTION INTRAVENOUS at 22:19

## 2021-10-26 RX ADMIN — LACTULOSE 20 G: 20 SOLUTION ORAL at 20:32

## 2021-10-26 RX ADMIN — DILTIAZEM HYDROCHLORIDE 10 MG/HR: 5 INJECTION, SOLUTION INTRAVENOUS at 00:28

## 2021-10-26 RX ADMIN — IPRATROPIUM BROMIDE AND ALBUTEROL SULFATE 1 AMPULE: .5; 2.5 SOLUTION RESPIRATORY (INHALATION) at 05:49

## 2021-10-26 RX ADMIN — LACTULOSE 20 G: 20 SOLUTION ORAL at 09:24

## 2021-10-26 RX ADMIN — LEVETIRACETAM 500 MG: 5 INJECTION, SOLUTION INTRAVENOUS at 10:57

## 2021-10-26 RX ADMIN — CEFTRIAXONE 2000 MG: 2 INJECTION, POWDER, FOR SOLUTION INTRAMUSCULAR; INTRAVENOUS at 10:57

## 2021-10-26 RX ADMIN — IPRATROPIUM BROMIDE AND ALBUTEROL SULFATE 1 AMPULE: .5; 2.5 SOLUTION RESPIRATORY (INHALATION) at 01:28

## 2021-10-26 RX ADMIN — IPRATROPIUM BROMIDE AND ALBUTEROL SULFATE 1 AMPULE: .5; 2.5 SOLUTION RESPIRATORY (INHALATION) at 16:23

## 2021-10-26 RX ADMIN — THIAMINE HYDROCHLORIDE 500 MG: 100 INJECTION, SOLUTION INTRAMUSCULAR; INTRAVENOUS at 09:24

## 2021-10-26 RX ADMIN — IPRATROPIUM BROMIDE AND ALBUTEROL SULFATE 1 AMPULE: .5; 2.5 SOLUTION RESPIRATORY (INHALATION) at 08:48

## 2021-10-26 RX ADMIN — ENOXAPARIN SODIUM 120 MG: 150 INJECTION SUBCUTANEOUS at 20:32

## 2021-10-26 RX ADMIN — SODIUM CHLORIDE, PRESERVATIVE FREE 10 ML: 5 INJECTION INTRAVENOUS at 22:04

## 2021-10-26 RX ADMIN — IPRATROPIUM BROMIDE AND ALBUTEROL SULFATE 1 AMPULE: .5; 2.5 SOLUTION RESPIRATORY (INHALATION) at 12:40

## 2021-10-26 RX ADMIN — GABAPENTIN 300 MG: 300 CAPSULE ORAL at 09:24

## 2021-10-26 RX ADMIN — SODIUM CHLORIDE, PRESERVATIVE FREE 10 ML: 5 INJECTION INTRAVENOUS at 09:26

## 2021-10-26 ASSESSMENT — PAIN SCALES - GENERAL
PAINLEVEL_OUTOF10: 0

## 2021-10-26 ASSESSMENT — PULMONARY FUNCTION TESTS
PIF_VALUE: 23
PIF_VALUE: 20
PIF_VALUE: 22
PIF_VALUE: 17
PIF_VALUE: 21
PIF_VALUE: 23
PIF_VALUE: 23
PIF_VALUE: 17
PIF_VALUE: 27
PIF_VALUE: 34
PIF_VALUE: 22
PIF_VALUE: 17
PIF_VALUE: 26
PIF_VALUE: 24
PIF_VALUE: 18
PIF_VALUE: 34
PIF_VALUE: 25
PIF_VALUE: 20
PIF_VALUE: 23
PIF_VALUE: 21
PIF_VALUE: 22
PIF_VALUE: 28
PIF_VALUE: 23
PIF_VALUE: 25

## 2021-10-26 NOTE — CARE COORDINATION
10/26/21 Negative covid 10/16/21. CM transition of care: icu/diltiazem gtt/peep at 5, fio2 at 40%. EEG underway. YOSI Savage received a call from [facility] with the following choices: SNF: [facility] ([facility]) (referral made), [facility] alternate choice. [facility]- not in network and [facility]- which is in network. Marcella Servin with [facility] is following. SNF referrals made at this time. Bo Brower lives in [place] , sent a [employer] [facility] email with discharge planning updates to her at  [email or web address]. PRECERT needed, if SNF need HENS and CURTIS signed at discharge. CM/SS to follow. Electronically signed by SIMEON Hanna on 10/26/2021 at 8:57 AM     Addendum: [facility] ([facility]) unable to follow, referral to [facility] to review.  Electronically signed by SIMEON Hanna on 10/26/2021 at 9:37 AM

## 2021-10-26 NOTE — PROGRESS NOTES
Department of Internal Medicine  General Internal Medicine  Attending Progress Note  Chief Complaint   Patient presents with    Delirium Tremens (DTS)     Pt states he usually drinks a fifth of kilo beam a day states he quit saturday. states he has been having visual hallucinations since yesterday. SUBJECTIVE:    Intubated.      OBJECTIVE      Medications    Current Facility-Administered Medications: levETIRAcetam (KEPPRA) 500 mg/100 mL IVPB, 500 mg, IntraVENous, Q12H  heparin flush 100 UNIT/ML injection 300 Units, 3 mL, IntraVENous, 2 times per day  heparin flush 100 UNIT/ML injection 300 Units, 3 mL, IntraCATHeter, PRN  thiamine mononitrate tablet 100 mg, 100 mg, Oral, Daily  lidocaine 1 % injection 5 mL, 5 mL, IntraDERmal, Once  sodium chloride flush 0.9 % injection 5-40 mL, 5-40 mL, IntraVENous, 2 times per day  sodium chloride flush 0.9 % injection 5-40 mL, 5-40 mL, IntraVENous, PRN  0.9 % sodium chloride infusion, 25 mL, IntraVENous, PRN  heparin flush 100 UNIT/ML injection 300 Units, 3 mL, IntraVENous, 2 times per day  heparin flush 100 UNIT/ML injection 300 Units, 3 mL, IntraCATHeter, PRN  dilTIAZem 125 mg in dextrose 5 % 125 mL infusion, 5-15 mg/hr, IntraVENous, Continuous  lactulose (CHRONULAC) 10 GM/15ML solution 20 g, 20 g, Oral, BID  sertraline (ZOLOFT) tablet 100 mg, 100 mg, Oral, Daily  dexmedetomidine (PRECEDEX) 400 mcg in sodium chloride 0.9 % 100 mL infusion, 0.2-1.4 mcg/kg/hr, IntraVENous, Continuous  cefTRIAXone (ROCEPHIN) 2,000 mg in sterile water 20 mL IV syringe, 2,000 mg, IntraVENous, Q24H  enoxaparin (LOVENOX) injection 120 mg, 1 mg/kg, SubCUTAneous, BID  0.9 % sodium chloride infusion, 25 mL, IntraVENous, Q8H  perflutren lipid microspheres (DEFINITY) injection 1.65 mg, 1.5 mL, IntraVENous, ONCE PRN  pantoprazole (PROTONIX) injection 40 mg, 40 mg, IntraVENous, BID **AND** sodium chloride (PF) 0.9 % injection 10 mL, 10 mL, IntraVENous, BID  folic acid 1 mg in dextrose 5 % 50 mL IVPB, 1 mg, IntraVENous, Daily  ondansetron (ZOFRAN-ODT) disintegrating tablet 4 mg, 4 mg, Oral, Q8H PRN **OR** ondansetron (ZOFRAN) injection 4 mg, 4 mg, IntraVENous, Q6H PRN  polyethylene glycol (GLYCOLAX) packet 17 g, 17 g, Oral, Daily PRN  acetaminophen (TYLENOL) tablet 650 mg, 650 mg, Oral, Q6H PRN **OR** acetaminophen (TYLENOL) suppository 650 mg, 650 mg, Rectal, Q6H PRN  ipratropium-albuterol (DUONEB) nebulizer solution 1 ampule, 1 ampule, Inhalation, Q4H  Physical    VITALS:  /81   Pulse 109   Temp 100 °F (37.8 °C) (Axillary)   Resp 23   Ht 5' 11\" (1.803 m)   Wt 262 lb 12.6 oz (119.2 kg)   SpO2 97%   BMI 36.65 kg/m²   Patient is sedated and ventilated  Normocephalic, without obvious abnormality, atraumatic, external ears without lesions,   Neck  cervical lymphadenopathy with limited exam for motion due to ET tube  Heart has a regular rate and rhythm no murmur  Lungs are clear to auscultation bilaterally with equal movements with the additional sounds of transmitted airway sounds from the ventilator. Abdomen is soft nontender nondistended no rebound or guarding. No  edema good peripheral perfusion. No significant rashes or new skin lesions. Affect and neuro exam limited since he is sedated on the ventilator  10/25 in response to my verbal stimuli he glances over me and then looks away  10/26 he repeats this activity several times.   With me this seems to be more glancing than that tracking      Data    CBC:   Lab Results   Component Value Date    WBC 10.9 10/26/2021    RBC 3.72 10/26/2021    HGB 11.6 10/26/2021    HCT 36.4 10/26/2021    MCV 97.8 10/26/2021    MCH 31.2 10/26/2021    MCHC 31.9 10/26/2021    RDW 16.2 10/26/2021     10/26/2021    MPV 8.7 10/26/2021     BMP:    Lab Results   Component Value Date     10/26/2021    K 3.8 10/26/2021    K 4.3 10/12/2021     10/26/2021    CO2 27 10/26/2021    BUN 28 10/26/2021    LABALBU 2.7 10/24/2021    CREATININE 0.8 10/26/2021    CALCIUM 9.4 10/26/2021    GFRAA >60 10/26/2021    LABGLOM >60 10/26/2021    GLUCOSE 137 10/26/2021       ASSESSMENT AND PLAN    Brief summary of Stay:  73 y. o. male with a history of alcoholism depression hypertension coronary artery disease GERD COPD morbid obesity presents with signs of alcohol withdrawal.  Before he was intubated he gave the ER the history that he began having tremors and hallucinations at 6 AM.  For the past 1 or 2 weeks he has been drinking 1/5 of gin being a day but has not had any drinks since Saturday the only other substances of abuse that he admits to is smoking 1 and half packs of cigarettes per day.  His tremors are getting worse on Saturday he started having leg swelling. In the ER he was known to be having tremors he received Ativan but even before that when he was grabbing at invisible objects he was snoring per the nurse. [facility] desatted down to 70%.  He was intubated to protect his airway.  He was noted to have A. fib RVR and was given Cardizem drip to good effect. Patient has been weaned off of Cardizem and heart rate has been controlled. Patient is still intubated. 1.  Acute on chronic hypoxic hypercapnic respiratory failure failing to wean planning for PEG and trach per pulmonary management. Precedex continues as of 10/27  2. Atrial fibrillation RVR. Cardizem drip continues as of 10/27  3. Alcohol withdrawal: This etiology of his acute encephalopathy but he seems to be mainly behind us by now need to consider other things. Neuro did see him note appreciated. Giving thiamine and folic acid. EEG & MRI pending  4. Infection treatment for UTI & Aspiration pneumonia ceftriaxone now to cover Klebsiella in the urine  5. Morbid obesity but also with   Malnutrition: Currently on tube feeding. 6.  Full code. 7.  DVT prophylaxis with enoxaparin at the anticoagulation dose 120 twice daily for A. fib.   8.  Disposition could consider LTAC soon

## 2021-10-26 NOTE — CONSULTS
History Of Present Illness: History of Present Illness:  73 y.o. male with a history of alcoholism depression hypertension coronary artery disease GERD COPD morbid obesity presents with signs of alcohol withdrawal.  Before he was intubated he gave the ER the history that he began having tremors and hallucinations at 6 AM.  For the past 1 or 2 weeks he has been drinking 1/5 of gin being a day but has not had any drinks since Saturday the only other substances of abuse that he admits to is smoking 1 and half packs of cigarettes per day. His tremors are getting worse on Saturday he started having leg swelling. In the ER he was known to be having tremors he received Ativan but even before that when he was grabbing at invisible objects he was snoring per the nurse. He desatted down to 70%. He was intubated to protect his airway. He was noted to have A. fib RVR and was given Cardizem drip to good effect  As above per Dr Marycarmen Garza. The patient is a 73 y.o. male with significant past medical history of see below who presents with above.       The patient has the following symptoms:    Change in level of consciousness: unresponsive    New Weakness: no    Numbness or Tingling: no    Difficulty Swallowing: yes    Current Medications:   Scheduled Meds:   levETIRAcetam  500 mg IntraVENous Q12H    lactulose  20 g Oral BID    sertraline  100 mg Oral Daily    sodium chloride flush  5-40 mL IntraVENous 2 times per day    cefTRIAXone (ROCEPHIN) IV  2,000 mg IntraVENous Q24H    enoxaparin  1 mg/kg SubCUTAneous BID    [Held by provider] baclofen  5 mg Oral BID    [Held by provider] diazePAM  5 mg Oral Q12H    thiamine (VITAMIN B1) IVPB  500 mg IntraVENous Q24H    pantoprazole  40 mg IntraVENous BID    And    sodium chloride (PF)  10 mL IntraVENous BID    folic acid IVPB  1 mg IntraVENous Daily    ipratropium-albuterol  1 ampule Inhalation Q4H     Continuous Infusions:   dilTIAZem (CARDIZEM) 125 mg in dextrose 5% 125 mL infusion 10 mg/hr (10/26/21 0028)    dexmedetomidine HCl in NaCl Stopped (10/25/21 5764)    sodium chloride      sodium chloride Stopped (10/19/21 0708)     PRN Meds:sodium chloride flush, sodium chloride, perflutren lipid microspheres, ondansetron **OR** ondansetron, polyethylene glycol, acetaminophen **OR** acetaminophen    Allergies:  Patient has no known allergies. Social History:   TOBACCO:   reports that he has been smoking cigars. He has a 45.00 pack-year smoking history. He has never used smokeless tobacco.  ETOH:   reports current alcohol use. Past Medical History:        Diagnosis Date    CAD (coronary artery disease)     COPD (chronic obstructive pulmonary disease) ([facility]     Depression     Fracture of unspecified phalanx of left middle finger, initial encounter for closed fracture     GERD (gastroesophageal reflux disease)     Hypertension        Past Surgical History:        Procedure Laterality Date    COLONOSCOPY      CORONARY ARTERY BYPASS GRAFT  3/2/05    x3: LIMA to LAD, SVG to RCA, SVG to diagonal    DIAGNOSTIC CARDIAC CATH LAB PROCEDURE  3/02/05    [facility]: Severe multivessel CAD in patient who presents with STEMI and total occlusion of diagonal branch. Significant disease of proximal LAD and severe disease of dominant RCA.  FINGER SURGERY Left 5/3/2019    CLOSED POSSIBLE LEFT MIDDLE FINGER OPEN REDUCTION INTERNAL FIXATION POSSIBLE PROXIMAL INTERPHALANGEAL JOINT ARTHROPLASTY   ++[facility] performed by Joanne Love MD at [address] [place] [address] N/A 10/20/2021    EGD WITH NG TUBE PLACEMENT **BEDSIDE** performed by Angie Gonzalez MD at [address] [place] [address]         Outside reports reviewed: ER records, historical medical records, lab reports and radiology reports. Patient's medications, allergies, past medical, surgical, social and family histories were reviewed and updated as appropriate.     Review of Systems  A comprehensive review of systems was negative except for:       Objective:     Neuro exam 140/82 p 120 t 99.4  General: somnolent. Intubated; does not clearly follow commands  Cranial nerve testing  unable to cooperate. PERRL, corneal reflexes +  . Funduscopic eye exam revealed not testable. Motor exam: . Algis Broaden Deep tendon reflexes were absent bilaterally. Plantar responses were flexor bilaterally. Cerebellar exam noted . Algis Broaden Sensation was . Algis Broaden Assessment:   Altered mental status in patient with positive urine drug ajuyvx01/13/21 barbiturate,benzodiazepines, oxcycodone, fenatanyl. MRI of brain unremarkable  CXR suggesting \"lower lobe airspace disease with pleural effusisions (history of cad/open heart surgery [facility])  Possible seizure like activity      Plan:    Thiamine  Repeat troponin  Suggest LP under with IR (repeat INR/PT)  EEG  Empiric keppra  Thanks for consult

## 2021-10-26 NOTE — PROCEDURES
[address]                          ELECTROENCEPHALOGRAM REPORT    PATIENT NAME: Cayla Peter                   :        1955  MED REC NO:   [id number]                            ROOM:       [facility]  ACCOUNT NO:   [de-identified]                           ADMIT DATE: 10/12/2021  PROVIDER:     Mando Handy MD    DATE OF EEG:  10/26/2021    PLACE OF SERVICE:  [address] [place] [address]. EEG DIAGNOSIS:  Awake abnormal, sleep normal report. A 19-channel EEG was recorded and demonstrates a background rhythm of  approximately 5-6 Hz independent of eye closure. Hyperventilation and  photic stimulation were not performed. During the sleep portion of the  record, there was no activation. INTERPRETATION:  Abnormal EEG secondary to theta range slowing. This  pattern of EEG abnormality can be seen secondary to toxic, metabolic, or  hypoxic ischemic encephalopathy. No epileptiform activity noted, which  does not rule out underlying seizure disorder. Clinical correlation  advised.         Von Bradford MD    D: 10/26/2021 14:29:38       T: 10/26/2021 14:31:54     [email or web address]  Job#: [id number]     Doc#: [id number]    CC:

## 2021-10-26 NOTE — PROGRESS NOTES
Assessment and Plan  Patient is a 73 y.o. male with the following medical Problems:   1. Acute on chronic hypoxic and hypercapnic respiratory failure  2. Alcohol withdrawal  3. New onset atrial fibrillation  4. History of thrombocytopenia  5. Morbid obesity with obesity hypoventilation syndrome  6. Metabolic encephalopathy  7. Acute kidney injury  (resolved)  8. Aspiration pneumonia  9. UTI  10. Small pericardial effusion  11. Hematuria (resolved)  12. 2.8 X 2.8 cm AAA  13. Small bilateral pleural effusions    Plan of care:  1. Continue with Precedex and wean down to assess for response to commands. Patient tracks and opens eyes but does not follow commands. 2.  Continue with thiamine and folic acid  3. Surgery consult for tracheostomy and PEG tube placement. Positive care is consulted. 4.  Lovenox 1 mg/kg twice a day for atrial fibrillation  5. Continue tube feedings. 6. EEG to rule out seizures and MRI to evaluate for encephalopathy. 7.  MRI ruled out any intracranial abnormalities. .  8.  GI prophylaxis  9. Ceftriaxone for 7 days for Klebsiella in the sputum. 10.  Chest x-ray today. History of Present Illness:   Patient is a 20-year-old with history of hypertension, CAD, COPD, and alcohol abuse. Patient presented to the ED on 10/12/2021 after developing hallucinations and shortness of breath. Patient apparently called police after seeing his  wife and 2 children walking in his house. He apparently attempted to spray them with bug spray before calling police. Patient has a history of alcohol abuse and drinks 1/5 of them being daily. According to ED physician's note the patient's last drink was on Saturday. The patient is currently intubated, sedated, and unable to provide any history. Daily Progress:  2021: Patient remains sedated intubated and mechanically ventilated. He had an uneventful night.   He is currently on a Cardizem drip for atrial fibrillation with RVR. He has no fever, chills, or rigors. October 14, 2021: Patient remained sedated intubated and mechanically ventilated. He had atrial fibrillation with RVR. Patient failed awakening trial today. He was agitated and confused. He has no fever, chills, rigors. October 15, 2021: Patient remains sedated intubated and mechanically ventilated. He gets easily agitated and combative off sedation. He failed awakening trial.  He has no fever, chills, or rigors. Heart rate is controlled. He developed hematuria overnight and Eliquis was put on hold. October 16, 2021: Patient had repetitive episodes of vomiting. Multiple attempts to place an OGT probably failed. CT scan of the abdomen and pelvis was obtained. There was no evidence of hiatal hernia. Patient has no fever, chills, rigors. He becomes agitated and combative off sedation. He has no fever, chills, rigors. He is currently on 75% FiO2. October 17, 2021: Patient remains sedated intubated and mechanically ventilated. We will attempt sedation vacation and spontaneous breathing trials today if tolerated. He remains n.p.o. since he has no NG tube. NG tube could not be advanced. He remained hemodynamically stable. He has no fever, chills, rigors. Sugar is mostly controlled. October 25, 2021: Patient remains lethargic and poorly responsive. She opens eyes but does not follow commands. He has no fever, chills, rigors. White blood cells count remained stable. Continues to tolerate tube feeding. He remained hemodynamically stable. October 26, 2021: Patient is awake and tracks of sedation but does not follow command. He has no fever, chills, or rigors. He is extremely weak and likely to decompensate if extubated. Tracheostomy would be beneficial in the long-term. He has no fever, chills, or rigors.     Past Medical History:  Past Medical History:   Diagnosis Date    CAD (coronary artery disease)     [facility]     Depression     Fracture of unspecified phalanx of left middle finger, initial encounter for closed fracture     GERD (gastroesophageal reflux disease)     Hypertension         Family History:   No family history on file. Allergies:         Patient has no known allergies. Social history:  Social History     Socioeconomic History    Marital status:      Spouse name: Not on file    Number of children: Not on file    Years of education: Not on file    Highest education level: Not on file   Occupational History    Not on file   Tobacco Use    Smoking status: Current Every Day Smoker     Packs/day: 1.00     Years: 45.00     Pack years: 45.00     Types: Cigars    Smokeless tobacco: Never Used    Tobacco comment: SMOKES CIGARS    Vaping Use    Vaping Use: Never used   Substance and Sexual Activity    Alcohol use: Yes     Comment: occassiona    Drug use: No    Sexual activity: Not on file   Other Topics Concern    Not on file   Social History Narrative    Not on file     Social Determinants of Health     Financial Resource Strain:     Difficulty of Paying Living Expenses:    Food Insecurity:     Worried About Running Out of Food in the Last Year:     Ran Out of Food in the Last Year:    Transportation Needs:     Lack of Transportation (Medical):      Lack of Transportation (Non-Medical):    Physical Activity:     Days of Exercise per Week:     Minutes of Exercise per Session:    Stress:     Feeling of Stress :    Social Connections:     Frequency of Communication with Friends and Family:     Frequency of Social Gatherings with Friends and Family:     Attends Gnosticist Services:     Active Member of Clubs or Organizations:     Attends Club or Organization Meetings:     Marital Status:    Intimate Partner Violence:     Fear of Current or Ex-Partner:     Emotionally Abused:     Physically Abused:     Sexually Abused:        Current Medications: Current Facility-Administered Medications:     levETIRAcetam (KEPPRA) 500 mg/100 mL IVPB, 500 mg, IntraVENous, Q12H, Sylvie Hall MD, Stopped at 10/26/21 1124    lidocaine 1 % injection 5 mL, 5 mL, IntraDERmal, Once, Jessie Hobbs MD    sodium chloride flush 0.9 % injection 5-40 mL, 5-40 mL, IntraVENous, 2 times per day, Jessie Hobbs MD    sodium chloride flush 0.9 % injection 5-40 mL, 5-40 mL, IntraVENous, PRN, Jessie Hobbs MD    0.9 % sodium chloride infusion, 25 mL, IntraVENous, PRN, Jessie Hobbs MD    heparin flush 100 UNIT/ML injection 300 Units, 3 mL, IntraVENous, 2 times per day, Jessie Hobbs MD    heparin flush 100 UNIT/ML injection 300 Units, 3 mL, IntraCATHeter, PRN, Jessie Hobbs MD    dilTIAZem 125 mg in dextrose 5 % 125 mL infusion, 5-15 mg/hr, IntraVENous, Continuous, Jessie Hobbs MD, Last Rate: 10 mL/hr at 10/26/21 0028, 10 mg/hr at 10/26/21 0028    lactulose (CHRONULAC) 10 GM/15ML solution 20 g, 20 g, Oral, BID, Yun Martinez MD, 20 g at 10/26/21 0924    sertraline (ZOLOFT) tablet 100 mg, 100 mg, Oral, Daily, Dmitriy Newell MD, 100 mg at 10/26/21 0924    dexmedetomidine (PRECEDEX) 400 mcg in sodium chloride 0.9 % 100 mL infusion, 0.2-1.4 mcg/kg/hr, IntraVENous, Continuous, Dmitriy Newell MD, Stopped at 10/25/21 0838    sodium chloride flush 0.9 % injection 5-40 mL, 5-40 mL, IntraVENous, 2 times per day, Thadeus Dapash, DO, 10 mL at 10/26/21 0926    sodium chloride flush 0.9 % injection 5-40 mL, 5-40 mL, IntraVENous, PRN, Thadeus Dapash, DO    0.9 % sodium chloride infusion, 25 mL, IntraVENous, PRN, Thadeus Dapash, DO    cefTRIAXone (ROCEPHIN) 2,000 mg in sterile water 20 mL IV syringe, 2,000 mg, IntraVENous, Q24H, Dmitriy Newell MD, 2,000 mg at 10/26/21 1057    enoxaparin (LOVENOX) injection 120 mg, 1 mg/kg, SubCUTAneous, BID, Jessie Hobbs MD, 120 mg MV    Ventilator Information:  Vent Information  $Ventilation: $Subsequent Day  Skin Assessment: Clean, dry, & intact  Equipment Changed: Suction catheter  Vent Type: 980  Vent Mode: AC/VC  Vt Ordered: 500 mL  Rate Set: 18 bmp  Peak Flow: 70 L/min  Pressure Support: 0 cmH20  FiO2 : 40 %  SpO2: 97 %  SpO2/FiO2 ratio: 242.5  Sensitivity: 4  PEEP/CPAP: 5  I Time/ I Time %: 0 s  Humidification Source: Heated wire  Humidification Temp: 37  Humidification Temp Measured: 36.5  Circuit Condensation: Not drained    General appearance: , not in pain or distress, in no respiratory distress    HEENT: Intubated  Neck: Supple, no jugular venous distension, lymphadenopathy, thyromegaly or carotid bruits  Chest: Decreased breath sounds, no wheezing, no crackles and no tenderness over ribs   Cardiovascular: Normal S1 , S2, regular rate and rhythm, no murmur, rub or gallop  Abdomen: Normal sounds present, soft, lax with no tenderness, no hepatosplenomegaly, and no masses  Extremities: No edema. Pulses are equally present. Skin: intact, no rashes   Neurologic: Sedated and mechanically ventilated but opens eyes off sedation. Patient tracks but does not follow commands. Investigations:  Labs, radiological imaging and cardiac work up were reviewed        ICU STAFF PHYSICIAN NOTE OF PERSONAL INVOLVEMENT IN CARE  As the attending physician, I certify that I personally reviewed the patient's history and personally examined the patient to confirm the physical findings described above, and that I reviewed the relevant imaging studies and available reports. I also discussed the differential diagnosis and all of the proposed management plans with the patient and individuals accompanying the patient to this visit. They had the opportunity to ask questions about the proposed management plans and to have those questions answered.     This patient has a high probability of sudden, clinically significant deterioration, which requires the highest level of physician preparedness to intervene urgently. I managed/supervised life or organ supporting interventions that required frequent physician assessment. I devoted my full attention to the direct care of this patient for the amount of time indicated below. Time I spent with family or surrogate(s) is included only if the patient was incapable of providing the necessary information or participating in medical decision-making. Time devoted to teaching and to any procedures I billed separately is not included.   Critical Care Time: 35 minutes      Electronically signed by Jessie Hobbs MD on 10/26/2021 at 12:20 PM

## 2021-10-26 NOTE — PROGRESS NOTES
Physician Progress Note      Aubrie Hernández  [facility] #:                  [id number]  :                       1955  ADMIT DATE:       10/12/2021 9:01 AM  DISCH DATE:  RESPONDING  PROVIDER #:        NIR RUST          QUERY TEXT:    Pt admitted with Alcohol withdrawal, Respiratory failure. Pt noted to have   aspiration pneumonia. If possible, please document in progress notes and   discharge summary the present on admission status of aspiration pneumonia:    The medical record reflects the following:  Risk Factors: Alcohol withdrawal, hallucinating, tremors/seizures, Acute   Respiratory failure, Metabolic encephalopathy, Obesity hypoventilation   syndrome  Clinical Indicators: 10/12 time line:  09:00 SAO2 94% RA---12:00 CXR   Cardiomegaly, 13:00 SAO2 70- 87% RA, 88% 5L.  14:17 Intubated   14:52 CXR  New opacity within the right lung base. ..pleural   effusion and or right lung base infiltrate. ETT/ NG in place, 17:24 CXR    Slightly more confluent airspace disease in the right lung base. Treatment: IV Solu-medrol, Maxipime, Vanc, Depacon, Rocephin, intubation/ mech   ventilation, Nebs. Critical care/ Pulm consult.     Thank you, Gertha Runner RN [phone]  Options provided:  -- Yes, aspiration pneumonia was present at the time of the order to admit to   the hospital  -- No, aspiration pneumonia was not present on admission and developed during   the inpatient stay  -- Other - I will add my own diagnosis  -- Disagree - Not applicable / Not valid  -- Disagree - Clinically unable to determine / Unknown  -- Refer to Clinical Documentation Reviewer    PROVIDER RESPONSE TEXT:    Yes, aspiration pneumonia was present at the time of the order to admit to the   hospital.    Query created by: Rossy Lopez on 10/25/2021 7:35 PM      Electronically signed by:  Layla Bergeron 10/26/2021 7:21 AM

## 2021-10-26 NOTE — PLAN OF CARE
Problem: Skin Integrity:  Goal: Will show no infection signs and symptoms  Description: Will show no infection signs and symptoms  Outcome: Met This Shift     Problem: Skin Integrity:  Goal: Absence of new skin breakdown  Description: Absence of new skin breakdown  Outcome: Met This Shift     Problem: Non-Violent Restraints  Goal: No harm/injury to patient while restraints in use  10/26/2021 1135 by Kostas Salinas RN  Outcome: Met This Shift     Problem: Non-Violent Restraints  Goal: Patient's dignity will be maintained  10/26/2021 1135 by Kostas Salinas RN  Outcome: Met This Shift     Problem: Falls - Risk of:  Goal: Will remain free from falls  Description: Will remain free from falls  Outcome: Met This Shift     Problem: Falls - Risk of:  Goal: Absence of physical injury  Description: Absence of physical injury  Outcome: Met This Shift     Problem: Airway Clearance - Ineffective:  Goal: Ability to maintain a clear airway will improve  Description: Ability to maintain a clear airway will improve  Outcome: Met This Shift     Problem: Pain:  Goal: Pain level will decrease  Description: Pain level will decrease  Outcome: Met This Shift     Problem: Injury - Risk of, Healthcare-Acquired Condition:  Goal: Will remain free from falls  Description: Will remain free from falls  Outcome: Met This Shift     Problem: Nutrition Deficit - Risk of:  Goal: Ability to achieve adequate nutritional intake will improve  Description: Ability to achieve adequate nutritional intake will improve  Outcome: Met This Shift     Problem: Nutrition Deficit - Risk of:  Goal: Maintenance of adequate weight for body size and type will improve to within specified parameters  Description: Maintenance of adequate weight for body size and type will improve to within specified parameters  Outcome: Met This Shift     Problem: Respiratory Function - Compromised:  Goal: Ability to maintain a clear airway will improve  Description: Ability to maintain a clear airway will improve  Outcome: Met This Shift     Problem: Non-Violent Restraints  Goal: Removal from restraints as soon as assessed to be safe  10/26/2021 1135 by Fredy Castro RN  Outcome: Not Met This Shift     Problem: Mobility - Impaired:  Goal: Able to ambulate independently  Description: Able to ambulate independently  Outcome: Not Met This Shift     Problem: Mobility - Impaired:  Goal: Able to ambulate with minimal assistance  Description: Able to ambulate with minimal assistance  Outcome: Not Met This Shift     Problem: Mobility - Impaired:  Goal: Ability to appropriately use an adaptive device for ambulation will improve  Description: Ability to appropriately use an adaptive device for ambulation will improve  Outcome: Not Met This Shift     Problem: Mobility - Impaired:  Goal: Able to verbalize acceptance of life and situations over which he or she has no control  Description: Absence of contracture deformity  Outcome: Not Met This Shift

## 2021-10-27 LAB
ANION GAP SERPL CALCULATED.3IONS-SCNC: 7 MMOL/L (ref 7–16)
BUN BLDV-MCNC: 31 MG/DL (ref 6–23)
CALCIUM SERPL-MCNC: 8.9 MG/DL (ref 8.6–10.2)
CHLORIDE BLD-SCNC: 110 MMOL/L (ref 98–107)
CO2: 28 MMOL/L (ref 22–29)
CREAT SERPL-MCNC: 0.7 MG/DL (ref 0.7–1.2)
GFR AFRICAN AMERICAN: >60
GFR NON-AFRICAN AMERICAN: >60 ML/MIN/1.73
GLUCOSE BLD-MCNC: 146 MG/DL (ref 74–99)
HCT VFR BLD CALC: 33.4 % (ref 37–54)
HEMOGLOBIN: 10.5 G/DL (ref 12.5–16.5)
MAGNESIUM: 1.8 MG/DL (ref 1.6–2.6)
MCH RBC QN AUTO: 31.3 PG (ref 26–35)
MCHC RBC AUTO-ENTMCNC: 31.4 % (ref 32–34.5)
MCV RBC AUTO: 99.4 FL (ref 80–99.9)
PDW BLD-RTO: 16 FL (ref 11.5–15)
PHOSPHORUS: 4.2 MG/DL (ref 2.5–4.5)
PLATELET # BLD: 366 E9/L (ref 130–450)
PMV BLD AUTO: 8.9 FL (ref 7–12)
POTASSIUM SERPL-SCNC: 3.6 MMOL/L (ref 3.5–5)
RBC # BLD: 3.36 E12/L (ref 3.8–5.8)
SODIUM BLD-SCNC: 145 MMOL/L (ref 132–146)
TRIGL SERPL-MCNC: 123 MG/DL (ref 0–149)
WBC # BLD: 8.9 E9/L (ref 4.5–11.5)

## 2021-10-27 PROCEDURE — 94003 VENT MGMT INPAT SUBQ DAY: CPT

## 2021-10-27 PROCEDURE — 80048 BASIC METABOLIC PNL TOTAL CA: CPT

## 2021-10-27 PROCEDURE — 2580000003 HC RX 258: Performed by: NURSE PRACTITIONER

## 2021-10-27 PROCEDURE — 83735 ASSAY OF MAGNESIUM: CPT

## 2021-10-27 PROCEDURE — 2500000003 HC RX 250 WO HCPCS: Performed by: INTERNAL MEDICINE

## 2021-10-27 PROCEDURE — 2000000000 HC ICU R&B

## 2021-10-27 PROCEDURE — 2580000003 HC RX 258: Performed by: EMERGENCY MEDICINE

## 2021-10-27 PROCEDURE — 2500000003 HC RX 250 WO HCPCS: Performed by: EMERGENCY MEDICINE

## 2021-10-27 PROCEDURE — 99222 1ST HOSP IP/OBS MODERATE 55: CPT | Performed by: NURSE PRACTITIONER

## 2021-10-27 PROCEDURE — 85027 COMPLETE CBC AUTOMATED: CPT

## 2021-10-27 PROCEDURE — 6370000000 HC RX 637 (ALT 250 FOR IP): Performed by: INTERNAL MEDICINE

## 2021-10-27 PROCEDURE — 2580000003 HC RX 258: Performed by: INTERNAL MEDICINE

## 2021-10-27 PROCEDURE — 6370000000 HC RX 637 (ALT 250 FOR IP): Performed by: NURSE PRACTITIONER

## 2021-10-27 PROCEDURE — 6360000002 HC RX W HCPCS: Performed by: INTERNAL MEDICINE

## 2021-10-27 PROCEDURE — 36592 COLLECT BLOOD FROM PICC: CPT

## 2021-10-27 PROCEDURE — 99232 SBSQ HOSP IP/OBS MODERATE 35: CPT | Performed by: INTERNAL MEDICINE

## 2021-10-27 PROCEDURE — C9113 INJ PANTOPRAZOLE SODIUM, VIA: HCPCS | Performed by: NURSE PRACTITIONER

## 2021-10-27 PROCEDURE — 6360000002 HC RX W HCPCS: Performed by: PSYCHIATRY & NEUROLOGY

## 2021-10-27 PROCEDURE — 6360000002 HC RX W HCPCS: Performed by: NURSE PRACTITIONER

## 2021-10-27 PROCEDURE — 84478 ASSAY OF TRIGLYCERIDES: CPT

## 2021-10-27 PROCEDURE — 84100 ASSAY OF PHOSPHORUS: CPT

## 2021-10-27 PROCEDURE — 94640 AIRWAY INHALATION TREATMENT: CPT

## 2021-10-27 RX ADMIN — IPRATROPIUM BROMIDE AND ALBUTEROL SULFATE 1 AMPULE: .5; 2.5 SOLUTION RESPIRATORY (INHALATION) at 06:14

## 2021-10-27 RX ADMIN — IPRATROPIUM BROMIDE AND ALBUTEROL SULFATE 1 AMPULE: .5; 2.5 SOLUTION RESPIRATORY (INHALATION) at 14:21

## 2021-10-27 RX ADMIN — PANTOPRAZOLE SODIUM 40 MG: 40 INJECTION, POWDER, FOR SOLUTION INTRAVENOUS at 20:18

## 2021-10-27 RX ADMIN — SODIUM CHLORIDE, PRESERVATIVE FREE 10 ML: 5 INJECTION INTRAVENOUS at 10:13

## 2021-10-27 RX ADMIN — LEVETIRACETAM 500 MG: 5 INJECTION, SOLUTION INTRAVENOUS at 23:00

## 2021-10-27 RX ADMIN — PANTOPRAZOLE SODIUM 40 MG: 40 INJECTION, POWDER, FOR SOLUTION INTRAVENOUS at 09:39

## 2021-10-27 RX ADMIN — IPRATROPIUM BROMIDE AND ALBUTEROL SULFATE 1 AMPULE: .5; 2.5 SOLUTION RESPIRATORY (INHALATION) at 02:00

## 2021-10-27 RX ADMIN — IPRATROPIUM BROMIDE AND ALBUTEROL SULFATE 1 AMPULE: .5; 2.5 SOLUTION RESPIRATORY (INHALATION) at 10:11

## 2021-10-27 RX ADMIN — LACTULOSE 20 G: 20 SOLUTION ORAL at 20:18

## 2021-10-27 RX ADMIN — Medication 0.5 MCG/KG/HR: at 08:15

## 2021-10-27 RX ADMIN — LACTULOSE 20 G: 20 SOLUTION ORAL at 10:16

## 2021-10-27 RX ADMIN — LEVETIRACETAM 500 MG: 5 INJECTION, SOLUTION INTRAVENOUS at 09:49

## 2021-10-27 RX ADMIN — SERTRALINE HYDROCHLORIDE 100 MG: 50 TABLET ORAL at 10:16

## 2021-10-27 RX ADMIN — ENOXAPARIN SODIUM 120 MG: 150 INJECTION SUBCUTANEOUS at 20:18

## 2021-10-27 RX ADMIN — Medication 0.4 MCG/KG/HR: at 15:28

## 2021-10-27 RX ADMIN — Medication 0.6 MCG/KG/HR: at 01:15

## 2021-10-27 RX ADMIN — Medication 10 ML: at 10:21

## 2021-10-27 RX ADMIN — IPRATROPIUM BROMIDE AND ALBUTEROL SULFATE 1 AMPULE: .5; 2.5 SOLUTION RESPIRATORY (INHALATION) at 19:12

## 2021-10-27 RX ADMIN — FOLIC ACID 1 MG: 5 INJECTION, SOLUTION INTRAMUSCULAR; INTRAVENOUS; SUBCUTANEOUS at 09:47

## 2021-10-27 RX ADMIN — SODIUM CHLORIDE, PRESERVATIVE FREE 300 UNITS: 5 INJECTION INTRAVENOUS at 20:23

## 2021-10-27 RX ADMIN — SODIUM CHLORIDE, PRESERVATIVE FREE 10 ML: 5 INJECTION INTRAVENOUS at 20:18

## 2021-10-27 RX ADMIN — IPRATROPIUM BROMIDE AND ALBUTEROL SULFATE 1 AMPULE: .5; 2.5 SOLUTION RESPIRATORY (INHALATION) at 23:02

## 2021-10-27 RX ADMIN — ENOXAPARIN SODIUM 120 MG: 150 INJECTION SUBCUTANEOUS at 09:37

## 2021-10-27 RX ADMIN — Medication 10 ML: at 20:19

## 2021-10-27 RX ADMIN — SODIUM CHLORIDE, PRESERVATIVE FREE 300 UNITS: 5 INJECTION INTRAVENOUS at 20:22

## 2021-10-27 RX ADMIN — Medication 0.5 MCG/KG/HR: at 23:12

## 2021-10-27 RX ADMIN — THIAMINE HCL TAB 100 MG 100 MG: 100 TAB at 10:16

## 2021-10-27 RX ADMIN — CEFTRIAXONE 2000 MG: 2 INJECTION, POWDER, FOR SOLUTION INTRAMUSCULAR; INTRAVENOUS at 09:44

## 2021-10-27 ASSESSMENT — PULMONARY FUNCTION TESTS
PIF_VALUE: 26
PIF_VALUE: 25
PIF_VALUE: 21
PIF_VALUE: 20
PIF_VALUE: 22
PIF_VALUE: 21
PIF_VALUE: 22
PIF_VALUE: 21
PIF_VALUE: 22
PIF_VALUE: 20
PIF_VALUE: 33
PIF_VALUE: 19
PIF_VALUE: 31
PIF_VALUE: 30
PIF_VALUE: 23
PIF_VALUE: 21
PIF_VALUE: 20
PIF_VALUE: 21
PIF_VALUE: 20
PIF_VALUE: 20
PIF_VALUE: 25
PIF_VALUE: 22
PIF_VALUE: 23
PIF_VALUE: 22
PIF_VALUE: 20
PIF_VALUE: 26
PIF_VALUE: 21
PIF_VALUE: 26

## 2021-10-27 NOTE — PLAN OF CARE
Problem: Skin Integrity:  Goal: Will show no infection signs and symptoms  Description: Will show no infection signs and symptoms  10/26/2021 2147 by Denae Narayanan RN  Outcome: Met This Shift  10/26/2021 1135 by Camacho Quinn RN  Outcome: Met This Shift  Goal: Absence of new skin breakdown  Description: Absence of new skin breakdown  10/26/2021 2147 by Denae Narayanan RN  Outcome: Met This Shift  10/26/2021 1135 by Camacho Quinn RN  Outcome: Met This Shift     Problem: Non-Violent Restraints  Goal: Removal from restraints as soon as assessed to be safe  10/26/2021 2147 by Denae Narayanan RN  Outcome: Not Met This Shift  10/26/2021 1135 by Camacho Quinn RN  Outcome: Not Met This Shift  Goal: No harm/injury to patient while restraints in use  10/26/2021 2147 by Denae Narayanan RN  Outcome: Met This Shift  10/26/2021 1135 by Camacho Quinn RN  Outcome: Met This Shift  Goal: Patient's dignity will be maintained  10/26/2021 2147 by Denae Narayanan RN  Outcome: Met This Shift  10/26/2021 1135 by Camacho Quinn RN  Outcome: Met This Shift     Problem: Falls - Risk of:  Goal: Will remain free from falls  Description: Will remain free from falls  10/26/2021 2147 by Denae Narayanan RN  Outcome: Met This Shift  10/26/2021 1135 by Camacho Quinn RN  Outcome: Met This Shift  Goal: Absence of physical injury  Description: Absence of physical injury  10/26/2021 2147 by Denae Narayanan RN  Outcome: Met This Shift  10/26/2021 1135 by Camacho Quinn RN  Outcome: Met This Shift     Problem: Airway Clearance - Ineffective:  Goal: Ability to maintain a clear airway will improve  Description: Ability to maintain a clear airway will improve  10/26/2021 2147 by Denae Narayanan RN  Outcome: Met This Shift  10/26/2021 1135 by Camacho Quinn RN  Outcome: Met This Shift     Problem: Verbal Communication - Impaired:  Goal: Functional communication will improve  Description: Functional communication will improve  Outcome: Completed  Goal: Ability to interact with others will improve  Description: Ability to interact with others will improve  Outcome: Completed  Goal: Ability to establish a method of communication will improve  Description: Ability to establish a method of communication will improve  Outcome: Completed     Problem: Pain:  Goal: Pain level will decrease  Description: Pain level will decrease  10/26/2021 2147 by Delon Cogan, RN  Outcome: Met This Shift  10/26/2021 1135 by Vanessa Campbell RN  Outcome: Met This Shift  Goal: Recognizes and communicates pain  Description: Recognizes and communicates pain  Outcome: Met This Shift     Problem: Mobility - Impaired:  Goal: Able to ambulate independently  Description: Able to ambulate independently  10/26/2021 2147 by Delon Cogan, RN  Outcome: Completed  10/26/2021 1135 by Vanessa Campbell RN  Outcome: Not Met This Shift  Goal: Able to ambulate with minimal assistance  Description: Able to ambulate with minimal assistance  10/26/2021 2147 by Delon Cogan, RN  Outcome: Completed  10/26/2021 1135 by Vanessa Campbell RN  Outcome: Not Met This Shift  Goal: Ability to appropriately use an adaptive device for ambulation will improve  Description: Ability to appropriately use an adaptive device for ambulation will improve  10/26/2021 2147 by Delon Cogan, RN  Outcome: Completed  10/26/2021 1135 by Vanessa Campbell RN  Outcome: Not Met This Shift  Goal: Able to verbalize acceptance of life and situations over which he or she has no control  Description: Absence of contracture deformity  10/26/2021 2147 by Delon Cogan, RN  Outcome: Completed  10/26/2021 1135 by Vanessa Campbell RN  Outcome: Not Met This Shift     Problem: Autonomic Dysreflexia - Risk of:  Goal: Absence of autonomic dysreflexia signs and symptoms  Description: Absence of autonomic dysreflexia signs and symptoms  Outcome: Completed     Problem: Injury - Risk of, Healthcare-Acquired Condition:  Goal: Will remain free from falls  Description: Will remain free from falls  10/26/2021 2147 by Colin Raygoza RN  Outcome: Met This Shift  10/26/2021 1135 by Gaylan Spatz, RN  Outcome: Met This Shift  Goal: Absence of healthcare acquired conditions  Description: Absence of healthcare acquired conditions  Outcome: Completed  Goal: Absence of pressure ulcer  Description: Absence of pressure ulcer  Outcome: Met This Shift  Goal: Demonstration of wound healing without infection will improve  Description: Demonstration of wound healing without infection will improve  Outcome: Completed  Goal: Absence of urinary tract infection signs and symptoms  Description: Absence of urinary tract infection signs and symptoms  Outcome: Met This Shift     Problem: Nutrition Deficit - Risk of:  Goal: Ability to achieve adequate nutritional intake will improve  Description: Ability to achieve adequate nutritional intake will improve  10/26/2021 2147 by Colin Raygoza RN  Outcome: Met This Shift  10/26/2021 1135 by Gaylan Spatz, RN  Outcome: Met This Shift  Goal: Maintenance of adequate weight for body size and type will improve to within specified parameters  Description: Maintenance of adequate weight for body size and type will improve to within specified parameters  10/26/2021 2147 by Colin Raygoza RN  Outcome: Met This Shift  10/26/2021 1135 by Gaylan Spatz, RN  Outcome: Met This Shift     Problem: Swallowing - Impaired:  Goal: Able to swallow without choking  Description: Able to swallow without choking  Outcome: Completed  Goal: Absence of aspiration  Description: Absence of aspiration  Outcome: Completed     Problem: Aspiration - Risk of:  Goal: Absence of aspiration  Description: Absence of aspiration  Outcome: Met This Shift     Problem: Respiratory Function - Compromised:  Goal: Ability to maintain a clear airway will improve  Description: Ability to maintain a clear airway will improve  10/26/2021 2147 by Colin Raygoza RN  Outcome: Met This Shift  10/26/2021 1135 by Lauren Reynoso RN  Outcome: Met This Shift  Goal: Absence of pulmonary infection  Description: Absence of pulmonary infection  Outcome: Not Met This Shift  Goal: Levels of oxygenation will improve  Description: Levels of oxygenation will improve  Outcome: Met This Shift  Goal: Increase in ventilator-free time  Description: Increase in ventilator-free time  Outcome: Not Met This Shift     Problem:  Bowel Function - Altered:  Goal: Bowel elimination is within specified parameters  Description: Bowel elimination is within specified parameters  Outcome: Met This Shift  Goal: Control of bowel function will improve  Description: Control of bowel function will improve  Outcome: Met This Shift  Goal: Ability to maintain normal bowel function will improve  Description: Ability to maintain normal bowel function will improve  Outcome: Met This Shift     Problem: Urinary Elimination - Impaired:  Goal: Urinary elimination within specified parameters  Description: Urinary elimination within specified parameters  Outcome: Completed  Goal: Absence of postvoid residual urine  Description: Absence of postvoid residual urine  Outcome: Completed  Goal: Absence of incontinence - Urinary  Description: Absence of incontinence - Urinary  Outcome: Completed  Goal: Reports of episodes of incontinence will decrease - Urinary  Description: Reports of episodes of incontinence will decrease - Urinary  Outcome: Completed     Problem: Self-Care Deficit:  Goal: Ability to perform activities of daily living will improve  Description: Ability to perform activities of daily living will improve  Outcome: Completed  Goal: Able to perform ADL with assistance  Description: Able to perform ADL with assistance  Outcome: Completed  Goal: Ability to communicate needs accurately will improve - ADL needs  Description: Ability to communicate needs accurately will improve - ADL needs  Outcome: Completed  Goal: Able to use self-care assistive device appropriately  Description: Able to use self-care assistive device appropriately  Outcome: Completed     Problem: Sexual Dysfunction:  Goal: Expressions of positive opinion of body image will increase  Description: Expressions of positive opinion of body image will increase  Outcome: Completed     Problem: Psychosocial Distress:  Goal: Absence of psychosocial distress  Description: Absence of psychosocial distress  Outcome: Completed  Goal: Ability to cope will improve  Description: Ability to cope will improve  Outcome: Completed  Goal: Ability to identify and utilize available support systems will improve  Description: Ability to identify and utilize available support systems will improve  Outcome: Completed

## 2021-10-27 NOTE — CONSULTS
Palliative Care Department  [phone]  Palliative Care Initial Consult  Provider Gris Browning, MARTY Carter CNP     Naun Cantor  [id number]  Hospital Day: 16  Date of Initial Consult: 10/27/2021  Referring Provider: Edith Barry MD  Palliative Medicine was consulted for assistance with: Goals of Care, Code Status Discussion, Family Support    HPI:   Naun Cantor is a 73 y.o. with a medical history of hypertension, CAD, COPD, EtOH abuse who was admitted on 10/12/2021 from home with a CHIEF COMPLAINT of hallucinations. Patient reportedly called police after seeing  family members walking in his house. Patient admitted to heavy alcohol use in ED, reported drinking 1/5 of Cheryln [place] a day for the past 1 to 2 weeks. Patient's reported last drink was 3 days prior to hospital admission. Patient did have episode of seizure in ED and was intubated for airway protection. General surgery consulted for trach/PEG on 10/25. Palliative medicine consulted for goals of care, CODE STATUS discussion, family support. ASSESSMENT/PLAN:     Pertinent Hospital Problems      Acute on chronic hypoxic respiratory failure   Alcohol withdrawal      Palliative Care Encounter / Counseling Regarding Goals of Care  Please see detailed goals of care discussion as below   At this time, Naun Cantor, Does Not have capacity for medical decision-making.   Capacity is time limited and situation/question specific   During encounter, Suman Hyde, was surrogate medical decision-maker   Outcome of goals of care meeting: Continue current management, continue full code, plan for trach/PEG   Code status Full Code   Advanced Directives: no POA or living will in epic    Surrogate/Legal NOK:  sohan Gardiner, child, [phone]  o Carmina Chandra, child, [phone]        Spiritual assessment: no spiritual distress identified  Bereavement and grief: to be determined  Referrals to: none today   SUBJECTIVE:       Details of Conversation: Chart reviewed and patient seen. Patient remains intubated, opens eyes to voice however does not follow commands or respond to orientation questions. Call placed to patient's daughter, Palmira Dixon, and patient's son, Hudson Omer. Role of palliative medicine explained. Hudson Omer explains that patient does not have a living will or healthcare power of . Hudson Omer and his sister, Palmira Dixon, have been making decisions together. Plan is for trach/PEG. Both siblings in agreement of plan. Continue full code. Support offered. No further needs identified from a palliative medicine standpoint. Will sign off. OBJECTIVE:   Prognosis: unknown    Physical Exam:  /69   Pulse 89   Temp 97.3 °F (36.3 °C)   Resp 18   Ht 5' 11\" (1.803 m)   Wt 262 lb 12.6 oz (119.2 kg)   SpO2 97%   BMI 36.65 kg/m²   Constitutional:  Intubated, ill-appearing, opens eyes to voice  Eyes: no scleral icterus, normal lids, no discharge  ENMT:  Normocephalic, atraumatic, mucosa moist, EOMI  Neck:  trachea midline, no JVD  Lungs: Intubated  Heart:  Irregular  Abd:  Soft, non tender, non distended, bowel sounds present  Ext:  Moving extremities to painful stimuli, + edema, pulses present  Skin:  Warm and dry  Neuro:  Opens eyes to voice, does not follow commans    Objective data reviewed: labs, images, records, medication use, vitals and chart    Discussed patient and the plan of care with the other IDT members: Palliative Medicine IDT Team    Time/Communication  Greater than 50% of time spent, total 50 minutes in counseling and coordination of care at the bedside regarding goals of care, diagnosis and prognosis and see above. Thank you for allowing Palliative Medicine to participate in the care of Mike Peña.

## 2021-10-27 NOTE — PROGRESS NOTES
S: suboptimal communication/ventilator  O: 118/70 p 88 t 98  Arouses with verbal stim and follows simple commands  PERRL  Corneal reflexes +  Moves extremities distally with no focal weakness  A/P: altered mental status with possible seizure like activity; improving with negative MRI of brain and EEG. Consider cardiology input regarding elevated troponin.   Once out of ICU will need PT/?rehab

## 2021-10-27 NOTE — PLAN OF CARE
Problem: Skin Integrity:  Goal: Will show no infection signs and symptoms  Description: Will show no infection signs and symptoms  10/27/2021 1417 by Evangelista Sawyer RN  Outcome: Met This Shift  10/27/2021 0321 by Low Mares RN  Outcome: Met This Shift  Goal: Absence of new skin breakdown  Description: Absence of new skin breakdown  10/27/2021 1417 by Evangelista Sawyer RN  Outcome: Met This Shift  10/27/2021 0321 by Low Mares RN  Outcome: Met This Shift     Problem: Non-Violent Restraints  Goal: No harm/injury to patient while restraints in use  10/27/2021 1417 by Evangelista Sawyer RN  Outcome: Met This Shift  10/27/2021 0321 by Low Mares RN  Outcome: Met This Shift  Goal: Patient's dignity will be maintained  10/27/2021 1417 by Evangelista Sawyer RN  Outcome: Met This Shift  10/27/2021 0321 by Low Mares RN  Outcome: Met This Shift     Problem: Falls - Risk of:  Goal: Will remain free from falls  Description: Will remain free from falls  10/27/2021 1417 by Evangelista Sawyer RN  Outcome: Met This Shift  10/27/2021 0321 by Low Mares RN  Outcome: Met This Shift  Goal: Absence of physical injury  Description: Absence of physical injury  Outcome: Met This Shift     Problem: Pain:  Goal: Pain level will decrease  Description: Pain level will decrease  Outcome: Met This Shift     Problem: Injury - Risk of, Healthcare-Acquired Condition:  Goal: Will remain free from falls  Description: Will remain free from falls  10/27/2021 1417 by Evangelista Sawyer RN  Outcome: Met This Shift  10/27/2021 0321 by Low Mares RN  Outcome: Met This Shift  Goal: Absence of pressure ulcer  Description: Absence of pressure ulcer  Outcome: Met This Shift  Goal: Absence of urinary tract infection signs and symptoms  Description: Absence of urinary tract infection signs and symptoms  Outcome: Met This Shift     Problem: Aspiration - Risk of:  Goal: Absence of aspiration  Description: Absence of aspiration  Outcome: Met This Shift     Problem: Respiratory Function - Compromised:  Goal: Levels of oxygenation will improve  Description: Levels of oxygenation will improve  10/27/2021 1417 by Evangelista Sawyer RN  Outcome: Met This Shift  10/27/2021 0321 by Low Mares RN  Outcome: Met This Shift     Problem: Cardiac:  Goal: Ability to maintain an adequate cardiac output will improve  Description: Ability to maintain an adequate cardiac output will improve  Outcome: Met This Shift  Goal: Hemodynamic stability will improve  Description: Hemodynamic stability will improve  Outcome: Met This Shift     Problem: Respiratory:  Goal: Respiratory status will improve  Description: Respiratory status will improve  Outcome: Met This Shift     Problem: Safety:  Goal: Ability to remain free from injury will improve  Description: Ability to remain free from injury will improve  Outcome: Met This Shift     Problem: Non-Violent Restraints  Goal: Removal from restraints as soon as assessed to be safe  10/27/2021 1417 by Evangelista Sawyer RN  Outcome: Not Met This Shift  10/27/2021 0321 by Low Mares RN  Outcome: Not Met This Shift     Problem: Airway Clearance - Ineffective:  Goal: Ability to maintain a clear airway will improve  Description: Ability to maintain a clear airway will improve  Outcome: Not Met This Shift     Problem: Discharge Planning:  Goal: Discharged to appropriate level of care  Description: Discharged to appropriate level of care  Outcome: Not Met This Shift     Problem: Pain:  Goal: Recognizes and communicates pain  Description: Recognizes and communicates pain  Outcome: Not Met This Shift     Problem: Nutrition Deficit - Risk of:  Goal: Ability to achieve adequate nutritional intake will improve  Description: Ability to achieve adequate nutritional intake will improve  Outcome: Not Met This Shift  Goal: Maintenance of adequate weight for body size and type will improve to within specified parameters  Description: Maintenance of adequate weight for body size and type will improve to within specified parameters  Outcome: Not Met This Shift     Problem: Respiratory Function - Compromised:  Goal: Ability to maintain a clear airway will improve  Description: Ability to maintain a clear airway will improve  Outcome: Not Met This Shift  Goal: Absence of pulmonary infection  Description: Absence of pulmonary infection  Outcome: Not Met This Shift  Goal: Increase in ventilator-free time  Description: Increase in ventilator-free time  Outcome: Not Met This Shift     Problem:  Bowel Function - Altered:  Goal: Bowel elimination is within specified parameters  Description: Bowel elimination is within specified parameters  Outcome: Not Met This Shift  Goal: Control of bowel function will improve  Description: Control of bowel function will improve  Outcome: Not Met This Shift  Goal: Ability to maintain normal bowel function will improve  Description: Ability to maintain normal bowel function will improve  Outcome: Not Met This Shift

## 2021-10-27 NOTE — PROGRESS NOTES
Assessment and Plan  Patient is a 73 y.o. male with the following medical Problems:   1. Acute on chronic hypoxic and hypercapnic respiratory failure  2. Alcohol withdrawal  3. New onset atrial fibrillation  4. History of thrombocytopenia  5. Morbid obesity with obesity hypoventilation syndrome  6. Metabolic encephalopathy  7. Acute kidney injury  (resolved)  8. Aspiration pneumonia  9. UTI  10. Small pericardial effusion  11. Hematuria (resolved)  12. 2.8 X 2.8 cm AAA  13. Small bilateral pleural effusions    Plan of care:  1. Continue with Precedex and wean down to assess for response to commands. Patient tracks and opens eyes but does not follow commands. 2.  Continue with thiamine and folic acid  3. Surgery consult for tracheostomy and PEG tube placement. 4.  Lovenox 1 mg/kg twice a day for atrial fibrillation  5. Continue tube feedings. 6. EEG to rule out seizures and MRI to evaluate for encephalopathy. 7.  MRI ruled out any intracranial abnormalities. .  8.  GI prophylaxis  9. Discontinue ceftriaxone  10. I spoke to the patient's son and daughter this morning. They are both in agreement with tracheostomy and PEG tube insertion. Risks, benefits, and alternatives were all explained. History of Present Illness:   Patient is a 43-year-old with history of hypertension, CAD, COPD, and alcohol abuse. Patient presented to the ED on 10/12/2021 after developing hallucinations and shortness of breath. Patient apparently called police after seeing his  wife and 2 children walking in his house. He apparently attempted to spray them with bug spray before calling police. Patient has a history of alcohol abuse and drinks 1/5 of them being daily. According to ED physician's note the patient's last drink was on Saturday. The patient is currently intubated, sedated, and unable to provide any history.       Daily Progress:  2021: Patient remains sedated intubated and mechanically ventilated. He had an uneventful night. He is currently on a Cardizem drip for atrial fibrillation with RVR. He has no fever, chills, or rigors. October 14, 2021: Patient remained sedated intubated and mechanically ventilated. He had atrial fibrillation with RVR. Patient failed awakening trial today. He was agitated and confused. He has no fever, chills, rigors. October 15, 2021: Patient remains sedated intubated and mechanically ventilated. He gets easily agitated and combative off sedation. He failed awakening trial.  He has no fever, chills, or rigors. Heart rate is controlled. He developed hematuria overnight and Eliquis was put on hold. October 16, 2021: Patient had repetitive episodes of vomiting. Multiple attempts to place an OGT probably failed. CT scan of the abdomen and pelvis was obtained. There was no evidence of hiatal hernia. Patient has no fever, chills, rigors. He becomes agitated and combative off sedation. He has no fever, chills, rigors. He is currently on 75% FiO2. October 17, 2021: Patient remains sedated intubated and mechanically ventilated. We will attempt sedation vacation and spontaneous breathing trials today if tolerated. He remains n.p.o. since he has no NG tube. NG tube could not be advanced. He remained hemodynamically stable. He has no fever, chills, rigors. Sugar is mostly controlled. October 25, 2021: Patient remains lethargic and poorly responsive. She opens eyes but does not follow commands. He has no fever, chills, rigors. White blood cells count remained stable. Continues to tolerate tube feeding. He remained hemodynamically stable. October 26, 2021: Patient is awake and tracks of sedation but does not follow command. He has no fever, chills, or rigors. He is extremely weak and likely to decompensate if extubated. Tracheostomy would be beneficial in the long-term. He has no fever, chills, or rigors.     October 27, 2021: Patient is more awake and interactive however he remains delirious and confused. He has no fever, chills, or rigors. His oxygen requirement has decreased. He is currently on 40% FiO2. Had an extensive discussion with the patient's son and daughter about benefits, risks, and alternatives for tracheostomy and PEG tube. They are both in agreement to proceed with tracheostomy and PEG tube. Patient's heart rate remained stable off Cardizem. Past Medical History:  Past Medical History:   Diagnosis Date    Alcohol abuse     Atrial fibrillation ([address]     CAD (coronary artery disease)     [facility]     Depression     Fracture of unspecified phalanx of left middle finger, initial encounter for closed fracture     GERD (gastroesophageal reflux disease)     Hypertension     [facility] [address] [address]         Family History:   No family history on file. Allergies:         Patient has no known allergies.     Social history:  Social History     Socioeconomic History    Marital status:      Spouse name: Not on file    Number of children: Not on file    Years of education: Not on file    Highest education level: Not on file   Occupational History    Not on file   Tobacco Use    Smoking status: Current Every Day Smoker     Packs/day: 1.00     Years: 45.00     Pack years: 45.00     Types: Cigars    Smokeless tobacco: Never Used    Tobacco comment: SMOKES CIGARS    Vaping Use    Vaping Use: Never used   Substance and Sexual Activity    Alcohol use: Yes     Comment: occassiona    Drug use: No    Sexual activity: Not on file   Other Topics Concern    Not on file   Social History Narrative    Not on file     Social Determinants of Health     Financial Resource Strain:     Difficulty of Paying Living Expenses:    Food Insecurity:     Worried About Running Out of Food in the Last Year:     [address] in the Last Year:    Transportation Needs:     Lack of Transportation (Medical):      Lack of Transportation (Non-Medical):    Physical Activity:     Days of Exercise per Week:     Minutes of Exercise per Session:    Stress:     Feeling of Stress :    Social Connections:     Frequency of Communication with Friends and Family:     Frequency of Social Gatherings with Friends and Family:     Attends [employer] Services:     Active Member of Clubs or Organizations:     Attends Club or Organization Meetings:     Marital Status:    Intimate Partner Violence:     Fear of Current or Ex-Partner:     Emotionally Abused:     Physically Abused:     Sexually Abused:        Current Medications:     Current Facility-Administered Medications:     levETIRAcetam (KEPPRA) 500 mg/100 mL IVPB, 500 mg, IntraVENous, Q12H, Emily Randolph MD, Stopped at 10/27/21 1013    heparin flush 100 UNIT/ML injection 300 Units, 3 mL, IntraVENous, 2 times per day, Fabienne Lee MD    heparin flush 100 UNIT/ML injection 300 Units, 3 mL, IntraCATHeter, PRN, Fabienne Lee MD    thiamine mononitrate tablet 100 mg, 100 mg, Oral, Daily, Hans Austen Escudero MD, 100 mg at 10/27/21 1016    sodium chloride flush 0.9 % injection 5-40 mL, 5-40 mL, IntraVENous, 2 times per day, Fabienne Lee MD, 10 mL at 10/27/21 1021    sodium chloride flush 0.9 % injection 5-40 mL, 5-40 mL, IntraVENous, PRN, Fabienne Lee MD, 10 mL at 10/26/21 2219    0.9 % sodium chloride infusion, 25 mL, IntraVENous, PRN, Fabienne Lee MD    heparin flush 100 UNIT/ML injection 300 Units, 3 mL, IntraVENous, 2 times per day, Fabienne Lee MD    heparin flush 100 UNIT/ML injection 300 Units, 3 mL, IntraCATHeter, PRN, Fabienne Lee MD    dilTIAZem 125 mg in dextrose 5 % 125 mL infusion, 5-15 mg/hr, IntraVENous, Continuous, Fabienne Lee MD, Stopped at 10/27/21 0200    lactulose (CHRONULAC) 10 GM/15ML solution 20 g, 20 g, Oral, BID, Alexandra Simpson MD, 20 g at 10/27/21 1016    sertraline (ZOLOFT) tablet 100 mg, 100 mg, Oral, Daily, Dmitriy Newell MD, 100 mg at 10/27/21 1016    dexmedetomidine (PRECEDEX) 400 mcg in sodium chloride 0.9 % 100 mL infusion, 0.2-1.4 mcg/kg/hr, IntraVENous, Continuous, Dmitriy Newell MD, Last Rate: 15.4 mL/hr at 10/27/21 0815, 0.5 mcg/kg/hr at 10/27/21 0815    enoxaparin (LOVENOX) injection 120 mg, 1 mg/kg, SubCUTAneous, BID, Rachel Meyer MD, 120 mg at 10/27/21 0937    0.9 % sodium chloride infusion, 25 mL, IntraVENous, Q8H, Rachel Meyer MD, Stopped at 10/19/21 0708    perflutren lipid microspheres (DEFINITY) injection 1.65 mg, 1.5 mL, IntraVENous, ONCE PRN, Rachel Meyer MD    pantoprazole (PROTONIX) injection 40 mg, 40 mg, IntraVENous, BID, 40 mg at 10/27/21 0939 **AND** sodium chloride (PF) 0.9 % injection 10 mL, 10 mL, IntraVENous, BID, Sabra Juan, APRN - CNP, 10 mL at 93/57/45 8075    folic acid 1 mg in dextrose 5 % 50 mL IVPB, 1 mg, IntraVENous, Daily, Rita Lowery MD, Stopped at 10/27/21 1021    ondansetron (ZOFRAN-ODT) disintegrating tablet 4 mg, 4 mg, Oral, Q8H PRN **OR** ondansetron (ZOFRAN) injection 4 mg, 4 mg, IntraVENous, Q6H PRN, Rosealee Sandifer, MD    polyethylene glycol (GLYCOLAX) packet 17 g, 17 g, Oral, Daily PRN, Rosealee Sandifer, MD    acetaminophen (TYLENOL) tablet 650 mg, 650 mg, Oral, Q6H PRN, 650 mg at 10/25/21 0120 **OR** acetaminophen (TYLENOL) suppository 650 mg, 650 mg, Rectal, Q6H PRN, Rosealee Sandifer, MD, 650 mg at 10/19/21 2020    ipratropium-albuterol (DUONEB) nebulizer solution 1 ampule, 1 ampule, Inhalation, Q4H, MARTY Doan - CNP, 1 ampule at 10/27/21 1011    Review of Systems:   Unable to obtain due to medical condition    Physical Exam:   Vital Signs:  /69   Pulse 89   Temp 97.3 °F (36.3 °C)   Resp 18   Ht 5' 11\" (1.803 m)   Wt 262 lb 12.6 oz (119.2 kg)   SpO2 97%   BMI 36.65 kg/m²     Input/Output: In: 1461.8 [I.V.:301.8; NG/GT:1060]  Out: 975     Oxygen requirements: MV    Ventilator Information:  Vent Information  $Ventilation: $Subsequent Day  Skin Assessment: Clean, dry, & intact  Equipment Changed: Suction catheter  Vent Type: 980  Vent Mode: AC/VC  Vt Ordered: 500 mL  Rate Set: 18 bmp  Peak Flow: 70 L/min  Pressure Support: 0 cmH20  FiO2 : 40 %  SpO2: 97 %  SpO2/FiO2 ratio: 242.5  Sensitivity: 4  PEEP/CPAP: 5  I Time/ I Time %: 0 s  Humidification Source: Heated wire  Humidification Temp: 37  Humidification Temp Measured: 37.1  Circuit Condensation: Drained    General appearance: , not in pain or distress, in no respiratory distress    HEENT: Intubated  Neck: Supple, no jugular venous distension, lymphadenopathy, thyromegaly or carotid bruits  Chest: Decreased breath sounds, no wheezing, no crackles and no tenderness over ribs   Cardiovascular: Normal S1 , S2, regular rate and rhythm, no murmur, rub or gallop  Abdomen: Normal sounds present, soft, lax with no tenderness, no hepatosplenomegaly, and no masses  Extremities: No edema. Pulses are equally present. Skin: intact, no rashes   Neurologic: Sedated and mechanically ventilated but opens eyes off sedation. Patient tracks but does not follow commands. Investigations:  Labs, radiological imaging and cardiac work up were reviewed        ICU STAFF PHYSICIAN NOTE OF PERSONAL INVOLVEMENT IN CARE  As the attending physician, I certify that I personally reviewed the patient's history and personally examined the patient to confirm the physical findings described above, and that I reviewed the relevant imaging studies and available reports. I also discussed the differential diagnosis and all of the proposed management plans with the patient and individuals accompanying the patient to this visit.   They had the opportunity to ask questions about the proposed management plans and to have those questions answered. This patient has a high probability of sudden, clinically significant deterioration, which requires the highest level of physician preparedness to intervene urgently. I managed/supervised life or organ supporting interventions that required frequent physician assessment. I devoted my full attention to the direct care of this patient for the amount of time indicated below. Time I spent with family or surrogate(s) is included only if the patient was incapable of providing the necessary information or participating in medical decision-making. Time devoted to teaching and to any procedures I billed separately is not included.   Critical Care Time: 35 minutes      Electronically signed by Chuyita Osborn MD on 10/27/2021 at 12:41 PM

## 2021-10-27 NOTE — CARE COORDINATION
10/27/21 Negative covid 10/16/21. CM transition of care: icu/vent at 40% peep 5. pt with increased secretions, deconditioned, potential for inability to protect his own airway if he is extubated. Family has agreed on trache/peg- palliative care signed off. Precert to be started when procedures completed. Therapies if/when pt can participate would be beneficial. CM/SS to follow.  Electronically signed by Lisandra Thibodeaux RN-BC on 10/27/2021 at 1:38 PM

## 2021-10-27 NOTE — PLAN OF CARE
Problem: Skin Integrity:  Goal: Will show no infection signs and symptoms  Description: Will show no infection signs and symptoms  10/27/2021 0321 by Vaishnavi Caruso RN  Outcome: Met This Shift  10/26/2021 2147 by Diego De La Fuente RN  Outcome: Met This Shift  Goal: Absence of new skin breakdown  Description: Absence of new skin breakdown  10/27/2021 0321 by Vaishnavi Caruso RN  Outcome: Met This Shift  10/26/2021 2147 by Diego De La Fuente RN  Outcome: Met This Shift     Problem: Non-Violent Restraints  Goal: No harm/injury to patient while restraints in use  10/27/2021 0321 by Vaishnavi Caruso RN  Outcome: Met This Shift  10/26/2021 2147 by Diego De La Fuente RN  Outcome: Met This Shift  Goal: Patient's dignity will be maintained  10/27/2021 0321 by Vaishnavi Caruso RN  Outcome: Met This Shift  10/26/2021 2147 by Diego De La Fuente RN  Outcome: Met This Shift     Problem: Falls - Risk of:  Goal: Will remain free from falls  Description: Will remain free from falls  10/27/2021 0321 by Vaishnavi Caruso RN  Outcome: Met This Shift  10/26/2021 2147 by Diego De La Fuente RN  Outcome: Met This Shift  Goal: Absence of physical injury  Description: Absence of physical injury  10/26/2021 2147 by Diego De La Fuente RN  Outcome: Met This Shift     Problem: Airway Clearance - Ineffective:  Goal: Ability to maintain a clear airway will improve  Description: Ability to maintain a clear airway will improve  10/26/2021 2147 by Diego De La Fuente RN  Outcome: Met This Shift     Problem: Pain:  Goal: Pain level will decrease  Description: Pain level will decrease  10/26/2021 2147 by Diego De La Fuente RN  Outcome: Met This Shift  Goal: Recognizes and communicates pain  Description: Recognizes and communicates pain  10/26/2021 2147 by Diego De La Fuente RN  Outcome: Met This Shift     Problem: Injury - Risk of, Healthcare-Acquired Condition:  Goal: Will remain free from falls  Description: Will remain free from falls  10/27/2021 0321 by Julio C Carreno RN  Outcome: Met This Shift  10/26/2021 2147 by Colin Raygoza RN  Outcome: Met This Shift  Goal: Absence of pressure ulcer  Description: Absence of pressure ulcer  10/26/2021 2147 by Colin Raygoza RN  Outcome: Met This Shift  Goal: Absence of urinary tract infection signs and symptoms  Description: Absence of urinary tract infection signs and symptoms  10/26/2021 2147 by Colin Raygoza RN  Outcome: Met This Shift     Problem: Nutrition Deficit - Risk of:  Goal: Ability to achieve adequate nutritional intake will improve  Description: Ability to achieve adequate nutritional intake will improve  10/26/2021 2147 by Colin Raygoza RN  Outcome: Met This Shift  Goal: Maintenance of adequate weight for body size and type will improve to within specified parameters  Description: Maintenance of adequate weight for body size and type will improve to within specified parameters  10/26/2021 2147 by Colin Raygoza RN  Outcome: Met This Shift     Problem: Aspiration - Risk of:  Goal: Absence of aspiration  Description: Absence of aspiration  10/26/2021 2147 by Colin Raygoza RN  Outcome: Met This Shift     Problem: Respiratory Function - Compromised:  Goal: Ability to maintain a clear airway will improve  Description: Ability to maintain a clear airway will improve  10/26/2021 2147 by Colin Raygoza RN  Outcome: Met This Shift  Goal: Levels of oxygenation will improve  Description: Levels of oxygenation will improve  10/27/2021 0321 by Julio C aCrreno RN  Outcome: Met This Shift  10/26/2021 2147 by Colin Raygoza RN  Outcome: Met This Shift     Problem:  Bowel Function - Altered:  Goal: Bowel elimination is within specified parameters  Description: Bowel elimination is within specified parameters  10/26/2021 2147 by Colin Raygoza RN  Outcome: Met This Shift  Goal: Control of bowel function will improve  Description: Control of bowel function will improve  10/26/2021 2147 by Amberly Chawla Protomaster, RN  Outcome: Met This Shift  Goal: Ability to maintain normal bowel function will improve  Description: Ability to maintain normal bowel function will improve  10/26/2021 2147 by Terri Evans RN  Outcome: Met This Shift     Problem: Cardiac:  Goal: Ability to maintain an adequate cardiac output will improve  Description: Ability to maintain an adequate cardiac output will improve  10/26/2021 2150 by Terri Evans RN  Outcome: Met This Shift  Goal: Hemodynamic stability will improve  Description: Hemodynamic stability will improve  10/26/2021 2150 by Terri Evans RN  Outcome: Met This Shift     Problem: Respiratory:  Goal: Respiratory status will improve  Description: Respiratory status will improve  10/26/2021 2150 by Terri Evans RN  Outcome: Met This Shift     Problem: Non-Violent Restraints  Goal: Removal from restraints as soon as assessed to be safe  10/27/2021 0321 by Ana Eckert RN  Outcome: Not Met This Shift     Patient educated on restraints and lines/tubes connected to him. Patient is not redirectable at this time. Restraints continued for patient's safety. Will continue to assess.

## 2021-10-27 NOTE — PROGRESS NOTES
Son Brooklynn Watson and Daughter Justino Soto agreeable to trach and peg plans per DR. Chaudhary. Message sent to DR Aydee Johnson & Jessa Nunez to schedule procedure \"likely Friday\"

## 2021-10-28 LAB
ANION GAP SERPL CALCULATED.3IONS-SCNC: 10 MMOL/L (ref 7–16)
BLOOD CULTURE, ROUTINE: NORMAL
BUN BLDV-MCNC: 28 MG/DL (ref 6–23)
CALCIUM SERPL-MCNC: 9.4 MG/DL (ref 8.6–10.2)
CHLORIDE BLD-SCNC: 110 MMOL/L (ref 98–107)
CO2: 25 MMOL/L (ref 22–29)
CREAT SERPL-MCNC: 0.8 MG/DL (ref 0.7–1.2)
CULTURE, BLOOD 2: NORMAL
GFR AFRICAN AMERICAN: >60
GFR NON-AFRICAN AMERICAN: >60 ML/MIN/1.73
GLUCOSE BLD-MCNC: 123 MG/DL (ref 74–99)
HCT VFR BLD CALC: 33.1 % (ref 37–54)
HEMOGLOBIN: 10.8 G/DL (ref 12.5–16.5)
MAGNESIUM: 1.8 MG/DL (ref 1.6–2.6)
MCH RBC QN AUTO: 31.5 PG (ref 26–35)
MCHC RBC AUTO-ENTMCNC: 32.6 % (ref 32–34.5)
MCV RBC AUTO: 96.5 FL (ref 80–99.9)
PDW BLD-RTO: 15.9 FL (ref 11.5–15)
PHOSPHORUS: 4.2 MG/DL (ref 2.5–4.5)
PLATELET # BLD: 420 E9/L (ref 130–450)
PMV BLD AUTO: 9.1 FL (ref 7–12)
POTASSIUM SERPL-SCNC: 4.2 MMOL/L (ref 3.5–5)
RBC # BLD: 3.43 E12/L (ref 3.8–5.8)
SODIUM BLD-SCNC: 145 MMOL/L (ref 132–146)
WBC # BLD: 9.5 E9/L (ref 4.5–11.5)

## 2021-10-28 PROCEDURE — 99232 SBSQ HOSP IP/OBS MODERATE 35: CPT | Performed by: INTERNAL MEDICINE

## 2021-10-28 PROCEDURE — 2580000003 HC RX 258: Performed by: INTERNAL MEDICINE

## 2021-10-28 PROCEDURE — 6360000002 HC RX W HCPCS: Performed by: PSYCHIATRY & NEUROLOGY

## 2021-10-28 PROCEDURE — 85027 COMPLETE CBC AUTOMATED: CPT

## 2021-10-28 PROCEDURE — 94640 AIRWAY INHALATION TREATMENT: CPT

## 2021-10-28 PROCEDURE — 84100 ASSAY OF PHOSPHORUS: CPT

## 2021-10-28 PROCEDURE — 2500000003 HC RX 250 WO HCPCS: Performed by: INTERNAL MEDICINE

## 2021-10-28 PROCEDURE — 6360000002 HC RX W HCPCS: Performed by: NURSE PRACTITIONER

## 2021-10-28 PROCEDURE — 2500000003 HC RX 250 WO HCPCS: Performed by: EMERGENCY MEDICINE

## 2021-10-28 PROCEDURE — 36592 COLLECT BLOOD FROM PICC: CPT

## 2021-10-28 PROCEDURE — 6370000000 HC RX 637 (ALT 250 FOR IP): Performed by: INTERNAL MEDICINE

## 2021-10-28 PROCEDURE — 2580000003 HC RX 258: Performed by: NURSE PRACTITIONER

## 2021-10-28 PROCEDURE — 6370000000 HC RX 637 (ALT 250 FOR IP): Performed by: NURSE PRACTITIONER

## 2021-10-28 PROCEDURE — 83735 ASSAY OF MAGNESIUM: CPT

## 2021-10-28 PROCEDURE — 80048 BASIC METABOLIC PNL TOTAL CA: CPT

## 2021-10-28 PROCEDURE — 2580000003 HC RX 258: Performed by: EMERGENCY MEDICINE

## 2021-10-28 PROCEDURE — 6360000002 HC RX W HCPCS: Performed by: INTERNAL MEDICINE

## 2021-10-28 PROCEDURE — 2000000000 HC ICU R&B

## 2021-10-28 PROCEDURE — C9113 INJ PANTOPRAZOLE SODIUM, VIA: HCPCS | Performed by: NURSE PRACTITIONER

## 2021-10-28 PROCEDURE — 94003 VENT MGMT INPAT SUBQ DAY: CPT

## 2021-10-28 RX ORDER — FOLIC ACID 1 MG/1
1 TABLET ORAL DAILY
Status: DISCONTINUED | OUTPATIENT
Start: 2021-10-28 | End: 2021-11-09

## 2021-10-28 RX ORDER — MAGNESIUM SULFATE IN WATER 40 MG/ML
4000 INJECTION, SOLUTION INTRAVENOUS ONCE
Status: COMPLETED | OUTPATIENT
Start: 2021-10-28 | End: 2021-10-28

## 2021-10-28 RX ADMIN — Medication 0.5 MCG/KG/HR: at 12:21

## 2021-10-28 RX ADMIN — ENOXAPARIN SODIUM 40 MG: 100 INJECTION SUBCUTANEOUS at 10:19

## 2021-10-28 RX ADMIN — SODIUM CHLORIDE, PRESERVATIVE FREE 300 UNITS: 5 INJECTION INTRAVENOUS at 20:24

## 2021-10-28 RX ADMIN — IPRATROPIUM BROMIDE AND ALBUTEROL SULFATE 1 AMPULE: .5; 2.5 SOLUTION RESPIRATORY (INHALATION) at 02:24

## 2021-10-28 RX ADMIN — LEVETIRACETAM 500 MG: 5 INJECTION, SOLUTION INTRAVENOUS at 23:00

## 2021-10-28 RX ADMIN — IPRATROPIUM BROMIDE AND ALBUTEROL SULFATE 1 AMPULE: .5; 2.5 SOLUTION RESPIRATORY (INHALATION) at 22:27

## 2021-10-28 RX ADMIN — IPRATROPIUM BROMIDE AND ALBUTEROL SULFATE 1 AMPULE: .5; 2.5 SOLUTION RESPIRATORY (INHALATION) at 06:35

## 2021-10-28 RX ADMIN — SODIUM CHLORIDE, PRESERVATIVE FREE 10 ML: 5 INJECTION INTRAVENOUS at 09:06

## 2021-10-28 RX ADMIN — IPRATROPIUM BROMIDE AND ALBUTEROL SULFATE 1 AMPULE: .5; 2.5 SOLUTION RESPIRATORY (INHALATION) at 19:43

## 2021-10-28 RX ADMIN — IPRATROPIUM BROMIDE AND ALBUTEROL SULFATE 1 AMPULE: .5; 2.5 SOLUTION RESPIRATORY (INHALATION) at 14:52

## 2021-10-28 RX ADMIN — Medication 0.5 MCG/KG/HR: at 19:15

## 2021-10-28 RX ADMIN — PANTOPRAZOLE SODIUM 40 MG: 40 INJECTION, POWDER, FOR SOLUTION INTRAVENOUS at 09:06

## 2021-10-28 RX ADMIN — IPRATROPIUM BROMIDE AND ALBUTEROL SULFATE 1 AMPULE: .5; 2.5 SOLUTION RESPIRATORY (INHALATION) at 09:26

## 2021-10-28 RX ADMIN — PANTOPRAZOLE SODIUM 40 MG: 40 INJECTION, POWDER, FOR SOLUTION INTRAVENOUS at 20:24

## 2021-10-28 RX ADMIN — FOLIC ACID 1 MG: 5 INJECTION, SOLUTION INTRAMUSCULAR; INTRAVENOUS; SUBCUTANEOUS at 09:05

## 2021-10-28 RX ADMIN — THIAMINE HCL TAB 100 MG 100 MG: 100 TAB at 09:07

## 2021-10-28 RX ADMIN — Medication 10 ML: at 20:25

## 2021-10-28 RX ADMIN — LEVETIRACETAM 500 MG: 5 INJECTION, SOLUTION INTRAVENOUS at 10:19

## 2021-10-28 RX ADMIN — LACTULOSE 20 G: 20 SOLUTION ORAL at 20:24

## 2021-10-28 RX ADMIN — Medication 10 ML: at 09:06

## 2021-10-28 RX ADMIN — SODIUM CHLORIDE, PRESERVATIVE FREE 300 UNITS: 5 INJECTION INTRAVENOUS at 20:25

## 2021-10-28 RX ADMIN — MAGNESIUM SULFATE HEPTAHYDRATE 4000 MG: 40 INJECTION, SOLUTION INTRAVENOUS at 10:49

## 2021-10-28 RX ADMIN — Medication 0.5 MCG/KG/HR: at 06:43

## 2021-10-28 RX ADMIN — SODIUM CHLORIDE, PRESERVATIVE FREE 10 ML: 5 INJECTION INTRAVENOUS at 20:24

## 2021-10-28 RX ADMIN — LACTULOSE 20 G: 20 SOLUTION ORAL at 09:06

## 2021-10-28 RX ADMIN — SERTRALINE HYDROCHLORIDE 100 MG: 50 TABLET ORAL at 09:06

## 2021-10-28 ASSESSMENT — PULMONARY FUNCTION TESTS
PIF_VALUE: 22
PIF_VALUE: 25
PIF_VALUE: 24
PIF_VALUE: 22
PIF_VALUE: 23
PIF_VALUE: 23
PIF_VALUE: 22
PIF_VALUE: 22
PIF_VALUE: 24
PIF_VALUE: 20
PIF_VALUE: 20
PIF_VALUE: 22
PIF_VALUE: 26
PIF_VALUE: 35
PIF_VALUE: 26
PIF_VALUE: 23
PIF_VALUE: 24
PIF_VALUE: 21
PIF_VALUE: 26

## 2021-10-28 ASSESSMENT — PAIN SCALES - GENERAL
PAINLEVEL_OUTOF10: 0
PAINLEVEL_OUTOF10: 0

## 2021-10-28 NOTE — CARE COORDINATION
10/28/21 Negative covid 10/16/21. CM transition of care: icu/vent/increased secretions/ fio2 at 30%, peep 5. Pt for Trache/peg (unable to protect airway) likely Friday-with Dr. Faina Pollock. Candelario Sosa-  in network, Carolyn following. Once trache/peg precert to be started for [facility] in network. CM following.  Electronically signed by SIMEON Womack on 10/28/2021 at 8:51 AM

## 2021-10-28 NOTE — PLAN OF CARE
Problem: Skin Integrity:  Goal: Will show no infection signs and symptoms  Description: Will show no infection signs and symptoms  10/27/2021 1417 by Tegan Bains RN  Outcome: Met This Shift  Goal: Absence of new skin breakdown  Description: Absence of new skin breakdown  10/27/2021 1417 by Tegan Bains RN  Outcome: Met This Shift     Problem: Non-Violent Restraints  Goal: No harm/injury to patient while restraints in use  10/28/2021 0012 by Juli oC Carreno RN  Outcome: Met This Shift  10/27/2021 1417 by Tegan Bains RN  Outcome: Met This Shift  Goal: Patient's dignity will be maintained  10/28/2021 0012 by Julio C Carreno RN  Outcome: Met This Shift  10/27/2021 1417 by Tegan Bains RN  Outcome: Met This Shift     Problem: Falls - Risk of:  Goal: Will remain free from falls  Description: Will remain free from falls  10/27/2021 1417 by Tegan Bains RN  Outcome: Met This Shift  Goal: Absence of physical injury  Description: Absence of physical injury  10/27/2021 1417 by Tegan Bains RN  Outcome: Met This Shift     Problem: Airway Clearance - Ineffective:  Goal: Ability to maintain a clear airway will improve  Description: Ability to maintain a clear airway will improve  10/28/2021 0012 by Julio C Carreno RN  Outcome: Met This Shift  10/27/2021 1417 by Tegan Bains RN  Outcome: Not Met This Shift     Problem: Pain:  Goal: Pain level will decrease  Description: Pain level will decrease  10/27/2021 1417 by Tegan Bains RN  Outcome: Met This Shift     Problem: Injury - Risk of, Healthcare-Acquired Condition:  Goal: Will remain free from falls  Description: Will remain free from falls  10/27/2021 1417 by Tegan Bains RN  Outcome: Met This Shift  Goal: Absence of pressure ulcer  Description: Absence of pressure ulcer  10/27/2021 1417 by Tegan Bains RN  Outcome: Met This Shift  Goal: Absence of urinary tract infection signs and symptoms  Description: Absence of urinary tract infection signs and symptoms  10/27/2021 1417 by Joaquin Hernandez RN  Outcome: Met This Shift     Problem: Aspiration - Risk of:  Goal: Absence of aspiration  Description: Absence of aspiration  10/27/2021 1417 by Joaquin Hernandez RN  Outcome: Met This Shift     Problem: Respiratory Function - Compromised:  Goal: Ability to maintain a clear airway will improve  Description: Ability to maintain a clear airway will improve  10/28/2021 0012 by Santos Queen RN  Outcome: Met This Shift  10/27/2021 1417 by Joaquin Hernandez RN  Outcome: Not Met This Shift  Goal: Levels of oxygenation will improve  Description: Levels of oxygenation will improve  10/27/2021 1417 by Joaquin Hernandez RN  Outcome: Met This Shift     Problem: Cardiac:  Goal: Ability to maintain an adequate cardiac output will improve  Description: Ability to maintain an adequate cardiac output will improve  10/27/2021 1417 by Joaquin Hernandez RN  Outcome: Met This Shift  Goal: Hemodynamic stability will improve  Description: Hemodynamic stability will improve  10/27/2021 1417 by Joaquin Hernandez RN  Outcome: Met This Shift     Problem: Respiratory:  Goal: Respiratory status will improve  Description: Respiratory status will improve  10/27/2021 1417 by Joaquin Hernandez RN  Outcome: Met This Shift     Problem: Safety:  Goal: Ability to remain free from injury will improve  Description: Ability to remain free from injury will improve  10/27/2021 1417 by Joaquin Hernandez RN  Outcome: Met This Shift     Problem: Non-Violent Restraints  Goal: Removal from restraints as soon as assessed to be safe  10/28/2021 0012 by Santos Queen RN  Outcome: Not Met This Shift  Patient educated on restraints and lines/tubes connected to him. Patient is not redirectable at this time. Restraints continued for patient's safety. Will continue to assess.

## 2021-10-28 NOTE — PROCEDURES
Attempt to place DL picc line L arm via cephalic vein unsuccessful, unable to thread cath past 25 cm. Procedure aborted. Will follow.

## 2021-10-28 NOTE — PROGRESS NOTES
Assessment and Plan  Patient is a 73 y.o. male with the following medical Problems:   1. Acute on chronic hypoxic and hypercapnic respiratory failure  2. Alcohol withdrawal  3. New onset atrial fibrillation  4. History of thrombocytopenia  5. Morbid obesity with obesity hypoventilation syndrome  6. Metabolic encephalopathy  7. Acute kidney injury  (resolved)  8. Aspiration pneumonia  9. UTI  10. Small pericardial effusion  11. Hematuria (resolved)  12. 2.8 X 2.8 cm AAA  13. Small bilateral pleural effusions    Plan of care:  1. Continue with Precedex and wean down to assess for response to commands. Patient tracks and opens eyes but does not follow commands. 2.  Continue with thiamine and folic acid  3. Surgery consult for tracheostomy and PEG tube placement. Patient scheduled for surgery tomorrow  4. Lovenox 1 mg/kg twice a day for atrial fibrillation on hold. Patient will receive 1 dose of Lovenox for DVT prophylaxis. 5.  Continue tube feedings. 6. EEG ruled out seizures and MRI to evaluate for encephalopathy. 7.  MRI ruled out any intracranial abnormalities. .  8.  GI prophylaxis  9. Discontinue ceftriaxone  10. I spoke to the patient's son and daughter this morning. They are both in agreement with tracheostomy and PEG tube insertion. Risks, benefits, and alternatives were all explained. History of Present Illness:   Patient is a 61-year-old with history of hypertension, CAD, COPD, and alcohol abuse. Patient presented to the ED on 10/12/2021 after developing hallucinations and shortness of breath. Patient apparently called police after seeing his  wife and 2 children walking in his house. He apparently attempted to spray them with bug spray before calling police. Patient has a history of alcohol abuse and drinks 1/5 of them being daily. According to ED physician's note the patient's last drink was on Saturday.   The patient is currently intubated, sedated, and unable to provide any history. Daily Progress:  October 13, 2021: Patient remains sedated intubated and mechanically ventilated. He had an uneventful night. He is currently on a Cardizem drip for atrial fibrillation with RVR. He has no fever, chills, or rigors. October 14, 2021: Patient remained sedated intubated and mechanically ventilated. He had atrial fibrillation with RVR. Patient failed awakening trial today. He was agitated and confused. He has no fever, chills, rigors. October 15, 2021: Patient remains sedated intubated and mechanically ventilated. He gets easily agitated and combative off sedation. He failed awakening trial.  He has no fever, chills, or rigors. Heart rate is controlled. He developed hematuria overnight and Eliquis was put on hold. October 16, 2021: Patient had repetitive episodes of vomiting. Multiple attempts to place an OGT probably failed. CT scan of the abdomen and pelvis was obtained. There was no evidence of hiatal hernia. Patient has no fever, chills, rigors. He becomes agitated and combative off sedation. He has no fever, chills, rigors. He is currently on 75% FiO2. October 17, 2021: Patient remains sedated intubated and mechanically ventilated. We will attempt sedation vacation and spontaneous breathing trials today if tolerated. He remains n.p.o. since he has no NG tube. NG tube could not be advanced. He remained hemodynamically stable. He has no fever, chills, rigors. Sugar is mostly controlled. October 25, 2021: Patient remains lethargic and poorly responsive. She opens eyes but does not follow commands. He has no fever, chills, rigors. White blood cells count remained stable. Continues to tolerate tube feeding. He remained hemodynamically stable. October 26, 2021: Patient is awake and tracks of sedation but does not follow command. He has no fever, chills, or rigors. He is extremely weak and likely to decompensate if extubated. Tracheostomy would be beneficial in the long-term. He has no fever, chills, or rigors. October 27, 2021: Patient is more awake and interactive however he remains delirious and confused. He has no fever, chills, or rigors. His oxygen requirement has decreased. He is currently on 40% FiO2. Had an extensive discussion with the patient's son and daughter about benefits, risks, and alternatives for tracheostomy and PEG tube. They are both in agreement to proceed with tracheostomy and PEG tube. Patient's heart rate remained stable off Cardizem. October 28, 2021: Patient remains clinically the same. His oxygen requirement has been stable. He is awake but does not follow commands. He is severely deconditioned. He has no fever, chills, rigors. He had an uneventful night. He is scheduled for tracheostomy and PEG tube placement. Sodium is borderline elevated. Patient was started on free water flushes. Past Medical History:  Past Medical History:   Diagnosis Date    Alcohol abuse     Atrial fibrillation ([address]     CAD (coronary artery disease)     [facility]ch[facility] obstructive [facility] disease) [facility]     Depression     Fracture of unspecified phalanx of left middle finger, initial encounter for closed fracture     GERD (gastroesophageal reflux disease)     Hypertension     Seizure ([address]         Family History:   No family history on file. Allergies:         Patient has no known allergies.     Social history:  Social History     Socioeconomic History    Marital status:      Spouse name: Not on file    Number of children: Not on file    Years of education: Not on file    Highest education level: Not on file   Occupational History    Not on file   Tobacco Use    Smoking status: Current Every Day Smoker     Packs/day: 1.00     Years: 45.00     Pack years: 45.00     Types: Cigars    Smokeless tobacco: Never Used    Tobacco comment: SMOKES CIGARS    Vaping Use    Vaping Use: Never used Substance and Sexual Activity    Alcohol use: Yes     Comment: occassiona    Drug use: No    Sexual activity: Not on file   Other Topics Concern    Not on file   Social History Narrative    Not on file     Social Determinants of Health     Financial Resource Strain:     Difficulty of Paying Living Expenses:    Food Insecurity:     Worried About Running Out of Food in the Last Year:     [address] in the Last Year:    Transportation Needs:     Lack of Transportation (Medical):      Lack of Transportation (Non-Medical):    Physical Activity:     Days of Exercise per Week:     Minutes of Exercise per Session:    Stress:     Feeling of Stress :    Social Connections:     Frequency of Communication with Friends and Family:     Frequency of Social Gatherings with Friends and Family:     Attends Mosque Services:     Active Member of Clubs or Organizations:     Attends Club or Organization Meetings:     Marital Status:    Intimate Partner Violence:     Fear of Current or Ex-Partner:     Emotionally Abused:     Physically Abused:     Sexually Abused:        Current Medications:     Current Facility-Administered Medications:     folic acid (FOLVITE) tablet 1 mg, 1 mg, Oral, Daily, Pepe Linares MD    magnesium sulfate 4000 mg in 100 mL IVPB premix, 4,000 mg, IntraVENous, Once, Pepe iLnares MD, Last Rate: 25 mL/hr at 10/28/21 1049, 4,000 mg at 10/28/21 1049    levETIRAcetam (KEPPRA) 500 mg/100 mL IVPB, 500 mg, IntraVENous, Q12H, Linda Hernandez MD, Stopped at 10/28/21 1049    heparin flush 100 UNIT/ML injection 300 Units, 3 mL, IntraVENous, 2 times per day, Pepe Linares MD, 300 Units at 10/27/21 2023    heparin flush 100 UNIT/ML injection 300 Units, 3 mL, IntraCATHeter, PRN, Pepe Linares MD    thiamine mononitrate tablet 100 mg, 100 mg, Oral, Daily, Pepe Linares MD, 100 mg at 10/28/21 8712    sodium chloride flush 0.9 % injection 5-40 mL, 5-40 mL, IntraVENous, 2 times per day, Angelito Weiner MD, 10 mL at 10/28/21 0906    sodium chloride flush 0.9 % injection 5-40 mL, 5-40 mL, IntraVENous, PRN, Angelito Weiner MD, 10 mL at 10/26/21 2219    0.9 % sodium chloride infusion, 25 mL, IntraVENous, PRN, Angelito Weiner MD    heparin flush 100 UNIT/ML injection 300 Units, 3 mL, IntraVENous, 2 times per day, Angelito Weiner MD, 300 Units at 10/27/21 2022    heparin flush 100 UNIT/ML injection 300 Units, 3 mL, IntraCATHeter, PRN, Angelito Weiner MD    lactulose (CHRONULAC) 10 GM/15ML solution 20 g, 20 g, Oral, BID, Mary Lou Marina MD, 20 g at 10/28/21 0906    sertraline (ZOLOFT) tablet 100 mg, 100 mg, Oral, Daily, Dmitriy Newell MD, 100 mg at 10/28/21 0906    dexmedetomidine (PRECEDEX) 400 mcg in sodium chloride 0.9 % 100 mL infusion, 0.2-1.4 mcg/kg/hr, IntraVENous, Continuous, Dmitriy Newell MD, Last Rate: 15.4 mL/hr at 10/28/21 0643, 0.5 mcg/kg/hr at 10/28/21 0643    [Held by provider] enoxaparin (LOVENOX) injection 120 mg, 1 mg/kg, SubCUTAneous, BID, Angelito Weiner MD, 120 mg at 10/27/21 2018    0.9 % sodium chloride infusion, 25 mL, IntraVENous, Q8H, Angelito Weiner MD, Stopped at 10/19/21 0708    perflutren lipid microspheres (DEFINITY) injection 1.65 mg, 1.5 mL, IntraVENous, ONCE PRN, Angelito Weiner MD    pantoprazole (PROTONIX) injection 40 mg, 40 mg, IntraVENous, BID, 40 mg at 10/28/21 0906 **AND** sodium chloride (PF) 0.9 % injection 10 mL, 10 mL, IntraVENous, BID, MARTY Jay - CNP, 10 mL at 10/28/21 0906    ondansetron (ZOFRAN-ODT) disintegrating tablet 4 mg, 4 mg, Oral, Q8H PRN **OR** ondansetron (ZOFRAN) injection 4 mg, 4 mg, IntraVENous, Q6H PRN, Chay Garcia MD    polyethylene glycol (GLYCOLAX) packet 17 g, 17 g, Oral, Daily PRN, Chay Garcia MD    acetaminophen (TYLENOL) tablet 650 mg, 650 mg, Oral, Q6H PRN, 650 mg at 10/25/21 0120 **OR** acetaminophen (TYLENOL) suppository 650 mg, 650 mg, Rectal, Q6H PRN, Kaela Dunbar MD, 650 mg at 10/19/21 2020    ipratropium-albuterol (DUONEB) nebulizer solution 1 ampule, 1 ampule, Inhalation, Q4H, MARTY Gray CNP, 1 ampule at 10/28/21 0694    Review of Systems:   Unable to obtain due to medical condition    Physical Exam:   Vital Signs:  /72   Pulse 89   Temp 99.2 °F (37.3 °C) (Axillary)   Resp 20   Ht 5' 11\" (1.803 m)   Wt 262 lb 12.6 oz (119.2 kg)   SpO2 94%   BMI 36.65 kg/m²     Input/Output: In: 1370 [I.V.:357; NG/GT:763]  Out: 1150     Oxygen requirements: MV    Ventilator Information:  Vent Information  $Ventilation: $Subsequent Day  Skin Assessment: Clean, dry, & intact  Equipment ID:  [id number]  Equipment Changed: Suction catheter  Vent Type: 980  Vent Mode: AC/VC  Vt Ordered: 500 mL  Rate Set: 18 bmp  Peak Flow: 70 L/min  Pressure Support: 0 cmH20  FiO2 : 30 %  SpO2: 94 %  SpO2/FiO2 ratio: 310  Sensitivity: 4  PEEP/CPAP: 5  I Time/ I Time %: 0 s  Humidification Source: Heated wire  Humidification Temp: 37  Humidification Temp Measured: 37  Circuit Condensation: Drained    General appearance: , not in pain or distress, in no respiratory distress    HEENT: Intubated  Neck: Supple, no jugular venous distension, lymphadenopathy, thyromegaly or carotid bruits  Chest: Decreased breath sounds, no wheezing, no crackles and no tenderness over ribs   Cardiovascular: Normal S1 , S2, regular rate and rhythm, no murmur, rub or gallop  Abdomen: Normal sounds present, soft, lax with no tenderness, no hepatosplenomegaly, and no masses  Extremities: No edema. Pulses are equally present. Skin: intact, no rashes   Neurologic: Sedated and mechanically ventilated but opens eyes off sedation. Patient tracks but does not follow commands.      Investigations:  Labs, radiological imaging and cardiac work up were reviewed        ICU STAFF PHYSICIAN NOTE OF PERSONAL INVOLVEMENT IN CARE  As the attending physician, I certify that I personally reviewed the patient's history and personally examined the patient to confirm the physical findings described above, and that I reviewed the relevant imaging studies and available reports. I also discussed the differential diagnosis and all of the proposed management plans with the patient and individuals accompanying the patient to this visit. They had the opportunity to ask questions about the proposed management plans and to have those questions answered. This patient has a high probability of sudden, clinically significant deterioration, which requires the highest level of physician preparedness to intervene urgently. I managed/supervised life or organ supporting interventions that required frequent physician assessment. I devoted my full attention to the direct care of this patient for the amount of time indicated below. Time I spent with family or surrogate(s) is included only if the patient was incapable of providing the necessary information or participating in medical decision-making. Time devoted to teaching and to any procedures I billed separately is not included.   Critical Care Time: 35 minutes      Electronically signed by Fabienne Lee MD on 10/28/2021 at 11:34 AM

## 2021-10-28 NOTE — PROGRESS NOTES
Q6H PRN  polyethylene glycol (GLYCOLAX) packet 17 g, 17 g, Oral, Daily PRN  acetaminophen (TYLENOL) tablet 650 mg, 650 mg, Oral, Q6H PRN **OR** acetaminophen (TYLENOL) suppository 650 mg, 650 mg, Rectal, Q6H PRN  ipratropium-albuterol (DUONEB) nebulizer solution 1 ampule, 1 ampule, Inhalation, Q4H  Physical    VITALS:  BP (!) 138/92   Pulse 100   Temp 99.2 °F (37.3 °C) (Axillary)   Resp 20   Ht 5' 11\" (1.803 m)   Wt 262 lb 12.6 oz (119.2 kg)   SpO2 95%   BMI 36.65 kg/m²   Patient is sedated and ventilated  Normocephalic, without obvious abnormality, atraumatic, external ears without lesions,   Neck  cervical lymphadenopathy with limited exam for motion due to ET tube  Heart has a regular rate and rhythm no murmur  Lungs are clear to auscultation bilaterally with equal movements with the additional sounds of transmitted airway sounds from the ventilator. Abdomen is soft nontender nondistended no rebound or guarding. No  edema good peripheral perfusion. No significant rashes or new skin lesions. Affect and neuro exam limited since he is sedated on the ventilator  10/25 in response to my verbal stimuli he glances over me and then looks away  10/26 he repeats this activity several times.   With me this seems to be more glancing than that tracking  10/27: Reportedly more arousable however when I see him he is not    Data    CBC:   Lab Results   Component Value Date    WBC 9.5 10/28/2021    RBC 3.43 10/28/2021    HGB 10.8 10/28/2021    HCT 33.1 10/28/2021    MCV 96.5 10/28/2021    MCH 31.5 10/28/2021    MCHC 32.6 10/28/2021    RDW 15.9 10/28/2021     10/28/2021    MPV 9.1 10/28/2021     BMP:    Lab Results   Component Value Date     10/28/2021    K 4.2 10/28/2021    K 4.3 10/12/2021     10/28/2021    CO2 25 10/28/2021    BUN 28 10/28/2021    LABALBU 2.7 10/24/2021    CREATININE 0.8 10/28/2021    CALCIUM 9.4 10/28/2021    GFRAA >60 10/28/2021    LABGLOM >60 10/28/2021    GLUCOSE 123 10/28/2021 Eeg: Abnormal EEG secondary to theta range slowing. This  pattern of EEG abnormality can be seen secondary to toxic, metabolic, or  hypoxic ischemic encephalopathy. No epileptiform activity noted, which  does not rule out underlying seizure disorder. Clinical correlation  advised. ASSESSMENT AND PLAN    Brief summary of Stay:  73 y. o. male with a history of alcoholism depression hypertension coronary artery disease GERD COPD morbid obesity presents with signs of alcohol withdrawal.  Before he was intubated he gave the ER the history that he began having tremors and hallucinations at 6 AM.  For the past 1 or 2 weeks he has been drinking 1/5 of gin being a day but has not had any drinks since Saturday the only other substances of abuse that he admits to is smoking 1 and half packs of cigarettes per day.  His tremors are getting worse on Saturday he started having leg swelling. In the ER he was known to be having tremors he received Ativan but even before that when he was grabbing at invisible objects he was snoring per the nurse. [facility] desatted down to 70%.  He was intubated to protect his airway.  He was noted to have A. fib RVR and was given Cardizem drip to good effect. Patient has been weaned off of Cardizem and heart rate has been controlled. Patient is still intubated. 1.  Acute on chronic hypoxic hypercapnic respiratory failure failing to wean planning for PEG and trach per pulmonary management. Precedex continues as of 10/28  2. Atrial fibrillation RVR. s/p Cardizem drip. It was new  3. Alcohol withdrawal: improved but not resolved acute encephalopathy. Neuro did see him note appreciated. Giving thiamine and folic acid. EEG above and MRI negative per neurology  4. Infection treatment for UTI & Aspiration pneumonia ceftriaxone now to cover Klebsiella in the urine  5. Morbid obesity but also with   Malnutrition: Currently on tube feeding. 6.  Full code.   7.  DVT prophylaxis with enoxaparin at the anticoagulation dose 120 twice daily for A. fib. 8.  Disposition could consider LTAC soon,.

## 2021-10-28 NOTE — PROGRESS NOTES
Department of Internal Medicine  General Internal Medicine  Attending Progress Note  Chief Complaint   Patient presents with    Delirium Tremens (DTS)     Pt states he usually drinks a fifth of kilo beam a day states he quit saturday. states he has been having visual hallucinations since yesterday. SUBJECTIVE:    Intubated.      OBJECTIVE      Medications    Current Facility-Administered Medications: folic acid (FOLVITE) tablet 1 mg, 1 mg, Oral, Daily  magnesium sulfate 4000 mg in 100 mL IVPB premix, 4,000 mg, IntraVENous, Once  levETIRAcetam (KEPPRA) 500 mg/100 mL IVPB, 500 mg, IntraVENous, Q12H  heparin flush 100 UNIT/ML injection 300 Units, 3 mL, IntraVENous, 2 times per day  heparin flush 100 UNIT/ML injection 300 Units, 3 mL, IntraCATHeter, PRN  thiamine mononitrate tablet 100 mg, 100 mg, Oral, Daily  sodium chloride flush 0.9 % injection 5-40 mL, 5-40 mL, IntraVENous, 2 times per day  sodium chloride flush 0.9 % injection 5-40 mL, 5-40 mL, IntraVENous, PRN  0.9 % sodium chloride infusion, 25 mL, IntraVENous, PRN  heparin flush 100 UNIT/ML injection 300 Units, 3 mL, IntraVENous, 2 times per day  heparin flush 100 UNIT/ML injection 300 Units, 3 mL, IntraCATHeter, PRN  lactulose (CHRONULAC) 10 GM/15ML solution 20 g, 20 g, Oral, BID  sertraline (ZOLOFT) tablet 100 mg, 100 mg, Oral, Daily  dexmedetomidine (PRECEDEX) 400 mcg in sodium chloride 0.9 % 100 mL infusion, 0.2-1.4 mcg/kg/hr, IntraVENous, Continuous  [Held by provider] enoxaparin (LOVENOX) injection 120 mg, 1 mg/kg, SubCUTAneous, BID  0.9 % sodium chloride infusion, 25 mL, IntraVENous, Q8H  perflutren lipid microspheres (DEFINITY) injection 1.65 mg, 1.5 mL, IntraVENous, ONCE PRN  pantoprazole (PROTONIX) injection 40 mg, 40 mg, IntraVENous, BID **AND** sodium chloride (PF) 0.9 % injection 10 mL, 10 mL, IntraVENous, BID  ondansetron (ZOFRAN-ODT) disintegrating tablet 4 mg, 4 mg, Oral, Q8H PRN **OR** ondansetron (ZOFRAN) injection 4 mg, 4 mg, IntraVENous, Eeg: Abnormal EEG secondary to theta range slowing. This  pattern of EEG abnormality can be seen secondary to toxic, metabolic, or  hypoxic ischemic encephalopathy. No epileptiform activity noted, which  does not rule out underlying seizure disorder. Clinical correlation  advised. ASSESSMENT AND PLAN    Brief summary of Stay:  73 y. o. male with a history of alcoholism depression hypertension coronary artery disease GERD COPD morbid obesity presents with signs of alcohol withdrawal.  Before he was intubated he gave the ER the history that he began having tremors and hallucinations at 6 AM.  For the past 1 or 2 weeks he has been drinking 1/5 of gin being a day but has not had any drinks since Saturday the only other substances of abuse that he admits to is smoking 1 and half packs of cigarettes per day.  His tremors are getting worse on Saturday he started having leg swelling. In the ER he was known to be having tremors he received Ativan but even before that when he was grabbing at invisible objects he was snoring per the nurse. [facility] desatted down to 70%.  He was intubated to protect his airway.  He was noted to have A. fib RVR and was given Cardizem drip to good effect. Patient has been weaned off of Cardizem and heart rate has been controlled. Patient is still intubated. 1.  Acute on chronic hypoxic hypercapnic respiratory failure failing to wean planning for PEG and trach per pulmonary management. Precedex continues as of 10/27  2. Atrial fibrillation RVR. Cardizem drip continues as of 10/27  3. Alcohol withdrawal: This etiology of his acute encephalopathy but he seems to be mainly behind us by now need to consider other things. Neuro did see him note appreciated. Giving thiamine and folic acid. EEG above and MRI negative per neurology  4. Infection treatment for UTI & Aspiration pneumonia ceftriaxone now to cover Klebsiella in the urine  5.   Morbid obesity but also with   Malnutrition: Currently on tube feeding. 6.  Full code. 7.  DVT prophylaxis with enoxaparin at the anticoagulation dose 120 twice daily for A. fib. 8.  Disposition could consider LTAC soon        This not was adjusted on 10/28 to represent note from 10/27.   Note some templated labs are pulled from 10/28

## 2021-10-29 ENCOUNTER — ANESTHESIA (OUTPATIENT)
Dept: OPERATING ROOM | Age: 66
DRG: 004 | End: 2021-10-29
Payer: MEDICARE

## 2021-10-29 ENCOUNTER — APPOINTMENT (OUTPATIENT)
Dept: GENERAL RADIOLOGY | Age: 66
DRG: 004 | End: 2021-10-29
Payer: MEDICARE

## 2021-10-29 ENCOUNTER — ANESTHESIA EVENT (OUTPATIENT)
Dept: OPERATING ROOM | Age: 66
DRG: 004 | End: 2021-10-29
Payer: MEDICARE

## 2021-10-29 VITALS
RESPIRATION RATE: 17 BRPM | SYSTOLIC BLOOD PRESSURE: 93 MMHG | DIASTOLIC BLOOD PRESSURE: 64 MMHG | OXYGEN SATURATION: 95 % | TEMPERATURE: 56.7 F

## 2021-10-29 LAB
ANION GAP SERPL CALCULATED.3IONS-SCNC: 10 MMOL/L (ref 7–16)
BUN BLDV-MCNC: 26 MG/DL (ref 6–23)
CALCIUM SERPL-MCNC: 9.2 MG/DL (ref 8.6–10.2)
CHLORIDE BLD-SCNC: 109 MMOL/L (ref 98–107)
CO2: 24 MMOL/L (ref 22–29)
CREAT SERPL-MCNC: 0.7 MG/DL (ref 0.7–1.2)
GFR AFRICAN AMERICAN: >60
GFR NON-AFRICAN AMERICAN: >60 ML/MIN/1.73
GLUCOSE BLD-MCNC: 116 MG/DL (ref 74–99)
HCT VFR BLD CALC: 33.2 % (ref 37–54)
HEMOGLOBIN: 10.8 G/DL (ref 12.5–16.5)
INR BLD: 1.2
MAGNESIUM: 2 MG/DL (ref 1.6–2.6)
MCH RBC QN AUTO: 31.3 PG (ref 26–35)
MCHC RBC AUTO-ENTMCNC: 32.5 % (ref 32–34.5)
MCV RBC AUTO: 96.2 FL (ref 80–99.9)
PDW BLD-RTO: 15.9 FL (ref 11.5–15)
PHOSPHORUS: 4.1 MG/DL (ref 2.5–4.5)
PLATELET # BLD: 423 E9/L (ref 130–450)
PMV BLD AUTO: 9.2 FL (ref 7–12)
POTASSIUM SERPL-SCNC: 4.1 MMOL/L (ref 3.5–5)
PROTHROMBIN TIME: 14.3 SEC (ref 9.3–12.4)
RBC # BLD: 3.45 E12/L (ref 3.8–5.8)
SODIUM BLD-SCNC: 143 MMOL/L (ref 132–146)
TRIGL SERPL-MCNC: 112 MG/DL (ref 0–149)
WBC # BLD: 10.5 E9/L (ref 4.5–11.5)

## 2021-10-29 PROCEDURE — 0B113F4 BYPASS TRACHEA TO CUTANEOUS WITH TRACHEOSTOMY DEVICE, PERCUTANEOUS APPROACH: ICD-10-PCS | Performed by: SURGERY

## 2021-10-29 PROCEDURE — 43246 EGD PLACE GASTROSTOMY TUBE: CPT | Performed by: SURGERY

## 2021-10-29 PROCEDURE — 3600000002 HC SURGERY LEVEL 2 BASE: Performed by: SURGERY

## 2021-10-29 PROCEDURE — 2709999900 HC NON-CHARGEABLE SUPPLY: Performed by: SURGERY

## 2021-10-29 PROCEDURE — 2500000003 HC RX 250 WO HCPCS: Performed by: INTERNAL MEDICINE

## 2021-10-29 PROCEDURE — 2000000000 HC ICU R&B

## 2021-10-29 PROCEDURE — 97110 THERAPEUTIC EXERCISES: CPT

## 2021-10-29 PROCEDURE — 84100 ASSAY OF PHOSPHORUS: CPT

## 2021-10-29 PROCEDURE — 85027 COMPLETE CBC AUTOMATED: CPT

## 2021-10-29 PROCEDURE — 31600 PLANNED TRACHEOSTOMY: CPT | Performed by: SURGERY

## 2021-10-29 PROCEDURE — 3700000000 HC ANESTHESIA ATTENDED CARE: Performed by: SURGERY

## 2021-10-29 PROCEDURE — 71045 X-RAY EXAM CHEST 1 VIEW: CPT

## 2021-10-29 PROCEDURE — 94640 AIRWAY INHALATION TREATMENT: CPT

## 2021-10-29 PROCEDURE — 94003 VENT MGMT INPAT SUBQ DAY: CPT

## 2021-10-29 PROCEDURE — 6370000000 HC RX 637 (ALT 250 FOR IP): Performed by: INTERNAL MEDICINE

## 2021-10-29 PROCEDURE — 84478 ASSAY OF TRIGLYCERIDES: CPT

## 2021-10-29 PROCEDURE — 6360000002 HC RX W HCPCS: Performed by: NURSE ANESTHETIST, CERTIFIED REGISTERED

## 2021-10-29 PROCEDURE — 36415 COLL VENOUS BLD VENIPUNCTURE: CPT

## 2021-10-29 PROCEDURE — 3700000001 HC ADD 15 MINUTES (ANESTHESIA): Performed by: SURGERY

## 2021-10-29 PROCEDURE — 2500000003 HC RX 250 WO HCPCS: Performed by: NURSE ANESTHETIST, CERTIFIED REGISTERED

## 2021-10-29 PROCEDURE — 6370000000 HC RX 637 (ALT 250 FOR IP): Performed by: NURSE PRACTITIONER

## 2021-10-29 PROCEDURE — 2580000003 HC RX 258: Performed by: INTERNAL MEDICINE

## 2021-10-29 PROCEDURE — 6360000002 HC RX W HCPCS: Performed by: NURSE PRACTITIONER

## 2021-10-29 PROCEDURE — 2580000003 HC RX 258: Performed by: NURSE ANESTHETIST, CERTIFIED REGISTERED

## 2021-10-29 PROCEDURE — 83735 ASSAY OF MAGNESIUM: CPT

## 2021-10-29 PROCEDURE — C9113 INJ PANTOPRAZOLE SODIUM, VIA: HCPCS | Performed by: NURSE PRACTITIONER

## 2021-10-29 PROCEDURE — C1769 GUIDE WIRE: HCPCS | Performed by: SURGERY

## 2021-10-29 PROCEDURE — 85610 PROTHROMBIN TIME: CPT

## 2021-10-29 PROCEDURE — 0DH63UZ INSERTION OF FEEDING DEVICE INTO STOMACH, PERCUTANEOUS APPROACH: ICD-10-PCS | Performed by: SURGERY

## 2021-10-29 PROCEDURE — 3600000012 HC SURGERY LEVEL 2 ADDTL 15MIN: Performed by: SURGERY

## 2021-10-29 PROCEDURE — 2580000003 HC RX 258: Performed by: NURSE PRACTITIONER

## 2021-10-29 PROCEDURE — 36592 COLLECT BLOOD FROM PICC: CPT

## 2021-10-29 PROCEDURE — 6360000002 HC RX W HCPCS

## 2021-10-29 PROCEDURE — 80048 BASIC METABOLIC PNL TOTAL CA: CPT

## 2021-10-29 PROCEDURE — 6360000002 HC RX W HCPCS: Performed by: INTERNAL MEDICINE

## 2021-10-29 PROCEDURE — 99232 SBSQ HOSP IP/OBS MODERATE 35: CPT | Performed by: INTERNAL MEDICINE

## 2021-10-29 PROCEDURE — 6360000002 HC RX W HCPCS: Performed by: PSYCHIATRY & NEUROLOGY

## 2021-10-29 RX ORDER — DEXAMETHASONE SODIUM PHOSPHATE 10 MG/ML
INJECTION, SOLUTION INTRAMUSCULAR; INTRAVENOUS PRN
Status: DISCONTINUED | OUTPATIENT
Start: 2021-10-29 | End: 2021-10-29 | Stop reason: SDUPTHER

## 2021-10-29 RX ORDER — ROCURONIUM BROMIDE 10 MG/ML
INJECTION, SOLUTION INTRAVENOUS PRN
Status: DISCONTINUED | OUTPATIENT
Start: 2021-10-29 | End: 2021-10-29 | Stop reason: SDUPTHER

## 2021-10-29 RX ORDER — FENTANYL CITRATE 50 UG/ML
INJECTION, SOLUTION INTRAMUSCULAR; INTRAVENOUS PRN
Status: DISCONTINUED | OUTPATIENT
Start: 2021-10-29 | End: 2021-10-29

## 2021-10-29 RX ORDER — ONDANSETRON 2 MG/ML
INJECTION INTRAMUSCULAR; INTRAVENOUS PRN
Status: DISCONTINUED | OUTPATIENT
Start: 2021-10-29 | End: 2021-10-29 | Stop reason: SDUPTHER

## 2021-10-29 RX ORDER — FENTANYL CITRATE 50 UG/ML
INJECTION, SOLUTION INTRAMUSCULAR; INTRAVENOUS PRN
Status: DISCONTINUED | OUTPATIENT
Start: 2021-10-29 | End: 2021-10-29 | Stop reason: SDUPTHER

## 2021-10-29 RX ORDER — SODIUM CHLORIDE 9 MG/ML
INJECTION, SOLUTION INTRAVENOUS CONTINUOUS PRN
Status: DISCONTINUED | OUTPATIENT
Start: 2021-10-29 | End: 2021-10-29 | Stop reason: SDUPTHER

## 2021-10-29 RX ORDER — MIDAZOLAM HYDROCHLORIDE 1 MG/ML
INJECTION INTRAMUSCULAR; INTRAVENOUS PRN
Status: DISCONTINUED | OUTPATIENT
Start: 2021-10-29 | End: 2021-10-29 | Stop reason: SDUPTHER

## 2021-10-29 RX ORDER — CEFAZOLIN SODIUM 1 G/3ML
INJECTION, POWDER, FOR SOLUTION INTRAMUSCULAR; INTRAVENOUS PRN
Status: DISCONTINUED | OUTPATIENT
Start: 2021-10-29 | End: 2021-10-29 | Stop reason: SDUPTHER

## 2021-10-29 RX ADMIN — SERTRALINE HYDROCHLORIDE 100 MG: 50 TABLET ORAL at 08:01

## 2021-10-29 RX ADMIN — FENTANYL CITRATE 50 MCG: 50 INJECTION, SOLUTION INTRAMUSCULAR; INTRAVENOUS at 15:03

## 2021-10-29 RX ADMIN — ROCURONIUM BROMIDE 50 MG: 10 SOLUTION INTRAVENOUS at 15:03

## 2021-10-29 RX ADMIN — SODIUM CHLORIDE, PRESERVATIVE FREE 10 ML: 5 INJECTION INTRAVENOUS at 20:24

## 2021-10-29 RX ADMIN — IPRATROPIUM BROMIDE AND ALBUTEROL SULFATE 1 AMPULE: .5; 2.5 SOLUTION RESPIRATORY (INHALATION) at 02:09

## 2021-10-29 RX ADMIN — FOLIC ACID 1 MG: 1 TABLET ORAL at 08:01

## 2021-10-29 RX ADMIN — SODIUM CHLORIDE, PRESERVATIVE FREE 10 ML: 5 INJECTION INTRAVENOUS at 08:02

## 2021-10-29 RX ADMIN — IPRATROPIUM BROMIDE AND ALBUTEROL SULFATE 1 AMPULE: .5; 2.5 SOLUTION RESPIRATORY (INHALATION) at 12:31

## 2021-10-29 RX ADMIN — MIDAZOLAM 2 MG: 1 INJECTION INTRAMUSCULAR; INTRAVENOUS at 15:00

## 2021-10-29 RX ADMIN — Medication 10 ML: at 20:24

## 2021-10-29 RX ADMIN — IPRATROPIUM BROMIDE AND ALBUTEROL SULFATE 1 AMPULE: .5; 2.5 SOLUTION RESPIRATORY (INHALATION) at 09:08

## 2021-10-29 RX ADMIN — ENOXAPARIN SODIUM 120 MG: 150 INJECTION SUBCUTANEOUS at 20:26

## 2021-10-29 RX ADMIN — LACTULOSE 20 G: 20 SOLUTION ORAL at 20:17

## 2021-10-29 RX ADMIN — Medication: at 19:30

## 2021-10-29 RX ADMIN — SODIUM CHLORIDE: 9 INJECTION, SOLUTION INTRAVENOUS at 14:53

## 2021-10-29 RX ADMIN — LEVETIRACETAM 500 MG: 5 INJECTION, SOLUTION INTRAVENOUS at 09:36

## 2021-10-29 RX ADMIN — IPRATROPIUM BROMIDE AND ALBUTEROL SULFATE 1 AMPULE: .5; 2.5 SOLUTION RESPIRATORY (INHALATION) at 21:12

## 2021-10-29 RX ADMIN — Medication 10 ML: at 08:02

## 2021-10-29 RX ADMIN — IPRATROPIUM BROMIDE AND ALBUTEROL SULFATE 1 AMPULE: .5; 2.5 SOLUTION RESPIRATORY (INHALATION) at 05:49

## 2021-10-29 RX ADMIN — IPRATROPIUM BROMIDE AND ALBUTEROL SULFATE 1 AMPULE: .5; 2.5 SOLUTION RESPIRATORY (INHALATION) at 16:32

## 2021-10-29 RX ADMIN — ONDANSETRON 4 MG: 2 INJECTION INTRAMUSCULAR; INTRAVENOUS at 15:36

## 2021-10-29 RX ADMIN — LACTULOSE 20 G: 20 SOLUTION ORAL at 08:01

## 2021-10-29 RX ADMIN — LEVETIRACETAM 500 MG: 5 INJECTION, SOLUTION INTRAVENOUS at 22:28

## 2021-10-29 RX ADMIN — FENTANYL CITRATE 50 MCG: 50 INJECTION, SOLUTION INTRAMUSCULAR; INTRAVENOUS at 15:34

## 2021-10-29 RX ADMIN — Medication 0.6 MCG/KG/HR: at 14:05

## 2021-10-29 RX ADMIN — SODIUM CHLORIDE 25 ML: 9 INJECTION, SOLUTION INTRAVENOUS at 22:43

## 2021-10-29 RX ADMIN — Medication 0.6 MCG/KG/HR: at 20:16

## 2021-10-29 RX ADMIN — PANTOPRAZOLE SODIUM 40 MG: 40 INJECTION, POWDER, FOR SOLUTION INTRAVENOUS at 20:17

## 2021-10-29 RX ADMIN — PANTOPRAZOLE SODIUM 40 MG: 40 INJECTION, POWDER, FOR SOLUTION INTRAVENOUS at 08:01

## 2021-10-29 RX ADMIN — Medication 0.6 MCG/KG/HR: at 08:01

## 2021-10-29 RX ADMIN — THIAMINE HCL TAB 100 MG 100 MG: 100 TAB at 08:01

## 2021-10-29 RX ADMIN — ROCURONIUM BROMIDE 20 MG: 10 SOLUTION INTRAVENOUS at 15:34

## 2021-10-29 RX ADMIN — CEFAZOLIN 2000 MG: 1 INJECTION, POWDER, FOR SOLUTION INTRAMUSCULAR; INTRAVENOUS at 15:15

## 2021-10-29 RX ADMIN — Medication 0.5 MCG/KG/HR: at 00:53

## 2021-10-29 RX ADMIN — DEXAMETHASONE SODIUM PHOSPHATE 10 MG: 10 INJECTION, SOLUTION INTRAMUSCULAR; INTRAVENOUS at 15:07

## 2021-10-29 ASSESSMENT — PULMONARY FUNCTION TESTS
PIF_VALUE: 24
PIF_VALUE: 24
PIF_VALUE: 32
PIF_VALUE: 24
PIF_VALUE: 0
PIF_VALUE: 21
PIF_VALUE: 24
PIF_VALUE: 27
PIF_VALUE: 24
PIF_VALUE: 25
PIF_VALUE: 22
PIF_VALUE: 0
PIF_VALUE: 20
PIF_VALUE: 18
PIF_VALUE: 24
PIF_VALUE: 25
PIF_VALUE: 24
PIF_VALUE: 14
PIF_VALUE: 42
PIF_VALUE: 22
PIF_VALUE: 30
PIF_VALUE: 29
PIF_VALUE: 21
PIF_VALUE: 24
PIF_VALUE: 24
PIF_VALUE: 25
PIF_VALUE: 0
PIF_VALUE: 21
PIF_VALUE: 26
PIF_VALUE: 24
PIF_VALUE: 25
PIF_VALUE: 25
PIF_VALUE: 24
PIF_VALUE: 0
PIF_VALUE: 24
PIF_VALUE: 0
PIF_VALUE: 17
PIF_VALUE: 1
PIF_VALUE: 24
PIF_VALUE: 0
PIF_VALUE: 0
PIF_VALUE: 30
PIF_VALUE: 8
PIF_VALUE: 21
PIF_VALUE: 21
PIF_VALUE: 24
PIF_VALUE: 13
PIF_VALUE: 0
PIF_VALUE: 24
PIF_VALUE: 0
PIF_VALUE: 24
PIF_VALUE: 50
PIF_VALUE: 19
PIF_VALUE: 24
PIF_VALUE: 42
PIF_VALUE: 0
PIF_VALUE: 0
PIF_VALUE: 25
PIF_VALUE: 24
PIF_VALUE: 29
PIF_VALUE: 24
PIF_VALUE: 28
PIF_VALUE: 25
PIF_VALUE: 0
PIF_VALUE: 25
PIF_VALUE: 27
PIF_VALUE: 22
PIF_VALUE: 18
PIF_VALUE: 7
PIF_VALUE: 0
PIF_VALUE: 7
PIF_VALUE: 24
PIF_VALUE: 25
PIF_VALUE: 0
PIF_VALUE: 27
PIF_VALUE: 23
PIF_VALUE: 24
PIF_VALUE: 19
PIF_VALUE: 24
PIF_VALUE: 23

## 2021-10-29 ASSESSMENT — LIFESTYLE VARIABLES: SMOKING_STATUS: 1

## 2021-10-29 ASSESSMENT — PAIN SCALES - GENERAL
PAINLEVEL_OUTOF10: 0
PAINLEVEL_OUTOF10: 0
PAINLEVEL_OUTOF10: 2

## 2021-10-29 NOTE — OP NOTE
[facility] [facility]  OPERATIVE NOTE    Brigid Chairez  1955      Pre-operative Diagnosis: Respiratory failure, malnutrition, dysphagia    Post-operative Diagnosis: Same    Procedure: 1. Bronchoscopy 2. Percutaneous Tracheostomy with #8 shiley 3. Upper endoscopy 4. Percutaneous endoscopic gastrostomy tube    Anesthesia: LMAC    Surgeon: Reagan Singh MD    Assistant: Layla Villegas MD    Estimated Blood Loss: Minimal    Complications: none    Specimens: were not obtained    Details of Procedure:    A shoulder roll was placed under the patient. The area of the neck was prepped and draped in the standard sterile fashion. The flexible bronchoscope was inserted into the endotracheal tube. Next 5 cc1% lidocaine was inserted into the subcutaneous tissue approx. 2 fingerbreaths above the sternal notch. Using a #15 blade, 2cm transverse incision was made and the hemostat was used to dilate the space. Next the bronchoscope and ET tube were withdrawn such that the ET tube was just distal to the vocal cords. The needle was inserted into the trachea at the 2nd tracheal ring. Using the Seldinger technique, the guidewire was inserted into the needle and the needle was withdrawn. Next the small dilator was used X3, then the large dilator was used to dilate the track. Finally the #8shiley was inserted over the [address] [place] dilator. The bronchoscope was positioned such that the entire process was visualized. Next, the inner canula was placed into the shiley. The End tidal CO2 detector confirmed the position of the tracheostomy tube. Next the bronchoscope was placed through the shiley, which also confirmed placement. There were no complications from the procedure. A bite block was inserted. A lubricated gastroscope was inserted into the oropharynx and advanced under direct vision to the esophagus and then the stomach. The stomach was insufflated.  The anterior abdominal wall was transilluminated. The light source was easily visualized. This area was prepped and draped. Local anesthetic was injected. A #11 blade was used to make a transverse incision. A snare forceps was inserted through the gastroscope and deployed into the gastric lumen. An angiocatheter was inserted through the incision into the gastric lumen under direct vision. A wire was inserted through the anterior catheter and grasped with the snare forceps. The gastroscope, snare, and wire were then pulled out through the patient's mouth. The gastrostomy tube was connected to the wire, and the wire was pulled through the stomach and out through the anterior abdominal wall. The gastroscope was reintroduced into the stomach. The PEG tube was seen in good position without evidence of bleeding. The gastroscope was removed. The PEG tube was cut to size 3cm at the skin and fit with a bumper and a feeding apparatus. The patient tolerated the procedure well and went to the recovery room in a good condition. I was present and scrubbed for the entire procedure. All instrument counts, lap counts, and needle counts were correct at the end of the   procedure.     Lamont Trevizo MD  10/29/2021  4:21 PM

## 2021-10-29 NOTE — DISCHARGE INSTR - COC
hypercholesterolemia E78.00    Pericardial effusion I31.3    Chronic obstructive pulmonary disease (HCC) J44.9    Moderate obesity E66.8    Dislocation of finger S63.259A    Closed dislocation of left middle finger S63.253A    Seizures (HCC) R56.9    Withdrawal symptoms, alcohol, with delirium (HCC) F10.231    Altered mental status R41.82       Isolation/Infection:   Isolation            Droplet          Patient Infection Status       Infection Onset Added Last Indicated Last Indicated By Review Planned Expiration Resolved Resolved By    None active    Resolved    Haemophilus influenza 10/14/21 10/17/21 10/14/21 Culture, Respiratory   10/21/21 Cristiana Arredondo RN    Standard precautions indicated.     COVID-19 Rule Out 10/16/21 10/16/21 10/16/21 Respiratory Panel, Molecular, with COVID-19 (Restricted: peds pts or suitable admitted adults) (Ordered)   10/16/21 Rule-Out Test Resulted    COVID-19 Rule Out 10/12/21 10/12/21 10/12/21 COVID-19, Rapid (Ordered)   10/12/21 Rule-Out Test Resulted            Nurse Assessment:  Last Vital Signs: BP (!) 144/103   Pulse 95   Temp 98.6 °F (37 °C) (Axillary)   Resp 22   Ht 5' 11\" (1.803 m)   Wt 262 lb 12.6 oz (119.2 kg)   SpO2 95%   BMI 36.65 kg/m²     Last documented pain score (0-10 scale): Pain Level: 0  Last Weight:   Wt Readings from Last 1 Encounters:   10/24/21 262 lb 12.6 oz (119.2 kg)     Mental Status:  alert and able to concentrate and follow conversation    IV Access:  [address] [place] [address] IV ACCESS:[id number]}    Nursing Mobility/ADLs:  Walking   Dependent  Transfer  Dependent  Bathing  Dependent  Dressing  Dependent  Toileting  Dependent  Feeding  Dependent  Med Admin  Dependent  Med Delivery   crushed via PEG    Wound Care Documentation and Therapy:  Wound 10/22/21 Neck Right;Upper (Active)   Dressing Status Other (Comment) 10/29/21 0800   Wound Cleansed Wound cleanser 10/29/21 0800   Dressing/Treatment Open to air 10/29/21 0800   Wound Length (cm) 1.5 cm 10/22/21 1600 Wound Width (cm) 1.5 cm 10/22/21 1600   Wound Surface Area (cm^2) 2.25 cm^2 10/22/21 1600   Wound Assessment Pink/red 10/29/21 0800   Drainage Amount None 10/29/21 0800   Odor None 10/29/21 0800   Number of days: 7       Wound 10/22/21 Brachial Left; Inner (Active)   Dressing/Treatment Open to air 10/29/21 0800   Wound Length (cm) 2 cm 10/22/21 1600   Wound Width (cm) 1 cm 10/22/21 1600   Wound Surface Area (cm^2) 2 cm^2 10/22/21 1600   Wound Assessment Pink/red 10/29/21 0800   Drainage Amount None 10/29/21 0800   Number of days: 7        Elimination:  Continence: Bowel: Yes  Bladder: No  Urinary Catheter: Insertion Date: 11/6/2021    Colostomy/Ileostomy/Ileal Conduit: No       Date of Last BM: ***    Intake/Output Summary (Last 24 hours) at 11/12/2021 1603  Last data filed at 11/12/2021 1557  Gross per 24 hour   Intake 782 ml   Output 800 ml   Net -18 ml     I/O last 3 completed shifts: In: 447 [I.V.:13; NG/GT:769]  Out: 300 [Urine:300]    Safety Concerns:      At Risk for Falls and Aspiration Risk    Impairments/Disabilities:      None    Nutrition Therapy:  Current Nutrition Therapy:   - Tube Feedings:  Immune Enhancing    Routes of Feeding: Gastrostomy Tube  Liquids: No Liquids  Daily Fluid Restriction: no  Last Modified Barium Swallow with Video (Video Swallowing Test): {Done Not Done UDDS:[id number]}    Treatments at the Time of Hospital Discharge:   Respiratory Treatments: ***  Oxygen Therapy:   is on oxygen 6 liters via trach mask  8 mm Shiley   Ventilator:    - Ventilator Settings:    Vt Ordered: 50 mL  Rate Set: 18 bmp  FiO2 : 30 %    PEEP/CPAP: 5  Pressure Support: 8 cmH20    Rehab Therapies: Physical Therapy and Occupational Therapy  Weight Bearing Status/Restrictions: No weight bearing restirctions  Other Medical Equipment (for information only, NOT a DME order):  {EQUIPMENT:[id number]}  Other Treatments: ***    Patient's personal belongings (please select all that are sent with patient):  {[facility] DME Belongings:161918407}    RN SIGNATURE:  {Esignature:[id number]}    CASE MANAGEMENT/SOCIAL WORK SECTION    Inpatient Status Date: 10/13/21    Readmission Risk Assessment Score:  Readmission Risk              Risk of Unplanned Readmission:  15           Discharging to Facility/ Agency   Name: [facility]   Address:   Phone:  [phone]  Fax : [phone]    / signature: Electronically signed by CLARY Caruso on 11/10/2021 at 4:02 PM          PHYSICIAN SECTION    Prognosis: Good    Condition at Discharge: Stable    Rehab Potential (if transferring to Rehab): Good    Recommended Labs or Other Treatments After Discharge:     Physician Certification: I certify the above information and transfer of Savanah Camacho  is necessary for the continuing treatment of the diagnosis listed and that he requires [facility] for less 30 days.      Update Admission H&P: {CHP DME Changes in FFOND:[id number]}    PHYSICIAN SIGNATURE:  {Esignature:[id number]}

## 2021-10-29 NOTE — ANESTHESIA POSTPROCEDURE EVALUATION
Department of Anesthesiology  Postprocedure Note    Patient: Helen Gamble  MRN: [id number]  YOB: 1955  Date of evaluation: 10/29/2021  Time:  6:34 PM     Procedure Summary     Date: 10/29/21 Room / Location: [address] [place] [address] 06 / [address]    Anesthesia Start: 7929 Anesthesia Stop: 1612    Procedures:       TRACHEOTOMY AND PEG (N/A )      EGD ESOPHAGOGASTRODUODENOSCOPY PEG TUBE INSERTION (N/A ) Diagnosis: (RESP FAILURE)    Surgeons: Ford Lawler MD Responsible Provider: Radha Randolph DO    Anesthesia Type: general ASA Status: 4          Anesthesia Type: general    Shalini Phase I:      Shalini Phase II:      Last vitals: Reviewed and per EMR flowsheets.        Anesthesia Post Evaluation    Patient location during evaluation: ICU  Patient participation: complete - patient cannot participate  Level of consciousness: sedated and ventilated  Airway patency: patent  Nausea & Vomiting: no nausea and no vomiting  Complications: no  Cardiovascular status: hemodynamically stable  Respiratory status: acceptable and ventilator  Hydration status: euvolemic

## 2021-10-29 NOTE — ANESTHESIA PRE PROCEDURE
Department of Anesthesiology  Preprocedure Note       Name:  Rosanna Garcia   Age:  73 y.o.  :  1955                                          MRN:  [id number]         Date:  10/29/2021      Surgeon: Jessie Mohs):  Layla Finch MD    Procedure: TRACHEOTOMY AND PEG (N/A )  EGD ESOPHAGOGASTRODUODENOSCOPY PEG TUBE INSERTION (N/A )    Medications prior to admission:   Prior to Admission medications    Medication Sig Start Date End Date Taking? Authorizing Provider   fluticasone (FLONASE) 50 MCG/ACT nasal spray 2 sprays by Nasal route daily 2 sprays each nostril once a day 19   Batool Talbert DO   busPIRone (BUSPAR) 5 MG tablet Take 15 mg by mouth 2 times daily  16   Historical Provider, MD   gabapentin (NEURONTIN) 300 MG capsule Take 300 mg by mouth 3 times daily    Historical Provider, MD   ibuprofen (ADVIL;MOTRIN) 600 MG tablet Take 600 mg by mouth 10/7/13   Historical Provider, MD   omeprazole (PRILOSEC) 20 MG capsule Take 20 mg by mouth Daily  16   Historical Provider, MD   PROAIR  (90 BASE) MCG/ACT inhaler  16   Historical Provider, MD   ASPIRIN LOW DOSE 81 MG EC tablet Take 81 mg by mouth daily  16   Historical Provider, MD   sertraline (ZOLOFT) 100 MG tablet 100 mg nightly  16   Historical Provider, MD   lisinopril (PRINIVIL;ZESTRIL) 5 MG tablet Take 5 mg by mouth daily    Historical Provider, MD   nitroGLYCERIN (NITROSTAT) 0.4 MG SL tablet Place 1 tablet under the tongue every 5 minutes as needed for Chest pain 3/23/16   Leda Mayberry MD   pravastatin (PRAVACHOL) 80 MG tablet Take 1 tablet by mouth daily 3/23/16   Leda Mayberry MD       Current medications:    No current facility-administered medications for this visit. No current outpatient medications on file.      Facility-Administered Medications Ordered in Other Visits   Medication Dose Route Frequency Provider Last Rate Last Admin    folic acid (FOLVITE) tablet 1 mg  1 mg Oral Daily Sheri Romero Taisha Medrano MD   1 mg at 10/29/21 0801    levETIRAcetam (KEPPRA) 500 mg/100 mL IVPB  500 mg IntraVENous Q12H Pushpa Grande MD   Stopped at 10/29/21 1029    heparin flush 100 UNIT/ML injection 300 Units  3 mL IntraVENous 2 times per day Getachew Cherry MD   300 Units at 10/28/21 2025    heparin flush 100 UNIT/ML injection 300 Units  3 mL IntraCATHeter PRN Getachew Cherry MD        thiamine mononitrate tablet 100 mg  100 mg Oral Daily Bobbi Fitch Wilmot Councilman, MD   100 mg at 10/29/21 0801    sodium chloride flush 0.9 % injection 5-40 mL  5-40 mL IntraVENous 2 times per day Getachew Cherry MD   10 mL at 10/29/21 0802    sodium chloride flush 0.9 % injection 5-40 mL  5-40 mL IntraVENous PRN Ana Chaudhary MD   10 mL at 10/26/21 2219    0.9 % sodium chloride infusion  25 mL IntraVENous PRN Getachew Cherry MD        heparin flush 100 UNIT/ML injection 300 Units  3 mL IntraVENous 2 times per day Getachew Cherry MD   300 Units at 10/28/21 2024    heparin flush 100 UNIT/ML injection 300 Units  3 mL IntraCATHeter NACHO Cherry MD        lactulose (CHRONULAC) 10 GM/15ML solution 20 g  20 g Oral BID Florian Chen MD   20 g at 10/29/21 0801    sertraline (ZOLOFT) tablet 100 mg  100 mg Oral Daily Dmitriy Newell MD   100 mg at 10/29/21 0801    dexmedetomidine (PRECEDEX) 400 mcg in sodium chloride 0.9 % 100 mL infusion  0.2-1.4 mcg/kg/hr IntraVENous Continuous Dmitriy Newell MD 18.4 mL/hr at 10/29/21 1405 0.6 mcg/kg/hr at 10/29/21 1405    [Held by provider] enoxaparin (LOVENOX) injection 120 mg  1 mg/kg SubCUTAneous BID Ana Chaudhary MD   120 mg at 10/27/21 2018    0.9 % sodium chloride infusion  25 mL IntraVENous Gina Hernandez MD   Stopped at 10/19/21 0708    perflutren lipid microspheres (DEFINITY) injection 1.65 mg  1.5 mL IntraVENous ONCE PRN Ana Negro MD Jet        pantoprazole (PROTONIX) injection 40 mg  40 mg IntraVENous BID MARTY Doan CNP   40 mg at 10/29/21 0801    And    sodium chloride (PF) 0.9 % injection 10 mL  10 mL IntraVENous BID MARTY Doan CNP   10 mL at 10/29/21 0802    ondansetron (ZOFRAN-ODT) disintegrating tablet 4 mg  4 mg Oral Q8H PRN Rosealee Sandifer, MD        Or    ondansetron [facility]) injection 4 mg  4 mg IntraVENous Q6H PRN Rosealee Sandifer, MD        polyethylene glycol [facility]) packet 17 g  17 g Oral Daily PRN Rosealee Sandifer, MD        acetaminophen (TYLENOL) tablet 650 mg  650 mg Oral Q6H PRN Rosealee Sandifer, MD   650 mg at 10/25/21 0120    Or    acetaminophen (TYLENOL) suppository 650 mg  650 mg Rectal Q6H PRN Rosealee Sandifer, MD   650 mg at 10/19/21 2020    ipratropium-albuterol (Claudeen Lime) nebulizer solution 1 ampule  1 ampule Inhalation Q4H MARTY Doan CNP   1 ampule at 10/29/21 1231       Allergies:  No Known Allergies    Problem List:    Patient Active Problem List   Diagnosis Code    Essential hypertension I10    Coronary artery disease involving coronary bypass graft of native heart without angina pectoris I25.810    Atypical chest pain R07.89    Pure hypercholesterolemia E78.00    Pericardial effusion I31.3    Chronic obstructive pulmonary disease (HCC) J44.9    Moderate obesity E66.8    Dislocation of finger S63.259A    Closed dislocation of left middle finger S63.253A    Seizures ([facility]) R56.9    Withdrawal symptoms, alcohol, with delirium ([facility]) F10.231    Altered mental status R41.82       Past Medical History:        Diagnosis Date    Alcohol abuse     [facility]rial fibrillation [facility]     CAD (coronary artery disease)     COPD (chronic obstructive pulmonary disease) ([facility]     Depression     Fracture of unspecified phalanx of left middle finger, initial encounter for closed fracture     GERD (gastroesophageal reflux disease)     Hypertension     Seizure ([facility]        Past Surgical History:        Procedure Laterality Date    COLONOSCOPY      CORONARY ARTERY BYPASS GRAFT  3/2/05    x3: LIMA to LAD, SVG to RCA, SVG to diagonal    DIAGNOSTIC CARDIAC CATH LAB PROCEDURE  3/02/05    [facility]: Severe multivessel CAD in patient who presents with STEMI and total occlusion of diagonal branch. Significant disease of proximal LAD and severe disease of dominant RCA.  FINGER SURGERY Left 5/3/2019    CLOSED POSSIBLE LEFT MIDDLE FINGER OPEN REDUCTION INTERNAL FIXATION POSSIBLE PROXIMAL INTERPHALANGEAL JOINT ARTHROPLASTY   ++[facility]++ performed by Sarah White MD at [address] [facility] N/A 10/20/2021    EGD WITH NG TUBE PLACEMENT **BEDSIDE** performed by Eyal Resendez MD at [address] [place] [address]       Social History:    Social History     Tobacco Use    Smoking status: Current Every Day Smoker     Packs/day: 1.00     Years: 45.00     Pack years: 45.00     Types: Cigars    Smokeless tobacco: Never Used    Tobacco comment: SMOKES CIGARS    Substance Use Topics    Alcohol use: Yes     Comment: occassiona                                Ready to quit: Not Answered  Counseling given: Not Answered  Comment: SMOKES CIGARS       Vital Signs (Current): There were no vitals filed for this visit.                                            BP Readings from Last 3 Encounters:   10/29/21 (!) 146/99   05/20/19 123/81   05/16/19 (!) 168/99       NPO Status:                           greater than 8 hours                                                      BMI:   Wt Readings from Last 3 Encounters:   10/24/21 262 lb 12.6 oz (119.2 kg)   05/20/19 247 lb (112 kg)   05/02/19 250 lb (113.4 kg)     There is no height or weight on file to calculate BMI.    CBC:   Lab Results   Component Value Date    WBC 10.5 10/29/2021    RBC 3.45 10/29/2021    HGB 10.8 10/29/2021    HCT 33.2 10/29/2021    MCV 96.2 10/29/2021    RDW 15.9 10/29/2021     10/29/2021 CMP:   Lab Results   Component Value Date     10/29/2021    K 4.1 10/29/2021    K 4.3 10/12/2021     10/29/2021    CO2 24 10/29/2021    BUN 26 10/29/2021    CREATININE 0.7 10/29/2021    GFRAA >60 10/29/2021    LABGLOM >60 10/29/2021    GLUCOSE 116 10/29/2021    PROT 5.9 10/24/2021    CALCIUM 9.2 10/29/2021    BILITOT 0.5 10/24/2021    ALKPHOS 62 10/24/2021    AST 73 10/24/2021    ALT 61 10/24/2021       POC Tests: No results for input(s): POCGLU, POCNA, POCK, POCCL, POCBUN, POCHEMO, POCHCT in the last 72 hours. Coags:   Lab Results   Component Value Date    PROTIME 14.3 10/29/2021    INR 1.2 10/29/2021    APTT 26.4 10/12/2021       HCG (If Applicable): No results found for: PREGTESTUR, PREGSERUM, HCG, HCGQUANT     ABGs:   Lab Results   Component Value Date    PO2ART 143.4 10/12/2021    HAG8VIQ 66.3 10/12/2021    JDF9NEK 21.3 10/12/2021        Type & Screen (If Applicable):  No results found for: LABABO, [address]    Anesthesia Evaluation  Patient summary reviewed no history of anesthetic complications:   Airway: Mallampati: II  TM distance: >3 FB   Neck ROM: full  Comment: intubated  Mouth opening: > = 3 FB Dental:          Pulmonary:   (+) COPD:  decreased breath sounds,  current smoker          Patient did not smoke on day of surgery. ROS comment: Respiratory failure   Cardiovascular:    (+) hypertension:, CAD:, CABG/stent (CABG):,         Rhythm: regular  Rate: normal           Beta Blocker:  Not on Beta Blocker         Neuro/Psych:   (+) psychiatric history (Depression):            GI/Hepatic/Renal:   (+) GERD:,           Endo/Other: Negative Endo/Other ROS                    Abdominal:   (+) obese,           Vascular: negative vascular ROS. Other Findings:             Anesthesia Plan      general     ASA 4       Induction: intravenous. MIPS: Postoperative opioids intended and Prophylactic antiemetics administered.   Anesthetic plan and risks discussed with patient. Plan discussed with CRNA.           [address],    10/29/2021

## 2021-10-29 NOTE — PROGRESS NOTES
Physical Therapy Treatment Note/Plan of Care    Room #:  IC06/IC06-01  Patient Name: Alfred David  YOB: 1955  MRN: [id number]    Date of Service: 10/29/2021     Tentative placement recommendation: Subacute rehab or Long Term Acute Care  Equipment recommendation: To be determined      Evaluating Physical Therapist: Maco Boles, PT #[id number]      Specific Provider Orders/Date/Referring Provider :  10/24/21 1115   PT eval and treat Start: 10/24/21 1115, End: 10/24/21 1115, ONE TIME, Standing Count: 1 Occurrences, Moises San MD      Admitting Diagnosis:   Delirium tremens ([address] [F10.231]  Seizures ([address] [R56.9]  Acute respiratory failure, unspecified whether with hypoxia or hypercapnia ([address] [J96.00]  Atrial fibrillation, unspecified type ([address] [I48.91]     hallucinations and tremors    Surgery: none  Visit Diagnoses       Codes    Acute respiratory failure, unspecified whether with hypoxia or hypercapnia ([address]     J96.00    Atrial fibrillation, unspecified type ([address]     I48.91    Encounter for nasogastric (NG) tube placement     Z46.59          Patient Active Problem List   Diagnosis    Essential hypertension    Coronary artery disease involving coronary bypass graft of native heart without angina pectoris    Atypical chest pain    Pure hypercholesterolemia    Pericardial effusion    Chronic obstructive pulmonary disease (HCC)    Moderate obesity    Dislocation of finger    Closed dislocation of left middle finger    Seizures ([address]    Withdrawal symptoms, alcohol, with delirium ([address]    Altered mental status        ASSESSMENT of Current Deficits Patient exhibits decreased strength, endurance and range of motion impairing functional mobility, transfers, gait , gait distance, tolerance to activity and participation patient able to move RLE with AAROM, unable to move LLE. Pt able to squeeze hands bilaterally.  Patient requires continued skilled physical therapy to address concerns listed above for increased safety and function at discharge. PHYSICAL THERAPY  PLAN OF CARE       Physical therapy plan of care is established based on physician order,  patient diagnosis and clinical assessment    Current Treatment Recommendations:    -Bed Mobility: Lower extremity exercises , Upper extremity exercises  and Trunk control activities   -Sitting Balance: Hands on support to maintain midline  and Facilitate active trunk muscle engagement   -Standing Balance: Instruct patient on adequate base of support to maintain balance  -Transfers: Provide instruction on proper hand and foot position for adequate transfer of weight onto lower extremities and use of gait device if needed, Cues for hand placement, technique and safety. Provide stabilization to prevent fall , Facilitate weight shift forward on to lower extremities and provide necessary stabilization of bilateral lower extremities  and Assist with extension of knees trunk and hip to accept weight transfer   -Gait: Gait training, Standing activities to improve: base of support, weight shift, weight bearing , Exercises to improve trunk control and Pregait training to emphasize: Base of support, Device control, Upright and Safety   -Endurance: Utilize Supervised activities to increase level of endurance to allow for safe functional mobility including transfers and gait  and Use graduated activities to promote good breathing techniques and provide support and education to maximize respiratory function    PT long term treatment goals are located in below grid    Patient and or family understand(s) diagnosis, prognosis, and plan of care. Frequency of treatments: Patient will be seen  2-3 times/week.          Prior Level of Function: unknown patient unable to answer  Rehab Potential: fair    for baseline    Past medical history:   Past Medical History:   Diagnosis Date    Alcohol abuse     Atrial fibrillation ([facility]     CAD (coronary artery disease)     [facility]     Depression     Fracture of unspecified phalanx of left middle finger, initial encounter for closed fracture     GERD (gastroesophageal reflux disease)     Hypertension     Seizure ([facility]      Past Surgical History:   Procedure Laterality Date    COLONOSCOPY      CORONARY ARTERY BYPASS GRAFT  3/2/05    x3: LIMA to LAD, SVG to RCA, SVG to diagonal    DIAGNOSTIC CARDIAC CATH LAB PROCEDURE  3/02/05    [facility]: Severe multivessel CAD in patient who presents with STEMI and total occlusion of diagonal branch. Significant disease of proximal LAD and severe disease of dominant RCA.  FINGER SURGERY Left 5/3/2019    CLOSED POSSIBLE LEFT MIDDLE FINGER OPEN REDUCTION INTERNAL FIXATION POSSIBLE PROXIMAL INTERPHALANGEAL JOINT ARTHROPLASTY   ++[facility] performed by Mireille Villanueva MD at [place] [address] N/A 10/20/2021    EGD WITH NG TUBE PLACEMENT **BEDSIDE** performed by Wendy Tabares MD at [address] [place] [address]       SUBJECTIVE:    Precautions:  , falls, alarm, vent and 2 point restraint , seizure,  alcohol abuse     Social history: Patient unable   Per chart, his  is his daughter who lives in [place].       Equipment owned: unknown,       -PAC Basic Mobility        -[facility] Mobility Inpatient   How much difficulty turning over in bed?: A Lot  How much difficulty sitting down on / standing up from a chair with arms?: Unable  How much difficulty moving from lying on back to sitting on side of bed?: Unable  How much help from another person moving to and from a bed to a chair?: Total  How much help from another person needed to walk in hospital room?: Total  How much help from another person for climbing 3-5 steps with a railing?: Total  -PAC Inpatient Mobility Raw Score : 7  -PAC Inpatient T-Scale Score : 26.42  Mobility Inpatient CMS 0-100% Score: 92.36  Mobility Inpatient CMS G-Code Modifier :     Nursing cleared patient for PT treatment. .   OBJECTIVE;   Initial Evaluation  Date: 10/26/2021 Treatment Date:    10/29/2021   Short Term/ Long Term   Goals   Was pt agreeable to Eval/treatment? Yes Yes To be met in 5 days   Pain level   Slight facial grimacing end range of motion stretch DF    No c/o of pain, no grimacing    Bed Mobility    Rolling: Not assessed     Supine to sit: Not assessed     Sit to supine: Not assessed     Scooting: Not assessed    Rolling: Not assessed    Supine to sit: Not assessed    Sit to supine: Not assessed    Scooting: Not assessed     Rolling: Moderate assist of 1    Supine to sit: Moderate assist of 1    Sit to supine: Moderate assist of 1    Scooting: Moderate assist of 1     Transfers Sit to stand: Not assessed    Sit to stand: Not assessed       Sit to stand:  Moderate assist of 1     Ambulation    not assessed  not assessed     10 feet using  wheeled walker with Moderate assist of 1    Stair negotiation: ascended and descended   Not assessed  Not assessed           ROM Slight bilateral plantar flexor contractures   Increase range of motion 10% of affected joints    Strength BUE:  refer to OT eval  bilateral lower extremities no active movement  Increase strength in affected mm groups by 1/3 grade   Balance Sitting EOB:  not assessed    Dynamic Standing:  not assessed   Sitting EOB: not assessed   Dynamic Standing: not assessed    Sitting EOB:  fair    Dynamic Standing: fair -     Patient is Alert & Oriented x 0 and does not follow directions  Able to squeeze with both hand and wiggle toes, able to move RLE more than LLE  Sensation:   Unable to assess   Edema:  yes bilateral lower extremities and bilateral upper extremities   Endurance: poor      Vitals: ventilator   Blood Pressure at rest  Blood Pressure during session    Heart Rate at rest  Heart Rate during session    SPO2 at rest %  SPO2 during session 98%     Patient education  Patient educated on role of Physical Therapy, risks of immobility, safety and plan of care,  importance of mobility while in hospital  and ankle pumps, quad set and glut set for edema control, blood clot prevention     Patient response to education:   Pt verbalized understanding Pt demonstrated skill Pt requires further education in this area   No No Yes      Treatment:  Patient practiced and was instructed/facilitated in the following treatment: Patient unable to actively move left lower extremity, AAROM on right lower extremities, slight squeeze both hands. Performed passive range of motion left lower extremities and PROM/AAROM  2 point restraint in place. Transport present to take patient to surgery. Therapeutic Exercises:  ankle pumps, heel slide, hip abduction/adduction, straight leg raise and wrist flexion/extension  x 10 reps. At end of session, patient in bed with alarm call light and phone within reach,  all lines and tubes intact, nursing notified. Patient would benefit from continued skilled Physical Therapy to improve functional independence and quality of life.          Patient's/ family goals   none stated    Time in  230  Time out  240    Total Treatment Time  10 minutes    CPT codes:  Therapeutic exercises (05011)   10 minutes  1 unit(s)    Regina Oconnell, [address] [facility] #[id number]

## 2021-10-29 NOTE — PROGRESS NOTES
Comprehensive Nutrition Assessment    Type and Reason for Visit:  Reassess    Nutrition Recommendations/Plan: Continue with current nutritional regimen    Nutrition Assessment:  Pt remains intubated, NPO, NGT w/ EN intitiated. Pt w/ ETOH w/draw, metabolic encephalopathy and aspiration PNA. Noted A-fib w/ RVR. Hx COPD. Continue current EN regimen and monitor. Malnutrition Assessment:  Malnutrition Status: At risk for malnutrition (Comment)    Context:  Chronic Illness     Findings of the 6 clinical characteristics of malnutrition:  Energy Intake:  Mild decrease in energy intake (Comment)  Weight Loss:  Unable to assess (no wt hx)     Body Fat Loss:  No significant body fat loss     Muscle Mass Loss:  No significant muscle mass loss    Fluid Accumulation:        Strength:  Not Performed    Estimated Daily Nutrient Needs:  Energy (kcal):  3244-1796; Weight Used for Energy Requirements:  Current     Protein (g):  120-140 (1.5-1.8 (monitor liver enzymes));  Weight Used for Protein Requirements:  Ideal        Fluid (ml/day):  per critical care; Method Used for Fluid Requirements:         Nutrition Related Findings:  Intubated , abd WDL, +BS, BLE +2 pitting edema, -I/O -1.9L      Wounds:  Skin Tears       Current Nutrition Therapies:    Diet NPO Exceptions are: Sips of Water with Meds  ADULT TUBE FEEDING; Nasogastric; Immune Enhancing; Continuous; 20; Yes; 10; Q 8 hours; 45; 200; Q 6 hours; Protein; 1 protein modular/d flush w/ 30ml water before and after administration    Anthropometric Measures:  · Height: 5' 11\" (180.3 cm)  · Current Body Weight: 262 lb (118.8 kg) (10/24 bed scale)   · Admission Body Weight: 277 lb (125.6 kg) (10/12 bed per flow sheet)    · Usual Body Weight:  (no wt hx per EMR)     · Ideal Body Weight: 172 lbs; % Ideal Body Weight 152.3 %   · BMI: 36.6  · Adjusted Body Weight:  ; No Adjustment    · BMI Categories: Obese Class 2 (BMI 35.0 -39.9)       Nutrition Diagnosis:   · Inadequate oral intake related to impaired respiratory function as evidenced by NPO or clear liquid status due to medical condition, intubation, nutrition support - enteral nutrition      Nutrition Interventions:   Food and/or Nutrient Delivery:  Continue Current Tube Feeding  Nutrition Education/Counseling:  No recommendation at this time   Coordination of Nutrition Care:  Continue to monitor while inpatient    Goals:  Pt to tolerate EN       Nutrition Monitoring and Evaluation:   Behavioral-Environmental Outcomes:  None Identified   Food/Nutrient Intake Outcomes:  Enteral Nutrition Intake/Tolerance  Physical Signs/Symptoms Outcomes:  Biochemical Data, GI Status, Fluid Status or Edema, Hemodynamic Status, Nutrition Focused Physical Findings, Skin, Weight     Discharge Planning:     Too soon to determine     Electronically signed by Eduardo Thao RD, LD on 10/29/21 at 9:59 AM EDT    Contact: [phone]

## 2021-10-29 NOTE — PROGRESS NOTES
Assessment and Plan  Patient is a 73 y.o. male with the following medical Problems:   1. Acute on chronic hypoxic and hypercapnic respiratory failure  2. Alcohol withdrawal  3. New onset atrial fibrillation  4. History of thrombocytopenia  5. Morbid obesity with obesity hypoventilation syndrome  6. Metabolic encephalopathy  7. Acute kidney injury  (resolved)  8. Aspiration pneumonia  9. UTI  10. Small pericardial effusion  11. Hematuria (resolved)  12. 2.8 X 2.8 cm AAA  13. Small bilateral pleural effusions    Plan of care:  1. Continue with Precedex and wean down to assess for response to commands. Patient tracks and opens eyes but does not follow commands. 2.  Continue with thiamine and folic acid  3. Surgery consult for tracheostomy and PEG tube placement. Patient scheduled for surgery tomorrow  4. Lovenox 1 mg/kg twice a day for atrial fibrillation on hold. Patient will receive 1 dose of Lovenox for DVT prophylaxis. 5.  Continue tube feedings. 6. EEG ruled out seizures and MRI to evaluate for encephalopathy. 7.  MRI ruled out any intracranial abnormalities. .  8.  GI prophylaxis  9. Off ceftriaxone  10. I spoke to the patient's son and daughter this morning. They are both in agreement with tracheostomy and PEG tube insertion. Risks, benefits, and alternatives were all explained. History of Present Illness:   Patient is a 72-year-old with history of hypertension, CAD, COPD, and alcohol abuse. Patient presented to the ED on 10/12/2021 after developing hallucinations and shortness of breath. Patient apparently called police after seeing his  wife and 2 children walking in his house. He apparently attempted to spray them with bug spray before calling police. Patient has a history of alcohol abuse and drinks 1/5 of them being daily. According to ED physician's note the patient's last drink was on Saturday.   The patient is currently intubated, sedated, and unable to provide any history. Daily Progress:  October 13, 2021: Patient remains sedated intubated and mechanically ventilated. He had an uneventful night. He is currently on a Cardizem drip for atrial fibrillation with RVR. He has no fever, chills, or rigors. October 14, 2021: Patient remained sedated intubated and mechanically ventilated. He had atrial fibrillation with RVR. Patient failed awakening trial today. He was agitated and confused. He has no fever, chills, rigors. October 15, 2021: Patient remains sedated intubated and mechanically ventilated. He gets easily agitated and combative off sedation. He failed awakening trial.  He has no fever, chills, or rigors. Heart rate is controlled. He developed hematuria overnight and Eliquis was put on hold. October 16, 2021: Patient had repetitive episodes of vomiting. Multiple attempts to place an OGT probably failed. CT scan of the abdomen and pelvis was obtained. There was no evidence of hiatal hernia. Patient has no fever, chills, rigors. He becomes agitated and combative off sedation. He has no fever, chills, rigors. He is currently on 75% FiO2. October 17, 2021: Patient remains sedated intubated and mechanically ventilated. We will attempt sedation vacation and spontaneous breathing trials today if tolerated. He remains n.p.o. since he has no NG tube. NG tube could not be advanced. He remained hemodynamically stable. He has no fever, chills, rigors. Sugar is mostly controlled. October 25, 2021: Patient remains lethargic and poorly responsive. She opens eyes but does not follow commands. He has no fever, chills, rigors. White blood cells count remained stable. Continues to tolerate tube feeding. He remained hemodynamically stable. October 26, 2021: Patient is awake and tracks of sedation but does not follow command. He has no fever, chills, or rigors. He is extremely weak and likely to decompensate if extubated.   Tracheostomy would be beneficial in the long-term. He has no fever, chills, or rigors. October 27, 2021: Patient is more awake and interactive however he remains delirious and confused. He has no fever, chills, or rigors. His oxygen requirement has decreased. He is currently on 40% FiO2. Had an extensive discussion with the patient's son and daughter about benefits, risks, and alternatives for tracheostomy and PEG tube. They are both in agreement to proceed with tracheostomy and PEG tube. Patient's heart rate remained stable off Cardizem. October 28, 2021: Patient remains clinically the same. His oxygen requirement has been stable. He is awake but does not follow commands. He is severely deconditioned. He has no fever, chills, rigors. He had an uneventful night. He is scheduled for tracheostomy and PEG tube placement. Sodium is borderline elevated. Patient was started on free water flushes. October 29, 2021: Patient's continues to improve neurologically. He is more awake and responsive. He remains extremely weak. He has no fever, chills, rigors. Sodium level has improved. He has an uneventful night. Past Medical History:  Past Medical History:   Diagnosis Date    Alcohol abuse     Atrial fibrillation ([address]     CAD (coronary artery disease)     [facility] obstructive [facility]     Depression     Fracture of unspecified phalanx of left middle finger, initial encounter for closed fracture     GERD (gastroesophageal reflux disease)     Hypertension     Seizure ([address]         Family History:   No family history on file. Allergies:         Patient has no known allergies.     Social history:  Social History     Socioeconomic History    Marital status:      Spouse name: Not on file    Number of children: Not on file    Years of education: Not on file    Highest education level: Not on file   Occupational History    Not on file   Tobacco Use    Smoking status: Current Every Day Smoker     Packs/day: 1.00     Years: 45.00     Pack years: 45.00     Types: Cigars    Smokeless tobacco: Never Used    Tobacco comment: SMOKES CIGARS    Vaping Use    Vaping Use: Never used   Substance and Sexual Activity    Alcohol use: Yes     Comment: occassiona    Drug use: No    Sexual activity: Not on file   Other Topics Concern    Not on file   Social History Narrative    Not on file     Social Determinants of Health     Financial Resource Strain:     Difficulty of Paying Living Expenses:    Food Insecurity:     Worried About Running Out of Food in the Last Year:     Ran Out of Food in the Last Year:    Transportation Needs:     Lack of Transportation (Medical):      Lack of Transportation (Non-Medical):    Physical Activity:     Days of Exercise per Week:     Minutes of Exercise per Session:    Stress:     Feeling of Stress :    Social Connections:     Frequency of Communication with Friends and Family:     Frequency of Social Gatherings with Friends and Family:     Attends Alevism Services:     Active Member of Clubs or Organizations:     Attends Club or Organization Meetings:     Marital Status:    Intimate Partner Violence:     Fear of Current or Ex-Partner:     Emotionally Abused:     Physically Abused:     Sexually Abused:        Current Medications:     Current Facility-Administered Medications:     folic acid (FOLVITE) tablet 1 mg, 1 mg, Oral, Daily, Carmie Salt MD Jet, 1 mg at 10/29/21 0801    levETIRAcetam (KEPPRA) 500 mg/100 mL IVPB, 500 mg, IntraVENous, Q12H, Virgie Ramos MD, Stopped at 10/29/21 1029    heparin flush 100 UNIT/ML injection 300 Units, 3 mL, IntraVENous, 2 times per day, Nichol Grubbs MD, 300 Units at 10/28/21 2025    heparin flush 100 UNIT/ML injection 300 Units, 3 mL, IntraCATHeter, PRN, Nichol Grubbs MD    thiamine mononitrate tablet 100 mg, 100 mg, Oral, Daily, Carmie Salt Margarito Mayes MD, 100 mg at 10/29/21 0801    sodium chloride flush 0.9 % injection 5-40 mL, 5-40 mL, IntraVENous, 2 times per day, Mary Perez MD, 10 mL at 10/29/21 0802    sodium chloride flush 0.9 % injection 5-40 mL, 5-40 mL, IntraVENous, PRN, Mary Perez MD, 10 mL at 10/26/21 2219    0.9 % sodium chloride infusion, 25 mL, IntraVENous, PRN, Mary Perez MD    heparin flush 100 UNIT/ML injection 300 Units, 3 mL, IntraVENous, 2 times per day, Mary Perez MD, 300 Units at 10/28/21 2024    heparin flush 100 UNIT/ML injection 300 Units, 3 mL, IntraCATHeter, PRN, Mary Perez MD    [facility]) 10 GM/15ML solution 20 g, 20 g, Oral, BID, Tyrell Diaz MD, 20 g at 10/29/21 0801    sertraline (ZOLOFT) tablet 100 mg, 100 mg, Oral, Daily, Dmitriy Newell MD, 100 mg at 10/29/21 0801    dexmedetomidine (PRECEDEX) 400 mcg in sodium chloride 0.9 % 100 mL infusion, 0.2-1.4 mcg/kg/hr, IntraVENous, Continuous, Dmitriy Newell MD, Last Rate: 18.4 mL/hr at 10/29/21 0801, 0.6 mcg/kg/hr at 10/29/21 0801    [Held by provider] enoxaparin (LOVENOX) injection 120 mg, 1 mg/kg, SubCUTAneous, BID, Mary Perez MD, 120 mg at 10/27/21 2018    0.9 % sodium chloride infusion, 25 mL, IntraVENous, Q8H, Mary Perez MD, Stopped at 10/19/21 0708    perflutren lipid microspheres (DEFINITY) injection 1.65 mg, 1.5 mL, IntraVENous, ONCE PRN, Mary Perez MD    pantoprazole (PROTONIX) injection 40 mg, 40 mg, IntraVENous, BID, 40 mg at 10/29/21 0801 **AND** sodium chloride (PF) 0.9 % injection 10 mL, 10 mL, IntraVENous, BID, Artem Mcleod APRN - CNP, 10 mL at 10/29/21 0802    ondansetron (ZOFRAN-ODT) disintegrating tablet 4 mg, 4 mg, Oral, Q8H PRN **OR** ondansetron (ZOFRAN) injection 4 mg, 4 mg, IntraVENous, Q6H PRN, Rhett Chung MD    polyethylene glycol (GLYCOLAX) packet 17 g, 17 g, Oral, Daily PRN, Maykel Swift MD    acetaminophen (TYLENOL) tablet 650 mg, 650 mg, Oral, Q6H PRN, 650 mg at 10/25/21 0120 **OR** acetaminophen (TYLENOL) suppository 650 mg, 650 mg, Rectal, Q6H PRN, Maykel Swift MD, 650 mg at 10/19/21 2020    ipratropium-albuterol (DUONEB) nebulizer solution 1 ampule, 1 ampule, Inhalation, Q4H, MARTY Prieto - CNP, 1 ampule at 10/29/21 0908    Review of Systems:   Unable to obtain due to medical condition    Physical Exam:   Vital Signs:  BP (!) 138/93   Pulse 98   Temp 98.6 °F (37 °C) (Axillary)   Resp 21   Ht 5' 11\" (1.803 m)   Wt 262 lb 12.6 oz (119.2 kg)   SpO2 96%   BMI 36.65 kg/m²     Input/Output: In: 991 [I.V.:236; NG/GT:655]  Out: 1260     Oxygen requirements: MV    Ventilator Information:  Vent Information  $Ventilation: $Subsequent Day  Skin Assessment: Clean, dry, & intact  Equipment ID:  [id number]  Equipment Changed: Suction catheter  Vent Type: 980  Vent Mode: AC/VC  Vt Ordered: 500 mL  Rate Set: 18 bmp  Peak Flow: 70 L/min  Pressure Support: 0 cmH20  FiO2 : 30 %  SpO2: 96 %  SpO2/FiO2 ratio: 316.67  Sensitivity: 4  PEEP/CPAP: 5  I Time/ I Time %: 0 s  Humidification Source: Heated wire  Humidification Temp: 37  Humidification Temp Measured: 37  Circuit Condensation: Drained    General appearance: , not in pain or distress, in no respiratory distress    HEENT: Intubated  Neck: Supple, no jugular venous distension, lymphadenopathy, thyromegaly or carotid bruits  Chest: Decreased breath sounds, no wheezing, no crackles and no tenderness over ribs   Cardiovascular: Normal S1 , S2, regular rate and rhythm, no murmur, rub or gallop  Abdomen: Normal sounds present, soft, lax with no tenderness, no hepatosplenomegaly, and no masses  Extremities: No edema. Pulses are equally present. Skin: intact, no rashes   Neurologic: Sedated and mechanically ventilated but opens eyes off sedation. Patient tracks but does not follow commands.      Investigations:  Labs, radiological imaging and cardiac work up were reviewed        ICU STAFF PHYSICIAN NOTE OF PERSONAL INVOLVEMENT IN CARE  As the attending physician, I certify that I personally reviewed the patient's history and personally examined the patient to confirm the physical findings described above, and that I reviewed the relevant imaging studies and available reports. I also discussed the differential diagnosis and all of the proposed management plans with the patient and individuals accompanying the patient to this visit. They had the opportunity to ask questions about the proposed management plans and to have those questions answered. This patient has a high probability of sudden, clinically significant deterioration, which requires the highest level of physician preparedness to intervene urgently. I managed/supervised life or organ supporting interventions that required frequent physician assessment. I devoted my full attention to the direct care of this patient for the amount of time indicated below. Time I spent with family or surrogate(s) is included only if the patient was incapable of providing the necessary information or participating in medical decision-making. Time devoted to teaching and to any procedures I billed separately is not included.   Critical Care Time: 35 minutes      Electronically signed by Angelito Weiner MD on 10/29/2021 at 11:34 AM

## 2021-10-29 NOTE — CARE COORDINATION
10/29/21 Negative covid 10/16/21. CM transition of care: icu/vent/- pt for trache/peg today- due to increased secretions and protection of airway. [facility] [place] following. PRECERT needed for LTACH. Daughter Oscar Morrow updated. CM to follow.   Electronically signed by Jay Harrison RN on 10/29/2021 at 2:06 PM

## 2021-10-29 NOTE — PLAN OF CARE
Problem: Non-Violent Restraints  Goal: No harm/injury to patient while restraints in use  Outcome: Met This Shift  Goal: Patient's dignity will be maintained  Outcome: Met This Shift     Problem: Airway Clearance - Ineffective:  Goal: Ability to maintain a clear airway will improve  Description: Ability to maintain a clear airway will improve  Outcome: Met This Shift     Problem: Discharge Planning:  Goal: Discharged to appropriate level of care  Description: Discharged to appropriate level of care  Outcome: Met This Shift     Problem: Respiratory Function - Compromised:  Goal: Ability to maintain a clear airway will improve  Description: Ability to maintain a clear airway will improve  Outcome: Met This Shift     Problem: Non-Violent Restraints  Goal: Removal from restraints as soon as assessed to be safe  Outcome: Not Met This Shift    Patient educated on restraints and lines/tubes connected to him. Patient is not redirectable at this time. Restraints continued for patient's safety. Will continue to assess.

## 2021-10-29 NOTE — PROGRESS NOTES
PRN  acetaminophen (TYLENOL) tablet 650 mg, 650 mg, Oral, Q6H PRN **OR** acetaminophen (TYLENOL) suppository 650 mg, 650 mg, Rectal, Q6H PRN  ipratropium-albuterol (DUONEB) nebulizer solution 1 ampule, 1 ampule, Inhalation, Q4H  Physical    VITALS:  BP (!) 144/103   Pulse 95   Temp 98.6 °F (37 °C) (Axillary)   Resp 22   Ht 5' 11\" (1.803 m)   Wt 262 lb 12.6 oz (119.2 kg)   SpO2 95%   BMI 36.65 kg/m²   Patient is sedated and ventilated  Normocephalic, without obvious abnormality, atraumatic, external ears without lesions,   Neck  cervical lymphadenopathy with limited exam for motion due to ET tube  Heart has a regular rate and rhythm no murmur  Lungs are clear to auscultation bilaterally with equal movements with the additional sounds of transmitted airway sounds from the ventilator. Abdomen is soft nontender nondistended no rebound or guarding. No  edema good peripheral perfusion. No significant rashes or new skin lesions. Affect and neuro exam limited since he is sedated on the ventilator  10/25 in response to my verbal stimuli he glances over me and then looks away  10/26 he repeats this activity several times.   With me this seems to be more glancing than that tracking  10/27: Reportedly more arousable however when I see him he is not  10/29: Truly tracking me with eyes across his midline    Data    CBC:   Lab Results   Component Value Date    WBC 10.5 10/29/2021    RBC 3.45 10/29/2021    HGB 10.8 10/29/2021    HCT 33.2 10/29/2021    MCV 96.2 10/29/2021    MCH 31.3 10/29/2021    MCHC 32.5 10/29/2021    RDW 15.9 10/29/2021     10/29/2021    MPV 9.2 10/29/2021     BMP:    Lab Results   Component Value Date     10/29/2021    K 4.1 10/29/2021    K 4.3 10/12/2021     10/29/2021    CO2 24 10/29/2021    BUN 26 10/29/2021    LABALBU 2.7 10/24/2021    CREATININE 0.7 10/29/2021    CALCIUM 9.2 10/29/2021    GFRAA >60 10/29/2021    LABGLOM >60 10/29/2021    GLUCOSE 116 10/29/2021     Eeg: Abnormal EEG secondary to theta range slowing. This  pattern of EEG abnormality can be seen secondary to toxic, metabolic, or  hypoxic ischemic encephalopathy. No epileptiform activity noted, which  does not rule out underlying seizure disorder. Clinical correlation  advised. ASSESSMENT AND PLAN    Brief summary of Stay:  73 y. o. male with a history of alcoholism depression hypertension coronary artery disease GERD COPD morbid obesity presents with signs of alcohol withdrawal.  Before he was intubated he gave the ER the history that he began having tremors and hallucinations at 6 AM.  For the past 1 or 2 weeks he has been drinking 1/5 of gin being a day but has not had any drinks since Saturday the only other substances of abuse that he admits to is smoking 1 and half packs of cigarettes per day.  His tremors are getting worse on Saturday he started having leg swelling. In the ER he was known to be having tremors he received Ativan but even before that when he was grabbing at invisible objects he was snoring per the nurse. [facility] desatted down to 70%.  He was intubated to protect his airway.  He was noted to have A. fib RVR and was given Cardizem drip to good effect. Patient has been weaned off of Cardizem and heart rate has been controlled. Patient is still intubated. 1.  Acute on chronic hypoxic hypercapnic respiratory failure failing to wean per pulmonary management. planning for PEG and trach for 10/30? Precedex continues as of 10/28  2. Atrial fibrillation RVR. s/p Cardizem drip. It was new  3. Alcohol withdrawal: improved but not resolved acute encephalopathy. Neuro did see him note appreciated. Giving thiamine and folic acid. EEG above and MRI negative per neurology  4. Infection treatment for UTI & Aspiration pneumonia ceftriaxone now to cover Klebsiella in the urine  5. Morbid obesity but also with   Malnutrition: Currently on tube feeding. 6.  Ct shows: 2.8 x 2.8 cm abdominal aortic aneurysm.   7. Full code. 8.  DVT prophylaxis with enoxaparin at the anticoagulation dose 120 twice daily for A. fib. 9.  Disposition could consider LTAC soon.

## 2021-10-30 LAB
ANION GAP SERPL CALCULATED.3IONS-SCNC: 11 MMOL/L (ref 7–16)
BUN BLDV-MCNC: 26 MG/DL (ref 6–23)
CALCIUM SERPL-MCNC: 8.8 MG/DL (ref 8.6–10.2)
CHLORIDE BLD-SCNC: 109 MMOL/L (ref 98–107)
CO2: 21 MMOL/L (ref 22–29)
CREAT SERPL-MCNC: 0.7 MG/DL (ref 0.7–1.2)
GFR AFRICAN AMERICAN: >60
GFR NON-AFRICAN AMERICAN: >60 ML/MIN/1.73
GLUCOSE BLD-MCNC: 116 MG/DL (ref 74–99)
HCT VFR BLD CALC: 32.3 % (ref 37–54)
HEMOGLOBIN: 10.8 G/DL (ref 12.5–16.5)
MAGNESIUM: 1.7 MG/DL (ref 1.6–2.6)
MCH RBC QN AUTO: 31.9 PG (ref 26–35)
MCHC RBC AUTO-ENTMCNC: 33.4 % (ref 32–34.5)
MCV RBC AUTO: 95.3 FL (ref 80–99.9)
PDW BLD-RTO: 15.4 FL (ref 11.5–15)
PHOSPHORUS: 4 MG/DL (ref 2.5–4.5)
PLATELET # BLD: 381 E9/L (ref 130–450)
PMV BLD AUTO: 9 FL (ref 7–12)
POTASSIUM SERPL-SCNC: 4.1 MMOL/L (ref 3.5–5)
RBC # BLD: 3.39 E12/L (ref 3.8–5.8)
SODIUM BLD-SCNC: 141 MMOL/L (ref 132–146)
WBC # BLD: 12.9 E9/L (ref 4.5–11.5)

## 2021-10-30 PROCEDURE — 94003 VENT MGMT INPAT SUBQ DAY: CPT

## 2021-10-30 PROCEDURE — 6370000000 HC RX 637 (ALT 250 FOR IP): Performed by: INTERNAL MEDICINE

## 2021-10-30 PROCEDURE — 2500000003 HC RX 250 WO HCPCS: Performed by: INTERNAL MEDICINE

## 2021-10-30 PROCEDURE — 6370000000 HC RX 637 (ALT 250 FOR IP): Performed by: NURSE PRACTITIONER

## 2021-10-30 PROCEDURE — 6360000002 HC RX W HCPCS: Performed by: INTERNAL MEDICINE

## 2021-10-30 PROCEDURE — 36415 COLL VENOUS BLD VENIPUNCTURE: CPT

## 2021-10-30 PROCEDURE — 85027 COMPLETE CBC AUTOMATED: CPT

## 2021-10-30 PROCEDURE — 99232 SBSQ HOSP IP/OBS MODERATE 35: CPT | Performed by: INTERNAL MEDICINE

## 2021-10-30 PROCEDURE — 83735 ASSAY OF MAGNESIUM: CPT

## 2021-10-30 PROCEDURE — 84100 ASSAY OF PHOSPHORUS: CPT

## 2021-10-30 PROCEDURE — 2580000003 HC RX 258: Performed by: NURSE PRACTITIONER

## 2021-10-30 PROCEDURE — 94640 AIRWAY INHALATION TREATMENT: CPT

## 2021-10-30 PROCEDURE — 2000000000 HC ICU R&B

## 2021-10-30 PROCEDURE — 99232 SBSQ HOSP IP/OBS MODERATE 35: CPT | Performed by: SURGERY

## 2021-10-30 PROCEDURE — 6360000002 HC RX W HCPCS: Performed by: PSYCHIATRY & NEUROLOGY

## 2021-10-30 PROCEDURE — 6360000002 HC RX W HCPCS: Performed by: NURSE PRACTITIONER

## 2021-10-30 PROCEDURE — C9113 INJ PANTOPRAZOLE SODIUM, VIA: HCPCS | Performed by: NURSE PRACTITIONER

## 2021-10-30 PROCEDURE — 80048 BASIC METABOLIC PNL TOTAL CA: CPT

## 2021-10-30 PROCEDURE — 2580000003 HC RX 258: Performed by: INTERNAL MEDICINE

## 2021-10-30 RX ORDER — LEVETIRACETAM 500 MG/1
500 TABLET ORAL 2 TIMES DAILY
Status: DISCONTINUED | OUTPATIENT
Start: 2021-10-30 | End: 2021-11-02

## 2021-10-30 RX ORDER — FAMOTIDINE 20 MG/1
40 TABLET, FILM COATED ORAL 2 TIMES DAILY
Status: DISCONTINUED | OUTPATIENT
Start: 2021-10-30 | End: 2021-11-09

## 2021-10-30 RX ADMIN — IPRATROPIUM BROMIDE AND ALBUTEROL SULFATE 1 AMPULE: .5; 2.5 SOLUTION RESPIRATORY (INHALATION) at 01:29

## 2021-10-30 RX ADMIN — SODIUM CHLORIDE, PRESERVATIVE FREE 10 ML: 5 INJECTION INTRAVENOUS at 09:42

## 2021-10-30 RX ADMIN — LACTULOSE 20 G: 20 SOLUTION ORAL at 09:38

## 2021-10-30 RX ADMIN — Medication 10 ML: at 09:40

## 2021-10-30 RX ADMIN — SODIUM CHLORIDE, PRESERVATIVE FREE 300 UNITS: 5 INJECTION INTRAVENOUS at 09:39

## 2021-10-30 RX ADMIN — FOLIC ACID 1 MG: 1 TABLET ORAL at 09:38

## 2021-10-30 RX ADMIN — ENOXAPARIN SODIUM 120 MG: 150 INJECTION SUBCUTANEOUS at 09:38

## 2021-10-30 RX ADMIN — Medication 0.6 MCG/KG/HR: at 09:43

## 2021-10-30 RX ADMIN — RIVAROXABAN 20 MG: 20 TABLET, FILM COATED ORAL at 18:19

## 2021-10-30 RX ADMIN — PANTOPRAZOLE SODIUM 40 MG: 40 INJECTION, POWDER, FOR SOLUTION INTRAVENOUS at 09:38

## 2021-10-30 RX ADMIN — DILTIAZEM HYDROCHLORIDE 5 MG/HR: 5 INJECTION, SOLUTION INTRAVENOUS at 13:51

## 2021-10-30 RX ADMIN — Medication 0.6 MCG/KG/HR: at 21:55

## 2021-10-30 RX ADMIN — IPRATROPIUM BROMIDE AND ALBUTEROL SULFATE 1 AMPULE: .5; 2.5 SOLUTION RESPIRATORY (INHALATION) at 13:00

## 2021-10-30 RX ADMIN — Medication 10 ML: at 20:29

## 2021-10-30 RX ADMIN — IPRATROPIUM BROMIDE AND ALBUTEROL SULFATE 1 AMPULE: .5; 2.5 SOLUTION RESPIRATORY (INHALATION) at 17:46

## 2021-10-30 RX ADMIN — SERTRALINE HYDROCHLORIDE 100 MG: 50 TABLET ORAL at 09:38

## 2021-10-30 RX ADMIN — LEVETIRACETAM 500 MG: 500 TABLET ORAL at 20:31

## 2021-10-30 RX ADMIN — FAMOTIDINE 40 MG: 20 TABLET, FILM COATED ORAL at 13:07

## 2021-10-30 RX ADMIN — Medication 0.6 MCG/KG/HR: at 01:50

## 2021-10-30 RX ADMIN — IPRATROPIUM BROMIDE AND ALBUTEROL SULFATE 1 AMPULE: .5; 2.5 SOLUTION RESPIRATORY (INHALATION) at 09:48

## 2021-10-30 RX ADMIN — IPRATROPIUM BROMIDE AND ALBUTEROL SULFATE 1 AMPULE: .5; 2.5 SOLUTION RESPIRATORY (INHALATION) at 22:04

## 2021-10-30 RX ADMIN — DILTIAZEM HYDROCHLORIDE 15 MG/HR: 5 INJECTION, SOLUTION INTRAVENOUS at 20:26

## 2021-10-30 RX ADMIN — LACTULOSE 20 G: 20 SOLUTION ORAL at 20:31

## 2021-10-30 RX ADMIN — FAMOTIDINE 40 MG: 20 TABLET, FILM COATED ORAL at 20:31

## 2021-10-30 RX ADMIN — LEVETIRACETAM 500 MG: 5 INJECTION, SOLUTION INTRAVENOUS at 10:03

## 2021-10-30 RX ADMIN — THIAMINE HCL TAB 100 MG 100 MG: 100 TAB at 09:39

## 2021-10-30 RX ADMIN — IPRATROPIUM BROMIDE AND ALBUTEROL SULFATE 1 AMPULE: .5; 2.5 SOLUTION RESPIRATORY (INHALATION) at 06:43

## 2021-10-30 ASSESSMENT — PULMONARY FUNCTION TESTS
PIF_VALUE: 28
PIF_VALUE: 19
PIF_VALUE: 30
PIF_VALUE: 25
PIF_VALUE: 16
PIF_VALUE: 15
PIF_VALUE: 18
PIF_VALUE: 19
PIF_VALUE: 0
PIF_VALUE: 23
PIF_VALUE: 17
PIF_VALUE: 20
PIF_VALUE: 21
PIF_VALUE: 28
PIF_VALUE: 26
PIF_VALUE: 17
PIF_VALUE: 23
PIF_VALUE: 20
PIF_VALUE: 23
PIF_VALUE: 22
PIF_VALUE: 27
PIF_VALUE: 22
PIF_VALUE: 18
PIF_VALUE: 51
PIF_VALUE: 22

## 2021-10-30 ASSESSMENT — PAIN SCALES - GENERAL: PAINLEVEL_OUTOF10: 2

## 2021-10-30 NOTE — PLAN OF CARE
Problem: Non-Violent Restraints  Goal: Patient's dignity will be maintained  Outcome: Met This Shift     Problem: Falls - Risk of:  Goal: Will remain free from falls  Description: Will remain free from falls  Outcome: Met This Shift  Goal: Absence of physical injury  Description: Absence of physical injury  Outcome: Met This Shift     Problem: Airway Clearance - Ineffective:  Goal: Ability to maintain a clear airway will improve  Description: Ability to maintain a clear airway will improve  Outcome: Met This Shift     Problem: Injury - Risk of, Healthcare-Acquired Condition:  Goal: Will remain free from falls  Description: Will remain free from falls  Outcome: Met This Shift  Goal: Absence of pressure ulcer  Description: Absence of pressure ulcer  Outcome: Met This Shift  Goal: Absence of urinary tract infection signs and symptoms  Description: Absence of urinary tract infection signs and symptoms  Outcome: Met This Shift     Problem: Respiratory Function - Compromised:  Goal: Ability to maintain a clear airway will improve  Description: Ability to maintain a clear airway will improve  Outcome: Met This Shift

## 2021-10-30 NOTE — PROGRESS NOTES
Department of Internal Medicine  General Internal Medicine  Attending Progress Note  Chief Complaint   Patient presents with    Delirium Tremens (DTS)     Pt states he usually drinks a fifth of kilo beam a day states he quit saturday. states he has been having visual hallucinations since yesterday. SUBJECTIVE:    Intubated.      OBJECTIVE      Medications    Current Facility-Administered Medications: levETIRAcetam (KEPPRA) tablet 500 mg, 500 mg, Oral, BID  famotidine (PEPCID) tablet 40 mg, 40 mg, Oral, BID  rivaroxaban (XARELTO) tablet 20 mg, 20 mg, Oral, Daily  dilTIAZem 125 mg in dextrose 5 % 125 mL infusion, 5-15 mg/hr, IntraVENous, Continuous  folic acid (FOLVITE) tablet 1 mg, 1 mg, Oral, Daily  heparin flush 100 UNIT/ML injection 300 Units, 3 mL, IntraVENous, 2 times per day  heparin flush 100 UNIT/ML injection 300 Units, 3 mL, IntraCATHeter, PRN  thiamine mononitrate tablet 100 mg, 100 mg, Oral, Daily  sodium chloride flush 0.9 % injection 5-40 mL, 5-40 mL, IntraVENous, 2 times per day  sodium chloride flush 0.9 % injection 5-40 mL, 5-40 mL, IntraVENous, PRN  0.9 % sodium chloride infusion, 25 mL, IntraVENous, PRN  heparin flush 100 UNIT/ML injection 300 Units, 3 mL, IntraVENous, 2 times per day  heparin flush 100 UNIT/ML injection 300 Units, 3 mL, IntraCATHeter, PRN  lactulose (CHRONULAC) 10 GM/15ML solution 20 g, 20 g, Oral, BID  sertraline (ZOLOFT) tablet 100 mg, 100 mg, Oral, Daily  dexmedetomidine (PRECEDEX) 400 mcg in sodium chloride 0.9 % 100 mL infusion, 0.2-1.4 mcg/kg/hr, IntraVENous, Continuous  0.9 % sodium chloride infusion, 25 mL, IntraVENous, Q8H  perflutren lipid microspheres (DEFINITY) injection 1.65 mg, 1.5 mL, IntraVENous, ONCE PRN  ondansetron (ZOFRAN-ODT) disintegrating tablet 4 mg, 4 mg, Oral, Q8H PRN **OR** ondansetron (ZOFRAN) injection 4 mg, 4 mg, IntraVENous, Q6H PRN  polyethylene glycol (GLYCOLAX) packet 17 g, 17 g, Oral, Daily PRN  acetaminophen (TYLENOL) tablet 650 mg, 650 mg, Oral, Q6H PRN **OR** acetaminophen (TYLENOL) suppository 650 mg, 650 mg, Rectal, Q6H PRN  ipratropium-albuterol (DUONEB) nebulizer solution 1 ampule, 1 ampule, Inhalation, Q4H  Physical    VITALS:  BP (!) 151/97   Pulse 138   Temp 101.5 °F (38.6 °C) (Oral)   Resp 18   Ht 5' 11\" (1.803 m)   Wt 256 lb (116.1 kg)   SpO2 93%   BMI 35.70 kg/m²   Patient is sedated and ventilated  Normocephalic, without obvious abnormality, atraumatic, external ears without lesions,   Neck  cervical lymphadenopathy with limited exam for motion due to ET tube  Heart has a regular rate and rhythm no murmur  Lungs are clear to auscultation bilaterally with equal movements with the additional sounds of transmitted airway sounds from the ventilator. Abdomen is soft nontender nondistended no rebound or guarding. No  edema good peripheral perfusion. No significant rashes or new skin lesions. Affect and neuro exam limited since he is sedated on the ventilator  10/25 in response to my verbal stimuli he glances over me and then looks away  10/26 he repeats this activity several times.   With me this seems to be more glancing than that tracking  10/27: Reportedly more arousable however when I see him he is not  10/29: Truly tracking me with eyes across his midline  10/30 now has trach and PEG    Data    CBC:   Lab Results   Component Value Date    WBC 12.9 10/30/2021    RBC 3.39 10/30/2021    HGB 10.8 10/30/2021    HCT 32.3 10/30/2021    MCV 95.3 10/30/2021    MCH 31.9 10/30/2021    MCHC 33.4 10/30/2021    RDW 15.4 10/30/2021     10/30/2021    MPV 9.0 10/30/2021     BMP:    Lab Results   Component Value Date     10/30/2021    K 4.1 10/30/2021    K 4.3 10/12/2021     10/30/2021    CO2 21 10/30/2021    BUN 26 10/30/2021    LABALBU 2.7 10/24/2021    CREATININE 0.7 10/30/2021    CALCIUM 8.8 10/30/2021    GFRAA >60 10/30/2021    LABGLOM >60 10/30/2021    GLUCOSE 116 10/30/2021     Eeg: Abnormal EEG secondary to theta range slowing. This  pattern of EEG abnormality can be seen secondary to toxic, metabolic, or  hypoxic ischemic encephalopathy. No epileptiform activity noted, which  does not rule out underlying seizure disorder. Clinical correlation  advised. ASSESSMENT AND PLAN    Brief summary of Stay:  73 y. o. male with a history of alcoholism depression hypertension coronary artery disease GERD COPD morbid obesity presents with signs of alcohol withdrawal.  Before he was intubated he gave the ER the history that he began having tremors and hallucinations at 6 AM.  For the past 1 or 2 weeks he has been drinking 1/5 of gin being a day but has not had any drinks since Saturday the only other substances of abuse that he admits to is smoking 1 and half packs of cigarettes per day.  His tremors are getting worse on Saturday he started having leg swelling. In the ER he was known to be having tremors he received Ativan but even before that when he was grabbing at invisible objects he was snoring per the nurse. [facility] desatted down to 70%.  He was intubated to protect his airway.  He was noted to have A. fib RVR and was given Cardizem drip to good effect. Patient has been weaned off of Cardizem and heart rate has been controlled. Patient is still intubated. 1.  Acute on chronic hypoxic hypercapnic respiratory failure failing to wean per pulmonary management. planning for PEG and trach for 10/30? Precedex continues as of 10/30 with intention to continue weaning. Was off this am of 10/30  2. Atrial fibrillation RVR. s/p Cardizem drip. It was new  3. Alcohol withdrawal: improved but not resolved acute encephalopathy. Neuro did see him note appreciated. Giving thiamine and folic acid. EEG above and MRI negative per neurology  4. Infection treatment for UTI & Aspiration pneumonia s/p ceftriaxone covered Klebsiella in the urine  5. Morbid obesity but also with   Malnutrition: Currently on tube feeding.   6.  Ct shows: 2.8 x 2.8 cm abdominal aortic aneurysm. 7. Full code. 8.  DVT prophylaxis with Xarelto   9. Disposition could consider LTAC soon.

## 2021-10-30 NOTE — PROGRESS NOTES
Assessment and Plan  Patient is a 73 y.o. male with the following medical Problems:   1. Acute on chronic hypoxic and hypercapnic respiratory failure  2. Alcohol withdrawal  3. New onset atrial fibrillation  4. History of thrombocytopenia  5. Morbid obesity with obesity hypoventilation syndrome  6. Metabolic encephalopathy  7. Acute kidney injury  (resolved)  8. Aspiration pneumonia  9. UTI  10. Small pericardial effusion  11. Hematuria (resolved)  12. 2.8 X 2.8 cm AAA  13. Small bilateral pleural effusions    Plan of care:  1. Continue with Precedex and wean down to assess for response to commands. Patient tracks and opens eyes but does not follow commands. 2.  Continue with thiamine and folic acid  3. S/P colostomy and PEG tube insertion on 2021. 4.  Resume Lovenox 1 mg/kg twice a day for atrial fibrillation until PICC line insertion is completed. Patient will be switched to Xarelto after PICC line insertion. 5.  Continue tube feedings. 6. EEG ruled out seizures and MRI to evaluate for encephalopathy. 7.  MRI ruled out any intracranial abnormalities. .  8.  GI prophylaxis  9. Off ceftriaxone  10. LTAC basement  11. DC CVC. History of Present Illness:   Patient is a 71-year-old with history of hypertension, CAD, COPD, and alcohol abuse. Patient presented to the ED on 10/12/2021 after developing hallucinations and shortness of breath. Patient apparently called police after seeing his  wife and 2 children walking in his house. He apparently attempted to spray them with bug spray before calling police. Patient has a history of alcohol abuse and drinks 1/5 of them being daily. According to ED physician's note the patient's last drink was on Saturday. The patient is currently intubated, sedated, and unable to provide any history. Daily Progress:  2021: Patient remains sedated intubated and mechanically ventilated. He had an uneventful night.   He is currently on a Cardizem drip for atrial fibrillation with RVR. He has no fever, chills, or rigors. October 14, 2021: Patient remained sedated intubated and mechanically ventilated. He had atrial fibrillation with RVR. Patient failed awakening trial today. He was agitated and confused. He has no fever, chills, rigors. October 15, 2021: Patient remains sedated intubated and mechanically ventilated. He gets easily agitated and combative off sedation. He failed awakening trial.  He has no fever, chills, or rigors. Heart rate is controlled. He developed hematuria overnight and Eliquis was put on hold. October 16, 2021: Patient had repetitive episodes of vomiting. Multiple attempts to place an OGT probably failed. CT scan of the abdomen and pelvis was obtained. There was no evidence of hiatal hernia. Patient has no fever, chills, rigors. He becomes agitated and combative off sedation. He has no fever, chills, rigors. He is currently on 75% FiO2. October 17, 2021: Patient remains sedated intubated and mechanically ventilated. We will attempt sedation vacation and spontaneous breathing trials today if tolerated. He remains n.p.o. since he has no NG tube. NG tube could not be advanced. He remained hemodynamically stable. He has no fever, chills, rigors. Sugar is mostly controlled. October 25, 2021: Patient remains lethargic and poorly responsive. She opens eyes but does not follow commands. He has no fever, chills, rigors. White blood cells count remained stable. Continues to tolerate tube feeding. He remained hemodynamically stable. October 26, 2021: Patient is awake and tracks of sedation but does not follow command. He has no fever, chills, or rigors. He is extremely weak and likely to decompensate if extubated. Tracheostomy would be beneficial in the long-term. He has no fever, chills, or rigors.     October 27, 2021: Patient is more awake and interactive however he remains delirious and confused. He has no fever, chills, or rigors. His oxygen requirement has decreased. He is currently on 40% FiO2. Had an extensive discussion with the patient's son and daughter about benefits, risks, and alternatives for tracheostomy and PEG tube. They are both in agreement to proceed with tracheostomy and PEG tube. Patient's heart rate remained stable off Cardizem. October 28, 2021: Patient remains clinically the same. His oxygen requirement has been stable. He is awake but does not follow commands. He is severely deconditioned. He has no fever, chills, rigors. He had an uneventful night. He is scheduled for tracheostomy and PEG tube placement. Sodium is borderline elevated. Patient was started on free water flushes. October 29, 2021: Patient's continues to improve neurologically. He is more awake and responsive. He remains extremely weak. He has no fever, chills, rigors. Sodium level has improved. He has an uneventful night. October 30, 2021: Patient underwent tracheostomy and PEG tube placement. He is currently on tube feeding and tolerating it well. He opens eyes tracks around but follows commands occasionally. He had an uneventful night. He has no fever, chills, rigors. Past Medical History:  Past Medical History:   Diagnosis Date    Alcohol abuse     Atrial fibrillation ([address]     CAD (coronary artery disease)     [facility] obstructive pulmonary disease) ([facility])     Depression     Fracture of unspecified phalanx of left middle finger, initial encounter for closed fracture     GERD (gastroesophageal reflux disease)     Hypertension     Seizure ([address]         Family History:   No family history on file. Allergies:         Patient has no known allergies.     Social history:  Social History     Socioeconomic History    Marital status:      Spouse name: Not on file    Number of children: Not on file    Years of education: Not on file    Highest education level: Not on file   Occupational History    Not on file   Tobacco Use    Smoking status: Current Every Day Smoker     Packs/day: 1.00     Years: 45.00     Pack years: 45.00     Types: Cigars    Smokeless tobacco: Never Used    Tobacco comment: SMOKES CIGARS    Vaping Use    Vaping Use: Never used   Substance and Sexual Activity    Alcohol use: Yes     Comment: occassiona    Drug use: No    Sexual activity: Not on file   Other Topics Concern    Not on file   Social History Narrative    Not on file     Social Determinants of Health     Financial Resource Strain:     Difficulty of Paying Living Expenses:    Food Insecurity:     Worried About Running Out of Food in the Last Year:     Ran Out of Food in the Last Year:    Transportation Needs:     Lack of Transportation (Medical):      Lack of Transportation (Non-Medical):    Physical Activity:     Days of Exercise per Week:     Minutes of Exercise per Session:    Stress:     Feeling of Stress :    Social Connections:     Frequency of Communication with Friends and Family:     Frequency of Social Gatherings with Friends and Family:     Attends Jewish Services:     Active Member of Clubs or Organizations:     Attends Club or Organization Meetings:     Marital Status:    Intimate Partner Violence:     Fear of Current or Ex-Partner:     Emotionally Abused:     Physically Abused:     Sexually Abused:        Current Medications:     Current Facility-Administered Medications:     folic acid (FOLVITE) tablet 1 mg, 1 mg, Oral, Daily, Odalis Gardner MD, 1 mg at 10/30/21 8696    levETIRAcetam (KEPPRA) 500 mg/100 mL IVPB, 500 mg, IntraVENous, Q12H, Beni Cline MD, Stopped at 10/30/21 1043    heparin flush 100 UNIT/ML injection 300 Units, 3 mL, IntraVENous, 2 times per day, Odalis Gardner MD, 300 Units at 10/28/21 2025    heparin flush 100 UNIT/ML injection 300 Units, 3 mL, IntraCATHeter, PRNLibrado Mamie Conte MD    thiamine mononitrate tablet 100 mg, 100 mg, Oral, Daily, Tanner Younger MD, 100 mg at 10/30/21 0373    sodium chloride flush 0.9 % injection 5-40 mL, 5-40 mL, IntraVENous, 2 times per day, Tanner Younger MD, 10 mL at 10/30/21 0940    sodium chloride flush 0.9 % injection 5-40 mL, 5-40 mL, IntraVENous, PRN, Tanner Younger MD, 10 mL at 10/26/21 2219    0.9 % sodium chloride infusion, 25 mL, IntraVENous, PRN, Tanner Younger MD    heparin flush 100 UNIT/ML injection 300 Units, 3 mL, IntraVENous, 2 times per day, Tanner Younger MD, 300 Units at 10/30/21 0939    heparin flush 100 UNIT/ML injection 300 Units, 3 mL, IntraCATHeter, PRN, Tanner Younger MD    lactulose (CHRONULAC) 10 GM/15ML solution 20 g, 20 g, Oral, BID, Issac Curiel MD, 20 g at 10/30/21 0790    sertraline (ZOLOFT) tablet 100 mg, 100 mg, Oral, Daily, Rosalind Kohli MD, 100 mg at 10/30/21 0938    dexmedetomidine (PRECEDEX) 400 mcg in sodium chloride 0.9 % 100 mL infusion, 0.2-1.4 mcg/kg/hr, IntraVENous, Continuous, Dmitriy Newell MD, Stopped at 10/30/21 0948    enoxaparin (LOVENOX) injection 120 mg, 1 mg/kg, SubCUTAneous, BID, Tanner Younger MD, 120 mg at 10/30/21 0938    0.9 % sodium chloride infusion, 25 mL, IntraVENous, Q8H, Tanner Younger MD, Stopped at 10/30/21 0026    perflutren lipid microspheres (DEFINITY) injection 1.65 mg, 1.5 mL, IntraVENous, ONCE PRN, Tanner Younger MD    pantoprazole (PROTONIX) injection 40 mg, 40 mg, IntraVENous, BID, 40 mg at 10/30/21 0938 **AND** sodium chloride (PF) 0.9 % injection 10 mL, 10 mL, IntraVENous, BID, Artem Mcleod, APRN - CNP, 10 mL at 10/30/21 0942    ondansetron (ZOFRAN-ODT) disintegrating tablet 4 mg, 4 mg, Oral, Q8H PRN **OR** ondansetron (ZOFRAN) injection 4 mg, 4 mg, IntraVENous, Q6H PRN, Gail Abdul MD    polyethylene glycol [facility]) packet 17 g, 17 g, Oral, Daily PRN, Valerie Martinez MD    acetaminophen (TYLENOL) tablet 650 mg, 650 mg, Oral, Q6H PRN, 650 mg at 10/25/21 0120 **OR** acetaminophen (TYLENOL) suppository 650 mg, 650 mg, Rectal, Q6H PRN, Valerie Martinez MD, 650 mg at 10/19/21 2020    ipratropium-albuterol (DUONEB) nebulizer solution 1 ampule, 1 ampule, Inhalation, Q4H, Saúl Marino APRN - CNP, 1 ampule at 10/30/21 4540    Review of Systems:   Unable to obtain due to medical condition    Physical Exam:   Vital Signs:  /84   Pulse 121   Temp 99.2 °F (37.3 °C) (Oral)   Resp 23   Ht 5' 11\" (1.803 m)   Wt 256 lb (116.1 kg)   SpO2 94%   BMI 35.70 kg/m²     Input/Output: In: 1099.5 [I.V.:739.5; NG/GT:260]  Out: 1535     Oxygen requirements: MV    Ventilator Information:  Vent Information  $Ventilation: $Subsequent Day  Skin Assessment: Other (see comment/note) (Bloody, surgical)  Equipment ID:  [id number]  Equipment Changed: Suction catheter  Vent Type: 980  Vent Mode: AC/VC  Vt Ordered: 500 mL  Rate Set: 18 bmp  Peak Flow: 70 L/min  Pressure Support: 0 cmH20  FiO2 : 30 %  SpO2: 94 %  SpO2/FiO2 ratio: 313.33  Sensitivity: 4  PEEP/CPAP: 5  I Time/ I Time %: 0 s  Humidification Source: Heated wire  Humidification Temp: 37  Humidification Temp Measured: 37  Circuit Condensation: Drained    General appearance: , not in pain or distress, in no respiratory distress    HEENT: +ve tracheostomy  Neck: Supple, no jugular venous distension, lymphadenopathy, thyromegaly or carotid bruits  Chest: Decreased breath sounds, no wheezing, no crackles and no tenderness over ribs   Cardiovascular: Normal S1 , S2, regular rate and rhythm, no murmur, rub or gallop  Abdomen: Normal sounds present, soft, lax with no tenderness, no hepatosplenomegaly, and no masses , +ve PEG  Extremities: No edema. Pulses are equally present. Skin: intact, no rashes   Neurologic: Sedated and mechanically ventilated but opens eyes off sedation.   Patient tracks but does not follow commands. Investigations:  Labs, radiological imaging and cardiac work up were reviewed        ICU STAFF PHYSICIAN NOTE OF PERSONAL INVOLVEMENT IN CARE  As the attending physician, I certify that I personally reviewed the patient's history and personally examined the patient to confirm the physical findings described above, and that I reviewed the relevant imaging studies and available reports. I also discussed the differential diagnosis and all of the proposed management plans with the patient and individuals accompanying the patient to this visit. They had the opportunity to ask questions about the proposed management plans and to have those questions answered. This patient has a high probability of sudden, clinically significant deterioration, which requires the highest level of physician preparedness to intervene urgently. I managed/supervised life or organ supporting interventions that required frequent physician assessment. I devoted my full attention to the direct care of this patient for the amount of time indicated below. Time I spent with family or surrogate(s) is included only if the patient was incapable of providing the necessary information or participating in medical decision-making. Time devoted to teaching and to any procedures I billed separately is not included.   Critical Care Time: 35 minutes      Electronically signed by Merline Pickles, MD on 10/30/2021 at 12:29 PM

## 2021-10-30 NOTE — PLAN OF CARE
Not Met This Shift     Problem: Airway Clearance - Ineffective:  Goal: Ability to maintain a clear airway will improve  Description: Ability to maintain a clear airway will improve  10/30/2021 0940 by Tyler Hardy RN  Outcome: Not Met This Shift     Problem: Pain:  Goal: Recognizes and communicates pain  Description: Recognizes and communicates pain  10/30/2021 0940 by Tyler Hardy RN  Outcome: Not Met This Shift     Problem: Respiratory Function - Compromised:  Goal: Ability to maintain a clear airway will improve  Description: Ability to maintain a clear airway will improve  10/30/2021 0940 by Tyler Hardy RN  Outcome: Not Met This Shift

## 2021-10-30 NOTE — PROGRESS NOTES
General Surgery Progress Note  [facility]    Patient's Name/Date of Birth: Lauren Raygoza / 1955    Date: October 30, 2021     Surgeon: Sb Thompson MD    Chief Complaint: Respiratory failure, dysphagia    Patient Active Problem List   Diagnosis    Essential hypertension    Coronary artery disease involving coronary bypass graft of native heart without angina pectoris    Atypical chest pain    Pure hypercholesterolemia    Pericardial effusion    Chronic obstructive pulmonary disease ([address]    Moderate obesity    Dislocation of finger    Closed dislocation of left middle finger    Seizures ([address]    Withdrawal symptoms, alcohol, with delirium ([address]    Altered mental status       Subjective: Status post trach and PEG, awake and alert today, tolerating tube feeds, no bleeding around tracheostomy site    Objective:  /83   Pulse 100   Temp 98.7 °F (37.1 °C) (Oral)   Resp 25   Ht 5' 11\" (1.803 m)   Wt 256 lb (116.1 kg)   SpO2 94%   BMI 35.70 kg/m²   Labs:  Recent Labs     10/28/21  0551 10/29/21  0532 10/30/21  0416   WBC 9.5 10.5 12.9*   HGB 10.8* 10.8* 10.8*   HCT 33.1* 33.2* 32.3*     Lab Results   Component Value Date    CREATININE 0.7 10/30/2021    BUN 26 (H) 10/30/2021     10/30/2021    K 4.1 10/30/2021     (H) 10/30/2021    CO2 21 (L) 10/30/2021     No results for input(s): LIPASE, AMYLASE in the last 72 hours.   CBC:   Lab Results   Component Value Date    WBC 12.9 10/30/2021    RBC 3.39 10/30/2021    HGB 10.8 10/30/2021    HCT 32.3 10/30/2021    MCV 95.3 10/30/2021    MCH 31.9 10/30/2021    MCHC 33.4 10/30/2021    RDW 15.4 10/30/2021     10/30/2021    MPV 9.0 10/30/2021     CMP:    Lab Results   Component Value Date     10/30/2021    K 4.1 10/30/2021    K 4.3 10/12/2021     10/30/2021    CO2 21 10/30/2021    BUN 26 10/30/2021    CREATININE 0.7 10/30/2021    GFRAA >60 10/30/2021    LABGLOM >60 10/30/2021    GLUCOSE 116 10/30/2021    PROT 5.9 10/24/2021    LABALBU 2.7 10/24/2021    CALCIUM 8.8 10/30/2021    BILITOT 0.5 10/24/2021    ALKPHOS 62 10/24/2021    AST 73 10/24/2021    ALT 61 10/24/2021       General appearance:  NAD  Head: NCAT, PERRLA, EOMI, red conjunctiva  Neck: Tracheostomy in place, hemostatic  Lungs: Mechanically ventilated, equal chest rise bilateral  Heart: Reg rate  Abdomen: soft, nondistended, tender appropriately, PEG tubes site clean dressing.   PEG at 3.5 cm at the abdominal wall, bumper loosely rotated  Skin; no lesions  Gu: no cva tenderness  Extremities: extremities normal, atraumatic, no cyanosis or edema      Assessment/Plan:  Wolfgang Saez is a 73 y.o. male postop day 1 tracheostomy and PEG    Tube feeds as tolerated  No issues with tracheostomy at this time  We will follow as needed    Leda Jane MD  10/30/2021  10:03 AM

## 2021-10-31 LAB
ANION GAP SERPL CALCULATED.3IONS-SCNC: 10 MMOL/L (ref 7–16)
BUN BLDV-MCNC: 29 MG/DL (ref 6–23)
CALCIUM SERPL-MCNC: 8.5 MG/DL (ref 8.6–10.2)
CHLORIDE BLD-SCNC: 108 MMOL/L (ref 98–107)
CO2: 22 MMOL/L (ref 22–29)
CREAT SERPL-MCNC: 0.6 MG/DL (ref 0.7–1.2)
GFR AFRICAN AMERICAN: >60
GFR NON-AFRICAN AMERICAN: >60 ML/MIN/1.73
GLUCOSE BLD-MCNC: 130 MG/DL (ref 74–99)
HCT VFR BLD CALC: 30.9 % (ref 37–54)
HEMOGLOBIN: 10.1 G/DL (ref 12.5–16.5)
MAGNESIUM: 1.8 MG/DL (ref 1.6–2.6)
MCH RBC QN AUTO: 31.2 PG (ref 26–35)
MCHC RBC AUTO-ENTMCNC: 32.7 % (ref 32–34.5)
MCV RBC AUTO: 95.4 FL (ref 80–99.9)
PDW BLD-RTO: 15.6 FL (ref 11.5–15)
PHOSPHORUS: 4.3 MG/DL (ref 2.5–4.5)
PLATELET # BLD: 367 E9/L (ref 130–450)
PMV BLD AUTO: 9.5 FL (ref 7–12)
POTASSIUM SERPL-SCNC: 4.1 MMOL/L (ref 3.5–5)
RBC # BLD: 3.24 E12/L (ref 3.8–5.8)
SODIUM BLD-SCNC: 140 MMOL/L (ref 132–146)
TRIGL SERPL-MCNC: 79 MG/DL (ref 0–149)
WBC # BLD: 11.3 E9/L (ref 4.5–11.5)

## 2021-10-31 PROCEDURE — 83735 ASSAY OF MAGNESIUM: CPT

## 2021-10-31 PROCEDURE — 2580000003 HC RX 258: Performed by: INTERNAL MEDICINE

## 2021-10-31 PROCEDURE — 6370000000 HC RX 637 (ALT 250 FOR IP): Performed by: NURSE PRACTITIONER

## 2021-10-31 PROCEDURE — 6370000000 HC RX 637 (ALT 250 FOR IP): Performed by: INTERNAL MEDICINE

## 2021-10-31 PROCEDURE — 36592 COLLECT BLOOD FROM PICC: CPT

## 2021-10-31 PROCEDURE — 84478 ASSAY OF TRIGLYCERIDES: CPT

## 2021-10-31 PROCEDURE — 2500000003 HC RX 250 WO HCPCS

## 2021-10-31 PROCEDURE — 2500000003 HC RX 250 WO HCPCS: Performed by: NURSE PRACTITIONER

## 2021-10-31 PROCEDURE — 80048 BASIC METABOLIC PNL TOTAL CA: CPT

## 2021-10-31 PROCEDURE — 85027 COMPLETE CBC AUTOMATED: CPT

## 2021-10-31 PROCEDURE — 94003 VENT MGMT INPAT SUBQ DAY: CPT

## 2021-10-31 PROCEDURE — 99232 SBSQ HOSP IP/OBS MODERATE 35: CPT | Performed by: INTERNAL MEDICINE

## 2021-10-31 PROCEDURE — 2000000000 HC ICU R&B

## 2021-10-31 PROCEDURE — 2500000003 HC RX 250 WO HCPCS: Performed by: INTERNAL MEDICINE

## 2021-10-31 PROCEDURE — 94640 AIRWAY INHALATION TREATMENT: CPT

## 2021-10-31 PROCEDURE — 6360000002 HC RX W HCPCS

## 2021-10-31 PROCEDURE — 84100 ASSAY OF PHOSPHORUS: CPT

## 2021-10-31 RX ORDER — METOPROLOL TARTRATE 5 MG/5ML
5 INJECTION INTRAVENOUS ONCE
Status: COMPLETED | OUTPATIENT
Start: 2021-10-31 | End: 2021-10-31

## 2021-10-31 RX ORDER — DILTIAZEM HYDROCHLORIDE 5 MG/ML
25 INJECTION INTRAVENOUS ONCE
Status: DISCONTINUED | OUTPATIENT
Start: 2021-10-31 | End: 2021-10-31

## 2021-10-31 RX ORDER — LORAZEPAM 2 MG/ML
1 INJECTION INTRAMUSCULAR ONCE
Status: COMPLETED | OUTPATIENT
Start: 2021-10-31 | End: 2021-10-31

## 2021-10-31 RX ORDER — METOPROLOL TARTRATE 5 MG/5ML
INJECTION INTRAVENOUS
Status: COMPLETED
Start: 2021-10-31 | End: 2021-10-31

## 2021-10-31 RX ORDER — DILTIAZEM HYDROCHLORIDE 5 MG/ML
20 INJECTION INTRAVENOUS ONCE
Status: COMPLETED | OUTPATIENT
Start: 2021-10-31 | End: 2021-10-31

## 2021-10-31 RX ORDER — LORAZEPAM 2 MG/ML
INJECTION INTRAMUSCULAR
Status: COMPLETED
Start: 2021-10-31 | End: 2021-10-31

## 2021-10-31 RX ADMIN — IPRATROPIUM BROMIDE AND ALBUTEROL SULFATE 1 AMPULE: .5; 2.5 SOLUTION RESPIRATORY (INHALATION) at 14:05

## 2021-10-31 RX ADMIN — IPRATROPIUM BROMIDE AND ALBUTEROL SULFATE 1 AMPULE: .5; 2.5 SOLUTION RESPIRATORY (INHALATION) at 10:13

## 2021-10-31 RX ADMIN — Medication 10 ML: at 21:27

## 2021-10-31 RX ADMIN — Medication 10 ML: at 11:30

## 2021-10-31 RX ADMIN — METOPROLOL TARTRATE 5 MG: 5 INJECTION INTRAVENOUS at 21:37

## 2021-10-31 RX ADMIN — METOPROLOL TARTRATE 5 MG: 1 INJECTION, SOLUTION INTRAVENOUS at 21:37

## 2021-10-31 RX ADMIN — FOLIC ACID 1 MG: 1 TABLET ORAL at 11:29

## 2021-10-31 RX ADMIN — DILTIAZEM HYDROCHLORIDE 90 MG: 30 TABLET, FILM COATED ORAL at 17:29

## 2021-10-31 RX ADMIN — IPRATROPIUM BROMIDE AND ALBUTEROL SULFATE 1 AMPULE: .5; 2.5 SOLUTION RESPIRATORY (INHALATION) at 06:20

## 2021-10-31 RX ADMIN — Medication 0.2 MCG/KG/HR: at 23:38

## 2021-10-31 RX ADMIN — METOPROLOL TARTRATE 5 MG: 1 INJECTION, SOLUTION INTRAVENOUS at 19:38

## 2021-10-31 RX ADMIN — SERTRALINE HYDROCHLORIDE 100 MG: 50 TABLET ORAL at 11:29

## 2021-10-31 RX ADMIN — IPRATROPIUM BROMIDE AND ALBUTEROL SULFATE 1 AMPULE: .5; 2.5 SOLUTION RESPIRATORY (INHALATION) at 01:51

## 2021-10-31 RX ADMIN — THIAMINE HCL TAB 100 MG 100 MG: 100 TAB at 11:30

## 2021-10-31 RX ADMIN — IPRATROPIUM BROMIDE AND ALBUTEROL SULFATE 1 AMPULE: .5; 2.5 SOLUTION RESPIRATORY (INHALATION) at 18:12

## 2021-10-31 RX ADMIN — LORAZEPAM 1 MG: 2 INJECTION INTRAMUSCULAR at 21:06

## 2021-10-31 RX ADMIN — FAMOTIDINE 40 MG: 20 TABLET, FILM COATED ORAL at 21:26

## 2021-10-31 RX ADMIN — LORAZEPAM 1 MG: 2 INJECTION INTRAMUSCULAR; INTRAVENOUS at 21:06

## 2021-10-31 RX ADMIN — LEVETIRACETAM 500 MG: 500 TABLET ORAL at 11:29

## 2021-10-31 RX ADMIN — Medication 0.7 MCG/KG/HR: at 04:04

## 2021-10-31 RX ADMIN — METOPROLOL TARTRATE 5 MG: 5 INJECTION INTRAVENOUS at 19:38

## 2021-10-31 RX ADMIN — DILTIAZEM HYDROCHLORIDE 60 MG: 30 TABLET, FILM COATED ORAL at 13:43

## 2021-10-31 RX ADMIN — RIVAROXABAN 20 MG: 20 TABLET, FILM COATED ORAL at 17:29

## 2021-10-31 RX ADMIN — IPRATROPIUM BROMIDE AND ALBUTEROL SULFATE 1 AMPULE: .5; 2.5 SOLUTION RESPIRATORY (INHALATION) at 21:58

## 2021-10-31 RX ADMIN — LEVETIRACETAM 500 MG: 500 TABLET ORAL at 21:26

## 2021-10-31 RX ADMIN — DILTIAZEM HYDROCHLORIDE 20 MG: 5 INJECTION INTRAVENOUS at 22:58

## 2021-10-31 RX ADMIN — FAMOTIDINE 40 MG: 20 TABLET, FILM COATED ORAL at 11:29

## 2021-10-31 ASSESSMENT — PULMONARY FUNCTION TESTS
PIF_VALUE: 25
PIF_VALUE: 20
PIF_VALUE: 19
PIF_VALUE: 23
PIF_VALUE: 20
PIF_VALUE: 19
PIF_VALUE: 38
PIF_VALUE: 21
PIF_VALUE: 17
PIF_VALUE: 16
PIF_VALUE: 26
PIF_VALUE: 29

## 2021-10-31 NOTE — PROGRESS NOTES
g, 17 g, Oral, Daily PRN  acetaminophen (TYLENOL) tablet 650 mg, 650 mg, Oral, Q6H PRN **OR** acetaminophen (TYLENOL) suppository 650 mg, 650 mg, Rectal, Q6H PRN  ipratropium-albuterol (DUONEB) nebulizer solution 1 ampule, 1 ampule, Inhalation, Q4H  Physical    VITALS:  /74   Pulse 75   Temp 99 °F (37.2 °C) (Axillary)   Resp 18   Ht 5' 11\" (1.803 m)   Wt 256 lb (116.1 kg)   SpO2 94%   BMI 35.70 kg/m²   Patient is sedated and ventilated  Normocephalic, without obvious abnormality, atraumatic, external ears without lesions,   Neck  cervical lymphadenopathy with limited exam for motion due to ET tube  Heart has a regular rate and rhythm no murmur  Lungs are clear to auscultation bilaterally with equal movements with the additional sounds of transmitted airway sounds from the ventilator. Abdomen is soft nontender nondistended no rebound or guarding. No  edema good peripheral perfusion. No significant rashes or new skin lesions. Affect and neuro exam limited since he is sedated on the ventilator  10/25 in response to my verbal stimuli he glances over me and then looks away  10/26 he repeats this activity several times.   With me this seems to be more glancing than that tracking  10/27: Reportedly more arousable however when I see him he is not  10/29: Truly tracking me with eyes across his midline  10/30 now has trach and PEG  10/31 probably tracking    Data    CBC:   Lab Results   Component Value Date    WBC 11.3 10/31/2021    RBC 3.24 10/31/2021    HGB 10.1 10/31/2021    HCT 30.9 10/31/2021    MCV 95.4 10/31/2021    MCH 31.2 10/31/2021    MCHC 32.7 10/31/2021    RDW 15.6 10/31/2021     10/31/2021    MPV 9.5 10/31/2021     BMP:    Lab Results   Component Value Date     10/31/2021    K 4.1 10/31/2021    K 4.3 10/12/2021     10/31/2021    CO2 22 10/31/2021    BUN 29 10/31/2021    LABALBU 2.7 10/24/2021    CREATININE 0.6 10/31/2021    CALCIUM 8.5 10/31/2021    GFRAA >60 10/31/2021 cm abdominal aortic aneurysm. 7. Full code. 8.  DVT prophylaxis with Xarelto   9. Disposition could consider LTAC soon.

## 2021-10-31 NOTE — PLAN OF CARE
Problem: Non-Violent Restraints  Goal: Removal from restraints as soon as assessed to be safe  10/31/2021 0054 by Jassi Parker RN  Outcome: Not Met This Shift     Problem: Non-Violent Restraints  Goal: No harm/injury to patient while restraints in use  10/31/2021 0054 by Jassi Parker RN  Outcome: Met This Shift     Problem: Non-Violent Restraints  Goal: Patient's dignity will be maintained  10/31/2021 0054 by Jassi Parker RN  Outcome: Met This Shift

## 2021-10-31 NOTE — PROGRESS NOTES
a Cardizem drip for atrial fibrillation with RVR. He has no fever, chills, or rigors. October 14, 2021: Patient remained sedated intubated and mechanically ventilated. He had atrial fibrillation with RVR. Patient failed awakening trial today. He was agitated and confused. He has no fever, chills, rigors. October 15, 2021: Patient remains sedated intubated and mechanically ventilated. He gets easily agitated and combative off sedation. He failed awakening trial.  He has no fever, chills, or rigors. Heart rate is controlled. He developed hematuria overnight and Eliquis was put on hold. October 16, 2021: Patient had repetitive episodes of vomiting. Multiple attempts to place an OGT probably failed. CT scan of the abdomen and pelvis was obtained. There was no evidence of hiatal hernia. Patient has no fever, chills, rigors. He becomes agitated and combative off sedation. He has no fever, chills, rigors. He is currently on 75% FiO2. October 17, 2021: Patient remains sedated intubated and mechanically ventilated. We will attempt sedation vacation and spontaneous breathing trials today if tolerated. He remains n.p.o. since he has no NG tube. NG tube could not be advanced. He remained hemodynamically stable. He has no fever, chills, rigors. Sugar is mostly controlled. October 25, 2021: Patient remains lethargic and poorly responsive. She opens eyes but does not follow commands. He has no fever, chills, rigors. White blood cells count remained stable. Continues to tolerate tube feeding. He remained hemodynamically stable. October 26, 2021: Patient is awake and tracks of sedation but does not follow command. He has no fever, chills, or rigors. He is extremely weak and likely to decompensate if extubated. Tracheostomy would be beneficial in the long-term. He has no fever, chills, or rigors.     October 27, 2021: Patient is more awake and interactive however he remains delirious and confused. He has no fever, chills, or rigors. His oxygen requirement has decreased. He is currently on 40% FiO2. Had an extensive discussion with the patient's son and daughter about benefits, risks, and alternatives for tracheostomy and PEG tube. They are both in agreement to proceed with tracheostomy and PEG tube. Patient's heart rate remained stable off Cardizem. October 28, 2021: Patient remains clinically the same. His oxygen requirement has been stable. He is awake but does not follow commands. He is severely deconditioned. He has no fever, chills, rigors. He had an uneventful night. He is scheduled for tracheostomy and PEG tube placement. Sodium is borderline elevated. Patient was started on free water flushes. October 29, 2021: Patient's continues to improve neurologically. He is more awake and responsive. He remains extremely weak. He has no fever, chills, rigors. Sodium level has improved. He has an uneventful night. October 30, 2021: Patient underwent tracheostomy and PEG tube placement. He is currently on tube feeding and tolerating it well. He opens eyes tracks around but follows commands occasionally. He had an uneventful night. He has no fever, chills, rigors. October 31, 2021: Patient has been hemodynamically stable however he was started on Cardizem for atrial fibrillation with RVR. He is currently tolerating tube feeding. He is running low-grade fever. He has been lethargic however he wakes up and occasionally follows commands off sedation. Blood sugar is controlled. Oxygen requirement remained stable. He is currently on tidal volume 500, rate 18, FiO2 30%, and PEEP of 5. Cardizem infusion has been weaned down slowly and currently on 2.5 mg an hour.     Past Medical History:  Past Medical History:   Diagnosis Date    Alcohol abuse     Atrial fibrillation ([facility] [address]     CAD (coronary artery disease)     [facility]  Depression     Fracture of unspecified phalanx of left middle finger, initial encounter for closed fracture     GERD (gastroesophageal reflux disease)     Hypertension     Seizure ([facility] [address]         Family History:   No family history on file. Allergies:         Patient has no known allergies. Social history:  Social History     Socioeconomic History    Marital status:      Spouse name: Not on file    Number of children: Not on file    Years of education: Not on file    Highest education level: Not on file   Occupational History    Not on file   Tobacco Use    Smoking status: Current Every Day Smoker     Packs/day: 1.00     Years: 45.00     Pack years: 45.00     Types: Cigars    Smokeless tobacco: Never Used    Tobacco comment: SMOKES CIGARS    Vaping Use    Vaping Use: Never used   Substance and Sexual Activity    Alcohol use: Yes     Comment: occassiona    Drug use: No    Sexual activity: Not on file   Other Topics Concern    Not on file   Social History Narrative    Not on file     Social Determinants of Health     Financial Resource Strain:     Difficulty of Paying Living Expenses:    Food Insecurity:     Worried About Running Out of Food in the Last Year:     Ran Out of Food in the Last Year:    Transportation Needs:     Lack of Transportation (Medical):      Lack of Transportation (Non-Medical):    Physical Activity:     Days of Exercise per Week:     Minutes of Exercise per Session:    Stress:     Feeling of Stress :    Social Connections:     Frequency of Communication with Friends and Family:     Frequency of Social Gatherings with Friends and Family:     Attends [employer] witness Services:     Active Member of Clubs or Organizations:     Attends Club or Organization Meetings:     Marital Status:    Intimate Partner Violence:     Fear of Current or Ex-Partner:     Emotionally Abused:     Physically Abused:     Sexually Abused:        Current Medications:     Current Facility-Administered Medications:     levETIRAcetam (KEPPRA) tablet 500 mg, 500 mg, Oral, BID, Travis Chaudhary MD, 500 mg at 10/31/21 1129    famotidine (PEPCID) tablet 40 mg, 40 mg, Oral, BID, Brenda Roach MD, 40 mg at 10/31/21 1129    rivaroxaban (XARELTO) tablet 20 mg, 20 mg, Oral, Daily, Brenda Roach MD, 20 mg at 10/30/21 1819    dilTIAZem 125 mg in dextrose 5 % 125 mL infusion, 5-15 mg/hr, IntraVENous, Continuous, Brenda Roach MD, Last Rate: 2.5 mL/hr at 10/31/21 0031, 2.5 mg/hr at 74/67/06 4077    folic acid (FOLVITE) tablet 1 mg, 1 mg, Oral, Daily, Brenda Roach MD, 1 mg at 10/31/21 1129    heparin flush 100 UNIT/ML injection 300 Units, 3 mL, IntraVENous, 2 times per day, Brenda Roach MD, 300 Units at 10/28/21 2025    heparin flush 100 UNIT/ML injection 300 Units, 3 mL, IntraCATHeter, PRN, Brenda Roach MD    thiamine mononitrate tablet 100 mg, 100 mg, Oral, Daily, Brenda Roach MD, 100 mg at 10/31/21 1130    sodium chloride flush 0.9 % injection 5-40 mL, 5-40 mL, IntraVENous, 2 times per day, Brenda Roach MD, 10 mL at 10/31/21 1130    sodium chloride flush 0.9 % injection 5-40 mL, 5-40 mL, IntraVENous, PRN, Bredna Roach MD, 10 mL at 10/26/21 2219    0.9 % sodium chloride infusion, 25 mL, IntraVENous, PRN, Brenda Roach MD    heparin flush 100 UNIT/ML injection 300 Units, 3 mL, IntraVENous, 2 times per day, Brenda Roach MD, 300 Units at 10/30/21 0939    heparin flush 100 UNIT/ML injection 300 Units, 3 mL, IntraCATHeter, PRN, Brenda Roach MD    [facility]) 10 GM/15ML solution 20 g, 20 g, Oral, BID, Brenda Roach MD, 20 g at 10/30/21 2031    sertraline (ZOLOFT) tablet 100 mg, 100 mg, Oral, Daily, Dmitriy Newell MD, 100 mg at 10/31/21 1129    dexmedetomidine (PRECEDEX) 400 mcg in sodium HEENT: +ve tracheostomy  Neck: Supple, no jugular venous distension, lymphadenopathy, thyromegaly or carotid bruits  Chest: Decreased breath sounds, no wheezing, no crackles and no tenderness over ribs   Cardiovascular: Normal S1 , S2, regular rate and rhythm, no murmur, rub or gallop  Abdomen: Normal sounds present, soft, lax with no tenderness, no hepatosplenomegaly, and no masses , +ve PEG  Extremities: No edema. Pulses are equally present. Skin: intact, no rashes   Neurologic: Sedated and mechanically ventilated but opens eyes off sedation. Patient tracks but does not follow commands. Investigations:  Labs, radiological imaging and cardiac work up were reviewed        ICU STAFF PHYSICIAN NOTE OF PERSONAL INVOLVEMENT IN CARE  As the attending physician, I certify that I personally reviewed the patient's history and personally examined the patient to confirm the physical findings described above, and that I reviewed the relevant imaging studies and available reports. I also discussed the differential diagnosis and all of the proposed management plans with the patient and individuals accompanying the patient to this visit. They had the opportunity to ask questions about the proposed management plans and to have those questions answered. This patient has a high probability of sudden, clinically significant deterioration, which requires the highest level of physician preparedness to intervene urgently. I managed/supervised life or organ supporting interventions that required frequent physician assessment. I devoted my full attention to the direct care of this patient for the amount of time indicated below. Time I spent with family or surrogate(s) is included only if the patient was incapable of providing the necessary information or participating in medical decision-making. Time devoted to teaching and to any procedures I billed separately is not included.   Critical Care Time: 28

## 2021-10-31 NOTE — PLAN OF CARE
Problem: Skin Integrity:  Goal: Will show no infection signs and symptoms  Description: Will show no infection signs and symptoms  10/30/2021 2218 by Damon Dalton RN  Outcome: Met This Shift  10/30/2021 0940 by Fredy Castro RN  Outcome: Met This Shift  Goal: Absence of new skin breakdown  Description: Absence of new skin breakdown  10/30/2021 2218 by Damon Dalton RN  Outcome: Met This Shift  10/30/2021 0940 by Fredy Castro RN  Outcome: Met This Shift     Problem: Non-Violent Restraints  Goal: Removal from restraints as soon as assessed to be safe  10/30/2021 2218 by Damon Dalton RN  Outcome: Not Met This Shift  10/30/2021 0940 by Fredy Castro RN  Outcome: Not Met This Shift  Goal: No harm/injury to patient while restraints in use  10/30/2021 2218 by Damon Dalton RN  Outcome: Met This Shift  10/30/2021 0940 by Fredy Castro RN  Outcome: Met This Shift  Goal: Patient's dignity will be maintained  10/30/2021 2218 by Damon Dalton RN  Outcome: Met This Shift  10/30/2021 0940 by Fredy Castro RN  Outcome: Met This Shift     Problem: Falls - Risk of:  Goal: Will remain free from falls  Description: Will remain free from falls  10/30/2021 2218 by Damon Dalton RN  Outcome: Met This Shift  10/30/2021 0940 by Fredy Castro RN  Outcome: Met This Shift  Goal: Absence of physical injury  Description: Absence of physical injury  10/30/2021 2218 by Damon Dalton RN  Outcome: Met This Shift  10/30/2021 0940 by Fredy Castro RN  Outcome: Met This Shift     Problem: Airway Clearance - Ineffective:  Goal: Ability to maintain a clear airway will improve  Description: Ability to maintain a clear airway will improve  10/30/2021 2218 by Damon Dalton RN  Outcome: Met This Shift  10/30/2021 0940 by Fredy Castro RN  Outcome: Not Met This Shift     Problem: Pain:  Goal: Pain level will decrease  Description: Pain level will decrease  10/30/2021 2218 by Damon Dalton RN  Outcome: Met This Shift  10/30/2021 0940 by Lqauita Hughes RN  Outcome: Met This Shift  Goal: Recognizes and communicates pain  Description: Recognizes and communicates pain  10/30/2021 2218 by Antonina Mederos RN  Outcome: Met This Shift  10/30/2021 0940 by Laquita Hughes RN  Outcome: Not Met This Shift     Problem: Injury - Risk of, Healthcare-Acquired Condition:  Goal: Will remain free from falls  Description: Will remain free from falls  10/30/2021 2218 by Antonina Mederos RN  Outcome: Met This Shift  10/30/2021 0940 by Laquita Hughes RN  Outcome: Met This Shift  Goal: Absence of pressure ulcer  Description: Absence of pressure ulcer  10/30/2021 2218 by Antonina Mederos RN  Outcome: Met This Shift  10/30/2021 0940 by Laquita Hughes RN  Outcome: Met This Shift  Goal: Absence of urinary tract infection signs and symptoms  Description: Absence of urinary tract infection signs and symptoms  10/30/2021 2218 by Antonina Mederos RN  Outcome: Met This Shift  10/30/2021 0940 by Laquita Hughes RN  Outcome: Met This Shift     Problem: Nutrition Deficit - Risk of:  Goal: Ability to achieve adequate nutritional intake will improve  Description: Ability to achieve adequate nutritional intake will improve  Outcome: Met This Shift  Goal: Maintenance of adequate weight for body size and type will improve to within specified parameters  Description: Maintenance of adequate weight for body size and type will improve to within specified parameters  Outcome: Met This Shift     Problem: Aspiration - Risk of:  Goal: Absence of aspiration  Description: Absence of aspiration  Outcome: Met This Shift     Problem: Respiratory Function - Compromised:  Goal: Ability to maintain a clear airway will improve  Description: Ability to maintain a clear airway will improve  10/30/2021 2218 by Antonina Mederos RN  Outcome: Met This Shift  10/30/2021 0940 by Laquita Hughes RN  Outcome: Not Met This Shift  Goal: Absence of pulmonary infection  Description: Absence of pulmonary infection  Outcome: Met This Shift  Goal: Levels of oxygenation will improve  Description: Levels of oxygenation will improve  Outcome: Met This Shift  Goal: Increase in ventilator-free time  Description: Increase in ventilator-free time  Outcome: Not Met This Shift     Problem:  Bowel Function - Altered:  Goal: Bowel elimination is within specified parameters  Description: Bowel elimination is within specified parameters  Outcome: Met This Shift  Goal: Control of bowel function will improve  Description: Control of bowel function will improve  Outcome: Met This Shift  Goal: Ability to maintain normal bowel function will improve  Description: Ability to maintain normal bowel function will improve  Outcome: Met This Shift

## 2021-11-01 ENCOUNTER — APPOINTMENT (OUTPATIENT)
Dept: GENERAL RADIOLOGY | Age: 66
DRG: 004 | End: 2021-11-01
Payer: MEDICARE

## 2021-11-01 LAB
ANION GAP SERPL CALCULATED.3IONS-SCNC: 10 MMOL/L (ref 7–16)
B.E.: -2.6 MMOL/L (ref -3–3)
BUN BLDV-MCNC: 24 MG/DL (ref 6–23)
CALCIUM SERPL-MCNC: 8.7 MG/DL (ref 8.6–10.2)
CHLORIDE BLD-SCNC: 107 MMOL/L (ref 98–107)
CO2: 23 MMOL/L (ref 22–29)
COHB: 0.3 % (ref 0–1.5)
CREAT SERPL-MCNC: 0.6 MG/DL (ref 0.7–1.2)
CRITICAL: ABNORMAL
DATE ANALYZED: ABNORMAL
DATE OF COLLECTION: ABNORMAL
FIO2: 30 %
GFR AFRICAN AMERICAN: >60
GFR NON-AFRICAN AMERICAN: >60 ML/MIN/1.73
GLUCOSE BLD-MCNC: 128 MG/DL (ref 74–99)
HCO3: 20.2 MMOL/L (ref 22–26)
HCT VFR BLD CALC: 30.4 % (ref 37–54)
HEMOGLOBIN: 10.1 G/DL (ref 12.5–16.5)
HHB: 4.2 % (ref 0–5)
LAB: ABNORMAL
Lab: ABNORMAL
MAGNESIUM: 1.8 MG/DL (ref 1.6–2.6)
MCH RBC QN AUTO: 31.7 PG (ref 26–35)
MCHC RBC AUTO-ENTMCNC: 33.2 % (ref 32–34.5)
MCV RBC AUTO: 95.3 FL (ref 80–99.9)
METHB: 0.1 % (ref 0–1.5)
MODE: AC
O2 CONTENT: 14.6 ML/DL
O2 SATURATION: 95.8 % (ref 92–98.5)
O2HB: 95.4 % (ref 94–97)
OPERATOR ID: 9689
PATIENT TEMP: 37 C
PCO2: 28.7 MMHG (ref 35–45)
PDW BLD-RTO: 15.7 FL (ref 11.5–15)
PEEP/CPAP: 5 CMH2O
PFO2: 3.04 MMHG/%
PH BLOOD GAS: 7.46 (ref 7.35–7.45)
PHOSPHORUS: 3.5 MG/DL (ref 2.5–4.5)
PLATELET # BLD: 365 E9/L (ref 130–450)
PMV BLD AUTO: 9.6 FL (ref 7–12)
PO2: 91.3 MMHG (ref 75–100)
POTASSIUM SERPL-SCNC: 3.8 MMOL/L (ref 3.5–5)
RBC # BLD: 3.19 E12/L (ref 3.8–5.8)
RI(T): 0.89
RR MECHANICAL: 18 B/MIN
SODIUM BLD-SCNC: 140 MMOL/L (ref 132–146)
SOURCE, BLOOD GAS: ABNORMAL
THB: 10.8 G/DL (ref 11.5–16.5)
TIME ANALYZED: 441
VT MECHANICAL: 500 ML
WBC # BLD: 13.9 E9/L (ref 4.5–11.5)

## 2021-11-01 PROCEDURE — 6360000002 HC RX W HCPCS: Performed by: INTERNAL MEDICINE

## 2021-11-01 PROCEDURE — 94640 AIRWAY INHALATION TREATMENT: CPT

## 2021-11-01 PROCEDURE — 6370000000 HC RX 637 (ALT 250 FOR IP): Performed by: INTERNAL MEDICINE

## 2021-11-01 PROCEDURE — 31502 CHANGE OF WINDPIPE AIRWAY: CPT

## 2021-11-01 PROCEDURE — 76937 US GUIDE VASCULAR ACCESS: CPT

## 2021-11-01 PROCEDURE — 02HV33Z INSERTION OF INFUSION DEVICE INTO SUPERIOR VENA CAVA, PERCUTANEOUS APPROACH: ICD-10-PCS | Performed by: INTERNAL MEDICINE

## 2021-11-01 PROCEDURE — 99232 SBSQ HOSP IP/OBS MODERATE 35: CPT | Performed by: INTERNAL MEDICINE

## 2021-11-01 PROCEDURE — 82805 BLOOD GASES W/O2 SATURATION: CPT

## 2021-11-01 PROCEDURE — 2500000003 HC RX 250 WO HCPCS: Performed by: INTERNAL MEDICINE

## 2021-11-01 PROCEDURE — 83735 ASSAY OF MAGNESIUM: CPT

## 2021-11-01 PROCEDURE — 80048 BASIC METABOLIC PNL TOTAL CA: CPT

## 2021-11-01 PROCEDURE — 2000000000 HC ICU R&B

## 2021-11-01 PROCEDURE — 2580000003 HC RX 258: Performed by: INTERNAL MEDICINE

## 2021-11-01 PROCEDURE — 85027 COMPLETE CBC AUTOMATED: CPT

## 2021-11-01 PROCEDURE — 6370000000 HC RX 637 (ALT 250 FOR IP): Performed by: NURSE PRACTITIONER

## 2021-11-01 PROCEDURE — 94003 VENT MGMT INPAT SUBQ DAY: CPT

## 2021-11-01 PROCEDURE — 71045 X-RAY EXAM CHEST 1 VIEW: CPT

## 2021-11-01 PROCEDURE — C1751 CATH, INF, PER/CENT/MIDLINE: HCPCS

## 2021-11-01 PROCEDURE — 36569 INSJ PICC 5 YR+ W/O IMAGING: CPT

## 2021-11-01 PROCEDURE — 2720000010 HC SURG SUPPLY STERILE

## 2021-11-01 PROCEDURE — 84100 ASSAY OF PHOSPHORUS: CPT

## 2021-11-01 RX ORDER — ACETYLCYSTEINE 200 MG/ML
600 SOLUTION ORAL; RESPIRATORY (INHALATION) 2 TIMES DAILY
Status: DISCONTINUED | OUTPATIENT
Start: 2021-11-01 | End: 2021-11-03

## 2021-11-01 RX ADMIN — IPRATROPIUM BROMIDE AND ALBUTEROL SULFATE 1 AMPULE: .5; 2.5 SOLUTION RESPIRATORY (INHALATION) at 01:29

## 2021-11-01 RX ADMIN — LACTULOSE 20 G: 20 SOLUTION ORAL at 20:41

## 2021-11-01 RX ADMIN — LEVETIRACETAM 500 MG: 500 TABLET ORAL at 09:19

## 2021-11-01 RX ADMIN — DILTIAZEM HYDROCHLORIDE 90 MG: 30 TABLET, FILM COATED ORAL at 23:50

## 2021-11-01 RX ADMIN — DILTIAZEM HYDROCHLORIDE 90 MG: 30 TABLET, FILM COATED ORAL at 06:00

## 2021-11-01 RX ADMIN — SODIUM CHLORIDE, PRESERVATIVE FREE 300 UNITS: 5 INJECTION INTRAVENOUS at 20:40

## 2021-11-01 RX ADMIN — LEVETIRACETAM 500 MG: 500 TABLET ORAL at 20:41

## 2021-11-01 RX ADMIN — Medication 0.3 MCG/KG/HR: at 23:51

## 2021-11-01 RX ADMIN — RIVAROXABAN 20 MG: 20 TABLET, FILM COATED ORAL at 17:44

## 2021-11-01 RX ADMIN — IPRATROPIUM BROMIDE AND ALBUTEROL SULFATE 1 AMPULE: .5; 2.5 SOLUTION RESPIRATORY (INHALATION) at 18:51

## 2021-11-01 RX ADMIN — Medication 10 ML: at 20:43

## 2021-11-01 RX ADMIN — ACETYLCYSTEINE 600 MG: 200 SOLUTION ORAL; RESPIRATORY (INHALATION) at 20:37

## 2021-11-01 RX ADMIN — Medication 10 ML: at 09:20

## 2021-11-01 RX ADMIN — DILTIAZEM HYDROCHLORIDE 90 MG: 30 TABLET, FILM COATED ORAL at 17:44

## 2021-11-01 RX ADMIN — DILTIAZEM HYDROCHLORIDE 90 MG: 30 TABLET, FILM COATED ORAL at 00:43

## 2021-11-01 RX ADMIN — Medication 0.2 MCG/KG/HR: at 12:40

## 2021-11-01 RX ADMIN — IPRATROPIUM BROMIDE AND ALBUTEROL SULFATE 1 AMPULE: .5; 2.5 SOLUTION RESPIRATORY (INHALATION) at 14:42

## 2021-11-01 RX ADMIN — THIAMINE HCL TAB 100 MG 100 MG: 100 TAB at 09:19

## 2021-11-01 RX ADMIN — DILTIAZEM HYDROCHLORIDE 90 MG: 30 TABLET, FILM COATED ORAL at 11:29

## 2021-11-01 RX ADMIN — IPRATROPIUM BROMIDE AND ALBUTEROL SULFATE 1 AMPULE: .5; 2.5 SOLUTION RESPIRATORY (INHALATION) at 06:30

## 2021-11-01 RX ADMIN — SERTRALINE HYDROCHLORIDE 100 MG: 50 TABLET ORAL at 09:20

## 2021-11-01 RX ADMIN — IPRATROPIUM BROMIDE AND ALBUTEROL SULFATE 1 AMPULE: .5; 2.5 SOLUTION RESPIRATORY (INHALATION) at 11:14

## 2021-11-01 RX ADMIN — FOLIC ACID 1 MG: 1 TABLET ORAL at 09:19

## 2021-11-01 RX ADMIN — IPRATROPIUM BROMIDE AND ALBUTEROL SULFATE 1 AMPULE: .5; 2.5 SOLUTION RESPIRATORY (INHALATION) at 22:17

## 2021-11-01 RX ADMIN — FAMOTIDINE 40 MG: 20 TABLET, FILM COATED ORAL at 09:20

## 2021-11-01 RX ADMIN — FAMOTIDINE 40 MG: 20 TABLET, FILM COATED ORAL at 20:41

## 2021-11-01 ASSESSMENT — PULMONARY FUNCTION TESTS
PIF_VALUE: 18
PIF_VALUE: 19
PIF_VALUE: 18
PIF_VALUE: 17
PIF_VALUE: 18
PIF_VALUE: 70
PIF_VALUE: 18
PIF_VALUE: 17
PIF_VALUE: 18
PIF_VALUE: 18
PIF_VALUE: 17
PIF_VALUE: 17
PIF_VALUE: 15
PIF_VALUE: 21
PIF_VALUE: 16
PIF_VALUE: 17
PIF_VALUE: 18
PIF_VALUE: 17
PIF_VALUE: 16
PIF_VALUE: 18
PIF_VALUE: 16
PIF_VALUE: 16

## 2021-11-01 NOTE — PROGRESS NOTES
Department of Internal Medicine  General Internal Medicine  Attending Progress Note  Chief Complaint   Patient presents with    Delirium Tremens (DTS)     Pt states he usually drinks a fifth of kilo beam a day states he quit saturday. states he has been having visual hallucinations since yesterday. SUBJECTIVE:    Opens eyes.  Mouths words, which are mostly incoherent    OBJECTIVE      Medications    Current Facility-Administered Medications: acetylcysteine (MUCOMYST) 20 % solution 600 mg, 600 mg, Oral, BID  dilTIAZem (CARDIZEM) tablet 90 mg, 90 mg, Oral, 4 times per day  levETIRAcetam (KEPPRA) tablet 500 mg, 500 mg, Oral, BID  famotidine (PEPCID) tablet 40 mg, 40 mg, Oral, BID  rivaroxaban (XARELTO) tablet 20 mg, 20 mg, Oral, Daily  folic acid (FOLVITE) tablet 1 mg, 1 mg, Oral, Daily  heparin flush 100 UNIT/ML injection 300 Units, 3 mL, IntraVENous, 2 times per day  heparin flush 100 UNIT/ML injection 300 Units, 3 mL, IntraCATHeter, PRN  thiamine mononitrate tablet 100 mg, 100 mg, Oral, Daily  sodium chloride flush 0.9 % injection 5-40 mL, 5-40 mL, IntraVENous, 2 times per day  sodium chloride flush 0.9 % injection 5-40 mL, 5-40 mL, IntraVENous, PRN  0.9 % sodium chloride infusion, 25 mL, IntraVENous, PRN  heparin flush 100 UNIT/ML injection 300 Units, 3 mL, IntraVENous, 2 times per day  heparin flush 100 UNIT/ML injection 300 Units, 3 mL, IntraCATHeter, PRN  lactulose (CHRONULAC) 10 GM/15ML solution 20 g, 20 g, Oral, BID  sertraline (ZOLOFT) tablet 100 mg, 100 mg, Oral, Daily  dexmedetomidine (PRECEDEX) 400 mcg in sodium chloride 0.9 % 100 mL infusion, 0.2-1.4 mcg/kg/hr, IntraVENous, Continuous  0.9 % sodium chloride infusion, 25 mL, IntraVENous, Q8H  perflutren lipid microspheres (DEFINITY) injection 1.65 mg, 1.5 mL, IntraVENous, ONCE PRN  ondansetron (ZOFRAN-ODT) disintegrating tablet 4 mg, 4 mg, Oral, Q8H PRN **OR** ondansetron (ZOFRAN) injection 4 mg, 4 mg, IntraVENous, Q6H PRN  polyethylene glycol (GLYCOLAX) packet 17 g, 17 g, Oral, Daily PRN  acetaminophen (TYLENOL) tablet 650 mg, 650 mg, Oral, Q6H PRN **OR** acetaminophen (TYLENOL) suppository 650 mg, 650 mg, Rectal, Q6H PRN  ipratropium-albuterol (DUONEB) nebulizer solution 1 ampule, 1 ampule, Inhalation, Q4H  Physical    VITALS:  /72   Pulse 104   Temp 99.2 °F (37.3 °C) (Axillary)   Resp 18   Ht 5' 11\" (1.803 m)   Wt 256 lb (116.1 kg)   SpO2 97%   BMI 35.70 kg/m²   CONSTITUTIONAL:  awake, alert, cooperative, no apparent distress, and appears stated age  EYES:  extra-ocular muscles intact and vision intact  ENT:  normocepalic, without obvious abnormality  NECK:  Tracheostomy is secured  LUNGS:  no increased work of breathing and clear to auscultation  CARDIOVASCULAR:  normal apical pulses and normal S1 and S2  ABDOMEN:  normal bowel sounds, soft and non-tender  MUSCULOSKELETAL:  tone is normal  NEUROLOGIC:  Mental Status Exam:  Level of Alertness:   awake  Orientation:   person, place, time  SKIN:  no bruising or bleeding  Data    CBC:   Lab Results   Component Value Date    WBC 13.9 11/01/2021    RBC 3.19 11/01/2021    HGB 10.1 11/01/2021    HCT 30.4 11/01/2021    MCV 95.3 11/01/2021    MCH 31.7 11/01/2021    MCHC 33.2 11/01/2021    RDW 15.7 11/01/2021     11/01/2021    MPV 9.6 11/01/2021     BMP:    Lab Results   Component Value Date     11/01/2021    K 3.8 11/01/2021    K 4.3 10/12/2021     11/01/2021    CO2 23 11/01/2021    BUN 24 11/01/2021    LABALBU 2.7 10/24/2021    CREATININE 0.6 11/01/2021    CALCIUM 8.7 11/01/2021    GFRAA >60 11/01/2021    LABGLOM >60 11/01/2021    GLUCOSE 128 11/01/2021       ASSESSMENT AND PLAN      1. Altered mental status    2. Seizures ([address]    3. Withdrawal symptoms, alcohol, with delirium ([facility] [address]    4. UTI    5. Metabolic Encephalopthy    6. Acute Respiratory failure with Hypoxia. Now trached    7. Aspiration PNA: resolved. Completed abx    8. Seizure: on keppra    9.   Hypertension Essential: good control    Plans:   Continue to wean off precedex as tolerated  Trach/Vent management per ICU  Platelets has recovered  PICC line inserted  Positive pleural effusion. Uncertain if this needs to be evacuated.

## 2021-11-01 NOTE — PROCEDURES
Consent for PICC line obtained, via family and witness per RN, time out done at 1000am, verfied name and birthdate, no side specifications, noted Snehal Amaro RN attempted left side, right side examined, noted large basilic vein round , compressible.   Accessed without difficulty Placement 11/1/2021    Product number: [id number]   Lot Number: [id number]   Ultrasound: yes/VPS   R Basilic      Upper Arm Circumference: 39CM    Size: 5.5F/55CM    Exposed Length: 0   Internal Length: 43CM   Cut: 12CM   Vein Measurement: 0.60CM  Unable to obtain bullseye pt in AFIB rate 100's, noted sound change, with green arrow on VPS, stat xray ordered and read as in RAMYA MENDOZA aware okay to use    Petrona Whitten RN  11/1/2021  12:11 PM

## 2021-11-01 NOTE — PLAN OF CARE
Problem: Non-Violent Restraints  Goal: Removal from restraints as soon as assessed to be safe  Outcome: Met This Shift  Goal: No harm/injury to patient while restraints in use  Outcome: Met This Shift  Goal: Patient's dignity will be maintained  Outcome: Met This Shift     Problem: Airway Clearance - Ineffective:  Goal: Ability to maintain a clear airway will improve  Description: Ability to maintain a clear airway will improve  Outcome: Not Met This Shift     Problem: Respiratory Function - Compromised:  Goal: Ability to maintain a clear airway will improve  Description: Ability to maintain a clear airway will improve  Outcome: Not Met This Shift

## 2021-11-01 NOTE — PROGRESS NOTES
Chart reviewed-s/p trach/PEG. Goals of care and CODE STATUS have been identified. Palliative medicine will sign off. Please reconsult if patient's condition deteriorates or if patient/family requests further discussion with palliative medicine team.  Thank you.

## 2021-11-01 NOTE — PROGRESS NOTES
Assessment and Plan  Patient is a 73 y.o. male with the following medical Problems:   1. Acute on chronic hypoxic and hypercapnic respiratory failure  2. Alcohol withdrawal  3. New onset atrial fibrillation  4. History of thrombocytopenia  5. Morbid obesity with obesity hypoventilation syndrome  6. Metabolic encephalopathy  7. Acute kidney injury  (resolved)  8. Aspiration pneumonia  9. UTI  10. Small pericardial effusion  11. Hematuria (resolved)  12. 2.8 X 2.8 cm AAA  13. Small bilateral pleural effusions    Plan of care:  1. Continue with Precedex and wean down to assess for response to commands. Patient tracks and opens eyes. He follows commands. 2.  Continue with thiamine and folic acid  3. S/P tracheostomy and PEG tube insertion on 2021. 4.  Patient is currently on Xarelto which covers for DVT prophylaxis  5. Continue tube feedings. 6. EEG ruled out seizures and MRI to evaluate for encephalopathy. 7.  MRI ruled out any intracranial abnormalities. .  8.  GI prophylaxis  9. Off ceftriaxone  10. LTAC placement after Precedex weaned  11. DC CVC. 12.  Present drip discontinued and patient has good rate control with oral Cardizem. Monitor currently shows A. fib with rate of 98. History of Present Illness:   Patient is a 71-year-old with history of hypertension, CAD, COPD, and alcohol abuse. Patient presented to the ED on 10/12/2021 after developing hallucinations and shortness of breath. Patient apparently called police after seeing his  wife and 2 children walking in his house. He apparently attempted to spray them with bug spray before calling police. Patient has a history of alcohol abuse and drinks 1/5 of them being daily. According to ED physician's note the patient's last drink was on Saturday. The patient is currently intubated, sedated, and unable to provide any history.       Daily Progress:  2021: Patient remains sedated intubated and mechanically ventilated. He had an uneventful night. He is currently on a Cardizem drip for atrial fibrillation with RVR. He has no fever, chills, or rigors. October 14, 2021: Patient remained sedated intubated and mechanically ventilated. He had atrial fibrillation with RVR. Patient failed awakening trial today. He was agitated and confused. He has no fever, chills, rigors. October 15, 2021: Patient remains sedated intubated and mechanically ventilated. He gets easily agitated and combative off sedation. He failed awakening trial.  He has no fever, chills, or rigors. Heart rate is controlled. He developed hematuria overnight and Eliquis was put on hold. October 16, 2021: Patient had repetitive episodes of vomiting. Multiple attempts to place an OGT probably failed. CT scan of the abdomen and pelvis was obtained. There was no evidence of hiatal hernia. Patient has no fever, chills, rigors. He becomes agitated and combative off sedation. He has no fever, chills, rigors. He is currently on 75% FiO2. October 17, 2021: Patient remains sedated intubated and mechanically ventilated. We will attempt sedation vacation and spontaneous breathing trials today if tolerated. He remains n.p.o. since he has no NG tube. NG tube could not be advanced. He remained hemodynamically stable. He has no fever, chills, rigors. Sugar is mostly controlled. October 25, 2021: Patient remains lethargic and poorly responsive. She opens eyes but does not follow commands. He has no fever, chills, rigors. White blood cells count remained stable. Continues to tolerate tube feeding. He remained hemodynamically stable. October 26, 2021: Patient is awake and tracks of sedation but does not follow command. He has no fever, chills, or rigors. He is extremely weak and likely to decompensate if extubated. Tracheostomy would be beneficial in the long-term. He has no fever, chills, or rigors.     October 27, 2021: Patient is more awake and interactive however he remains delirious and confused. He has no fever, chills, or rigors. His oxygen requirement has decreased. He is currently on 40% FiO2. Had an extensive discussion with the patient's son and daughter about benefits, risks, and alternatives for tracheostomy and PEG tube. They are both in agreement to proceed with tracheostomy and PEG tube. Patient's heart rate remained stable off Cardizem. October 28, 2021: Patient remains clinically the same. His oxygen requirement has been stable. He is awake but does not follow commands. He is severely deconditioned. He has no fever, chills, rigors. He had an uneventful night. He is scheduled for tracheostomy and PEG tube placement. Sodium is borderline elevated. Patient was started on free water flushes. October 29, 2021: Patient's continues to improve neurologically. He is more awake and responsive. He remains extremely weak. He has no fever, chills, rigors. Sodium level has improved. He has an uneventful night. October 30, 2021: Patient underwent tracheostomy and PEG tube placement. He is currently on tube feeding and tolerating it well. He opens eyes tracks around but follows commands occasionally. He had an uneventful night. He has no fever, chills, rigors. October 31, 2021: Patient has been hemodynamically stable however he was started on Cardizem for atrial fibrillation with RVR. He is currently tolerating tube feeding. He is running low-grade fever. He has been lethargic however he wakes up and occasionally follows commands off sedation. Blood sugar is controlled. Oxygen requirement remained stable. He is currently on tidal volume 500, rate 18, FiO2 30%, and PEEP of 5. Cardizem infusion has been weaned down slowly and currently on 2.5 mg an hour. 11/1/2021: Patient hemodynamically stable overnight and was converted from a Cardizem drip to p.o.  Cardizem 90 mg every 6 hours per PEG tube. On her currently shows A. fib with rate of 98. Patient is currently running low-grade fever. He is tolerating tube feeding well. The patient does appear agitated and required Precedex drip and Ativan IV push overnight. She has a large amount of secretions and requires frequent suctioning. Current vent settings tidal volume 500, rate 18, PEEP of 5, FiO2 30% with SPO2 96%. Past Medical History:  Past Medical History:   Diagnosis Date    Alcohol abuse     Atrial fibrillation ([facility]     CAD (coronary artery disease)     [facility]     Depression     Fracture of unspecified phalanx of left middle finger, initial encounter for closed fracture     GERD (gastroesophageal reflux disease)     Hypertension     Seizure ([facility] [address]         Family History:   No family history on file. Allergies:         Patient has no known allergies. Social history:  Social History     Socioeconomic History    Marital status:      Spouse name: Not on file    Number of children: Not on file    Years of education: Not on file    Highest education level: Not on file   Occupational History    Not on file   Tobacco Use    Smoking status: Current Every Day Smoker     Packs/day: 1.00     Years: 45.00     Pack years: 45.00     Types: Cigars    Smokeless tobacco: Never Used    Tobacco comment: SMOKES CIGARS    Vaping Use    Vaping Use: Never used   Substance and Sexual Activity    Alcohol use: Yes     Comment: occassiona    Drug use: No    Sexual activity: Not on file   Other Topics Concern    Not on file   Social History Narrative    Not on file     Social Determinants of Health     Financial Resource Strain:     Difficulty of Paying Living Expenses:    Food Insecurity:     Worried About Running Out of Food in the Last Year:     Ran Out of Food in the Last Year:    Transportation Needs:     Lack of Transportation (Medical):      Lack of Transportation (Non-Medical):    Physical Activity:     Days of Exercise per Week:     Minutes of Exercise per Session:    Stress:     Feeling of Stress :    Social Connections:     Frequency of Communication with Friends and Family:     Frequency of Social Gatherings with Friends and Family:     Attends Buddhism Services:     Active Member of Clubs or Organizations:     Attends Club or Organization Meetings:     Marital Status:    Intimate Partner Violence:     Fear of Current or Ex-Partner:     Emotionally Abused:     Physically Abused:     Sexually Abused:        Current Medications:     Current Facility-Administered Medications:     dilTIAZem (CARDIZEM) tablet 90 mg, 90 mg, Oral, 4 times per day, Merla Meigs, MD, 90 mg at 11/01/21 1129    levETIRAcetam (KEPPRA) tablet 500 mg, 500 mg, Oral, BID, Merla Meigs, MD, 500 mg at 11/01/21 0919    famotidine (PEPCID) tablet 40 mg, 40 mg, Oral, BID, Merla Meigs, MD, 40 mg at 11/01/21 0920    rivaroxaban (XARELTO) tablet 20 mg, 20 mg, Oral, Daily, Merla Meigs, MD, 20 mg at 78/28/03 2746    folic acid (FOLVITE) tablet 1 mg, 1 mg, Oral, Daily, Merla Meigs, MD, 1 mg at 11/01/21 0919    heparin flush 100 UNIT/ML injection 300 Units, 3 mL, IntraVENous, 2 times per day, Merla Meigs, MD, 300 Units at 10/28/21 2025    heparin flush 100 UNIT/ML injection 300 Units, 3 mL, IntraCATHeter, PRN, Merla Meigs, MD    thiamine mononitrate tablet 100 mg, 100 mg, Oral, Daily, Merla Meigs, MD, 100 mg at 11/01/21 0919    sodium chloride flush 0.9 % injection 5-40 mL, 5-40 mL, IntraVENous, 2 times per day, Merla Meigs, MD, 10 mL at 11/01/21 0920    sodium chloride flush 0.9 % injection 5-40 mL, 5-40 mL, IntraVENous, PRN, Merla Meigs, MD, 10 mL at 10/26/21 2219    0.9 % sodium chloride infusion, 25 mL, IntraVENous, PRN, Zan Pickett Annette Abarca MD    heparin flush 100 UNIT/ML injection 300 Units, 3 mL, IntraVENous, 2 times per day, Bret Coburn MD, 300 Units at 10/30/21 0939    heparin flush 100 UNIT/ML injection 300 Units, 3 mL, IntraCATHeter, PRN, Bret Coburn MD    lactulose [facility]) 10 GM/15ML solution 20 g, 20 g, Oral, BID, Bret Coburn MD, 20 g at 10/30/21 2031    sertraline (ZOLOFT) tablet 100 mg, 100 mg, Oral, Daily, Pepe Andres MD, 100 mg at 11/01/21 0920    dexmedetomidine (PRECEDEX) 400 mcg in sodium chloride 0.9 % 100 mL infusion, 0.2-1.4 mcg/kg/hr, IntraVENous, Continuous, Dmitriy Newell MD, Last Rate: 6.1 mL/hr at 11/01/21 1240, 0.2 mcg/kg/hr at 11/01/21 1240    0.9 % sodium chloride infusion, 25 mL, IntraVENous, Q8H, Bret Coburn MD, Stopped at 10/30/21 0026    perflutren lipid microspheres (DEFINITY) injection 1.65 mg, 1.5 mL, IntraVENous, ONCE PRN, Bret Coburn MD    ondansetron (ZOFRAN-ODT) disintegrating tablet 4 mg, 4 mg, Oral, Q8H PRN **OR** ondansetron (ZOFRAN) injection 4 mg, 4 mg, IntraVENous, Q6H PRN, Valeri Phillips MD    polyethylene glycol (GLYCOLAX) packet 17 g, 17 g, Oral, Daily PRN, Valeri Phillips MD    acetaminophen (TYLENOL) tablet 650 mg, 650 mg, Oral, Q6H PRN, 650 mg at 10/30/21 2203 **OR** acetaminophen (TYLENOL) suppository 650 mg, 650 mg, Rectal, Q6H PRN, Valeri Phillips MD, 650 mg at 10/19/21 2020    ipratropium-albuterol (DUONEB) nebulizer solution 1 ampule, 1 ampule, Inhalation, Q4H, Palomo Russell APRN - CNP, 1 ampule at 11/01/21 1442    Review of Systems:   Unable to obtain due to medical condition    Physical Exam:   Vital Signs:  /77   Pulse 86   Temp 99.6 °F (37.6 °C) (Axillary)   Resp 18   Ht 5' 11\" (1.803 m)   Wt 256 lb (116.1 kg)   SpO2 95%   BMI 35.70 kg/m²     Input/Output:   In: 1034.1 [I.V.:99.1; NG/GT:935]  Out: 1560     Oxygen requirements: MV    Ventilator Information:  Vent Information  $Ventilation: $Subsequent Day  Skin Assessment: Clean, dry, & intact  Equipment ID:  [id number]  Equipment Changed: Suction catheter  Vent Type: 980  Vent Mode: AC/VC  Vt Ordered: 500 mL  Rate Set: 18 bmp  Peak Flow: 70 L/min  Pressure Support: 0 cmH20  FiO2 : 30 %  SpO2: 95 %  SpO2/FiO2 ratio: 316.67  Sensitivity: 3  PEEP/CPAP: 5  I Time/ I Time %: 0 s  Humidification Source: Heated wire  Humidification Temp: 37  Humidification Temp Measured: 37  Circuit Condensation: Not drained    General appearance: Anxious and agitated intermittently, in no respiratory distress    HEENT: +ve tracheostomy  Neck: Supple, no jugular venous distension, lymphadenopathy, thyromegaly or carotid bruits  Chest: Decreased breath sounds, no wheezing, no crackles and no tenderness over ribs   Cardiovascular: Normal S1 , S2, regular rate and rhythm, no murmur, rub or gallop  Abdomen: Normal sounds present, soft, lax with no tenderness, no hepatosplenomegaly, and no masses , +ve PEG  Extremities: No edema. Pulses are equally present. Skin: intact, no rashes   Neurologic: Sedated and mechanically ventilated but opens eyes off sedation. Patient tracks but does not follow commands. Investigations:  Labs, radiological imaging and cardiac work up were reviewed        ICU STAFF PHYSICIAN NOTE OF PERSONAL INVOLVEMENT IN CARE  As the attending physician, I certify that I personally reviewed the patient's history and personally examined the patient to confirm the physical findings described above, and that I reviewed the relevant imaging studies and available reports. I also discussed the differential diagnosis and all of the proposed management plans with the patient and individuals accompanying the patient to this visit. They had the opportunity to ask questions about the proposed management plans and to have those questions answered.     This patient has a high probability of sudden, clinically significant deterioration, which requires the highest level of physician preparedness to intervene urgently. I managed/supervised life or organ supporting interventions that required frequent physician assessment. I devoted my full attention to the direct care of this patient for the amount of time indicated below. Time I spent with family or surrogate(s) is included only if the patient was incapable of providing the necessary information or participating in medical decision-making. Time devoted to teaching and to any procedures I billed separately is not included.   Critical Care Time: 30 minutes      Electronically signed by MARTY Gonzalez CNP on 11/1/2021 at 2:48 PM

## 2021-11-01 NOTE — PROGRESS NOTES
Patient's daughter Suzanne Loredo called in for update. All  Questions were answered and daughter seemed satisfied.

## 2021-11-01 NOTE — CARE COORDINATION
11/1/21 Negative covid 10/16/21. CM transition of care: icu/vent/trache/peg. fio2 at 30%, peep at 5, precedex gtt. Palliative care signed off. [facility]-  in network,  CM requested Fabiola Hernandez to start the precert- waiting to hear back from [facility]. CM following.  Electronically signed by SIMEON Avalos on 11/1/2021 at 11:26 AM

## 2021-11-02 LAB
ANION GAP SERPL CALCULATED.3IONS-SCNC: 11 MMOL/L (ref 7–16)
BUN BLDV-MCNC: 26 MG/DL (ref 6–23)
CALCIUM SERPL-MCNC: 9.3 MG/DL (ref 8.6–10.2)
CHLORIDE BLD-SCNC: 109 MMOL/L (ref 98–107)
CO2: 23 MMOL/L (ref 22–29)
CREAT SERPL-MCNC: 0.6 MG/DL (ref 0.7–1.2)
GFR AFRICAN AMERICAN: >60
GFR NON-AFRICAN AMERICAN: >60 ML/MIN/1.73
GLUCOSE BLD-MCNC: 152 MG/DL (ref 74–99)
HCT VFR BLD CALC: 29.7 % (ref 37–54)
HEMOGLOBIN: 9.9 G/DL (ref 12.5–16.5)
IRON SATURATION: 14 % (ref 20–55)
IRON: 26 MCG/DL (ref 59–158)
MAGNESIUM: 1.8 MG/DL (ref 1.6–2.6)
MCH RBC QN AUTO: 31.5 PG (ref 26–35)
MCHC RBC AUTO-ENTMCNC: 33.3 % (ref 32–34.5)
MCV RBC AUTO: 94.6 FL (ref 80–99.9)
PDW BLD-RTO: 15.5 FL (ref 11.5–15)
PHOSPHORUS: 4 MG/DL (ref 2.5–4.5)
PLATELET # BLD: 355 E9/L (ref 130–450)
PMV BLD AUTO: 10.1 FL (ref 7–12)
POTASSIUM SERPL-SCNC: 3.6 MMOL/L (ref 3.5–5)
RBC # BLD: 3.14 E12/L (ref 3.8–5.8)
SODIUM BLD-SCNC: 143 MMOL/L (ref 132–146)
TOTAL IRON BINDING CAPACITY: 186 MCG/DL (ref 250–450)
TRIGL SERPL-MCNC: 68 MG/DL (ref 0–149)
VITAMIN D 25-HYDROXY: 26 NG/ML (ref 30–100)
WBC # BLD: 12.3 E9/L (ref 4.5–11.5)

## 2021-11-02 PROCEDURE — 6360000002 HC RX W HCPCS

## 2021-11-02 PROCEDURE — 94003 VENT MGMT INPAT SUBQ DAY: CPT

## 2021-11-02 PROCEDURE — 83735 ASSAY OF MAGNESIUM: CPT

## 2021-11-02 PROCEDURE — 84146 ASSAY OF PROLACTIN: CPT

## 2021-11-02 PROCEDURE — 94640 AIRWAY INHALATION TREATMENT: CPT

## 2021-11-02 PROCEDURE — 99232 SBSQ HOSP IP/OBS MODERATE 35: CPT | Performed by: INTERNAL MEDICINE

## 2021-11-02 PROCEDURE — 84100 ASSAY OF PHOSPHORUS: CPT

## 2021-11-02 PROCEDURE — 80048 BASIC METABOLIC PNL TOTAL CA: CPT

## 2021-11-02 PROCEDURE — 2000000000 HC ICU R&B

## 2021-11-02 PROCEDURE — 6370000000 HC RX 637 (ALT 250 FOR IP): Performed by: NURSE PRACTITIONER

## 2021-11-02 PROCEDURE — 6370000000 HC RX 637 (ALT 250 FOR IP): Performed by: INTERNAL MEDICINE

## 2021-11-02 PROCEDURE — 83540 ASSAY OF IRON: CPT

## 2021-11-02 PROCEDURE — 6360000002 HC RX W HCPCS: Performed by: INTERNAL MEDICINE

## 2021-11-02 PROCEDURE — 2580000003 HC RX 258: Performed by: INTERNAL MEDICINE

## 2021-11-02 PROCEDURE — 82306 VITAMIN D 25 HYDROXY: CPT

## 2021-11-02 PROCEDURE — 85027 COMPLETE CBC AUTOMATED: CPT

## 2021-11-02 PROCEDURE — 83550 IRON BINDING TEST: CPT

## 2021-11-02 PROCEDURE — 2500000003 HC RX 250 WO HCPCS

## 2021-11-02 PROCEDURE — 36415 COLL VENOUS BLD VENIPUNCTURE: CPT

## 2021-11-02 PROCEDURE — 84478 ASSAY OF TRIGLYCERIDES: CPT

## 2021-11-02 RX ORDER — LORAZEPAM 2 MG/ML
2 INJECTION INTRAMUSCULAR ONCE
Status: COMPLETED | OUTPATIENT
Start: 2021-11-02 | End: 2021-11-04

## 2021-11-02 RX ORDER — LORAZEPAM 2 MG/ML
INJECTION INTRAMUSCULAR
Status: COMPLETED
Start: 2021-11-02 | End: 2021-11-02

## 2021-11-02 RX ORDER — LORAZEPAM 2 MG/ML
2 INJECTION INTRAMUSCULAR EVERY 6 HOURS PRN
Status: DISCONTINUED | OUTPATIENT
Start: 2021-11-02 | End: 2021-11-02

## 2021-11-02 RX ORDER — METOPROLOL TARTRATE 5 MG/5ML
5 INJECTION INTRAVENOUS ONCE
Status: COMPLETED | OUTPATIENT
Start: 2021-11-02 | End: 2021-11-02

## 2021-11-02 RX ORDER — METOPROLOL TARTRATE 5 MG/5ML
INJECTION INTRAVENOUS
Status: COMPLETED
Start: 2021-11-02 | End: 2021-11-02

## 2021-11-02 RX ORDER — LEVETIRACETAM 500 MG/1
1000 TABLET ORAL 2 TIMES DAILY
Status: DISCONTINUED | OUTPATIENT
Start: 2021-11-03 | End: 2021-11-08 | Stop reason: CLARIF

## 2021-11-02 RX ADMIN — FAMOTIDINE 40 MG: 20 TABLET, FILM COATED ORAL at 22:03

## 2021-11-02 RX ADMIN — Medication 10 ML: at 21:53

## 2021-11-02 RX ADMIN — METOPROLOL TARTRATE 5 MG: 1 INJECTION, SOLUTION INTRAVENOUS at 22:47

## 2021-11-02 RX ADMIN — Medication 10 ML: at 08:18

## 2021-11-02 RX ADMIN — SODIUM CHLORIDE, PRESERVATIVE FREE 300 UNITS: 5 INJECTION INTRAVENOUS at 21:52

## 2021-11-02 RX ADMIN — METOPROLOL TARTRATE 5 MG: 5 INJECTION INTRAVENOUS at 22:47

## 2021-11-02 RX ADMIN — IPRATROPIUM BROMIDE AND ALBUTEROL SULFATE 1 AMPULE: .5; 2.5 SOLUTION RESPIRATORY (INHALATION) at 11:23

## 2021-11-02 RX ADMIN — LORAZEPAM 2 MG: 2 INJECTION INTRAMUSCULAR; INTRAVENOUS at 22:00

## 2021-11-02 RX ADMIN — IPRATROPIUM BROMIDE AND ALBUTEROL SULFATE 1 AMPULE: .5; 2.5 SOLUTION RESPIRATORY (INHALATION) at 23:06

## 2021-11-02 RX ADMIN — ACETYLCYSTEINE 600 MG: 200 SOLUTION ORAL; RESPIRATORY (INHALATION) at 08:19

## 2021-11-02 RX ADMIN — ACETYLCYSTEINE 600 MG: 200 SOLUTION ORAL; RESPIRATORY (INHALATION) at 21:44

## 2021-11-02 RX ADMIN — DILTIAZEM HYDROCHLORIDE 90 MG: 30 TABLET, FILM COATED ORAL at 06:26

## 2021-11-02 RX ADMIN — RIVAROXABAN 20 MG: 20 TABLET, FILM COATED ORAL at 18:16

## 2021-11-02 RX ADMIN — DILTIAZEM HYDROCHLORIDE 90 MG: 30 TABLET, FILM COATED ORAL at 23:30

## 2021-11-02 RX ADMIN — THIAMINE HCL TAB 100 MG 100 MG: 100 TAB at 08:18

## 2021-11-02 RX ADMIN — IPRATROPIUM BROMIDE AND ALBUTEROL SULFATE 1 AMPULE: .5; 2.5 SOLUTION RESPIRATORY (INHALATION) at 01:20

## 2021-11-02 RX ADMIN — LEVETIRACETAM 500 MG: 500 TABLET ORAL at 08:18

## 2021-11-02 RX ADMIN — LACTULOSE 20 G: 20 SOLUTION ORAL at 08:25

## 2021-11-02 RX ADMIN — IPRATROPIUM BROMIDE AND ALBUTEROL SULFATE 1 AMPULE: .5; 2.5 SOLUTION RESPIRATORY (INHALATION) at 08:12

## 2021-11-02 RX ADMIN — FAMOTIDINE 40 MG: 20 TABLET, FILM COATED ORAL at 08:18

## 2021-11-02 RX ADMIN — DILTIAZEM HYDROCHLORIDE 90 MG: 30 TABLET, FILM COATED ORAL at 12:05

## 2021-11-02 RX ADMIN — DILTIAZEM HYDROCHLORIDE 90 MG: 30 TABLET, FILM COATED ORAL at 18:16

## 2021-11-02 RX ADMIN — LORAZEPAM 2 MG: 2 INJECTION INTRAMUSCULAR at 22:00

## 2021-11-02 RX ADMIN — LACTULOSE 20 G: 20 SOLUTION ORAL at 22:03

## 2021-11-02 RX ADMIN — SERTRALINE HYDROCHLORIDE 100 MG: 50 TABLET ORAL at 08:17

## 2021-11-02 RX ADMIN — LEVETIRACETAM 500 MG: 500 TABLET ORAL at 21:44

## 2021-11-02 RX ADMIN — IPRATROPIUM BROMIDE AND ALBUTEROL SULFATE 1 AMPULE: .5; 2.5 SOLUTION RESPIRATORY (INHALATION) at 14:54

## 2021-11-02 RX ADMIN — FOLIC ACID 1 MG: 1 TABLET ORAL at 08:18

## 2021-11-02 RX ADMIN — IPRATROPIUM BROMIDE AND ALBUTEROL SULFATE 1 AMPULE: .5; 2.5 SOLUTION RESPIRATORY (INHALATION) at 18:39

## 2021-11-02 ASSESSMENT — PULMONARY FUNCTION TESTS
PIF_VALUE: 20
PIF_VALUE: 28
PIF_VALUE: 18
PIF_VALUE: 19
PIF_VALUE: 19
PIF_VALUE: 16
PIF_VALUE: 17
PIF_VALUE: 38
PIF_VALUE: 23
PIF_VALUE: 17
PIF_VALUE: 16
PIF_VALUE: 16
PIF_VALUE: 17
PIF_VALUE: 20

## 2021-11-02 ASSESSMENT — PAIN SCALES - GENERAL
PAINLEVEL_OUTOF10: 0

## 2021-11-02 NOTE — PROGRESS NOTES
Diminished breath sounds bases no consolidation  Gastrointestinal: Soft bowel sounds present   Genitourinary: Catheter in place  Extremities: No signs of DVT or ischemia pulses intact  Neurological: Follows simple commands  Psychological: Sedated on the vent    LABS:  WBC   Date Value Ref Range Status   11/02/2021 12.3 (H) 4.5 - 11.5 E9/L Final   11/01/2021 13.9 (H) 4.5 - 11.5 E9/L Final   10/31/2021 11.3 4.5 - 11.5 E9/L Final     Hemoglobin   Date Value Ref Range Status   11/02/2021 9.9 (L) 12.5 - 16.5 g/dL Final   11/01/2021 10.1 (L) 12.5 - 16.5 g/dL Final   10/31/2021 10.1 (L) 12.5 - 16.5 g/dL Final     Hematocrit   Date Value Ref Range Status   11/02/2021 29.7 (L) 37.0 - 54.0 % Final   11/01/2021 30.4 (L) 37.0 - 54.0 % Final   10/31/2021 30.9 (L) 37.0 - 54.0 % Final     MCV   Date Value Ref Range Status   11/02/2021 94.6 80.0 - 99.9 fL Final   11/01/2021 95.3 80.0 - 99.9 fL Final   10/31/2021 95.4 80.0 - 99.9 fL Final     Platelets   Date Value Ref Range Status   11/02/2021 355 130 - 450 E9/L Final   11/01/2021 365 130 - 450 E9/L Final   10/31/2021 367 130 - 450 E9/L Final     Sodium   Date Value Ref Range Status   11/02/2021 143 132 - 146 mmol/L Final   11/01/2021 140 132 - 146 mmol/L Final   10/31/2021 140 132 - 146 mmol/L Final     Potassium   Date Value Ref Range Status   11/02/2021 3.6 3.5 - 5.0 mmol/L Final   11/01/2021 3.8 3.5 - 5.0 mmol/L Final   10/31/2021 4.1 3.5 - 5.0 mmol/L Final     Potassium reflex Magnesium   Date Value Ref Range Status   10/12/2021 4.3 3.5 - 5.0 mmol/L Final     Chloride   Date Value Ref Range Status   11/02/2021 109 (H) 98 - 107 mmol/L Final   11/01/2021 107 98 - 107 mmol/L Final   10/31/2021 108 (H) 98 - 107 mmol/L Final     CO2   Date Value Ref Range Status   11/02/2021 23 22 - 29 mmol/L Final   11/01/2021 23 22 - 29 mmol/L Final   10/31/2021 22 22 - 29 mmol/L Final     BUN   Date Value Ref Range Status   11/02/2021 26 (H) 6 - 23 mg/dL Final   11/01/2021 24 (H) 6 - 23 mg/dL Final   10/31/2021 29 (H) 6 - 23 mg/dL Final     CREATININE   Date Value Ref Range Status   11/02/2021 0.6 (L) 0.7 - 1.2 mg/dL Final   11/01/2021 0.6 (L) 0.7 - 1.2 mg/dL Final   10/31/2021 0.6 (L) 0.7 - 1.2 mg/dL Final     Glucose   Date Value Ref Range Status   11/02/2021 152 (H) 74 - 99 mg/dL Final   11/01/2021 128 (H) 74 - 99 mg/dL Final   10/31/2021 130 (H) 74 - 99 mg/dL Final     Calcium   Date Value Ref Range Status   11/02/2021 9.3 8.6 - 10.2 mg/dL Final   11/01/2021 8.7 8.6 - 10.2 mg/dL Final   10/31/2021 8.5 (L) 8.6 - 10.2 mg/dL Final     Total Protein   Date Value Ref Range Status   10/24/2021 5.9 (L) 6.4 - 8.3 g/dL Final   10/23/2021 6.0 (L) 6.4 - 8.3 g/dL Final   10/22/2021 6.2 (L) 6.4 - 8.3 g/dL Final     Albumin   Date Value Ref Range Status   10/24/2021 2.7 (L) 3.5 - 5.2 g/dL Final   10/23/2021 2.8 (L) 3.5 - 5.2 g/dL Final   10/22/2021 2.8 (L) 3.5 - 5.2 g/dL Final     Total Bilirubin   Date Value Ref Range Status   10/24/2021 0.5 0.0 - 1.2 mg/dL Final   10/23/2021 0.5 0.0 - 1.2 mg/dL Final   10/22/2021 0.8 0.0 - 1.2 mg/dL Final     Alkaline Phosphatase   Date Value Ref Range Status   10/24/2021 62 40 - 129 U/L Final   10/23/2021 58 40 - 129 U/L Final   10/22/2021 57 40 - 129 U/L Final     AST   Date Value Ref Range Status   10/24/2021 73 (H) 0 - 39 U/L Final   10/23/2021 55 (H) 0 - 39 U/L Final   10/22/2021 18 0 - 39 U/L Final     ALT   Date Value Ref Range Status   10/24/2021 61 (H) 0 - 40 U/L Final   10/23/2021 33 0 - 40 U/L Final   10/22/2021 11 0 - 40 U/L Final     GFR Non-   Date Value Ref Range Status   11/02/2021 >60 >=60 mL/min/1.73 Final     Comment:     Chronic Kidney Disease: less than 60 ml/min/1.73 sq.m. Kidney Failure: less than 15 ml/min/1.73 sq.m. Results valid for patients 18 years and older. 11/01/2021 >60 >=60 mL/min/1.73 Final     Comment:     Chronic Kidney Disease: less than 60 ml/min/1.73 sq.m.           Kidney Failure: less than 15 ml/min/1.73 sq.m.  Results valid for patients 18 years and older. 10/31/2021 >60 >=60 mL/min/1.73 Final     Comment:     Chronic Kidney Disease: less than 60 ml/min/1.73 sq.m. Kidney Failure: less than 15 ml/min/1.73 sq.m. Results valid for patients 18 years and older. GFR    Date Value Ref Range Status   11/02/2021 >60  Final   11/01/2021 >60  Final   10/31/2021 >60  Final     Magnesium   Date Value Ref Range Status   11/02/2021 1.8 1.6 - 2.6 mg/dL Final   11/01/2021 1.8 1.6 - 2.6 mg/dL Final   10/31/2021 1.8 1.6 - 2.6 mg/dL Final     Phosphorus   Date Value Ref Range Status   11/02/2021 4.0 2.5 - 4.5 mg/dL Final   11/01/2021 3.5 2.5 - 4.5 mg/dL Final   10/31/2021 4.3 2.5 - 4.5 mg/dL Final     Recent Labs     11/01/21  0441   PH 7.465*   PO2 91.3   PCO2 28.7*   HCO3 20.2*   BE -2.6   O2SAT 95.8       RADIOLOGY:  XR CHEST PORTABLE   Final Result   Right PICC line tip is at the cavoatrial junction. Stable prominent cardiac silhouette and bilateral pleural effusions with   bibasilar atelectasis or infiltrates         XR CHEST PORTABLE   Final Result   No acute process. Cardiomegaly. XR CHEST PORTABLE   Final Result   Cardiomegaly and postop changes. ETT is in good position. Right IJ catheter   shows no significant interval change. Small bilateral effusions, slightly   improved compared to October 20. MRI BRAIN W WO CONTRAST   Final Result   No acute intracranial abnormality visualized. CT HEAD WO CONTRAST   Final Result   1. No acute intracranial abnormality. 2. Mucosal disease of the paranasal sinuses. XR ABDOMEN FOR NG/OG/NE TUBE PLACEMENT   Final Result   Satisfactory position of nasogastric tube. XR CHEST PORTABLE   Final Result   1. No interval change in the vague bilateral lower lobe airspace disease. 2. Possible trace bilateral pleural effusions. 3. The NG tube has been removed.          US GALLBLADDER RUQ   Final Result   Mild fatty liver. No sonographic evidence of acute cholecystitis. CT ABDOMEN PELVIS W IV CONTRAST Additional Contrast? None   Final Result   Cardiomegaly with infiltrates and pleural effusions in the lung bases likely   pneumonia or mild CHF. There is no hiatal hernia. Distended gallbladder with mild wall thickening. Consider ultrasonography to   evaluate for cholecystitis. Diverticulosis of the colon with mild thickening of the sigmoid colon which   could be due to spasm or uncomplicated diverticulitis. 2.8 x 2.8 cm abdominal aortic aneurysm. RECOMMENDATIONS:   Abdominal aortic aneurysm. 5 year surveillance is recommended. XR CHEST PORTABLE   Final Result   Worsening left greater than right perihilar opacities which may represent   asymmetric edema or pneumonia. Continued follow-up recommended. XR ABDOMEN (KUB) (SINGLE AP VIEW)   Final Result   No significant changes detailed above. XR CHEST ABDOMEN NG PLACEMENT   Final Result   The enteric tube terminates in the region of the proximal stomach. Recommend   consideration of advancement by at least 6 cm to ensure intragastric location   of the side hole. XR CHEST PORTABLE   Final Result   ET tube terminates between the clavicular heads and the karo. Enteric tube is poorly visualized at the GE junction and below; if position   of enteric tube tip needs to be known then KUB would better evaluate for it. Stable mild pulmonary vascular congestion. Stable small pleural effusions. Stable cardiomegaly. XR CHEST PORTABLE   Final Result   No interval change compared to prior exam.      Persistent small bilateral pleural effusions. Ongoing mild-to-moderate pulmonary edema. XR CHEST PORTABLE   Final Result   New right IJ line with the distal tip in the SVC and no pneumothorax. Slightly more confluent airspace disease in the right lung base.       No other significant interval change. XR CHEST PORTABLE   Final Result   1. Satisfactory position of the NG tube and endotracheal tube   2. New opacity within the right lung base which could represent a pleural   effusion and or right lung base infiltrate. XR CHEST PORTABLE   Final Result   1. Cardiomegaly. There is no evidence of failure or pneumonia. CT Head WO Contrast   Final Result   1. There is no acute intracranial abnormality. Specifically, there is no   intracranial hemorrhage. 2. Atrophy and periventricular leukomalacia,   . PROBLEM LIST:  Principal Problem:    Altered mental status  Active Problems:    Seizures ([address]    Withdrawal symptoms, alcohol, with delirium ([address]  Resolved Problems:    * No resolved hospital problems. *      IMPRESSION:  1. Acute on chronic respiratory failure with current vent support  2. History of alcohol withdrawal and seizures  3. Moderate protein caloric malnutrition  4. Anemia    PLAN:  1. Continue respiratory measures of treatment  2. Weaning program  3. Nutritional support  4. Work-up patient's anemia  5. Seizure precautions  6. DVT prophylaxis    ATTESTATION:  ICU Staff Physician note of personal involvement in Care  As the attending physician, I certify that I personally reviewed the patients history and personnally examined the patient to confirm the physical findings described above,  And that I reviewed the relevant imaging studies and available reports. I also discussed the differential diagnosis and all of the proposed management plans with the patient and individuals accompanying the patient to this visit. They had the opportunity to ask questions about the proposed management plans and to have those questions answered. This patient has a high probability of sudden, clinically significant deterioration, which requires the highest level of physician preparedness to intervene urgently.   I managed/supervised life or organ supporting interventions that required frequent physician assessment. I devoted my full attention to the direct care of this patient for the amount of time indicated below. Time I spent with the family or surrogate(s) is included only if the patient was incapable of providing the necessary information or participating in medical decisions  Time devoted to teaching and to any procedures I billed separately is not included.     CRITICAL CARE TIME:  36 minutes    Electronically signed by Mikala Saldana DO on 11/2/2021 at 9:14 AM

## 2021-11-02 NOTE — CARE COORDINATION
11/2/21 Negative covid 10/16/21. CM transition of care: icu/peg/tracheostomy- peep 5, fio2 at 30%. Sedation being weaned off at 0.2 mcg/kg/hr currently. LTACH PRECERT started 93/7/41. Ambulance sheet started in epic- copy in soft blue chart.  Electronically signed by Jayleen Mcgee RN-BC on 11/2/2021 at 1:45 PM

## 2021-11-02 NOTE — PROGRESS NOTES
Department of Internal Medicine  General Internal Medicine  Attending Progress Note  Chief Complaint   Patient presents with    Delirium Tremens (DTS)     Pt states he usually drinks a fifth of kilo beam a day states he quit saturday. states he has been having visual hallucinations since yesterday. SUBJECTIVE:    Trached.      OBJECTIVE      Medications    Current Facility-Administered Medications: acetylcysteine (MUCOMYST) 20 % solution 600 mg, 600 mg, Oral, BID  dilTIAZem (CARDIZEM) tablet 90 mg, 90 mg, Oral, 4 times per day  levETIRAcetam (KEPPRA) tablet 500 mg, 500 mg, Oral, BID  famotidine (PEPCID) tablet 40 mg, 40 mg, Oral, BID  rivaroxaban (XARELTO) tablet 20 mg, 20 mg, Oral, Daily  folic acid (FOLVITE) tablet 1 mg, 1 mg, Oral, Daily  heparin flush 100 UNIT/ML injection 300 Units, 3 mL, IntraVENous, 2 times per day  heparin flush 100 UNIT/ML injection 300 Units, 3 mL, IntraCATHeter, PRN  thiamine mononitrate tablet 100 mg, 100 mg, Oral, Daily  sodium chloride flush 0.9 % injection 5-40 mL, 5-40 mL, IntraVENous, 2 times per day  sodium chloride flush 0.9 % injection 5-40 mL, 5-40 mL, IntraVENous, PRN  0.9 % sodium chloride infusion, 25 mL, IntraVENous, PRN  heparin flush 100 UNIT/ML injection 300 Units, 3 mL, IntraVENous, 2 times per day  heparin flush 100 UNIT/ML injection 300 Units, 3 mL, IntraCATHeter, PRN  lactulose (CHRONULAC) 10 GM/15ML solution 20 g, 20 g, Oral, BID  sertraline (ZOLOFT) tablet 100 mg, 100 mg, Oral, Daily  dexmedetomidine (PRECEDEX) 400 mcg in sodium chloride 0.9 % 100 mL infusion, 0.2-1.4 mcg/kg/hr, IntraVENous, Continuous  0.9 % sodium chloride infusion, 25 mL, IntraVENous, Q8H  perflutren lipid microspheres (DEFINITY) injection 1.65 mg, 1.5 mL, IntraVENous, ONCE PRN  ondansetron (ZOFRAN-ODT) disintegrating tablet 4 mg, 4 mg, Oral, Q8H PRN **OR** ondansetron (ZOFRAN) injection 4 mg, 4 mg, IntraVENous, Q6H PRN  polyethylene glycol (GLYCOLAX) packet 17 g, 17 g, Oral, Daily PRN  acetaminophen (TYLENOL) tablet 650 mg, 650 mg, Oral, Q6H PRN **OR** acetaminophen (TYLENOL) suppository 650 mg, 650 mg, Rectal, Q6H PRN  ipratropium-albuterol (DUONEB) nebulizer solution 1 ampule, 1 ampule, Inhalation, Q4H  Physical    VITALS:  /80   Pulse 122   Temp 100.4 °F (38 °C) (Axillary)   Resp 18   Ht 5' 11\" (1.803 m)   Wt 256 lb (116.1 kg)   SpO2 94%   BMI 35.70 kg/m²   CONSTITUTIONAL:  sedated  NECK:  Lexine Julia is secured  LUNGS:  no increased work of breathing and good air exchange  CARDIOVASCULAR:  tachycardic with regular rhythm  ABDOMEN:  Peg tube in place, soft and non-tender  MUSCULOSKELETAL:  Trace edema  NEUROLOGIC:  Mental Status Exam:  Level of Alertness:   awake  SKIN:  no bruising or bleeding  Data    CBC:   Lab Results   Component Value Date    WBC 12.3 11/02/2021    RBC 3.14 11/02/2021    HGB 9.9 11/02/2021    HCT 29.7 11/02/2021    MCV 94.6 11/02/2021    MCH 31.5 11/02/2021    MCHC 33.3 11/02/2021    RDW 15.5 11/02/2021     11/02/2021    MPV 10.1 11/02/2021     BMP:    Lab Results   Component Value Date     11/02/2021    K 3.6 11/02/2021    K 4.3 10/12/2021     11/02/2021    CO2 23 11/02/2021    BUN 26 11/02/2021    LABALBU 2.7 10/24/2021    CREATININE 0.6 11/02/2021    CALCIUM 9.3 11/02/2021    GFRAA >60 11/02/2021    LABGLOM >60 11/02/2021    GLUCOSE 152 11/02/2021       ASSESSMENT AND PLAN      1. Altered mental status    2. Seizures ([facility] [address]    3. Withdrawal symptoms, alcohol, with delirium:    4. Anemia    5. Aspiration PNA; completed abx    6. Acute Respiratory failure with Hypoxia: now trach    7. Metabolic encephalopathy:    Plans  Continue to wean off Precedex  Trach management  Continue Keppra  Discharge planning.  Possible LTAC

## 2021-11-03 ENCOUNTER — APPOINTMENT (OUTPATIENT)
Dept: GENERAL RADIOLOGY | Age: 66
DRG: 004 | End: 2021-11-03
Payer: MEDICARE

## 2021-11-03 LAB
ANION GAP SERPL CALCULATED.3IONS-SCNC: 13 MMOL/L (ref 7–16)
BUN BLDV-MCNC: 26 MG/DL (ref 6–23)
CALCIUM SERPL-MCNC: 9.2 MG/DL (ref 8.6–10.2)
CHLORIDE BLD-SCNC: 109 MMOL/L (ref 98–107)
CO2: 23 MMOL/L (ref 22–29)
CREAT SERPL-MCNC: 0.6 MG/DL (ref 0.7–1.2)
EKG ATRIAL RATE: 147 BPM
EKG Q-T INTERVAL: 292 MS
EKG QRS DURATION: 78 MS
EKG QTC CALCULATION (BAZETT): 429 MS
EKG R AXIS: 0 DEGREES
EKG T AXIS: 88 DEGREES
EKG VENTRICULAR RATE: 130 BPM
GFR AFRICAN AMERICAN: >60
GFR NON-AFRICAN AMERICAN: >60 ML/MIN/1.73
GLUCOSE BLD-MCNC: 135 MG/DL (ref 74–99)
HCT VFR BLD CALC: 30.7 % (ref 37–54)
HEMOGLOBIN: 10 G/DL (ref 12.5–16.5)
MAGNESIUM: 1.8 MG/DL (ref 1.6–2.6)
MCH RBC QN AUTO: 31.4 PG (ref 26–35)
MCHC RBC AUTO-ENTMCNC: 32.6 % (ref 32–34.5)
MCV RBC AUTO: 96.5 FL (ref 80–99.9)
PDW BLD-RTO: 15.6 FL (ref 11.5–15)
PHOSPHORUS: 4.1 MG/DL (ref 2.5–4.5)
PLATELET # BLD: 370 E9/L (ref 130–450)
PMV BLD AUTO: 9.8 FL (ref 7–12)
POTASSIUM SERPL-SCNC: 3.4 MMOL/L (ref 3.5–5)
PROLACTIN: 17.14 NG/ML
RBC # BLD: 3.18 E12/L (ref 3.8–5.8)
SODIUM BLD-SCNC: 145 MMOL/L (ref 132–146)
WBC # BLD: 11.2 E9/L (ref 4.5–11.5)

## 2021-11-03 PROCEDURE — 6370000000 HC RX 637 (ALT 250 FOR IP): Performed by: INTERNAL MEDICINE

## 2021-11-03 PROCEDURE — 6370000000 HC RX 637 (ALT 250 FOR IP)

## 2021-11-03 PROCEDURE — 2580000003 HC RX 258: Performed by: INTERNAL MEDICINE

## 2021-11-03 PROCEDURE — 71045 X-RAY EXAM CHEST 1 VIEW: CPT

## 2021-11-03 PROCEDURE — 6370000000 HC RX 637 (ALT 250 FOR IP): Performed by: NURSE PRACTITIONER

## 2021-11-03 PROCEDURE — 85027 COMPLETE CBC AUTOMATED: CPT

## 2021-11-03 PROCEDURE — 94003 VENT MGMT INPAT SUBQ DAY: CPT

## 2021-11-03 PROCEDURE — 2580000003 HC RX 258

## 2021-11-03 PROCEDURE — 99232 SBSQ HOSP IP/OBS MODERATE 35: CPT | Performed by: INTERNAL MEDICINE

## 2021-11-03 PROCEDURE — 6360000002 HC RX W HCPCS

## 2021-11-03 PROCEDURE — 84100 ASSAY OF PHOSPHORUS: CPT

## 2021-11-03 PROCEDURE — 83735 ASSAY OF MAGNESIUM: CPT

## 2021-11-03 PROCEDURE — 6360000002 HC RX W HCPCS: Performed by: INTERNAL MEDICINE

## 2021-11-03 PROCEDURE — 94640 AIRWAY INHALATION TREATMENT: CPT

## 2021-11-03 PROCEDURE — 93005 ELECTROCARDIOGRAM TRACING: CPT

## 2021-11-03 PROCEDURE — 2000000000 HC ICU R&B

## 2021-11-03 PROCEDURE — 80048 BASIC METABOLIC PNL TOTAL CA: CPT

## 2021-11-03 PROCEDURE — 36592 COLLECT BLOOD FROM PICC: CPT

## 2021-11-03 PROCEDURE — 36415 COLL VENOUS BLD VENIPUNCTURE: CPT

## 2021-11-03 RX ORDER — HALOPERIDOL 5 MG/ML
5 INJECTION INTRAMUSCULAR ONCE
Status: COMPLETED | OUTPATIENT
Start: 2021-11-03 | End: 2021-11-03

## 2021-11-03 RX ORDER — ZIPRASIDONE HYDROCHLORIDE 20 MG/1
20 CAPSULE ORAL 2 TIMES DAILY WITH MEALS
Status: DISCONTINUED | OUTPATIENT
Start: 2021-11-03 | End: 2021-11-03

## 2021-11-03 RX ORDER — HALOPERIDOL 5 MG/ML
1 INJECTION INTRAMUSCULAR EVERY 6 HOURS PRN
Status: DISCONTINUED | OUTPATIENT
Start: 2021-11-03 | End: 2021-11-07

## 2021-11-03 RX ORDER — ZIPRASIDONE HYDROCHLORIDE 20 MG/1
20 CAPSULE ORAL EVERY 12 HOURS
Status: DISCONTINUED | OUTPATIENT
Start: 2021-11-03 | End: 2021-11-06

## 2021-11-03 RX ORDER — HALOPERIDOL 5 MG/ML
2 INJECTION INTRAMUSCULAR EVERY 6 HOURS PRN
Status: DISCONTINUED | OUTPATIENT
Start: 2021-11-03 | End: 2021-11-07

## 2021-11-03 RX ADMIN — ZIPRASIDONE HYDROCHLORIDE 20 MG: 20 CAPSULE ORAL at 12:05

## 2021-11-03 RX ADMIN — LEVETIRACETAM 1000 MG: 500 TABLET, FILM COATED ORAL at 20:50

## 2021-11-03 RX ADMIN — FOLIC ACID 1 MG: 1 TABLET ORAL at 08:21

## 2021-11-03 RX ADMIN — SERTRALINE HYDROCHLORIDE 100 MG: 50 TABLET ORAL at 08:21

## 2021-11-03 RX ADMIN — SODIUM CHLORIDE, PRESERVATIVE FREE 300 UNITS: 5 INJECTION INTRAVENOUS at 20:55

## 2021-11-03 RX ADMIN — DILTIAZEM HYDROCHLORIDE 90 MG: 30 TABLET, FILM COATED ORAL at 06:31

## 2021-11-03 RX ADMIN — IPRATROPIUM BROMIDE AND ALBUTEROL SULFATE 1 AMPULE: .5; 2.5 SOLUTION RESPIRATORY (INHALATION) at 07:01

## 2021-11-03 RX ADMIN — ACETYLCYSTEINE 600 MG: 200 SOLUTION ORAL; RESPIRATORY (INHALATION) at 08:39

## 2021-11-03 RX ADMIN — ZIPRASIDONE HYDROCHLORIDE 20 MG: 20 CAPSULE ORAL at 23:58

## 2021-11-03 RX ADMIN — FAMOTIDINE 40 MG: 20 TABLET, FILM COATED ORAL at 08:21

## 2021-11-03 RX ADMIN — IPRATROPIUM BROMIDE AND ALBUTEROL SULFATE 1 AMPULE: .5; 2.5 SOLUTION RESPIRATORY (INHALATION) at 10:15

## 2021-11-03 RX ADMIN — IPRATROPIUM BROMIDE AND ALBUTEROL SULFATE 1 AMPULE: .5; 2.5 SOLUTION RESPIRATORY (INHALATION) at 22:38

## 2021-11-03 RX ADMIN — HALOPERIDOL LACTATE 5 MG: 5 INJECTION, SOLUTION INTRAMUSCULAR at 04:26

## 2021-11-03 RX ADMIN — DILTIAZEM HYDROCHLORIDE 90 MG: 30 TABLET, FILM COATED ORAL at 17:24

## 2021-11-03 RX ADMIN — DILTIAZEM HYDROCHLORIDE 90 MG: 30 TABLET, FILM COATED ORAL at 23:58

## 2021-11-03 RX ADMIN — Medication 10 ML: at 08:22

## 2021-11-03 RX ADMIN — FAMOTIDINE 40 MG: 20 TABLET, FILM COATED ORAL at 20:51

## 2021-11-03 RX ADMIN — IPRATROPIUM BROMIDE AND ALBUTEROL SULFATE 1 AMPULE: .5; 2.5 SOLUTION RESPIRATORY (INHALATION) at 02:30

## 2021-11-03 RX ADMIN — RIVAROXABAN 20 MG: 20 TABLET, FILM COATED ORAL at 17:24

## 2021-11-03 RX ADMIN — LEVETIRACETAM 1000 MG: 500 TABLET, FILM COATED ORAL at 08:21

## 2021-11-03 RX ADMIN — IPRATROPIUM BROMIDE AND ALBUTEROL SULFATE 1 AMPULE: .5; 2.5 SOLUTION RESPIRATORY (INHALATION) at 19:14

## 2021-11-03 RX ADMIN — HALOPERIDOL LACTATE 2 MG: 5 INJECTION, SOLUTION INTRAMUSCULAR at 17:24

## 2021-11-03 RX ADMIN — DILTIAZEM HYDROCHLORIDE 90 MG: 30 TABLET, FILM COATED ORAL at 12:05

## 2021-11-03 RX ADMIN — POTASSIUM BICARBONATE 40 MEQ: 782 TABLET, EFFERVESCENT ORAL at 11:11

## 2021-11-03 RX ADMIN — ALTEPLASE 1 MG: 2.2 INJECTION, POWDER, LYOPHILIZED, FOR SOLUTION INTRAVENOUS at 21:50

## 2021-11-03 RX ADMIN — IPRATROPIUM BROMIDE AND ALBUTEROL SULFATE 1 AMPULE: .5; 2.5 SOLUTION RESPIRATORY (INHALATION) at 14:23

## 2021-11-03 RX ADMIN — SODIUM CHLORIDE, PRESERVATIVE FREE 300 UNITS: 5 INJECTION INTRAVENOUS at 08:23

## 2021-11-03 RX ADMIN — THIAMINE HCL TAB 100 MG 100 MG: 100 TAB at 08:21

## 2021-11-03 RX ADMIN — HALOPERIDOL LACTATE 2 MG: 5 INJECTION, SOLUTION INTRAMUSCULAR at 11:09

## 2021-11-03 RX ADMIN — SODIUM CHLORIDE, PRESERVATIVE FREE 300 UNITS: 5 INJECTION INTRAVENOUS at 08:22

## 2021-11-03 RX ADMIN — Medication 10 ML: at 20:54

## 2021-11-03 ASSESSMENT — PULMONARY FUNCTION TESTS
PIF_VALUE: 23
PIF_VALUE: 21
PIF_VALUE: 21
PIF_VALUE: 22
PIF_VALUE: 22
PIF_VALUE: 21
PIF_VALUE: 20

## 2021-11-03 ASSESSMENT — PAIN SCALES - GENERAL
PAINLEVEL_OUTOF10: 0

## 2021-11-03 NOTE — CARE COORDINATION
11/3/21 1634 CM note: COVID (-) 10/16/21. Trach/peg on 10/29/21. Vent FIO2 30%, P5. Hx ETOH. Precert for LTAC at [facility] started 11/1/21. Discussed reviewing SNF facilities that accept vent pts in the event that insurance denies LTAC and emailed [facility] list to pts daughter, Su Vazquez, at: [email or web address]. CM/SW will continue to follow for discharge planning.  Electronically signed by Uvaldo Rosario RN on 11/3/2021 at 4:39 PM

## 2021-11-03 NOTE — PROGRESS NOTES
Department of Internal Medicine  General Internal Medicine  Attending Progress Note  Chief Complaint   Patient presents with    Delirium Tremens (DTS)     Pt states he usually drinks a fifth of kilo beam a day states he quit saturday. states he has been having visual hallucinations since yesterday. SUBJECTIVE:    Patient is trached. Seems a little restless. Heart rate still elevated.   Nonverbal.    OBJECTIVE      Medications    Current Facility-Administered Medications: haloperidol lactate (HALDOL) injection 1 mg, 1 mg, IntraVENous, Q6H PRN **OR** haloperidol lactate (HALDOL) injection 2 mg, 2 mg, IntraVENous, Q6H PRN  ziprasidone (GEODON) capsule 20 mg, 20 mg, Oral, Q12H  LORazepam (ATIVAN) injection 2 mg, 2 mg, IntraVENous, Once  levETIRAcetam (KEPPRA) tablet 1,000 mg, 1,000 mg, Oral, BID  dilTIAZem (CARDIZEM) tablet 90 mg, 90 mg, Oral, 4 times per day  famotidine (PEPCID) tablet 40 mg, 40 mg, Oral, BID  rivaroxaban (XARELTO) tablet 20 mg, 20 mg, Oral, Daily  folic acid (FOLVITE) tablet 1 mg, 1 mg, Oral, Daily  heparin flush 100 UNIT/ML injection 300 Units, 3 mL, IntraVENous, 2 times per day  heparin flush 100 UNIT/ML injection 300 Units, 3 mL, IntraCATHeter, PRN  thiamine mononitrate tablet 100 mg, 100 mg, Oral, Daily  sodium chloride flush 0.9 % injection 5-40 mL, 5-40 mL, IntraVENous, 2 times per day  sodium chloride flush 0.9 % injection 5-40 mL, 5-40 mL, IntraVENous, PRN  0.9 % sodium chloride infusion, 25 mL, IntraVENous, PRN  heparin flush 100 UNIT/ML injection 300 Units, 3 mL, IntraVENous, 2 times per day  heparin flush 100 UNIT/ML injection 300 Units, 3 mL, IntraCATHeter, PRN  lactulose (CHRONULAC) 10 GM/15ML solution 20 g, 20 g, Oral, BID  sertraline (ZOLOFT) tablet 100 mg, 100 mg, Oral, Daily  dexmedetomidine (PRECEDEX) 400 mcg in sodium chloride 0.9 % 100 mL infusion, 0.2-1.4 mcg/kg/hr, IntraVENous, Continuous  perflutren lipid microspheres (DEFINITY) injection 1.65 mg, 1.5 mL, IntraVENous, ONCE PRN  ondansetron (ZOFRAN-ODT) disintegrating tablet 4 mg, 4 mg, Oral, Q8H PRN **OR** ondansetron (ZOFRAN) injection 4 mg, 4 mg, IntraVENous, Q6H PRN  polyethylene glycol (GLYCOLAX) packet 17 g, 17 g, Oral, Daily PRN  acetaminophen (TYLENOL) tablet 650 mg, 650 mg, Oral, Q6H PRN **OR** acetaminophen (TYLENOL) suppository 650 mg, 650 mg, Rectal, Q6H PRN  ipratropium-albuterol (DUONEB) nebulizer solution 1 ampule, 1 ampule, Inhalation, Q4H  Physical    VITALS:  BP (!) 148/83   Pulse 136   Temp 99.2 °F (37.3 °C) (Axillary)   Resp 27   Ht 5' 11\" (1.803 m)   Wt 256 lb (116.1 kg)   SpO2 96%   BMI 35.70 kg/m²   CONSTITUTIONAL:  awake  EYES:  extra-ocular muscles intact and vision intact  ENT:  normocepalic, without obvious abnormality  NECK: Tracheostomy in place  LUNGS:  no increased work of breathing  CARDIOVASCULAR:  tachycardic with regular rhythm  ABDOMEN:  normal bowel sounds, soft and non-distended  MUSCULOSKELETAL:  there is no redness, warmth, or swelling of the joints  NEUROLOGIC:  Mental Status Exam:  Level of Alertness:   awake  SKIN:  no bruising or bleeding  Data    CBC:   Lab Results   Component Value Date    WBC 11.2 11/03/2021    RBC 3.18 11/03/2021    HGB 10.0 11/03/2021    HCT 30.7 11/03/2021    MCV 96.5 11/03/2021    MCH 31.4 11/03/2021    MCHC 32.6 11/03/2021    RDW 15.6 11/03/2021     11/03/2021    MPV 9.8 11/03/2021     BMP:    Lab Results   Component Value Date     11/03/2021    K 3.4 11/03/2021    K 4.3 10/12/2021     11/03/2021    CO2 23 11/03/2021    BUN 26 11/03/2021    LABALBU 2.7 10/24/2021    CREATININE 0.6 11/03/2021    CALCIUM 9.2 11/03/2021    GFRAA >60 11/03/2021    LABGLOM >60 11/03/2021    GLUCOSE 135 11/03/2021       ASSESSMENT AND PLAN      1. Altered mental status    2. Seizures ([facility] [address]    3. Withdrawal symptoms, alcohol, with delirium ([facility] [address]    4. Acute respiratory failure with hypoxia currently trached. 5.  Anemia:    6.   Aspiration pneumonia: Completed antibiotics. 7.  Metabolic encephalopathy:    Plans:  Patient currently off Precedex  Trach management per ICU. Discharge planning. Continue Keppra. Patient is currently tachycardia suspect that patient might have now finished withdrawing. Will defer to ICU for management. He is already started on medication to help with anxiety.

## 2021-11-03 NOTE — PROGRESS NOTES
Pulmonary/Critical Care Progress Note    We are following patient for respiratory failure    SUBJECTIVE:  25-year-old gentleman who is an alcoholic was admitted 26/56/2218 with hallucinations. Patient went on to have seizures and was intubated for airway protection and had I believe a pneumonia. Patient was trached and pegged inability to wean in his large secretions. Patient's past medical history is positive otherwise for alcoholism, depression, hypertension, coronary disease, GERD, COPD and morbid obesity as well as atrial fibrillation. Currently patient is doing well he is on 30% +5 of PEEP tidal line 500 mL and a rate of 18.   Saturations are 97% although his heart rate is 125    MEDICATIONS:   ziprasidone  20 mg Oral Q12H    LORazepam  2 mg IntraVENous Once    levETIRAcetam  1,000 mg Oral BID    acetylcysteine  600 mg Oral BID    dilTIAZem  90 mg Oral 4 times per day    famotidine  40 mg Oral BID    rivaroxaban  20 mg Oral Daily    folic acid  1 mg Oral Daily    heparin flush  3 mL IntraVENous 2 times per day    thiamine mononitrate  100 mg Oral Daily    sodium chloride flush  5-40 mL IntraVENous 2 times per day    heparin flush  3 mL IntraVENous 2 times per day    lactulose  20 g Oral BID    sertraline  100 mg Oral Daily    ipratropium-albuterol  1 ampule Inhalation Q4H      sodium chloride      dexmedetomidine HCl in NaCl Stopped (11/02/21 1100)     haloperidol lactate **OR** haloperidol lactate, heparin flush, sodium chloride flush, sodium chloride, heparin flush, perflutren lipid microspheres, ondansetron **OR** ondansetron, polyethylene glycol, acetaminophen **OR** acetaminophen    Review of Systems   Unable to perform ROS: Intubated       OBJECTIVE:  Vitals:    11/03/21 1200   BP:    Pulse: 129   Resp: 26   Temp: 99.2 °F (37.3 °C)   SpO2: 98%     FiO2 : 30 %  O2 Flow Rate (L/min): 5 L/min  O2 Device: Ventilator    PHYSICAL EXAM:  Constitutional: Scruffy beard mildly obese  Skin: No skin breakdown no cyanosis  HEENT: Normocephalic neck is supple trach in place.   Neck: No JVD no sinus tenderness  Cardiovascular: Rate and rhythm are regular but tacky no murmur  Respiratory: Clear overall no consolidation  Gastrointestinal: Soft bowel sounds present  Genitourinary: Deferred  Extremities: 2/4 pulses no signs of DVT or ischemia no cellulitis  Neurological: Patient's easily agitated moves upper lower extremities  Psychological: Sedated    LABS:  WBC   Date Value Ref Range Status   11/03/2021 11.2 4.5 - 11.5 E9/L Final   11/02/2021 12.3 (H) 4.5 - 11.5 E9/L Final   11/01/2021 13.9 (H) 4.5 - 11.5 E9/L Final     Hemoglobin   Date Value Ref Range Status   11/03/2021 10.0 (L) 12.5 - 16.5 g/dL Final   11/02/2021 9.9 (L) 12.5 - 16.5 g/dL Final   11/01/2021 10.1 (L) 12.5 - 16.5 g/dL Final     Hematocrit   Date Value Ref Range Status   11/03/2021 30.7 (L) 37.0 - 54.0 % Final   11/02/2021 29.7 (L) 37.0 - 54.0 % Final   11/01/2021 30.4 (L) 37.0 - 54.0 % Final     MCV   Date Value Ref Range Status   11/03/2021 96.5 80.0 - 99.9 fL Final   11/02/2021 94.6 80.0 - 99.9 fL Final   11/01/2021 95.3 80.0 - 99.9 fL Final     Platelets   Date Value Ref Range Status   11/03/2021 370 130 - 450 E9/L Final   11/02/2021 355 130 - 450 E9/L Final   11/01/2021 365 130 - 450 E9/L Final     Sodium   Date Value Ref Range Status   11/03/2021 145 132 - 146 mmol/L Final   11/02/2021 143 132 - 146 mmol/L Final   11/01/2021 140 132 - 146 mmol/L Final     Potassium   Date Value Ref Range Status   11/03/2021 3.4 (L) 3.5 - 5.0 mmol/L Final   11/02/2021 3.6 3.5 - 5.0 mmol/L Final   11/01/2021 3.8 3.5 - 5.0 mmol/L Final     Potassium reflex Magnesium   Date Value Ref Range Status   10/12/2021 4.3 3.5 - 5.0 mmol/L Final     Chloride   Date Value Ref Range Status   11/03/2021 109 (H) 98 - 107 mmol/L Final   11/02/2021 109 (H) 98 - 107 mmol/L Final   11/01/2021 107 98 - 107 mmol/L Final     CO2   Date Value Ref Range Status   11/03/2021 23 22 - 29 mmol/L Final   11/02/2021 23 22 - 29 mmol/L Final   11/01/2021 23 22 - 29 mmol/L Final     BUN   Date Value Ref Range Status   11/03/2021 26 (H) 6 - 23 mg/dL Final   11/02/2021 26 (H) 6 - 23 mg/dL Final   11/01/2021 24 (H) 6 - 23 mg/dL Final     CREATININE   Date Value Ref Range Status   11/03/2021 0.6 (L) 0.7 - 1.2 mg/dL Final   11/02/2021 0.6 (L) 0.7 - 1.2 mg/dL Final   11/01/2021 0.6 (L) 0.7 - 1.2 mg/dL Final     Glucose   Date Value Ref Range Status   11/03/2021 135 (H) 74 - 99 mg/dL Final   11/02/2021 152 (H) 74 - 99 mg/dL Final   11/01/2021 128 (H) 74 - 99 mg/dL Final     Calcium   Date Value Ref Range Status   11/03/2021 9.2 8.6 - 10.2 mg/dL Final   11/02/2021 9.3 8.6 - 10.2 mg/dL Final   11/01/2021 8.7 8.6 - 10.2 mg/dL Final     Total Protein   Date Value Ref Range Status   10/24/2021 5.9 (L) 6.4 - 8.3 g/dL Final   10/23/2021 6.0 (L) 6.4 - 8.3 g/dL Final   10/22/2021 6.2 (L) 6.4 - 8.3 g/dL Final     Albumin   Date Value Ref Range Status   10/24/2021 2.7 (L) 3.5 - 5.2 g/dL Final   10/23/2021 2.8 (L) 3.5 - 5.2 g/dL Final   10/22/2021 2.8 (L) 3.5 - 5.2 g/dL Final     Total Bilirubin   Date Value Ref Range Status   10/24/2021 0.5 0.0 - 1.2 mg/dL Final   10/23/2021 0.5 0.0 - 1.2 mg/dL Final   10/22/2021 0.8 0.0 - 1.2 mg/dL Final     Alkaline Phosphatase   Date Value Ref Range Status   10/24/2021 62 40 - 129 U/L Final   10/23/2021 58 40 - 129 U/L Final   10/22/2021 57 40 - 129 U/L Final     AST   Date Value Ref Range Status   10/24/2021 73 (H) 0 - 39 U/L Final   10/23/2021 55 (H) 0 - 39 U/L Final   10/22/2021 18 0 - 39 U/L Final     ALT   Date Value Ref Range Status   10/24/2021 61 (H) 0 - 40 U/L Final   10/23/2021 33 0 - 40 U/L Final   10/22/2021 11 0 - 40 U/L Final     GFR Non-   Date Value Ref Range Status   11/03/2021 >60 >=60 mL/min/1.73 Final     Comment:     Chronic Kidney Disease: less than 60 ml/min/1.73 sq.m. Kidney Failure: less than 15 ml/min/1.73 sq.m.   Results valid for PLACEMENT   Final Result   Satisfactory position of nasogastric tube. XR CHEST PORTABLE   Final Result   1. No interval change in the vague bilateral lower lobe airspace disease. 2. Possible trace bilateral pleural effusions. 3. The NG tube has been removed. US GALLBLADDER RUQ   Final Result   Mild fatty liver. No sonographic evidence of acute cholecystitis. CT ABDOMEN PELVIS W IV CONTRAST Additional Contrast? None   Final Result   Cardiomegaly with infiltrates and pleural effusions in the lung bases likely   pneumonia or mild CHF. There is no hiatal hernia. Distended gallbladder with mild wall thickening. Consider ultrasonography to   evaluate for cholecystitis. Diverticulosis of the colon with mild thickening of the sigmoid colon which   could be due to spasm or uncomplicated diverticulitis. 2.8 x 2.8 cm abdominal aortic aneurysm. RECOMMENDATIONS:   Abdominal aortic aneurysm. 5 year surveillance is recommended. XR CHEST PORTABLE   Final Result   Worsening left greater than right perihilar opacities which may represent   asymmetric edema or pneumonia. Continued follow-up recommended. XR ABDOMEN (KUB) (SINGLE AP VIEW)   Final Result   No significant changes detailed above. XR CHEST ABDOMEN NG PLACEMENT   Final Result   The enteric tube terminates in the region of the proximal stomach. Recommend   consideration of advancement by at least 6 cm to ensure intragastric location   of the side hole. XR CHEST PORTABLE   Final Result   ET tube terminates between the clavicular heads and the karo. Enteric tube is poorly visualized at the GE junction and below; if position   of enteric tube tip needs to be known then KUB would better evaluate for it. Stable mild pulmonary vascular congestion. Stable small pleural effusions. Stable cardiomegaly.          XR CHEST PORTABLE   Final Result   No interval change compared to prior exam.      Persistent small bilateral pleural effusions. Ongoing mild-to-moderate pulmonary edema. XR CHEST PORTABLE   Final Result   New right IJ line with the distal tip in the SVC and no pneumothorax. Slightly more confluent airspace disease in the right lung base. No other significant interval change. XR CHEST PORTABLE   Final Result   1. Satisfactory position of the NG tube and endotracheal tube   2. New opacity within the right lung base which could represent a pleural   effusion and or right lung base infiltrate. XR CHEST PORTABLE   Final Result   1. Cardiomegaly. There is no evidence of failure or pneumonia. CT Head WO Contrast   Final Result   1. There is no acute intracranial abnormality. Specifically, there is no   intracranial hemorrhage. 2. Atrophy and periventricular leukomalacia,   . PROBLEM LIST:  Principal Problem:    Altered mental status  Active Problems:    Seizures ([address]    Withdrawal symptoms, alcohol, with delirium ([address]  Resolved Problems:    * No resolved hospital problems. *      IMPRESSION:  1. History of acute on chronic respiratory failure with pneumonia persistent moderate to large secretions  History of alcohol withdrawal and seizures  History protein caloric malnutrition  History of anemia    PLAN:  1. Control his secretions through pulmonary program  2. Slow weaning  3. Nutritional support  4. Case discussed with nursing    ATTESTATION:  ICU Staff Physician note of personal involvement in Care  As the attending physician, I certify that I personally reviewed the patients history and personnally examined the patient to confirm the physical findings described above,  And that I reviewed the relevant imaging studies and available reports. I also discussed the differential diagnosis and all of the proposed management plans with the patient and individuals accompanying the patient to this visit.   They had the

## 2021-11-03 NOTE — PROGRESS NOTES
Comprehensive Nutrition Assessment    Type and Reason for Visit:  Reassess    Nutrition Recommendations/Plan: Continue with current nutr'l regimen    Nutrition Assessment:  Pt remains intubated, NPO, Trach/PEG EN intitiated. Pt w/ ETOH w/draw, metabolic encephalopathy and aspiration PNA. Noted A-fib w/ RVR. Hx COPD. Continue current EN regimen and monitor. Malnutrition Assessment:  Malnutrition Status: At risk for malnutrition (Comment)    Context:  Chronic Illness     Findings of the 6 clinical characteristics of malnutrition:  Energy Intake:  Mild decrease in energy intake (Comment)  Weight Loss:  Unable to assess (no wt hx)     Body Fat Loss:  No significant body fat loss     Muscle Mass Loss:  No significant muscle mass loss    Fluid Accumulation:        Strength:  Not Performed    Estimated Daily Nutrient Needs:  Energy (kcal):  8243-8884; Weight Used for Energy Requirements:  Current     Protein (g):  120-140 (1.5-1.8 (monitor liver enzymes));  Weight Used for Protein Requirements:  Ideal        Fluid (ml/day):  per critical care; Method Used for Fluid Requirements:         Nutrition Related Findings:  Intubated, Abd round, Trach/PEG, +BS, BLE +2 pitting edema,+I/O +1.4L      Wounds:  Skin Tears       Current Nutrition Therapies:    Current Tube Feeding (TF) Orders:  · Feeding Route: Nasogastric  · Formula: Immune Enhancing  · Schedule: Continuous  · Additives/Modulars: Protein (1 protein modular daily)  · Water Flushes: 30 ml before and after pro mod = 60 ml  · Current TF & Flush Orders Provides:    · Goal TF & Flush Orders Provides: @ 45 ml/hr = 1080 tv, 1620 kcal, 101 g pro (1724 kcal, 127 g pro w/ pro mod), 810 ml free water (870 ml total free water w/ flush)      Anthropometric Measures:  · Height: 5' 11\" (180.3 cm)  · Current Body Weight: 256 lb (116.1 kg) (11/1 bed scale)   · Admission Body Weight: 277 lb (125.6 kg) (10/12 bed per flow sheet)    · Usual Body Weight:  (no wt hx per EMR) · Ideal Body Weight: 172 lbs; % Ideal Body Weight 148.8 %   · BMI: 35.7  · Adjusted Body Weight:  ; No Adjustment   · BMI Categories: Obese Class 2 (BMI 35.0 -39.9)       Nutrition Diagnosis:   · Inadequate oral intake related to impaired respiratory function as evidenced by NPO or clear liquid status due to medical condition, intubation, nutrition support - enteral nutrition      Nutrition Interventions:   Food and/or Nutrient Delivery:  Continue Current Tube Feeding  Nutrition Education/Counseling:  No recommendation at this time   Coordination of Nutrition Care:  Continue to monitor while inpatient    Goals:  Pt to tolerate EN       Nutrition Monitoring and Evaluation:   Behavioral-Environmental Outcomes:  None Identified   Food/Nutrient Intake Outcomes:  Enteral Nutrition Intake/Tolerance  Physical Signs/Symptoms Outcomes:  Biochemical Data, GI Status, Fluid Status or Edema, Hemodynamic Status, Nutrition Focused Physical Findings, Skin, Weight     Discharge Planning:     Too soon to determine     Electronically signed by Eduardo Thao RD, LD on 11/3/21 at 10:18 AM EDT    Contact: [phone]

## 2021-11-04 LAB
ANION GAP SERPL CALCULATED.3IONS-SCNC: 11 MMOL/L (ref 7–16)
BACTERIA: ABNORMAL /HPF
BILIRUBIN URINE: ABNORMAL
BLOOD, URINE: ABNORMAL
BUN BLDV-MCNC: 29 MG/DL (ref 6–23)
CALCIUM SERPL-MCNC: 9.2 MG/DL (ref 8.6–10.2)
CHLORIDE BLD-SCNC: 109 MMOL/L (ref 98–107)
CLARITY: CLEAR
CO2: 24 MMOL/L (ref 22–29)
COLOR: ABNORMAL
CREAT SERPL-MCNC: 0.7 MG/DL (ref 0.7–1.2)
GFR AFRICAN AMERICAN: >60
GFR NON-AFRICAN AMERICAN: >60 ML/MIN/1.73
GLUCOSE BLD-MCNC: 147 MG/DL (ref 74–99)
GLUCOSE URINE: NEGATIVE MG/DL
HCT VFR BLD CALC: 29.7 % (ref 37–54)
HEMOGLOBIN: 9.6 G/DL (ref 12.5–16.5)
KETONES, URINE: ABNORMAL MG/DL
LEUKOCYTE ESTERASE, URINE: NEGATIVE
MAGNESIUM: 1.8 MG/DL (ref 1.6–2.6)
MCH RBC QN AUTO: 31 PG (ref 26–35)
MCHC RBC AUTO-ENTMCNC: 32.3 % (ref 32–34.5)
MCV RBC AUTO: 95.8 FL (ref 80–99.9)
NITRITE, URINE: NEGATIVE
PDW BLD-RTO: 15.7 FL (ref 11.5–15)
PH UA: 5.5 (ref 5–9)
PHOSPHORUS: 4.7 MG/DL (ref 2.5–4.5)
PLATELET # BLD: 358 E9/L (ref 130–450)
PMV BLD AUTO: 9.7 FL (ref 7–12)
POTASSIUM SERPL-SCNC: 4.1 MMOL/L (ref 3.5–5)
PROTEIN UA: 30 MG/DL
RBC # BLD: 3.1 E12/L (ref 3.8–5.8)
RBC UA: >20 /HPF (ref 0–2)
SODIUM BLD-SCNC: 144 MMOL/L (ref 132–146)
SPECIFIC GRAVITY UA: 1.02 (ref 1–1.03)
TRIGL SERPL-MCNC: 73 MG/DL (ref 0–149)
UROBILINOGEN, URINE: >=8 E.U./DL
WBC # BLD: 9.8 E9/L (ref 4.5–11.5)
WBC UA: ABNORMAL /HPF (ref 0–5)

## 2021-11-04 PROCEDURE — 6370000000 HC RX 637 (ALT 250 FOR IP): Performed by: INTERNAL MEDICINE

## 2021-11-04 PROCEDURE — 6360000002 HC RX W HCPCS

## 2021-11-04 PROCEDURE — 6370000000 HC RX 637 (ALT 250 FOR IP)

## 2021-11-04 PROCEDURE — 87077 CULTURE AEROBIC IDENTIFY: CPT

## 2021-11-04 PROCEDURE — 81001 URINALYSIS AUTO W/SCOPE: CPT

## 2021-11-04 PROCEDURE — 36415 COLL VENOUS BLD VENIPUNCTURE: CPT

## 2021-11-04 PROCEDURE — 94003 VENT MGMT INPAT SUBQ DAY: CPT

## 2021-11-04 PROCEDURE — 87070 CULTURE OTHR SPECIMN AEROBIC: CPT

## 2021-11-04 PROCEDURE — 99232 SBSQ HOSP IP/OBS MODERATE 35: CPT | Performed by: INTERNAL MEDICINE

## 2021-11-04 PROCEDURE — 83735 ASSAY OF MAGNESIUM: CPT

## 2021-11-04 PROCEDURE — 6360000002 HC RX W HCPCS: Performed by: INTERNAL MEDICINE

## 2021-11-04 PROCEDURE — 6370000000 HC RX 637 (ALT 250 FOR IP): Performed by: NURSE PRACTITIONER

## 2021-11-04 PROCEDURE — 87088 URINE BACTERIA CULTURE: CPT

## 2021-11-04 PROCEDURE — 87186 SC STD MICRODIL/AGAR DIL: CPT

## 2021-11-04 PROCEDURE — 80048 BASIC METABOLIC PNL TOTAL CA: CPT

## 2021-11-04 PROCEDURE — 85027 COMPLETE CBC AUTOMATED: CPT

## 2021-11-04 PROCEDURE — 87040 BLOOD CULTURE FOR BACTERIA: CPT

## 2021-11-04 PROCEDURE — 97110 THERAPEUTIC EXERCISES: CPT

## 2021-11-04 PROCEDURE — 94640 AIRWAY INHALATION TREATMENT: CPT

## 2021-11-04 PROCEDURE — 2580000003 HC RX 258: Performed by: INTERNAL MEDICINE

## 2021-11-04 PROCEDURE — 84100 ASSAY OF PHOSPHORUS: CPT

## 2021-11-04 PROCEDURE — 84478 ASSAY OF TRIGLYCERIDES: CPT

## 2021-11-04 PROCEDURE — 2000000000 HC ICU R&B

## 2021-11-04 PROCEDURE — 87206 SMEAR FLUORESCENT/ACID STAI: CPT

## 2021-11-04 PROCEDURE — 36592 COLLECT BLOOD FROM PICC: CPT

## 2021-11-04 RX ORDER — SULFAMETHOXAZOLE AND TRIMETHOPRIM 800; 160 MG/1; MG/1
1 TABLET ORAL EVERY 12 HOURS SCHEDULED
Status: DISCONTINUED | OUTPATIENT
Start: 2021-11-04 | End: 2021-11-09 | Stop reason: CLARIF

## 2021-11-04 RX ORDER — SULFAMETHOXAZOLE AND TRIMETHOPRIM 800; 160 MG/1; MG/1
1 TABLET ORAL EVERY 12 HOURS SCHEDULED
Status: DISCONTINUED | OUTPATIENT
Start: 2021-11-04 | End: 2021-11-04

## 2021-11-04 RX ORDER — FUROSEMIDE 10 MG/ML
40 INJECTION INTRAMUSCULAR; INTRAVENOUS DAILY
Status: COMPLETED | OUTPATIENT
Start: 2021-11-04 | End: 2021-11-07

## 2021-11-04 RX ADMIN — ZIPRASIDONE HYDROCHLORIDE 20 MG: 20 CAPSULE ORAL at 12:42

## 2021-11-04 RX ADMIN — FOLIC ACID 1 MG: 1 TABLET ORAL at 09:03

## 2021-11-04 RX ADMIN — LEVETIRACETAM 1000 MG: 500 TABLET, FILM COATED ORAL at 21:01

## 2021-11-04 RX ADMIN — IPRATROPIUM BROMIDE AND ALBUTEROL SULFATE 1 AMPULE: .5; 2.5 SOLUTION RESPIRATORY (INHALATION) at 23:06

## 2021-11-04 RX ADMIN — DILTIAZEM HYDROCHLORIDE 90 MG: 30 TABLET, FILM COATED ORAL at 12:42

## 2021-11-04 RX ADMIN — RIVAROXABAN 20 MG: 20 TABLET, FILM COATED ORAL at 17:35

## 2021-11-04 RX ADMIN — IPRATROPIUM BROMIDE AND ALBUTEROL SULFATE 1 AMPULE: .5; 2.5 SOLUTION RESPIRATORY (INHALATION) at 06:34

## 2021-11-04 RX ADMIN — HALOPERIDOL LACTATE 2 MG: 5 INJECTION, SOLUTION INTRAMUSCULAR at 20:02

## 2021-11-04 RX ADMIN — SODIUM CHLORIDE, PRESERVATIVE FREE 300 UNITS: 5 INJECTION INTRAVENOUS at 21:02

## 2021-11-04 RX ADMIN — ZIPRASIDONE HYDROCHLORIDE 20 MG: 20 CAPSULE ORAL at 22:56

## 2021-11-04 RX ADMIN — SULFAMETHOXAZOLE AND TRIMETHOPRIM 1 TABLET: 800; 160 TABLET ORAL at 17:35

## 2021-11-04 RX ADMIN — ALTEPLASE 2 MG: 2.2 INJECTION, POWDER, LYOPHILIZED, FOR SOLUTION INTRAVENOUS at 00:36

## 2021-11-04 RX ADMIN — FUROSEMIDE 40 MG: 10 INJECTION, SOLUTION INTRAMUSCULAR; INTRAVENOUS at 17:35

## 2021-11-04 RX ADMIN — HALOPERIDOL LACTATE 2 MG: 5 INJECTION, SOLUTION INTRAMUSCULAR at 07:13

## 2021-11-04 RX ADMIN — IPRATROPIUM BROMIDE AND ALBUTEROL SULFATE 1 AMPULE: .5; 2.5 SOLUTION RESPIRATORY (INHALATION) at 10:27

## 2021-11-04 RX ADMIN — DILTIAZEM HYDROCHLORIDE 90 MG: 30 TABLET, FILM COATED ORAL at 22:50

## 2021-11-04 RX ADMIN — FAMOTIDINE 40 MG: 20 TABLET, FILM COATED ORAL at 09:03

## 2021-11-04 RX ADMIN — IPRATROPIUM BROMIDE AND ALBUTEROL SULFATE 1 AMPULE: .5; 2.5 SOLUTION RESPIRATORY (INHALATION) at 13:17

## 2021-11-04 RX ADMIN — LACTULOSE 20 G: 20 SOLUTION ORAL at 09:03

## 2021-11-04 RX ADMIN — LEVETIRACETAM 1000 MG: 500 TABLET, FILM COATED ORAL at 09:03

## 2021-11-04 RX ADMIN — DILTIAZEM HYDROCHLORIDE 90 MG: 30 TABLET, FILM COATED ORAL at 17:35

## 2021-11-04 RX ADMIN — Medication 10 ML: at 21:03

## 2021-11-04 RX ADMIN — IPRATROPIUM BROMIDE AND ALBUTEROL SULFATE 1 AMPULE: .5; 2.5 SOLUTION RESPIRATORY (INHALATION) at 17:54

## 2021-11-04 RX ADMIN — SODIUM CHLORIDE, PRESERVATIVE FREE 300 UNITS: 5 INJECTION INTRAVENOUS at 09:04

## 2021-11-04 RX ADMIN — THIAMINE HCL TAB 100 MG 100 MG: 100 TAB at 09:03

## 2021-11-04 RX ADMIN — LORAZEPAM 2 MG: 2 INJECTION INTRAMUSCULAR; INTRAVENOUS at 20:06

## 2021-11-04 RX ADMIN — IPRATROPIUM BROMIDE AND ALBUTEROL SULFATE 1 AMPULE: .5; 2.5 SOLUTION RESPIRATORY (INHALATION) at 02:23

## 2021-11-04 RX ADMIN — FAMOTIDINE 40 MG: 20 TABLET, FILM COATED ORAL at 21:00

## 2021-11-04 RX ADMIN — SERTRALINE HYDROCHLORIDE 100 MG: 50 TABLET ORAL at 09:03

## 2021-11-04 RX ADMIN — Medication 10 ML: at 09:04

## 2021-11-04 RX ADMIN — DILTIAZEM HYDROCHLORIDE 90 MG: 30 TABLET, FILM COATED ORAL at 05:56

## 2021-11-04 ASSESSMENT — PULMONARY FUNCTION TESTS
PIF_VALUE: 18
PIF_VALUE: 20
PIF_VALUE: 24
PIF_VALUE: 23
PIF_VALUE: 21
PIF_VALUE: 20
PIF_VALUE: 22
PIF_VALUE: 20
PIF_VALUE: 20
PIF_VALUE: 19
PIF_VALUE: 24
PIF_VALUE: 20
PIF_VALUE: 24
PIF_VALUE: 23
PIF_VALUE: 24
PIF_VALUE: 50
PIF_VALUE: 19

## 2021-11-04 ASSESSMENT — PAIN SCALES - GENERAL
PAINLEVEL_OUTOF10: 0

## 2021-11-04 NOTE — PROGRESS NOTES
Pulmonary/Critical Care Progress Note    We are following patient for respiratory failure    SUBJECTIVE:  79-year-old gentleman alcoholic admitted 01/04 with withdrawal and seizures. Patient was intubated with pneumonia and respiratory failure eventually undergoing a tracheostomy.     MEDICATIONS:   ziprasidone  20 mg Oral Q12H    LORazepam  2 mg IntraVENous Once    levETIRAcetam  1,000 mg Oral BID    dilTIAZem  90 mg Oral 4 times per day    famotidine  40 mg Oral BID    rivaroxaban  20 mg Oral Daily    folic acid  1 mg Oral Daily    heparin flush  3 mL IntraVENous 2 times per day    thiamine mononitrate  100 mg Oral Daily    sodium chloride flush  5-40 mL IntraVENous 2 times per day    heparin flush  3 mL IntraVENous 2 times per day    lactulose  20 g Oral BID    sertraline  100 mg Oral Daily    ipratropium-albuterol  1 ampule Inhalation Q4H      sodium chloride      dexmedetomidine HCl in NaCl Stopped (11/02/21 1100)     haloperidol lactate **OR** haloperidol lactate, heparin flush, sodium chloride flush, sodium chloride, heparin flush, perflutren lipid microspheres, ondansetron **OR** ondansetron, polyethylene glycol, acetaminophen **OR** acetaminophen    Review of Systems   Unable to perform ROS: Intubated       OBJECTIVE:  Vitals:    11/04/21 1300   BP: 103/77   Pulse: 126   Resp: 27   Temp:    SpO2: 98%     FiO2 : 30 %  O2 Flow Rate (L/min): 5 L/min  O2 Device: Ventilator    PHYSICAL EXAM:  Constitutional: Chronically ill gentleman easily agitated  Skin: +2 edema generalized  HEENT: Normocephalic neck is supple pharynx is clear patient has a trach  Neck: Trach is midline no JVD  Cardiovascular: Rate and rhythm are regular no gallop no murmur  Respiratory: Few rales bilaterally no large secretions  Gastrointestinal: Soft bowel sounds present no peritoneal signs  Genitourinary: Deferred  Extremities: +2 edema generalized no DVT signs no cellulitis  Neurological: Moves upper extremities confused  Psychological: Confused trach    LABS:  WBC   Date Value Ref Range Status   11/04/2021 9.8 4.5 - 11.5 E9/L Final   11/03/2021 11.2 4.5 - 11.5 E9/L Final   11/02/2021 12.3 (H) 4.5 - 11.5 E9/L Final     Hemoglobin   Date Value Ref Range Status   11/04/2021 9.6 (L) 12.5 - 16.5 g/dL Final   11/03/2021 10.0 (L) 12.5 - 16.5 g/dL Final   11/02/2021 9.9 (L) 12.5 - 16.5 g/dL Final     Hematocrit   Date Value Ref Range Status   11/04/2021 29.7 (L) 37.0 - 54.0 % Final   11/03/2021 30.7 (L) 37.0 - 54.0 % Final   11/02/2021 29.7 (L) 37.0 - 54.0 % Final     MCV   Date Value Ref Range Status   11/04/2021 95.8 80.0 - 99.9 fL Final   11/03/2021 96.5 80.0 - 99.9 fL Final   11/02/2021 94.6 80.0 - 99.9 fL Final     Platelets   Date Value Ref Range Status   11/04/2021 358 130 - 450 E9/L Final   11/03/2021 370 130 - 450 E9/L Final   11/02/2021 355 130 - 450 E9/L Final     Sodium   Date Value Ref Range Status   11/04/2021 144 132 - 146 mmol/L Final   11/03/2021 145 132 - 146 mmol/L Final   11/02/2021 143 132 - 146 mmol/L Final     Potassium   Date Value Ref Range Status   11/04/2021 4.1 3.5 - 5.0 mmol/L Final   11/03/2021 3.4 (L) 3.5 - 5.0 mmol/L Final   11/02/2021 3.6 3.5 - 5.0 mmol/L Final     Potassium reflex Magnesium   Date Value Ref Range Status   10/12/2021 4.3 3.5 - 5.0 mmol/L Final     Chloride   Date Value Ref Range Status   11/04/2021 109 (H) 98 - 107 mmol/L Final   11/03/2021 109 (H) 98 - 107 mmol/L Final   11/02/2021 109 (H) 98 - 107 mmol/L Final     CO2   Date Value Ref Range Status   11/04/2021 24 22 - 29 mmol/L Final   11/03/2021 23 22 - 29 mmol/L Final   11/02/2021 23 22 - 29 mmol/L Final     BUN   Date Value Ref Range Status   11/04/2021 29 (H) 6 - 23 mg/dL Final   11/03/2021 26 (H) 6 - 23 mg/dL Final   11/02/2021 26 (H) 6 - 23 mg/dL Final     CREATININE   Date Value Ref Range Status   11/04/2021 0.7 0.7 - 1.2 mg/dL Final   11/03/2021 0.6 (L) 0.7 - 1.2 mg/dL Final   11/02/2021 0.6 (L) 0.7 - 1.2 mg/dL Final Glucose   Date Value Ref Range Status   11/04/2021 147 (H) 74 - 99 mg/dL Final   11/03/2021 135 (H) 74 - 99 mg/dL Final   11/02/2021 152 (H) 74 - 99 mg/dL Final     Calcium   Date Value Ref Range Status   11/04/2021 9.2 8.6 - 10.2 mg/dL Final   11/03/2021 9.2 8.6 - 10.2 mg/dL Final   11/02/2021 9.3 8.6 - 10.2 mg/dL Final     Total Protein   Date Value Ref Range Status   10/24/2021 5.9 (L) 6.4 - 8.3 g/dL Final   10/23/2021 6.0 (L) 6.4 - 8.3 g/dL Final   10/22/2021 6.2 (L) 6.4 - 8.3 g/dL Final     Albumin   Date Value Ref Range Status   10/24/2021 2.7 (L) 3.5 - 5.2 g/dL Final   10/23/2021 2.8 (L) 3.5 - 5.2 g/dL Final   10/22/2021 2.8 (L) 3.5 - 5.2 g/dL Final     Total Bilirubin   Date Value Ref Range Status   10/24/2021 0.5 0.0 - 1.2 mg/dL Final   10/23/2021 0.5 0.0 - 1.2 mg/dL Final   10/22/2021 0.8 0.0 - 1.2 mg/dL Final     Alkaline Phosphatase   Date Value Ref Range Status   10/24/2021 62 40 - 129 U/L Final   10/23/2021 58 40 - 129 U/L Final   10/22/2021 57 40 - 129 U/L Final     AST   Date Value Ref Range Status   10/24/2021 73 (H) 0 - 39 U/L Final   10/23/2021 55 (H) 0 - 39 U/L Final   10/22/2021 18 0 - 39 U/L Final     ALT   Date Value Ref Range Status   10/24/2021 61 (H) 0 - 40 U/L Final   10/23/2021 33 0 - 40 U/L Final   10/22/2021 11 0 - 40 U/L Final     GFR Non-   Date Value Ref Range Status   11/04/2021 >60 >=60 mL/min/1.73 Final     Comment:     Chronic Kidney Disease: less than 60 ml/min/1.73 sq.m. Kidney Failure: less than 15 ml/min/1.73 sq.m. Results valid for patients 18 years and older. 11/03/2021 >60 >=60 mL/min/1.73 Final     Comment:     Chronic Kidney Disease: less than 60 ml/min/1.73 sq.m. Kidney Failure: less than 15 ml/min/1.73 sq.m. Results valid for patients 18 years and older. 11/02/2021 >60 >=60 mL/min/1.73 Final     Comment:     Chronic Kidney Disease: less than 60 ml/min/1.73 sq.m.           Kidney Failure: less than 15 ml/min/1.73 sq.m.  Results valid for patients 18 years and older. GFR    Date Value Ref Range Status   11/04/2021 >60  Final   11/03/2021 >60  Final   11/02/2021 >60  Final     Magnesium   Date Value Ref Range Status   11/04/2021 1.8 1.6 - 2.6 mg/dL Final   11/03/2021 1.8 1.6 - 2.6 mg/dL Final   11/02/2021 1.8 1.6 - 2.6 mg/dL Final     Phosphorus   Date Value Ref Range Status   11/04/2021 4.7 (H) 2.5 - 4.5 mg/dL Final   11/03/2021 4.1 2.5 - 4.5 mg/dL Final   11/02/2021 4.0 2.5 - 4.5 mg/dL Final     No results for input(s): PH, PO2, PCO2, HCO3, BE, O2SAT in the last 72 hours. RADIOLOGY:  XR CHEST PORTABLE   Final Result   Stable small bilateral pleural effusions, with bibasilar atelectasis or   infiltrates         XR CHEST PORTABLE   Final Result   Right PICC line tip is at the cavoatrial junction. Stable prominent cardiac silhouette and bilateral pleural effusions with   bibasilar atelectasis or infiltrates         XR CHEST PORTABLE   Final Result   No acute process. Cardiomegaly. XR CHEST PORTABLE   Final Result   Cardiomegaly and postop changes. ETT is in good position. Right IJ catheter   shows no significant interval change. Small bilateral effusions, slightly   improved compared to October 20. MRI BRAIN W WO CONTRAST   Final Result   No acute intracranial abnormality visualized. CT HEAD WO CONTRAST   Final Result   1. No acute intracranial abnormality. 2. Mucosal disease of the paranasal sinuses. XR ABDOMEN FOR NG/OG/NE TUBE PLACEMENT   Final Result   Satisfactory position of nasogastric tube. XR CHEST PORTABLE   Final Result   1. No interval change in the vague bilateral lower lobe airspace disease. 2. Possible trace bilateral pleural effusions. 3. The NG tube has been removed. US GALLBLADDER RUQ   Final Result   Mild fatty liver. No sonographic evidence of acute cholecystitis.          CT ABDOMEN PELVIS W IV CONTRAST Additional Contrast? None   Final Result   Cardiomegaly with infiltrates and pleural effusions in the lung bases likely   pneumonia or mild CHF. There is no hiatal hernia. Distended gallbladder with mild wall thickening. Consider ultrasonography to   evaluate for cholecystitis. Diverticulosis of the colon with mild thickening of the sigmoid colon which   could be due to spasm or uncomplicated diverticulitis. 2.8 x 2.8 cm abdominal aortic aneurysm. RECOMMENDATIONS:   Abdominal aortic aneurysm. 5 year surveillance is recommended. XR CHEST PORTABLE   Final Result   Worsening left greater than right perihilar opacities which may represent   asymmetric edema or pneumonia. Continued follow-up recommended. XR ABDOMEN (KUB) (SINGLE AP VIEW)   Final Result   No significant changes detailed above. XR CHEST ABDOMEN NG PLACEMENT   Final Result   The enteric tube terminates in the region of the proximal stomach. Recommend   consideration of advancement by at least 6 cm to ensure intragastric location   of the side hole. XR CHEST PORTABLE   Final Result   ET tube terminates between the clavicular heads and the karo. Enteric tube is poorly visualized at the GE junction and below; if position   of enteric tube tip needs to be known then KUB would better evaluate for it. Stable mild pulmonary vascular congestion. Stable small pleural effusions. Stable cardiomegaly. XR CHEST PORTABLE   Final Result   No interval change compared to prior exam.      Persistent small bilateral pleural effusions. Ongoing mild-to-moderate pulmonary edema. XR CHEST PORTABLE   Final Result   New right IJ line with the distal tip in the SVC and no pneumothorax. Slightly more confluent airspace disease in the right lung base. No other significant interval change. XR CHEST PORTABLE   Final Result   1.  Satisfactory position of the NG tube and endotracheal tube   2. New opacity within the right lung base which could represent a pleural   effusion and or right lung base infiltrate. XR CHEST PORTABLE   Final Result   1. Cardiomegaly. There is no evidence of failure or pneumonia. CT Head WO Contrast   Final Result   1. There is no acute intracranial abnormality. Specifically, there is no   intracranial hemorrhage. 2. Atrophy and periventricular leukomalacia,   . PROBLEM LIST:  Principal Problem:    Altered mental status  Active Problems:    Seizures ([address]    Withdrawal symptoms, alcohol, with delirium ([address]  Resolved Problems:    * No resolved hospital problems. *      IMPRESSION:  1. Bibasilar pneumonia with respiratory failure  2. History of alcoholism and withdrawal improved  3. Protein caloric malnutrition  4. Generalized edema    PLAN:  1. Diuresis x3 days  2. Considerations for weaning transitioning to SIMV prior to CPAP tomorrow pending response to diuretics  3. Nutritional support    ATTESTATION:  ICU Staff Physician note of personal involvement in Care  As the attending physician, I certify that I personally reviewed the patients history and personnally examined the patient to confirm the physical findings described above,  And that I reviewed the relevant imaging studies and available reports. I also discussed the differential diagnosis and all of the proposed management plans with the patient and individuals accompanying the patient to this visit. They had the opportunity to ask questions about the proposed management plans and to have those questions answered. This patient has a high probability of sudden, clinically significant deterioration, which requires the highest level of physician preparedness to intervene urgently. I managed/supervised life or organ supporting interventions that required frequent physician assessment.    I devoted my full attention to the direct care of this patient for the

## 2021-11-04 NOTE — CARE COORDINATION
11/4/21 1458 CM note: COVID (-) 10/16/21. ICU patient. Trach/peg on 10/29/21. Vent FIO2 30%, P5. Hx ETOH. Per [employer], insurance has denied LTAC but is offering P2P. Per Elaina rivera, Dr Jael Sinclair has agreed to do P2P and this is being arranged. Updated pts daughter, Steve Felipe, on above. Steve Felipe states she hasn't started reviewing SNF vent facilities yet but states she will start working on that tonight. CM/SW will continue to follow for discharge planning. Electronically signed by Shayla Milton RN on 11/4/2021 at 3:03 PM     Update: 11/4/21 1518 Received call back from Steve Felipe, pts daughter, and she provided me with SNF choices in the event P2P is unable to overturn denial for LTAC; 1) [facility]- referral given to Janis rivera, and she is going to review and will need update on status of P2P. 2) [facility] 3) [facility]  Electronically signed by Shayla Milton RN on 11/4/2021 at 3:24 PM    Update: 11/4/21 1609 Received call from Bubba Renteria, [employer], who explains they were trying to set up P2P with Dr Jael Sinclair; however insurance requires a current treating physician to complete P2P therefore Dr Jael Sinclair can not do the P2P. Spoke with Dr Jennifer Rodrigues and he is agreeable to completing P2P if it is set up for today or tomorrow. Updated Bubba Renteria and she will have Mon Cord set [employer] with Dr Jennifer Rodrigues; his contact number was provided.  Electronically signed by Shayla Milton RN on 11/4/2021 at 4:24 PM

## 2021-11-04 NOTE — PROGRESS NOTES
PRN **OR** ondansetron (ZOFRAN) injection 4 mg, 4 mg, IntraVENous, Q6H PRN  polyethylene glycol (GLYCOLAX) packet 17 g, 17 g, Oral, Daily PRN  acetaminophen (TYLENOL) tablet 650 mg, 650 mg, Oral, Q6H PRN **OR** acetaminophen (TYLENOL) suppository 650 mg, 650 mg, Rectal, Q6H PRN  ipratropium-albuterol (DUONEB) nebulizer solution 1 ampule, 1 ampule, Inhalation, Q4H  Physical    VITALS:  /73   Pulse 128   Temp 101.4 °F (38.6 °C) (Axillary)   Resp 29   Ht 5' 11\" (1.803 m)   Wt 256 lb (116.1 kg)   SpO2 96%   BMI 35.70 kg/m²   CONSTITUTIONAL:  awake and mild distress  EYES:  extra-ocular muscles intact  ENT:  atraumatic  BACK:  symmetric  LUNGS:  no increased work of breathing and clear to auscultation  CARDIOVASCULAR:  normal apical pulses and tachycardic with regular rhythm  ABDOMEN:  normal bowel sounds, non-distended and non-tender  NEUROLOGIC:  Mental Status Exam:  Level of Alertness:   lethargic  SKIN:  no bruising or bleeding  Data    CBC:   Lab Results   Component Value Date    WBC 9.8 11/04/2021    RBC 3.10 11/04/2021    HGB 9.6 11/04/2021    HCT 29.7 11/04/2021    MCV 95.8 11/04/2021    MCH 31.0 11/04/2021    MCHC 32.3 11/04/2021    RDW 15.7 11/04/2021     11/04/2021    MPV 9.7 11/04/2021     BMP:    Lab Results   Component Value Date     11/04/2021    K 4.1 11/04/2021    K 4.3 10/12/2021     11/04/2021    CO2 24 11/04/2021    BUN 29 11/04/2021    LABALBU 2.7 10/24/2021    CREATININE 0.7 11/04/2021    CALCIUM 9.2 11/04/2021    GFRAA >60 11/04/2021    LABGLOM >60 11/04/2021    GLUCOSE 147 11/04/2021       ASSESSMENT AND PLAN      1. Altered mental status    2. Seizures ([address]    3. Withdrawal symptoms, alcohol, with delirium ([facility]    4. Acute respiratory failure with hypoxia: Now on trach    5. Pneumonia: Aspiration: Completed antibiotics. 6.  Metabolic encephalopathy:    7. Anemia:    8. Hypertension essential:    9.  UTI: Completed antibiotics.     Plans:  Patient still on vent. Heart rate is still elevated. Currently on diltiazem for heart rate control. Also on Xarelto as a prophylaxis/treatment of suspected atrial fibrillation. Patient at this irregular heart rate was per resolved. However to monitor seems most likely normal sinus tachycardia. Continue to monitor. Possible transfer unable to get a proper placement for patient. Continue Ativan as needed. Continue tube feeding.

## 2021-11-04 NOTE — PROGRESS NOTES
therapy to address concerns listed above for increased safety and function at discharge. PHYSICAL THERAPY  PLAN OF CARE       Physical therapy plan of care is established based on physician order,  patient diagnosis and clinical assessment    Current Treatment Recommendations:    -Bed Mobility: Lower extremity exercises , Upper extremity exercises  and Trunk control activities   -Sitting Balance: Hands on support to maintain midline  and Facilitate active trunk muscle engagement   -Standing Balance: Instruct patient on adequate base of support to maintain balance  -Transfers: Provide instruction on proper hand and foot position for adequate transfer of weight onto lower extremities and use of gait device if needed, Cues for hand placement, technique and safety. Provide stabilization to prevent fall , Facilitate weight shift forward on to lower extremities and provide necessary stabilization of bilateral lower extremities  and Assist with extension of knees trunk and hip to accept weight transfer   -Gait: Gait training, Standing activities to improve: base of support, weight shift, weight bearing , Exercises to improve trunk control and Pregait training to emphasize: Base of support, Device control, Upright and Safety   -Endurance: Utilize Supervised activities to increase level of endurance to allow for safe functional mobility including transfers and gait  and Use graduated activities to promote good breathing techniques and provide support and education to maximize respiratory function    PT long term treatment goals are located in below grid    Patient and or family understand(s) diagnosis, prognosis, and plan of care. Frequency of treatments: Patient will be seen  2-3 times/week.          Prior Level of Function: unknown patient unable to answer  Rehab Potential: fair    for baseline    Past medical history:   Past Medical History:   Diagnosis Date    Alcohol abuse     [facility]rial fibrillation ([facility]     CAD (coronary artery disease)     COPD (chronic obstructive pulmonary disease) (HCC)     Depression     Fracture of unspecified phalanx of left middle finger, initial encounter for closed fracture     GERD (gastroesophageal reflux disease)     Hypertension     Seizure ([facility]      Past Surgical History:   Procedure Laterality Date    COLONOSCOPY      CORONARY ARTERY BYPASS GRAFT  3/2/05    x3: LIMA to LAD, SVG to RCA, SVG to diagonal    DIAGNOSTIC CARDIAC CATH LAB PROCEDURE  3/02/05    [facility]: Severe multivessel CAD in patient who presents with STEMI and total occlusion of diagonal branch. Significant disease of proximal LAD and severe disease of dominant RCA.  FINGER SURGERY Left 5/3/2019    CLOSED POSSIBLE LEFT MIDDLE FINGER OPEN REDUCTION INTERNAL FIXATION POSSIBLE PROXIMAL INTERPHALANGEAL JOINT ARTHROPLASTY   ++[facility] performed by Ryan Bowling MD at [place] [id number] N/A 10/20/2021    EGD WITH NG TUBE PLACEMENT **BEDSIDE** performed by David Alexis MD at [facility]      double    TRACHEOSTOMY N/A 10/29/2021    TRACHEOTOMY AND PEG performed by Fina Palomo MD at [facility] N/A 10/29/2021    EGD ESOPHAGOGASTRODUODENOSCOPY PEG TUBE INSERTION performed by Fina Palomo MD at [facility] beds at the [facility]:    Precautions:  , falls, alarm, vent and 2 point restraint , seizure,  alcohol abuse     Social history: Patient unable   Per chart, his  is his daughter who lives in [place].       Equipment owned: unknown,       AM-PAC Basic Mobility        AM-PAC Mobility Inpatient   How much difficulty turning over in bed?: Unable  How much difficulty sitting down on / standing up from a chair with arms?: Unable  How much difficulty moving from lying on back to sitting on side of bed?: Unable  How much help from another person moving to and from a bed to a chair?: Total  How much help from another person needed to walk in hospital room?: Total  How much help from another person for climbing 3-5 steps with a railing?: Total  AM-PAC Inpatient Mobility Raw Score : 6  AM-PAC Inpatient T-Scale Score : 23.55  Mobility Inpatient CMS 0-100% Score: 100  Mobility Inpatient CMS G-Code Modifier : CN    Nursing cleared patient for PT treatment. .   OBJECTIVE;   Initial Evaluation  Date: 10/26/2021 Treatment Date:    11/4/2021   Short Term/ Long Term   Goals   Was pt agreeable to Eval/treatment? Yes Yes To be met in 5 days   Pain level   Slight facial grimacing end range of motion stretch DF    No c/o of pain, no grimacing    Bed Mobility    Rolling: Not assessed     Supine to sit: Not assessed     Sit to supine: Not assessed     Scooting: Not assessed    Rolling: Not assessed    Supine to sit: Not assessed    Sit to supine: Not assessed    Scooting: Not assessed     Rolling: Moderate assist of 1    Supine to sit: Moderate assist of 1    Sit to supine: Moderate assist of 1    Scooting: Moderate assist of 1     Transfers Sit to stand: Not assessed    Sit to stand: Not assessed       Sit to stand:  Moderate assist of 1     Ambulation    not assessed  not assessed     10 feet using  wheeled walker with Moderate assist of 1    Stair negotiation: ascended and descended   Not assessed  Not assessed           ROM Slight bilateral plantar flexor contractures   Increase range of motion 10% of affected joints    Strength BUE:  refer to OT eval  bilateral lower extremities no active movement  Increase strength in affected mm groups by 1/3 grade   Balance Sitting EOB:  not assessed    Dynamic Standing:  not assessed   Sitting EOB: not assessed   Dynamic Standing: not assessed    Sitting EOB:  fair    Dynamic Standing: fair -     Patient is Alert & Oriented x 0 and does not follow directions    Sensation:   Unable to assess   Edema:  yes bilateral lower extremities and bilateral upper extremities   Endurance: poor      Vitals: ventilator Blood Pressure at rest  Blood Pressure during session    Heart Rate at rest  Heart Rate during session    SPO2 at rest %  SPO2 during session %     Patient education  Patient educated on role of Physical Therapy, risks of immobility, safety and plan of care,  importance of mobility while in hospital  and ankle pumps, quad set and glut set for edema control, blood clot prevention     Patient response to education:   Pt verbalized understanding Pt demonstrated skill Pt requires further education in this area   No No Yes      Treatment:  Patient practiced and was instructed/facilitated in the following treatment: Patient assisted with ROM activities as below. Patient does demonstrate some twitching with R LE and R UE. Blankets placed under bilateral UE for swelling. Therapeutic Exercises:  ankle pumps, heel slide, hip abduction/adduction, straight leg raise and wrist flexion/extension  x 10 reps. At end of session, patient in bed with alarm call light and phone within reach,  all lines and tubes intact, nursing notified. Patient would benefit from continued skilled Physical Therapy to improve functional independence and quality of life.          Patient's/ family goals   none stated    Time in  413  Time out  427    Total Treatment Time  14 minutes    CPT codes:  Therapeutic exercises (27196)   14 minutes  1 unit(s)    Hakeem Alvarado, [facility] #[id number]

## 2021-11-05 ENCOUNTER — APPOINTMENT (OUTPATIENT)
Dept: GENERAL RADIOLOGY | Age: 66
DRG: 004 | End: 2021-11-05
Payer: MEDICARE

## 2021-11-05 LAB
ANION GAP SERPL CALCULATED.3IONS-SCNC: 12 MMOL/L (ref 7–16)
BUN BLDV-MCNC: 31 MG/DL (ref 6–23)
CALCIUM SERPL-MCNC: 9.2 MG/DL (ref 8.6–10.2)
CHLORIDE BLD-SCNC: 110 MMOL/L (ref 98–107)
CO2: 25 MMOL/L (ref 22–29)
CREAT SERPL-MCNC: 0.7 MG/DL (ref 0.7–1.2)
GFR AFRICAN AMERICAN: >60
GFR NON-AFRICAN AMERICAN: >60 ML/MIN/1.73
GLUCOSE BLD-MCNC: 135 MG/DL (ref 74–99)
HCT VFR BLD CALC: 29 % (ref 37–54)
HEMOGLOBIN: 9.7 G/DL (ref 12.5–16.5)
MAGNESIUM: 1.8 MG/DL (ref 1.6–2.6)
MCH RBC QN AUTO: 31.5 PG (ref 26–35)
MCHC RBC AUTO-ENTMCNC: 33.4 % (ref 32–34.5)
MCV RBC AUTO: 94.2 FL (ref 80–99.9)
PDW BLD-RTO: 15.7 FL (ref 11.5–15)
PHOSPHORUS: 4.7 MG/DL (ref 2.5–4.5)
PLATELET # BLD: 343 E9/L (ref 130–450)
PMV BLD AUTO: 9.6 FL (ref 7–12)
POTASSIUM SERPL-SCNC: 3.9 MMOL/L (ref 3.5–5)
RBC # BLD: 3.08 E12/L (ref 3.8–5.8)
SODIUM BLD-SCNC: 147 MMOL/L (ref 132–146)
WBC # BLD: 9.7 E9/L (ref 4.5–11.5)

## 2021-11-05 PROCEDURE — 94003 VENT MGMT INPAT SUBQ DAY: CPT

## 2021-11-05 PROCEDURE — 6360000002 HC RX W HCPCS: Performed by: INTERNAL MEDICINE

## 2021-11-05 PROCEDURE — 2580000003 HC RX 258: Performed by: INTERNAL MEDICINE

## 2021-11-05 PROCEDURE — 36415 COLL VENOUS BLD VENIPUNCTURE: CPT

## 2021-11-05 PROCEDURE — 83735 ASSAY OF MAGNESIUM: CPT

## 2021-11-05 PROCEDURE — 6370000000 HC RX 637 (ALT 250 FOR IP): Performed by: INTERNAL MEDICINE

## 2021-11-05 PROCEDURE — 85027 COMPLETE CBC AUTOMATED: CPT

## 2021-11-05 PROCEDURE — 84100 ASSAY OF PHOSPHORUS: CPT

## 2021-11-05 PROCEDURE — 80048 BASIC METABOLIC PNL TOTAL CA: CPT

## 2021-11-05 PROCEDURE — 6370000000 HC RX 637 (ALT 250 FOR IP)

## 2021-11-05 PROCEDURE — 99232 SBSQ HOSP IP/OBS MODERATE 35: CPT | Performed by: INTERNAL MEDICINE

## 2021-11-05 PROCEDURE — 71045 X-RAY EXAM CHEST 1 VIEW: CPT

## 2021-11-05 PROCEDURE — 2000000000 HC ICU R&B

## 2021-11-05 PROCEDURE — 36591 DRAW BLOOD OFF VENOUS DEVICE: CPT

## 2021-11-05 PROCEDURE — 94640 AIRWAY INHALATION TREATMENT: CPT

## 2021-11-05 PROCEDURE — 6370000000 HC RX 637 (ALT 250 FOR IP): Performed by: NURSE PRACTITIONER

## 2021-11-05 RX ORDER — CHLORHEXIDINE GLUCONATE 4 G/100ML
SOLUTION TOPICAL DAILY
Status: DISCONTINUED | OUTPATIENT
Start: 2021-11-06 | End: 2021-11-13 | Stop reason: HOSPADM

## 2021-11-05 RX ORDER — CHLORHEXIDINE GLUCONATE 4 G/100ML
SOLUTION TOPICAL DAILY
Status: DISCONTINUED | OUTPATIENT
Start: 2021-11-05 | End: 2021-11-05

## 2021-11-05 RX ADMIN — FUROSEMIDE 40 MG: 10 INJECTION, SOLUTION INTRAMUSCULAR; INTRAVENOUS at 08:50

## 2021-11-05 RX ADMIN — IPRATROPIUM BROMIDE AND ALBUTEROL SULFATE 1 AMPULE: .5; 2.5 SOLUTION RESPIRATORY (INHALATION) at 17:12

## 2021-11-05 RX ADMIN — VANCOMYCIN HYDROCHLORIDE 1250 MG: 10 INJECTION, POWDER, LYOPHILIZED, FOR SOLUTION INTRAVENOUS at 15:48

## 2021-11-05 RX ADMIN — THIAMINE HCL TAB 100 MG 100 MG: 100 TAB at 08:51

## 2021-11-05 RX ADMIN — LEVETIRACETAM 1000 MG: 500 TABLET, FILM COATED ORAL at 20:41

## 2021-11-05 RX ADMIN — ZIPRASIDONE HYDROCHLORIDE 20 MG: 20 CAPSULE ORAL at 11:33

## 2021-11-05 RX ADMIN — SULFAMETHOXAZOLE AND TRIMETHOPRIM 1 TABLET: 800; 160 TABLET ORAL at 20:45

## 2021-11-05 RX ADMIN — LEVETIRACETAM 1000 MG: 500 TABLET, FILM COATED ORAL at 08:51

## 2021-11-05 RX ADMIN — DILTIAZEM HYDROCHLORIDE 90 MG: 30 TABLET, FILM COATED ORAL at 04:21

## 2021-11-05 RX ADMIN — FOLIC ACID 1 MG: 1 TABLET ORAL at 08:51

## 2021-11-05 RX ADMIN — IPRATROPIUM BROMIDE AND ALBUTEROL SULFATE 1 AMPULE: .5; 2.5 SOLUTION RESPIRATORY (INHALATION) at 13:59

## 2021-11-05 RX ADMIN — DILTIAZEM HYDROCHLORIDE 90 MG: 30 TABLET, FILM COATED ORAL at 18:41

## 2021-11-05 RX ADMIN — SODIUM CHLORIDE, PRESERVATIVE FREE 300 UNITS: 5 INJECTION INTRAVENOUS at 08:52

## 2021-11-05 RX ADMIN — HALOPERIDOL LACTATE 2 MG: 5 INJECTION, SOLUTION INTRAMUSCULAR at 03:36

## 2021-11-05 RX ADMIN — IPRATROPIUM BROMIDE AND ALBUTEROL SULFATE 1 AMPULE: .5; 2.5 SOLUTION RESPIRATORY (INHALATION) at 02:55

## 2021-11-05 RX ADMIN — SODIUM CHLORIDE, PRESERVATIVE FREE 300 UNITS: 5 INJECTION INTRAVENOUS at 08:50

## 2021-11-05 RX ADMIN — RIVAROXABAN 20 MG: 20 TABLET, FILM COATED ORAL at 18:41

## 2021-11-05 RX ADMIN — Medication 10 ML: at 08:52

## 2021-11-05 RX ADMIN — LACTULOSE 20 G: 20 SOLUTION ORAL at 08:50

## 2021-11-05 RX ADMIN — SULFAMETHOXAZOLE AND TRIMETHOPRIM 1 TABLET: 800; 160 TABLET ORAL at 11:33

## 2021-11-05 RX ADMIN — FAMOTIDINE 40 MG: 20 TABLET, FILM COATED ORAL at 20:42

## 2021-11-05 RX ADMIN — DILTIAZEM HYDROCHLORIDE 90 MG: 30 TABLET, FILM COATED ORAL at 10:15

## 2021-11-05 RX ADMIN — IPRATROPIUM BROMIDE AND ALBUTEROL SULFATE 1 AMPULE: .5; 2.5 SOLUTION RESPIRATORY (INHALATION) at 06:29

## 2021-11-05 RX ADMIN — FAMOTIDINE 40 MG: 20 TABLET, FILM COATED ORAL at 08:51

## 2021-11-05 RX ADMIN — IPRATROPIUM BROMIDE AND ALBUTEROL SULFATE 1 AMPULE: .5; 2.5 SOLUTION RESPIRATORY (INHALATION) at 11:25

## 2021-11-05 RX ADMIN — IPRATROPIUM BROMIDE AND ALBUTEROL SULFATE 1 AMPULE: .5; 2.5 SOLUTION RESPIRATORY (INHALATION) at 22:30

## 2021-11-05 RX ADMIN — Medication 10 ML: at 20:45

## 2021-11-05 RX ADMIN — SODIUM CHLORIDE, PRESERVATIVE FREE 300 UNITS: 5 INJECTION INTRAVENOUS at 20:44

## 2021-11-05 RX ADMIN — DILTIAZEM HYDROCHLORIDE 90 MG: 30 TABLET, FILM COATED ORAL at 22:28

## 2021-11-05 RX ADMIN — SERTRALINE HYDROCHLORIDE 100 MG: 50 TABLET ORAL at 08:51

## 2021-11-05 RX ADMIN — LACTULOSE 20 G: 20 SOLUTION ORAL at 20:40

## 2021-11-05 ASSESSMENT — PULMONARY FUNCTION TESTS
PIF_VALUE: 34
PIF_VALUE: 30
PIF_VALUE: 29
PIF_VALUE: 27
PIF_VALUE: 29
PIF_VALUE: 25
PIF_VALUE: 36
PIF_VALUE: 27
PIF_VALUE: 31
PIF_VALUE: 23
PIF_VALUE: 34
PIF_VALUE: 35
PIF_VALUE: 27
PIF_VALUE: 30
PIF_VALUE: 29
PIF_VALUE: 29
PIF_VALUE: 33
PIF_VALUE: 21
PIF_VALUE: 32
PIF_VALUE: 23
PIF_VALUE: 28
PIF_VALUE: 34
PIF_VALUE: 21
PIF_VALUE: 29
PIF_VALUE: 25
PIF_VALUE: 29
PIF_VALUE: 33

## 2021-11-05 ASSESSMENT — PAIN SCALES - GENERAL: PAINLEVEL_OUTOF10: 0

## 2021-11-05 NOTE — PROGRESS NOTES
CRITICAL CARE PROGRESS NOTE    The patient's case was discussed in multidisciplinary rounds including critical care specialist, nursing, RT and pharmacy. His evaluation is as follows:     1. Acute on chronic hypoxic and hypercapnic respiratory failure with Staphylococcal pneumonia  2. New onset atrial fibrillation  3. History of thrombocytopenia  4. Morbid obesity with obesity hypoventilation syndrome  5. Metabolic encephalopathy  6. Acute kidney injury  (resolved)  7. Aspiration pneumonia  8. UTI  9. Small pericardial effusion  10. Hematuria  11. 2.8 X 2.8 cm AAA  12. Small bilateral pleural effusions  13. Elevated liver enzymes      --Post tracheostomy and PEG tube insertion   --Started vancomycin for management of staphylococcal bacteremia  --Receiving Bactrim, will add vancomycin  --Management of atrial fibrillation with diltiazem  --Stop Geodon as the patient is lethargic  --Continue keppra, check level    DVT prophylaxis with enoxaparin  Nutrition: Tube feedings   Urinary catheter present   Goals of care: Full code    /76   Pulse 122   Temp 98.4 °F (36.9 °C) (Axillary)   Resp (!) 35   Ht 5' 11\" (1.803 m)   Wt 256 lb (116.1 kg)   SpO2 96%   BMI 35.70 kg/m²     General:  Lethargic, opens his eyes when I touch him  HEENT: No head lesions, PERRL, EOMI, mouth without lesions, no nasal lesions, tracheostomy in place  Respiratory: Lungs with equal breath sounds bilaterally, no adventitious sounds auscultated, no accessory muscle use  CV: Regular rate, no murmurs, JVD, no leg edema  Abdomen: Soft, non tender, + bowel sounds  Skin: Hydrated, adequate turgor, no rash, capillary refill <2 seconds  Extremities: Moves 4 limbs spontaneously, distal pulses present  Neurology: Lethargic, moves 4 limbs spontaneously, equal sensation, no dysmetria, neck is supple, no meningitic signs present.     Last 3 CMP:    Recent Labs     11/03/21 0430 11/04/21 0430 11/05/21 0420    144 147*   K 3.4* 4.1 3.9   * 109* 110*   CO2 23 24 25   BUN 26* 29* 31*   CREATININE 0.6* 0.7 0.7   GLUCOSE 135* 147* 135*   CALCIUM 9.2 9.2 9.2     Recent Labs     11/05/21  0420   WBC 9.7   RBC 3.08*   HGB 9.7*   HCT 29.0*   MCV 94.2   MCH 31.5   MCHC 33.4   RDW 15.7*      MPV 9.6       No results for input(s): BC in the last 72 hours. No results for input(s): Rachel Idler in the last 72 hours.     24 HR INTAKE/OUTPUT:      Intake/Output Summary (Last 24 hours) at 11/5/2021 1203  Last data filed at 11/5/2021 0800  Gross per 24 hour   Intake 1469 ml   Output 2625 ml   Net -1156 ml     MEDICATIONS:   furosemide  40 mg IntraVENous Daily    sulfamethoxazole-trimethoprim  1 tablet Oral 2 times per day    ziprasidone  20 mg Oral Q12H    levETIRAcetam  1,000 mg Oral BID    dilTIAZem  90 mg Oral 4 times per day    famotidine  40 mg Oral BID    rivaroxaban  20 mg Oral Daily    folic acid  1 mg Oral Daily    heparin flush  3 mL IntraVENous 2 times per day    thiamine mononitrate  100 mg Oral Daily    sodium chloride flush  5-40 mL IntraVENous 2 times per day    heparin flush  3 mL IntraVENous 2 times per day    lactulose  20 g Oral BID    sertraline  100 mg Oral Daily    ipratropium-albuterol  1 ampule Inhalation Q4H      sodium chloride      dexmedetomidine HCl in NaCl Stopped (11/02/21 1100)     haloperidol lactate **OR** haloperidol lactate, heparin flush, sodium chloride flush, sodium chloride, heparin flush, perflutren lipid microspheres, ondansetron **OR** ondansetron, polyethylene glycol, acetaminophen **OR** acetaminophen    OBJECTIVE:  Vitals:    11/05/21 1100   BP: 138/76   Pulse: 122   Resp: (!) 35   Temp:    SpO2: 96%     FiO2 : 30 %  O2 Flow Rate (L/min): 5 L/min  O2 Device: Ventilator        LABS:  WBC   Date Value Ref Range Status   11/05/2021 9.7 4.5 - 11.5 E9/L Final   11/04/2021 9.8 4.5 - 11.5 E9/L Final   11/03/2021 11.2 4.5 - 11.5 E9/L Final     Hemoglobin   Date Value Ref Range Status 11/05/2021 9.7 (L) 12.5 - 16.5 g/dL Final   11/04/2021 9.6 (L) 12.5 - 16.5 g/dL Final   11/03/2021 10.0 (L) 12.5 - 16.5 g/dL Final     Hematocrit   Date Value Ref Range Status   11/05/2021 29.0 (L) 37.0 - 54.0 % Final   11/04/2021 29.7 (L) 37.0 - 54.0 % Final   11/03/2021 30.7 (L) 37.0 - 54.0 % Final     MCV   Date Value Ref Range Status   11/05/2021 94.2 80.0 - 99.9 fL Final   11/04/2021 95.8 80.0 - 99.9 fL Final   11/03/2021 96.5 80.0 - 99.9 fL Final     Platelets   Date Value Ref Range Status   11/05/2021 343 130 - 450 E9/L Final   11/04/2021 358 130 - 450 E9/L Final   11/03/2021 370 130 - 450 E9/L Final     Sodium   Date Value Ref Range Status   11/05/2021 147 (H) 132 - 146 mmol/L Final   11/04/2021 144 132 - 146 mmol/L Final   11/03/2021 145 132 - 146 mmol/L Final     Potassium   Date Value Ref Range Status   11/05/2021 3.9 3.5 - 5.0 mmol/L Final   11/04/2021 4.1 3.5 - 5.0 mmol/L Final   11/03/2021 3.4 (L) 3.5 - 5.0 mmol/L Final     Potassium reflex Magnesium   Date Value Ref Range Status   10/12/2021 4.3 3.5 - 5.0 mmol/L Final     Chloride   Date Value Ref Range Status   11/05/2021 110 (H) 98 - 107 mmol/L Final   11/04/2021 109 (H) 98 - 107 mmol/L Final   11/03/2021 109 (H) 98 - 107 mmol/L Final     CO2   Date Value Ref Range Status   11/05/2021 25 22 - 29 mmol/L Final   11/04/2021 24 22 - 29 mmol/L Final   11/03/2021 23 22 - 29 mmol/L Final     BUN   Date Value Ref Range Status   11/05/2021 31 (H) 6 - 23 mg/dL Final   11/04/2021 29 (H) 6 - 23 mg/dL Final   11/03/2021 26 (H) 6 - 23 mg/dL Final     CREATININE   Date Value Ref Range Status   11/05/2021 0.7 0.7 - 1.2 mg/dL Final   11/04/2021 0.7 0.7 - 1.2 mg/dL Final   11/03/2021 0.6 (L) 0.7 - 1.2 mg/dL Final     Glucose   Date Value Ref Range Status   11/05/2021 135 (H) 74 - 99 mg/dL Final   11/04/2021 147 (H) 74 - 99 mg/dL Final   11/03/2021 135 (H) 74 - 99 mg/dL Final     Calcium   Date Value Ref Range Status   11/05/2021 9.2 8.6 - 10.2 mg/dL Final 11/04/2021 9.2 8.6 - 10.2 mg/dL Final   11/03/2021 9.2 8.6 - 10.2 mg/dL Final     Total Protein   Date Value Ref Range Status   10/24/2021 5.9 (L) 6.4 - 8.3 g/dL Final   10/23/2021 6.0 (L) 6.4 - 8.3 g/dL Final   10/22/2021 6.2 (L) 6.4 - 8.3 g/dL Final     Albumin   Date Value Ref Range Status   10/24/2021 2.7 (L) 3.5 - 5.2 g/dL Final   10/23/2021 2.8 (L) 3.5 - 5.2 g/dL Final   10/22/2021 2.8 (L) 3.5 - 5.2 g/dL Final     Total Bilirubin   Date Value Ref Range Status   10/24/2021 0.5 0.0 - 1.2 mg/dL Final   10/23/2021 0.5 0.0 - 1.2 mg/dL Final   10/22/2021 0.8 0.0 - 1.2 mg/dL Final     Alkaline Phosphatase   Date Value Ref Range Status   10/24/2021 62 40 - 129 U/L Final   10/23/2021 58 40 - 129 U/L Final   10/22/2021 57 40 - 129 U/L Final     AST   Date Value Ref Range Status   10/24/2021 73 (H) 0 - 39 U/L Final   10/23/2021 55 (H) 0 - 39 U/L Final   10/22/2021 18 0 - 39 U/L Final     ALT   Date Value Ref Range Status   10/24/2021 61 (H) 0 - 40 U/L Final   10/23/2021 33 0 - 40 U/L Final   10/22/2021 11 0 - 40 U/L Final     GFR Non-   Date Value Ref Range Status   11/05/2021 >60 >=60 mL/min/1.73 Final     Comment:     Chronic Kidney Disease: less than 60 ml/min/1.73 sq.m. Kidney Failure: less than 15 ml/min/1.73 sq.m. Results valid for patients 18 years and older. 11/04/2021 >60 >=60 mL/min/1.73 Final     Comment:     Chronic Kidney Disease: less than 60 ml/min/1.73 sq.m. Kidney Failure: less than 15 ml/min/1.73 sq.m. Results valid for patients 18 years and older. 11/03/2021 >60 >=60 mL/min/1.73 Final     Comment:     Chronic Kidney Disease: less than 60 ml/min/1.73 sq.m. Kidney Failure: less than 15 ml/min/1.73 sq.m. Results valid for patients 18 years and older.        GFR    Date Value Ref Range Status   11/05/2021 >60  Final   11/04/2021 >60  Final   11/03/2021 >60  Final     Magnesium   Date Value Ref Range Status   11/05/2021 1.8 1.6 - 2.6 mg/dL Final   11/04/2021 1.8 1.6 - 2.6 mg/dL Final   11/03/2021 1.8 1.6 - 2.6 mg/dL Final     Phosphorus   Date Value Ref Range Status   11/05/2021 4.7 (H) 2.5 - 4.5 mg/dL Final   11/04/2021 4.7 (H) 2.5 - 4.5 mg/dL Final   11/03/2021 4.1 2.5 - 4.5 mg/dL Final     No results for input(s): PH, PO2, PCO2, HCO3, BE, O2SAT in the last 72 hours. RADIOLOGY:  XR CHEST PORTABLE   Final Result   No acute process         XR CHEST PORTABLE   Final Result   Stable small bilateral pleural effusions, with bibasilar atelectasis or   infiltrates         XR CHEST PORTABLE   Final Result   Right PICC line tip is at the cavoatrial junction. Stable prominent cardiac silhouette and bilateral pleural effusions with   bibasilar atelectasis or infiltrates         XR CHEST PORTABLE   Final Result   No acute process. Cardiomegaly. XR CHEST PORTABLE   Final Result   Cardiomegaly and postop changes. ETT is in good position. Right IJ catheter   shows no significant interval change. Small bilateral effusions, slightly   improved compared to October 20. MRI BRAIN W WO CONTRAST   Final Result   No acute intracranial abnormality visualized. CT HEAD WO CONTRAST   Final Result   1. No acute intracranial abnormality. 2. Mucosal disease of the paranasal sinuses. XR ABDOMEN FOR NG/OG/NE TUBE PLACEMENT   Final Result   Satisfactory position of nasogastric tube. XR CHEST PORTABLE   Final Result   1. No interval change in the vague bilateral lower lobe airspace disease. 2. Possible trace bilateral pleural effusions. 3. The NG tube has been removed. US GALLBLADDER RUQ   Final Result   Mild fatty liver. No sonographic evidence of acute cholecystitis. CT ABDOMEN PELVIS W IV CONTRAST Additional Contrast? None   Final Result   Cardiomegaly with infiltrates and pleural effusions in the lung bases likely   pneumonia or mild CHF. There is no hiatal hernia.       Distended gallbladder with mild wall thickening. Consider ultrasonography to   evaluate for cholecystitis. Diverticulosis of the colon with mild thickening of the sigmoid colon which   could be due to spasm or uncomplicated diverticulitis. 2.8 x 2.8 cm abdominal aortic aneurysm. RECOMMENDATIONS:   Abdominal aortic aneurysm. 5 year surveillance is recommended. XR CHEST PORTABLE   Final Result   Worsening left greater than right perihilar opacities which may represent   asymmetric edema or pneumonia. Continued follow-up recommended. XR ABDOMEN (KUB) (SINGLE AP VIEW)   Final Result   No significant changes detailed above. XR CHEST ABDOMEN NG PLACEMENT   Final Result   The enteric tube terminates in the region of the proximal stomach. Recommend   consideration of advancement by at least 6 cm to ensure intragastric location   of the side hole. XR CHEST PORTABLE   Final Result   ET tube terminates between the clavicular heads and the karo. Enteric tube is poorly visualized at the GE junction and below; if position   of enteric tube tip needs to be known then KUB would better evaluate for it. Stable mild pulmonary vascular congestion. Stable small pleural effusions. Stable cardiomegaly. XR CHEST PORTABLE   Final Result   No interval change compared to prior exam.      Persistent small bilateral pleural effusions. Ongoing mild-to-moderate pulmonary edema. XR CHEST PORTABLE   Final Result   New right IJ line with the distal tip in the SVC and no pneumothorax. Slightly more confluent airspace disease in the right lung base. No other significant interval change. XR CHEST PORTABLE   Final Result   1. Satisfactory position of the NG tube and endotracheal tube   2. New opacity within the right lung base which could represent a pleural   effusion and or right lung base infiltrate. XR CHEST PORTABLE   Final Result   1. Cardiomegaly. There is no evidence of failure or pneumonia. CT Head WO Contrast   Final Result   1. There is no acute intracranial abnormality. Specifically, there is no   intracranial hemorrhage. 2. Atrophy and periventricular leukomalacia,   . PROBLEM LIST:  Principal Problem:    Altered mental status  Active Problems:    Seizures ([address]    Withdrawal symptoms, alcohol, with delirium ([address]  Resolved Problems:    * No resolved hospital problems. *      ATTESTATION:  ICU Staff Physician note of personal involvement in Care  As the attending physician, I certify that I personally reviewed the patients history and personnally examined the patient to confirm the physical findings described above,  And that I reviewed the relevant imaging studies and available reports. I also discussed the differential diagnosis and all of the proposed management plans with the patient and individuals accompanying the patient to this visit. They had the opportunity to ask questions about the proposed management plans and to have those questions answered. This patient has a high probability of sudden, clinically significant deterioration, which requires the highest level of physician preparedness to intervene urgently. I managed/supervised life or organ supporting interventions that required frequent physician assessment. I devoted my full attention to the direct care of this patient for the amount of time indicated below. Time I spent with the family or surrogate(s) is included only if the patient was incapable of providing the necessary information or participating in medical decisions  Time devoted to teaching and to any procedures I billed separately is not included.      CRITICAL CARE TIME:  30 minutes    Abiola Hernandez MD  Pulmonary and Critical Care Medicine

## 2021-11-05 NOTE — PROGRESS NOTES
Department of Internal Medicine  General Internal Medicine  Attending Progress Note  Chief Complaint   Patient presents with    Delirium Tremens (DTS)     Pt states he usually drinks a fifth of kilo beam a day states he quit saturday. states he has been having visual hallucinations since yesterday. SUBJECTIVE:    Patient is still lethargic.  No verbal    OBJECTIVE      Medications    Current Facility-Administered Medications: chlorhexidine (HIBICLENS) 4 % liquid, , Topical, Daily  vancomycin (VANCOCIN) 1,250 mg in dextrose 5 % 250 mL IVPB, 1,250 mg, IntraVENous, Q12H  furosemide (LASIX) injection 40 mg, 40 mg, IntraVENous, Daily  sulfamethoxazole-trimethoprim (BACTRIM DS;SEPTRA DS) 800-160 MG per tablet 1 tablet, 1 tablet, Oral, 2 times per day  haloperidol lactate (HALDOL) injection 1 mg, 1 mg, IntraVENous, Q6H PRN **OR** haloperidol lactate (HALDOL) injection 2 mg, 2 mg, IntraVENous, Q6H PRN  [Held by provider] ziprasidone (GEODON) capsule 20 mg, 20 mg, Oral, Q12H  levETIRAcetam (KEPPRA) tablet 1,000 mg, 1,000 mg, Oral, BID  dilTIAZem (CARDIZEM) tablet 90 mg, 90 mg, Oral, 4 times per day  famotidine (PEPCID) tablet 40 mg, 40 mg, Oral, BID  rivaroxaban (XARELTO) tablet 20 mg, 20 mg, Oral, Daily  folic acid (FOLVITE) tablet 1 mg, 1 mg, Oral, Daily  heparin flush 100 UNIT/ML injection 300 Units, 3 mL, IntraVENous, 2 times per day  heparin flush 100 UNIT/ML injection 300 Units, 3 mL, IntraCATHeter, PRN  thiamine mononitrate tablet 100 mg, 100 mg, Oral, Daily  sodium chloride flush 0.9 % injection 5-40 mL, 5-40 mL, IntraVENous, 2 times per day  sodium chloride flush 0.9 % injection 5-40 mL, 5-40 mL, IntraVENous, PRN  0.9 % sodium chloride infusion, 25 mL, IntraVENous, PRN  heparin flush 100 UNIT/ML injection 300 Units, 3 mL, IntraVENous, 2 times per day  heparin flush 100 UNIT/ML injection 300 Units, 3 mL, IntraCATHeter, PRN  lactulose (CHRONULAC) 10 GM/15ML solution 20 g, 20 g, Oral, BID  sertraline (ZOLOFT) tablet 100 mg, 100 mg, Oral, Daily  perflutren lipid microspheres (DEFINITY) injection 1.65 mg, 1.5 mL, IntraVENous, ONCE PRN  ondansetron (ZOFRAN-ODT) disintegrating tablet 4 mg, 4 mg, Oral, Q8H PRN **OR** ondansetron (ZOFRAN) injection 4 mg, 4 mg, IntraVENous, Q6H PRN  polyethylene glycol (GLYCOLAX) packet 17 g, 17 g, Oral, Daily PRN  acetaminophen (TYLENOL) tablet 650 mg, 650 mg, Oral, Q6H PRN **OR** acetaminophen (TYLENOL) suppository 650 mg, 650 mg, Rectal, Q6H PRN  ipratropium-albuterol (DUONEB) nebulizer solution 1 ampule, 1 ampule, Inhalation, Q4H  Physical    VITALS:  BP (!) 115/90   Pulse 138   Temp 98.4 °F (36.9 °C) (Axillary)   Resp 23   Ht 5' 11\" (1.803 m)   Wt 256 lb (116.1 kg)   SpO2 96%   BMI 35.70 kg/m²   CONSTITUTIONAL:  Lethargic/non verbal  EYES:  Closed  NECK:  trached  LUNGS:  Coarse lung sound  CARDIOVASCULAR:  normal apical pulses, normal S1 and S2 and no edema  ABDOMEN:  normal bowel sounds, non-distended and non-tender  MUSCULOSKELETAL:  there is no redness, warmth, or swelling of the joints  NEUROLOGIC:  Mental Status Exam:  Level of Alertness:   awake  Data    CBC:   Lab Results   Component Value Date    WBC 9.7 11/05/2021    RBC 3.08 11/05/2021    HGB 9.7 11/05/2021    HCT 29.0 11/05/2021    MCV 94.2 11/05/2021    MCH 31.5 11/05/2021    MCHC 33.4 11/05/2021    RDW 15.7 11/05/2021     11/05/2021    MPV 9.6 11/05/2021     BMP:    Lab Results   Component Value Date     11/05/2021    K 3.9 11/05/2021    K 4.3 10/12/2021     11/05/2021    CO2 25 11/05/2021    BUN 31 11/05/2021    LABALBU 2.7 10/24/2021    CREATININE 0.7 11/05/2021    CALCIUM 9.2 11/05/2021    GFRAA >60 11/05/2021    LABGLOM >60 11/05/2021    GLUCOSE 135 11/05/2021       ASSESSMENT AND PLAN      1. Altered mental status/encephalopathy. 2.  Seizures ([facility] [address]): On Keppra    3. Withdrawal symptoms, alcohol, with delirium ([facility] [address]    4. History of aspiration pneumonia. Completed antibiotics.     5.  Atrial fibrillation:    6. Acute respiratory failure with hypoxia: Now on trach. 7.  Dysphagia/malnutrition: Continue tube feeding. Plans:  Patient is still tachycardiac unsure if this is related to withdrawal versus atrial fibrillation. However telemetry appears to be sinus tachycardia. Continue antibiotics as was started. Continue tube feeding  Continue Zoloft. Continue Xarelto. I did talk to physicians at the peer to peer recommendation. The unfortunately denied patient LTAC discharge noted that patient is still acutely sick and cannot be appropriately discharged noted that if patient improved that there will be glad to take patients.

## 2021-11-05 NOTE — PROGRESS NOTES
Pharmacy Consultation Note  (Antibiotic Dosing and Monitoring)    Initial consult date: 10/16/2021-10/18/21; RE-CONSULTED 11/5/21  Consulting physician/provider: Dr. Malcom Mayberry  Drug: Vancomycin  Indication: Staph Pneumonia (VAP)    Age/  Gender Height Weight IBW  Allergy Information   66 y.o./male 5' 10.87\" (180 cm) 277 lb 3.2 oz (125.7 kg)     Ideal body weight: 75.3 kg (166 lb 0.1 oz)  Adjusted ideal body weight: 91.6 kg (202 lb 0.1 oz)   Patient has no known allergies. Renal Function:  Recent Labs     11/03/21  0430 11/04/21  0430 11/05/21  0420   BUN 26* 29* 31*   CREATININE 0.6* 0.7 0.7       Intake/Output Summary (Last 24 hours) at 11/5/2021 1224  Last data filed at 11/5/2021 0800  Gross per 24 hour   Intake 1469 ml   Output 2625 ml   Net -1156 ml       Vancomycin Monitoring:  Trough:  No results for input(s): VANCOTROUGH in the last 72 hours. Random:  No results for input(s): VANCORANDOM in the last 72 hours. Recent vancomycin administrations                   vancomycin (VANCOCIN) 1,250 mg in dextrose 5 % 250 mL IVPB (mg) 1,250 mg New Bag 10/18/21 1101     1,250 mg New Bag 10/17/21 2230     1,250 mg New Bag  1042     1,250 mg New Bag 10/16/21 2212     1,250 mg New Bag  1215              Assessment:  · Patient is a 73 y.o. male who has been initiated on vancomycin for pneumonia (respiratory cx growing Staph aureus, S pending). Estimated Creatinine Clearance: 136 mL/min (based on SCr of 0.7 mg/dL). · To dose vancomycin, pharmacy will be utilizing ActivePath calculation software for goal AUC/GRIFFIN 400-600 mg/L-hr   · 11/5/21: Re-consulted today to start vancomycin for staph pneumonia.  Patient was on vanco 1,250 mg q12h earlier during this admission, trough collected on 10/18 @ 2111 = 14.0 mcg/mL    Plan:  · Restart Vancomycin 1,250 mg IV Q12H  · Repeat trough at new steady state  · Will continue to monitor renal function   · Clinical pharmacy to follow    Thank you for the consult,    Jonelle Hickman [address], PharmD, BCPS 11/5/2021 12:26 PM   [phone]

## 2021-11-05 NOTE — PLAN OF CARE
Problem: Skin Integrity:  Goal: Will show no infection signs and symptoms  Description: Will show no infection signs and symptoms  Outcome: Met This Shift     Problem: Falls - Risk of:  Goal: Will remain free from falls  Description: Will remain free from falls  Outcome: Met This Shift     Problem: Airway Clearance - Ineffective:  Goal: Ability to maintain a clear airway will improve  Description: Ability to maintain a clear airway will improve  Outcome: Met This Shift     Problem: Injury - Risk of, Healthcare-Acquired Condition:  Goal: Will remain free from falls  Description: Will remain free from falls  Outcome: Met This Shift     Problem: Respiratory Function - Compromised:  Goal: Ability to maintain a clear airway will improve  Description: Ability to maintain a clear airway will improve  Outcome: Met This Shift

## 2021-11-05 NOTE — PROGRESS NOTES
Patient transported from 533 to ICU bed 3 on monitored bed by RN and RT with all documented belongings.

## 2021-11-05 NOTE — CARE COORDINATION
11/5/21 Negative covid 10/16/21. CM transition of care: icu- trache/vent at 30% and peep 5. [facility] - Dr Will Myers has agreed to do P2P for the [facility] denial. Thien Bonifacio (dtr) states she hasn't started reviewing SNF vent facilities. Francia uTrner- with [facility] provided updates as requested- [phone]. CM/SS assigned to follow. Electronically signed by SIMEON Preston on 11/5/2021 at 9:48 AM    Addendum: p2p can only be done by pcp- Dr. Brisa Jasso agreed.  Electronically signed by SIMEON Preston on 11/5/2021 at 12:28 PM

## 2021-11-06 LAB
ALBUMIN SERPL-MCNC: 3.1 G/DL (ref 3.5–5.2)
ALP BLD-CCNC: 195 U/L (ref 40–129)
ALT SERPL-CCNC: 273 U/L (ref 0–40)
ANION GAP SERPL CALCULATED.3IONS-SCNC: 13 MMOL/L (ref 7–16)
ANION GAP SERPL CALCULATED.3IONS-SCNC: 13 MMOL/L (ref 7–16)
AST SERPL-CCNC: 99 U/L (ref 0–39)
BILIRUB SERPL-MCNC: 0.8 MG/DL (ref 0–1.2)
BUN BLDV-MCNC: 31 MG/DL (ref 6–23)
BUN BLDV-MCNC: 33 MG/DL (ref 6–23)
CALCIUM SERPL-MCNC: 8.3 MG/DL (ref 8.6–10.2)
CALCIUM SERPL-MCNC: 9.2 MG/DL (ref 8.6–10.2)
CHLORIDE BLD-SCNC: 105 MMOL/L (ref 98–107)
CHLORIDE BLD-SCNC: 94 MMOL/L (ref 98–107)
CO2: 22 MMOL/L (ref 22–29)
CO2: 24 MMOL/L (ref 22–29)
CREAT SERPL-MCNC: 0.7 MG/DL (ref 0.7–1.2)
CREAT SERPL-MCNC: 0.8 MG/DL (ref 0.7–1.2)
CULTURE, RESPIRATORY: ABNORMAL
CULTURE, RESPIRATORY: ABNORMAL
GFR AFRICAN AMERICAN: >60
GFR AFRICAN AMERICAN: >60
GFR NON-AFRICAN AMERICAN: >60 ML/MIN/1.73
GFR NON-AFRICAN AMERICAN: >60 ML/MIN/1.73
GLUCOSE BLD-MCNC: 142 MG/DL (ref 74–99)
GLUCOSE BLD-MCNC: 550 MG/DL (ref 74–99)
HCT VFR BLD CALC: 29.4 % (ref 37–54)
HEMOGLOBIN: 9.2 G/DL (ref 12.5–16.5)
MAGNESIUM: 1.7 MG/DL (ref 1.6–2.6)
MAGNESIUM: 1.9 MG/DL (ref 1.6–2.6)
MCH RBC QN AUTO: 30.6 PG (ref 26–35)
MCHC RBC AUTO-ENTMCNC: 31.3 % (ref 32–34.5)
MCV RBC AUTO: 97.7 FL (ref 80–99.9)
METER GLUCOSE: 123 MG/DL (ref 74–99)
METER GLUCOSE: 134 MG/DL (ref 74–99)
METER GLUCOSE: 141 MG/DL (ref 74–99)
ORGANISM: ABNORMAL
PDW BLD-RTO: 15.6 FL (ref 11.5–15)
PHOSPHORUS: 4.1 MG/DL (ref 2.5–4.5)
PHOSPHORUS: 4.3 MG/DL (ref 2.5–4.5)
PLATELET # BLD: 295 E9/L (ref 130–450)
PMV BLD AUTO: 10 FL (ref 7–12)
POTASSIUM SERPL-SCNC: 3.7 MMOL/L (ref 3.5–5)
POTASSIUM SERPL-SCNC: 4.1 MMOL/L (ref 3.5–5)
RBC # BLD: 3.01 E12/L (ref 3.8–5.8)
SMEAR, RESPIRATORY: ABNORMAL
SODIUM BLD-SCNC: 129 MMOL/L (ref 132–146)
SODIUM BLD-SCNC: 142 MMOL/L (ref 132–146)
TOTAL PROTEIN: 6.8 G/DL (ref 6.4–8.3)
TRIGL SERPL-MCNC: 85 MG/DL (ref 0–149)
URINE CULTURE, ROUTINE: NORMAL
WBC # BLD: 9.5 E9/L (ref 4.5–11.5)

## 2021-11-06 PROCEDURE — 84100 ASSAY OF PHOSPHORUS: CPT

## 2021-11-06 PROCEDURE — 99232 SBSQ HOSP IP/OBS MODERATE 35: CPT | Performed by: INTERNAL MEDICINE

## 2021-11-06 PROCEDURE — 6360000002 HC RX W HCPCS: Performed by: INTERNAL MEDICINE

## 2021-11-06 PROCEDURE — 85027 COMPLETE CBC AUTOMATED: CPT

## 2021-11-06 PROCEDURE — 80053 COMPREHEN METABOLIC PANEL: CPT

## 2021-11-06 PROCEDURE — 94640 AIRWAY INHALATION TREATMENT: CPT

## 2021-11-06 PROCEDURE — 6370000000 HC RX 637 (ALT 250 FOR IP): Performed by: INTERNAL MEDICINE

## 2021-11-06 PROCEDURE — 83735 ASSAY OF MAGNESIUM: CPT

## 2021-11-06 PROCEDURE — 84478 ASSAY OF TRIGLYCERIDES: CPT

## 2021-11-06 PROCEDURE — 2580000003 HC RX 258: Performed by: INTERNAL MEDICINE

## 2021-11-06 PROCEDURE — 6370000000 HC RX 637 (ALT 250 FOR IP): Performed by: NURSE PRACTITIONER

## 2021-11-06 PROCEDURE — 80048 BASIC METABOLIC PNL TOTAL CA: CPT

## 2021-11-06 PROCEDURE — 36415 COLL VENOUS BLD VENIPUNCTURE: CPT

## 2021-11-06 PROCEDURE — 2000000000 HC ICU R&B

## 2021-11-06 PROCEDURE — 94003 VENT MGMT INPAT SUBQ DAY: CPT

## 2021-11-06 PROCEDURE — 6370000000 HC RX 637 (ALT 250 FOR IP)

## 2021-11-06 PROCEDURE — 80177 DRUG SCRN QUAN LEVETIRACETAM: CPT

## 2021-11-06 PROCEDURE — 82962 GLUCOSE BLOOD TEST: CPT

## 2021-11-06 RX ORDER — ACETAMINOPHEN 325 MG/1
650 TABLET ORAL EVERY 6 HOURS
Status: DISCONTINUED | OUTPATIENT
Start: 2021-11-06 | End: 2021-11-09

## 2021-11-06 RX ORDER — FENTANYL CITRATE 50 UG/ML
25 INJECTION, SOLUTION INTRAMUSCULAR; INTRAVENOUS
Status: DISCONTINUED | OUTPATIENT
Start: 2021-11-06 | End: 2021-11-13 | Stop reason: HOSPADM

## 2021-11-06 RX ORDER — DEXTROSE MONOHYDRATE 25 G/50ML
12.5 INJECTION, SOLUTION INTRAVENOUS PRN
Status: DISCONTINUED | OUTPATIENT
Start: 2021-11-06 | End: 2021-11-13 | Stop reason: HOSPADM

## 2021-11-06 RX ORDER — DEXTROSE MONOHYDRATE 50 MG/ML
100 INJECTION, SOLUTION INTRAVENOUS PRN
Status: DISCONTINUED | OUTPATIENT
Start: 2021-11-06 | End: 2021-11-13 | Stop reason: HOSPADM

## 2021-11-06 RX ORDER — QUETIAPINE FUMARATE 25 MG/1
25 TABLET, FILM COATED ORAL 2 TIMES DAILY
Status: DISCONTINUED | OUTPATIENT
Start: 2021-11-06 | End: 2021-11-07

## 2021-11-06 RX ORDER — FENTANYL 12 UG/H
1 PATCH TRANSDERMAL
Status: DISCONTINUED | OUTPATIENT
Start: 2021-11-06 | End: 2021-11-13 | Stop reason: HOSPADM

## 2021-11-06 RX ORDER — NICOTINE POLACRILEX 4 MG
15 LOZENGE BUCCAL PRN
Status: DISCONTINUED | OUTPATIENT
Start: 2021-11-06 | End: 2021-11-13 | Stop reason: HOSPADM

## 2021-11-06 RX ADMIN — DILTIAZEM HYDROCHLORIDE 90 MG: 30 TABLET, FILM COATED ORAL at 12:09

## 2021-11-06 RX ADMIN — WATER 2000 MG: 1 INJECTION INTRAMUSCULAR; INTRAVENOUS; SUBCUTANEOUS at 14:39

## 2021-11-06 RX ADMIN — ACETAMINOPHEN 650 MG: 325 TABLET ORAL at 18:17

## 2021-11-06 RX ADMIN — SERTRALINE HYDROCHLORIDE 100 MG: 50 TABLET ORAL at 08:50

## 2021-11-06 RX ADMIN — LEVETIRACETAM 1000 MG: 500 TABLET, FILM COATED ORAL at 08:50

## 2021-11-06 RX ADMIN — DILTIAZEM HYDROCHLORIDE 90 MG: 30 TABLET, FILM COATED ORAL at 18:14

## 2021-11-06 RX ADMIN — FUROSEMIDE 40 MG: 10 INJECTION, SOLUTION INTRAMUSCULAR; INTRAVENOUS at 08:50

## 2021-11-06 RX ADMIN — VANCOMYCIN HYDROCHLORIDE 1250 MG: 10 INJECTION, POWDER, LYOPHILIZED, FOR SOLUTION INTRAVENOUS at 04:22

## 2021-11-06 RX ADMIN — ACETAMINOPHEN 650 MG: 325 TABLET ORAL at 12:08

## 2021-11-06 RX ADMIN — LEVETIRACETAM 1000 MG: 500 TABLET, FILM COATED ORAL at 21:24

## 2021-11-06 RX ADMIN — IPRATROPIUM BROMIDE AND ALBUTEROL SULFATE 1 AMPULE: .5; 2.5 SOLUTION RESPIRATORY (INHALATION) at 18:21

## 2021-11-06 RX ADMIN — Medication 10 ML: at 08:50

## 2021-11-06 RX ADMIN — FOLIC ACID 1 MG: 1 TABLET ORAL at 08:50

## 2021-11-06 RX ADMIN — IPRATROPIUM BROMIDE AND ALBUTEROL SULFATE 1 AMPULE: .5; 2.5 SOLUTION RESPIRATORY (INHALATION) at 10:18

## 2021-11-06 RX ADMIN — LACTULOSE 20 G: 20 SOLUTION ORAL at 08:50

## 2021-11-06 RX ADMIN — IPRATROPIUM BROMIDE AND ALBUTEROL SULFATE 1 AMPULE: .5; 2.5 SOLUTION RESPIRATORY (INHALATION) at 22:19

## 2021-11-06 RX ADMIN — SODIUM CHLORIDE, PRESERVATIVE FREE 300 UNITS: 5 INJECTION INTRAVENOUS at 08:51

## 2021-11-06 RX ADMIN — RIVAROXABAN 20 MG: 20 TABLET, FILM COATED ORAL at 18:14

## 2021-11-06 RX ADMIN — Medication 10 ML: at 21:38

## 2021-11-06 RX ADMIN — IPRATROPIUM BROMIDE AND ALBUTEROL SULFATE 1 AMPULE: .5; 2.5 SOLUTION RESPIRATORY (INHALATION) at 14:36

## 2021-11-06 RX ADMIN — IPRATROPIUM BROMIDE AND ALBUTEROL SULFATE 1 AMPULE: .5; 2.5 SOLUTION RESPIRATORY (INHALATION) at 07:34

## 2021-11-06 RX ADMIN — QUETIAPINE FUMARATE 25 MG: 25 TABLET ORAL at 21:24

## 2021-11-06 RX ADMIN — SODIUM CHLORIDE, PRESERVATIVE FREE 300 UNITS: 5 INJECTION INTRAVENOUS at 21:38

## 2021-11-06 RX ADMIN — LACTULOSE 20 G: 20 SOLUTION ORAL at 21:24

## 2021-11-06 RX ADMIN — IPRATROPIUM BROMIDE AND ALBUTEROL SULFATE 1 AMPULE: .5; 2.5 SOLUTION RESPIRATORY (INHALATION) at 02:02

## 2021-11-06 RX ADMIN — FAMOTIDINE 40 MG: 20 TABLET, FILM COATED ORAL at 08:49

## 2021-11-06 RX ADMIN — SODIUM CHLORIDE, PRESERVATIVE FREE 300 UNITS: 5 INJECTION INTRAVENOUS at 09:10

## 2021-11-06 RX ADMIN — SULFAMETHOXAZOLE AND TRIMETHOPRIM 1 TABLET: 800; 160 TABLET ORAL at 08:50

## 2021-11-06 RX ADMIN — METOPROLOL TARTRATE 25 MG: 25 TABLET, FILM COATED ORAL at 21:24

## 2021-11-06 RX ADMIN — DILTIAZEM HYDROCHLORIDE 90 MG: 30 TABLET, FILM COATED ORAL at 04:22

## 2021-11-06 RX ADMIN — FAMOTIDINE 40 MG: 20 TABLET, FILM COATED ORAL at 21:24

## 2021-11-06 RX ADMIN — METOPROLOL TARTRATE 25 MG: 25 TABLET, FILM COATED ORAL at 10:03

## 2021-11-06 RX ADMIN — THIAMINE HCL TAB 100 MG 100 MG: 100 TAB at 08:50

## 2021-11-06 RX ADMIN — FENTANYL CITRATE 25 MCG: 0.05 INJECTION, SOLUTION INTRAMUSCULAR; INTRAVENOUS at 10:04

## 2021-11-06 RX ADMIN — SULFAMETHOXAZOLE AND TRIMETHOPRIM 1 TABLET: 800; 160 TABLET ORAL at 21:23

## 2021-11-06 ASSESSMENT — PULMONARY FUNCTION TESTS
PIF_VALUE: 22
PIF_VALUE: 25
PIF_VALUE: 19
PIF_VALUE: 23
PIF_VALUE: 16
PIF_VALUE: 24
PIF_VALUE: 23
PIF_VALUE: 26
PIF_VALUE: 34
PIF_VALUE: 24
PIF_VALUE: 27
PIF_VALUE: 24
PIF_VALUE: 23
PIF_VALUE: 26
PIF_VALUE: 24
PIF_VALUE: 22
PIF_VALUE: 20
PIF_VALUE: 21
PIF_VALUE: 22
PIF_VALUE: 22
PIF_VALUE: 24
PIF_VALUE: 26
PIF_VALUE: 22
PIF_VALUE: 23
PIF_VALUE: 24
PIF_VALUE: 22
PIF_VALUE: 31
PIF_VALUE: 23
PIF_VALUE: 18

## 2021-11-06 ASSESSMENT — PAIN SCALES - GENERAL
PAINLEVEL_OUTOF10: 3
PAINLEVEL_OUTOF10: 0

## 2021-11-06 NOTE — PROGRESS NOTES
Department of Internal Medicine  General Internal Medicine  Attending Progress Note  Chief Complaint   Patient presents with    Delirium Tremens (DTS)     Pt states he usually drinks a fifth of kilo beam a day states he quit saturday. states he has been having visual hallucinations since yesterday. SUBJECTIVE:    Trached.  lethargic    OBJECTIVE      Medications    Current Facility-Administered Medications: fentaNYL (SUBLIMAZE) injection 25 mcg, 25 mcg, IntraVENous, Q1H PRN  metoprolol tartrate (LOPRESSOR) tablet 25 mg, 25 mg, Per G Tube, BID  acetaminophen (TYLENOL) tablet 650 mg, 650 mg, Oral, Q6H  fentaNYL (DURAGESIC) 12 MCG/HR 1 patch, 1 patch, TransDERmal, Q72H  cefTRIAXone (ROCEPHIN) 2,000 mg in sterile water 20 mL IV syringe, 2,000 mg, IntraVENous, Q24H  insulin lispro (HUMALOG) injection vial 0-12 Units, 0-12 Units, SubCUTAneous, TID WC  insulin lispro (HUMALOG) injection vial 0-6 Units, 0-6 Units, SubCUTAneous, Nightly  glucose (GLUTOSE) 40 % oral gel 15 g, 15 g, Oral, PRN  dextrose 50 % IV solution, 12.5 g, IntraVENous, PRN  glucagon (rDNA) injection 1 mg, 1 mg, IntraMUSCular, PRN  dextrose 5 % solution, 100 mL/hr, IntraVENous, PRN  chlorhexidine (HIBICLENS) 4 % liquid, , Topical, Daily  furosemide (LASIX) injection 40 mg, 40 mg, IntraVENous, Daily  sulfamethoxazole-trimethoprim (BACTRIM DS;SEPTRA DS) 800-160 MG per tablet 1 tablet, 1 tablet, Oral, 2 times per day  haloperidol lactate (HALDOL) injection 1 mg, 1 mg, IntraVENous, Q6H PRN **OR** haloperidol lactate (HALDOL) injection 2 mg, 2 mg, IntraVENous, Q6H PRN  [Held by provider] ziprasidone (GEODON) capsule 20 mg, 20 mg, Oral, Q12H  levETIRAcetam (KEPPRA) tablet 1,000 mg, 1,000 mg, Oral, BID  dilTIAZem (CARDIZEM) tablet 90 mg, 90 mg, Oral, 4 times per day  famotidine (PEPCID) tablet 40 mg, 40 mg, Oral, BID  rivaroxaban (XARELTO) tablet 20 mg, 20 mg, Oral, Daily  folic acid (FOLVITE) tablet 1 mg, 1 mg, Oral, Daily  heparin flush 100 UNIT/ML injection 300 Units, 3 mL, IntraVENous, 2 times per day  heparin flush 100 UNIT/ML injection 300 Units, 3 mL, IntraCATHeter, PRN  thiamine mononitrate tablet 100 mg, 100 mg, Oral, Daily  sodium chloride flush 0.9 % injection 5-40 mL, 5-40 mL, IntraVENous, 2 times per day  sodium chloride flush 0.9 % injection 5-40 mL, 5-40 mL, IntraVENous, PRN  0.9 % sodium chloride infusion, 25 mL, IntraVENous, PRN  heparin flush 100 UNIT/ML injection 300 Units, 3 mL, IntraVENous, 2 times per day  heparin flush 100 UNIT/ML injection 300 Units, 3 mL, IntraCATHeter, PRN  lactulose (CHRONULAC) 10 GM/15ML solution 20 g, 20 g, Oral, BID  sertraline (ZOLOFT) tablet 100 mg, 100 mg, Oral, Daily  perflutren lipid microspheres (DEFINITY) injection 1.65 mg, 1.5 mL, IntraVENous, ONCE PRN  ondansetron (ZOFRAN-ODT) disintegrating tablet 4 mg, 4 mg, Oral, Q8H PRN **OR** ondansetron (ZOFRAN) injection 4 mg, 4 mg, IntraVENous, Q6H PRN  polyethylene glycol (GLYCOLAX) packet 17 g, 17 g, Oral, Daily PRN  ipratropium-albuterol (DUONEB) nebulizer solution 1 ampule, 1 ampule, Inhalation, Q4H  Physical    VITALS:  BP (!) 133/99   Pulse 110   Temp 97.3 °F (36.3 °C) (Infrared)   Resp 27   Ht 5' 11\" (1.803 m)   Wt 256 lb (116.1 kg)   SpO2 95%   BMI 35.70 kg/m²   CONSTITUTIONAL:  awake  EYES:  Lids and lashes normal, pupils equal, round and reactive to light, extra ocular muscles intact, sclera clear, conjunctiva normal  ENT:  normocepalic, without obvious abnormality  NECK:  trach  LUNGS:  No increased work of breathing, good air exchange, clear to auscultation bilaterally, no crackles or wheezing  CARDIOVASCULAR:  normal apical pulses, normal S1 and S2 and no edema  ABDOMEN:  Peg tube in place  MUSCULOSKELETAL:  there is no redness, warmth, or swelling of the joints  NEUROLOGIC:  Awake, alert, oriented to name, place and time. Cranial nerves II-XII are grossly intact. Motor is 5 out of 5 bilaterally. Cerebellar finger to nose, heel to shin intact. Sensory is intact. Babinski down going, Romberg negative, and gait is normal.  SKIN:  no bruising or bleeding  Data    CBC:   Lab Results   Component Value Date    WBC 9.5 11/06/2021    RBC 3.01 11/06/2021    HGB 9.2 11/06/2021    HCT 29.4 11/06/2021    MCV 97.7 11/06/2021    MCH 30.6 11/06/2021    MCHC 31.3 11/06/2021    RDW 15.6 11/06/2021     11/06/2021    MPV 10.0 11/06/2021     BMP:    Lab Results   Component Value Date     11/06/2021    K 4.1 11/06/2021    K 4.3 10/12/2021     11/06/2021    CO2 24 11/06/2021    BUN 33 11/06/2021    LABALBU 3.1 11/06/2021    CREATININE 0.8 11/06/2021    CALCIUM 9.2 11/06/2021    GFRAA >60 11/06/2021    LABGLOM >60 11/06/2021    GLUCOSE 142 11/06/2021       ASSESSMENT AND PLAN      1. Altered mental status    2. Seizures ([address]    3. Withdrawal symptoms, alcohol, with delirium ([address]    4. Aspiration PNA. 5.  Atrial Fibrillation: rate seems to be improving. Continue xarelto and Cardizem. 6.  Elevated BGL without diagnosis of DM: check A1C, started on sliding scale. May be stress induced vs tube feeding. Possible discuss with dietician to see if tube feeding can be adjusted. Continue to monitor  Hyperglycemia seems to have improved. Still okay with sliding scale. 7.  Hyponatremia: pseudo.  Related to #6.    8.  ? Transaminitis: continue to monitor

## 2021-11-06 NOTE — PROGRESS NOTES
Addendum:    Vancomycin discontinued, pharmacy will sign off. Please re-consult if needed. Thank you,    Maximino Ordoñez, PharmD, BCPS 11/6/2021 2:30 PM   [phone]     Pharmacy Consultation Note  (Antibiotic Dosing and Monitoring)    Initial consult date: 10/16/2021-10/18/21; RE-CONSULTED 11/5/21  Consulting physician/provider: Dr. Hafsa Bowers  Drug: Vancomycin  Indication: Staph Pneumonia (VAP)    Age/  Gender Height Weight IBW  Allergy Information   66 y.o./male 5' 10.87\" (180 cm) 277 lb 3.2 oz (125.7 kg)     Ideal body weight: 75.3 kg (166 lb 0.1 oz)  Adjusted ideal body weight: 91.6 kg (202 lb 0.1 oz)   Patient has no known allergies. Renal Function:  Recent Labs     11/05/21  0420 11/06/21  0444 11/06/21  0646   BUN 31* 31* 33*   CREATININE 0.7 0.7 0.8       Intake/Output Summary (Last 24 hours) at 11/6/2021 1300  Last data filed at 11/6/2021 0500  Gross per 24 hour   Intake 2452 ml   Output 3775 ml   Net -1323 ml       Vancomycin Monitoring:  Trough:  No results for input(s): VANCOTROUGH in the last 72 hours. Random:  No results for input(s): VANCORANDOM in the last 72 hours. Vancomycin Administration Times:  Recent vancomycin administrations                   vancomycin (VANCOCIN) 1,250 mg in dextrose 5 % 250 mL IVPB (mg) 1,250 mg New Bag 11/06/21 0422     1,250 mg New Bag 11/05/21 1548                      Assessment:  · Patient is a 73 y.o. male who has been initiated on vancomycin for pneumonia (respiratory cx growing Staph aureus, S pending). Estimated Creatinine Clearance: 119 mL/min (based on SCr of 0.8 mg/dL). · To dose vancomycin, pharmacy will be utilizing [employer] calculation software for goal AUC/GRIFFIN 400-600 mg/L-hr   · 11/5/21: Re-consulted today to start vancomycin for staph pneumonia.  Patient was on vanco 1,250 mg q12h earlier during this admission, trough collected on 10/18 @ 2111 = 14.0 mcg/mL    Plan:  · Continue Vancomycin 1,250 mg IV Q12H  · Trough tomorrow @ 0330, hold dose if trough is >20 mcg/mL  · Will continue to monitor renal function   · Clinical pharmacy to follow    Thank you for the consult,    Denae Poole, PharmD, BCPS 11/6/2021 1:00 PM   [phone]

## 2021-11-06 NOTE — PROGRESS NOTES
CRITICAL CARE PROGRESS NOTE    The patient's case was discussed in multidisciplinary rounds including critical care specialist, nursing, RT and pharmacy. His evaluation is as follows:     1. Acute on chronic hypoxic and hypercapnic respiratory failure with Staphylococcal pneumonia  --Post tracheostomy and PEG tube insertion, on mechanical ventilation   --Started ceftriaxone for management of staphylococcal pneumonia (MSSA)  --Plan for PSV 10/5 for 1 hour today    2. New onset atrial fibrillation  3. History of thrombocytopenia  4. Morbid obesity with obesity hypoventilation syndrome  5. Metabolic encephalopathy  6. Acute kidney injury  (resolved)  7. Aspiration pneumonia  8. UTI  9. Small pericardial effusion  10. Hematuria  11. 2.8 X 2.8 cm AAA  12. Small bilateral pleural effusions  13. Elevated liver enzymes    --Pain control with acetaminophen and fentanyl patch  --Management of atrial fibrillation with diltiazem and metoprolol  --Continue keppra      DVT prophylaxis with enoxaparin  Nutrition: Tube feedings   Urinary catheter present   Goals of care: Full code    The patient can be transferred to LTACH    BP (!) 122/94   Pulse 110   Temp 96.9 °F (36.1 °C) (Infrared)   Resp 27   Ht 5' 11\" (1.803 m)   Wt 256 lb (116.1 kg)   SpO2 97%   BMI 35.70 kg/m²   General: Awake, tracks me, does not follow commands  HEENT: No head lesions, PERRL, EOMI, mouth without lesions, no nasal lesions, tracheostomy in mid neck  Respiratory: Lungs with equal breath sounds bilaterally, no adventitious sounds auscultated, no accessory muscle use  CV: Regular rate, no murmurs, JVD, no leg edema  Abdomen: Soft, non tender, + bowel sounds, PEG present  Skin: Hydrated, adequate turgor, no rash, capillary refill <2 seconds  Extremities: Muscular strength: Flaccid limbs, distal pulses present  Neurology: Awake, tracks me but does not follow commands, neck is supple, no meningitic signs present.       Last 3 CMP:    Recent Labs     11/05/21  0420 11/06/21  0444 11/06/21  0646   * 129* 142   K 3.9 3.7 4.1   * 94* 105   CO2 25 22 24   BUN 31* 31* 33*   CREATININE 0.7 0.7 0.8   GLUCOSE 135* 550* 142*   CALCIUM 9.2 8.3* 9.2   PROT  --   --  6.8   LABALBU  --   --  3.1*   BILITOT  --   --  0.8   ALKPHOS  --   --  195*   AST  --   --  99*   ALT  --   --  273*     Recent Labs     11/06/21  0444   WBC 9.5   RBC 3.01*   HGB 9.2*   HCT 29.4*   MCV 97.7   MCH 30.6   MCHC 31.3*   RDW 15.6*      MPV 10.0       Recent Labs     11/04/21  1555   BC 24 Hours no growth       Recent Labs     11/04/21  1535   BLOODCULT2 24 Hours no growth       24 HR INTAKE/OUTPUT:      Intake/Output Summary (Last 24 hours) at 11/6/2021 1158  Last data filed at 11/6/2021 0500  Gross per 24 hour   Intake 2452 ml   Output 3975 ml   Net -1523 ml     MEDICATIONS:   metoprolol tartrate  25 mg Per G Tube BID    acetaminophen  650 mg Oral Q6H    fentaNYL  1 patch TransDERmal Q72H    vancomycin  1,250 mg IntraVENous Q12H    chlorhexidine   Topical Daily    furosemide  40 mg IntraVENous Daily    sulfamethoxazole-trimethoprim  1 tablet Oral 2 times per day    [Held by provider] ziprasidone  20 mg Oral Q12H    levETIRAcetam  1,000 mg Oral BID    dilTIAZem  90 mg Oral 4 times per day    famotidine  40 mg Oral BID    rivaroxaban  20 mg Oral Daily    folic acid  1 mg Oral Daily    heparin flush  3 mL IntraVENous 2 times per day    thiamine mononitrate  100 mg Oral Daily    sodium chloride flush  5-40 mL IntraVENous 2 times per day    heparin flush  3 mL IntraVENous 2 times per day    lactulose  20 g Oral BID    sertraline  100 mg Oral Daily    ipratropium-albuterol  1 ampule Inhalation Q4H      sodium chloride       fentanNYL, haloperidol lactate **OR** haloperidol lactate, heparin flush, sodium chloride flush, sodium chloride, heparin flush, perflutren lipid microspheres, ondansetron **OR** ondansetron, polyethylene glycol    OBJECTIVE:  Vitals:    11/06/21 1100   BP: (!) 122/94   Pulse: 110   Resp: 27   Temp:    SpO2: 97%     FiO2 : 30 %  O2 Flow Rate (L/min): 5 L/min  O2 Device: Ventilator        LABS:  WBC   Date Value Ref Range Status   11/06/2021 9.5 4.5 - 11.5 E9/L Final   11/05/2021 9.7 4.5 - 11.5 E9/L Final   11/04/2021 9.8 4.5 - 11.5 E9/L Final     Hemoglobin   Date Value Ref Range Status   11/06/2021 9.2 (L) 12.5 - 16.5 g/dL Final   11/05/2021 9.7 (L) 12.5 - 16.5 g/dL Final   11/04/2021 9.6 (L) 12.5 - 16.5 g/dL Final     Hematocrit   Date Value Ref Range Status   11/06/2021 29.4 (L) 37.0 - 54.0 % Final   11/05/2021 29.0 (L) 37.0 - 54.0 % Final   11/04/2021 29.7 (L) 37.0 - 54.0 % Final     MCV   Date Value Ref Range Status   11/06/2021 97.7 80.0 - 99.9 fL Final   11/05/2021 94.2 80.0 - 99.9 fL Final   11/04/2021 95.8 80.0 - 99.9 fL Final     Platelets   Date Value Ref Range Status   11/06/2021 295 130 - 450 E9/L Final   11/05/2021 343 130 - 450 E9/L Final   11/04/2021 358 130 - 450 E9/L Final     Sodium   Date Value Ref Range Status   11/06/2021 142 132 - 146 mmol/L Final   11/06/2021 129 (L) 132 - 146 mmol/L Final   11/05/2021 147 (H) 132 - 146 mmol/L Final     Potassium   Date Value Ref Range Status   11/06/2021 4.1 3.5 - 5.0 mmol/L Final   11/06/2021 3.7 3.5 - 5.0 mmol/L Final   11/05/2021 3.9 3.5 - 5.0 mmol/L Final     Potassium reflex Magnesium   Date Value Ref Range Status   10/12/2021 4.3 3.5 - 5.0 mmol/L Final     Chloride   Date Value Ref Range Status   11/06/2021 105 98 - 107 mmol/L Final   11/06/2021 94 (L) 98 - 107 mmol/L Final   11/05/2021 110 (H) 98 - 107 mmol/L Final     CO2   Date Value Ref Range Status   11/06/2021 24 22 - 29 mmol/L Final   11/06/2021 22 22 - 29 mmol/L Final   11/05/2021 25 22 - 29 mmol/L Final     BUN   Date Value Ref Range Status   11/06/2021 33 (H) 6 - 23 mg/dL Final   11/06/2021 31 (H) 6 - 23 mg/dL Final   11/05/2021 31 (H) 6 - 23 mg/dL Final     CREATININE   Date Value Ref Range Status   11/06/2021 0.8 0.7 - 1.2 mg/dL Final   11/06/2021 0.7 0.7 - 1.2 mg/dL Final   11/05/2021 0.7 0.7 - 1.2 mg/dL Final     Glucose   Date Value Ref Range Status   11/06/2021 142 (H) 74 - 99 mg/dL Final   11/06/2021 550 (HH) 74 - 99 mg/dL Final   11/05/2021 135 (H) 74 - 99 mg/dL Final     Calcium   Date Value Ref Range Status   11/06/2021 9.2 8.6 - 10.2 mg/dL Final   11/06/2021 8.3 (L) 8.6 - 10.2 mg/dL Final   11/05/2021 9.2 8.6 - 10.2 mg/dL Final     Total Protein   Date Value Ref Range Status   11/06/2021 6.8 6.4 - 8.3 g/dL Final   10/24/2021 5.9 (L) 6.4 - 8.3 g/dL Final   10/23/2021 6.0 (L) 6.4 - 8.3 g/dL Final     Albumin   Date Value Ref Range Status   11/06/2021 3.1 (L) 3.5 - 5.2 g/dL Final   10/24/2021 2.7 (L) 3.5 - 5.2 g/dL Final   10/23/2021 2.8 (L) 3.5 - 5.2 g/dL Final     Total Bilirubin   Date Value Ref Range Status   11/06/2021 0.8 0.0 - 1.2 mg/dL Final   10/24/2021 0.5 0.0 - 1.2 mg/dL Final   10/23/2021 0.5 0.0 - 1.2 mg/dL Final     Alkaline Phosphatase   Date Value Ref Range Status   11/06/2021 195 (H) 40 - 129 U/L Final   10/24/2021 62 40 - 129 U/L Final   10/23/2021 58 40 - 129 U/L Final     AST   Date Value Ref Range Status   11/06/2021 99 (H) 0 - 39 U/L Final   10/24/2021 73 (H) 0 - 39 U/L Final   10/23/2021 55 (H) 0 - 39 U/L Final     ALT   Date Value Ref Range Status   11/06/2021 273 (H) 0 - 40 U/L Final   10/24/2021 61 (H) 0 - 40 U/L Final   10/23/2021 33 0 - 40 U/L Final     GFR Non-   Date Value Ref Range Status   11/06/2021 >60 >=60 mL/min/1.73 Final     Comment:     Chronic Kidney Disease: less than 60 ml/min/1.73 sq.m. Kidney Failure: less than 15 ml/min/1.73 sq.m. Results valid for patients 18 years and older. 11/06/2021 >60 >=60 mL/min/1.73 Final     Comment:     Chronic Kidney Disease: less than 60 ml/min/1.73 sq.m. Kidney Failure: less than 15 ml/min/1.73 sq.m. Results valid for patients 18 years and older.      11/05/2021 >60 >=60 mL/min/1.73 Final     Comment:     Chronic Kidney Disease: less than 60 ml/min/1.73 sq.m. Kidney Failure: less than 15 ml/min/1.73 sq.m. Results valid for patients 18 years and older. GFR    Date Value Ref Range Status   11/06/2021 >60  Final   11/06/2021 >60  Final   11/05/2021 >60  Final     Magnesium   Date Value Ref Range Status   11/06/2021 1.9 1.6 - 2.6 mg/dL Final   11/06/2021 1.7 1.6 - 2.6 mg/dL Final   11/05/2021 1.8 1.6 - 2.6 mg/dL Final     Phosphorus   Date Value Ref Range Status   11/06/2021 4.3 2.5 - 4.5 mg/dL Final   11/06/2021 4.1 2.5 - 4.5 mg/dL Final   11/05/2021 4.7 (H) 2.5 - 4.5 mg/dL Final     No results for input(s): PH, PO2, PCO2, HCO3, BE, O2SAT in the last 72 hours. RADIOLOGY:  XR CHEST PORTABLE   Final Result   No acute process         XR CHEST PORTABLE   Final Result   Stable small bilateral pleural effusions, with bibasilar atelectasis or   infiltrates         XR CHEST PORTABLE   Final Result   Right PICC line tip is at the cavoatrial junction. Stable prominent cardiac silhouette and bilateral pleural effusions with   bibasilar atelectasis or infiltrates         XR CHEST PORTABLE   Final Result   No acute process. Cardiomegaly. XR CHEST PORTABLE   Final Result   Cardiomegaly and postop changes. ETT is in good position. Right IJ catheter   shows no significant interval change. Small bilateral effusions, slightly   improved compared to October 20. MRI BRAIN W WO CONTRAST   Final Result   No acute intracranial abnormality visualized. CT HEAD WO CONTRAST   Final Result   1. No acute intracranial abnormality. 2. Mucosal disease of the paranasal sinuses. XR ABDOMEN FOR NG/OG/NE TUBE PLACEMENT   Final Result   Satisfactory position of nasogastric tube. XR CHEST PORTABLE   Final Result   1. No interval change in the vague bilateral lower lobe airspace disease. 2. Possible trace bilateral pleural effusions.    3. The NG tube has been removed. US GALLBLADDER RUQ   Final Result   Mild fatty liver. No sonographic evidence of acute cholecystitis. CT ABDOMEN PELVIS W IV CONTRAST Additional Contrast? None   Final Result   Cardiomegaly with infiltrates and pleural effusions in the lung bases likely   pneumonia or mild CHF. There is no hiatal hernia. Distended gallbladder with mild wall thickening. Consider ultrasonography to   evaluate for cholecystitis. Diverticulosis of the colon with mild thickening of the sigmoid colon which   could be due to spasm or uncomplicated diverticulitis. 2.8 x 2.8 cm abdominal aortic aneurysm. RECOMMENDATIONS:   Abdominal aortic aneurysm. 5 year surveillance is recommended. XR CHEST PORTABLE   Final Result   Worsening left greater than right perihilar opacities which may represent   asymmetric edema or pneumonia. Continued follow-up recommended. XR ABDOMEN (KUB) (SINGLE AP VIEW)   Final Result   No significant changes detailed above. XR CHEST ABDOMEN NG PLACEMENT   Final Result   The enteric tube terminates in the region of the proximal stomach. Recommend   consideration of advancement by at least 6 cm to ensure intragastric location   of the side hole. XR CHEST PORTABLE   Final Result   ET tube terminates between the clavicular heads and the karo. Enteric tube is poorly visualized at the GE junction and below; if position   of enteric tube tip needs to be known then KUB would better evaluate for it. Stable mild pulmonary vascular congestion. Stable small pleural effusions. Stable cardiomegaly. XR CHEST PORTABLE   Final Result   No interval change compared to prior exam.      Persistent small bilateral pleural effusions. Ongoing mild-to-moderate pulmonary edema. XR CHEST PORTABLE   Final Result   New right IJ line with the distal tip in the SVC and no pneumothorax.       Slightly more confluent airspace disease in the right lung base. No other significant interval change. XR CHEST PORTABLE   Final Result   1. Satisfactory position of the NG tube and endotracheal tube   2. New opacity within the right lung base which could represent a pleural   effusion and or right lung base infiltrate. XR CHEST PORTABLE   Final Result   1. Cardiomegaly. There is no evidence of failure or pneumonia. CT Head WO Contrast   Final Result   1. There is no acute intracranial abnormality. Specifically, there is no   intracranial hemorrhage. 2. Atrophy and periventricular leukomalacia,   . PROBLEM LIST:  Principal Problem:    Altered mental status  Active Problems:    Seizures ([address]    Withdrawal symptoms, alcohol, with delirium ([address]  Resolved Problems:    * No resolved hospital problems. *      ATTESTATION:  ICU Staff Physician note of personal involvement in Care  As the attending physician, I certify that I personally reviewed the patients history and personnally examined the patient to confirm the physical findings described above,  And that I reviewed the relevant imaging studies and available reports. I also discussed the differential diagnosis and all of the proposed management plans with the patient and individuals accompanying the patient to this visit. They had the opportunity to ask questions about the proposed management plans and to have those questions answered. This patient has a high probability of sudden, clinically significant deterioration, which requires the highest level of physician preparedness to intervene urgently. I managed/supervised life or organ supporting interventions that required frequent physician assessment. I devoted my full attention to the direct care of this patient for the amount of time indicated below.   Time I spent with the family or surrogate(s) is included only if the patient was incapable of providing the necessary information or participating in medical decisions  Time devoted to teaching and to any procedures I billed separately is not included.      CRITICAL CARE TIME:  30 minutes    Hillary Bolton MD  Pulmonary and Critical Care Medicine

## 2021-11-06 NOTE — PLAN OF CARE
Problem: Airway Clearance - Ineffective:  Goal: Ability to maintain a clear airway will improve  Description: Ability to maintain a clear airway will improve  11/6/2021 1959 by Kush Carroll RN  Outcome: Met This Shift     Problem: Aspiration - Risk of:  Goal: Absence of aspiration  Description: Absence of aspiration  11/6/2021 1959 by Kush Carroll RN  Outcome: Met This Shift

## 2021-11-07 LAB
AMMONIA: 24 UMOL/L (ref 16–60)
ANION GAP SERPL CALCULATED.3IONS-SCNC: 12 MMOL/L (ref 7–16)
BUN BLDV-MCNC: 34 MG/DL (ref 6–23)
CALCIUM SERPL-MCNC: 9.3 MG/DL (ref 8.6–10.2)
CHLORIDE BLD-SCNC: 106 MMOL/L (ref 98–107)
CO2: 25 MMOL/L (ref 22–29)
CREAT SERPL-MCNC: 0.7 MG/DL (ref 0.7–1.2)
GFR AFRICAN AMERICAN: >60
GFR NON-AFRICAN AMERICAN: >60 ML/MIN/1.73
GLUCOSE BLD-MCNC: 135 MG/DL (ref 74–99)
HCT VFR BLD CALC: 31.3 % (ref 37–54)
HEMOGLOBIN: 10 G/DL (ref 12.5–16.5)
MAGNESIUM: 2 MG/DL (ref 1.6–2.6)
MCH RBC QN AUTO: 31.2 PG (ref 26–35)
MCHC RBC AUTO-ENTMCNC: 31.9 % (ref 32–34.5)
MCV RBC AUTO: 97.5 FL (ref 80–99.9)
METER GLUCOSE: 109 MG/DL (ref 74–99)
METER GLUCOSE: 127 MG/DL (ref 74–99)
METER GLUCOSE: 129 MG/DL (ref 74–99)
METER GLUCOSE: 131 MG/DL (ref 74–99)
PDW BLD-RTO: 15.4 FL (ref 11.5–15)
PHOSPHORUS: 4.4 MG/DL (ref 2.5–4.5)
PLATELET # BLD: 328 E9/L (ref 130–450)
PMV BLD AUTO: 9.7 FL (ref 7–12)
POTASSIUM SERPL-SCNC: 4.2 MMOL/L (ref 3.5–5)
RBC # BLD: 3.21 E12/L (ref 3.8–5.8)
SODIUM BLD-SCNC: 143 MMOL/L (ref 132–146)
TSH SERPL DL<=0.05 MIU/L-ACNC: 3.57 UIU/ML (ref 0.27–4.2)
WBC # BLD: 10.4 E9/L (ref 4.5–11.5)

## 2021-11-07 PROCEDURE — 84443 ASSAY THYROID STIM HORMONE: CPT

## 2021-11-07 PROCEDURE — 6370000000 HC RX 637 (ALT 250 FOR IP): Performed by: INTERNAL MEDICINE

## 2021-11-07 PROCEDURE — 82140 ASSAY OF AMMONIA: CPT

## 2021-11-07 PROCEDURE — 94640 AIRWAY INHALATION TREATMENT: CPT

## 2021-11-07 PROCEDURE — 6370000000 HC RX 637 (ALT 250 FOR IP): Performed by: NURSE PRACTITIONER

## 2021-11-07 PROCEDURE — 36415 COLL VENOUS BLD VENIPUNCTURE: CPT

## 2021-11-07 PROCEDURE — 85027 COMPLETE CBC AUTOMATED: CPT

## 2021-11-07 PROCEDURE — 80048 BASIC METABOLIC PNL TOTAL CA: CPT

## 2021-11-07 PROCEDURE — 94003 VENT MGMT INPAT SUBQ DAY: CPT

## 2021-11-07 PROCEDURE — 82962 GLUCOSE BLOOD TEST: CPT

## 2021-11-07 PROCEDURE — 99232 SBSQ HOSP IP/OBS MODERATE 35: CPT | Performed by: INTERNAL MEDICINE

## 2021-11-07 PROCEDURE — 2580000003 HC RX 258: Performed by: INTERNAL MEDICINE

## 2021-11-07 PROCEDURE — 6360000002 HC RX W HCPCS: Performed by: INTERNAL MEDICINE

## 2021-11-07 PROCEDURE — 2000000000 HC ICU R&B

## 2021-11-07 PROCEDURE — 83735 ASSAY OF MAGNESIUM: CPT

## 2021-11-07 PROCEDURE — 6370000000 HC RX 637 (ALT 250 FOR IP)

## 2021-11-07 PROCEDURE — 84100 ASSAY OF PHOSPHORUS: CPT

## 2021-11-07 RX ORDER — QUETIAPINE FUMARATE 25 MG/1
25 TABLET, FILM COATED ORAL NIGHTLY PRN
Status: DISCONTINUED | OUTPATIENT
Start: 2021-11-07 | End: 2021-11-09

## 2021-11-07 RX ADMIN — FENTANYL CITRATE 25 MCG: 0.05 INJECTION, SOLUTION INTRAMUSCULAR; INTRAVENOUS at 12:32

## 2021-11-07 RX ADMIN — RIVAROXABAN 20 MG: 20 TABLET, FILM COATED ORAL at 17:28

## 2021-11-07 RX ADMIN — FENTANYL CITRATE 25 MCG: 0.05 INJECTION, SOLUTION INTRAMUSCULAR; INTRAVENOUS at 18:34

## 2021-11-07 RX ADMIN — WATER 2000 MG: 1 INJECTION INTRAMUSCULAR; INTRAVENOUS; SUBCUTANEOUS at 14:09

## 2021-11-07 RX ADMIN — SODIUM CHLORIDE, PRESERVATIVE FREE 300 UNITS: 5 INJECTION INTRAVENOUS at 21:38

## 2021-11-07 RX ADMIN — IPRATROPIUM BROMIDE AND ALBUTEROL SULFATE 1 AMPULE: .5; 2.5 SOLUTION RESPIRATORY (INHALATION) at 01:23

## 2021-11-07 RX ADMIN — ANTISEPTIC SURGICAL SCRUB: 0.04 SOLUTION TOPICAL at 08:10

## 2021-11-07 RX ADMIN — FENTANYL CITRATE 25 MCG: 0.05 INJECTION, SOLUTION INTRAMUSCULAR; INTRAVENOUS at 23:18

## 2021-11-07 RX ADMIN — LEVETIRACETAM 1000 MG: 500 TABLET, FILM COATED ORAL at 07:51

## 2021-11-07 RX ADMIN — DILTIAZEM HYDROCHLORIDE 90 MG: 30 TABLET, FILM COATED ORAL at 05:16

## 2021-11-07 RX ADMIN — FOLIC ACID 1 MG: 1 TABLET ORAL at 07:51

## 2021-11-07 RX ADMIN — IPRATROPIUM BROMIDE AND ALBUTEROL SULFATE 1 AMPULE: .5; 2.5 SOLUTION RESPIRATORY (INHALATION) at 17:45

## 2021-11-07 RX ADMIN — DILTIAZEM HYDROCHLORIDE 90 MG: 30 TABLET, FILM COATED ORAL at 17:17

## 2021-11-07 RX ADMIN — Medication 10 ML: at 21:37

## 2021-11-07 RX ADMIN — LACTULOSE 20 G: 20 SOLUTION ORAL at 07:51

## 2021-11-07 RX ADMIN — IPRATROPIUM BROMIDE AND ALBUTEROL SULFATE 1 AMPULE: .5; 2.5 SOLUTION RESPIRATORY (INHALATION) at 14:35

## 2021-11-07 RX ADMIN — FAMOTIDINE 40 MG: 20 TABLET, FILM COATED ORAL at 07:52

## 2021-11-07 RX ADMIN — THIAMINE HCL TAB 100 MG 100 MG: 100 TAB at 07:51

## 2021-11-07 RX ADMIN — ACETAMINOPHEN 650 MG: 325 TABLET ORAL at 00:40

## 2021-11-07 RX ADMIN — DILTIAZEM HYDROCHLORIDE 90 MG: 30 TABLET, FILM COATED ORAL at 23:18

## 2021-11-07 RX ADMIN — FUROSEMIDE 40 MG: 10 INJECTION, SOLUTION INTRAMUSCULAR; INTRAVENOUS at 07:52

## 2021-11-07 RX ADMIN — SODIUM CHLORIDE, PRESERVATIVE FREE 300 UNITS: 5 INJECTION INTRAVENOUS at 07:59

## 2021-11-07 RX ADMIN — LEVETIRACETAM 1000 MG: 500 TABLET, FILM COATED ORAL at 21:37

## 2021-11-07 RX ADMIN — DILTIAZEM HYDROCHLORIDE 90 MG: 30 TABLET, FILM COATED ORAL at 00:40

## 2021-11-07 RX ADMIN — SERTRALINE HYDROCHLORIDE 100 MG: 50 TABLET ORAL at 07:51

## 2021-11-07 RX ADMIN — FAMOTIDINE 40 MG: 20 TABLET, FILM COATED ORAL at 21:37

## 2021-11-07 RX ADMIN — METOPROLOL TARTRATE 25 MG: 25 TABLET, FILM COATED ORAL at 06:30

## 2021-11-07 RX ADMIN — ACETAMINOPHEN 650 MG: 325 TABLET ORAL at 05:16

## 2021-11-07 RX ADMIN — FENTANYL CITRATE 25 MCG: 0.05 INJECTION, SOLUTION INTRAMUSCULAR; INTRAVENOUS at 19:19

## 2021-11-07 RX ADMIN — QUETIAPINE FUMARATE 25 MG: 25 TABLET ORAL at 07:52

## 2021-11-07 RX ADMIN — Medication 10 ML: at 07:59

## 2021-11-07 RX ADMIN — IPRATROPIUM BROMIDE AND ALBUTEROL SULFATE 1 AMPULE: .5; 2.5 SOLUTION RESPIRATORY (INHALATION) at 09:03

## 2021-11-07 RX ADMIN — SULFAMETHOXAZOLE AND TRIMETHOPRIM 1 TABLET: 800; 160 TABLET ORAL at 08:01

## 2021-11-07 RX ADMIN — METOPROLOL TARTRATE 25 MG: 25 TABLET, FILM COATED ORAL at 21:37

## 2021-11-07 RX ADMIN — HALOPERIDOL LACTATE 2 MG: 5 INJECTION, SOLUTION INTRAMUSCULAR at 02:41

## 2021-11-07 RX ADMIN — FENTANYL CITRATE 25 MCG: 0.05 INJECTION, SOLUTION INTRAMUSCULAR; INTRAVENOUS at 17:14

## 2021-11-07 RX ADMIN — FENTANYL CITRATE 25 MCG: 0.05 INJECTION, SOLUTION INTRAMUSCULAR; INTRAVENOUS at 16:05

## 2021-11-07 RX ADMIN — IPRATROPIUM BROMIDE AND ALBUTEROL SULFATE 1 AMPULE: .5; 2.5 SOLUTION RESPIRATORY (INHALATION) at 22:13

## 2021-11-07 RX ADMIN — SULFAMETHOXAZOLE AND TRIMETHOPRIM 1 TABLET: 800; 160 TABLET ORAL at 21:37

## 2021-11-07 RX ADMIN — SODIUM CHLORIDE, PRESERVATIVE FREE 300 UNITS: 5 INJECTION INTRAVENOUS at 07:58

## 2021-11-07 RX ADMIN — DILTIAZEM HYDROCHLORIDE 90 MG: 30 TABLET, FILM COATED ORAL at 12:15

## 2021-11-07 RX ADMIN — ACETAMINOPHEN 650 MG: 325 TABLET ORAL at 17:28

## 2021-11-07 RX ADMIN — ACETAMINOPHEN 650 MG: 325 TABLET ORAL at 12:15

## 2021-11-07 RX ADMIN — IPRATROPIUM BROMIDE AND ALBUTEROL SULFATE 1 AMPULE: .5; 2.5 SOLUTION RESPIRATORY (INHALATION) at 05:09

## 2021-11-07 RX ADMIN — ACETAMINOPHEN 650 MG: 325 TABLET ORAL at 23:18

## 2021-11-07 ASSESSMENT — PULMONARY FUNCTION TESTS
PIF_VALUE: 21
PIF_VALUE: 20
PIF_VALUE: 21
PIF_VALUE: 21
PIF_VALUE: 22
PIF_VALUE: 20
PIF_VALUE: 22
PIF_VALUE: 20
PIF_VALUE: 19
PIF_VALUE: 18
PIF_VALUE: 23
PIF_VALUE: 45
PIF_VALUE: 20
PIF_VALUE: 19
PIF_VALUE: 20
PIF_VALUE: 20
PIF_VALUE: 43
PIF_VALUE: 20
PIF_VALUE: 18
PIF_VALUE: 27
PIF_VALUE: 18
PIF_VALUE: 23
PIF_VALUE: 31
PIF_VALUE: 18
PIF_VALUE: 20
PIF_VALUE: 21
PIF_VALUE: 26
PIF_VALUE: 20
PIF_VALUE: 18
PIF_VALUE: 26
PIF_VALUE: 18
PIF_VALUE: 20
PIF_VALUE: 45
PIF_VALUE: 21
PIF_VALUE: 23
PIF_VALUE: 21
PIF_VALUE: 19
PIF_VALUE: 50
PIF_VALUE: 18
PIF_VALUE: 19
PIF_VALUE: 19
PIF_VALUE: 50
PIF_VALUE: 24
PIF_VALUE: 45
PIF_VALUE: 18
PIF_VALUE: 20
PIF_VALUE: 23
PIF_VALUE: 19
PIF_VALUE: 20
PIF_VALUE: 16
PIF_VALUE: 27
PIF_VALUE: 20
PIF_VALUE: 21
PIF_VALUE: 43
PIF_VALUE: 30
PIF_VALUE: 20
PIF_VALUE: 18
PIF_VALUE: 20
PIF_VALUE: 42
PIF_VALUE: 44
PIF_VALUE: 22
PIF_VALUE: 18
PIF_VALUE: 19
PIF_VALUE: 17
PIF_VALUE: 20
PIF_VALUE: 24
PIF_VALUE: 21
PIF_VALUE: 50
PIF_VALUE: 17
PIF_VALUE: 19
PIF_VALUE: 20
PIF_VALUE: 23
PIF_VALUE: 20
PIF_VALUE: 18
PIF_VALUE: 21
PIF_VALUE: 25
PIF_VALUE: 20
PIF_VALUE: 18
PIF_VALUE: 21
PIF_VALUE: 22
PIF_VALUE: 24
PIF_VALUE: 19
PIF_VALUE: 45
PIF_VALUE: 21
PIF_VALUE: 50
PIF_VALUE: 23
PIF_VALUE: 39
PIF_VALUE: 19
PIF_VALUE: 43
PIF_VALUE: 24
PIF_VALUE: 16
PIF_VALUE: 26
PIF_VALUE: 22
PIF_VALUE: 24
PIF_VALUE: 20
PIF_VALUE: 21
PIF_VALUE: 19
PIF_VALUE: 24
PIF_VALUE: 49
PIF_VALUE: 25
PIF_VALUE: 25
PIF_VALUE: 24
PIF_VALUE: 46
PIF_VALUE: 20
PIF_VALUE: 20
PIF_VALUE: 18
PIF_VALUE: 42
PIF_VALUE: 20
PIF_VALUE: 45
PIF_VALUE: 17
PIF_VALUE: 25
PIF_VALUE: 44
PIF_VALUE: 41
PIF_VALUE: 24
PIF_VALUE: 21
PIF_VALUE: 19
PIF_VALUE: 19
PIF_VALUE: 20
PIF_VALUE: 22
PIF_VALUE: 43
PIF_VALUE: 20
PIF_VALUE: 49
PIF_VALUE: 21
PIF_VALUE: 19
PIF_VALUE: 25
PIF_VALUE: 22
PIF_VALUE: 21
PIF_VALUE: 20
PIF_VALUE: 45
PIF_VALUE: 21
PIF_VALUE: 17
PIF_VALUE: 19

## 2021-11-07 ASSESSMENT — PAIN SCALES - GENERAL
PAINLEVEL_OUTOF10: 3
PAINLEVEL_OUTOF10: 0
PAINLEVEL_OUTOF10: 3
PAINLEVEL_OUTOF10: 1
PAINLEVEL_OUTOF10: 3
PAINLEVEL_OUTOF10: 3
PAINLEVEL_OUTOF10: 7
PAINLEVEL_OUTOF10: 3
PAINLEVEL_OUTOF10: 0
PAINLEVEL_OUTOF10: 7
PAINLEVEL_OUTOF10: 3
PAINLEVEL_OUTOF10: 8
PAINLEVEL_OUTOF10: 3
PAINLEVEL_OUTOF10: 3

## 2021-11-07 NOTE — PROGRESS NOTES
dysmetria, neck is supple, no meningitic signs present.       Last 3 CMP:    Recent Labs     11/06/21  0444 11/06/21  0646 11/07/21  0404   * 142 143   K 3.7 4.1 4.2   CL 94* 105 106   CO2 22 24 25   BUN 31* 33* 34*   CREATININE 0.7 0.8 0.7   GLUCOSE 550* 142* 135*   CALCIUM 8.3* 9.2 9.3   PROT  --  6.8  --    LABALBU  --  3.1*  --    BILITOT  --  0.8  --    ALKPHOS  --  195*  --    AST  --  99*  --    ALT  --  273*  --      Recent Labs     11/07/21  0404   WBC 10.4   RBC 3.21*   HGB 10.0*   HCT 31.3*   MCV 97.5   MCH 31.2   MCHC 31.9*   RDW 15.4*      MPV 9.7     Recent Labs     11/04/21  1555   BC 24 Hours no growth     Recent Labs     11/04/21  1535   BLOODCULT2 24 Hours no growth     24 HR INTAKE/OUTPUT:      Intake/Output Summary (Last 24 hours) at 11/7/2021 0919  Last data filed at 11/7/2021 0630  Gross per 24 hour   Intake 2648 ml   Output 1530 ml   Net 1118 ml     MEDICATIONS:   metoprolol tartrate  25 mg Per G Tube BID    acetaminophen  650 mg Oral Q6H    fentaNYL  1 patch TransDERmal Q72H    cefTRIAXone (ROCEPHIN) IV  2,000 mg IntraVENous Q24H    insulin lispro  0-12 Units SubCUTAneous TID WC    insulin lispro  0-6 Units SubCUTAneous Nightly    QUEtiapine  25 mg Oral BID    chlorhexidine   Topical Daily    sulfamethoxazole-trimethoprim  1 tablet Oral 2 times per day    levETIRAcetam  1,000 mg Oral BID    dilTIAZem  90 mg Oral 4 times per day    famotidine  40 mg Oral BID    rivaroxaban  20 mg Oral Daily    folic acid  1 mg Oral Daily    heparin flush  3 mL IntraVENous 2 times per day    thiamine mononitrate  100 mg Oral Daily    sodium chloride flush  5-40 mL IntraVENous 2 times per day    heparin flush  3 mL IntraVENous 2 times per day    lactulose  20 g Oral BID    sertraline  100 mg Oral Daily    ipratropium-albuterol  1 ampule Inhalation Q4H      dextrose      sodium chloride       fentanNYL, glucose, dextrose, glucagon (rDNA), dextrose, haloperidol lactate **OR** haloperidol lactate, heparin flush, sodium chloride flush, sodium chloride, heparin flush, perflutren lipid microspheres, ondansetron **OR** ondansetron, polyethylene glycol    OBJECTIVE:  Vitals:    11/07/21 0800   BP: (!) 142/85   Pulse: 100   Resp: 16   Temp: 99.9 °F (37.7 °C)   SpO2: 97%     FiO2 : 30 %  O2 Flow Rate (L/min): 5 L/min  O2 Device: Ventilator        LABS:  WBC   Date Value Ref Range Status   11/07/2021 10.4 4.5 - 11.5 E9/L Final   11/06/2021 9.5 4.5 - 11.5 E9/L Final   11/05/2021 9.7 4.5 - 11.5 E9/L Final     Hemoglobin   Date Value Ref Range Status   11/07/2021 10.0 (L) 12.5 - 16.5 g/dL Final   11/06/2021 9.2 (L) 12.5 - 16.5 g/dL Final   11/05/2021 9.7 (L) 12.5 - 16.5 g/dL Final     Hematocrit   Date Value Ref Range Status   11/07/2021 31.3 (L) 37.0 - 54.0 % Final   11/06/2021 29.4 (L) 37.0 - 54.0 % Final   11/05/2021 29.0 (L) 37.0 - 54.0 % Final     MCV   Date Value Ref Range Status   11/07/2021 97.5 80.0 - 99.9 fL Final   11/06/2021 97.7 80.0 - 99.9 fL Final   11/05/2021 94.2 80.0 - 99.9 fL Final     Platelets   Date Value Ref Range Status   11/07/2021 328 130 - 450 E9/L Final   11/06/2021 295 130 - 450 E9/L Final   11/05/2021 343 130 - 450 E9/L Final     Sodium   Date Value Ref Range Status   11/07/2021 143 132 - 146 mmol/L Final   11/06/2021 142 132 - 146 mmol/L Final   11/06/2021 129 (L) 132 - 146 mmol/L Final     Potassium   Date Value Ref Range Status   11/07/2021 4.2 3.5 - 5.0 mmol/L Final   11/06/2021 4.1 3.5 - 5.0 mmol/L Final   11/06/2021 3.7 3.5 - 5.0 mmol/L Final     Potassium reflex Magnesium   Date Value Ref Range Status   10/12/2021 4.3 3.5 - 5.0 mmol/L Final     Chloride   Date Value Ref Range Status   11/07/2021 106 98 - 107 mmol/L Final   11/06/2021 105 98 - 107 mmol/L Final   11/06/2021 94 (L) 98 - 107 mmol/L Final     CO2   Date Value Ref Range Status   11/07/2021 25 22 - 29 mmol/L Final   11/06/2021 24 22 - 29 mmol/L Final   11/06/2021 22 22 - 29 mmol/L Final     BUN   Date Value Ref Range Status   11/07/2021 34 (H) 6 - 23 mg/dL Final   11/06/2021 33 (H) 6 - 23 mg/dL Final   11/06/2021 31 (H) 6 - 23 mg/dL Final     CREATININE   Date Value Ref Range Status   11/07/2021 0.7 0.7 - 1.2 mg/dL Final   11/06/2021 0.8 0.7 - 1.2 mg/dL Final   11/06/2021 0.7 0.7 - 1.2 mg/dL Final     Glucose   Date Value Ref Range Status   11/07/2021 135 (H) 74 - 99 mg/dL Final   11/06/2021 142 (H) 74 - 99 mg/dL Final   11/06/2021 550 (HH) 74 - 99 mg/dL Final     Calcium   Date Value Ref Range Status   11/07/2021 9.3 8.6 - 10.2 mg/dL Final   11/06/2021 9.2 8.6 - 10.2 mg/dL Final   11/06/2021 8.3 (L) 8.6 - 10.2 mg/dL Final     Total Protein   Date Value Ref Range Status   11/06/2021 6.8 6.4 - 8.3 g/dL Final   10/24/2021 5.9 (L) 6.4 - 8.3 g/dL Final   10/23/2021 6.0 (L) 6.4 - 8.3 g/dL Final     Albumin   Date Value Ref Range Status   11/06/2021 3.1 (L) 3.5 - 5.2 g/dL Final   10/24/2021 2.7 (L) 3.5 - 5.2 g/dL Final   10/23/2021 2.8 (L) 3.5 - 5.2 g/dL Final     Total Bilirubin   Date Value Ref Range Status   11/06/2021 0.8 0.0 - 1.2 mg/dL Final   10/24/2021 0.5 0.0 - 1.2 mg/dL Final   10/23/2021 0.5 0.0 - 1.2 mg/dL Final     Alkaline Phosphatase   Date Value Ref Range Status   11/06/2021 195 (H) 40 - 129 U/L Final   10/24/2021 62 40 - 129 U/L Final   10/23/2021 58 40 - 129 U/L Final     AST   Date Value Ref Range Status   11/06/2021 99 (H) 0 - 39 U/L Final   10/24/2021 73 (H) 0 - 39 U/L Final   10/23/2021 55 (H) 0 - 39 U/L Final     ALT   Date Value Ref Range Status   11/06/2021 273 (H) 0 - 40 U/L Final   10/24/2021 61 (H) 0 - 40 U/L Final   10/23/2021 33 0 - 40 U/L Final     GFR Non-   Date Value Ref Range Status   11/07/2021 >60 >=60 mL/min/1.73 Final     Comment:     Chronic Kidney Disease: less than 60 ml/min/1.73 sq.m. Kidney Failure: less than 15 ml/min/1.73 sq.m. Results valid for patients 18 years and older.      11/06/2021 >60 >=60 mL/min/1.73 Final     Comment:     Chronic Kidney Disease: less than 60 ml/min/1.73 sq.m. Kidney Failure: less than 15 ml/min/1.73 sq.m. Results valid for patients 18 years and older. 11/06/2021 >60 >=60 mL/min/1.73 Final     Comment:     Chronic Kidney Disease: less than 60 ml/min/1.73 sq.m. Kidney Failure: less than 15 ml/min/1.73 sq.m. Results valid for patients 18 years and older. GFR    Date Value Ref Range Status   11/07/2021 >60  Final   11/06/2021 >60  Final   11/06/2021 >60  Final     Magnesium   Date Value Ref Range Status   11/07/2021 2.0 1.6 - 2.6 mg/dL Final   11/06/2021 1.9 1.6 - 2.6 mg/dL Final   11/06/2021 1.7 1.6 - 2.6 mg/dL Final     Phosphorus   Date Value Ref Range Status   11/07/2021 4.4 2.5 - 4.5 mg/dL Final   11/06/2021 4.3 2.5 - 4.5 mg/dL Final   11/06/2021 4.1 2.5 - 4.5 mg/dL Final     No results for input(s): PH, PO2, PCO2, HCO3, BE, O2SAT in the last 72 hours. RADIOLOGY:  XR CHEST PORTABLE   Final Result   No acute process         XR CHEST PORTABLE   Final Result   Stable small bilateral pleural effusions, with bibasilar atelectasis or   infiltrates         XR CHEST PORTABLE   Final Result   Right PICC line tip is at the cavoatrial junction. Stable prominent cardiac silhouette and bilateral pleural effusions with   bibasilar atelectasis or infiltrates         XR CHEST PORTABLE   Final Result   No acute process. Cardiomegaly. XR CHEST PORTABLE   Final Result   Cardiomegaly and postop changes. ETT is in good position. Right IJ catheter   shows no significant interval change. Small bilateral effusions, slightly   improved compared to October 20. MRI BRAIN W WO CONTRAST   Final Result   No acute intracranial abnormality visualized. CT HEAD WO CONTRAST   Final Result   1. No acute intracranial abnormality. 2. Mucosal disease of the paranasal sinuses. XR ABDOMEN FOR NG/OG/NE TUBE PLACEMENT   Final Result   Satisfactory position of nasogastric tube. XR CHEST PORTABLE   Final Result   1. No interval change in the vague bilateral lower lobe airspace disease. 2. Possible trace bilateral pleural effusions. 3. The NG tube has been removed. US GALLBLADDER RUQ   Final Result   Mild fatty liver. No sonographic evidence of acute cholecystitis. CT ABDOMEN PELVIS W IV CONTRAST Additional Contrast? None   Final Result   Cardiomegaly with infiltrates and pleural effusions in the lung bases likely   pneumonia or mild CHF. There is no hiatal hernia. Distended gallbladder with mild wall thickening. Consider ultrasonography to   evaluate for cholecystitis. Diverticulosis of the colon with mild thickening of the sigmoid colon which   could be due to spasm or uncomplicated diverticulitis. 2.8 x 2.8 cm abdominal aortic aneurysm. RECOMMENDATIONS:   Abdominal aortic aneurysm. 5 year surveillance is recommended. XR CHEST PORTABLE   Final Result   Worsening left greater than right perihilar opacities which may represent   asymmetric edema or pneumonia. Continued follow-up recommended. XR ABDOMEN (KUB) (SINGLE AP VIEW)   Final Result   No significant changes detailed above. XR CHEST ABDOMEN NG PLACEMENT   Final Result   The enteric tube terminates in the region of the proximal stomach. Recommend   consideration of advancement by at least 6 cm to ensure intragastric location   of the side hole. XR CHEST PORTABLE   Final Result   ET tube terminates between the clavicular heads and the karo. Enteric tube is poorly visualized at the GE junction and below; if position   of enteric tube tip needs to be known then KUB would better evaluate for it. Stable mild pulmonary vascular congestion. Stable small pleural effusions. Stable cardiomegaly. XR CHEST PORTABLE   Final Result   No interval change compared to prior exam.      Persistent small bilateral pleural effusions. Ongoing mild-to-moderate pulmonary edema. XR CHEST PORTABLE   Final Result   New right IJ line with the distal tip in the SVC and no pneumothorax. Slightly more confluent airspace disease in the right lung base. No other significant interval change. XR CHEST PORTABLE   Final Result   1. Satisfactory position of the NG tube and endotracheal tube   2. New opacity within the right lung base which could represent a pleural   effusion and or right lung base infiltrate. XR CHEST PORTABLE   Final Result   1. Cardiomegaly. There is no evidence of failure or pneumonia. CT Head WO Contrast   Final Result   1. There is no acute intracranial abnormality. Specifically, there is no   intracranial hemorrhage. 2. Atrophy and periventricular leukomalacia,   . PROBLEM LIST:  Principal Problem:    Altered mental status  Active Problems:    Seizures ([address]    Withdrawal symptoms, alcohol, with delirium ([address]  Resolved Problems:    * No resolved hospital problems. *      ATTESTATION:  ICU Staff Physician note of personal involvement in Care  As the attending physician, I certify that I personally reviewed the patients history and personnally examined the patient to confirm the physical findings described above,  And that I reviewed the relevant imaging studies and available reports. I also discussed the differential diagnosis and all of the proposed management plans with the patient and individuals accompanying the patient to this visit. They had the opportunity to ask questions about the proposed management plans and to have those questions answered. This patient has a high probability of sudden, clinically significant deterioration, which requires the highest level of physician preparedness to intervene urgently. I managed/supervised life or organ supporting interventions that required frequent physician assessment.    I devoted my full attention to the direct care of this patient for the amount of time indicated below. Time I spent with the family or surrogate(s) is included only if the patient was incapable of providing the necessary information or participating in medical decisions  Time devoted to teaching and to any procedures I billed separately is not included.      CRITICAL CARE TIME:  30 minutes    Cuong Conner MD  Pulmonary and Critical Care Medicine

## 2021-11-07 NOTE — PLAN OF CARE
Problem: Skin Integrity:  Goal: Absence of new skin breakdown  Description: Absence of new skin breakdown  Outcome: Met This Shift     Problem: Airway Clearance - Ineffective:  Goal: Ability to maintain a clear airway will improve  Description: Ability to maintain a clear airway will improve  Outcome: Met This Shift     Problem: Injury - Risk of, Healthcare-Acquired Condition:  Goal: Absence of pressure ulcer  Description: Absence of pressure ulcer  Outcome: Met This Shift

## 2021-11-07 NOTE — PROGRESS NOTES
Department of Internal Medicine  General Internal Medicine  Attending Progress Note  Chief Complaint   Patient presents with    Delirium [place] (DTS)     Pt states he usually drinks a fifth of kilo beam a day states he quit saturday. states he has been having visual hallucinations since yesterday. SUBJECTIVE:    Trached.      OBJECTIVE      Medications    Current Facility-Administered Medications: QUEtiapine (SEROQUEL) tablet 25 mg, 25 mg, Oral, Nightly PRN  fentaNYL (SUBLIMAZE) injection 25 mcg, 25 mcg, IntraVENous, Q1H PRN  metoprolol tartrate (LOPRESSOR) tablet 25 mg, 25 mg, Per G Tube, BID  acetaminophen (TYLENOL) tablet 650 mg, 650 mg, Oral, Q6H  fentaNYL (DURAGESIC) 12 MCG/HR 1 patch, 1 patch, TransDERmal, Q72H  cefTRIAXone (ROCEPHIN) 2,000 mg in sterile water 20 mL IV syringe, 2,000 mg, IntraVENous, Q24H  insulin lispro (HUMALOG) injection vial 0-12 Units, 0-12 Units, SubCUTAneous, TID WC  insulin lispro (HUMALOG) injection vial 0-6 Units, 0-6 Units, SubCUTAneous, Nightly  glucose (GLUTOSE) 40 % oral gel 15 g, 15 g, Oral, PRN  dextrose 50 % IV solution, 12.5 g, IntraVENous, PRN  glucagon (rDNA) injection 1 mg, 1 mg, IntraMUSCular, PRN  dextrose 5 % solution, 100 mL/hr, IntraVENous, PRN  chlorhexidine (HIBICLENS) 4 % liquid, , Topical, Daily  sulfamethoxazole-trimethoprim (BACTRIM DS;SEPTRA DS) 800-160 MG per tablet 1 tablet, 1 tablet, Oral, 2 times per day  levETIRAcetam (KEPPRA) tablet 1,000 mg, 1,000 mg, Oral, BID  dilTIAZem (CARDIZEM) tablet 90 mg, 90 mg, Oral, 4 times per day  famotidine (PEPCID) tablet 40 mg, 40 mg, Oral, BID  rivaroxaban (XARELTO) tablet 20 mg, 20 mg, Oral, Daily  folic acid (FOLVITE) tablet 1 mg, 1 mg, Oral, Daily  heparin flush 100 UNIT/ML injection 300 Units, 3 mL, IntraVENous, 2 times per day  heparin flush 100 UNIT/ML injection 300 Units, 3 mL, IntraCATHeter, PRN  thiamine mononitrate tablet 100 mg, 100 mg, Oral, Daily  sodium chloride flush 0.9 % injection 5-40 mL, 5-40 mL, BILITOT 0.8 11/06/2021    ALKPHOS 195 11/06/2021    AST 99 11/06/2021     11/06/2021       ASSESSMENT AND PLAN    Brief summary of stay:  73 y. o. male with a history of alcoholism depression hypertension coronary artery disease GERD COPD morbid obesity presents with signs of alcohol withdrawal. Jaylyn Infante went into respiratory distress and was intubated. Intubation was prolonged and difficulty with weaning him off vent. He was trached. He was treated for aspiration PNA, Atrial fibrillation, GELY and dysphagia/peg tube. His sputum is currently positive for MSSA and on ceftriaxone for MSSA PNA. 1.  Altered mental status    2. Seizures ([address]    3. Withdrawal symptoms, alcohol, with delirium ([facility] [address]    4. GELY: resolved    5. Atrial fibrillation: on xarelto and cardiazem    6. UTI: resolved    7. Dysphagia/Malnutrition: on tube feeding    8. Transaminitis: monitor to worsening. Plan:  Patient was denied LTAC. They stated that he is not qualified for LTAC placement  Vent management per ICU.

## 2021-11-07 NOTE — PLAN OF CARE
Problem: Skin Integrity:  Goal: Absence of new skin breakdown  Description: Absence of new skin breakdown  Outcome: Met This Shift     Problem: Falls - Risk of:  Goal: Will remain free from falls  Description: Will remain free from falls  Outcome: Met This Shift  Goal: Absence of physical injury  Description: Absence of physical injury  Outcome: Met This Shift     Problem: Injury - Risk of, Healthcare-Acquired Condition:  Goal: Will remain free from falls  Description: Will remain free from falls  Outcome: Met This Shift     Problem: Skin Integrity:  Goal: Will show no infection signs and symptoms  Description: Will show no infection signs and symptoms  Outcome: Ongoing     Problem: Non-Violent Restraints  Goal: Patient's dignity will be maintained  Outcome: Ongoing     Problem: Airway Clearance - Ineffective:  Goal: Ability to maintain a clear airway will improve  Description: Ability to maintain a clear airway will improve  11/7/2021 0231 by Talya Bergeron RN  Outcome: Ongoing  11/6/2021 1959 by Floridalma Garcias RN  Outcome: Met This Shift     Problem: Discharge Planning:  Goal: Discharged to appropriate level of care  Description: Discharged to appropriate level of care  Outcome: Ongoing     Problem: Pain:  Goal: Pain level will decrease  Description: Pain level will decrease  Outcome: Ongoing  Goal: Recognizes and communicates pain  Description: Recognizes and communicates pain  Outcome: Ongoing     Problem: Injury - Risk of, Healthcare-Acquired Condition:  Goal: Absence of pressure ulcer  Description: Absence of pressure ulcer  Outcome: Ongoing  Goal: Absence of urinary tract infection signs and symptoms  Description: Absence of urinary tract infection signs and symptoms  Outcome: Ongoing     Problem: Nutrition Deficit - Risk of:  Goal: Ability to achieve adequate nutritional intake will improve  Description: Ability to achieve adequate nutritional intake will improve  Outcome: Ongoing  Goal: Maintenance of adequate weight for body size and type will improve to within specified parameters  Description: Maintenance of adequate weight for body size and type will improve to within specified parameters  Outcome: Ongoing     Problem: Aspiration - Risk of:  Goal: Absence of aspiration  Description: Absence of aspiration  11/7/2021 0231 by Lizandro Wagner RN  Outcome: Ongoing  11/6/2021 1959 by Pavel Manzano RN  Outcome: Met This Shift     Problem: Respiratory Function - Compromised:  Goal: Ability to maintain a clear airway will improve  Description: Ability to maintain a clear airway will improve  11/7/2021 0231 by Lizandro Wagner RN  Outcome: Ongoing  11/6/2021 1959 by Pavel Manzano RN  Outcome: Met This Shift  Goal: Absence of pulmonary infection  Description: Absence of pulmonary infection  Outcome: Ongoing  Goal: Levels of oxygenation will improve  Description: Levels of oxygenation will improve  Outcome: Ongoing  Goal: Increase in ventilator-free time  Description: Increase in ventilator-free time  Outcome: Ongoing     Problem:  Bowel Function - Altered:  Goal: Bowel elimination is within specified parameters  Description: Bowel elimination is within specified parameters  Outcome: Ongoing  Goal: Control of bowel function will improve  Description: Control of bowel function will improve  Outcome: Ongoing  Goal: Ability to maintain normal bowel function will improve  Description: Ability to maintain normal bowel function will improve  Outcome: Ongoing     Problem: Cardiac:  Goal: Ability to maintain an adequate cardiac output will improve  Description: Ability to maintain an adequate cardiac output will improve  Outcome: Ongoing  Goal: Hemodynamic stability will improve  Description: Hemodynamic stability will improve  Outcome: Ongoing     Problem: Respiratory:  Goal: Respiratory status will improve  Description: Respiratory status will improve  Outcome: Ongoing     Problem: Safety:  Goal: Ability to remain free from injury will improve  Description: Ability to remain free from injury will improve  Outcome: Ongoing

## 2021-11-08 LAB
ANION GAP SERPL CALCULATED.3IONS-SCNC: 10 MMOL/L (ref 7–16)
BUN BLDV-MCNC: 37 MG/DL (ref 6–23)
CALCIUM SERPL-MCNC: 9.2 MG/DL (ref 8.6–10.2)
CHLORIDE BLD-SCNC: 105 MMOL/L (ref 98–107)
CO2: 26 MMOL/L (ref 22–29)
CREAT SERPL-MCNC: 0.8 MG/DL (ref 0.7–1.2)
GFR AFRICAN AMERICAN: >60
GFR NON-AFRICAN AMERICAN: >60 ML/MIN/1.73
GLUCOSE BLD-MCNC: 139 MG/DL (ref 74–99)
HCT VFR BLD CALC: 31.3 % (ref 37–54)
HEMOGLOBIN: 10.1 G/DL (ref 12.5–16.5)
KEPPRA: 13 UG/ML (ref 12–46)
MAGNESIUM: 2 MG/DL (ref 1.6–2.6)
MCH RBC QN AUTO: 30.6 PG (ref 26–35)
MCHC RBC AUTO-ENTMCNC: 32.3 % (ref 32–34.5)
MCV RBC AUTO: 94.8 FL (ref 80–99.9)
METER GLUCOSE: 115 MG/DL (ref 74–99)
METER GLUCOSE: 120 MG/DL (ref 74–99)
METER GLUCOSE: 123 MG/DL (ref 74–99)
PDW BLD-RTO: 15.3 FL (ref 11.5–15)
PHOSPHORUS: 4.2 MG/DL (ref 2.5–4.5)
PLATELET # BLD: 318 E9/L (ref 130–450)
PMV BLD AUTO: 9.6 FL (ref 7–12)
POTASSIUM SERPL-SCNC: 4.2 MMOL/L (ref 3.5–5)
RBC # BLD: 3.3 E12/L (ref 3.8–5.8)
SODIUM BLD-SCNC: 141 MMOL/L (ref 132–146)
TRIGL SERPL-MCNC: 93 MG/DL (ref 0–149)
WBC # BLD: 9.2 E9/L (ref 4.5–11.5)

## 2021-11-08 PROCEDURE — 6370000000 HC RX 637 (ALT 250 FOR IP): Performed by: NURSE PRACTITIONER

## 2021-11-08 PROCEDURE — 6370000000 HC RX 637 (ALT 250 FOR IP): Performed by: INTERNAL MEDICINE

## 2021-11-08 PROCEDURE — 82962 GLUCOSE BLOOD TEST: CPT

## 2021-11-08 PROCEDURE — 83735 ASSAY OF MAGNESIUM: CPT

## 2021-11-08 PROCEDURE — 6370000000 HC RX 637 (ALT 250 FOR IP)

## 2021-11-08 PROCEDURE — 97110 THERAPEUTIC EXERCISES: CPT | Performed by: PHYSICAL THERAPIST

## 2021-11-08 PROCEDURE — 2580000003 HC RX 258: Performed by: INTERNAL MEDICINE

## 2021-11-08 PROCEDURE — 99232 SBSQ HOSP IP/OBS MODERATE 35: CPT | Performed by: INTERNAL MEDICINE

## 2021-11-08 PROCEDURE — 2700000000 HC OXYGEN THERAPY PER DAY

## 2021-11-08 PROCEDURE — 80048 BASIC METABOLIC PNL TOTAL CA: CPT

## 2021-11-08 PROCEDURE — 6360000002 HC RX W HCPCS: Performed by: INTERNAL MEDICINE

## 2021-11-08 PROCEDURE — 94640 AIRWAY INHALATION TREATMENT: CPT

## 2021-11-08 PROCEDURE — 2060000000 HC ICU INTERMEDIATE R&B

## 2021-11-08 PROCEDURE — 85027 COMPLETE CBC AUTOMATED: CPT

## 2021-11-08 PROCEDURE — 84100 ASSAY OF PHOSPHORUS: CPT

## 2021-11-08 PROCEDURE — 84478 ASSAY OF TRIGLYCERIDES: CPT

## 2021-11-08 PROCEDURE — 94003 VENT MGMT INPAT SUBQ DAY: CPT

## 2021-11-08 PROCEDURE — 36415 COLL VENOUS BLD VENIPUNCTURE: CPT

## 2021-11-08 RX ORDER — LEVETIRACETAM 100 MG/ML
1000 SOLUTION ORAL 2 TIMES DAILY
Status: DISCONTINUED | OUTPATIENT
Start: 2021-11-08 | End: 2021-11-13 | Stop reason: HOSPADM

## 2021-11-08 RX ADMIN — IPRATROPIUM BROMIDE AND ALBUTEROL SULFATE 1 AMPULE: .5; 2.5 SOLUTION RESPIRATORY (INHALATION) at 06:23

## 2021-11-08 RX ADMIN — DILTIAZEM HYDROCHLORIDE 90 MG: 30 TABLET, FILM COATED ORAL at 17:16

## 2021-11-08 RX ADMIN — ACETAMINOPHEN 650 MG: 325 TABLET ORAL at 13:09

## 2021-11-08 RX ADMIN — FAMOTIDINE 40 MG: 20 TABLET, FILM COATED ORAL at 08:43

## 2021-11-08 RX ADMIN — FAMOTIDINE 40 MG: 20 TABLET, FILM COATED ORAL at 22:31

## 2021-11-08 RX ADMIN — SULFAMETHOXAZOLE AND TRIMETHOPRIM 1 TABLET: 800; 160 TABLET ORAL at 22:52

## 2021-11-08 RX ADMIN — SODIUM CHLORIDE, PRESERVATIVE FREE 300 UNITS: 5 INJECTION INTRAVENOUS at 22:20

## 2021-11-08 RX ADMIN — Medication 10 ML: at 08:44

## 2021-11-08 RX ADMIN — ACETAMINOPHEN 650 MG: 325 TABLET ORAL at 06:00

## 2021-11-08 RX ADMIN — FENTANYL CITRATE 25 MCG: 0.05 INJECTION, SOLUTION INTRAMUSCULAR; INTRAVENOUS at 02:48

## 2021-11-08 RX ADMIN — FENTANYL CITRATE 25 MCG: 0.05 INJECTION, SOLUTION INTRAMUSCULAR; INTRAVENOUS at 14:16

## 2021-11-08 RX ADMIN — DILTIAZEM HYDROCHLORIDE 90 MG: 30 TABLET, FILM COATED ORAL at 06:00

## 2021-11-08 RX ADMIN — THIAMINE HCL TAB 100 MG 100 MG: 100 TAB at 08:43

## 2021-11-08 RX ADMIN — SODIUM CHLORIDE, PRESERVATIVE FREE 300 UNITS: 5 INJECTION INTRAVENOUS at 08:45

## 2021-11-08 RX ADMIN — IPRATROPIUM BROMIDE AND ALBUTEROL SULFATE 1 AMPULE: .5; 2.5 SOLUTION RESPIRATORY (INHALATION) at 01:32

## 2021-11-08 RX ADMIN — SODIUM CHLORIDE, PRESERVATIVE FREE 300 UNITS: 5 INJECTION INTRAVENOUS at 22:23

## 2021-11-08 RX ADMIN — DILTIAZEM HYDROCHLORIDE 90 MG: 30 TABLET, FILM COATED ORAL at 13:09

## 2021-11-08 RX ADMIN — RIVAROXABAN 20 MG: 20 TABLET, FILM COATED ORAL at 17:25

## 2021-11-08 RX ADMIN — METOPROLOL TARTRATE 25 MG: 25 TABLET, FILM COATED ORAL at 08:43

## 2021-11-08 RX ADMIN — WATER 2000 MG: 1 INJECTION INTRAMUSCULAR; INTRAVENOUS; SUBCUTANEOUS at 14:13

## 2021-11-08 RX ADMIN — Medication 10 ML: at 22:21

## 2021-11-08 RX ADMIN — ACETAMINOPHEN 650 MG: 325 TABLET ORAL at 17:16

## 2021-11-08 RX ADMIN — LEVETIRACETAM 1000 MG: 500 SOLUTION ORAL at 22:31

## 2021-11-08 RX ADMIN — SERTRALINE HYDROCHLORIDE 100 MG: 50 TABLET ORAL at 08:43

## 2021-11-08 RX ADMIN — FOLIC ACID 1 MG: 1 TABLET ORAL at 08:43

## 2021-11-08 RX ADMIN — SULFAMETHOXAZOLE AND TRIMETHOPRIM 1 TABLET: 800; 160 TABLET ORAL at 08:44

## 2021-11-08 RX ADMIN — IPRATROPIUM BROMIDE AND ALBUTEROL SULFATE 1 AMPULE: .5; 2.5 SOLUTION RESPIRATORY (INHALATION) at 17:01

## 2021-11-08 RX ADMIN — METOPROLOL TARTRATE 25 MG: 25 TABLET, FILM COATED ORAL at 22:31

## 2021-11-08 RX ADMIN — IPRATROPIUM BROMIDE AND ALBUTEROL SULFATE 1 AMPULE: .5; 2.5 SOLUTION RESPIRATORY (INHALATION) at 10:04

## 2021-11-08 RX ADMIN — IPRATROPIUM BROMIDE AND ALBUTEROL SULFATE 1 AMPULE: .5; 2.5 SOLUTION RESPIRATORY (INHALATION) at 21:24

## 2021-11-08 RX ADMIN — LEVETIRACETAM 1000 MG: 500 SOLUTION ORAL at 08:54

## 2021-11-08 RX ADMIN — IPRATROPIUM BROMIDE AND ALBUTEROL SULFATE 1 AMPULE: .5; 2.5 SOLUTION RESPIRATORY (INHALATION) at 13:40

## 2021-11-08 ASSESSMENT — PULMONARY FUNCTION TESTS
PIF_VALUE: 21
PIF_VALUE: 50
PIF_VALUE: 22
PIF_VALUE: 20
PIF_VALUE: 14
PIF_VALUE: 23
PIF_VALUE: 21
PIF_VALUE: 24
PIF_VALUE: 22
PIF_VALUE: 20
PIF_VALUE: 19
PIF_VALUE: 21
PIF_VALUE: 21
PIF_VALUE: 22
PIF_VALUE: 20
PIF_VALUE: 0
PIF_VALUE: 24
PIF_VALUE: 21
PIF_VALUE: 30
PIF_VALUE: 22
PIF_VALUE: 21
PIF_VALUE: 20
PIF_VALUE: 29
PIF_VALUE: 20
PIF_VALUE: 23
PIF_VALUE: 24
PIF_VALUE: 15
PIF_VALUE: 22
PIF_VALUE: 24
PIF_VALUE: 25
PIF_VALUE: 24
PIF_VALUE: 25
PIF_VALUE: 19
PIF_VALUE: 18
PIF_VALUE: 16
PIF_VALUE: 20
PIF_VALUE: 22
PIF_VALUE: 48
PIF_VALUE: 23
PIF_VALUE: 15
PIF_VALUE: 23
PIF_VALUE: 27
PIF_VALUE: 19
PIF_VALUE: 21
PIF_VALUE: 27
PIF_VALUE: 18
PIF_VALUE: 21
PIF_VALUE: 18
PIF_VALUE: 21
PIF_VALUE: 17
PIF_VALUE: 19
PIF_VALUE: 14
PIF_VALUE: 19
PIF_VALUE: 21
PIF_VALUE: 23
PIF_VALUE: 22
PIF_VALUE: 21
PIF_VALUE: 18
PIF_VALUE: 22
PIF_VALUE: 43
PIF_VALUE: 0
PIF_VALUE: 19
PIF_VALUE: 21
PIF_VALUE: 23
PIF_VALUE: 20
PIF_VALUE: 22
PIF_VALUE: 21
PIF_VALUE: 22
PIF_VALUE: 22
PIF_VALUE: 20
PIF_VALUE: 22
PIF_VALUE: 14
PIF_VALUE: 26
PIF_VALUE: 19
PIF_VALUE: 0
PIF_VALUE: 28
PIF_VALUE: 21
PIF_VALUE: 23
PIF_VALUE: 21
PIF_VALUE: 22
PIF_VALUE: 20
PIF_VALUE: 22
PIF_VALUE: 21
PIF_VALUE: 17
PIF_VALUE: 19
PIF_VALUE: 20
PIF_VALUE: 20
PIF_VALUE: 19
PIF_VALUE: 24
PIF_VALUE: 20
PIF_VALUE: 25
PIF_VALUE: 24
PIF_VALUE: 21
PIF_VALUE: 21
PIF_VALUE: 22
PIF_VALUE: 14
PIF_VALUE: 24
PIF_VALUE: 20
PIF_VALUE: 23
PIF_VALUE: 24
PIF_VALUE: 21
PIF_VALUE: 22
PIF_VALUE: 24
PIF_VALUE: 22
PIF_VALUE: 22
PIF_VALUE: 28
PIF_VALUE: 25
PIF_VALUE: 20
PIF_VALUE: 14
PIF_VALUE: 24
PIF_VALUE: 21
PIF_VALUE: 0
PIF_VALUE: 24
PIF_VALUE: 19
PIF_VALUE: 19
PIF_VALUE: 22
PIF_VALUE: 20
PIF_VALUE: 18
PIF_VALUE: 25
PIF_VALUE: 19
PIF_VALUE: 19
PIF_VALUE: 20
PIF_VALUE: 22
PIF_VALUE: 20
PIF_VALUE: 30
PIF_VALUE: 24
PIF_VALUE: 22
PIF_VALUE: 17
PIF_VALUE: 43
PIF_VALUE: 28
PIF_VALUE: 18
PIF_VALUE: 20
PIF_VALUE: 18
PIF_VALUE: 21
PIF_VALUE: 14
PIF_VALUE: 18
PIF_VALUE: 22
PIF_VALUE: 25
PIF_VALUE: 24
PIF_VALUE: 20
PIF_VALUE: 38
PIF_VALUE: 19
PIF_VALUE: 21
PIF_VALUE: 14
PIF_VALUE: 23
PIF_VALUE: 50
PIF_VALUE: 18
PIF_VALUE: 21
PIF_VALUE: 19
PIF_VALUE: 22
PIF_VALUE: 22
PIF_VALUE: 19

## 2021-11-08 ASSESSMENT — PAIN SCALES - GENERAL
PAINLEVEL_OUTOF10: 3
PAINLEVEL_OUTOF10: 2
PAINLEVEL_OUTOF10: 7
PAINLEVEL_OUTOF10: 7
PAINLEVEL_OUTOF10: 0
PAINLEVEL_OUTOF10: 1

## 2021-11-08 ASSESSMENT — ENCOUNTER SYMPTOMS: TACHYPNEA: 1

## 2021-11-08 ASSESSMENT — PAIN DESCRIPTION - PAIN TYPE: TYPE: ACUTE PAIN;CHRONIC PAIN

## 2021-11-08 ASSESSMENT — PAIN DESCRIPTION - DESCRIPTORS: DESCRIPTORS: ACHING;SORE

## 2021-11-08 ASSESSMENT — PAIN DESCRIPTION - LOCATION: LOCATION: GENERALIZED

## 2021-11-08 NOTE — CARE COORDINATION
CM note: Negative covid 10/16/21. icu- trach/vent at 30% FIO2 and peep 5/TF. Spoke with dr. Rayray Reyes, willing to sign for expedited appeal for LTAC, awaiting letter from [facility] for him to sign,  also requests that CM work on SNF choice with daughter as a secondary plan. CM reached out to daughter, Nohemi Cope, states that she spoke with another CM last week about additional choices if LTAC not approved 1)[facility] 2)[facility]. CM went back thru notes and found that indeed this conversation is documented and CM had already notified Ronnie Jain of [employer]. CM checking with liaison on determination for SNF, will update when known.

## 2021-11-08 NOTE — PROGRESS NOTES
Subjective:     Interval History: No acute events overnight  Patient able to relate understanding of cancer diagnosis and need for right colectomy; consent obtained  Denies nausea/vomiting  Denies abdominal pain  Patient unsure regarding BM/flatus since colonoscopy    Post-Op Info:  Procedure(s) (LRB):  EGD (ESOPHAGOGASTRODUODENOSCOPY) (N/A)  COLONOSCOPY (N/A)   1 Day Post-Op      Medications:  Continuous Infusions:  Scheduled Meds:   ascorbic acid (vitamin C)  250 mg Oral Daily    calcium carbonate  500 mg Oral Daily    cefTRIAXone (ROCEPHIN) IVPB  1 g Intravenous Once Pre-Op    cyanocobalamin  1,000 mcg Oral Daily    ferrous sulfate  325 mg Oral Daily    pantoprazole  40 mg Oral Daily    rosuvastatin  40 mg Oral Daily    vitamin D  1,000 Units Oral Daily     PRN Meds:   sodium chloride    melatonin    metronidazole    sodium chloride 0.9%        Objective:     Vital Signs (Most Recent):  Temp: 97.4 °F (36.3 °C) (03/20/20 0428)  Pulse: 74 (03/20/20 0428)  Resp: 18 (03/20/20 0428)  BP: (!) 153/71 (03/20/20 0428)  SpO2: 100 % (03/20/20 0428) Vital Signs (24h Range):  Temp:  [97 °F (36.1 °C)-98.6 °F (37 °C)] 97.4 °F (36.3 °C)  Pulse:  [64-99] 74  Resp:  [16-20] 18  SpO2:  [97 %-100 %] 100 %  BP: (117-153)/(55-71) 153/71     Intake/Output - Last 3 Shifts       03/18 0700 - 03/19 0659 03/19 0700 - 03/20 0659    P.O. 4000 900    I.V. (mL/kg)  1000 (20.7)    Blood  482.5    Other  0    Total Intake(mL/kg) 4000 (82.8) 2382.5 (49.3)    Net +4000 +2382.5          Urine Occurrence 6 x 3 x    Stool Occurrence 9 x 0 x    Emesis Occurrence  0 x          Physical Exam   Constitutional: She is oriented to person, place, and time. She appears well-nourished. No distress.   HENT:   Head: Normocephalic.   Eyes: Conjunctivae are normal.   Neck: No tracheal deviation present.   Cardiovascular: Regular rhythm.   Pulmonary/Chest: Effort normal. No respiratory distress.   Abdominal:   Soft, non-tender.  Well healed midline  -Department of Internal Medicine  General Internal Medicine  Attending Progress Note  Chief Complaint   Patient presents with    Delirium Tremens (DTS)     Pt states he usually drinks a fifth of kilo beam a day states he quit saturday. states he has been having visual hallucinations since yesterday. SUBJECTIVE:    Trached.  Cleared to go to lower level of service by intensivist      OBJECTIVE      Medications    Current Facility-Administered Medications: levETIRAcetam (KEPPRA) 100 MG/ML solution 1,000 mg, 1,000 mg, Oral, BID  QUEtiapine (SEROQUEL) tablet 25 mg, 25 mg, Oral, Nightly PRN  fentaNYL (SUBLIMAZE) injection 25 mcg, 25 mcg, IntraVENous, Q1H PRN  metoprolol tartrate (LOPRESSOR) tablet 25 mg, 25 mg, Per G Tube, BID  acetaminophen (TYLENOL) tablet 650 mg, 650 mg, Oral, Q6H  fentaNYL (DURAGESIC) 12 MCG/HR 1 patch, 1 patch, TransDERmal, Q72H  cefTRIAXone (ROCEPHIN) 2,000 mg in sterile water 20 mL IV syringe, 2,000 mg, IntraVENous, Q24H  insulin lispro (HUMALOG) injection vial 0-12 Units, 0-12 Units, SubCUTAneous, TID WC  insulin lispro (HUMALOG) injection vial 0-6 Units, 0-6 Units, SubCUTAneous, Nightly  glucose (GLUTOSE) 40 % oral gel 15 g, 15 g, Oral, PRN  dextrose 50 % IV solution, 12.5 g, IntraVENous, PRN  glucagon (rDNA) injection 1 mg, 1 mg, IntraMUSCular, PRN  dextrose 5 % solution, 100 mL/hr, IntraVENous, PRN  chlorhexidine (HIBICLENS) 4 % liquid, , Topical, Daily  sulfamethoxazole-trimethoprim (BACTRIM DS;SEPTRA DS) 800-160 MG per tablet 1 tablet, 1 tablet, Oral, 2 times per day  dilTIAZem (CARDIZEM) tablet 90 mg, 90 mg, Oral, 4 times per day  famotidine (PEPCID) tablet 40 mg, 40 mg, Oral, BID  rivaroxaban (XARELTO) tablet 20 mg, 20 mg, Oral, Daily  folic acid (FOLVITE) tablet 1 mg, 1 mg, Oral, Daily  heparin flush 100 UNIT/ML injection 300 Units, 3 mL, IntraVENous, 2 times per day  heparin flush 100 UNIT/ML injection 300 Units, 3 mL, IntraCATHeter, PRN  thiamine mononitrate tablet 100 mg, 100 mg, Oral, Daily  sodium chloride flush 0.9 % injection 5-40 mL, 5-40 mL, IntraVENous, 2 times per day  sodium chloride flush 0.9 % injection 5-40 mL, 5-40 mL, IntraVENous, PRN  0.9 % sodium chloride infusion, 25 mL, IntraVENous, PRN  heparin flush 100 UNIT/ML injection 300 Units, 3 mL, IntraVENous, 2 times per day  heparin flush 100 UNIT/ML injection 300 Units, 3 mL, IntraCATHeter, PRN  sertraline (ZOLOFT) tablet 100 mg, 100 mg, Oral, Daily  perflutren lipid microspheres (DEFINITY) injection 1.65 mg, 1.5 mL, IntraVENous, ONCE PRN  ondansetron (ZOFRAN-ODT) disintegrating tablet 4 mg, 4 mg, Oral, Q8H PRN **OR** ondansetron (ZOFRAN) injection 4 mg, 4 mg, IntraVENous, Q6H PRN  polyethylene glycol (GLYCOLAX) packet 17 g, 17 g, Oral, Daily PRN  ipratropium-albuterol (DUONEB) nebulizer solution 1 ampule, 1 ampule, Inhalation, Q4H  Physical    VITALS:  BP (!) 137/92   Pulse 121   Temp 99.9 °F (37.7 °C) (Bladder)   Resp 30   Ht 5' 11\" (1.803 m)   Wt 259 lb (117.5 kg)   SpO2 100%   BMI 36.12 kg/m²   CONSTITUTIONAL:  lethargic  EYES:  pupils equal, round and reactive to light and extra-ocular muscles intact  ENT:  atraumatic  NECK:  trach  LUNGS:  wheeze right anterior and left anterior  CARDIOVASCULAR:  normal apical pulses and normal S1 and S2  ABDOMEN:  normal bowel sounds  MUSCULOSKELETAL:  No edema  NEUROLOGIC:  Unable to determine  SKIN:  no bruising or bleeding  Data    CBC:   Lab Results   Component Value Date    WBC 9.2 11/08/2021    RBC 3.30 11/08/2021    HGB 10.1 11/08/2021    HCT 31.3 11/08/2021    MCV 94.8 11/08/2021    MCH 30.6 11/08/2021    MCHC 32.3 11/08/2021    RDW 15.3 11/08/2021     11/08/2021    MPV 9.6 11/08/2021     CMP:    Lab Results   Component Value Date     11/08/2021    K 4.2 11/08/2021    K 4.3 10/12/2021     11/08/2021    CO2 26 11/08/2021    BUN 37 11/08/2021    CREATININE 0.8 11/08/2021    GFRAA >60 11/08/2021    LABGLOM >60 11/08/2021    GLUCOSE 139 11/08/2021    PROT 6.8 scar   Neurological: She is alert and oriented to person, place, and time.       Significant Labs:  BMP (Last 3 Results):   Recent Labs   Lab 03/17/20  1431 03/18/20  0538 03/19/20  0701   GLU 88 86 93    136 135*   K 3.9 4.2 3.7    106 102   CO2 23 17* 23   BUN 11 15 9   CREATININE 0.7 0.8 0.7   CALCIUM 7.9* 7.9* 7.9*   MG 2.1 2.0 1.9     CBC (Last 3 Results):   Recent Labs   Lab 03/18/20  2314 03/19/20  0913 03/19/20  1939   WBC 8.98 8.81 7.39   RBC 3.64* 3.56* 3.61*   HGB 7.5* 7.4* 7.7*   HCT 27.1* 26.4* 27.3*   * 364* 367*   MCV 75* 74* 76*   MCH 20.6* 20.8* 21.3*   MCHC 27.7* 28.0* 28.2*       Significant Diagnostics:  CT: I have reviewed all pertinent results/findings within the past 24 hours:  Mass in the cecum; no evidence of metastatic disease on my personal read   11/06/2021    LABALBU 3.1 11/06/2021    CALCIUM 9.2 11/08/2021    BILITOT 0.8 11/06/2021    ALKPHOS 195 11/06/2021    AST 99 11/06/2021     11/06/2021       ASSESSMENT AND PLAN    Brief summary of stay:  73 y. o. male with a history of alcoholism depression hypertension coronary artery disease GERD COPD morbid obesity presents with signs of alcohol withdrawal. Isabel Ferguson went into respiratory distress and was intubated. Intubation was prolonged and difficulty with weaning him off vent. He was trached. He was treated for aspiration PNA, Atrial fibrillation, GELY and dysphagia/peg tube. His sputum is currently positive for MSSA and on ceftriaxone for MSSA PNA.     1. Acute on chronic hypoxic hypercapnic respiratory failure failing to wean per pulmonary management. did PEG and trach. So far intensivist unable to wean of vent. weaning. Was off this am of 10/30  2. Atrial fibrillation RVR. Onn BB and ccb po. on xarelto   3. Alcohol withdrawal: off sedation. Less confusion  Giving thiamine and folic acid. EEG done/ MRI negative per neurology  4. Infection treatment for UTI & Aspiration pneumonia s/p ceftriaxone covered Klebsiella in the urine. Now mssa pna still  On rocehpin and on bactrim for uti    5. Morbid obesity but also with   Malnutrition: Currently on tube feeding. 6.  Ct shows: 2.8 x 2.8 cm abdominal aortic aneurysm. 7.  Dysphagia/Malnutrition: on tube feeding  8. Transaminitis: monitor to worsening. 9. Full code. 10.  DVT prophylaxis with Xarelto   11. Disposition Patient was denied LTAC. They stated that he is not qualified for LTAC placement.

## 2021-11-08 NOTE — CARE COORDINATION
CM note: Denial for LTAC has been upheld after P2P, [facility] willing to send for expedited appeal if attending willing to sign.   Electronically signed by Sania Chopra RN on 11/8/2021 at 10:42 AM

## 2021-11-08 NOTE — PLAN OF CARE
Problem: Skin Integrity:  Goal: Will show no infection signs and symptoms  Description: Will show no infection signs and symptoms  Outcome: Ongoing  Goal: Absence of new skin breakdown  Description: Absence of new skin breakdown  11/8/2021 0138 by Donny Friedman RN  Outcome: Ongoing  11/7/2021 1640 by Kenton Sauceda RN  Outcome: Met This Shift     Problem: Non-Violent Restraints  Goal: Patient's dignity will be maintained  Outcome: Ongoing     Problem: Falls - Risk of:  Goal: Will remain free from falls  Description: Will remain free from falls  Outcome: Ongoing  Goal: Absence of physical injury  Description: Absence of physical injury  Outcome: Ongoing     Problem: Airway Clearance - Ineffective:  Goal: Ability to maintain a clear airway will improve  Description: Ability to maintain a clear airway will improve  11/8/2021 0138 by Donny Friedman RN  Outcome: Ongoing  11/7/2021 1638 by Kenton Sauceda RN  Outcome: Met This Shift     Problem: Discharge Planning:  Goal: Discharged to appropriate level of care  Description: Discharged to appropriate level of care  Outcome: Ongoing     Problem: Pain:  Goal: Pain level will decrease  Description: Pain level will decrease  Outcome: Ongoing  Goal: Recognizes and communicates pain  Description: Recognizes and communicates pain  Outcome: Ongoing     Problem: Injury - Risk of, Healthcare-Acquired Condition:  Goal: Will remain free from falls  Description: Will remain free from falls  Outcome: Ongoing  Goal: Absence of pressure ulcer  Description: Absence of pressure ulcer  11/8/2021 0138 by Donny Friedman RN  Outcome: Ongoing  11/7/2021 1638 by Kenton Sauceda RN  Outcome: Met This Shift  Goal: Absence of urinary tract infection signs and symptoms  Description: Absence of urinary tract infection signs and symptoms  Outcome: Ongoing     Problem: Nutrition Deficit - Risk of:  Goal: Ability to achieve adequate nutritional intake will improve  Description: Ability to achieve adequate nutritional intake will improve  Outcome: Ongoing  Goal: Maintenance of adequate weight for body size and type will improve to within specified parameters  Description: Maintenance of adequate weight for body size and type will improve to within specified parameters  Outcome: Ongoing     Problem: Aspiration - Risk of:  Goal: Absence of aspiration  Description: Absence of aspiration  Outcome: Ongoing     Problem: Respiratory Function - Compromised:  Goal: Ability to maintain a clear airway will improve  Description: Ability to maintain a clear airway will improve  11/8/2021 0138 by Trevor Rosenthal RN  Outcome: Ongoing  11/7/2021 1638 by May Tierney RN  Outcome: Met This Shift  Goal: Absence of pulmonary infection  Description: Absence of pulmonary infection  Outcome: Ongoing  Goal: Levels of oxygenation will improve  Description: Levels of oxygenation will improve  Outcome: Ongoing  Goal: Increase in ventilator-free time  Description: Increase in ventilator-free time  Outcome: Ongoing     Problem:  Bowel Function - Altered:  Goal: Bowel elimination is within specified parameters  Description: Bowel elimination is within specified parameters  Outcome: Ongoing  Goal: Control of bowel function will improve  Description: Control of bowel function will improve  Outcome: Ongoing  Goal: Ability to maintain normal bowel function will improve  Description: Ability to maintain normal bowel function will improve  Outcome: Ongoing     Problem: Cardiac:  Goal: Ability to maintain an adequate cardiac output will improve  Description: Ability to maintain an adequate cardiac output will improve  Outcome: Ongoing  Goal: Hemodynamic stability will improve  Description: Hemodynamic stability will improve  Outcome: Ongoing     Problem: Respiratory:  Goal: Respiratory status will improve  Description: Respiratory status will improve  Outcome: Ongoing

## 2021-11-08 NOTE — CARE COORDINATION
CM note: Message out to [facility] to verify denial after P2P completion. Await a return call and then will call patient's daughter for SNF choices. ADDENDUM 10:01 AM  Spoke with [facility] liaison, Jose Guillen, she has not heard from Jasb[facility] on P2P outcome, states that they will proceed with expedited appeal process if it has been upheld. CM placed a call the [facility] with [facility] and left message. Requested a call back on P2P outcome.

## 2021-11-08 NOTE — PROGRESS NOTES
CRITICAL CARE PROGRESS NOTE    The patient's case was discussed in multidisciplinary rounds including critical care specialist, nursing, RT and pharmacy. His evaluation is as follows:     Acute on chronic hypoxic and hypercapnic respiratory failure with Staphylococcal pneumonia  Obesity Hypoventilation Syndrome  Aspiration pneumonia  Post tracheostomy and PEG tube insertion, on mechanical ventilation   --Started ceftriaxone for management of staphylococcal pneumonia (MSSA) X 8 days  Start [employer] trials - open ended  If does well - speech Rx evaluation for PMV trials as well    Atrial fibrillation  On Diltiazem / Metoprolol via PEG  Rate controlled  On Xarelto    History of Seizures  On Keppra    Metabolic encephalopathy with delirium  Seroquel PRN  On zoloft  Improved menatation    History of thrombocytopenia  Due to ETOH  Resolved    Morbid obesity with obesity hypoventilation syndrome        Elevated liver enzymes  Follow    Pain control   Scheduled  Acetaminophen  fentanyl patch      DVT prophylaxis Xarelto  Nutrition: Tube feedings   Urinary catheter present   Goals of care:  Full code    The patient can be transferred to [facility] or [facility]  Ongoing evaluation (?denied)    ____________________________________________      /86   Pulse 93   Temp 100 °F (37.8 °C) (Bladder)   Resp 23   Ht 5' 11\" (1.803 m)   Wt 259 lb (117.5 kg)   SpO2 96%   BMI 36.12 kg/m²     General: Awake, follows commands  HEENT: No head lesions, PERRL, EOMI, mouth without lesions, no nasal lesions, tracheostomy present  Respiratory: Lungs with equal breath sounds bilaterally, no adventitious sounds auscultated, no accessory muscle use  CV: Regular rate, no murmurs, JVD, no leg edema  Abdomen: Soft, non tender, + bowel sounds, no lesions  Skin: Hydrated, adequate turgor, no rash, capillary refill <2 seconds  Extremities: Moves 4 limbs spontaneously, very weak, distal pulses present  Neurology: Awake and alert, follows commands, moves 4 limbs on command and spontaneously, equal sensation, no dysmetria, neck is supple, no meningitic signs present. Last 3 CMP:    Recent Labs     11/06/21  0444 11/06/21  0646 11/07/21  0404   * 142 143   K 3.7 4.1 4.2   CL 94* 105 106   CO2 22 24 25   BUN 31* 33* 34*   CREATININE 0.7 0.8 0.7   GLUCOSE 550* 142* 135*   CALCIUM 8.3* 9.2 9.3   PROT  --  6.8  --    LABALBU  --  3.1*  --    BILITOT  --  0.8  --    ALKPHOS  --  195*  --    AST  --  99*  --    ALT  --  273*  --      Recent Labs     11/07/21  0404   WBC 10.4   RBC 3.21*   HGB 10.0*   HCT 31.3*   MCV 97.5   MCH 31.2   MCHC 31.9*   RDW 15.4*      MPV 9.7     No results for input(s): BC in the last 72 hours. No results for input(s): Epifanio Swain in the last 72 hours.   24 HR INTAKE/OUTPUT:      Intake/Output Summary (Last 24 hours) at 11/8/2021 0115  Last data filed at 11/7/2021 2300  Gross per 24 hour   Intake 3024 ml   Output 1835 ml   Net 1189 ml     MEDICATIONS:   metoprolol tartrate  25 mg Per G Tube BID    acetaminophen  650 mg Oral Q6H    fentaNYL  1 patch TransDERmal Q72H    cefTRIAXone (ROCEPHIN) IV  2,000 mg IntraVENous Q24H    insulin lispro  0-12 Units SubCUTAneous TID     insulin lispro  0-6 Units SubCUTAneous Nightly    chlorhexidine   Topical Daily    sulfamethoxazole-trimethoprim  1 tablet Oral 2 times per day    levETIRAcetam  1,000 mg Oral BID    dilTIAZem  90 mg Oral 4 times per day    famotidine  40 mg Oral BID    rivaroxaban  20 mg Oral Daily    folic acid  1 mg Oral Daily    heparin flush  3 mL IntraVENous 2 times per day    thiamine mononitrate  100 mg Oral Daily    sodium chloride flush  5-40 mL IntraVENous 2 times per day    heparin flush  3 mL IntraVENous 2 times per day    sertraline  100 mg Oral Daily    ipratropium-albuterol  1 ampule Inhalation Q4H      dextrose      sodium chloride       QUEtiapine, fentanNYL, glucose, dextrose, glucagon (rDNA), dextrose, heparin flush, sodium chloride flush, sodium chloride, heparin flush, perflutren lipid microspheres, ondansetron **OR** ondansetron, polyethylene glycol    OBJECTIVE:  Vitals:    11/08/21 0000   BP: 124/86   Pulse: 93   Resp: 23   Temp: 100 °F (37.8 °C)   SpO2: 96%     FiO2 : 30 %  O2 Flow Rate (L/min): 5 L/min  O2 Device: Ventilator        LABS:  WBC   Date Value Ref Range Status   11/07/2021 10.4 4.5 - 11.5 E9/L Final   11/06/2021 9.5 4.5 - 11.5 E9/L Final   11/05/2021 9.7 4.5 - 11.5 E9/L Final     Hemoglobin   Date Value Ref Range Status   11/07/2021 10.0 (L) 12.5 - 16.5 g/dL Final   11/06/2021 9.2 (L) 12.5 - 16.5 g/dL Final   11/05/2021 9.7 (L) 12.5 - 16.5 g/dL Final     Hematocrit   Date Value Ref Range Status   11/07/2021 31.3 (L) 37.0 - 54.0 % Final   11/06/2021 29.4 (L) 37.0 - 54.0 % Final   11/05/2021 29.0 (L) 37.0 - 54.0 % Final     MCV   Date Value Ref Range Status   11/07/2021 97.5 80.0 - 99.9 fL Final   11/06/2021 97.7 80.0 - 99.9 fL Final   11/05/2021 94.2 80.0 - 99.9 fL Final     Platelets   Date Value Ref Range Status   11/07/2021 328 130 - 450 E9/L Final   11/06/2021 295 130 - 450 E9/L Final   11/05/2021 343 130 - 450 E9/L Final     Sodium   Date Value Ref Range Status   11/08/2021 141 132 - 146 mmol/L Final   11/07/2021 143 132 - 146 mmol/L Final   11/06/2021 142 132 - 146 mmol/L Final     Potassium   Date Value Ref Range Status   11/08/2021 4.2 3.5 - 5.0 mmol/L Final   11/07/2021 4.2 3.5 - 5.0 mmol/L Final   11/06/2021 4.1 3.5 - 5.0 mmol/L Final     Potassium reflex Magnesium   Date Value Ref Range Status   10/12/2021 4.3 3.5 - 5.0 mmol/L Final     Chloride   Date Value Ref Range Status   11/08/2021 105 98 - 107 mmol/L Final   11/07/2021 106 98 - 107 mmol/L Final   11/06/2021 105 98 - 107 mmol/L Final     CO2   Date Value Ref Range Status   11/08/2021 26 22 - 29 mmol/L Final   11/07/2021 25 22 - 29 mmol/L Final   11/06/2021 24 22 - 29 mmol/L Final     BUN   Date Value Ref Range Status   11/08/2021 37 (H) 6 - 23 mg/dL Final 11/07/2021 34 (H) 6 - 23 mg/dL Final   11/06/2021 33 (H) 6 - 23 mg/dL Final     CREATININE   Date Value Ref Range Status   11/08/2021 0.8 0.7 - 1.2 mg/dL Final   11/07/2021 0.7 0.7 - 1.2 mg/dL Final   11/06/2021 0.8 0.7 - 1.2 mg/dL Final     Glucose   Date Value Ref Range Status   11/08/2021 139 (H) 74 - 99 mg/dL Final   11/07/2021 135 (H) 74 - 99 mg/dL Final   11/06/2021 142 (H) 74 - 99 mg/dL Final     Calcium   Date Value Ref Range Status   11/08/2021 9.2 8.6 - 10.2 mg/dL Final   11/07/2021 9.3 8.6 - 10.2 mg/dL Final   11/06/2021 9.2 8.6 - 10.2 mg/dL Final     Total Protein   Date Value Ref Range Status   11/06/2021 6.8 6.4 - 8.3 g/dL Final   10/24/2021 5.9 (L) 6.4 - 8.3 g/dL Final   10/23/2021 6.0 (L) 6.4 - 8.3 g/dL Final     Albumin   Date Value Ref Range Status   11/06/2021 3.1 (L) 3.5 - 5.2 g/dL Final   10/24/2021 2.7 (L) 3.5 - 5.2 g/dL Final   10/23/2021 2.8 (L) 3.5 - 5.2 g/dL Final     Total Bilirubin   Date Value Ref Range Status   11/06/2021 0.8 0.0 - 1.2 mg/dL Final   10/24/2021 0.5 0.0 - 1.2 mg/dL Final   10/23/2021 0.5 0.0 - 1.2 mg/dL Final     Alkaline Phosphatase   Date Value Ref Range Status   11/06/2021 195 (H) 40 - 129 U/L Final   10/24/2021 62 40 - 129 U/L Final   10/23/2021 58 40 - 129 U/L Final     AST   Date Value Ref Range Status   11/06/2021 99 (H) 0 - 39 U/L Final   10/24/2021 73 (H) 0 - 39 U/L Final   10/23/2021 55 (H) 0 - 39 U/L Final     ALT   Date Value Ref Range Status   11/06/2021 273 (H) 0 - 40 U/L Final   10/24/2021 61 (H) 0 - 40 U/L Final   10/23/2021 33 0 - 40 U/L Final     GFR Non-   Date Value Ref Range Status   11/08/2021 >60 >=60 mL/min/1.73 Final     Comment:     Chronic Kidney Disease: less than 60 ml/min/1.73 sq.m. Kidney Failure: less than 15 ml/min/1.73 sq.m. Results valid for patients 18 years and older. 11/07/2021 >60 >=60 mL/min/1.73 Final     Comment:     Chronic Kidney Disease: less than 60 ml/min/1.73 sq.m.           Kidney Failure: less than 15 ml/min/1.73 sq.m. Results valid for patients 18 years and older. 11/06/2021 >60 >=60 mL/min/1.73 Final     Comment:     Chronic Kidney Disease: less than 60 ml/min/1.73 sq.m. Kidney Failure: less than 15 ml/min/1.73 sq.m. Results valid for patients 18 years and older. GFR    Date Value Ref Range Status   11/08/2021 >60  Final   11/07/2021 >60  Final   11/06/2021 >60  Final     Magnesium   Date Value Ref Range Status   11/07/2021 2.0 1.6 - 2.6 mg/dL Final   11/06/2021 1.9 1.6 - 2.6 mg/dL Final   11/06/2021 1.7 1.6 - 2.6 mg/dL Final     Phosphorus   Date Value Ref Range Status   11/07/2021 4.4 2.5 - 4.5 mg/dL Final   11/06/2021 4.3 2.5 - 4.5 mg/dL Final   11/06/2021 4.1 2.5 - 4.5 mg/dL Final     No results for input(s): PH, PO2, PCO2, HCO3, BE, O2SAT in the last 72 hours. RADIOLOGY:  XR CHEST PORTABLE   Final Result   No acute process         XR CHEST PORTABLE   Final Result   Stable small bilateral pleural effusions, with bibasilar atelectasis or   infiltrates         XR CHEST PORTABLE   Final Result   Right PICC line tip is at the cavoatrial junction. Stable prominent cardiac silhouette and bilateral pleural effusions with   bibasilar atelectasis or infiltrates         XR CHEST PORTABLE   Final Result   No acute process. Cardiomegaly. XR CHEST PORTABLE   Final Result   Cardiomegaly and postop changes. ETT is in good position. Right IJ catheter   shows no significant interval change. Small bilateral effusions, slightly   improved compared to October 20. MRI BRAIN W WO CONTRAST   Final Result   No acute intracranial abnormality visualized. CT HEAD WO CONTRAST   Final Result   1. No acute intracranial abnormality. 2. Mucosal disease of the paranasal sinuses. XR ABDOMEN FOR NG/OG/NE TUBE PLACEMENT   Final Result   Satisfactory position of nasogastric tube. XR CHEST PORTABLE   Final Result   1.  No interval change in the vague bilateral lower lobe airspace disease. 2. Possible trace bilateral pleural effusions. 3. The NG tube has been removed. US GALLBLADDER RUQ   Final Result   Mild fatty liver. No sonographic evidence of acute cholecystitis. CT ABDOMEN PELVIS W IV CONTRAST Additional Contrast? None   Final Result   Cardiomegaly with infiltrates and pleural effusions in the lung bases likely   pneumonia or mild CHF. There is no hiatal hernia. Distended gallbladder with mild wall thickening. Consider ultrasonography to   evaluate for cholecystitis. Diverticulosis of the colon with mild thickening of the sigmoid colon which   could be due to spasm or uncomplicated diverticulitis. 2.8 x 2.8 cm abdominal aortic aneurysm. RECOMMENDATIONS:   Abdominal aortic aneurysm. 5 year surveillance is recommended. XR CHEST PORTABLE   Final Result   Worsening left greater than right perihilar opacities which may represent   asymmetric edema or pneumonia. Continued follow-up recommended. XR ABDOMEN (KUB) (SINGLE AP VIEW)   Final Result   No significant changes detailed above. XR CHEST ABDOMEN NG PLACEMENT   Final Result   The enteric tube terminates in the region of the proximal stomach. Recommend   consideration of advancement by at least 6 cm to ensure intragastric location   of the side hole. XR CHEST PORTABLE   Final Result   ET tube terminates between the clavicular heads and the karo. Enteric tube is poorly visualized at the GE junction and below; if position   of enteric tube tip needs to be known then KUB would better evaluate for it. Stable mild pulmonary vascular congestion. Stable small pleural effusions. Stable cardiomegaly. XR CHEST PORTABLE   Final Result   No interval change compared to prior exam.      Persistent small bilateral pleural effusions. Ongoing mild-to-moderate pulmonary edema.          XR CHEST PORTABLE   Final Result   New right IJ line with the distal tip in the SVC and no pneumothorax. Slightly more confluent airspace disease in the right lung base. No other significant interval change. XR CHEST PORTABLE   Final Result   1. Satisfactory position of the NG tube and endotracheal tube   2. New opacity within the right lung base which could represent a pleural   effusion and or right lung base infiltrate. XR CHEST PORTABLE   Final Result   1. Cardiomegaly. There is no evidence of failure or pneumonia. CT Head WO Contrast   Final Result   1. There is no acute intracranial abnormality. Specifically, there is no   intracranial hemorrhage. 2. Atrophy and periventricular leukomalacia,   . PROBLEM LIST:  Principal Problem:    Altered mental status  Active Problems:    Seizures ([address]    Withdrawal symptoms, alcohol, with delirium ([address]  Resolved Problems:    * No resolved hospital problems.  *       CRITICAL CARE TIME:  30 minutes    Paula Grigsby M.D  Pulmonary & Critical Care  11/8/2021 8:06 PM

## 2021-11-08 NOTE — PROGRESS NOTES
Physical Therapy Treatment Note/Plan of Care    Room #:  IC03/IC03-01  Patient Name: Kush Hawthorne  YOB: 1955  MRN: [id number]    Date of Service: 11/8/2021     Tentative placement recommendation: Long Term Acute Care  Equipment recommendation: To be determined      Evaluating Physical Therapist: Asmita Gonzalez #[id number]    Re evaluating : Kimberly Birch PT    Specific Provider Orders/Date/Referring Provider :  10/24/21 1115   PT eval and treat Start: 10/24/21 1115, End: 10/24/21 1115, ONE TIME, Standing Count: 1 Occurrences, Damon Anderson MD      Admitting Diagnosis:   Delirium tremens ([address] [F10.231]  Seizures ([address] [R56.9]  Acute respiratory failure, unspecified whether with hypoxia or hypercapnia ([address] [J96.00]  Atrial fibrillation, unspecified type ([address] [I48.91]     hallucinations and tremors      Surgery:   10/29  trach  Visit Diagnoses       Codes    Acute respiratory failure, unspecified whether with hypoxia or hypercapnia ([address]     J96.00    Atrial fibrillation, unspecified type ([address]     I48.91    Encounter for nasogastric (NG) tube placement     Z46.59          Patient Active Problem List   Diagnosis    Essential hypertension    Coronary artery disease involving coronary bypass graft of native heart without angina pectoris    Atypical chest pain    Pure hypercholesterolemia    Pericardial effusion    Chronic obstructive pulmonary disease (HCC)    Moderate obesity    Dislocation of finger    Closed dislocation of left middle finger    Seizures ([address]    Withdrawal symptoms, alcohol, with delirium ([address]    Altered mental status        ASSESSMENT of Current Deficits Patient exhibits decreased strength, endurance and range of motion impairing functional mobility, tolerance to activity and participation able to follow commands  , one step intermittely Continues slight plantar flexor contractures positioned in near neutral with pillow support.  Patient requires continued skilled physical therapy to address concerns listed above for increased safety and function at discharge. More alert with eye contact noted      PHYSICAL THERAPY  PLAN OF CARE       Physical therapy plan of care is established based on physician order,  patient diagnosis and clinical assessment    Current Treatment Recommendations:    -Bed Mobility: Lower extremity exercises , Upper extremity exercises  and Trunk control activities   -Sitting Balance: Hands on support to maintain midline  and Facilitate active trunk muscle engagement   -Transfers: Provide instruction on proper hand and foot position for adequate transfer of weight onto lower extremities and use of gait device if needed, Cues for hand placement, technique and safety. Provide stabilization to prevent fall , Facilitate weight shift forward on to lower extremities and provide necessary stabilization of bilateral lower extremities  and Assist with extension of knees trunk and hip to accept weight transfer   -Endurance: Utilize Supervised activities to increase level of endurance to allow for safe functional mobility including transfers and gait  and Use graduated activities to promote good breathing techniques and provide support and education to maximize respiratory function    PT long term treatment goals are located in below grid    Patient and or family understand(s) diagnosis, prognosis, and plan of care. Frequency of treatments: Patient will be seen  3-5 times/week.          Prior Level of Function: unknown patient unable to answer  Rehab Potential: fair         Past medical history:   Past Medical History:   Diagnosis Date    Alcohol abuse     [facility]rial [facility]     CAD (coronary artery disease)     COPD (chronic obstructive pulmonary disease) ([facility]     Depression     Fracture of unspecified phalanx of left middle finger, initial encounter for closed fracture     GERD (gastroesophageal reflux disease)     Hypertension     Seizure [facility])      Past Surgical History:   Procedure Laterality Date    COLONOSCOPY      CORONARY ARTERY BYPASS GRAFT  3/2/05    x3: LIMA to LAD, SVG to RCA, SVG to diagonal    DIAGNOSTIC CARDIAC CATH LAB PROCEDURE  3/02/05    [facility]: Severe multivessel CAD in patient who presents with STEMI and total occlusion of diagonal branch. Significant disease of proximal LAD and severe disease of dominant RCA.  FINGER SURGERY Left 5/3/2019    CLOSED POSSIBLE LEFT MIDDLE FINGER OPEN REDUCTION INTERNAL FIXATION POSSIBLE PROXIMAL INTERPHALANGEAL JOINT ARTHROPLASTY   ++[facility] [employer]++ performed by Víctor Barron MD at [facility] N/A 10/20/2021    EGD WITH NG TUBE PLACEMENT **BEDSIDE** performed by Jennifer Ann MD at [address]      double    TRACHEOSTOMY N/A 10/29/2021    TRACHEOTOMY AND PEG performed by Arjun Goldsmith MD at [address] N/A 10/29/2021    EGD ESOPHAGOGASTRODUODENOSCOPY PEG TUBE INSERTION performed by Arjun Goldsmith MD at [facility]:    Precautions:   ,  , seizure,  alcohol abuse     Social history: Patient unable   Per chart, his  is his daughter who lives in [place].       Equipment owned: unknown,       AM-PAC Basic Mobility        AM-[facility] Mobility Inpatient   How much difficulty turning over in bed?: Unable  How much difficulty sitting down on / standing up from a chair with arms?: Unable  How much difficulty moving from lying on back to sitting on side of bed?: Unable  How much help from another person moving to and from a bed to a chair?: Total  How much help from another person needed to walk in hospital room?: Total  How much help from another person for climbing 3-5 steps with a railing?: Total  AM-PAC Inpatient Mobility Raw Score : 6  AM-PAC Inpatient T-Scale Score : 23.55  Mobility Inpatient CMS 0-100% Score: 100  Mobility Inpatient CMS G-Code Modifier : CN    Nursing cleared patient for PT treatment. OBJECTIVE;   Initial Evaluation  Date: 10/26/2021 Treatment Date:  11/8/2021      Short Term/ Long Term   Goals   Was pt agreeable to Eval/treatment? Yes yes To be met in 5 days   Pain level   Slight facial grimacing end range of motion stretch DF    No reaction to heel cord stretching    Bed Mobility    Rolling: Not assessed     Supine to sit: Not assessed     Sit to supine: Not assessed     Scooting: Not assessed    Rolling: Not assessed    Supine to sit: Not assessed    Sit to supine: Not assessed    Scooting: Not assessed     Rolling: Moderate assist of 1    Supine to sit: Moderate assist of 1    Sit to supine: Moderate assist of 1    Scooting: Moderate assist of 1     Transfers Sit to stand: Not assessed     Sit to stand:  Moderate assist of 1     Ambulation    not assessed    10 feet using  wheeled walker with Moderate assist of 1    Stair negotiation: ascended and descended   Not assessed          ROM Slight bilateral plantar flexor contractures   Increase range of motion 10% of affected joints    Strength BUE:  refer to OT eval  bilateral lower extremities no active movement  Increase strength in affected mm groups by 1/3 grade   Balance Sitting EOB:  not assessed    Dynamic Standing:  not assessed   Placed in chair position for trt; poor head control and postures to left with inability to self correct Sitting EOB:  fair    Dynamic Standing: fair -     Patient is Alert & Oriented x person and follows one step directions  Intermittently; difficult to elicit responseSensation:   Unable to assess   Edema:  yes bilateral upper extremities   Endurance: poor      Vitals: ventilator   Blood Pressure at rest 139/95 Blood Pressure during session 116/83   Heart Rate at rest 98 Heart Rate during session 101   SPO2 at rest 98%  SPO2 during session 91%     Patient education  Patient educated on role of Physical Therapy, risks of immobility, safety and plan of care, importance of mobility while in hospital  and ankle pumps, quad set and glut set for edema control, blood clot prevention     Patient response to education:   Pt verbalized understanding Pt demonstrated skill Pt requires further education in this area   No No Yes      Treatment:  Patient practiced and was instructed/facilitated in the following treatment: Patient unable to actively move bilateral lower extremities, slight squeeze left hand. Performed passive range of motion bilateral lower extremities end range of motion stretching. Positioning for skin and joint integrity  chair position       Therapeutic Exercises:  end range of motion stretching heel cords  And a/a rom all joints/extremities x 1 0 reps. At end of session, patient in bed in chair position with alarm call light and phone within reach,  all lines and tubes intact, nursing notified. Patient would benefit from continued skilled Physical Therapy to improve functional independence and quality of life.          Patient's/ family goals   none stated    Time in  1021  Time out  1040    Total Treatment Time  19 minutes       CPT codes:     Therapeutic exercises ([id number])   19 minutes  1 unit(s)    Walter Hanson, PT

## 2021-11-09 LAB
ANION GAP SERPL CALCULATED.3IONS-SCNC: 12 MMOL/L (ref 7–16)
BLOOD CULTURE, ROUTINE: NORMAL
BUN BLDV-MCNC: 35 MG/DL (ref 6–23)
CALCIUM SERPL-MCNC: 9.4 MG/DL (ref 8.6–10.2)
CHLORIDE BLD-SCNC: 106 MMOL/L (ref 98–107)
CO2: 23 MMOL/L (ref 22–29)
CREAT SERPL-MCNC: 0.8 MG/DL (ref 0.7–1.2)
CULTURE, BLOOD 2: NORMAL
GFR AFRICAN AMERICAN: >60
GFR NON-AFRICAN AMERICAN: >60 ML/MIN/1.73
GLUCOSE BLD-MCNC: 129 MG/DL (ref 74–99)
HCT VFR BLD CALC: 33.4 % (ref 37–54)
HEMOGLOBIN: 10.6 G/DL (ref 12.5–16.5)
MAGNESIUM: 2 MG/DL (ref 1.6–2.6)
MCH RBC QN AUTO: 30.8 PG (ref 26–35)
MCHC RBC AUTO-ENTMCNC: 31.7 % (ref 32–34.5)
MCV RBC AUTO: 97.1 FL (ref 80–99.9)
METER GLUCOSE: 121 MG/DL (ref 74–99)
METER GLUCOSE: 129 MG/DL (ref 74–99)
METER GLUCOSE: 131 MG/DL (ref 74–99)
METER GLUCOSE: 132 MG/DL (ref 74–99)
PDW BLD-RTO: 15.4 FL (ref 11.5–15)
PHOSPHORUS: 4.2 MG/DL (ref 2.5–4.5)
PLATELET # BLD: 329 E9/L (ref 130–450)
PMV BLD AUTO: 9.6 FL (ref 7–12)
POTASSIUM SERPL-SCNC: 4.3 MMOL/L (ref 3.5–5)
RBC # BLD: 3.44 E12/L (ref 3.8–5.8)
SODIUM BLD-SCNC: 141 MMOL/L (ref 132–146)
WBC # BLD: 9.6 E9/L (ref 4.5–11.5)

## 2021-11-09 PROCEDURE — 6370000000 HC RX 637 (ALT 250 FOR IP): Performed by: INTERNAL MEDICINE

## 2021-11-09 PROCEDURE — 6370000000 HC RX 637 (ALT 250 FOR IP): Performed by: NURSE PRACTITIONER

## 2021-11-09 PROCEDURE — 94003 VENT MGMT INPAT SUBQ DAY: CPT

## 2021-11-09 PROCEDURE — 2580000003 HC RX 258: Performed by: INTERNAL MEDICINE

## 2021-11-09 PROCEDURE — 99232 SBSQ HOSP IP/OBS MODERATE 35: CPT | Performed by: INTERNAL MEDICINE

## 2021-11-09 PROCEDURE — 6370000000 HC RX 637 (ALT 250 FOR IP)

## 2021-11-09 PROCEDURE — 2060000000 HC ICU INTERMEDIATE R&B

## 2021-11-09 PROCEDURE — 94640 AIRWAY INHALATION TREATMENT: CPT

## 2021-11-09 PROCEDURE — 83735 ASSAY OF MAGNESIUM: CPT

## 2021-11-09 PROCEDURE — 6360000002 HC RX W HCPCS: Performed by: INTERNAL MEDICINE

## 2021-11-09 PROCEDURE — 82962 GLUCOSE BLOOD TEST: CPT

## 2021-11-09 PROCEDURE — 84100 ASSAY OF PHOSPHORUS: CPT

## 2021-11-09 PROCEDURE — 85027 COMPLETE CBC AUTOMATED: CPT

## 2021-11-09 PROCEDURE — 36415 COLL VENOUS BLD VENIPUNCTURE: CPT

## 2021-11-09 PROCEDURE — 80048 BASIC METABOLIC PNL TOTAL CA: CPT

## 2021-11-09 PROCEDURE — 2700000000 HC OXYGEN THERAPY PER DAY

## 2021-11-09 PROCEDURE — 36592 COLLECT BLOOD FROM PICC: CPT

## 2021-11-09 RX ORDER — QUETIAPINE FUMARATE 25 MG/1
25 TABLET, FILM COATED ORAL NIGHTLY PRN
Status: DISCONTINUED | OUTPATIENT
Start: 2021-11-09 | End: 2021-11-13 | Stop reason: HOSPADM

## 2021-11-09 RX ORDER — SULFAMETHOXAZOLE AND TRIMETHOPRIM 200; 40 MG/5ML; MG/5ML
160 SUSPENSION ORAL EVERY 12 HOURS
Status: DISCONTINUED | OUTPATIENT
Start: 2021-11-09 | End: 2021-11-13 | Stop reason: HOSPADM

## 2021-11-09 RX ORDER — FOLIC ACID 1 MG/1
1 TABLET ORAL DAILY
Status: DISCONTINUED | OUTPATIENT
Start: 2021-11-10 | End: 2021-11-13 | Stop reason: HOSPADM

## 2021-11-09 RX ORDER — FAMOTIDINE 20 MG/1
40 TABLET, FILM COATED ORAL 2 TIMES DAILY
Status: DISCONTINUED | OUTPATIENT
Start: 2021-11-09 | End: 2021-11-13 | Stop reason: HOSPADM

## 2021-11-09 RX ORDER — ACETAMINOPHEN 160 MG/5ML
650 SUSPENSION, ORAL (FINAL DOSE FORM) ORAL EVERY 6 HOURS
Status: DISCONTINUED | OUTPATIENT
Start: 2021-11-09 | End: 2021-11-13 | Stop reason: HOSPADM

## 2021-11-09 RX ADMIN — ACETAMINOPHEN 650 MG: 160 SUSPENSION ORAL at 17:18

## 2021-11-09 RX ADMIN — FOLIC ACID 1 MG: 1 TABLET ORAL at 10:29

## 2021-11-09 RX ADMIN — LEVETIRACETAM 1000 MG: 500 SOLUTION ORAL at 10:28

## 2021-11-09 RX ADMIN — FAMOTIDINE 40 MG: 20 TABLET, FILM COATED ORAL at 22:45

## 2021-11-09 RX ADMIN — IPRATROPIUM BROMIDE AND ALBUTEROL SULFATE 1 AMPULE: .5; 2.5 SOLUTION RESPIRATORY (INHALATION) at 18:02

## 2021-11-09 RX ADMIN — RIVAROXABAN 20 MG: 20 TABLET, FILM COATED ORAL at 17:15

## 2021-11-09 RX ADMIN — SODIUM CHLORIDE, PRESERVATIVE FREE 300 UNITS: 5 INJECTION INTRAVENOUS at 23:01

## 2021-11-09 RX ADMIN — DILTIAZEM HYDROCHLORIDE 90 MG: 30 TABLET, FILM COATED ORAL at 23:24

## 2021-11-09 RX ADMIN — SODIUM CHLORIDE, PRESERVATIVE FREE 300 UNITS: 5 INJECTION INTRAVENOUS at 23:05

## 2021-11-09 RX ADMIN — SULFAMETHOXAZOLE AND TRIMETHOPRIM 20 ML: 200; 40 SUSPENSION ORAL at 11:46

## 2021-11-09 RX ADMIN — SERTRALINE HYDROCHLORIDE 100 MG: 50 TABLET ORAL at 10:29

## 2021-11-09 RX ADMIN — Medication 20 ML: at 23:01

## 2021-11-09 RX ADMIN — DILTIAZEM HYDROCHLORIDE 90 MG: 30 TABLET, FILM COATED ORAL at 00:31

## 2021-11-09 RX ADMIN — IPRATROPIUM BROMIDE AND ALBUTEROL SULFATE 1 AMPULE: .5; 2.5 SOLUTION RESPIRATORY (INHALATION) at 14:57

## 2021-11-09 RX ADMIN — SULFAMETHOXAZOLE AND TRIMETHOPRIM 20 ML: 200; 40 SUSPENSION ORAL at 22:45

## 2021-11-09 RX ADMIN — DILTIAZEM HYDROCHLORIDE 90 MG: 30 TABLET, FILM COATED ORAL at 17:18

## 2021-11-09 RX ADMIN — IPRATROPIUM BROMIDE AND ALBUTEROL SULFATE 1 AMPULE: .5; 2.5 SOLUTION RESPIRATORY (INHALATION) at 02:18

## 2021-11-09 RX ADMIN — WATER 2000 MG: 1 INJECTION INTRAMUSCULAR; INTRAVENOUS; SUBCUTANEOUS at 14:08

## 2021-11-09 RX ADMIN — ACETAMINOPHEN 650 MG: 325 TABLET ORAL at 06:33

## 2021-11-09 RX ADMIN — FAMOTIDINE 40 MG: 20 TABLET, FILM COATED ORAL at 10:29

## 2021-11-09 RX ADMIN — ACETAMINOPHEN 650 MG: 325 TABLET ORAL at 00:31

## 2021-11-09 RX ADMIN — METOPROLOL TARTRATE 25 MG: 25 TABLET, FILM COATED ORAL at 22:45

## 2021-11-09 RX ADMIN — ACETAMINOPHEN 650 MG: 160 SUSPENSION ORAL at 23:24

## 2021-11-09 RX ADMIN — METOPROLOL TARTRATE 25 MG: 25 TABLET, FILM COATED ORAL at 10:29

## 2021-11-09 RX ADMIN — LEVETIRACETAM 1000 MG: 500 SOLUTION ORAL at 22:45

## 2021-11-09 RX ADMIN — DILTIAZEM HYDROCHLORIDE 90 MG: 30 TABLET, FILM COATED ORAL at 11:46

## 2021-11-09 RX ADMIN — DILTIAZEM HYDROCHLORIDE 90 MG: 30 TABLET, FILM COATED ORAL at 06:33

## 2021-11-09 RX ADMIN — ACETAMINOPHEN 650 MG: 160 SUSPENSION ORAL at 11:46

## 2021-11-09 RX ADMIN — THIAMINE HCL TAB 100 MG 100 MG: 100 TAB at 10:29

## 2021-11-09 RX ADMIN — IPRATROPIUM BROMIDE AND ALBUTEROL SULFATE 1 AMPULE: .5; 2.5 SOLUTION RESPIRATORY (INHALATION) at 22:48

## 2021-11-09 ASSESSMENT — PAIN SCALES - GENERAL
PAINLEVEL_OUTOF10: 0

## 2021-11-09 ASSESSMENT — PULMONARY FUNCTION TESTS: PIF_VALUE: 23

## 2021-11-09 NOTE — PROGRESS NOTES
Report called to [facility], patient events of stay, vitals, labs and overall condition reviewed.  Will call respiratory and transfer patient    Daughter notified that patient will be moving to [phone]

## 2021-11-09 NOTE — PLAN OF CARE
Problem: Falls - Risk of:  Goal: Will remain free from falls  Description: Will remain free from falls  Outcome: Met This Shift     Problem: Falls - Risk of:  Goal: Absence of physical injury  Description: Absence of physical injury  Outcome: Met This Shift     Problem: Injury - Risk of, Healthcare-Acquired Condition:  Goal: Will remain free from falls  Description: Will remain free from falls  Outcome: Met This Shift

## 2021-11-09 NOTE — CARE COORDINATION
SOCIAL WORK / DISCHARGE PLANNING:  PRECERT denied for [employer], peer to peer denial upheld. Sw assisted with expediated appeal letter, had signed by Dr Hari Jackson and it was forwarded to [employer], [employer] rep. Appeal process can take up to 72 hours to process. Sw followed up with SARs that had been choiced by previous CM- Aneesh Ashley ( no longer take vents ) and Justine ( voice message left, request return call to confirm has referral and if has vent bed available if needed. PRECERT would need to be completed prior to dc.                    Electronically signed by CLARY Frederick on 11/9/2021 at 4:08 PM

## 2021-11-09 NOTE — PROGRESS NOTES
-Department of Internal Medicine  General Internal Medicine  Attending Progress Note  Chief Complaint   Patient presents with    Delirium Tremens (DTS)     Pt states he usually drinks a fifth of kilo beam a day states he quit saturday. states he has been having visual hallucinations since yesterday. SUBJECTIVE:    Trached.        OBJECTIVE      Medications    Current Facility-Administered Medications: sulfamethoxazole-trimethoprim (BACTRIM;SEPTRA) 200-40 MG/5ML suspension 20 mL, 160 mg, Per G Tube, Q12H  acetaminophen (TYLENOL) suspension 650 mg, 650 mg, Per NG tube, Q6H  dilTIAZem (CARDIZEM) tablet 90 mg, 90 mg, Per NG tube, 4 times per day  famotidine (PEPCID) tablet 40 mg, 40 mg, Per NG tube, BID  [START ON 87/24/2857] folic acid (FOLVITE) tablet 1 mg, 1 mg, Per NG tube, Daily  QUEtiapine (SEROQUEL) tablet 25 mg, 25 mg, Per NG tube, Nightly PRN  rivaroxaban (XARELTO) tablet 20 mg, 20 mg, Per NG tube, Daily  [START ON 11/10/2021] sertraline (ZOLOFT) tablet 100 mg, 100 mg, Per NG tube, Daily  levETIRAcetam (KEPPRA) 100 MG/ML solution 1,000 mg, 1,000 mg, Oral, BID  fentaNYL (SUBLIMAZE) injection 25 mcg, 25 mcg, IntraVENous, Q1H PRN  metoprolol tartrate (LOPRESSOR) tablet 25 mg, 25 mg, Per G Tube, BID  fentaNYL (DURAGESIC) 12 MCG/HR 1 patch, 1 patch, TransDERmal, Q72H  cefTRIAXone (ROCEPHIN) 2,000 mg in sterile water 20 mL IV syringe, 2,000 mg, IntraVENous, Q24H  insulin lispro (HUMALOG) injection vial 0-12 Units, 0-12 Units, SubCUTAneous, TID WC  insulin lispro (HUMALOG) injection vial 0-6 Units, 0-6 Units, SubCUTAneous, Nightly  glucose (GLUTOSE) 40 % oral gel 15 g, 15 g, Oral, PRN  dextrose 50 % IV solution, 12.5 g, IntraVENous, PRN  glucagon (rDNA) injection 1 mg, 1 mg, IntraMUSCular, PRN  dextrose 5 % solution, 100 mL/hr, IntraVENous, PRN  chlorhexidine (HIBICLENS) 4 % liquid, , Topical, Daily  heparin flush 100 UNIT/ML injection 300 Units, 3 mL, IntraVENous, 2 times per day  heparin flush 100 UNIT/ML injection 300 Units, 3 mL, IntraCATHeter, PRN  thiamine mononitrate tablet 100 mg, 100 mg, Oral, Daily  sodium chloride flush 0.9 % injection 5-40 mL, 5-40 mL, IntraVENous, 2 times per day  sodium chloride flush 0.9 % injection 5-40 mL, 5-40 mL, IntraVENous, PRN  0.9 % sodium chloride infusion, 25 mL, IntraVENous, PRN  heparin flush 100 UNIT/ML injection 300 Units, 3 mL, IntraVENous, 2 times per day  heparin flush 100 UNIT/ML injection 300 Units, 3 mL, IntraCATHeter, PRN  perflutren lipid microspheres (DEFINITY) injection 1.65 mg, 1.5 mL, IntraVENous, ONCE PRN  ondansetron (ZOFRAN-ODT) disintegrating tablet 4 mg, 4 mg, Oral, Q8H PRN **OR** ondansetron (ZOFRAN) injection 4 mg, 4 mg, IntraVENous, Q6H PRN  polyethylene glycol (GLYCOLAX) packet 17 g, 17 g, Oral, Daily PRN  ipratropium-albuterol (DUONEB) nebulizer solution 1 ampule, 1 ampule, Inhalation, Q4H  Physical    VITALS:  /88 Comment: manual  Pulse 104   Temp 99.9 °F (37.7 °C) (Oral)   Resp 16   Ht 5' 11\" (1.803 m)   Wt 235 lb 3.2 oz (106.7 kg)   SpO2 97%   BMI 32.80 kg/m²   CONSTITUTIONAL:  lethargic  EYES:  pupils equal, round and reactive to light and extra-ocular muscles intact  ENT:  atraumatic  NECK:  trach  LUNGS:  wheeze right anterior and left anterior  CARDIOVASCULAR:  normal apical pulses and normal S1 and S2  ABDOMEN:  normal bowel sounds  MUSCULOSKELETAL:  No edema  NEUROLOGIC:  Unable to determine  SKIN:  no bruising or bleeding  Tracking me with eyes  Right hand tremor    Data    CBC:   Lab Results   Component Value Date    WBC 9.6 11/09/2021    RBC 3.44 11/09/2021    HGB 10.6 11/09/2021    HCT 33.4 11/09/2021    MCV 97.1 11/09/2021    MCH 30.8 11/09/2021    MCHC 31.7 11/09/2021    RDW 15.4 11/09/2021     11/09/2021    MPV 9.6 11/09/2021     CMP:    Lab Results   Component Value Date     11/09/2021    K 4.3 11/09/2021    K 4.3 10/12/2021     11/09/2021    CO2 23 11/09/2021    BUN 35 11/09/2021    CREATININE 0.8 11/09/2021    GFRAA >60 11/09/2021    LABGLOM >60 11/09/2021    GLUCOSE 129 11/09/2021    PROT 6.8 11/06/2021    LABALBU 3.1 11/06/2021    CALCIUM 9.4 11/09/2021    BILITOT 0.8 11/06/2021    ALKPHOS 195 11/06/2021    AST 99 11/06/2021     11/06/2021       ASSESSMENT AND PLAN    Brief summary of stay:  73 y. o. male with a history of alcoholism depression hypertension coronary artery disease GERD COPD morbid obesity presents with signs of alcohol withdrawal. [facility] went into respiratory distress and was intubated. Intubation was prolonged and difficulty with weaning him off vent. He was trached. He was treated for aspiration PNA, Atrial fibrillation, GELY and dysphagia/peg tube. His sputum is currently positive for MSSA and on ceftriaxone for MSSA PNA.     1. Acute on chronic hypoxic hypercapnic respiratory failure failing to wean per pulmonary management. did PEG and trach. Intensivist managing vent. 2.  Atrial fibrillation RVR. Onn BB and ccb po. on xarelto   3. Alcohol withdrawal: off sedation. More awake. Giving thiamine and folic acid. EEG done. MRI negative per neurology  4. Infection treatment for UTI & Aspiration pneumonia s/p ceftriaxone covered Klebsiella in the urine. Now mssa pna still on rocehpin. On bactrim for uti    5. Morbid obesity but also with Malnutrition: Currently on tube feeding. 6.  Ct shows: 2.8 x 2.8 cm abdominal aortic aneurysm. 7.  Dysphagia/Malnutrition: on tube feeding  8. Transaminitis: monitor to worsening. 9. Full code. 10.  DVT prophylaxis with Xarelto   11. Disposition Patient was denied LTAC. They stated that he is not qualified for LTAC placement.  Cmm sending in my signed letter for an appeal

## 2021-11-10 LAB
ANION GAP SERPL CALCULATED.3IONS-SCNC: 13 MMOL/L (ref 7–16)
BUN BLDV-MCNC: 34 MG/DL (ref 6–23)
CALCIUM SERPL-MCNC: 9.2 MG/DL (ref 8.6–10.2)
CHLORIDE BLD-SCNC: 107 MMOL/L (ref 98–107)
CO2: 23 MMOL/L (ref 22–29)
CREAT SERPL-MCNC: 0.7 MG/DL (ref 0.7–1.2)
GFR AFRICAN AMERICAN: >60
GFR NON-AFRICAN AMERICAN: >60 ML/MIN/1.73
GLUCOSE BLD-MCNC: 135 MG/DL (ref 74–99)
HCT VFR BLD CALC: 31.9 % (ref 37–54)
HEMOGLOBIN: 10.5 G/DL (ref 12.5–16.5)
MAGNESIUM: 1.9 MG/DL (ref 1.6–2.6)
MCH RBC QN AUTO: 31.3 PG (ref 26–35)
MCHC RBC AUTO-ENTMCNC: 32.9 % (ref 32–34.5)
MCV RBC AUTO: 94.9 FL (ref 80–99.9)
METER GLUCOSE: 107 MG/DL (ref 74–99)
METER GLUCOSE: 121 MG/DL (ref 74–99)
METER GLUCOSE: 132 MG/DL (ref 74–99)
PDW BLD-RTO: 15.3 FL (ref 11.5–15)
PHOSPHORUS: 4.5 MG/DL (ref 2.5–4.5)
PLATELET # BLD: 305 E9/L (ref 130–450)
PMV BLD AUTO: 9.4 FL (ref 7–12)
POTASSIUM SERPL-SCNC: 4.2 MMOL/L (ref 3.5–5)
RBC # BLD: 3.36 E12/L (ref 3.8–5.8)
SODIUM BLD-SCNC: 143 MMOL/L (ref 132–146)
TRIGL SERPL-MCNC: 107 MG/DL (ref 0–149)
WBC # BLD: 10.1 E9/L (ref 4.5–11.5)

## 2021-11-10 PROCEDURE — 83735 ASSAY OF MAGNESIUM: CPT

## 2021-11-10 PROCEDURE — 6370000000 HC RX 637 (ALT 250 FOR IP): Performed by: INTERNAL MEDICINE

## 2021-11-10 PROCEDURE — 2580000003 HC RX 258: Performed by: INTERNAL MEDICINE

## 2021-11-10 PROCEDURE — 2060000000 HC ICU INTERMEDIATE R&B

## 2021-11-10 PROCEDURE — 99232 SBSQ HOSP IP/OBS MODERATE 35: CPT | Performed by: INTERNAL MEDICINE

## 2021-11-10 PROCEDURE — 94640 AIRWAY INHALATION TREATMENT: CPT

## 2021-11-10 PROCEDURE — 6360000002 HC RX W HCPCS: Performed by: INTERNAL MEDICINE

## 2021-11-10 PROCEDURE — 97535 SELF CARE MNGMENT TRAINING: CPT

## 2021-11-10 PROCEDURE — 84478 ASSAY OF TRIGLYCERIDES: CPT

## 2021-11-10 PROCEDURE — 36415 COLL VENOUS BLD VENIPUNCTURE: CPT

## 2021-11-10 PROCEDURE — 84100 ASSAY OF PHOSPHORUS: CPT

## 2021-11-10 PROCEDURE — 6370000000 HC RX 637 (ALT 250 FOR IP)

## 2021-11-10 PROCEDURE — 2700000000 HC OXYGEN THERAPY PER DAY

## 2021-11-10 PROCEDURE — 36592 COLLECT BLOOD FROM PICC: CPT

## 2021-11-10 PROCEDURE — 94003 VENT MGMT INPAT SUBQ DAY: CPT

## 2021-11-10 PROCEDURE — 85027 COMPLETE CBC AUTOMATED: CPT

## 2021-11-10 PROCEDURE — 80048 BASIC METABOLIC PNL TOTAL CA: CPT

## 2021-11-10 PROCEDURE — 6370000000 HC RX 637 (ALT 250 FOR IP): Performed by: NURSE PRACTITIONER

## 2021-11-10 PROCEDURE — 82962 GLUCOSE BLOOD TEST: CPT

## 2021-11-10 PROCEDURE — 97110 THERAPEUTIC EXERCISES: CPT

## 2021-11-10 RX ADMIN — FOLIC ACID 1 MG: 1 TABLET ORAL at 10:39

## 2021-11-10 RX ADMIN — SODIUM CHLORIDE, PRESERVATIVE FREE 300 UNITS: 5 INJECTION INTRAVENOUS at 10:40

## 2021-11-10 RX ADMIN — IPRATROPIUM BROMIDE AND ALBUTEROL SULFATE 1 AMPULE: .5; 2.5 SOLUTION RESPIRATORY (INHALATION) at 18:38

## 2021-11-10 RX ADMIN — IPRATROPIUM BROMIDE AND ALBUTEROL SULFATE 1 AMPULE: .5; 2.5 SOLUTION RESPIRATORY (INHALATION) at 15:32

## 2021-11-10 RX ADMIN — WATER 2000 MG: 1 INJECTION INTRAMUSCULAR; INTRAVENOUS; SUBCUTANEOUS at 14:56

## 2021-11-10 RX ADMIN — THIAMINE HCL TAB 100 MG 100 MG: 100 TAB at 10:39

## 2021-11-10 RX ADMIN — SODIUM CHLORIDE, PRESERVATIVE FREE 300 UNITS: 5 INJECTION INTRAVENOUS at 10:30

## 2021-11-10 RX ADMIN — SULFAMETHOXAZOLE AND TRIMETHOPRIM 20 ML: 200; 40 SUSPENSION ORAL at 21:13

## 2021-11-10 RX ADMIN — ACETAMINOPHEN 650 MG: 160 SUSPENSION ORAL at 13:22

## 2021-11-10 RX ADMIN — SODIUM CHLORIDE, PRESERVATIVE FREE 300 UNITS: 5 INJECTION INTRAVENOUS at 19:43

## 2021-11-10 RX ADMIN — IPRATROPIUM BROMIDE AND ALBUTEROL SULFATE 1 AMPULE: .5; 2.5 SOLUTION RESPIRATORY (INHALATION) at 22:43

## 2021-11-10 RX ADMIN — ACETAMINOPHEN 650 MG: 160 SUSPENSION ORAL at 06:12

## 2021-11-10 RX ADMIN — DILTIAZEM HYDROCHLORIDE 90 MG: 30 TABLET, FILM COATED ORAL at 13:22

## 2021-11-10 RX ADMIN — METOPROLOL TARTRATE 25 MG: 25 TABLET, FILM COATED ORAL at 10:39

## 2021-11-10 RX ADMIN — IPRATROPIUM BROMIDE AND ALBUTEROL SULFATE 1 AMPULE: .5; 2.5 SOLUTION RESPIRATORY (INHALATION) at 11:36

## 2021-11-10 RX ADMIN — ACETAMINOPHEN 650 MG: 160 SUSPENSION ORAL at 17:54

## 2021-11-10 RX ADMIN — FAMOTIDINE 40 MG: 20 TABLET, FILM COATED ORAL at 19:42

## 2021-11-10 RX ADMIN — DILTIAZEM HYDROCHLORIDE 90 MG: 30 TABLET, FILM COATED ORAL at 06:12

## 2021-11-10 RX ADMIN — FAMOTIDINE 40 MG: 20 TABLET, FILM COATED ORAL at 10:39

## 2021-11-10 RX ADMIN — METOPROLOL TARTRATE 25 MG: 25 TABLET, FILM COATED ORAL at 19:42

## 2021-11-10 RX ADMIN — Medication 10 ML: at 21:14

## 2021-11-10 RX ADMIN — IPRATROPIUM BROMIDE AND ALBUTEROL SULFATE 1 AMPULE: .5; 2.5 SOLUTION RESPIRATORY (INHALATION) at 01:35

## 2021-11-10 RX ADMIN — LEVETIRACETAM 1000 MG: 500 SOLUTION ORAL at 10:38

## 2021-11-10 RX ADMIN — IPRATROPIUM BROMIDE AND ALBUTEROL SULFATE 1 AMPULE: .5; 2.5 SOLUTION RESPIRATORY (INHALATION) at 07:42

## 2021-11-10 RX ADMIN — LEVETIRACETAM 1000 MG: 500 SOLUTION ORAL at 19:41

## 2021-11-10 RX ADMIN — DILTIAZEM HYDROCHLORIDE 90 MG: 30 TABLET, FILM COATED ORAL at 17:54

## 2021-11-10 RX ADMIN — SERTRALINE HYDROCHLORIDE 100 MG: 50 TABLET ORAL at 10:39

## 2021-11-10 RX ADMIN — SULFAMETHOXAZOLE AND TRIMETHOPRIM 20 ML: 200; 40 SUSPENSION ORAL at 10:38

## 2021-11-10 RX ADMIN — Medication 10 ML: at 10:40

## 2021-11-10 RX ADMIN — RIVAROXABAN 20 MG: 20 TABLET, FILM COATED ORAL at 17:55

## 2021-11-10 ASSESSMENT — PAIN SCALES - GENERAL
PAINLEVEL_OUTOF10: 0

## 2021-11-10 ASSESSMENT — PULMONARY FUNCTION TESTS: PIF_VALUE: 20

## 2021-11-10 NOTE — PROGRESS NOTES
Comprehensive Nutrition Assessment    Type and Reason for Visit:  Reassess    Nutrition Recommendations/Plan: Continue with current Nutr'l regimen    Nutrition Assessment:  Pt remains intubated, NPO, Trach/PEG, TF for nutrition. Pt w/ ETOH w/draw, metabolic encephalopathy and aspiration PNA. Noted A-fib w/ RVR. Hx COPD. Continue current EN regimen and monitor. Malnutrition Assessment:  Malnutrition Status: At risk for malnutrition (Comment)    Context:  Chronic Illness     Findings of the 6 clinical characteristics of malnutrition:  Energy Intake:  Mild decrease in energy intake (Comment)  Weight Loss:  Unable to assess (no wt hx)     Body Fat Loss:  No significant body fat loss     Muscle Mass Loss:  No significant muscle mass loss     Strength:  Not Performed    Estimated Daily Nutrient Needs:  Energy (kcal):  6603-0905; Weight Used for Energy Requirements:  Current     Protein (g):  120-140 (1.5-1.8 (monitor liver enzymes));  Weight Used for Protein Requirements:  Ideal        Fluid (ml/day):  per critical care; Method Used for Fluid Requirements:         Nutrition Related Findings:  Intubated, Abd round, Trach/PEG, +BS, BLE +2 pitting edema,+I/O +1.4L      Wounds:  Skin Tears       Current Nutrition Therapies:    Current Tube Feeding (TF) Orders:  · Feeding Route: Nasogastric  · Formula: Immune Enhancing  · Schedule: Continuous  · Additives/Modulars: Protein (1 protein modular daily)  · Water Flushes: 30 ml before and after pro mod = 60 ml  · Current TF & Flush Orders Provides:    · Goal TF & Flush Orders Provides: @ 45 ml/hr = 1080 tv, 1620 kcal, 101 g pro (1724 kcal, 127 g pro w/ pro mod), 810 ml free water (870 ml total free water w/ flush)      Anthropometric Measures:  · Height: 5' 11\" (180.3 cm)  · Current Body Weight: 256 lb (116.1 kg) (11/1 bed scale)   · Admission Body Weight: 277 lb (125.6 kg) (10/12 bed per flow sheet)    · Usual Body Weight:  (no wt hx per EMR)     · Ideal Body Weight: 172 lbs; % Ideal Body Weight 148.8 %   · BMI: 35.7  ·   · BMI Categories: Obese Class 2 (BMI 35.0 -39.9)       Nutrition Diagnosis:   · Inadequate oral intake related to impaired respiratory function as evidenced by NPO or clear liquid status due to medical condition, intubation, nutrition support - enteral nutrition      Nutrition Interventions:   Food and/or Nutrient Delivery:  Continue Current Tube Feeding  Nutrition Education/Counseling:  No recommendation at this time   Coordination of Nutrition Care:  Continue to monitor while inpatient    Goals:  Pt to tolerate EN       Nutrition Monitoring and Evaluation:   Behavioral-Environmental Outcomes:  None Identified   Food/Nutrient Intake Outcomes:  Enteral Nutrition Intake/Tolerance  Physical Signs/Symptoms Outcomes:  Biochemical Data, GI Status, Fluid Status or Edema, Hemodynamic Status, Nutrition Focused Physical Findings, Skin, Weight     Discharge Planning:     Too soon to determine     Electronically signed by Winsome Tabares RD, LD on 11/10/21 at 11:53 AM EST    Contact: [phone]

## 2021-11-10 NOTE — CARE COORDINATION
SOCIAL WORK / DISCHARGE PLANNING:  French met with pt's dtr, Damian Pat to discuss transition to care. She flew in last evening from [facility]. Expediated appeal letter was sent by [employer] yesterday afternoon, can take up to 72 hrs for response. Updated her on [facility] choices that accept vent as pt is on vent still at night. Referral made to [facility], Marie Monroe today. Await review for acceptance. French alerted Damian Pat that Mckayla will call her to discuss financials as well. PRECERT would be needed prior to dc. Addendum: 1145am - French received notice that denial was upheld on the expediated appeal. Frecnh notified dtr, Damian Pat at bedside as well as pt. He is frustrated today with inability to be able to speak with dtr. Wipe off board provided. Await acceptance by [facility] will be needed. Addendum: 309pm - Per Justine Block pt tentatively accepted clinically but needs cleared financially before fully accepted. Dtr to go to pt's home looking for financial info.        Electronically signed by CLARY Gonzalez on 11/10/2021 at 10:45 AM

## 2021-11-10 NOTE — PROGRESS NOTES
-Department of Internal Medicine  General Internal Medicine  Attending Progress Note  Chief Complaint   Patient presents with    Delirium Tremens (DTS)     Pt states he usually drinks a fifth of kilo beam a day states he quit saturday. states he has been having visual hallucinations since yesterday. SUBJECTIVE:    Trached.    traching well    OBJECTIVE      Medications    Current Facility-Administered Medications: sulfamethoxazole-trimethoprim (BACTRIM;SEPTRA) 200-40 MG/5ML suspension 20 mL, 160 mg, Per G Tube, Q12H  acetaminophen (TYLENOL) suspension 650 mg, 650 mg, Per NG tube, Q6H  dilTIAZem (CARDIZEM) tablet 90 mg, 90 mg, Per NG tube, 4 times per day  famotidine (PEPCID) tablet 40 mg, 40 mg, Per NG tube, BID  folic acid (FOLVITE) tablet 1 mg, 1 mg, Per NG tube, Daily  QUEtiapine (SEROQUEL) tablet 25 mg, 25 mg, Per NG tube, Nightly PRN  rivaroxaban (XARELTO) tablet 20 mg, 20 mg, Per NG tube, Daily  sertraline (ZOLOFT) tablet 100 mg, 100 mg, Per NG tube, Daily  levETIRAcetam (KEPPRA) 100 MG/ML solution 1,000 mg, 1,000 mg, Oral, BID  fentaNYL (SUBLIMAZE) injection 25 mcg, 25 mcg, IntraVENous, Q1H PRN  metoprolol tartrate (LOPRESSOR) tablet 25 mg, 25 mg, Per G Tube, BID  fentaNYL (DURAGESIC) 12 MCG/HR 1 patch, 1 patch, TransDERmal, Q72H  cefTRIAXone (ROCEPHIN) 2,000 mg in sterile water 20 mL IV syringe, 2,000 mg, IntraVENous, Q24H  insulin lispro (HUMALOG) injection vial 0-12 Units, 0-12 Units, SubCUTAneous, TID WC  insulin lispro (HUMALOG) injection vial 0-6 Units, 0-6 Units, SubCUTAneous, Nightly  glucose (GLUTOSE) 40 % oral gel 15 g, 15 g, Oral, PRN  dextrose 50 % IV solution, 12.5 g, IntraVENous, PRN  glucagon (rDNA) injection 1 mg, 1 mg, IntraMUSCular, PRN  dextrose 5 % solution, 100 mL/hr, IntraVENous, PRN  chlorhexidine (HIBICLENS) 4 % liquid, , Topical, Daily  heparin flush 100 UNIT/ML injection 300 Units, 3 mL, IntraVENous, 2 times per day  heparin flush 100 UNIT/ML injection 300 Units, 3 mL, IntraCATHeter, PRN  thiamine mononitrate tablet 100 mg, 100 mg, Oral, Daily  sodium chloride flush 0.9 % injection 5-40 mL, 5-40 mL, IntraVENous, 2 times per day  sodium chloride flush 0.9 % injection 5-40 mL, 5-40 mL, IntraVENous, PRN  0.9 % sodium chloride infusion, 25 mL, IntraVENous, PRN  heparin flush 100 UNIT/ML injection 300 Units, 3 mL, IntraVENous, 2 times per day  heparin flush 100 UNIT/ML injection 300 Units, 3 mL, IntraCATHeter, PRN  perflutren lipid microspheres (DEFINITY) injection 1.65 mg, 1.5 mL, IntraVENous, ONCE PRN  ondansetron (ZOFRAN-ODT) disintegrating tablet 4 mg, 4 mg, Oral, Q8H PRN **OR** ondansetron (ZOFRAN) injection 4 mg, 4 mg, IntraVENous, Q6H PRN  polyethylene glycol (GLYCOLAX) packet 17 g, 17 g, Oral, Daily PRN  ipratropium-albuterol (DUONEB) nebulizer solution 1 ampule, 1 ampule, Inhalation, Q4H  Physical    VITALS:  BP (!) 125/90   Pulse 117   Temp 99 °F (37.2 °C) (Oral)   Resp 20   Ht 5' 11\" (1.803 m)   Wt 251 lb 1.7 oz (113.9 kg)   SpO2 95%   BMI 35.02 kg/m²   CONSTITUTIONAL:  lethargic  EYES:  pupils equal, round and reactive to light and extra-ocular muscles intact  ENT:  atraumatic  NECK:  trach  LUNGS:  wheeze right anterior and left anterior  CARDIOVASCULAR:  normal apical pulses and normal S1 and S2  ABDOMEN:  normal bowel sounds  MUSCULOSKELETAL:  No edema  NEUROLOGIC:  Unable to determine  SKIN:  no bruising or bleeding  Tracking me with eyes  Right hand tremor not detected today    Data    CBC:   Lab Results   Component Value Date    WBC 10.1 11/10/2021    RBC 3.36 11/10/2021    HGB 10.5 11/10/2021    HCT 31.9 11/10/2021    MCV 94.9 11/10/2021    MCH 31.3 11/10/2021    MCHC 32.9 11/10/2021    RDW 15.3 11/10/2021     11/10/2021    MPV 9.4 11/10/2021     CMP:    Lab Results   Component Value Date     11/10/2021    K 4.2 11/10/2021    K 4.3 10/12/2021     11/10/2021    CO2 23 11/10/2021    BUN 34 11/10/2021    CREATININE 0.7 11/10/2021    GFRAA >60 11/10/2021    LABGLOM >60 11/10/2021    GLUCOSE 135 11/10/2021    PROT 6.8 11/06/2021    LABALBU 3.1 11/06/2021    CALCIUM 9.2 11/10/2021    BILITOT 0.8 11/06/2021    ALKPHOS 195 11/06/2021    AST 99 11/06/2021     11/06/2021       ASSESSMENT AND PLAN    Brief summary of stay:  73 y. o. male with a history of alcoholism depression hypertension coronary artery disease GERD COPD morbid obesity presents with signs of alcohol withdrawal. [facility] went into respiratory distress and was intubated. Intubation was prolonged and difficulty with weaning him off vent. He was trached. He was treated for aspiration PNA, Atrial fibrillation, GELY and dysphagia/peg tube. His sputum is currently positive for MSSA and on ceftriaxone for MSSA PNA.     1. Acute on chronic hypoxic hypercapnic respiratory failure failing to wean per pulmonary management. did PEG and trach. Intensivist managing vent. 2.  Atrial fibrillation RVR. Onn BB and ccb po. on xarelto   3. Alcohol withdrawal: off sedation. More awake. Giving thiamine and folic acid. EEG done. MRI negative per neurology  4. Infection treatment for UTI & Aspiration pneumonia s/p ceftriaxone covered Klebsiella in the urine. Now mssa pna still on rocehpin. On bactrim for uti    5. Morbid obesity but also with Malnutrition: Currently on tube feeding. 6.  Ct shows: 2.8 x 2.8 cm abdominal aortic aneurysm. 7.  Dysphagia/Malnutrition: on tube feeding  8. Transaminitis: monitor  9. Full code. 10.  DVT prophylaxis with Xarelto   11. Disposition Patient was denied LTAC. They stated that he is not qualified for LTAC placement. Cmm sending in my signed letter for an appeal.  11/10: looks like he will go to [facility] soon. Spoke tin person to dtr from [place]. She will have to plan to keep or sell his house in 2-4 weeks from now.  I I advised to reassess in 2-3 weeks from now to be aviva sharp

## 2021-11-10 NOTE — CARE COORDINATION
SOCIAL WORK / DISCHARGE PLANNING:  RAPID COVID WILL BE NEEDED PRIOR TO DC. Pt was accepted to [facility], updated dtr, Palmira Dixon who is very pleased with plan. Discussed case with Dr Ebenezer Zuleta, states to start [facility] for dc planning. PRECERT to be started. CURTIS will need signed by physician. HENs form will need completed prior to dc.                          Electronically signed by CLARY Osborn on 11/10/2021 at 3:58 PM

## 2021-11-10 NOTE — PROGRESS NOTES
OT BEDSIDE TREATMENT NOTE      Date:11/10/2021  Patient Name: Diane Brooks  MRN: [id number]  : 1955  Room: [address] [place] [address]        Evaluating OT: ALEXANDRA Montejo/YANELY; [id number]        Referring Provider and Orders/Date:   OT eval and treat Start: 10/24/21 1115, End: 10/24/21 1115, ONE TIME, Standing Count: 1 Occurrences, Je Love MD     Diagnosis:   1. Delirium tremens ([address]    2. Acute respiratory failure, unspecified whether with hypoxia or hypercapnia ([address]    3. Atrial fibrillation, unspecified type ([address]    4. Encounter for nasogastric (NG) tube placement          Surgery: 10/20/21: EGD WITH NG TUBE PLACEMENT **BEDSIDE**      Pertinent Medical History:        Past Medical History        Past Medical History:   Diagnosis Date    CAD (coronary artery disease)      COPD (chronic obstructive pulmonary disease) ([address]      Depression      Fracture of unspecified phalanx of left middle finger, initial encounter for closed fracture      GERD (gastroesophageal reflux disease)      Hypertension               Past Surgical History         Past Surgical History:   Procedure Laterality Date    COLONOSCOPY        CORONARY ARTERY BYPASS GRAFT   3/2/05     x3: LIMA to LAD, SVG to RCA, SVG to diagonal    DIAGNOSTIC CARDIAC CATH LAB PROCEDURE   3/02/05     [facility]: Severe multivessel CAD in patient who presents with STEMI and total occlusion of diagonal branch. Significant disease of proximal LAD and severe disease of dominant RCA.      FINGER SURGERY Left 5/3/2019     CLOSED POSSIBLE LEFT MIDDLE FINGER OPEN REDUCTION INTERNAL FIXATION POSSIBLE PROXIMAL INTERPHALANGEAL JOINT ARTHROPLASTY   ++[facility]++ performed by Adrien Georges MD at [facility] N/A 10/20/2021     EGD WITH NG TUBE PLACEMENT **BEDSIDE** performed by Leslie Gonzalez MD at [address] [place] [address]           Precautions:  Fall Risk, ventilator, droplet iso; pt's O2 sats ranged 95-97% during session    Recommended placement: subacute vs. LTAC pending pt progress in IP setting    Assessment of current deficits     [x]? Functional mobility           [x]? ADLs           [x]? Strength                   [x]? Cognition     [x]? Functional transfers         [x]? IADLs         [x]? Safety Awareness   [x]? Endurance     [x]? Fine Coordination                        [x]? Balance      []? Vision/perception    []? Sensation       [x]? Gross Motor Coordination            [x]? ROM           []?  Delirium                   []? Motor Control      OT PLAN OF CARE   OT POC based on physician orders, patient diagnosis and results of clinical assessment     Frequency/Duration 1-3 days/wk for 2 weeks PRN   Specific OT Treatment Interventions to include:   * Instruction/training on adapted ADL techniques and AE recommendations to increase functional independence within precautions       * Training on energy conservation strategies, correct breathing pattern and techniques to improve independence/tolerance for self-care routine  * Functional transfer/mobility training/DME recommendations for increased independence, safety, and fall prevention  * Patient/Family education to increase follow through with safety techniques and functional independence  * Recommendation of environmental modifications for increased safety with functional transfers/mobility and ADLs  * Cognitive retraining/development of therapeutic activities to improve problem solving, judgement, memory, and attention for increased safety/participation in ADL/IADL tasks  * Splinting/positioning for increased function, prevention of contractures, and improve skin integrity  * Therapeutic exercise to improve motor endurance, ROM, and functional strength for ADLs/functional transfers  * Therapeutic activities to facilitate/challenge dynamic balance, stand tolerance for increased safety and independence with ADLs  * Therapeutic activities to facilitate gross/fine motor skills for increased independence with ADLs  * Neuro-muscular re-education: facilitation of righting/equilibrium reactions, midline orientation, scapular stability/mobility, normalization of muscle tone, and facilitation of volitional active controled movement  * Positioning to improve skin integrity, interaction with environment and functional independence  * Manual techniques for edema management      Recommended Adaptive Equipment/DME:  TBD       Home Living: Pt unable to provide PLOF. Per chart, his  is his daughter who lives in [place]. Bathroom setup: unknown    DME owned: unknown      Prior Level of Function: indep with ADLs , indep with IADLs; ambulated indep   Driving: unknown   Occupation: unknown   Enjoys: unknown     Pain Level: pt shook his head no when asked if he had any pain  Cognition: A&O: 1/4 to self;  Follows 1 step directions, but not consistent with yes/no, head shake or facial expression              Memory:  Difficult to assess with vent              Sequencing:  Difficult to assess with vent-fair-              Problem solving:  Difficult to assess with vent              Judgement/safety:  Difficult to assess with vent     AM-[facility] Daily Activity Inpatient   How much help for putting on and taking off regular lower body clothing?: Total  How much help for Bathing?: Total  How much help for Toileting?: Total  How much help for putting on and taking off regular upper body clothing?: Total  How much help for taking care of personal grooming?: Total  How much help for eating meals?: Total  AM-PAC Inpatient Daily Activity Raw Score: 6  AM-PAC Inpatient ADL T-Scale Score : 17.07  ADL Inpatient CMS 0-100% Score: 100  ADL Inpatient CMS G-Code Modifier : CN                Functional Assessment:      Initial Eval Status  Date: 10/25/2021   Treatment Status  Date:11/10/2021 STGs = LTGs  Time frame: 10-14 days   Feeding Dependent with NPO on eval.  N/T  Mod A   Grooming Dependent for hair comb, face and hand wash, nursing suctioned while therapist present Dep to don shampoo cap, wash, towel dry and comb hair; Dep with oral hygiene with use of toothette with mouthwash squeezed out of toothette; Dep to brush beard and to wash face Moderate Assist    UB Dressing Dependent for clean gown from supine. Extended time required Dep to change gown when seated in bed; pt unable to assist to lift B UE's; assist to thread monitor lines through gown, as well  Maximal Assist    LB Dressing Dependent suspected with LB clothing not appropriate on eval Dep to don/doff prevo boots with positioning of LE's provided d/t external rotation of LLE  Maximal Assist    Bathing Dependent with bathing from supine and A x 2 recommended for rolling Dep to wash UB, complete pericare and wash LE's ; pt unable to follow through with instructions and demo's weakness in B UE's  Maximal Assist    Toileting Dependent with foster in place and A x 2 recommended for rolling N/T  Maximal Assist    Bed Mobility  Not tested on eval with vent in place and elevated heart rate with UE tasks. Recommending A x 2 for all bed mobility for safety.   N/T d/t pt being on vent Supine to sit: Maximal Assist   Sit to supine: Maximal Assist    Functional Transfers NT due to pt overall debility, decreased activity tolerance, balance deficits, safety and fall risk.     NT due to pt overall debility, decreased activity tolerance, balance deficits, safety and fall risk.   Maximal Assist for sit to stand   Functional Mobility NT due to pt overall debility, decreased activity tolerance, balance deficits, safety and fall risk.      NT due to pt overall debility, decreased activity tolerance, balance deficits, safety and fall risk.   Not appropriate at this time   Balance Sitting:     Static:  NT    Dynamic:NT  Standing: NT Sitting:     Static:  NT    Dynamic:NT  Standing: NT  Sitting:     Static:  fair    Dynamic:fair-  Standing: poor+   Activity Tolerance Vitals with activity: supine at ; O2 96% 129/97. Following positioning and exercise with 98%;  with nursing assessment 139/81  Pt's O2 sats remained in 90's throughout session on vent  Increase sitting tolerance for >15min with stable vital signs for carry over into toileting, functional tranfers and indep in ADLs   Visual/  Perceptual Glasses: not Present; Unable to thoroughly assist. Pt eyes twitching and he can open on command. Follows therapist visually from side to side with conversation.     Reports change in vision since admission: No      NA   Francisco UE Strengthening, function and positioning   1+/5 generally; edema in francisco UE with fair- positioning interventions   2/5MMT generally with good positioning and min edema for carry over into self care and client centered tasks.       Hand Dominance  [x]? Right   []? Left     [facility]: [facility] [facility] [employer] [place]   Sensation:  No c/o numbness or tingling   Tone: hypotonia  Edema: in francisco UE and feet       Comments: Patient cleared by nursing staff. Upon arrival pt seated in bed and on trach/vent. Daughter present, however stepped OOR for lunch. Pt agreeable to OT tx session (pt shakes head yes and nods head no; pt appears to be talking, however unable to understand what he is saying). Pt educated with regards to hand placement, safety awareness, static sitting balance,   ADL retraining,  grooming tasks, UE/LE bathing, LE/UE dressing, toileting/hygiene, ECT's. At end of session pt seated in bed with HOB greater than 30* d/t pt has tube feed running;  all lines and tubes intact, call light within reach. Nsg notified, as pt needed suctioned; Overall, pt demonstrated decreased independence and safety during completion of ADL/functional transfers/mobility tasks. Pt would benefit from continued skilled OT to increase safety and independence with completion of ADL/IADL tasks for functional independence and quality of life.       Pt required cues and education as noted above for safe facilitation and completion of tasks. Therapist provided skilled monitoring of patient's response during treatment session. Prior to and at the end of session, environmental modifications / line management completed for patients safety and efficiency of treatment session. Overall, patient demonstrates moderate/maximal difficulties with completion of BADLs and IADLs. Factors contributing to these difficulties include pt unable to follow through with instructions,  decreased endurance, and generalized weakness. As noted above, patient likely to benefit from further OT intervention to increase independence, safety, and overall quality of life. Treatment:     ? ADL completion: Self-care retraining for the above-mentioned ADLs; training on proper hand placement, safety technique, sequencing, and energy conservation techniques. ? Skilled positioning: Proper positioning to improve interaction with environment, overall functioning and decrease/prevent edema and contractures. · Pt has made fair minus/poor progress towards set goals   · OT 1-3x/week for 5-7 days during hospitalization       Treatment Time also includes thorough review of current medical information, gathering information on past medical history/social history and prior level of function, informal observation of tasks, assessment of data and education on plan of care and goals.     Treatment Time In: 11:30 AM     Treatment Time Out: 12:00 PM            Treatment Charges: Mins Units   ADL/Home Mgt     19510 30 2   Thera Activities     01178     Ther Ex                 78290     Manual Therapy    00763     Neuro Re-ed         10582     Orthotic manage/training                               72105     Non Billable Time     Total Timed Treatment 30 [address] [facility], [facility]/ #[id number]

## 2021-11-10 NOTE — PROGRESS NOTES
Physical Therapy Treatment Note/Plan of Care    Room #:  8036/6223-98  Patient Name: Annika Valenzuela  YOB: 1955  MRN: [id number]    Date of Service: 11/10/2021     Tentative placement recommendation: Long Term Acute Care  Equipment recommendation: To be determined      Evaluating Physical Therapist: Asmita Hsu #[id number]    Re evaluating : Christy Monsalve PT    Specific Provider Orders/Date/Referring Provider :  10/24/21 1115   PT eval and treat Start: 10/24/21 1115, End: 10/24/21 1115, ONE TIME, Standing Count: 1 Occurrences, Derik Alcantara MD      Admitting Diagnosis:   Delirium tremens ([address] [F10.231]  Seizures ([address] [R56.9]  Acute respiratory failure, unspecified whether with hypoxia or hypercapnia ([address] [J96.00]  Atrial fibrillation, unspecified type ([address] [I48.91]     hallucinations and tremors      Surgery:   10/29  trach  Visit Diagnoses       Codes    Acute respiratory failure, unspecified whether with hypoxia or hypercapnia ([address]     J96.00    Atrial fibrillation, unspecified type ([address]     I48.91    Encounter for nasogastric (NG) tube placement     Z46.59          Patient Active Problem List   Diagnosis    Essential hypertension    Coronary artery disease involving coronary bypass graft of native heart without angina pectoris    Atypical chest pain    Pure hypercholesterolemia    Pericardial effusion    Chronic obstructive pulmonary disease (HCC)    Moderate obesity    Dislocation of finger    Closed dislocation of left middle finger    Seizures ([address]    Withdrawal symptoms, alcohol, with delirium ([address]    Altered mental status        ASSESSMENT of Current Deficits Patient exhibits decreased strength, endurance and range of motion impairing functional mobility, tolerance to activity and participation able to follow commands  ,one step intermittently. Patient able to assist with exercises to lower extremities.  Patient requires continued skilled physical therapy to address concerns listed above for increased safety and function at discharge. More alert with eye contact noted. PHYSICAL THERAPY  PLAN OF CARE       Physical therapy plan of care is established based on physician order,  patient diagnosis and clinical assessment    Current Treatment Recommendations:    -Bed Mobility: Lower extremity exercises , Upper extremity exercises  and Trunk control activities   -Sitting Balance: Hands on support to maintain midline  and Facilitate active trunk muscle engagement   -Transfers: Provide instruction on proper hand and foot position for adequate transfer of weight onto lower extremities and use of gait device if needed, Cues for hand placement, technique and safety. Provide stabilization to prevent fall , Facilitate weight shift forward on to lower extremities and provide necessary stabilization of bilateral lower extremities  and Assist with extension of knees trunk and hip to accept weight transfer   -Endurance: Utilize Supervised activities to increase level of endurance to allow for safe functional mobility including transfers and gait  and Use graduated activities to promote good breathing techniques and provide support and education to maximize respiratory function    PT long term treatment goals are located in below grid    Patient and or family understand(s) diagnosis, prognosis, and plan of care. Frequency of treatments: Patient will be seen  3-5 times/week.          Prior Level of Function: unknown patient unable to answer  Rehab Potential: fair         Past medical history:   Past Medical History:   Diagnosis Date    Alcohol abuse     Atrial fibrillation ([address]     CAD (coronary artery disease)     [facility]) [facility]     Depression     Fracture of unspecified phalanx of left middle finger, initial encounter for closed fracture     GERD (gastroesophageal reflux disease)     Hypertension     Seizure ([facility] Past Surgical History:   Procedure Laterality Date    COLONOSCOPY      CORONARY ARTERY BYPASS GRAFT  3/2/05    x3: LIMA to LAD, SVG to RCA, SVG to diagonal    DIAGNOSTIC CARDIAC CATH LAB PROCEDURE  3/02/05    [facility]: Severe multivessel CAD in patient who presents with STEMI and total occlusion of diagonal branch. Significant disease of proximal LAD and severe disease of dominant RCA.  FINGER SURGERY Left 5/3/2019    CLOSED POSSIBLE LEFT MIDDLE FINGER OPEN REDUCTION INTERNAL FIXATION POSSIBLE PROXIMAL INTERPHALANGEAL JOINT ARTHROPLASTY   ++[facility]++ performed by Julio Shanks MD at [place] [address] N/A 10/20/2021    EGD WITH NG TUBE PLACEMENT **BEDSIDE** performed by Frannie Cardoza MD at [facility]      double    TRACHEOSTOMY N/A 10/29/2021    TRACHEOTOMY AND PEG performed by Puja Jackson MD at [address] [place] [address] N/A 10/29/2021    EGD ESOPHAGOGASTRODUODENOSCOPY PEG TUBE INSERTION performed by Puja Jackson MD at [facility]:    Precautions:   ,  , seizure,  alcohol abuse     Social history: Patient unable   Per chart, his  is his daughter who lives in [place].       Equipment owned: unknown,       AM-PAC Basic Mobility        AM-[facility] Mobility Inpatient   How much difficulty turning over in bed?: A Lot  How much difficulty sitting down on / standing up from a chair with arms?: Unable  How much difficulty moving from lying on back to sitting on side of bed?: Unable  How much help from another person moving to and from a bed to a chair?: Total  How much help from another person needed to walk in hospital room?: Total  How much help from another person for climbing 3-5 steps with a railing?: Total  AM-PAC Inpatient Mobility Raw Score : 7  AM-PAC Inpatient T-Scale Score : 26.42  Mobility Inpatient CMS 0-100% Score: 92.36  Mobility Inpatient CMS G-Code Modifier : CM    Nursing cleared patient for PT treatment. OBJECTIVE;   Initial Evaluation  Date: 10/26/2021 Treatment Date:  11/10/2021      Short Term/ Long Term   Goals   Was pt agreeable to Eval/treatment? Yes yes To be met in 5 days   Pain level   Slight facial grimacing end range of motion stretch DF    No facial grimacing throughout session    Bed Mobility    Rolling: Not assessed     Supine to sit: Not assessed     Sit to supine: Not assessed     Scooting: Not assessed    Rolling: Not assessed    Supine to sit: Not assessed    Sit to supine: Not assessed    Scooting: Not assessed     Rolling: Moderate assist of 1    Supine to sit: Moderate assist of 1    Sit to supine: Moderate assist of 1    Scooting: Moderate assist of 1     Transfers Sit to stand: Not assessed     Sit to stand:  Moderate assist of 1     Ambulation    not assessed    10 feet using  wheeled walker with Moderate assist of 1    Stair negotiation: ascended and descended   Not assessed          ROM Slight bilateral plantar flexor contractures   Increase range of motion 10% of affected joints    Strength BUE:  refer to OT eval  bilateral lower extremities no active movement  Increase strength in affected mm groups by 1/3 grade   Balance Sitting EOB:  not assessed    Dynamic Standing:  not assessed   Sitting EOB: not assessed   Dynamic Standing: not assessed    Sitting EOB:  fair    Dynamic Standing: fair -     Patient is Alert & Oriented x person and follows one step directions  Intermittently; difficult to elicit responseSensation:   Unable to assess   Edema:  yes bilateral upper extremities   Endurance: poor      Vitals: ventilator   Blood Pressure at rest  Blood Pressure during session    Heart Rate at rest  Heart Rate during session    SPO2 at rest 97%  SPO2 during session 93%     Patient education  Patient educated on role of Physical Therapy, risks of immobility, safety and plan of care,  importance of mobility while in hospital  and ankle pumps, quad set and glut set for edema control, blood clot prevention     Patient response to education:   Pt verbalized understanding Pt demonstrated skill Pt requires further education in this area   No No Yes      Treatment:  Patient practiced and was instructed/facilitated in the following treatment: Patient able to actively move bilateral lower extremities 15/25%, slight squeeze left hand. Performed passive range of motion and AAROM of bilateral lower extremities end range of motion stretching. Positioning for skin and joint integrity  Pt assisted with shoulder positioning, helping with moving his upper trunk       Therapeutic Exercises:  ankle pumps, heel slide, hip abduction/adduction, straight leg raise, shoulder elevation and elbow flexion/extension  x 10 reps. Patient PROM for shoulder elevation, hip abd/add. At end of session, patient in bed with alarm call light and phone within reach,  all lines and tubes intact, nursing notified. Patient would benefit from continued skilled Physical Therapy to improve functional independence and quality of life.          Patient's/ family goals   none stated    Time in  0808  Time out  0820    Total Treatment Time  12 minutes       CPT codes:  Therapeutic exercises (87275)   12 minutes  1 unit(s)    Asmita Mcmahon #[id number]

## 2021-11-11 LAB
ANION GAP SERPL CALCULATED.3IONS-SCNC: 13 MMOL/L (ref 7–16)
BUN BLDV-MCNC: 36 MG/DL (ref 6–23)
CALCIUM SERPL-MCNC: 9.1 MG/DL (ref 8.6–10.2)
CHLORIDE BLD-SCNC: 105 MMOL/L (ref 98–107)
CO2: 22 MMOL/L (ref 22–29)
CREAT SERPL-MCNC: 0.7 MG/DL (ref 0.7–1.2)
GFR AFRICAN AMERICAN: >60
GFR NON-AFRICAN AMERICAN: >60 ML/MIN/1.73
GLUCOSE BLD-MCNC: 131 MG/DL (ref 74–99)
HCT VFR BLD CALC: 33.1 % (ref 37–54)
HEMOGLOBIN: 10.8 G/DL (ref 12.5–16.5)
MAGNESIUM: 1.8 MG/DL (ref 1.6–2.6)
MCH RBC QN AUTO: 31.3 PG (ref 26–35)
MCHC RBC AUTO-ENTMCNC: 32.6 % (ref 32–34.5)
MCV RBC AUTO: 95.9 FL (ref 80–99.9)
METER GLUCOSE: 112 MG/DL (ref 74–99)
METER GLUCOSE: 117 MG/DL (ref 74–99)
METER GLUCOSE: 121 MG/DL (ref 74–99)
METER GLUCOSE: 122 MG/DL (ref 74–99)
PDW BLD-RTO: 15.4 FL (ref 11.5–15)
PHOSPHORUS: 4.3 MG/DL (ref 2.5–4.5)
PLATELET # BLD: 315 E9/L (ref 130–450)
PMV BLD AUTO: 9.6 FL (ref 7–12)
POTASSIUM SERPL-SCNC: 4.2 MMOL/L (ref 3.5–5)
RBC # BLD: 3.45 E12/L (ref 3.8–5.8)
SODIUM BLD-SCNC: 140 MMOL/L (ref 132–146)
WBC # BLD: 10.2 E9/L (ref 4.5–11.5)

## 2021-11-11 PROCEDURE — 6370000000 HC RX 637 (ALT 250 FOR IP): Performed by: INTERNAL MEDICINE

## 2021-11-11 PROCEDURE — 99232 SBSQ HOSP IP/OBS MODERATE 35: CPT | Performed by: INTERNAL MEDICINE

## 2021-11-11 PROCEDURE — 6370000000 HC RX 637 (ALT 250 FOR IP)

## 2021-11-11 PROCEDURE — 6360000002 HC RX W HCPCS: Performed by: INTERNAL MEDICINE

## 2021-11-11 PROCEDURE — 80048 BASIC METABOLIC PNL TOTAL CA: CPT

## 2021-11-11 PROCEDURE — 2580000003 HC RX 258: Performed by: INTERNAL MEDICINE

## 2021-11-11 PROCEDURE — 84100 ASSAY OF PHOSPHORUS: CPT

## 2021-11-11 PROCEDURE — 85027 COMPLETE CBC AUTOMATED: CPT

## 2021-11-11 PROCEDURE — 36415 COLL VENOUS BLD VENIPUNCTURE: CPT

## 2021-11-11 PROCEDURE — 2060000000 HC ICU INTERMEDIATE R&B

## 2021-11-11 PROCEDURE — 97530 THERAPEUTIC ACTIVITIES: CPT

## 2021-11-11 PROCEDURE — 82962 GLUCOSE BLOOD TEST: CPT

## 2021-11-11 PROCEDURE — 94003 VENT MGMT INPAT SUBQ DAY: CPT

## 2021-11-11 PROCEDURE — 83735 ASSAY OF MAGNESIUM: CPT

## 2021-11-11 PROCEDURE — 94640 AIRWAY INHALATION TREATMENT: CPT

## 2021-11-11 PROCEDURE — 6370000000 HC RX 637 (ALT 250 FOR IP): Performed by: NURSE PRACTITIONER

## 2021-11-11 PROCEDURE — 2700000000 HC OXYGEN THERAPY PER DAY

## 2021-11-11 RX ADMIN — IPRATROPIUM BROMIDE AND ALBUTEROL SULFATE 1 AMPULE: .5; 2.5 SOLUTION RESPIRATORY (INHALATION) at 05:36

## 2021-11-11 RX ADMIN — FAMOTIDINE 40 MG: 20 TABLET, FILM COATED ORAL at 21:49

## 2021-11-11 RX ADMIN — SULFAMETHOXAZOLE AND TRIMETHOPRIM 20 ML: 200; 40 SUSPENSION ORAL at 22:35

## 2021-11-11 RX ADMIN — RIVAROXABAN 20 MG: 20 TABLET, FILM COATED ORAL at 18:20

## 2021-11-11 RX ADMIN — SODIUM CHLORIDE, PRESERVATIVE FREE 300 UNITS: 5 INJECTION INTRAVENOUS at 10:53

## 2021-11-11 RX ADMIN — SODIUM CHLORIDE, PRESERVATIVE FREE 300 UNITS: 5 INJECTION INTRAVENOUS at 11:00

## 2021-11-11 RX ADMIN — DILTIAZEM HYDROCHLORIDE 90 MG: 30 TABLET, FILM COATED ORAL at 18:20

## 2021-11-11 RX ADMIN — ACETAMINOPHEN 650 MG: 160 SUSPENSION ORAL at 18:20

## 2021-11-11 RX ADMIN — DILTIAZEM HYDROCHLORIDE 90 MG: 30 TABLET, FILM COATED ORAL at 12:55

## 2021-11-11 RX ADMIN — DILTIAZEM HYDROCHLORIDE 90 MG: 30 TABLET, FILM COATED ORAL at 00:15

## 2021-11-11 RX ADMIN — ACETAMINOPHEN 650 MG: 160 SUSPENSION ORAL at 00:15

## 2021-11-11 RX ADMIN — METOPROLOL TARTRATE 25 MG: 25 TABLET, FILM COATED ORAL at 10:53

## 2021-11-11 RX ADMIN — IPRATROPIUM BROMIDE AND ALBUTEROL SULFATE 1 AMPULE: .5; 2.5 SOLUTION RESPIRATORY (INHALATION) at 02:31

## 2021-11-11 RX ADMIN — IPRATROPIUM BROMIDE AND ALBUTEROL SULFATE 1 AMPULE: .5; 2.5 SOLUTION RESPIRATORY (INHALATION) at 22:53

## 2021-11-11 RX ADMIN — SODIUM CHLORIDE, PRESERVATIVE FREE 300 UNITS: 5 INJECTION INTRAVENOUS at 21:50

## 2021-11-11 RX ADMIN — LEVETIRACETAM 1000 MG: 500 SOLUTION ORAL at 10:53

## 2021-11-11 RX ADMIN — Medication 10 ML: at 10:54

## 2021-11-11 RX ADMIN — Medication 10 ML: at 22:06

## 2021-11-11 RX ADMIN — SODIUM CHLORIDE, PRESERVATIVE FREE 300 UNITS: 5 INJECTION INTRAVENOUS at 21:51

## 2021-11-11 RX ADMIN — IPRATROPIUM BROMIDE AND ALBUTEROL SULFATE 1 AMPULE: .5; 2.5 SOLUTION RESPIRATORY (INHALATION) at 18:46

## 2021-11-11 RX ADMIN — FAMOTIDINE 40 MG: 20 TABLET, FILM COATED ORAL at 10:53

## 2021-11-11 RX ADMIN — SODIUM CHLORIDE, PRESERVATIVE FREE 300 UNITS: 5 INJECTION INTRAVENOUS at 00:16

## 2021-11-11 RX ADMIN — THIAMINE HCL TAB 100 MG 100 MG: 100 TAB at 10:53

## 2021-11-11 RX ADMIN — IPRATROPIUM BROMIDE AND ALBUTEROL SULFATE 1 AMPULE: .5; 2.5 SOLUTION RESPIRATORY (INHALATION) at 13:28

## 2021-11-11 RX ADMIN — METOPROLOL TARTRATE 25 MG: 25 TABLET, FILM COATED ORAL at 21:49

## 2021-11-11 RX ADMIN — SULFAMETHOXAZOLE AND TRIMETHOPRIM 20 ML: 200; 40 SUSPENSION ORAL at 10:53

## 2021-11-11 RX ADMIN — DILTIAZEM HYDROCHLORIDE 90 MG: 30 TABLET, FILM COATED ORAL at 05:50

## 2021-11-11 RX ADMIN — LEVETIRACETAM 1000 MG: 500 SOLUTION ORAL at 21:49

## 2021-11-11 RX ADMIN — ACETAMINOPHEN 650 MG: 160 SUSPENSION ORAL at 05:50

## 2021-11-11 RX ADMIN — SERTRALINE HYDROCHLORIDE 100 MG: 50 TABLET ORAL at 10:54

## 2021-11-11 RX ADMIN — WATER 2000 MG: 1 INJECTION INTRAMUSCULAR; INTRAVENOUS; SUBCUTANEOUS at 12:55

## 2021-11-11 RX ADMIN — IPRATROPIUM BROMIDE AND ALBUTEROL SULFATE 1 AMPULE: .5; 2.5 SOLUTION RESPIRATORY (INHALATION) at 10:22

## 2021-11-11 RX ADMIN — FOLIC ACID 1 MG: 1 TABLET ORAL at 10:54

## 2021-11-11 ASSESSMENT — PAIN SCALES - GENERAL
PAINLEVEL_OUTOF10: 0

## 2021-11-11 ASSESSMENT — PULMONARY FUNCTION TESTS
PIF_VALUE: 25
PIF_VALUE: 22
PIF_VALUE: 20

## 2021-11-11 NOTE — PROGRESS NOTES
-Department of Internal Medicine  General Internal Medicine  Attending Progress Note  Chief Complaint   Patient presents with    Delirium Tremens (DTS)     Pt states he usually drinks a fifth of kilo beam a day states he quit saturday. states he has been having visual hallucinations since yesterday. SUBJECTIVE:    Trached. tracking well  Following some commands and tries to respond verbally but not intelligibly  Seen at bedside sitting up at side of bed with therapy.   Daughter at bedside cannot think of any further questions for now either    OBJECTIVE      Medications    Current Facility-Administered Medications: sulfamethoxazole-trimethoprim (BACTRIM;SEPTRA) 200-40 MG/5ML suspension 20 mL, 160 mg, Per G Tube, Q12H  acetaminophen (TYLENOL) suspension 650 mg, 650 mg, Per NG tube, Q6H  dilTIAZem (CARDIZEM) tablet 90 mg, 90 mg, Per NG tube, 4 times per day  famotidine (PEPCID) tablet 40 mg, 40 mg, Per NG tube, BID  folic acid (FOLVITE) tablet 1 mg, 1 mg, Per NG tube, Daily  QUEtiapine (SEROQUEL) tablet 25 mg, 25 mg, Per NG tube, Nightly PRN  rivaroxaban (XARELTO) tablet 20 mg, 20 mg, Per NG tube, Daily  sertraline (ZOLOFT) tablet 100 mg, 100 mg, Per NG tube, Daily  levETIRAcetam (KEPPRA) 100 MG/ML solution 1,000 mg, 1,000 mg, Oral, BID  fentaNYL (SUBLIMAZE) injection 25 mcg, 25 mcg, IntraVENous, Q1H PRN  metoprolol tartrate (LOPRESSOR) tablet 25 mg, 25 mg, Per G Tube, BID  fentaNYL (DURAGESIC) 12 MCG/HR 1 patch, 1 patch, TransDERmal, Q72H  cefTRIAXone (ROCEPHIN) 2,000 mg in sterile water 20 mL IV syringe, 2,000 mg, IntraVENous, Q24H  insulin lispro (HUMALOG) injection vial 0-12 Units, 0-12 Units, SubCUTAneous, TID WC  insulin lispro (HUMALOG) injection vial 0-6 Units, 0-6 Units, SubCUTAneous, Nightly  glucose (GLUTOSE) 40 % oral gel 15 g, 15 g, Oral, PRN  dextrose 50 % IV solution, 12.5 g, IntraVENous, PRN  glucagon (rDNA) injection 1 mg, 1 mg, IntraMUSCular, PRN  dextrose 5 % solution, 100 mL/hr, IntraVENous, PRN  chlorhexidine (HIBICLENS) 4 % liquid, , Topical, Daily  heparin flush 100 UNIT/ML injection 300 Units, 3 mL, IntraVENous, 2 times per day  heparin flush 100 UNIT/ML injection 300 Units, 3 mL, IntraCATHeter, PRN  thiamine mononitrate tablet 100 mg, 100 mg, Oral, Daily  sodium chloride flush 0.9 % injection 5-40 mL, 5-40 mL, IntraVENous, 2 times per day  sodium chloride flush 0.9 % injection 5-40 mL, 5-40 mL, IntraVENous, PRN  0.9 % sodium chloride infusion, 25 mL, IntraVENous, PRN  heparin flush 100 UNIT/ML injection 300 Units, 3 mL, IntraVENous, 2 times per day  heparin flush 100 UNIT/ML injection 300 Units, 3 mL, IntraCATHeter, PRN  perflutren lipid microspheres (DEFINITY) injection 1.65 mg, 1.5 mL, IntraVENous, ONCE PRN  ondansetron (ZOFRAN-ODT) disintegrating tablet 4 mg, 4 mg, Oral, Q8H PRN **OR** ondansetron (ZOFRAN) injection 4 mg, 4 mg, IntraVENous, Q6H PRN  polyethylene glycol (GLYCOLAX) packet 17 g, 17 g, Oral, Daily PRN  ipratropium-albuterol (DUONEB) nebulizer solution 1 ampule, 1 ampule, Inhalation, Q4H  Physical    VITALS:  BP (!) 146/88   Pulse 92   Temp 98.5 °F (36.9 °C) (Oral)   Resp 26   Ht 5' 11\" (1.803 m)   Wt 259 lb 14.4 oz (117.9 kg)   SpO2 98%   BMI 36.25 kg/m²   CONSTITUTIONAL:  lethargic  EYES:  pupils equal, round and reactive to light and extra-ocular muscles intact  ENT:  atraumatic  NECK:  trach  LUNGS:  wheeze right anterior and left anterior  CARDIOVASCULAR:  normal apical pulses and normal S1 and S2  ABDOMEN:  normal bowel sounds  MUSCULOSKELETAL:  No edema  NEUROLOGIC:  Unable to determine  SKIN:  no bruising or bleeding  Tracking me with eyes  Right hand tremor not detected today    Data    CBC:   Lab Results   Component Value Date    WBC 10.2 11/11/2021    RBC 3.45 11/11/2021    HGB 10.8 11/11/2021    HCT 33.1 11/11/2021    MCV 95.9 11/11/2021    MCH 31.3 11/11/2021    MCHC 32.6 11/11/2021    RDW 15.4 11/11/2021     11/11/2021    MPV 9.6 11/11/2021     CMP:    Lab Results   Component Value Date     11/11/2021    K 4.2 11/11/2021    K 4.3 10/12/2021     11/11/2021    CO2 22 11/11/2021    BUN 36 11/11/2021    CREATININE 0.7 11/11/2021    GFRAA >60 11/11/2021    LABGLOM >60 11/11/2021    GLUCOSE 131 11/11/2021    PROT 6.8 11/06/2021    LABALBU 3.1 11/06/2021    CALCIUM 9.1 11/11/2021    BILITOT 0.8 11/06/2021    ALKPHOS 195 11/06/2021    AST 99 11/06/2021     11/06/2021       ASSESSMENT AND PLAN    Brief summary of stay:  73 y. o. male with a history of alcoholism depression hypertension coronary artery disease GERD COPD morbid obesity presents with signs of alcohol withdrawal. Mireille Nair went into respiratory distress and was intubated. Intubation was prolonged and difficulty with weaning him off vent. He was trached. He was treated for aspiration PNA, Atrial fibrillation, GELY and dysphagia/peg tube. His sputum is currently positive for MSSA and on ceftriaxone for MSSA PNA.     1. Acute on chronic hypoxic hypercapnic respiratory failure failing to wean per pulmonary management. did PEG and trach. Intensivist managing vent. 2.  Atrial fibrillation RVR. Onn BB and ccb po. on xarelto   3. Alcohol withdrawal: off sedation. More awake. Giving thiamine and folic acid. EEG done. MRI negative per neurology  4. Infection treatment for UTI & Aspiration pneumonia s/p ceftriaxone covered Klebsiella in the urine. Now mssa pna still on rocehpin. On bactrim for uti    5. Morbid obesity but also with Malnutrition: Currently on tube feeding. 6.  Ct shows: 2.8 x 2.8 cm abdominal aortic aneurysm. 7.  Dysphagia/Malnutrition: on tube feeding  8. Transaminitis: monitor  9. Full code. 10.  DVT prophylaxis with Xarelto   11. Disposition Patient was denied LTAC. They stated that he is not qualified for LTAC placement. Cm sending in my signed letter for an appeal.  11/10: looks like he will go to [facility] soon. Spoke in person to dtr from [place].   She will have to plan to keep or sell his house in 2-4 weeks from now.   I advised to reassess in 2-3 weeks from now to be aviva sharp

## 2021-11-11 NOTE — PROGRESS NOTES
Physical Therapy Treatment Note/Plan of Care    Room #:  6578/8323-62  Patient Name: Cora Sierra  YOB: 1955  MRN: [id number]    Date of Service: 11/11/2021     Tentative placement recommendation: Long Term Acute Care  Equipment recommendation: To be determined      Evaluating Physical Therapist: Asmita Townsend #[id number]    Re evaluating : MILENA Johnson PTA    Specific Provider Orders/Date/Referring Provider :  10/24/21 1115   PT eval and treat Start: 10/24/21 1115, End: 10/24/21 1115, ONE TIME, Standing Count: 1 Occurrences, Brenden Mckeon MD      Admitting Diagnosis:   Delirium tremens ([address] [F10.231]  Seizures ([address] [R56.9]  Acute respiratory failure, unspecified whether with hypoxia or hypercapnia ([address] [J96.00]  Atrial fibrillation, unspecified type ([address] [I48.91]     hallucinations and tremors      Surgery:   10/29  trach  Visit Diagnoses       Codes    Acute respiratory failure, unspecified whether with hypoxia or hypercapnia ([address]     J96.00    Atrial fibrillation, unspecified type ([address]     I48.91    Encounter for nasogastric (NG) tube placement     Z46.59          Patient Active Problem List   Diagnosis    Essential hypertension    Coronary artery disease involving coronary bypass graft of native heart without angina pectoris    Atypical chest pain    Pure hypercholesterolemia    Pericardial effusion    Chronic obstructive pulmonary disease (HCC)    Moderate obesity    Dislocation of finger    Closed dislocation of left middle finger    Seizures ([address]    Withdrawal symptoms, alcohol, with delirium ([address]    Altered mental status        ASSESSMENT of Current Deficits Patient exhibits decreased strength, endurance and range of motion impairing functional mobility, tolerance to activity and participation.   Patients daughter present and stated he was independent with everything before this admission to the hospital. Pt needing maximal assist for trunk flexion, bilateral lower extremities for bed mobility and sitting balance due to strong right lateral lean. Patient requires continued skilled physical therapy to address concerns listed above for increased safety and function at discharge. More alert with eye contact noted. PHYSICAL THERAPY  PLAN OF CARE       Physical therapy plan of care is established based on physician order,  patient diagnosis and clinical assessment    Current Treatment Recommendations:    -Bed Mobility: Lower extremity exercises , Upper extremity exercises  and Trunk control activities   -Sitting Balance: Hands on support to maintain midline  and Facilitate active trunk muscle engagement   -Transfers: Provide instruction on proper hand and foot position for adequate transfer of weight onto lower extremities and use of gait device if needed, Cues for hand placement, technique and safety. Provide stabilization to prevent fall , Facilitate weight shift forward on to lower extremities and provide necessary stabilization of bilateral lower extremities  and Assist with extension of knees trunk and hip to accept weight transfer   -Endurance: Utilize Supervised activities to increase level of endurance to allow for safe functional mobility including transfers and gait  and Use graduated activities to promote good breathing techniques and provide support and education to maximize respiratory function    PT long term treatment goals are located in below grid    Patient and or family understand(s) diagnosis, prognosis, and plan of care. Frequency of treatments: Patient will be seen  3-5 times/week.          Prior Level of Function: unknown patient unable to answer  Rehab Potential: fair         Past medical history:   Past Medical History:   Diagnosis Date    Alcohol abuse     Atrial fibrillation ([facility]     CAD (coronary artery disease)     COPD (chronic obstructive pulmonary disease) ([facility] [address]     Depression     Fracture of unspecified phalanx of left middle finger, initial encounter for closed fracture     GERD (gastroesophageal reflux disease)     Hypertension     Seizure [facility])      Past Surgical History:   Procedure Laterality Date    COLONOSCOPY      CORONARY ARTERY BYPASS GRAFT  3/2/05    x3: LIMA to LAD, SVG to RCA, SVG to diagonal    DIAGNOSTIC CARDIAC CATH LAB PROCEDURE  3/02/05    [facility]: Severe multivessel CAD in patient who presents with STEMI and total occlusion of diagonal branch. Significant disease of proximal LAD and severe disease of dominant RCA.  FINGER SURGERY Left 5/3/2019    CLOSED POSSIBLE LEFT MIDDLE FINGER OPEN REDUCTION INTERNAL FIXATION POSSIBLE PROXIMAL INTERPHALANGEAL JOINT ARTHROPLASTY   ++[facility] performed by Robina Mane MD at [place] [address] N/A 10/20/2021    EGD WITH NG TUBE PLACEMENT **BEDSIDE** performed by Mildred Calixto MD at [facility]      double    TRACHEOSTOMY N/A 10/29/2021    TRACHEOTOMY AND PEG performed by Arslan Verde MD at [address] [facility] N/A 10/29/2021    EGD ESOPHAGOGASTRODUODENOSCOPY PEG TUBE INSERTION performed by Arslan Verde MD at [facility]:    Precautions:   ,  , seizure,  alcohol abuse     Social history: Patient unable   Per chart, his  is his daughter who lives in [place].       Equipment owned: unknown,       AM-PAC Basic Mobility        AM-[facility] Mobility Inpatient   How much difficulty turning over in bed?: A Lot  How much difficulty sitting down on / standing up from a chair with arms?: Unable  How much difficulty moving from lying on back to sitting on side of bed?: A Lot  How much help from another person moving to and from a bed to a chair?: Total  How much help from another person needed to walk in hospital room?: Total  How much help from another person for climbing 3-5 steps with a railing?: Total  AM-PAC Inpatient Mobility Raw Score : 8  AM-PAC Inpatient T-Scale Score : 28.52  Mobility Inpatient CMS 0-100% Score: 86.62  Mobility Inpatient CMS G-Code Modifier : CM    Nursing cleared patient for PT treatment. OBJECTIVE;   Initial Evaluation  Date: 10/26/2021 Treatment Date:  11/11/2021      Short Term/ Long Term   Goals   Was pt agreeable to Eval/treatment? Yes yes To be met in 5 days   Pain level   Slight facial grimacing end range of motion stretch DF     facial grimacing at times throughout session    Bed Mobility    Rolling: Not assessed     Supine to sit: Not assessed     Sit to supine: Not assessed     Scooting: Not assessed    Rolling: Maximal assist of 1   Supine to sit: Maximal assist of 1   Sit to supine: Maximal assist of 1   Scooting: Maximal assist of 1    Rolling: Moderate assist of 1    Supine to sit: Moderate assist of 1    Sit to supine: Moderate assist of 1    Scooting: Moderate assist of 1     Transfers Sit to stand: Not assessed    Sit to stand: Not assessed       Sit to stand:  Moderate assist of 1     Ambulation    not assessed  not assessed     10 feet using  wheeled walker with Moderate assist of 1    Stair negotiation: ascended and descended   Not assessed          ROM Slight bilateral plantar flexor contractures   Increase range of motion 10% of affected joints    Strength BUE:  refer to OT eval  bilateral lower extremities no active movement  Increase strength in affected mm groups by 1/3 grade   Balance Sitting EOB:  not assessed    Dynamic Standing:  not assessed   Sitting EOB: poor strong right lateral lean   Dynamic Standing: not assessed    Sitting EOB:  fair    Dynamic Standing: fair -     Patient is Alert & Oriented x person and follows one step directions  Intermittently; difficult to elicit responseSensation:   Unable to assess   Edema:  yes bilateral upper extremities   Endurance: poor      Vitals: ventilator   Blood Pressure at rest  Blood Pressure during session    Heart Rate at rest  Heart Rate during session    SPO2 at rest %  SPO2 during session %     Patient education  Patient educated on role of Physical Therapy, risks of immobility, safety and plan of care,  importance of mobility while in hospital  and ankle pumps, quad set and glut set for edema control, blood clot prevention     Patient response to education:   Pt verbalized understanding Pt demonstrated skill Pt requires further education in this area   No No Yes      Treatment:  Patient practiced and was instructed/facilitated in the following treatment: Patient assisted to edge of bed,  Patient   Sat edge of bed 12 minutes with Maximal assist of 1 to increase dynamic sitting balance and activity tolerance. Working on trying to maintain mid-line position and trunk control due to strong right lateral lean. Pt returned to supine. Assisted nursing with positioning the patient for skin and joint integrity as well as comfort. Therapeutic Exercises:  not performed      At end of session, patient in bed with alarm call light and phone within reach,  all lines and tubes intact, nursing notified. Patient would benefit from continued skilled Physical Therapy to improve functional independence and quality of life. Patient's/ family goals   none stated    Time in  3:02  Time out  3:25    Total Treatment Time  23 minutes       CPT codes:  Therapeutic activities (71385)   23 minutes  2 unit(s)   Marco Vidal  [facility]  LIC # [id number]

## 2021-11-12 LAB
ANION GAP SERPL CALCULATED.3IONS-SCNC: 12 MMOL/L (ref 7–16)
BUN BLDV-MCNC: 33 MG/DL (ref 6–23)
CALCIUM SERPL-MCNC: 9.1 MG/DL (ref 8.6–10.2)
CHLORIDE BLD-SCNC: 104 MMOL/L (ref 98–107)
CO2: 22 MMOL/L (ref 22–29)
CREAT SERPL-MCNC: 0.7 MG/DL (ref 0.7–1.2)
GFR AFRICAN AMERICAN: >60
GFR NON-AFRICAN AMERICAN: >60 ML/MIN/1.73
GLUCOSE BLD-MCNC: 117 MG/DL (ref 74–99)
HCT VFR BLD CALC: 33.4 % (ref 37–54)
HEMOGLOBIN: 10.8 G/DL (ref 12.5–16.5)
MAGNESIUM: 1.7 MG/DL (ref 1.6–2.6)
MCH RBC QN AUTO: 31.5 PG (ref 26–35)
MCHC RBC AUTO-ENTMCNC: 32.3 % (ref 32–34.5)
MCV RBC AUTO: 97.4 FL (ref 80–99.9)
METER GLUCOSE: 110 MG/DL (ref 74–99)
METER GLUCOSE: 139 MG/DL (ref 74–99)
PDW BLD-RTO: 15.7 FL (ref 11.5–15)
PHOSPHORUS: 4.4 MG/DL (ref 2.5–4.5)
PLATELET # BLD: 309 E9/L (ref 130–450)
PMV BLD AUTO: 9.5 FL (ref 7–12)
POTASSIUM SERPL-SCNC: 4.5 MMOL/L (ref 3.5–5)
RBC # BLD: 3.43 E12/L (ref 3.8–5.8)
SARS-COV-2, NAAT: NOT DETECTED
SODIUM BLD-SCNC: 138 MMOL/L (ref 132–146)
TRIGL SERPL-MCNC: 125 MG/DL (ref 0–149)
WBC # BLD: 10.8 E9/L (ref 4.5–11.5)

## 2021-11-12 PROCEDURE — 87635 SARS-COV-2 COVID-19 AMP PRB: CPT

## 2021-11-12 PROCEDURE — 99232 SBSQ HOSP IP/OBS MODERATE 35: CPT | Performed by: INTERNAL MEDICINE

## 2021-11-12 PROCEDURE — 2580000003 HC RX 258: Performed by: INTERNAL MEDICINE

## 2021-11-12 PROCEDURE — 6360000002 HC RX W HCPCS: Performed by: INTERNAL MEDICINE

## 2021-11-12 PROCEDURE — 6370000000 HC RX 637 (ALT 250 FOR IP): Performed by: INTERNAL MEDICINE

## 2021-11-12 PROCEDURE — 83735 ASSAY OF MAGNESIUM: CPT

## 2021-11-12 PROCEDURE — 84478 ASSAY OF TRIGLYCERIDES: CPT

## 2021-11-12 PROCEDURE — 6370000000 HC RX 637 (ALT 250 FOR IP)

## 2021-11-12 PROCEDURE — 80048 BASIC METABOLIC PNL TOTAL CA: CPT

## 2021-11-12 PROCEDURE — 6370000000 HC RX 637 (ALT 250 FOR IP): Performed by: NURSE PRACTITIONER

## 2021-11-12 PROCEDURE — 94640 AIRWAY INHALATION TREATMENT: CPT

## 2021-11-12 PROCEDURE — 85027 COMPLETE CBC AUTOMATED: CPT

## 2021-11-12 PROCEDURE — 2700000000 HC OXYGEN THERAPY PER DAY

## 2021-11-12 PROCEDURE — 82962 GLUCOSE BLOOD TEST: CPT

## 2021-11-12 PROCEDURE — 36415 COLL VENOUS BLD VENIPUNCTURE: CPT

## 2021-11-12 PROCEDURE — 94003 VENT MGMT INPAT SUBQ DAY: CPT

## 2021-11-12 PROCEDURE — 84100 ASSAY OF PHOSPHORUS: CPT

## 2021-11-12 PROCEDURE — 2060000000 HC ICU INTERMEDIATE R&B

## 2021-11-12 RX ADMIN — SULFAMETHOXAZOLE AND TRIMETHOPRIM 20 ML: 200; 40 SUSPENSION ORAL at 10:49

## 2021-11-12 RX ADMIN — IPRATROPIUM BROMIDE AND ALBUTEROL SULFATE 1 AMPULE: .5; 2.5 SOLUTION RESPIRATORY (INHALATION) at 11:38

## 2021-11-12 RX ADMIN — Medication 10 ML: at 20:21

## 2021-11-12 RX ADMIN — THIAMINE HCL TAB 100 MG 100 MG: 100 TAB at 10:50

## 2021-11-12 RX ADMIN — Medication 10 ML: at 10:57

## 2021-11-12 RX ADMIN — DILTIAZEM HYDROCHLORIDE 90 MG: 30 TABLET, FILM COATED ORAL at 02:00

## 2021-11-12 RX ADMIN — LEVETIRACETAM 1000 MG: 500 SOLUTION ORAL at 20:20

## 2021-11-12 RX ADMIN — IPRATROPIUM BROMIDE AND ALBUTEROL SULFATE 1 AMPULE: .5; 2.5 SOLUTION RESPIRATORY (INHALATION) at 19:22

## 2021-11-12 RX ADMIN — ACETAMINOPHEN 650 MG: 160 SUSPENSION ORAL at 02:00

## 2021-11-12 RX ADMIN — SERTRALINE HYDROCHLORIDE 100 MG: 50 TABLET ORAL at 10:49

## 2021-11-12 RX ADMIN — DILTIAZEM HYDROCHLORIDE 90 MG: 30 TABLET, FILM COATED ORAL at 05:46

## 2021-11-12 RX ADMIN — IPRATROPIUM BROMIDE AND ALBUTEROL SULFATE 1 AMPULE: .5; 2.5 SOLUTION RESPIRATORY (INHALATION) at 15:19

## 2021-11-12 RX ADMIN — FAMOTIDINE 40 MG: 20 TABLET, FILM COATED ORAL at 10:49

## 2021-11-12 RX ADMIN — DILTIAZEM HYDROCHLORIDE 90 MG: 30 TABLET, FILM COATED ORAL at 13:41

## 2021-11-12 RX ADMIN — IPRATROPIUM BROMIDE AND ALBUTEROL SULFATE 1 AMPULE: .5; 2.5 SOLUTION RESPIRATORY (INHALATION) at 02:15

## 2021-11-12 RX ADMIN — IPRATROPIUM BROMIDE AND ALBUTEROL SULFATE 1 AMPULE: .5; 2.5 SOLUTION RESPIRATORY (INHALATION) at 08:24

## 2021-11-12 RX ADMIN — WATER 2000 MG: 1 INJECTION INTRAMUSCULAR; INTRAVENOUS; SUBCUTANEOUS at 13:41

## 2021-11-12 RX ADMIN — LEVETIRACETAM 1000 MG: 500 SOLUTION ORAL at 10:49

## 2021-11-12 RX ADMIN — FAMOTIDINE 40 MG: 20 TABLET, FILM COATED ORAL at 20:20

## 2021-11-12 RX ADMIN — DILTIAZEM HYDROCHLORIDE 90 MG: 30 TABLET, FILM COATED ORAL at 17:57

## 2021-11-12 RX ADMIN — METOPROLOL TARTRATE 25 MG: 25 TABLET, FILM COATED ORAL at 10:50

## 2021-11-12 RX ADMIN — METOPROLOL TARTRATE 25 MG: 25 TABLET, FILM COATED ORAL at 20:21

## 2021-11-12 RX ADMIN — FOLIC ACID 1 MG: 1 TABLET ORAL at 10:50

## 2021-11-12 RX ADMIN — SODIUM CHLORIDE, PRESERVATIVE FREE 300 UNITS: 5 INJECTION INTRAVENOUS at 10:50

## 2021-11-12 RX ADMIN — IPRATROPIUM BROMIDE AND ALBUTEROL SULFATE 1 AMPULE: .5; 2.5 SOLUTION RESPIRATORY (INHALATION) at 22:30

## 2021-11-12 RX ADMIN — RIVAROXABAN 20 MG: 20 TABLET, FILM COATED ORAL at 17:57

## 2021-11-12 RX ADMIN — SODIUM CHLORIDE, PRESERVATIVE FREE 300 UNITS: 5 INJECTION INTRAVENOUS at 20:21

## 2021-11-12 RX ADMIN — ACETAMINOPHEN 650 MG: 160 SUSPENSION ORAL at 05:45

## 2021-11-12 RX ADMIN — SODIUM CHLORIDE, PRESERVATIVE FREE 300 UNITS: 5 INJECTION INTRAVENOUS at 10:51

## 2021-11-12 ASSESSMENT — PULMONARY FUNCTION TESTS: PIF_VALUE: 22

## 2021-11-12 ASSESSMENT — PAIN SCALES - GENERAL
PAINLEVEL_OUTOF10: 0

## 2021-11-12 NOTE — CARE COORDINATION
SOCIAL WORK / DISCHARGE PLANNING:  RAPID COVID WILL BE NEEDED PRIOR TO DC. Kavitha Michele remains pending for [address] fax [phone]. Per Mckayla, rep hopeful it will come today. CURTIS will need signed by physician. Passar completed per request of facility, copy placed in soft chart. Addendum: [id number] PRECERT obtained for [address] fax [phone]. Transport arranged via [facility] 1030am 11/13/21 Sat. Lauren Lauren RN charge aware. Dr Louis Ponce aware. Sw notified dtr, Mika Jeffers via phone of dc date and time of transport.          Electronically signed by CLARY Cortez on 11/12/2021 at 10:50 AM

## 2021-11-12 NOTE — PROGRESS NOTES
-Department of Internal Medicine  General Internal Medicine  Attending Progress Note  Chief Complaint   Patient presents with    Delirium Tremens (DTS)     Pt states he usually drinks a fifth of kilo beam a day states he quit saturday. states he has been having visual hallucinations since yesterday. SUBJECTIVE:    Trached. tracking well  Following some commands and tries to respond verbally but not intelligibly, He tries to mouth his words. rn confirms that tremor too significant to write. hE IS NOT SUCCEEDING USING COMMUNICATION BOARDS. THEY WILL CONTINUE TO TRY.   dTR AT [facility], UPDATED    OBJECTIVE      Medications    Current Facility-Administered Medications: sulfamethoxazole-trimethoprim (BACTRIM;SEPTRA) 200-40 MG/5ML suspension 20 mL, 160 mg, Per G Tube, Q12H  acetaminophen (TYLENOL) suspension 650 mg, 650 mg, Per NG tube, Q6H  dilTIAZem (CARDIZEM) tablet 90 mg, 90 mg, Per NG tube, 4 times per day  famotidine (PEPCID) tablet 40 mg, 40 mg, Per NG tube, BID  folic acid (FOLVITE) tablet 1 mg, 1 mg, Per NG tube, Daily  QUEtiapine (SEROQUEL) tablet 25 mg, 25 mg, Per NG tube, Nightly PRN  rivaroxaban (XARELTO) tablet 20 mg, 20 mg, Per NG tube, Daily  sertraline (ZOLOFT) tablet 100 mg, 100 mg, Per NG tube, Daily  levETIRAcetam (KEPPRA) 100 MG/ML solution 1,000 mg, 1,000 mg, Oral, BID  fentaNYL (SUBLIMAZE) injection 25 mcg, 25 mcg, IntraVENous, Q1H PRN  metoprolol tartrate (LOPRESSOR) tablet 25 mg, 25 mg, Per G Tube, BID  fentaNYL (DURAGESIC) 12 MCG/HR 1 patch, 1 patch, TransDERmal, Q72H  cefTRIAXone (ROCEPHIN) 2,000 mg in sterile water 20 mL IV syringe, 2,000 mg, IntraVENous, Q24H  insulin lispro (HUMALOG) injection vial 0-12 Units, 0-12 Units, SubCUTAneous, TID WC  insulin lispro (HUMALOG) injection vial 0-6 Units, 0-6 Units, SubCUTAneous, Nightly  glucose (GLUTOSE) 40 % oral gel 15 g, 15 g, Oral, PRN  dextrose 50 % IV solution, 12.5 g, IntraVENous, PRN  glucagon (rDNA) injection 1 mg, 1 mg, IntraMUSCular, PRN  dextrose 5 % solution, 100 mL/hr, IntraVENous, PRN  chlorhexidine (HIBICLENS) 4 % liquid, , Topical, Daily  heparin flush 100 UNIT/ML injection 300 Units, 3 mL, IntraVENous, 2 times per day  heparin flush 100 UNIT/ML injection 300 Units, 3 mL, IntraCATHeter, PRN  thiamine mononitrate tablet 100 mg, 100 mg, Oral, Daily  sodium chloride flush 0.9 % injection 5-40 mL, 5-40 mL, IntraVENous, 2 times per day  sodium chloride flush 0.9 % injection 5-40 mL, 5-40 mL, IntraVENous, PRN  0.9 % sodium chloride infusion, 25 mL, IntraVENous, PRN  heparin flush 100 UNIT/ML injection 300 Units, 3 mL, IntraVENous, 2 times per day  heparin flush 100 UNIT/ML injection 300 Units, 3 mL, IntraCATHeter, PRN  perflutren lipid microspheres (DEFINITY) injection 1.65 mg, 1.5 mL, IntraVENous, ONCE PRN  ondansetron (ZOFRAN-ODT) disintegrating tablet 4 mg, 4 mg, Oral, Q8H PRN **OR** ondansetron (ZOFRAN) injection 4 mg, 4 mg, IntraVENous, Q6H PRN  polyethylene glycol (GLYCOLAX) packet 17 g, 17 g, Oral, Daily PRN  ipratropium-albuterol (DUONEB) nebulizer solution 1 ampule, 1 ampule, Inhalation, Q4H  Physical    VITALS:  /68   Pulse 96   Temp 97.9 °F (36.6 °C) (Infrared)   Resp 18   Ht 5' 11\" (1.803 m)   Wt 258 lb 14.4 oz (117.4 kg)   SpO2 96%   BMI 36.11 kg/m²   CONSTITUTIONAL:  lethargic  EYES:  pupils equal, round and reactive to light and extra-ocular muscles intact  ENT:  atraumatic  NECK:  trach  LUNGS:  wheeze right anterior and left anterior  CARDIOVASCULAR:  normal apical pulses and normal S1 and S2  ABDOMEN:  normal bowel sounds  MUSCULOSKELETAL:  No edema  NEUROLOGIC:  Unable to determine  SKIN:  no bruising or bleeding  Tracking me with eyes  Right hand tremor not detected today    Data    CBC:   Lab Results   Component Value Date    WBC 10.8 11/12/2021    RBC 3.43 11/12/2021    HGB 10.8 11/12/2021    HCT 33.4 11/12/2021    MCV 97.4 11/12/2021    MCH 31.5 11/12/2021    MCHC 32.3 11/12/2021    RDW 15.7 11/12/2021     11/12/2021    MPV 9.5 11/12/2021     CMP:    Lab Results   Component Value Date     11/12/2021    K 4.5 11/12/2021    K 4.3 10/12/2021     11/12/2021    CO2 22 11/12/2021    BUN 33 11/12/2021    CREATININE 0.7 11/12/2021    GFRAA >60 11/12/2021    LABGLOM >60 11/12/2021    GLUCOSE 117 11/12/2021    PROT 6.8 11/06/2021    LABALBU 3.1 11/06/2021    CALCIUM 9.1 11/12/2021    BILITOT 0.8 11/06/2021    ALKPHOS 195 11/06/2021    AST 99 11/06/2021     11/06/2021       ASSESSMENT AND PLAN    Brief summary of stay:  73 y. o. male with a history of alcoholism depression hypertension coronary artery disease GERD COPD morbid obesity presents with signs of alcohol withdrawal. Renzo Krishnan went into respiratory distress and was intubated. Intubation was prolonged and difficulty with weaning him off vent. He was trached. He was treated for aspiration PNA, Atrial fibrillation, GELY and dysphagia/peg tube. His sputum is currently positive for MSSA and on ceftriaxone for MSSA PNA.     1. Acute on chronic hypoxic hypercapnic respiratory failure failing to wean per pulmonary management. did PEG and trach. Intensivist managing vent. 2.  Atrial fibrillation RVR. Onn BB and ccb po. on xarelto   3. Alcohol withdrawal: off sedation. More awake. Giving thiamine and folic acid. EEG done. MRI negative per neurology  4. Infection treatment for UTI & Aspiration pneumonia s/p ceftriaxone covered Klebsiella in the urine. Now mssa pna still on rocehpin. On bactrim for uti    5. Morbid obesity but also with Malnutrition: Currently on tube feeding. 6.  Ct shows: 2.8 x 2.8 cm abdominal aortic aneurysm. 7.  Dysphagia/Malnutrition: on tube feeding  8. Transaminitis: monitor. stable  9. Full code. 10.  DVT prophylaxis with Xarelto   11.   Disposition 11/12: looks like he will go to [facility] soon

## 2021-11-13 VITALS
BODY MASS INDEX: 36.19 KG/M2 | SYSTOLIC BLOOD PRESSURE: 107 MMHG | HEIGHT: 71 IN | WEIGHT: 258.5 LBS | DIASTOLIC BLOOD PRESSURE: 74 MMHG | RESPIRATION RATE: 22 BRPM | TEMPERATURE: 97.9 F | HEART RATE: 104 BPM | OXYGEN SATURATION: 95 %

## 2021-11-13 LAB
ANION GAP SERPL CALCULATED.3IONS-SCNC: 13 MMOL/L (ref 7–16)
BUN BLDV-MCNC: 29 MG/DL (ref 6–23)
CALCIUM SERPL-MCNC: 9.4 MG/DL (ref 8.6–10.2)
CHLORIDE BLD-SCNC: 103 MMOL/L (ref 98–107)
CO2: 22 MMOL/L (ref 22–29)
CREAT SERPL-MCNC: 0.7 MG/DL (ref 0.7–1.2)
GFR AFRICAN AMERICAN: >60
GFR NON-AFRICAN AMERICAN: >60 ML/MIN/1.73
GLUCOSE BLD-MCNC: 120 MG/DL (ref 74–99)
HCT VFR BLD CALC: 34.6 % (ref 37–54)
HEMOGLOBIN: 11.3 G/DL (ref 12.5–16.5)
MAGNESIUM: 1.7 MG/DL (ref 1.6–2.6)
MCH RBC QN AUTO: 31.2 PG (ref 26–35)
MCHC RBC AUTO-ENTMCNC: 32.7 % (ref 32–34.5)
MCV RBC AUTO: 95.6 FL (ref 80–99.9)
METER GLUCOSE: 108 MG/DL (ref 74–99)
PDW BLD-RTO: 15.5 FL (ref 11.5–15)
PHOSPHORUS: 4.2 MG/DL (ref 2.5–4.5)
PLATELET # BLD: 306 E9/L (ref 130–450)
PMV BLD AUTO: 9.6 FL (ref 7–12)
POTASSIUM SERPL-SCNC: 4 MMOL/L (ref 3.5–5)
RBC # BLD: 3.62 E12/L (ref 3.8–5.8)
SODIUM BLD-SCNC: 138 MMOL/L (ref 132–146)
WBC # BLD: 10.9 E9/L (ref 4.5–11.5)

## 2021-11-13 PROCEDURE — 94003 VENT MGMT INPAT SUBQ DAY: CPT

## 2021-11-13 PROCEDURE — 6370000000 HC RX 637 (ALT 250 FOR IP): Performed by: INTERNAL MEDICINE

## 2021-11-13 PROCEDURE — 82962 GLUCOSE BLOOD TEST: CPT

## 2021-11-13 PROCEDURE — 6370000000 HC RX 637 (ALT 250 FOR IP): Performed by: NURSE PRACTITIONER

## 2021-11-13 PROCEDURE — 2580000003 HC RX 258: Performed by: INTERNAL MEDICINE

## 2021-11-13 PROCEDURE — 84100 ASSAY OF PHOSPHORUS: CPT

## 2021-11-13 PROCEDURE — 6360000002 HC RX W HCPCS: Performed by: INTERNAL MEDICINE

## 2021-11-13 PROCEDURE — 94640 AIRWAY INHALATION TREATMENT: CPT

## 2021-11-13 PROCEDURE — 99239 HOSP IP/OBS DSCHRG MGMT >30: CPT | Performed by: INTERNAL MEDICINE

## 2021-11-13 PROCEDURE — 36415 COLL VENOUS BLD VENIPUNCTURE: CPT

## 2021-11-13 PROCEDURE — 2700000000 HC OXYGEN THERAPY PER DAY

## 2021-11-13 PROCEDURE — 83735 ASSAY OF MAGNESIUM: CPT

## 2021-11-13 PROCEDURE — 85027 COMPLETE CBC AUTOMATED: CPT

## 2021-11-13 PROCEDURE — 6370000000 HC RX 637 (ALT 250 FOR IP)

## 2021-11-13 PROCEDURE — 80048 BASIC METABOLIC PNL TOTAL CA: CPT

## 2021-11-13 RX ORDER — QUETIAPINE FUMARATE 25 MG/1
25 TABLET, FILM COATED ORAL NIGHTLY PRN
Qty: 60 TABLET | Refills: 3 | Status: SHIPPED | OUTPATIENT
Start: 2021-11-13

## 2021-11-13 RX ORDER — SULFAMETHOXAZOLE AND TRIMETHOPRIM 200; 40 MG/5ML; MG/5ML
160 SUSPENSION ORAL EVERY 12 HOURS
Qty: 40 ML | Refills: 0
Start: 2021-11-13 | End: 2021-11-14

## 2021-11-13 RX ORDER — SERTRALINE HYDROCHLORIDE 100 MG/1
100 TABLET, FILM COATED ORAL DAILY
Qty: 30 TABLET | Refills: 3 | Status: SHIPPED | OUTPATIENT
Start: 2021-11-14

## 2021-11-13 RX ORDER — THIAMINE MONONITRATE (VIT B1) 100 MG
100 TABLET ORAL DAILY
Qty: 30 TABLET | Refills: 0
Start: 2021-11-14 | End: 2021-12-14

## 2021-11-13 RX ORDER — ACETAMINOPHEN 160 MG/5ML
SUSPENSION, ORAL (FINAL DOSE FORM) ORAL
Status: DISCONTINUED
Start: 2021-11-13 | End: 2021-11-13 | Stop reason: HOSPADM

## 2021-11-13 RX ORDER — DILTIAZEM HCL 90 MG
90 TABLET ORAL 3 TIMES DAILY
Qty: 120 TABLET | Refills: 0 | Status: SHIPPED | OUTPATIENT
Start: 2021-11-13

## 2021-11-13 RX ORDER — FAMOTIDINE 40 MG/1
40 TABLET, FILM COATED ORAL 2 TIMES DAILY
Qty: 60 TABLET | Refills: 3 | Status: SHIPPED | OUTPATIENT
Start: 2021-11-13

## 2021-11-13 RX ORDER — FOLIC ACID 1 MG/1
1 TABLET ORAL DAILY
Qty: 30 TABLET | Refills: 3 | Status: SHIPPED | OUTPATIENT
Start: 2021-11-14

## 2021-11-13 RX ORDER — IPRATROPIUM BROMIDE AND ALBUTEROL SULFATE 2.5; .5 MG/3ML; MG/3ML
3 SOLUTION RESPIRATORY (INHALATION) EVERY 4 HOURS
Qty: 360 ML | Refills: 0 | Status: SHIPPED | OUTPATIENT
Start: 2021-11-13

## 2021-11-13 RX ORDER — AMOXICILLIN AND CLAVULANATE POTASSIUM 500; 125 MG/1; MG/1
1 TABLET, FILM COATED ORAL 3 TIMES DAILY
Qty: 3 TABLET | Refills: 0
Start: 2021-11-13 | End: 2021-11-14

## 2021-11-13 RX ORDER — LEVETIRACETAM 100 MG/ML
1000 SOLUTION ORAL 2 TIMES DAILY
Qty: 30 EACH | Refills: 0
Start: 2021-11-13 | End: 2021-12-13

## 2021-11-13 RX ADMIN — METOPROLOL TARTRATE 25 MG: 25 TABLET, FILM COATED ORAL at 08:49

## 2021-11-13 RX ADMIN — THIAMINE HCL TAB 100 MG 100 MG: 100 TAB at 08:49

## 2021-11-13 RX ADMIN — FAMOTIDINE 40 MG: 20 TABLET, FILM COATED ORAL at 08:50

## 2021-11-13 RX ADMIN — DILTIAZEM HYDROCHLORIDE 90 MG: 30 TABLET, FILM COATED ORAL at 05:23

## 2021-11-13 RX ADMIN — SERTRALINE HYDROCHLORIDE 100 MG: 50 TABLET ORAL at 08:49

## 2021-11-13 RX ADMIN — IPRATROPIUM BROMIDE AND ALBUTEROL SULFATE 1 AMPULE: .5; 2.5 SOLUTION RESPIRATORY (INHALATION) at 05:46

## 2021-11-13 RX ADMIN — FOLIC ACID 1 MG: 1 TABLET ORAL at 08:50

## 2021-11-13 RX ADMIN — LEVETIRACETAM 1000 MG: 500 SOLUTION ORAL at 10:48

## 2021-11-13 RX ADMIN — ACETAMINOPHEN 650 MG: 160 SUSPENSION ORAL at 00:09

## 2021-11-13 RX ADMIN — Medication 10 ML: at 08:51

## 2021-11-13 RX ADMIN — SULFAMETHOXAZOLE AND TRIMETHOPRIM 20 ML: 200; 40 SUSPENSION ORAL at 10:48

## 2021-11-13 RX ADMIN — IPRATROPIUM BROMIDE AND ALBUTEROL SULFATE 1 AMPULE: .5; 2.5 SOLUTION RESPIRATORY (INHALATION) at 10:40

## 2021-11-13 RX ADMIN — SULFAMETHOXAZOLE AND TRIMETHOPRIM 20 ML: 200; 40 SUSPENSION ORAL at 00:08

## 2021-11-13 RX ADMIN — SODIUM CHLORIDE, PRESERVATIVE FREE 300 UNITS: 5 INJECTION INTRAVENOUS at 08:52

## 2021-11-13 RX ADMIN — DILTIAZEM HYDROCHLORIDE 90 MG: 30 TABLET, FILM COATED ORAL at 00:09

## 2021-11-13 RX ADMIN — IPRATROPIUM BROMIDE AND ALBUTEROL SULFATE 1 AMPULE: .5; 2.5 SOLUTION RESPIRATORY (INHALATION) at 02:09

## 2021-11-13 RX ADMIN — SODIUM CHLORIDE, PRESERVATIVE FREE 300 UNITS: 5 INJECTION INTRAVENOUS at 08:50

## 2021-11-13 ASSESSMENT — PAIN SCALES - GENERAL: PAINLEVEL_OUTOF10: 3

## 2021-11-13 NOTE — DISCHARGE SUMMARY
[facility] Physician Discharge Summary       [facility]  [address]  [phone]          Activity level: Slowly increase as tolerated    Diet: Diet NPO Exceptions are: Sips of Water with Meds  ADULT TUBE FEEDING; PEG; Immune Enhancing; Continuous; 20; Yes; 10; Q 8 hours; 45; 200; Q 6 hours; Protein; 1 protein modular/d flush w/ 30ml water before and after administration    Labs: None are pending at the discharge    Condition at discharge: Stable    Dispo: to [place] [facility]    Patient ID:  Sue Archer  [id number]  73 y.o.  1955    Admit date: 10/12/2021    Discharge date and time:  11/13/2021  9:23 AM    Admission Diagnoses: Principal Problem:    Altered mental status  Active Problems:    Seizures ([address]    Withdrawal symptoms, alcohol, with delirium ([address]  Resolved Problems:    * No resolved hospital problems. *      Discharge Diagnoses: Principal Problem:    Altered mental status  Active Problems:    Seizures ([address]    Withdrawal symptoms, alcohol, with delirium ([address]  Resolved Problems:    * No resolved hospital problems. *      Consults:  IP CONSULT TO SOCIAL WORK  IP CONSULT TO PULMONOLOGY  IP CONSULT TO CARDIOLOGY  IP CONSULT TO CRITICAL CARE  IP CONSULT TO DIETITIAN  PHARMACY TO DOSE VANCOMYCIN  IP CONSULT TO GI  IP CONSULT TO DIETITIAN  IP CONSULT TO GENERAL SURGERY  IP CONSULT TO SOCIAL WORK  IP CONSULT TO NEUROLOGY  IP CONSULT TO PALLIATIVE CARE    Procedures: None significant except if described in hospital course. Hospital Course: 73 y. o. male with a history of alcoholism depression hypertension coronary artery disease GERD COPD morbid obesity presents with signs of alcohol withdrawal. Robina Gaffney went into respiratory distress in ED and was intubated. Intubation was prolonged. He had difficulty with weaning off vent. He was trached. He was treated for aspiration PNA, Atrial fibrillation, GELY and dysphagia/peg tube.  His sputum is currently positive for MSSA and on ceftriaxone for MSSA PNA.     1.  Acute on chronic hypoxic hypercapnic respiratory failure failing to wean per pulmonary management. did PEG and trach. Intensivist managed vent. He has had Trach Collar trials     2.  Atrial fibrillation RVR. Onn BB and ccb po. & xarelto   3.  Alcohol withdrawal: off sedation. More awake.  Giving thiamine and folic acid. EEG done. MRI negative per neurology  4.  Infection treatment for UTI & Aspiration pneumonia s/p ceftriaxone covered Klebsiella in the urine. Now mssa pna still on rocehpin. On bactrim for uti. I will change ceftriaxone to Augmentin and continue bactrim. Bother were schedule to be last dosed tomorrow. Prior to these patient has been on various other abx throughout the stay including other cephalosporin and vanco     5.  Morbid obesity with Obesity hypoventilation disorder but also with Malnutrition: Currently on tube feeding. 6.  Ct shows: 2.8 x 2.8 cm abdominal aortic aneurysm. 7.  Dysphagia/Malnutrition: on tube feeding  8. Transaminitis: monitor. stable  9. Seizure disorder continue Keppra  9. Thrombocytopenia: due to ETOHism: stable  10 Metabolic encephalpathy: somewhat improved. Still not able to communicate. Etiology includes #1,2,4      Discharge Exam:  Vitals:    11/13/21 0015 11/13/21 0109 11/13/21 0515 11/13/21 0800   BP: 139/84  120/85 107/74   Pulse:   106 104   Resp:   20 22   Temp:   97.8 °F (36.6 °C) 97.9 °F (36.6 °C)   TempSrc:   Infrared Infrared   SpO2:   92% 95%   Weight:  258 lb 8 oz (117.3 kg)     Height:           No acute distress moist membranes   Normocephalic, without obvious abnormality, atraumatic, external ears without lesions,   Neck supple no cervical lymphadenopathy  Heart has a regular rate and rhythm no murmur  Lungs are clear to auscultation bilaterally with equal movements. Abdomen is soft nontender nondistended no rebound or guarding. trace edema good peripheral perfusion.   No significant rashes or new skin lesions. No new focality on neuro exam which is overall grossly intact. Has tremor right hand> left inconsistently. Not able to write notes or use communication board. Does track with eye and follows some commands  Affect and mood seem to be appropriate     I/O last 3 completed shifts: In: 906 [NG/GT:906]  Out: 975 [Urine:975]  No intake/output data recorded. LABS:  Recent Labs     11/11/21 0548 11/12/21 0533 11/13/21  0530    138 138   K 4.2 4.5 4.0    104 103   CO2 22 22 22   BUN 36* 33* 29*   CREATININE 0.7 0.7 0.7   GLUCOSE 131* 117* 120*   CALCIUM 9.1 9.1 9.4       Recent Labs     11/11/21 0548 11/12/21 0533 11/13/21  0530   WBC 10.2 10.8 10.9   RBC 3.45* 3.43* 3.62*   HGB 10.8* 10.8* 11.3*   HCT 33.1* 33.4* 34.6*   MCV 95.9 97.4 95.6   MCH 31.3 31.5 31.2   MCHC 32.6 32.3 32.7   RDW 15.4* 15.7* 15.5*    309 306   MPV 9.6 9.5 9.6       No results for input(s): POCGLU in the last 72 hours. Imaging:  CT Head WO Contrast    Result Date: 10/12/2021  EXAMINATION: CT OF THE HEAD WITHOUT CONTRAST  10/12/2021 9:34 am TECHNIQUE: CT of the head was performed without the administration of intravenous contrast. Dose modulation, iterative reconstruction, and/or weight based adjustment of the mA/kV was utilized to reduce the radiation dose to as low as reasonably achievable. COMPARISON: None. HISTORY: ORDERING SYSTEM PROVIDED HISTORY: hallucinations, new onset TECHNOLOGIST PROVIDED HISTORY: Reason for exam:->hallucinations, new onset Has a \"code stroke\" or \"stroke alert\" been called? ->No Decision Support Exception - unselect if not a suspected or confirmed emergency medical condition->Emergency Medical Condition (MA) FINDINGS: BRAIN/VENTRICLES: There is no acute intracranial hemorrhage, mass effect or midline shift. No abnormal extra-axial fluid collection. The gray-white differentiation is maintained without evidence of an acute infarct.   There is no evidence of hydrocephalus. The ventricles, cisterns and sulci are prominent consistent with atrophy. There is decreased attenuation within the periventricular white matter consistent with periventricular leukomalacia. ORBITS: The visualized portion of the orbits demonstrate no acute abnormality. SINUSES: The visualized paranasal sinuses and mastoid air cells demonstrate no acute abnormality. SOFT TISSUES/SKULL:  No acute abnormality of the visualized skull or soft tissues. 1. There is no acute intracranial abnormality. Specifically, there is no intracranial hemorrhage. 2. Atrophy and periventricular leukomalacia, . XR CHEST PORTABLE    Result Date: 10/13/2021  EXAMINATION: ONE XRAY VIEW OF THE CHEST 10/13/2021 1:20 pm COMPARISON: Chest x-ray same day HISTORY: ORDERING SYSTEM PROVIDED HISTORY: ET tube TECHNOLOGIST PROVIDED HISTORY: Reason for exam:->ET tube FINDINGS: ET tube terminates in the trachea, between the clavicular heads and the karo. Enteric tube is not well visualized at the GE junction and below. Right IJ CVC terminates at the superior cavoatrial junction. Stable mild pulmonary vascular congestion. Stable small pleural effusions. Stable cardiomegaly. Changes of median sternotomy. No evidence of pneumothorax. Bones are stable. ET tube terminates between the clavicular heads and the karo. Enteric tube is poorly visualized at the GE junction and below; if position of enteric tube tip needs to be known then KUB would better evaluate for it. Stable mild pulmonary vascular congestion. Stable small pleural effusions. Stable cardiomegaly.      XR CHEST PORTABLE    Result Date: 10/13/2021  EXAMINATION: ONE XRAY VIEW OF THE CHEST 10/13/2021 3:13 pm COMPARISON: Chest radiograph October 12, 2021, 23 hour prior HISTORY: ORDERING SYSTEM PROVIDED HISTORY: ET tube placement TECHNOLOGIST PROVIDED HISTORY: Reason for exam:->ET tube placement FINDINGS: Endotracheal tube, enteric tube, and right IJ central venous catheter are unchanged in positions. Mild diffuse bilateral airspace opacities are unchanged small bilateral pleural effusions are unchanged. There is no pneumothorax. The cardiomediastinal silhouette is without acute process. The osseous structures are without acute process. No interval change compared to prior exam. Persistent small bilateral pleural effusions. Ongoing mild-to-moderate pulmonary edema. XR CHEST PORTABLE    Result Date: 10/12/2021  EXAMINATION: ONE XRAY VIEW OF THE CHEST 10/12/2021 4:10 pm COMPARISON: October 12 at 1428 hours HISTORY: ORDERING SYSTEM PROVIDED HISTORY: central line palcement TECHNOLOGIST PROVIDED HISTORY: Reason for exam:->central line palcement FINDINGS: There is a new right IJ line with the distal tip in the SVC. No evidence of pneumothorax. ET tube and NG tube remain in place. Prominent cardiomegaly, unchanged. Status post median sternotomy. Mild elevation of the right hemidiaphragm and airspace disease in the right lung base somewhat more confluent than on the prior examination. The pulmonary vasculature is within normal limits. No other significant interval change. New right IJ line with the distal tip in the SVC and no pneumothorax. Slightly more confluent airspace disease in the right lung base. No other significant interval change. XR CHEST PORTABLE    Result Date: 10/12/2021  EXAMINATION: ONE XRAY VIEW OF THE CHEST 10/12/2021 2:51 pm COMPARISON: Prior chest x-ray obtained earlier today. HISTORY: ORDERING SYSTEM PROVIDED HISTORY: tube placement TECHNOLOGIST PROVIDED HISTORY: Reason for exam:->tube placement FINDINGS: The endotracheal tube is located 6 cm proximal to the karo. There is an NG tube within the stomach. The heart is enlarged. There is new airspace disease seen within the right lung base when compared with the earlier study. This could represent an effusion and or infiltrate.      1. Satisfactory position of the NG tube and endotracheal tube 2. New opacity within the right lung base which could represent a pleural effusion and or right lung base infiltrate. XR CHEST PORTABLE    Result Date: 10/12/2021  EXAMINATION: ONE XRAY VIEW OF THE CHEST 10/12/2021 11:50 am COMPARISON: Previous CT of the chest of 10/01/2021 HISTORY: ORDERING SYSTEM PROVIDED HISTORY: altered mental status TECHNOLOGIST PROVIDED HISTORY: Reason for exam:->altered mental status FINDINGS: The heart is enlarged. Postoperative sternotomy changes are noted. The lungs are clear. There is no focal infiltrate or effusion. 1. Cardiomegaly. There is no evidence of failure or pneumonia. Patient Instructions:   Current Discharge Medication List      CONTINUE these medications which have NOT CHANGED    Details   fluticasone (FLONASE) 50 MCG/ACT nasal spray 2 sprays by Nasal route daily 2 sprays each nostril once a day  Qty: 1 Bottle, Refills: 3      busPIRone (BUSPAR) 5 MG tablet Take 15 mg by mouth 2 times daily       gabapentin (NEURONTIN) 300 MG capsule Take 300 mg by mouth 3 times daily      ibuprofen (ADVIL;MOTRIN) 600 MG tablet Take 600 mg by mouth      omeprazole (PRILOSEC) 20 MG capsule Take 20 mg by mouth Daily       PROAIR  (90 BASE) MCG/ACT inhaler       ASPIRIN LOW DOSE 81 MG EC tablet Take 81 mg by mouth daily       sertraline (ZOLOFT) 100 MG tablet 100 mg nightly       lisinopril (PRINIVIL;ZESTRIL) 5 MG tablet Take 5 mg by mouth daily      nitroGLYCERIN (NITROSTAT) 0.4 MG SL tablet Place 1 tablet under the tongue every 5 minutes as needed for Chest pain  Qty: 25 tablet, Refills: 3      pravastatin (PRAVACHOL) 80 MG tablet Take 1 tablet by mouth daily  Qty: 30 tablet, Refills: 3               Note that more than 30 minutes was spent in preparing discharge papers, discussing discharge with patient, medication review, etc.    NOTE: This report was transcribed using voice recognition software.  Every effort was made to ensure accuracy; however, inadvertent computerized transcription errors may be present.      Signed:  Electronically signed by Jaison Garcia MD on 11/13/2021 at 9:23 AM

## 2021-11-23 NOTE — ED NOTES
Spoke with NP Saúl Tang and Dr. Edmund Bumpers on pts status, pt having seizure like activity. BP, HR, and Spo2 WNL. HR 71, /84, Spo2 100%. Pt inline suctioned with scant secretions, OG on intermittent suction with bloody secretions. Pt orally suction with clear frothy sputum.       Kiley Rodriguez, RN  10/13/21 [address], RN  10/13/21 0062 Never

## 2021-12-01 ENCOUNTER — HOSPITAL ENCOUNTER (EMERGENCY)
Age: 66
Discharge: HOME | End: 2021-12-02
Attending: EMERGENCY MEDICINE
Payer: MEDICARE

## 2021-12-01 ENCOUNTER — APPOINTMENT (OUTPATIENT)
Dept: GENERAL RADIOLOGY | Age: 66
End: 2021-12-01
Payer: MEDICARE

## 2021-12-01 VITALS
OXYGEN SATURATION: 97 % | RESPIRATION RATE: 18 BRPM | SYSTOLIC BLOOD PRESSURE: 116 MMHG | DIASTOLIC BLOOD PRESSURE: 63 MMHG | TEMPERATURE: 98.7 F | HEART RATE: 108 BPM

## 2021-12-01 DIAGNOSIS — K94.29 IRRITATION AROUND PERCUTANEOUS ENDOSCOPIC GASTROSTOMY (PEG) TUBE SITE (HCC): ICD-10-CM

## 2021-12-01 DIAGNOSIS — T85.848A PAIN AROUND PEG TUBE SITE, INITIAL ENCOUNTER: ICD-10-CM

## 2021-12-01 DIAGNOSIS — Z43.1 ATTENTION TO G-TUBE (HCC): Primary | ICD-10-CM

## 2021-12-01 PROCEDURE — 74018 RADEX ABDOMEN 1 VIEW: CPT

## 2021-12-01 PROCEDURE — 99284 EMERGENCY DEPT VISIT MOD MDM: CPT

## 2021-12-01 ASSESSMENT — ENCOUNTER SYMPTOMS
ABDOMINAL DISTENTION: 1
CHEST TIGHTNESS: 0
BACK PAIN: 0
TROUBLE SWALLOWING: 0
VOMITING: 0
NAUSEA: 0
BLOOD IN STOOL: 0
EYE PAIN: 0
SHORTNESS OF BREATH: 0
DIARRHEA: 0
WHEEZING: 0
COUGH: 0
RHINORRHEA: 0
ABDOMINAL PAIN: 0

## 2021-12-02 NOTE — ED PROVIDER NOTES
[facility]  eMERGENCY dEPARTMENT eNCOUnter      Pt Name: Zenia Venegas  MRN: [id number]  [place] 1955  Date of evaluation: 12/1/2021  Provider: Jeny Erickson MD     [address]       Chief Complaint   Patient presents with    G Tube Complications         HISTORY OF PRESENT ILLNESS   (Location/Symptom, Timing/Onset,Context/Setting, Quality, Duration, Modifying Factors, Severity) Note limiting factors. Presents here for evaluation of his gastrostomy tube. This was placed at the end of October. He states it hurts when they use it. And he has some leaking around it. He was sent here from the [facility]. He denies any other complaints. No chest pain or shortness of breath. Zenia Venegas is a 73 y.o. male who presents to the emergency department      Nursing Notes were reviewed. REVIEW OF SYSTEMS    (2+ for4; 10+ for level 5)     Review of Systems   Constitutional: Negative for appetite change, chills, fatigue, fever and unexpected weight change. HENT: Negative for congestion, drooling, nosebleeds, rhinorrhea and trouble swallowing. Eyes: Negative for pain and visual disturbance. Respiratory: Negative for cough, chest tightness, shortness of breath and wheezing. Cardiovascular: Negative for chest pain, palpitations and leg swelling. Gastrointestinal: Positive for abdominal distention. Negative for abdominal pain, blood in stool, diarrhea, nausea and vomiting. Endocrine: Negative for polydipsia and polyuria. Genitourinary: Negative for difficulty urinating, dysuria, flank pain, frequency, hematuria and urgency. Musculoskeletal: Negative for arthralgias, back pain, myalgias and neck pain. Skin: Negative for pallor and rash. Allergic/Immunologic: Negative for environmental allergies. Neurological: Negative for dizziness, syncope, speech difficulty, weakness, light-headedness, numbness and headaches.    Hematological: Does not bruise/bleed easily. Psychiatric/Behavioral: Negative for confusion and decreased concentration. All other systems reviewed and are negative. PAST MEDICAL HISTORY     Past Medical History:   Diagnosis Date    Alcohol abuse     Atrial fibrillation ([facility]     CAD (coronary artery disease)     COPD (chronic obstructive pulmonary disease) ([facility]     Depression     Fracture of unspecified phalanx of left middle finger, initial encounter for closed fracture     GERD (gastroesophageal reflux disease)     Hypertension     Seizure ([facility] [address] [facility]        SURGICALHISTORY       Past Surgical History:   Procedure Laterality Date    COLONOSCOPY      CORONARY ARTERY BYPASS GRAFT  3/2/05    x3: LIMA to LAD, SVG to RCA, SVG to diagonal    DIAGNOSTIC CARDIAC CATH LAB PROCEDURE  3/02/05    [facility]: Severe multivessel CAD in patient who presents with STEMI and total occlusion of diagonal branch. Significant disease of proximal LAD and severe disease of dominant RCA.      FINGER SURGERY Left 5/3/2019    CLOSED POSSIBLE LEFT MIDDLE FINGER OPEN REDUCTION INTERNAL FIXATION POSSIBLE PROXIMAL INTERPHALANGEAL JOINT ARTHROPLASTY   ++[facility] performed by Abilio Fowler MD at [place] [address] N/A 10/20/2021    EGD WITH NG TUBE PLACEMENT **BEDSIDE** performed by Raymond Lewis MD at [address] [facility]      double    TRACHEOSTOMY N/A 10/29/2021    TRACHEOTOMY AND PEG performed by Anna Chauhna MD at [address] [place] [address] 10/29/2021    EGD ESOPHAGOGASTRODUODENOSCOPY PEG TUBE INSERTION performed by Anna Chauhan MD at [address] [place] [address]       Previous Medications    DILTIAZEM (CARDIZEM) 90 MG TABLET    1 tablet by Per NG tube route 3 times daily    FAMOTIDINE (PEPCID) 40 MG TABLET    1 tablet by Per NG tube route 2 times daily    FOLIC ACID (FOLVITE) 1 MG TABLET    1 tablet by Per NG tube route daily    INSULIN LISPRO (HUMALOG) 100 UNIT/ML INJECTION VIAL    Inject 0-6 Units into the skin nightly    INSULIN LISPRO (HUMALOG) 100 UNIT/ML INJECTION VIAL    Inject 0-12 Units into the skin 3 times daily (with meals)    IPRATROPIUM-ALBUTEROL (DUONEB) 0.5-2.5 (3) MG/3ML SOLN NEBULIZER SOLUTION    Inhale 3 mLs into the lungs every 4 hours    LEVETIRACETAM (KEPPRA) 100 MG/ML SOLUTION    Take 10 mLs by mouth 2 times daily    METOPROLOL TARTRATE (LOPRESSOR) 25 MG TABLET    1 tablet by Per G Tube route 2 times daily    QUETIAPINE (SEROQUEL) 25 MG TABLET    1 tablet by Per NG tube route nightly as needed for Agitation    RESPIRATORY THERAPY SUPPLIES (FULL KIT NEBULIZER SET) MISC    Use as directed with nebulized medication. RIVAROXABAN (XARELTO) 20 MG TABS TABLET    1 tablet by Per NG tube route daily    SERTRALINE (ZOLOFT) 100 MG TABLET    1 tablet by Per NG tube route daily    THIAMINE MONONITRATE (THIAMINE) 100 MG TABLET    Take 1 tablet by mouth daily            Patient has no known allergies. FAMILY HISTORY     History reviewed. No pertinent family history.        SOCIAL HISTORY       Social History     Socioeconomic History    Marital status:      Spouse name: None    Number of children: None    Years of education: None    Highest education level: None   Occupational History    None   Tobacco Use    Smoking status: Current Every Day Smoker     Packs/day: 1.00     Years: 45.00     Pack years: 45.00     Types: Cigars    Smokeless tobacco: Never Used    Tobacco comment: SMOKES CIGARS    Vaping Use    Vaping Use: Never used   Substance and Sexual Activity    Alcohol use: Yes     Comment: occassiona    Drug use: No    Sexual activity: None   Other Topics Concern    None   Social History Narrative    None     Social Determinants of Health     Financial Resource Strain:     Difficulty of Paying Living Expenses: Not on file   Food Insecurity:     Worried About Running Out of Food in the Last Year: Not on file    Sheri of Food in the Last Year: Not on file   Transportation Needs:     Lack of Transportation (Medical): Not on file    Lack of Transportation (Non-Medical): Not on file   Physical Activity:     Days of Exercise per Week: Not on file    Minutes of Exercise per Session: Not on file   Stress:     Feeling of Stress : Not on file   Social Connections:     Frequency of Communication with Friends and Family: Not on file    Frequency of Social Gatherings with Friends and Family: Not on file    Attends Rastafarian Services: Not on file    Active Member of [address] [place] [address] [employer] or Organizations: Not on file    Attends Club or Organization Meetings: Not on file    Marital Status: Not on file   Intimate Partner Violence:     Fear of Current or Ex-Partner: Not on file    Emotionally Abused: Not on file    Physically Abused: Not on file    Sexually Abused: Not on file   Housing Stability:     Unable to Pay for Housing in the Last Year: Not on file    Number of [place] in the Last Year: Not on file    Unstable Housing in the Last Year: Not on file       SCREENINGS    Winter Coma Scale  Eye Opening: Spontaneous  Best Verbal Response: Oriented  Best Motor Response: Obeys commands  [place] Coma Scale Score: 15 @FLOW([id number])@    PHYSICAL EXAM    (5+ for level 4, 8+ for level 5)     ED Triage Vitals [12/01/21 2056]   BP Temp Temp Source Pulse Resp SpO2 Height Weight   116/63 98.7 °F (37.1 °C) Oral 108 18 97 % -- --       Physical Exam  Vitals and nursing note reviewed. Constitutional:       General: He is not in acute distress. Appearance: Normal appearance. He is well-developed. He is not diaphoretic. HENT:      Head: Normocephalic and atraumatic. Nose: Nose normal.      Mouth/Throat:      Pharynx: No oropharyngeal exudate. Eyes:      General: No scleral icterus. Right eye: No discharge. Left eye: No discharge.       Conjunctiva/sclera: Conjunctivae normal.      Pupils: Pupils are equal, round, and reactive to light.   Neck:      Thyroid: No thyromegaly. Vascular: No JVD. Trachea: No tracheal deviation. Comments: Trach in place  Cardiovascular:      Rate and Rhythm: Normal rate and regular rhythm. Heart sounds: Normal heart sounds. No murmur heard. No gallop. Pulmonary:      Effort: Pulmonary effort is normal. No respiratory distress. Breath sounds: Normal breath sounds. No stridor. No wheezing or rales. Chest:      Chest wall: No tenderness. Abdominal:      General: There is no distension. Palpations: Abdomen is soft. There is no mass. Tenderness: There is abdominal tenderness. There is no guarding or rebound. Comments: Tenderness around the site. There is a little bit of food drainage. No real erythema. No sign of infection. It is slightly tender. Musculoskeletal:         General: No tenderness or deformity. Normal range of motion. Cervical back: Normal range of motion and neck supple. Skin:     General: Skin is warm and dry. Coloration: Skin is not pale. Findings: No erythema or rash. Neurological:      General: No focal deficit present. Mental Status: He is alert and oriented to person, place, and time. Cranial Nerves: No cranial nerve deficit. Motor: No abnormal muscle tone. Coordination: Coordination normal.   Psychiatric:         Mood and Affect: Mood normal.         Behavior: Behavior normal.         Thought Content: Thought content normal.         Judgment: Judgment normal.         DIAGNOSTIC RESULTS     EKG (Per Emergency Physician):       RADIOLOGY (Per Latonia Hernandez):      Interpretation per the Radiologist below, if available at the time of this note:  XR ABDOMEN (KUB) (SINGLE AP VIEW)    Result Date: 12/1/2021  EXAMINATION: ONE SUPINE XRAY VIEW(S) OF THE ABDOMEN 12/1/2021 11:14 pm COMPARISON: 10/20/2021 HISTORY: ORDERING SYSTEM PROVIDED HISTORY: tube placement TECHNOLOGIST PROVIDED HISTORY: With gastrograffin Reason for exam:->tube placement FINDINGS: A  view of the abdomen demonstrates a percutaneous feeding tube overlying the left upper to mid abdomen. Nonspecific nonobstructive bowel gas pattern. On the 2nd image, obtained after infusion of contrast via the percutaneous tube, contrast is identified within the stomach, duodenum and proximal jejunum. No obvious extravasated contrast.  The tip of the tube appears to overlie the distal duodenum near the ligament of Treitz. Percutaneous feeding tube positioned as described with contrast in the bowel and no obvious extravasated contrast.       :  Labs Reviewed - No data to display    All other labs were within normal range or not returned as of this dictation. EMERGENCY DEPARTMENT COURSE and DIFFERENTIALDIAGNOSIS/MDM:   Vitals:    Vitals:    12/01/21 2056   BP: 116/63   Pulse: 108   Resp: 18   Temp: 98.7 °F (37.1 °C)   TempSrc: Oral   SpO2: 97%       Medications - No data to display    MDM  Number of Diagnoses or Management Options  Diagnosis management comments: Patient presented here for evaluation of his gastrostomy tube. After a flushing attempt he was complaining of pain. At that time a KUB as well as a Gastrografin KUB were obtained. Gastrografin KUB revealed contrast media in the stomach small intestines. There is no evidence of extravasation. The patient will be discharged back to his extended care facility. They are to contact his surgeon tomorrow to make arrangements for possible tube change. This does not appear to be the type of tube I can change here and replaced with a Woo or GRIFFIN 2. Also it has not been in for an extended period of time so the tract may not be as formed. It is in the correct place and he is no longer have any discomfort discharge. Have spoken with the patient and discussed todays results, in addition to providing specific details for the plan of care and counseling regarding the diagnosis and prognosis.   Their questions are answered at this time and they are agreeable with the plan. .      CONSULTS:  None    PROCEDURES:  Unless otherwise noted below, none     Procedures    FINAL IMPRESSION      1. Attention to G-tube ([facility] [address]    2. Irritation around percutaneous endoscopic gastrostomy (PEG) tube site [facility])    3. Pain around PEG tube site, initial encounter        DISPOSITION/PLAN   DISPOSITION Decision To Discharge 12/01/2021 11:50:59 PM      PATIENT REFERRED TO:  MARTY Potter - EPIFANIO  Counts include 234 beds at the [facility] [phone]    Schedule an appointment as soon as possible for a visit in 3 days      Lamont Trevizo MD  [address]  [address] [phone]      call in AM to discuss the PEG and possible replacement      DISCHARGE MEDICATIONS:  New Prescriptions    No medications on file          (Please note:  Portions of this note were completed with a voice recognition program.  Efforts were made to edit thedictations but occasionally words and phrases are mis-transcribed.)    Form v2016. [id number]    Eliecer Bowen MD (electronically signed)  Emergency Medicine Provider          Eliecer Bowen MD  12/01/21 6852

## 2021-12-02 NOTE — ED NOTES
Attempted to manually irrigate peg tube. Patient did not tolerate well and expressed discomfort while pushing anything through and while withdrawing from tube. Provider notified.      Annalisa Lopez RN  12/01/21 4753
Bed: 08  Expected date:   Expected time:   Means of arrival:   Comments:  350 Isaias Chavis RN  12/01/21 2043
Dagmar Amezcua arrives via EMS from [facility] with complaint of pain to G tube site. He reports that he has had 'extreme pain' to the tube any time it is used over the past week. Redness to skin surrounding g tube site with tan purulent drainage.         Parish Inman RN  12/01/21 2102
[facility] transport set up, [address] , RN  12/02/21 0015
Report called to Just[facility], spoke with 120 Janell Lockett RN.      Alonso Krishnan RN  12/02/21 9848
sob, no cough

## 2021-12-03 ENCOUNTER — HOSPITAL ENCOUNTER (EMERGENCY)
Age: 66
Discharge: LEFT BEFORE DISCHARGE | End: 2021-12-04
Attending: EMERGENCY MEDICINE
Payer: MEDICARE

## 2021-12-03 ENCOUNTER — APPOINTMENT (OUTPATIENT)
Dept: GENERAL RADIOLOGY | Age: 66
End: 2021-12-03
Payer: MEDICARE

## 2021-12-03 DIAGNOSIS — R07.9 CHEST PAIN, UNSPECIFIED TYPE: Primary | ICD-10-CM

## 2021-12-03 LAB
ALBUMIN SERPL-MCNC: 2.9 G/DL (ref 3.5–5.2)
ALP BLD-CCNC: 117 U/L (ref 40–129)
ALT SERPL-CCNC: 28 U/L (ref 0–40)
ANION GAP SERPL CALCULATED.3IONS-SCNC: 10 MMOL/L (ref 7–16)
AST SERPL-CCNC: 31 U/L (ref 0–39)
BASOPHILS ABSOLUTE: 0.04 E9/L (ref 0–0.2)
BASOPHILS RELATIVE PERCENT: 0.4 % (ref 0–2)
BILIRUB SERPL-MCNC: 0.5 MG/DL (ref 0–1.2)
BUN BLDV-MCNC: 18 MG/DL (ref 6–23)
CALCIUM SERPL-MCNC: 8.8 MG/DL (ref 8.6–10.2)
CHLORIDE BLD-SCNC: 101 MMOL/L (ref 98–107)
CO2: 26 MMOL/L (ref 22–29)
CREAT SERPL-MCNC: 0.6 MG/DL (ref 0.7–1.2)
EOSINOPHILS ABSOLUTE: 0.11 E9/L (ref 0.05–0.5)
EOSINOPHILS RELATIVE PERCENT: 1.2 % (ref 0–6)
GFR AFRICAN AMERICAN: >60
GFR NON-AFRICAN AMERICAN: >60 ML/MIN/1.73
GLUCOSE BLD-MCNC: 112 MG/DL (ref 74–99)
HCT VFR BLD CALC: 28.8 % (ref 37–54)
HEMOGLOBIN: 9 G/DL (ref 12.5–16.5)
IMMATURE GRANULOCYTES #: 0.04 E9/L
IMMATURE GRANULOCYTES %: 0.4 % (ref 0–5)
LACTIC ACID: 1.1 MMOL/L (ref 0.5–2.2)
LYMPHOCYTES ABSOLUTE: 1.66 E9/L (ref 1.5–4)
LYMPHOCYTES RELATIVE PERCENT: 18 % (ref 20–42)
MCH RBC QN AUTO: 29.6 PG (ref 26–35)
MCHC RBC AUTO-ENTMCNC: 31.3 % (ref 32–34.5)
MCV RBC AUTO: 94.7 FL (ref 80–99.9)
MONOCYTES ABSOLUTE: 0.9 E9/L (ref 0.1–0.95)
MONOCYTES RELATIVE PERCENT: 9.8 % (ref 2–12)
NEUTROPHILS ABSOLUTE: 6.48 E9/L (ref 1.8–7.3)
NEUTROPHILS RELATIVE PERCENT: 70.2 % (ref 43–80)
PDW BLD-RTO: 16.8 FL (ref 11.5–15)
PLATELET # BLD: 421 E9/L (ref 130–450)
PMV BLD AUTO: 8.5 FL (ref 7–12)
POTASSIUM REFLEX MAGNESIUM: 5 MMOL/L (ref 3.5–5)
PRO-BNP: 1256 PG/ML (ref 0–125)
RBC # BLD: 3.04 E12/L (ref 3.8–5.8)
REASON FOR REJECTION: NORMAL
REJECTED TEST: NORMAL
SODIUM BLD-SCNC: 137 MMOL/L (ref 132–146)
TOTAL PROTEIN: 6.5 G/DL (ref 6.4–8.3)
TROPONIN, HIGH SENSITIVITY: 18 NG/L (ref 0–11)
TROPONIN, HIGH SENSITIVITY: 18 NG/L (ref 0–11)
WBC # BLD: 9.2 E9/L (ref 4.5–11.5)

## 2021-12-03 PROCEDURE — 85025 COMPLETE CBC W/AUTO DIFF WBC: CPT

## 2021-12-03 PROCEDURE — 99284 EMERGENCY DEPT VISIT MOD MDM: CPT

## 2021-12-03 PROCEDURE — 83605 ASSAY OF LACTIC ACID: CPT

## 2021-12-03 PROCEDURE — 83880 ASSAY OF NATRIURETIC PEPTIDE: CPT

## 2021-12-03 PROCEDURE — 93005 ELECTROCARDIOGRAM TRACING: CPT | Performed by: EMERGENCY MEDICINE

## 2021-12-03 PROCEDURE — 36415 COLL VENOUS BLD VENIPUNCTURE: CPT

## 2021-12-03 PROCEDURE — 80053 COMPREHEN METABOLIC PANEL: CPT

## 2021-12-03 PROCEDURE — 84484 ASSAY OF TROPONIN QUANT: CPT

## 2021-12-03 PROCEDURE — 71045 X-RAY EXAM CHEST 1 VIEW: CPT

## 2021-12-03 ASSESSMENT — ENCOUNTER SYMPTOMS
SHORTNESS OF BREATH: 0
NAUSEA: 0
VOMITING: 0
COUGH: 0
DIARRHEA: 0
EYE PAIN: 0
SORE THROAT: 0
BACK PAIN: 0
SINUS PAIN: 0
EYE REDNESS: 0
ABDOMINAL PAIN: 0
WHEEZING: 0

## 2021-12-03 NOTE — ED PROVIDER NOTES
Chief Complaint   Patient presents with    Chest Pain     + pain around feeding tube + anxiety        81-year-old male with past medical history of hypertension, CAD with CABG, COPD, seizures, trach and PEG operation on 10/29 presenting after an episode of chest pain earlier today. The chest pain was not exacerbated by anything, it was not worsened by activity or anxiety. He did take 3 nitros that made it better, nothing made it worse. It did last for a couple of hours and was constant throughout that time. It was left-sided and nonradiating. He says it did not feel like his prior heart attack. He is not experiencing chest pain now. He is also not complaining of any headache, vision changes, diaphoresis, abdominal pain, increased pain or swelling in his legs. He is coming from [place] [id number] and has been regaining activity from her recent hospitalization. No other acute issues. Review of Systems   Constitutional: Negative for chills and fever. HENT: Negative for ear pain, sinus pain and sore throat. Eyes: Negative for pain and redness. Respiratory: Negative for cough, shortness of breath and wheezing. Cardiovascular: Positive for chest pain. Gastrointestinal: Negative for abdominal pain, diarrhea, nausea and vomiting. Genitourinary: Negative for dysuria and flank pain. Musculoskeletal: Negative for back pain and neck pain. Skin: Negative for rash. Neurological: Negative for seizures and headaches. Hematological: Negative for adenopathy. All other systems reviewed and are negative. Physical Exam  Vitals and nursing note reviewed. Constitutional:       Appearance: Normal appearance. He is not ill-appearing. Comments: Chronically ill-appearing   HENT:      Head: Normocephalic and atraumatic.       Right Ear: External ear normal.      Left Ear: External ear normal.      Nose: Nose normal.      Mouth/Throat:      Mouth: Mucous membranes are moist.      Pharynx: Oropharynx is clear. Eyes:      Conjunctiva/sclera: Conjunctivae normal.      Pupils: Pupils are equal, round, and reactive to light. Neck:      Comments: Tracheostomy in place  Cardiovascular:      Rate and Rhythm: Normal rate and regular rhythm. Pulmonary:      Breath sounds: Normal breath sounds. Abdominal:      General: Abdomen is flat. There is no distension. Palpations: Abdomen is soft. Tenderness: There is no abdominal tenderness. Comments: PEG tube in place   Musculoskeletal:         General: No deformity or signs of injury. Cervical back: Neck supple. Skin:     General: Skin is warm and dry. Neurological:      General: No focal deficit present. Mental Status: He is alert and oriented to person, place, and time. Mental status is at baseline. Comments: Chronic tremors          Procedures     EKG: This EKG is signed and interpreted by me. Rate:98  Rhythm: Atrial fibrillation  Interpretation: no acute changes, no ST elevations, intervals normal  Comparison: stable as compared to patient's most recent EKG      MDM  Number of Diagnoses or Management Options  Chest pain, unspecified type  Diagnosis management comments: 71-year-old male with past medical history of hypertension, CAD with CABG, COPD, seizures, trach and PEG operation on 10/21 presented today after episode of chest pain earlier. He was hemodynamically stable on arrival to emergency department. EKG did not show acute abnormalities or other changes, it showed atrial fibrillation which is chronic for him. Chest x-ray did not show acute pulmonary process, he does have cardiomegaly although that was unchanged from prior studies. Lab work was not outside the patient's normal, there were no acute changes on CBC or CMP, lactic acid was within normal limits, his BNP was reduced at 1256 from the last reading of 1800. His troponin was 18x2.   Since the patient's chest pain went away with 3 rounds of nitro, we thought that it would be the best medical decision for him to be admitted for further cardiac evaluation and work-up. With a long discussion with the patient and he did not want to stay in the hospital.  We discussed the risks that were involved in this decision and he was willing to take them. We did advise he would have to sign out [address] and he was agreeable. He was still given strict return precautions and knows return to the emergency department any acutely worsens. ED Course as of 12/03/21 2132   Fri Dec 03, 2021   2005 12/3/21 / Time:  8:05 PM EST. This patient has chosen to leave against medical advice. I have personally explained to them that choosing to do so may result in permanent bodily harm or death. I discussed at length that without further evaluation and monitoring there may be unforeseen circumstances and deterioration resulting in permanent bodily harm or death as a result of their choice. They are alert, oriented, and competent at this time. They state that they are aware of the serious risks as explained, but they continue to wish to leave against medical advice. In light of their decision to leave against medical advice, follow-up has been arranged and they are aware of the importance of following up as instructed. They have been advised that they should return to the ED immediately if they change their mind at any time, or if their condition begins to change or worsen. ------------------------------------------------------------------------------------------   [KK]      ED Course User Index  [KK] Monica De La Fuente MD      ED Course as of 12/03/21 2132   Fri Dec 03, 2021   2005 12/3/21 / Time:  8:05 PM EST. This patient has chosen to leave against medical advice. I have personally explained to them that choosing to do so may result in permanent bodily harm or death.   I discussed at length that without further evaluation and monitoring there may be unforeseen circumstances and deterioration resulting in permanent bodily harm or death as a result of their choice. They are alert, oriented, and competent at this time. They state that they are aware of the serious risks as explained, but they continue to wish to leave against medical advice. In light of their decision to leave against medical advice, follow-up has been arranged and they are aware of the importance of following up as instructed. They have been advised that they should return to the ED immediately if they change their mind at any time, or if their condition begins to change or worsen. ------------------------------------------------------------------------------------------   [KK]      ED Course User Index  [KK] Paul Sanchez MD       --------------------------------------------- PAST HISTORY ---------------------------------------------  Past Medical History:  has a past medical history of Alcohol abuse, Atrial fibrillation ([facility] CAD (coronary artery disease), [facility]ch[facility] obstructive [facility] Depression, Fracture of unspecified phalanx of left middle finger, initial encounter for closed fracture, GERD (gastroesophageal reflux disease), Hypertension, and Seizure ([facility] Past Surgical History:  has a past surgical history that includes hernia repair; Coronary artery bypass graft (3/2/05); Diagnostic Cardiac Cath Lab Procedure (3/02/05); Colonoscopy; Finger surgery (Left, 5/3/2019); Gastrostomy tube placement (N/A, 10/20/2021); tracheostomy (N/A, 10/29/2021); and Upper gastrointestinal endoscopy (N/A, 10/29/2021). Social History:  reports that he has been smoking cigars. He has a 45.00 pack-year smoking history. He has never used smokeless tobacco. He reports current alcohol use. He reports that he does not use drugs. Family History: family history is not on file. The patients home medications have been reviewed.     Allergies: Patient has no known allergies.     -------------------------------------------------- RESULTS -------------------------------------------------  Labs:  Results for orders placed or performed during the hospital encounter of 12/03/21   CBC Auto Differential   Result Value Ref Range    WBC 9.2 4.5 - 11.5 E9/L    RBC 3.04 (L) 3.80 - 5.80 E12/L    Hemoglobin 9.0 (L) 12.5 - 16.5 g/dL    Hematocrit 28.8 (L) 37.0 - 54.0 %    MCV 94.7 80.0 - 99.9 fL    MCH 29.6 26.0 - 35.0 pg    MCHC 31.3 (L) 32.0 - 34.5 %    RDW 16.8 (H) 11.5 - 15.0 fL    Platelets 297 501 - 320 E9/L    MPV 8.5 7.0 - 12.0 fL    Neutrophils % 70.2 43.0 - 80.0 %    Immature Granulocytes % 0.4 0.0 - 5.0 %    Lymphocytes % 18.0 (L) 20.0 - 42.0 %    Monocytes % 9.8 2.0 - 12.0 %    Eosinophils % 1.2 0.0 - 6.0 %    Basophils % 0.4 0.0 - 2.0 %    Neutrophils Absolute 6.48 1.80 - 7.30 E9/L    Immature Granulocytes # 0.04 E9/L    Lymphocytes Absolute 1.66 1.50 - 4.00 E9/L    Monocytes Absolute 0.90 0.10 - 0.95 E9/L    Eosinophils Absolute 0.11 0.05 - 0.50 E9/L    Basophils Absolute 0.04 0.00 - 0.20 E9/L   Comprehensive Metabolic Panel w/ Reflex to MG   Result Value Ref Range    Sodium 137 132 - 146 mmol/L    Potassium reflex Magnesium 5.0 3.5 - 5.0 mmol/L    Chloride 101 98 - 107 mmol/L    CO2 26 22 - 29 mmol/L    Anion Gap 10 7 - 16 mmol/L    Glucose 112 (H) 74 - 99 mg/dL    BUN 18 6 - 23 mg/dL    CREATININE 0.6 (L) 0.7 - 1.2 mg/dL    GFR Non-African American >60 >=60 mL/min/1.73    GFR African American >60     Calcium 8.8 8.6 - 10.2 mg/dL    Total Protein 6.5 6.4 - 8.3 g/dL    Albumin 2.9 (L) 3.5 - 5.2 g/dL    Total Bilirubin 0.5 0.0 - 1.2 mg/dL    Alkaline Phosphatase 117 40 - 129 U/L    ALT 28 0 - 40 U/L    AST 31 0 - 39 U/L   Brain Natriuretic Peptide   Result Value Ref Range    Pro-BNP 1,256 (H) 0 - 125 pg/mL   Lactic Acid, Plasma   Result Value Ref Range    Lactic Acid 1.1 0.5 - 2.2 mmol/L   SPECIMEN REJECTION   Result Value Ref Range    Rejected Test trp5     Reason for Rejection see below    Troponin   Result Value Ref Range    Troponin, High Sensitivity 18 (H) 0 - 11 ng/L   Troponin   Result Value Ref Range    Troponin, High Sensitivity 18 (H) 0 - 11 ng/L   EKG 12 Lead   Result Value Ref Range    Ventricular Rate 98 BPM    Atrial Rate 92 BPM    QRS Duration 92 ms    Q-T Interval 346 ms    QTc Calculation (Bazett) 441 ms    R Axis 0 degrees    T Axis 90 degrees       Radiology:  XR ABDOMEN (KUB) (SINGLE AP VIEW)    Result Date: 12/1/2021  EXAMINATION: ONE SUPINE XRAY VIEW(S) OF THE ABDOMEN 12/1/2021 11:14 pm COMPARISON: 10/20/2021 HISTORY: ORDERING SYSTEM PROVIDED HISTORY: tube placement TECHNOLOGIST PROVIDED HISTORY: With gastrograffin Reason for exam:->tube placement FINDINGS: A  view of the abdomen demonstrates a percutaneous feeding tube overlying the left upper to mid abdomen. Nonspecific nonobstructive bowel gas pattern. On the 2nd image, obtained after infusion of contrast via the percutaneous tube, contrast is identified within the stomach, duodenum and proximal jejunum. No obvious extravasated contrast.  The tip of the tube appears to overlie the distal duodenum near the ligament of Treitz. Percutaneous feeding tube positioned as described with contrast in the bowel and no obvious extravasated contrast.     XR CHEST PORTABLE    Result Date: 12/3/2021  EXAMINATION: ONE XRAY VIEW OF THE CHEST 12/3/2021 2:53 pm COMPARISON: None. HISTORY: ORDERING SYSTEM PROVIDED HISTORY: chest pain TECHNOLOGIST PROVIDED HISTORY: Reason for exam:->chest pain FINDINGS: The heart is enlarged. The lungs are clear. There is no pneumothorax. Fall atelectasis at the left lung base. The costophrenic angles are clear. Tracheostomy tube and median sternotomy wires in place. No acute pulmonary process. Cardiomegaly.      XR CHEST PORTABLE    Result Date: 11/5/2021  EXAMINATION: ONE XRAY VIEW OF THE CHEST 11/5/2021 6:08 am COMPARISON: 11/03/2021 HISTORY: ORDERING SYSTEM PROVIDED HISTORY: SOB TECHNOLOGIST PROVIDED HISTORY: Reason for exam:->SOB FINDINGS: Some prominence of the cardiac silhouette is unchanged and may be related to epicardial fat pads. There are no infiltrates or effusions. There has been sternotomy. Support lines are appropriate. No acute process       ------------------------- NURSING NOTES AND VITALS REVIEWED ---------------------------  Date / Time Roomed:  12/3/2021  2:14 PM  ED Bed Assignment:  03/03    The nursing notes within the ED encounter and vital signs as below have been reviewed. /60   Pulse 101   Temp 98.3 °F (36.8 °C) (Oral)   Resp 18   Ht 5' 11\" (1.803 m)   Wt 225 lb (102.1 kg)   SpO2 97%   BMI 31.38 kg/m²   Oxygen Saturation Interpretation: Normal      ------------------------------------------ PROGRESS NOTES ------------------------------------------  Time: 2000  Re-evaluation. Patients symptoms show no change  Repeat physical examination is not changed    I have spoken with the patient and discussed todays availabe results to this point, in addition to providing specific details for the plan of care and counseling regarding the diagnosis and prognosis. Their questions are answered, however they are not agreeable to admission.     --------------------------------- ADDITIONAL PROVIDER NOTES ---------------------------------  This patient has chosen to leave against medical advice. I have personally explained to them that choosing to do so may result in permanent bodily harm or death. I discussed at length that without further evaluation and monitoring there may be unforeseen circumstances and deterioration resulting in permanent bodily harm or death as a result of their choice. They are alert, oriented, and competent at this time. They state that they are aware of the serious risks as explained, but they continue to wish to leave against medical   advice.    In light of their decision to leave against medical advice, follow-up has been arranged and they are aware of the importance of following up as instructed. They have been advised that they should return to the ED immediately if they change their mind at any time, or if their condition begins to change or worsen. The follow up plan has been discussed in detail and they are aware of the specific conditions for emergent return, as well as the importance of follow-up. New Prescriptions    No medications on file       Diagnosis:  1. Chest pain, unspecified type        Disposition:  Patient's disposition: Left against medical advice. Patient's condition is stable.          Robert Perea,   Resident  12/03/21 1905

## 2021-12-04 VITALS
OXYGEN SATURATION: 100 % | HEIGHT: 71 IN | TEMPERATURE: 98.3 F | HEART RATE: 111 BPM | BODY MASS INDEX: 31.5 KG/M2 | RESPIRATION RATE: 18 BRPM | WEIGHT: 225 LBS | SYSTOLIC BLOOD PRESSURE: 126 MMHG | DIASTOLIC BLOOD PRESSURE: 78 MMHG

## 2021-12-04 LAB
EKG ATRIAL RATE: 92 BPM
EKG Q-T INTERVAL: 346 MS
EKG QRS DURATION: 92 MS
EKG QTC CALCULATION (BAZETT): 441 MS
EKG R AXIS: 0 DEGREES
EKG T AXIS: 90 DEGREES
EKG VENTRICULAR RATE: 98 BPM

## 2021-12-04 PROCEDURE — 93010 ELECTROCARDIOGRAM REPORT: CPT | Performed by: INTERNAL MEDICINE

## 2021-12-04 NOTE — ED NOTES
Nurse to nurse given to [facility].      Rukhsana Galloway, RN  12/03/21 2027

## 2022-01-13 LAB
ALBUMIN SERPL-MCNC: 3.6 G/DL (ref 3.5–5.2)
ALP BLD-CCNC: 104 U/L (ref 40–129)
ALT SERPL-CCNC: 24 U/L (ref 0–40)
ANION GAP SERPL CALCULATED.3IONS-SCNC: 14 MMOL/L (ref 7–16)
AST SERPL-CCNC: 15 U/L (ref 0–39)
BASOPHILS ABSOLUTE: 0.03 E9/L (ref 0–0.2)
BASOPHILS RELATIVE PERCENT: 0.4 % (ref 0–2)
BILIRUB SERPL-MCNC: 0.3 MG/DL (ref 0–1.2)
BILIRUBIN URINE: NEGATIVE
BLOOD, URINE: NEGATIVE
BUN BLDV-MCNC: 16 MG/DL (ref 6–23)
CALCIUM SERPL-MCNC: 9.3 MG/DL (ref 8.6–10.2)
CHLORIDE BLD-SCNC: 104 MMOL/L (ref 98–107)
CHOLESTEROL, TOTAL: 227 MG/DL (ref 0–199)
CLARITY: CLEAR
CO2: 23 MMOL/L (ref 22–29)
COLOR: YELLOW
CREAT SERPL-MCNC: 0.8 MG/DL (ref 0.7–1.2)
EOSINOPHILS ABSOLUTE: 0.22 E9/L (ref 0.05–0.5)
EOSINOPHILS RELATIVE PERCENT: 2.6 % (ref 0–6)
GFR AFRICAN AMERICAN: >60
GFR NON-AFRICAN AMERICAN: >60 ML/MIN/1.73
GLUCOSE BLD-MCNC: 92 MG/DL (ref 74–99)
GLUCOSE URINE: NEGATIVE MG/DL
HBA1C MFR BLD: 5 % (ref 4–5.6)
HCT VFR BLD CALC: 41.8 % (ref 37–54)
HDLC SERPL-MCNC: 51 MG/DL
HEMOGLOBIN: 12.7 G/DL (ref 12.5–16.5)
IMMATURE GRANULOCYTES #: 0.02 E9/L
IMMATURE GRANULOCYTES %: 0.2 % (ref 0–5)
KETONES, URINE: NEGATIVE MG/DL
LDL CHOLESTEROL CALCULATED: 142 MG/DL (ref 0–99)
LEUKOCYTE ESTERASE, URINE: NEGATIVE
LYMPHOCYTES ABSOLUTE: 2 E9/L (ref 1.5–4)
LYMPHOCYTES RELATIVE PERCENT: 23.5 % (ref 20–42)
MCH RBC QN AUTO: 29.8 PG (ref 26–35)
MCHC RBC AUTO-ENTMCNC: 30.4 % (ref 32–34.5)
MCV RBC AUTO: 98.1 FL (ref 80–99.9)
MONOCYTES ABSOLUTE: 0.54 E9/L (ref 0.1–0.95)
MONOCYTES RELATIVE PERCENT: 6.4 % (ref 2–12)
NEUTROPHILS ABSOLUTE: 5.69 E9/L (ref 1.8–7.3)
NEUTROPHILS RELATIVE PERCENT: 66.9 % (ref 43–80)
NITRITE, URINE: NEGATIVE
PDW BLD-RTO: 16.8 FL (ref 11.5–15)
PH UA: 5.5 (ref 5–9)
PLATELET # BLD: 167 E9/L (ref 130–450)
PMV BLD AUTO: 10.1 FL (ref 7–12)
POTASSIUM SERPL-SCNC: 4.4 MMOL/L (ref 3.5–5)
PROSTATE SPECIFIC ANTIGEN: 0.95 NG/ML (ref 0–4)
PROTEIN UA: NEGATIVE MG/DL
RBC # BLD: 4.26 E12/L (ref 3.8–5.8)
SODIUM BLD-SCNC: 141 MMOL/L (ref 132–146)
SPECIFIC GRAVITY UA: 1.01 (ref 1–1.03)
T4 FREE: 1.09 NG/DL (ref 0.93–1.7)
TOTAL PROTEIN: 6.7 G/DL (ref 6.4–8.3)
TRIGL SERPL-MCNC: 169 MG/DL (ref 0–149)
TSH SERPL DL<=0.05 MIU/L-ACNC: 2.24 UIU/ML (ref 0.27–4.2)
UROBILINOGEN, URINE: 0.2 E.U./DL
VITAMIN D 25-HYDROXY: 28 NG/ML (ref 30–100)
VLDLC SERPL CALC-MCNC: 34 MG/DL
WBC # BLD: 8.5 E9/L (ref 4.5–11.5)

## 2022-01-16 LAB
ORGANISM: ABNORMAL
URINE CULTURE, ROUTINE: ABNORMAL

## 2022-01-17 NOTE — CONSULTS
Comprehensive Nutrition Assessment    Type and Reason for Visit:  Initial, Consult (TF order and management)    Nutrition Recommendations/Plan: Start Tube Feeding: Immune Enhancing(PIVOT 1.5)@ 35ml/hr x24hrs x 1 protein modular/d: 840mlTV/2359kcal includes propofol&prot. mod)/105gm pro/638ml FW: Additional water flushes per critical care. Start TF @ 10ml/hr and increase 10ml every 4hours until goal rate is reached. Nutrition Assessment:  Pt admits w/ Dx: Seizures, Hallucinations/Tremors, New Onset A-fib w/ H/o ETOH abuse, COPD, CAD. Pt intubated in ED/sedated. Cosnult for Tf order and manage. Malnutrition Assessment:  Malnutrition Status: At risk for malnutrition (Comment)    Context:  Chronic Illness     Findings of the 6 clinical characteristics of malnutrition:  Energy Intake:  Mild decrease in energy intake (Comment)  Weight Loss:  Unable to assess (no wt hx)     Body Fat Loss:  No significant body fat loss     Muscle Mass Loss:  No significant muscle mass loss    Fluid Accumulation:        Strength:  Not Performed    Estimated Daily Nutrient Needs:  Energy (kcal):  4531-8052; Weight Used for Energy Requirements:  Current     Protein (g):  100-120 (x1.3-1.5gm/kg);  Weight Used for Protein Requirements:  Ideal        Fluid (ml/day):  per critical care; Method Used for Fluid Requirements:         Nutrition Related Findings:  Intubated, Propofol @37.7ml/hr provides 995kcal/d,abd WDL, hypoactive BS, BLE +2 pitting edema, OGT, I/O WNL,      Wounds:  None       Current Nutrition Therapies:    Diet NPO  ADULT TUBE FEEDING; Orogastric; Immune Enhancing; Continuous; 10; Yes; 10; Q 4 hours; 35; 0; Other (specify); additional water flushes per critical care; Protein; 1 Protein Modular/d: Flush w/30ml water before and after administration    Anthropometric Measures:  · Height: 5' 11\" (180.3 cm)  · Current Body Weight: 277 lb (125.6 kg) (10/14 bed scale)   · Usual Body Weight:  (No wt hx per EMR)     · Ideal Body Weight: 172 lbs; % Ideal Body Weight 161 %   · BMI: 38.7  · Adjusted Body Weight:  ; No Adjustment     · BMI Categories: Obese Class 2 (BMI 35.0 -39.9)       Nutrition Diagnosis:   · Inadequate protein-energy intake related to impaired respiratory function as evidenced by NPO or clear liquid status due to medical condition, intubation      Nutrition Interventions:   Food and/or Nutrient Delivery:  Start Tube Feeding (Immune Enhancing(PIVOT 1.5)@ 35ml/hr x24hrs x 1 protein modular/d: 840mlTV/2359kcal includes propofol&prot. mod)/105gm pro/638ml FW: Additional water flushes per critical care)  Nutrition Education/Counseling:  No recommendation at this time   Coordination of Nutrition Care:  Continue to monitor while inpatient    Goals:  EN Tolerance       Nutrition Monitoring and Evaluation:   Behavioral-Environmental Outcomes:  None Identified   Food/Nutrient Intake Outcomes:  Enteral Nutrition Intake/Tolerance  Physical Signs/Symptoms Outcomes:  Biochemical Data, Fluid Status or Edema, Hemodynamic Status, Nutrition Focused Physical Findings, Skin, Weight     Discharge Planning:     Too soon to determine     Electronically signed by Carl Lindo RD, LD on 10/14/21 at 10:55 AM EDT    Contact: [phone] [[email or web address]] : quality: gender dysphoria\par onset 21\par severity: moderate \par modifying factors: HRT helped  \par associated factors: obesity \par

## 2022-12-16 ENCOUNTER — TELEPHONE (OUTPATIENT)
Dept: SLEEP CENTER | Age: 67
End: 2022-12-16

## 2022-12-19 ENCOUNTER — TELEPHONE (OUTPATIENT)
Dept: SLEEP CENTER | Age: 67
End: 2022-12-19

## 2023-03-27 ENCOUNTER — HOSPITAL ENCOUNTER (OUTPATIENT)
Dept: MRI IMAGING | Age: 68
Discharge: HOME | End: 2023-03-29
Payer: MEDICARE

## 2023-03-27 DIAGNOSIS — M47.819 MULTILEVEL SPONDYLOSIS: ICD-10-CM

## 2023-03-27 PROCEDURE — 72148 MRI LUMBAR SPINE W/O DYE: CPT

## 2023-07-24 RX ORDER — GABAPENTIN 600 MG/1
600 TABLET ORAL 3 TIMES DAILY
COMMUNITY

## 2023-07-24 RX ORDER — BUSPIRONE HYDROCHLORIDE 10 MG/1
10 TABLET ORAL 2 TIMES DAILY
COMMUNITY

## 2023-07-24 RX ORDER — FENOFIBRATE 160 MG/1
160 TABLET ORAL DAILY
COMMUNITY

## 2023-07-24 RX ORDER — OMEPRAZOLE 20 MG/1
20 CAPSULE, DELAYED RELEASE ORAL DAILY
COMMUNITY

## 2023-07-24 RX ORDER — LISINOPRIL 20 MG/1
20 TABLET ORAL DAILY
COMMUNITY

## 2023-07-24 RX ORDER — ROSUVASTATIN CALCIUM 40 MG/1
40 TABLET, COATED ORAL EVERY EVENING
COMMUNITY

## 2023-07-24 NOTE — PROGRESS NOTES
Called patient for his PAT for his injection with Dr. Delsa Holstein. Patient did not know where his updated med list was but was instructed to bring it to the appointment. I will update his med list by what he read me off his bottles but will need clarified with the med list. Patient is on xaralto and will need clearance to hold. Message left for [place] . Patient also does not have a ride to the appointment and wants to drive himself. Edel Maddox was also notified of this.

## 2023-08-01 PROBLEM — M51.26 PROTRUDED LUMBAR DISC: Chronic | Status: ACTIVE | Noted: 2023-08-01

## 2023-08-01 PROBLEM — M54.16 LUMBAR RADICULOPATHY: Status: ACTIVE | Noted: 2023-08-01

## 2023-08-02 ENCOUNTER — HOSPITAL ENCOUNTER (OUTPATIENT)
Dept: OPERATING ROOM | Age: 68
Setting detail: OUTPATIENT SURGERY
Discharge: HOME | End: 2023-08-02
Attending: ANESTHESIOLOGY
Payer: MEDICARE

## 2023-08-02 ENCOUNTER — HOSPITAL ENCOUNTER (OUTPATIENT)
Age: 68
Setting detail: OUTPATIENT SURGERY
Discharge: HOME | End: 2023-08-02
Attending: ANESTHESIOLOGY | Admitting: ANESTHESIOLOGY
Payer: MEDICARE

## 2023-08-02 VITALS
WEIGHT: 265 LBS | BODY MASS INDEX: 35.89 KG/M2 | SYSTOLIC BLOOD PRESSURE: 107 MMHG | RESPIRATION RATE: 16 BRPM | HEIGHT: 72 IN | OXYGEN SATURATION: 96 % | DIASTOLIC BLOOD PRESSURE: 70 MMHG | HEART RATE: 77 BPM

## 2023-08-02 DIAGNOSIS — M54.16 LUMBAR RADICULOPATHY: ICD-10-CM

## 2023-08-02 PROCEDURE — 2709999900 HC NON-CHARGEABLE SUPPLY: Performed by: ANESTHESIOLOGY

## 2023-08-02 PROCEDURE — 7100000011 HC PHASE II RECOVERY - ADDTL 15 MIN: Performed by: ANESTHESIOLOGY

## 2023-08-02 PROCEDURE — 7100000010 HC PHASE II RECOVERY - FIRST 15 MIN: Performed by: ANESTHESIOLOGY

## 2023-08-02 PROCEDURE — 6360000002 HC RX W HCPCS: Performed by: ANESTHESIOLOGY

## 2023-08-02 PROCEDURE — 3600000005 HC SURGERY LEVEL 5 BASE: Performed by: ANESTHESIOLOGY

## 2023-08-02 PROCEDURE — 2580000003 HC RX 258: Performed by: ANESTHESIOLOGY

## 2023-08-02 PROCEDURE — 6360000004 HC RX CONTRAST MEDICATION: Performed by: ANESTHESIOLOGY

## 2023-08-02 PROCEDURE — 2500000003 HC RX 250 WO HCPCS: Performed by: ANESTHESIOLOGY

## 2023-08-02 RX ORDER — FOLIC ACID 1 MG/1
1 TABLET ORAL DAILY
COMMUNITY

## 2023-08-02 RX ORDER — LIDOCAINE HYDROCHLORIDE 10 MG/ML
INJECTION, SOLUTION EPIDURAL; INFILTRATION; INTRACAUDAL; PERINEURAL PRN
Status: DISCONTINUED | OUTPATIENT
Start: 2023-08-02 | End: 2023-08-02 | Stop reason: ALTCHOICE

## 2023-08-02 RX ORDER — ALBUTEROL SULFATE 2.5 MG/3ML
2.5 SOLUTION RESPIRATORY (INHALATION) 3 TIMES DAILY PRN
COMMUNITY

## 2023-08-02 ASSESSMENT — PAIN DESCRIPTION - DESCRIPTORS: DESCRIPTORS: ACHING

## 2023-08-02 ASSESSMENT — PAIN - FUNCTIONAL ASSESSMENT: PAIN_FUNCTIONAL_ASSESSMENT: 0-10

## 2023-08-02 ASSESSMENT — PAIN SCALES - GENERAL
PAINLEVEL_OUTOF10: 0
PAINLEVEL_OUTOF10: 0

## 2023-08-02 NOTE — H&P
Update History & Physical    The patient's History and Physical of 08/01/2023 was reviewed with the patient and there were no significant changes. I examined the patient and there were no significant changes from the previous History and Physical.    Plan: The risk, benefits, expected outcome, and alternative to the recommended procedure have been discussed with the patient. Patient understands and wants to proceed with the procedure.       Electronically signed by Susanne Yañez DO on 8/2/23 at 9:18 AM EDT

## 2023-08-02 NOTE — PROGRESS NOTES
Reviewed discharge instructions. No questions. Waiting for 30 minutes because of no , per Dr. Yared Jones.

## 2023-09-24 ENCOUNTER — HOSPITAL ENCOUNTER (INPATIENT)
Age: 68
LOS: 16 days | Discharge: NURSING, REHAB OR LONG-TERM CARE | DRG: 896 | End: 2023-10-10
Attending: STUDENT IN AN ORGANIZED HEALTH CARE EDUCATION/TRAINING PROGRAM | Admitting: INTERNAL MEDICINE
Payer: MEDICARE

## 2023-09-24 ENCOUNTER — APPOINTMENT (OUTPATIENT)
Dept: CT IMAGING | Age: 68
DRG: 896 | End: 2023-09-24
Payer: MEDICARE

## 2023-09-24 ENCOUNTER — APPOINTMENT (OUTPATIENT)
Dept: ULTRASOUND IMAGING | Age: 68
DRG: 896 | End: 2023-09-24
Payer: MEDICARE

## 2023-09-24 DIAGNOSIS — F10.932 ALCOHOL WITHDRAWAL SYNDROME WITH PERCEPTUAL DISTURBANCE (HCC): Primary | ICD-10-CM

## 2023-09-24 DIAGNOSIS — R79.89 ELEVATED LFTS: ICD-10-CM

## 2023-09-24 PROBLEM — I48.19 PERSISTENT ATRIAL FIBRILLATION (HCC): Status: ACTIVE | Noted: 2023-09-24

## 2023-09-24 PROBLEM — K70.9 ALCOHOLIC LIVER DISEASE (HCC): Status: ACTIVE | Noted: 2023-09-24

## 2023-09-24 PROBLEM — F10.939 ALCOHOL WITHDRAWAL SYNDROME WITH COMPLICATION (HCC): Status: ACTIVE | Noted: 2023-09-24

## 2023-09-24 PROBLEM — F10.239 IMPENDING DELIRIUM TREMENS (HCC): Status: ACTIVE | Noted: 2023-09-24

## 2023-09-24 PROBLEM — I48.0 PAROXYSMAL ATRIAL FIBRILLATION (HCC): Status: ACTIVE | Noted: 2023-09-24

## 2023-09-24 PROBLEM — Z72.0 TOBACCO ABUSE: Status: ACTIVE | Noted: 2023-09-24

## 2023-09-24 LAB
ALBUMIN SERPL-MCNC: 4.5 G/DL (ref 3.5–5.2)
ALP SERPL-CCNC: 66 U/L (ref 40–129)
ALT SERPL-CCNC: 82 U/L (ref 0–40)
AMPHET UR QL SCN: NEGATIVE
ANION GAP SERPL CALCULATED.3IONS-SCNC: 12 MMOL/L (ref 7–16)
APAP SERPL-MCNC: <5 UG/ML (ref 10–30)
AST SERPL-CCNC: 155 U/L (ref 0–39)
BARBITURATES UR QL SCN: NEGATIVE
BASOPHILS # BLD: 0.06 K/UL (ref 0–0.2)
BASOPHILS NFR BLD: 1 % (ref 0–2)
BENZODIAZ UR QL: NEGATIVE
BILIRUB SERPL-MCNC: 1.3 MG/DL (ref 0–1.2)
BUN SERPL-MCNC: 22 MG/DL (ref 6–23)
BUPRENORPHINE UR QL: NEGATIVE
CALCIUM SERPL-MCNC: 9.3 MG/DL (ref 8.6–10.2)
CANNABINOIDS UR QL SCN: NEGATIVE
CHLORIDE SERPL-SCNC: 101 MMOL/L (ref 98–107)
CO2 SERPL-SCNC: 24 MMOL/L (ref 22–29)
COCAINE UR QL SCN: NEGATIVE
CREAT SERPL-MCNC: 1.2 MG/DL (ref 0.7–1.2)
EOSINOPHIL # BLD: 0.07 K/UL (ref 0.05–0.5)
EOSINOPHILS RELATIVE PERCENT: 1 % (ref 0–6)
ERYTHROCYTE [DISTWIDTH] IN BLOOD BY AUTOMATED COUNT: 16.5 % (ref 11.5–15)
ETHANOLAMINE SERPL-MCNC: <10 MG/DL
FENTANYL UR QL: NEGATIVE
GFR SERPL CREATININE-BSD FRML MDRD: >60 ML/MIN/1.73M2
GLUCOSE SERPL-MCNC: 93 MG/DL (ref 74–99)
HCT VFR BLD AUTO: 39.8 % (ref 37–54)
HGB BLD-MCNC: 12.8 G/DL (ref 12.5–16.5)
IMM GRANULOCYTES # BLD AUTO: 0.05 K/UL (ref 0–0.58)
IMM GRANULOCYTES NFR BLD: 1 % (ref 0–5)
LYMPHOCYTES NFR BLD: 1.13 K/UL (ref 1.5–4)
LYMPHOCYTES RELATIVE PERCENT: 12 % (ref 20–42)
MCH RBC QN AUTO: 29.1 PG (ref 26–35)
MCHC RBC AUTO-ENTMCNC: 32.2 G/DL (ref 32–34.5)
MCV RBC AUTO: 90.5 FL (ref 80–99.9)
METHADONE UR QL: NEGATIVE
MONOCYTES NFR BLD: 0.73 K/UL (ref 0.1–0.95)
MONOCYTES NFR BLD: 8 % (ref 2–12)
NEUTROPHILS NFR BLD: 78 % (ref 43–80)
NEUTS SEG NFR BLD: 7.04 K/UL (ref 1.8–7.3)
OPIATES UR QL SCN: NEGATIVE
OXYCODONE UR QL SCN: NEGATIVE
PCP UR QL SCN: NEGATIVE
PLATELET # BLD AUTO: 166 K/UL (ref 130–450)
PMV BLD AUTO: 10.9 FL (ref 7–12)
POTASSIUM SERPL-SCNC: 4.9 MMOL/L (ref 3.5–5)
PROT SERPL-MCNC: 7.1 G/DL (ref 6.4–8.3)
RBC # BLD AUTO: 4.4 M/UL (ref 3.8–5.8)
SALICYLATES SERPL-MCNC: <0.3 MG/DL (ref 0–30)
SODIUM SERPL-SCNC: 137 MMOL/L (ref 132–146)
TEST INFORMATION: NORMAL
TOXIC TRICYCLIC SC,BLOOD: NEGATIVE
WBC OTHER # BLD: 9.1 K/UL (ref 4.5–11.5)

## 2023-09-24 PROCEDURE — 6370000000 HC RX 637 (ALT 250 FOR IP): Performed by: STUDENT IN AN ORGANIZED HEALTH CARE EDUCATION/TRAINING PROGRAM

## 2023-09-24 PROCEDURE — 76705 ECHO EXAM OF ABDOMEN: CPT

## 2023-09-24 PROCEDURE — 2580000003 HC RX 258: Performed by: STUDENT IN AN ORGANIZED HEALTH CARE EDUCATION/TRAINING PROGRAM

## 2023-09-24 PROCEDURE — 6370000000 HC RX 637 (ALT 250 FOR IP): Performed by: INTERNAL MEDICINE

## 2023-09-24 PROCEDURE — 6360000002 HC RX W HCPCS: Performed by: INTERNAL MEDICINE

## 2023-09-24 PROCEDURE — 99223 1ST HOSP IP/OBS HIGH 75: CPT | Performed by: INTERNAL MEDICINE

## 2023-09-24 PROCEDURE — 85025 COMPLETE CBC W/AUTO DIFF WBC: CPT

## 2023-09-24 PROCEDURE — 2000000000 HC ICU R&B

## 2023-09-24 PROCEDURE — 2580000003 HC RX 258: Performed by: INTERNAL MEDICINE

## 2023-09-24 PROCEDURE — 80179 DRUG ASSAY SALICYLATE: CPT

## 2023-09-24 PROCEDURE — 80143 DRUG ASSAY ACETAMINOPHEN: CPT

## 2023-09-24 PROCEDURE — 80053 COMPREHEN METABOLIC PANEL: CPT

## 2023-09-24 PROCEDURE — 6360000002 HC RX W HCPCS: Performed by: FAMILY MEDICINE

## 2023-09-24 PROCEDURE — 80307 DRUG TEST PRSMV CHEM ANLYZR: CPT

## 2023-09-24 PROCEDURE — 94640 AIRWAY INHALATION TREATMENT: CPT

## 2023-09-24 PROCEDURE — 70450 CT HEAD/BRAIN W/O DYE: CPT

## 2023-09-24 PROCEDURE — 36415 COLL VENOUS BLD VENIPUNCTURE: CPT

## 2023-09-24 PROCEDURE — 93005 ELECTROCARDIOGRAM TRACING: CPT | Performed by: STUDENT IN AN ORGANIZED HEALTH CARE EDUCATION/TRAINING PROGRAM

## 2023-09-24 PROCEDURE — 6360000002 HC RX W HCPCS: Performed by: STUDENT IN AN ORGANIZED HEALTH CARE EDUCATION/TRAINING PROGRAM

## 2023-09-24 PROCEDURE — 84425 ASSAY OF VITAMIN B-1: CPT

## 2023-09-24 PROCEDURE — 99285 EMERGENCY DEPT VISIT HI MDM: CPT

## 2023-09-24 PROCEDURE — G0480 DRUG TEST DEF 1-7 CLASSES: HCPCS

## 2023-09-24 RX ORDER — NICOTINE 21 MG/24HR
1 PATCH, TRANSDERMAL 24 HOURS TRANSDERMAL DAILY
Status: DISCONTINUED | OUTPATIENT
Start: 2023-09-24 | End: 2023-10-10 | Stop reason: HOSPADM

## 2023-09-24 RX ORDER — PHENOBARBITAL SODIUM 65 MG/ML
32.5 INJECTION INTRAMUSCULAR 2 TIMES DAILY
Status: COMPLETED | OUTPATIENT
Start: 2023-09-26 | End: 2023-09-27

## 2023-09-24 RX ORDER — PHENOBARBITAL SODIUM 65 MG/ML
16.2 INJECTION INTRAMUSCULAR 2 TIMES DAILY
Status: COMPLETED | OUTPATIENT
Start: 2023-09-27 | End: 2023-09-28

## 2023-09-24 RX ORDER — PHENOBARBITAL SODIUM 65 MG/ML
130 INJECTION INTRAMUSCULAR ONCE
Status: COMPLETED | OUTPATIENT
Start: 2023-09-24 | End: 2023-09-24

## 2023-09-24 RX ORDER — ONDANSETRON 2 MG/ML
4 INJECTION INTRAMUSCULAR; INTRAVENOUS EVERY 6 HOURS PRN
Status: DISCONTINUED | OUTPATIENT
Start: 2023-09-24 | End: 2023-10-10 | Stop reason: HOSPADM

## 2023-09-24 RX ORDER — PHENOBARBITAL 32.4 MG/1
32.4 TABLET ORAL EVERY 8 HOURS
Status: DISCONTINUED | OUTPATIENT
Start: 2023-09-26 | End: 2023-09-24 | Stop reason: SDUPTHER

## 2023-09-24 RX ORDER — LORAZEPAM 1 MG/1
4 TABLET ORAL
Status: DISCONTINUED | OUTPATIENT
Start: 2023-09-24 | End: 2023-09-24

## 2023-09-24 RX ORDER — LORAZEPAM 2 MG/ML
4 INJECTION INTRAMUSCULAR
Status: DISCONTINUED | OUTPATIENT
Start: 2023-09-24 | End: 2023-09-25

## 2023-09-24 RX ORDER — LORAZEPAM 2 MG/ML
4 INJECTION INTRAMUSCULAR
Status: DISCONTINUED | OUTPATIENT
Start: 2023-09-24 | End: 2023-09-24

## 2023-09-24 RX ORDER — LORAZEPAM 1 MG/1
2 TABLET ORAL
Status: DISCONTINUED | OUTPATIENT
Start: 2023-09-24 | End: 2023-09-24

## 2023-09-24 RX ORDER — SODIUM CHLORIDE 9 MG/ML
INJECTION, SOLUTION INTRAVENOUS PRN
Status: DISCONTINUED | OUTPATIENT
Start: 2023-09-24 | End: 2023-10-10 | Stop reason: HOSPADM

## 2023-09-24 RX ORDER — SODIUM CHLORIDE 0.9 % (FLUSH) 0.9 %
5-40 SYRINGE (ML) INJECTION PRN
Status: DISCONTINUED | OUTPATIENT
Start: 2023-09-24 | End: 2023-10-10 | Stop reason: HOSPADM

## 2023-09-24 RX ORDER — LORAZEPAM 2 MG/ML
2 INJECTION INTRAMUSCULAR
Status: DISCONTINUED | OUTPATIENT
Start: 2023-09-24 | End: 2023-09-24

## 2023-09-24 RX ORDER — LORAZEPAM 2 MG/ML
3 INJECTION INTRAMUSCULAR
Status: DISCONTINUED | OUTPATIENT
Start: 2023-09-24 | End: 2023-09-24

## 2023-09-24 RX ORDER — LORAZEPAM 1 MG/1
3 TABLET ORAL
Status: DISCONTINUED | OUTPATIENT
Start: 2023-09-24 | End: 2023-09-24

## 2023-09-24 RX ORDER — SODIUM CHLORIDE 0.9 % (FLUSH) 0.9 %
5-40 SYRINGE (ML) INJECTION EVERY 12 HOURS SCHEDULED
Status: DISCONTINUED | OUTPATIENT
Start: 2023-09-24 | End: 2023-10-10 | Stop reason: HOSPADM

## 2023-09-24 RX ORDER — PHENOBARBITAL 32.4 MG/1
64.8 TABLET ORAL EVERY 6 HOURS
Status: DISCONTINUED | OUTPATIENT
Start: 2023-09-25 | End: 2023-09-24 | Stop reason: SDUPTHER

## 2023-09-24 RX ORDER — THIAMINE HYDROCHLORIDE 100 MG/ML
100 INJECTION, SOLUTION INTRAMUSCULAR; INTRAVENOUS DAILY
Status: DISCONTINUED | OUTPATIENT
Start: 2023-09-27 | End: 2023-09-24

## 2023-09-24 RX ORDER — FOLIC ACID 1 MG/1
1 TABLET ORAL DAILY
Status: DISCONTINUED | OUTPATIENT
Start: 2023-09-24 | End: 2023-09-25

## 2023-09-24 RX ORDER — PANTOPRAZOLE SODIUM 40 MG/1
40 TABLET, DELAYED RELEASE ORAL
Status: DISCONTINUED | OUTPATIENT
Start: 2023-09-25 | End: 2023-09-25

## 2023-09-24 RX ORDER — ENOXAPARIN SODIUM 150 MG/ML
1 INJECTION SUBCUTANEOUS 2 TIMES DAILY
Status: DISCONTINUED | OUTPATIENT
Start: 2023-09-24 | End: 2023-09-26

## 2023-09-24 RX ORDER — LORAZEPAM 2 MG/ML
3 INJECTION INTRAMUSCULAR
Status: DISCONTINUED | OUTPATIENT
Start: 2023-09-24 | End: 2023-09-25

## 2023-09-24 RX ORDER — FENOFIBRATE 160 MG/1
160 TABLET ORAL DAILY
Status: DISCONTINUED | OUTPATIENT
Start: 2023-09-24 | End: 2023-10-10 | Stop reason: HOSPADM

## 2023-09-24 RX ORDER — LORAZEPAM 1 MG/1
1 TABLET ORAL
Status: DISCONTINUED | OUTPATIENT
Start: 2023-09-24 | End: 2023-09-25

## 2023-09-24 RX ORDER — LORAZEPAM 1 MG/1
2 TABLET ORAL
Status: DISCONTINUED | OUTPATIENT
Start: 2023-09-24 | End: 2023-09-25

## 2023-09-24 RX ORDER — SODIUM CHLORIDE 9 MG/ML
INJECTION, SOLUTION INTRAVENOUS CONTINUOUS
Status: DISCONTINUED | OUTPATIENT
Start: 2023-09-24 | End: 2023-09-25

## 2023-09-24 RX ORDER — LORAZEPAM 1 MG/1
1 TABLET ORAL
Status: DISCONTINUED | OUTPATIENT
Start: 2023-09-24 | End: 2023-09-24

## 2023-09-24 RX ORDER — ROSUVASTATIN CALCIUM 10 MG/1
40 TABLET, COATED ORAL EVERY EVENING
Status: DISCONTINUED | OUTPATIENT
Start: 2023-09-24 | End: 2023-10-10 | Stop reason: HOSPADM

## 2023-09-24 RX ORDER — PHENOBARBITAL SODIUM 65 MG/ML
65 INJECTION INTRAMUSCULAR 4 TIMES DAILY
Status: DISPENSED | OUTPATIENT
Start: 2023-09-24 | End: 2023-09-25

## 2023-09-24 RX ORDER — PHENOBARBITAL 32.4 MG/1
16.2 TABLET ORAL 2 TIMES DAILY
Status: DISCONTINUED | OUTPATIENT
Start: 2023-09-28 | End: 2023-09-24 | Stop reason: SDUPTHER

## 2023-09-24 RX ORDER — LORAZEPAM 2 MG/ML
1 INJECTION INTRAMUSCULAR
Status: DISCONTINUED | OUTPATIENT
Start: 2023-09-24 | End: 2023-09-24

## 2023-09-24 RX ORDER — ONDANSETRON 4 MG/1
4 TABLET, ORALLY DISINTEGRATING ORAL EVERY 8 HOURS PRN
Status: DISCONTINUED | OUTPATIENT
Start: 2023-09-24 | End: 2023-10-10 | Stop reason: HOSPADM

## 2023-09-24 RX ORDER — GAUZE BANDAGE 2" X 2"
100 BANDAGE TOPICAL DAILY
Status: DISCONTINUED | OUTPATIENT
Start: 2023-09-24 | End: 2023-09-24

## 2023-09-24 RX ORDER — ACETAMINOPHEN 325 MG/1
650 TABLET ORAL EVERY 6 HOURS PRN
Status: DISCONTINUED | OUTPATIENT
Start: 2023-09-24 | End: 2023-10-10 | Stop reason: HOSPADM

## 2023-09-24 RX ORDER — PHENOBARBITAL 32.4 MG/1
97.2 TABLET ORAL EVERY 4 HOURS
Status: DISCONTINUED | OUTPATIENT
Start: 2023-09-24 | End: 2023-09-24 | Stop reason: SDUPTHER

## 2023-09-24 RX ORDER — LORAZEPAM 2 MG/ML
1 INJECTION INTRAMUSCULAR
Status: DISCONTINUED | OUTPATIENT
Start: 2023-09-24 | End: 2023-09-25

## 2023-09-24 RX ORDER — LORAZEPAM 1 MG/1
4 TABLET ORAL
Status: DISCONTINUED | OUTPATIENT
Start: 2023-09-24 | End: 2023-09-25

## 2023-09-24 RX ORDER — THIAMINE HYDROCHLORIDE 100 MG/ML
500 INJECTION, SOLUTION INTRAMUSCULAR; INTRAVENOUS DAILY
Status: DISCONTINUED | OUTPATIENT
Start: 2023-09-24 | End: 2023-09-24

## 2023-09-24 RX ORDER — FOLIC ACID 1 MG/1
1 TABLET ORAL DAILY
Status: DISCONTINUED | OUTPATIENT
Start: 2023-09-24 | End: 2023-09-24

## 2023-09-24 RX ORDER — LISINOPRIL 20 MG/1
20 TABLET ORAL DAILY
Status: DISCONTINUED | OUTPATIENT
Start: 2023-09-24 | End: 2023-10-10 | Stop reason: HOSPADM

## 2023-09-24 RX ORDER — POLYETHYLENE GLYCOL 3350 17 G/17G
17 POWDER, FOR SOLUTION ORAL DAILY PRN
Status: DISCONTINUED | OUTPATIENT
Start: 2023-09-24 | End: 2023-10-10 | Stop reason: HOSPADM

## 2023-09-24 RX ORDER — PHENOBARBITAL SODIUM 65 MG/ML
32.5 INJECTION INTRAMUSCULAR 4 TIMES DAILY
Status: COMPLETED | OUTPATIENT
Start: 2023-09-25 | End: 2023-09-26

## 2023-09-24 RX ORDER — ACETAMINOPHEN 650 MG/1
650 SUPPOSITORY RECTAL EVERY 6 HOURS PRN
Status: DISCONTINUED | OUTPATIENT
Start: 2023-09-24 | End: 2023-10-10 | Stop reason: HOSPADM

## 2023-09-24 RX ORDER — LORAZEPAM 1 MG/1
3 TABLET ORAL
Status: DISCONTINUED | OUTPATIENT
Start: 2023-09-24 | End: 2023-09-25

## 2023-09-24 RX ORDER — SERTRALINE HYDROCHLORIDE 100 MG/1
100 TABLET, FILM COATED ORAL DAILY
COMMUNITY

## 2023-09-24 RX ORDER — BUSPIRONE HYDROCHLORIDE 5 MG/1
10 TABLET ORAL 2 TIMES DAILY
Status: DISCONTINUED | OUTPATIENT
Start: 2023-09-24 | End: 2023-10-10 | Stop reason: HOSPADM

## 2023-09-24 RX ORDER — LORAZEPAM 2 MG/ML
2 INJECTION INTRAMUSCULAR
Status: DISCONTINUED | OUTPATIENT
Start: 2023-09-24 | End: 2023-09-25

## 2023-09-24 RX ORDER — POLYETHYLENE GLYCOL 3350 17 G
2 POWDER IN PACKET (EA) ORAL
Status: DISCONTINUED | OUTPATIENT
Start: 2023-09-24 | End: 2023-10-10 | Stop reason: HOSPADM

## 2023-09-24 RX ADMIN — Medication 100 MG: at 16:53

## 2023-09-24 RX ADMIN — LORAZEPAM 4 MG: 2 INJECTION INTRAMUSCULAR; INTRAVENOUS at 19:41

## 2023-09-24 RX ADMIN — PHENOBARBITAL SODIUM 130 MG: 65 INJECTION INTRAMUSCULAR; INTRAVENOUS at 20:23

## 2023-09-24 RX ADMIN — LORAZEPAM 4 MG: 2 INJECTION INTRAMUSCULAR; INTRAVENOUS at 23:08

## 2023-09-24 RX ADMIN — THIAMINE HYDROCHLORIDE 500 MG: 100 INJECTION, SOLUTION INTRAMUSCULAR; INTRAVENOUS at 19:55

## 2023-09-24 RX ADMIN — ENOXAPARIN SODIUM 120 MG: 150 INJECTION SUBCUTANEOUS at 20:58

## 2023-09-24 RX ADMIN — IPRATROPIUM BROMIDE 0.5 MG: 0.5 SOLUTION RESPIRATORY (INHALATION) at 18:14

## 2023-09-24 RX ADMIN — Medication 5 ML: at 20:32

## 2023-09-24 RX ADMIN — PHENOBARBITAL SODIUM 65 MG: 65 INJECTION INTRAMUSCULAR; INTRAVENOUS at 23:06

## 2023-09-24 RX ADMIN — Medication 5 ML: at 20:33

## 2023-09-24 RX ADMIN — LORAZEPAM 4 MG: 1 TABLET ORAL at 16:52

## 2023-09-24 RX ADMIN — LORAZEPAM 4 MG: 2 INJECTION INTRAMUSCULAR; INTRAVENOUS at 21:42

## 2023-09-24 RX ADMIN — SODIUM CHLORIDE: 9 INJECTION, SOLUTION INTRAVENOUS at 20:36

## 2023-09-24 RX ADMIN — PHENOBARBITAL SODIUM 130 MG: 65 INJECTION INTRAMUSCULAR; INTRAVENOUS at 19:48

## 2023-09-24 NOTE — H&P
intra orbital structures have unremarkable appearance. Images with bone window settings demonstrate significant findings. Comparison with study of October 22, 2021, no significant interval changes are observed. No acute intracranial abnormality. EKG: Atrial fibrillation    ASSESSMENT AND PLAN:      Principal Problem:    Alcohol withdrawal syndrome with complication (HCC)  Active Problems:    Essential hypertension    Chronic obstructive pulmonary disease (HCC)    Paroxysmal atrial fibrillation (HCC)    Tobacco abuse    Alcoholic liver disease (HCC)    Impending delirium tremens ([address])  Resolved Problems:    * No resolved hospital problems. *      Alcohol withdrawal syndrome with impending delirium tremens  -Start phenobarbital taper  -Continue as needed Ativan per CIWA protocol  -IV thiamine 500 mg daily for 3 days followed by 100 mg daily  -IV fluids normal saline at 75 mL/h  -Seizure precautions  -Admit to ICU for close monitoring    2. Persistent atrial fibrillation  -Hold Xarelto for now as phenobarbital may decrease the efficacy, will use weight-based enoxaparin for the time being  -Continue metoprolol to tartrate for rate control    3. COPD  -Patient has wheezing but no acute symptoms; not acutely exacerbated at this time  -We will continue Spiriva    4. Alcoholic liver disease  -Transaminases and bilirubin significantly up from prior admission. Will obtain liver ultrasound    5. Tobacco abuse  -Nicotine patch    6. Hypertriglyceridemia  -Continue fenofibrate    7. Primary hypertension  -Continue lisinopril, metoprolol tartrate    8. Anxiety disorder  -Continue buspirone    Code Status: Full code  DVT prophylaxis: Enoxaparin    Disposition: Admit to ICU    Discussed with Dr. Farrah Martinez of pulmonary/critical care    NOTE: This report was transcribed using voice recognition software. Every effort was made to ensure accuracy; however, inadvertent computerized transcription errors may be present.

## 2023-09-24 NOTE — ED NOTES
Pt with increasing confusion. Oriented to name only. Dr. Smiley Samayoa made aware and re-assessed patient. No new orders. Daugher at bedside.      Yesika Martinez RN  09/24/23 [id number]

## 2023-09-24 NOTE — ED PROVIDER NOTES
[address]      Pt Name: Burgess Yancey  MRN: [id number]  [address] [facility] 1955  Date of evaluation: 9/24/2023  Provider: Racheal Perez DO  PCP: MARTY Houser NP  Note Started: 5:30 PM EDT 9/24/23    CHIEF COMPLAINT       Chief Complaint   Patient presents with    Hallucinations     Pt reports seeing and hearing things, seeing things like people coming into his house and taking things, pt has not been sleeping lately either, this began a week ago per the pt, reports last drink two weeks ago       HISTORY OF PRESENT ILLNESS: 1 or more Elements   History From: Patient  Limitations to history : None    Burgess Yancey is a 80 y.o. male Past medical history of A-fib, hypertension, COPD as well as CAD and alcohol abuse. Patient presents with chief complaint of hallucinations as well as difficulty sleeping. Patient states that for the last few days he has been seeing things called his walls as well as been hearing people in his house trying to steal things. Interim 7 moderate severity intermittent since onset. He also notes difficulty with sleeping. Patient states he does have a history of moderate alcohol use and usually drinks 1/5 every day or 2. He states that he has abstained from alcohol as he was going to visit his daughter. Daughter called the patient stated that he was found to be confused with some hallucinations that he presents emerged department for further evaluation. Patient denies any suicidal homicidal ideations. Patient denies any fevers, chills, chest pain, cough, Nico pain, constipation or diarrhea    Nursing Notes were all reviewed and agreed with or any disagreements were addressed in the HPI. REVIEW OF SYSTEMS :    Positives and Pertinent negatives as per HPI.      SURGICAL HISTORY     Past Surgical History:   Procedure Laterality Date    COLONOSCOPY      CORONARY ARTERY BYPASS GRAFT  3/2/05

## 2023-09-25 ENCOUNTER — APPOINTMENT (OUTPATIENT)
Dept: GENERAL RADIOLOGY | Age: 68
DRG: 896 | End: 2023-09-25
Payer: MEDICARE

## 2023-09-25 PROBLEM — F10.921 ACUTE ALCOHOL ABUSE, WITH DELIRIUM (HCC): Status: ACTIVE | Noted: 2023-09-25

## 2023-09-25 PROBLEM — F10.932 ALCOHOL WITHDRAWAL SYNDROME WITH PERCEPTUAL DISTURBANCE (HCC): Status: ACTIVE | Noted: 2023-09-24

## 2023-09-25 PROBLEM — I48.0 PAROXYSMAL ATRIAL FIBRILLATION (HCC): Status: ACTIVE | Noted: 2023-09-25

## 2023-09-25 PROBLEM — J96.02 ACUTE RESPIRATORY FAILURE WITH HYPERCAPNIA (HCC): Status: ACTIVE | Noted: 2023-09-25

## 2023-09-25 PROBLEM — J44.1 CHRONIC OBSTRUCTIVE PULMONARY DISEASE WITH ACUTE EXACERBATION (HCC): Status: ACTIVE | Noted: 2018-04-03

## 2023-09-25 PROBLEM — F41.9 ANXIETY DISORDER: Status: ACTIVE | Noted: 2023-09-25

## 2023-09-25 PROBLEM — E78.1 HYPERTRIGLYCERIDEMIA: Status: ACTIVE | Noted: 2023-09-25

## 2023-09-25 PROBLEM — R79.89 ELEVATED LFTS: Status: ACTIVE | Noted: 2023-09-25

## 2023-09-25 LAB
ALBUMIN SERPL-MCNC: 3.7 G/DL (ref 3.5–5.2)
ALP SERPL-CCNC: 58 U/L (ref 40–129)
ALT SERPL-CCNC: 71 U/L (ref 0–40)
AMMONIA PLAS-SCNC: 49 UMOL/L (ref 16–60)
ANION GAP SERPL CALCULATED.3IONS-SCNC: 10 MMOL/L (ref 7–16)
AST SERPL-CCNC: 115 U/L (ref 0–39)
B PARAP IS1001 DNA NPH QL NAA+NON-PROBE: NOT DETECTED
B PERT DNA SPEC QL NAA+PROBE: NOT DETECTED
B.E.: -1.2 MMOL/L (ref -3–3)
B.E.: -2.8 MMOL/L (ref -3–3)
BASOPHILS # BLD: 0.04 K/UL (ref 0–0.2)
BASOPHILS NFR BLD: 1 % (ref 0–2)
BILIRUB SERPL-MCNC: 1.1 MG/DL (ref 0–1.2)
BNP SERPL-MCNC: 2676 PG/ML (ref 0–450)
BUN SERPL-MCNC: 23 MG/DL (ref 6–23)
C PNEUM DNA NPH QL NAA+NON-PROBE: NOT DETECTED
CALCIUM SERPL-MCNC: 8.4 MG/DL (ref 8.6–10.2)
CHLORIDE SERPL-SCNC: 103 MMOL/L (ref 98–107)
CHOLEST SERPL-MCNC: 90 MG/DL
CO2 SERPL-SCNC: 24 MMOL/L (ref 22–29)
COHB: 2.2 % (ref 0–1.5)
COHB: 3.7 % (ref 0–1.5)
CREAT SERPL-MCNC: 1.2 MG/DL (ref 0.7–1.2)
CRITICAL: ABNORMAL
CRITICAL: ABNORMAL
DATE ANALYZED: ABNORMAL
DATE ANALYZED: ABNORMAL
DATE OF COLLECTION: ABNORMAL
DATE OF COLLECTION: ABNORMAL
EOSINOPHIL # BLD: 0.04 K/UL (ref 0.05–0.5)
EOSINOPHILS RELATIVE PERCENT: 1 % (ref 0–6)
ERYTHROCYTE [DISTWIDTH] IN BLOOD BY AUTOMATED COUNT: 16.4 % (ref 11.5–15)
FLUAV RNA NPH QL NAA+NON-PROBE: NOT DETECTED
FLUBV RNA NPH QL NAA+NON-PROBE: NOT DETECTED
GFR SERPL CREATININE-BSD FRML MDRD: >60 ML/MIN/1.73M2
GLUCOSE SERPL-MCNC: 97 MG/DL (ref 74–99)
HADV DNA NPH QL NAA+NON-PROBE: NOT DETECTED
HCO3: 24.1 MMOL/L (ref 22–26)
HCO3: 24.7 MMOL/L (ref 22–26)
HCOV 229E RNA NPH QL NAA+NON-PROBE: NOT DETECTED
HCOV HKU1 RNA NPH QL NAA+NON-PROBE: NOT DETECTED
HCOV NL63 RNA NPH QL NAA+NON-PROBE: NOT DETECTED
HCOV OC43 RNA NPH QL NAA+NON-PROBE: NOT DETECTED
HCT VFR BLD AUTO: 35.9 % (ref 37–54)
HDLC SERPL-MCNC: 22 MG/DL
HGB BLD-MCNC: 11.4 G/DL (ref 12.5–16.5)
HHB: 1.9 % (ref 0–5)
HHB: 9.1 % (ref 0–5)
HMPV RNA NPH QL NAA+NON-PROBE: NOT DETECTED
HPIV1 RNA NPH QL NAA+NON-PROBE: NOT DETECTED
HPIV2 RNA NPH QL NAA+NON-PROBE: NOT DETECTED
HPIV3 RNA NPH QL NAA+NON-PROBE: NOT DETECTED
HPIV4 RNA NPH QL NAA+NON-PROBE: NOT DETECTED
IMM GRANULOCYTES # BLD AUTO: 0.04 K/UL (ref 0–0.58)
IMM GRANULOCYTES NFR BLD: 1 % (ref 0–5)
INR PPP: 2
LAB: ABNORMAL
LAB: ABNORMAL
LACTATE BLDV-SCNC: 0.7 MMOL/L (ref 0.5–2.2)
LDLC SERPL CALC-MCNC: 40 MG/DL
LYMPHOCYTES NFR BLD: 0.94 K/UL (ref 1.5–4)
LYMPHOCYTES RELATIVE PERCENT: 14 % (ref 20–42)
Lab: 134
Lab: 942
M PNEUMO DNA NPH QL NAA+NON-PROBE: NOT DETECTED
MAGNESIUM SERPL-MCNC: 1.7 MG/DL (ref 1.6–2.6)
MCH RBC QN AUTO: 29.5 PG (ref 26–35)
MCHC RBC AUTO-ENTMCNC: 31.8 G/DL (ref 32–34.5)
MCV RBC AUTO: 92.8 FL (ref 80–99.9)
METHB: 0.3 % (ref 0–1.5)
METHB: 0.3 % (ref 0–1.5)
MODE: ABNORMAL
MODE: ABNORMAL
MONOCYTES NFR BLD: 0.52 K/UL (ref 0.1–0.95)
MONOCYTES NFR BLD: 8 % (ref 2–12)
NEUTROPHILS NFR BLD: 76 % (ref 43–80)
NEUTS SEG NFR BLD: 5.1 K/UL (ref 1.8–7.3)
O2 CONTENT: 15.9 ML/DL
O2 CONTENT: 16.6 ML/DL
O2 SATURATION: 90.7 % (ref 92–98.5)
O2 SATURATION: 98 % (ref 92–98.5)
O2HB: 88.4 % (ref 94–97)
O2HB: 94.1 % (ref 94–97)
OPERATOR ID: ABNORMAL
OPERATOR ID: ABNORMAL
PATIENT TEMP: 37 C
PATIENT TEMP: 37 C
PCO2: 42.4 MMHG (ref 35–45)
PCO2: 55 MMHG (ref 35–45)
PH BLOOD GAS: 7.27 (ref 7.35–7.45)
PH BLOOD GAS: 7.37 (ref 7.35–7.45)
PHOSPHATE SERPL-MCNC: 3.4 MG/DL (ref 2.5–4.5)
PLATELET # BLD AUTO: 142 K/UL (ref 130–450)
PMV BLD AUTO: 10.9 FL (ref 7–12)
PO2: 124 MMHG (ref 75–100)
PO2: 67.6 MMHG (ref 75–100)
POTASSIUM SERPL-SCNC: 4.7 MMOL/L (ref 3.5–5)
PROCALCITONIN SERPL-MCNC: 0.1 NG/ML (ref 0–0.08)
PROT SERPL-MCNC: 6.1 G/DL (ref 6.4–8.3)
PROTHROMBIN TIME: 23.4 SEC (ref 9.3–12.4)
RBC # BLD AUTO: 3.87 M/UL (ref 3.8–5.8)
RSV RNA NPH QL NAA+NON-PROBE: NOT DETECTED
RV+EV RNA NPH QL NAA+NON-PROBE: NOT DETECTED
SARS-COV-2 RNA NPH QL NAA+NON-PROBE: NOT DETECTED
SODIUM SERPL-SCNC: 137 MMOL/L (ref 132–146)
SOURCE, BLOOD GAS: ABNORMAL
SOURCE, BLOOD GAS: ABNORMAL
SPECIMEN DESCRIPTION: NORMAL
THB: 12.4 G/DL (ref 11.5–16.5)
THB: 12.8 G/DL (ref 11.5–16.5)
TIME ANALYZED: 136
TIME ANALYZED: 944
TRIGL SERPL-MCNC: 140 MG/DL
TROPONIN I SERPL HS-MCNC: 33 NG/L (ref 0–11)
VLDLC SERPL CALC-MCNC: 28 MG/DL
WBC OTHER # BLD: 6.7 K/UL (ref 4.5–11.5)

## 2023-09-25 PROCEDURE — 87081 CULTURE SCREEN ONLY: CPT

## 2023-09-25 PROCEDURE — 87088 URINE BACTERIA CULTURE: CPT

## 2023-09-25 PROCEDURE — 84145 PROCALCITONIN (PCT): CPT

## 2023-09-25 PROCEDURE — 94660 CPAP INITIATION&MGMT: CPT

## 2023-09-25 PROCEDURE — 84484 ASSAY OF TROPONIN QUANT: CPT

## 2023-09-25 PROCEDURE — 71045 X-RAY EXAM CHEST 1 VIEW: CPT

## 2023-09-25 PROCEDURE — 94640 AIRWAY INHALATION TREATMENT: CPT

## 2023-09-25 PROCEDURE — 85610 PROTHROMBIN TIME: CPT

## 2023-09-25 PROCEDURE — C9113 INJ PANTOPRAZOLE SODIUM, VIA: HCPCS | Performed by: INTERNAL MEDICINE

## 2023-09-25 PROCEDURE — 2000000000 HC ICU R&B

## 2023-09-25 PROCEDURE — 80053 COMPREHEN METABOLIC PANEL: CPT

## 2023-09-25 PROCEDURE — 5A09457 ASSISTANCE WITH RESPIRATORY VENTILATION, 24-96 CONSECUTIVE HOURS, CONTINUOUS POSITIVE AIRWAY PRESSURE: ICD-10-PCS | Performed by: INTERNAL MEDICINE

## 2023-09-25 PROCEDURE — 0202U NFCT DS 22 TRGT SARS-COV-2: CPT

## 2023-09-25 PROCEDURE — 2500000003 HC RX 250 WO HCPCS: Performed by: INTERNAL MEDICINE

## 2023-09-25 PROCEDURE — 2580000003 HC RX 258: Performed by: INTERNAL MEDICINE

## 2023-09-25 PROCEDURE — 6360000002 HC RX W HCPCS: Performed by: INTERNAL MEDICINE

## 2023-09-25 PROCEDURE — 87086 URINE CULTURE/COLONY COUNT: CPT

## 2023-09-25 PROCEDURE — 83735 ASSAY OF MAGNESIUM: CPT

## 2023-09-25 PROCEDURE — 74018 RADEX ABDOMEN 1 VIEW: CPT

## 2023-09-25 PROCEDURE — 99291 CRITICAL CARE FIRST HOUR: CPT

## 2023-09-25 PROCEDURE — 82140 ASSAY OF AMMONIA: CPT

## 2023-09-25 PROCEDURE — 6370000000 HC RX 637 (ALT 250 FOR IP): Performed by: INTERNAL MEDICINE

## 2023-09-25 PROCEDURE — 80061 LIPID PANEL: CPT

## 2023-09-25 PROCEDURE — 2700000000 HC OXYGEN THERAPY PER DAY

## 2023-09-25 PROCEDURE — 83880 ASSAY OF NATRIURETIC PEPTIDE: CPT

## 2023-09-25 PROCEDURE — 2580000003 HC RX 258: Performed by: STUDENT IN AN ORGANIZED HEALTH CARE EDUCATION/TRAINING PROGRAM

## 2023-09-25 PROCEDURE — 84100 ASSAY OF PHOSPHORUS: CPT

## 2023-09-25 PROCEDURE — A4216 STERILE WATER/SALINE, 10 ML: HCPCS | Performed by: INTERNAL MEDICINE

## 2023-09-25 PROCEDURE — 2500000003 HC RX 250 WO HCPCS

## 2023-09-25 PROCEDURE — 99291 CRITICAL CARE FIRST HOUR: CPT | Performed by: INTERNAL MEDICINE

## 2023-09-25 PROCEDURE — 99233 SBSQ HOSP IP/OBS HIGH 50: CPT | Performed by: INTERNAL MEDICINE

## 2023-09-25 PROCEDURE — 83605 ASSAY OF LACTIC ACID: CPT

## 2023-09-25 PROCEDURE — 85025 COMPLETE CBC W/AUTO DIFF WBC: CPT

## 2023-09-25 PROCEDURE — 87040 BLOOD CULTURE FOR BACTERIA: CPT

## 2023-09-25 PROCEDURE — 6360000002 HC RX W HCPCS: Performed by: FAMILY MEDICINE

## 2023-09-25 PROCEDURE — 6360000002 HC RX W HCPCS

## 2023-09-25 PROCEDURE — 93308 TTE F-UP OR LMTD: CPT

## 2023-09-25 PROCEDURE — 82805 BLOOD GASES W/O2 SATURATION: CPT

## 2023-09-25 RX ORDER — IPRATROPIUM BROMIDE AND ALBUTEROL SULFATE 2.5; .5 MG/3ML; MG/3ML
1 SOLUTION RESPIRATORY (INHALATION)
Status: DISCONTINUED | OUTPATIENT
Start: 2023-09-25 | End: 2023-10-05

## 2023-09-25 RX ORDER — GABAPENTIN 400 MG/1
400 CAPSULE ORAL EVERY 8 HOURS
Status: DISCONTINUED | OUTPATIENT
Start: 2023-09-25 | End: 2023-09-26

## 2023-09-25 RX ORDER — SODIUM CHLORIDE, SODIUM LACTATE, POTASSIUM CHLORIDE, CALCIUM CHLORIDE 600; 310; 30; 20 MG/100ML; MG/100ML; MG/100ML; MG/100ML
INJECTION, SOLUTION INTRAVENOUS CONTINUOUS
Status: DISCONTINUED | OUTPATIENT
Start: 2023-09-25 | End: 2023-09-30

## 2023-09-25 RX ORDER — CLONIDINE 0.3 MG/24H
1 PATCH, EXTENDED RELEASE TRANSDERMAL WEEKLY
Status: DISCONTINUED | OUTPATIENT
Start: 2023-09-25 | End: 2023-10-09

## 2023-09-25 RX ORDER — FUROSEMIDE 10 MG/ML
20 INJECTION INTRAMUSCULAR; INTRAVENOUS ONCE
Status: COMPLETED | OUTPATIENT
Start: 2023-09-25 | End: 2023-09-25

## 2023-09-25 RX ORDER — HYDRALAZINE HYDROCHLORIDE 20 MG/ML
10 INJECTION INTRAMUSCULAR; INTRAVENOUS EVERY 4 HOURS PRN
Status: DISCONTINUED | OUTPATIENT
Start: 2023-09-25 | End: 2023-10-10 | Stop reason: HOSPADM

## 2023-09-25 RX ORDER — DEXMEDETOMIDINE HYDROCHLORIDE 4 UG/ML
.1-1.5 INJECTION, SOLUTION INTRAVENOUS CONTINUOUS
Status: DISCONTINUED | OUTPATIENT
Start: 2023-09-25 | End: 2023-09-30

## 2023-09-25 RX ORDER — LORAZEPAM 2 MG/ML
1 INJECTION INTRAMUSCULAR EVERY 4 HOURS PRN
Status: DISCONTINUED | OUTPATIENT
Start: 2023-09-25 | End: 2023-10-01

## 2023-09-25 RX ORDER — CLONIDINE 0.2 MG/24H
1 PATCH, EXTENDED RELEASE TRANSDERMAL WEEKLY
Status: DISCONTINUED | OUTPATIENT
Start: 2023-09-25 | End: 2023-09-25

## 2023-09-25 RX ORDER — AMLODIPINE BESYLATE 5 MG/1
5 TABLET ORAL DAILY
Status: DISCONTINUED | OUTPATIENT
Start: 2023-09-25 | End: 2023-10-10 | Stop reason: HOSPADM

## 2023-09-25 RX ORDER — BUDESONIDE 0.5 MG/2ML
0.5 INHALANT ORAL
Status: DISCONTINUED | OUTPATIENT
Start: 2023-09-25 | End: 2023-10-05

## 2023-09-25 RX ORDER — ARFORMOTEROL TARTRATE 15 UG/2ML
15 SOLUTION RESPIRATORY (INHALATION)
Status: DISCONTINUED | OUTPATIENT
Start: 2023-09-25 | End: 2023-09-25

## 2023-09-25 RX ADMIN — Medication 10 ML: at 22:59

## 2023-09-25 RX ADMIN — IPRATROPIUM BROMIDE 0.5 MG: 0.5 SOLUTION RESPIRATORY (INHALATION) at 04:05

## 2023-09-25 RX ADMIN — PHENOBARBITAL SODIUM 65 MG: 65 INJECTION INTRAMUSCULAR; INTRAVENOUS at 13:27

## 2023-09-25 RX ADMIN — Medication 0.2 MCG/KG/HR: at 01:10

## 2023-09-25 RX ADMIN — Medication 10 ML: at 09:17

## 2023-09-25 RX ADMIN — GABAPENTIN 400 MG: 400 CAPSULE ORAL at 17:20

## 2023-09-25 RX ADMIN — PHENOBARBITAL SODIUM 65 MG: 65 INJECTION INTRAMUSCULAR; INTRAVENOUS at 17:16

## 2023-09-25 RX ADMIN — IPRATROPIUM BROMIDE AND ALBUTEROL SULFATE 1 DOSE: .5; 2.5 SOLUTION RESPIRATORY (INHALATION) at 18:23

## 2023-09-25 RX ADMIN — FUROSEMIDE 20 MG: 10 INJECTION, SOLUTION INTRAMUSCULAR; INTRAVENOUS at 07:07

## 2023-09-25 RX ADMIN — IPRATROPIUM BROMIDE AND ALBUTEROL SULFATE 1 DOSE: .5; 2.5 SOLUTION RESPIRATORY (INHALATION) at 14:27

## 2023-09-25 RX ADMIN — ANTI-FUNGAL POWDER MICONAZOLE NITRATE TALC FREE: 1.42 POWDER TOPICAL at 03:00

## 2023-09-25 RX ADMIN — ARFORMOTEROL TARTRATE 15 MCG: 15 SOLUTION RESPIRATORY (INHALATION) at 04:05

## 2023-09-25 RX ADMIN — AMLODIPINE BESYLATE 5 MG: 5 TABLET ORAL at 17:16

## 2023-09-25 RX ADMIN — SODIUM CHLORIDE, POTASSIUM CHLORIDE, SODIUM LACTATE AND CALCIUM CHLORIDE: 600; 310; 30; 20 INJECTION, SOLUTION INTRAVENOUS at 13:19

## 2023-09-25 RX ADMIN — SODIUM CHLORIDE, PRESERVATIVE FREE 40 MG: 5 INJECTION INTRAVENOUS at 13:24

## 2023-09-25 RX ADMIN — Medication 1 MCG/KG/HR: at 13:16

## 2023-09-25 RX ADMIN — ANTI-FUNGAL POWDER MICONAZOLE NITRATE TALC FREE: 1.42 POWDER TOPICAL at 09:15

## 2023-09-25 RX ADMIN — Medication 1 MCG/KG/HR: at 23:19

## 2023-09-25 RX ADMIN — ANTI-FUNGAL POWDER MICONAZOLE NITRATE TALC FREE: 1.42 POWDER TOPICAL at 22:59

## 2023-09-25 RX ADMIN — THIAMINE HYDROCHLORIDE 500 MG: 100 INJECTION, SOLUTION INTRAMUSCULAR; INTRAVENOUS at 17:26

## 2023-09-25 RX ADMIN — PHENOBARBITAL SODIUM 32.5 MG: 65 INJECTION INTRAMUSCULAR; INTRAVENOUS at 22:59

## 2023-09-25 RX ADMIN — IPRATROPIUM BROMIDE 0.5 MG: 0.5 SOLUTION RESPIRATORY (INHALATION) at 09:57

## 2023-09-25 RX ADMIN — BUDESONIDE 500 MCG: 0.5 SUSPENSION RESPIRATORY (INHALATION) at 04:06

## 2023-09-25 RX ADMIN — HYDRALAZINE HYDROCHLORIDE 10 MG: 20 INJECTION INTRAMUSCULAR; INTRAVENOUS at 19:11

## 2023-09-25 RX ADMIN — FOLIC ACID 1 MG: 5 INJECTION, SOLUTION INTRAMUSCULAR; INTRAVENOUS; SUBCUTANEOUS at 13:24

## 2023-09-25 RX ADMIN — Medication 10 ML: at 23:00

## 2023-09-25 RX ADMIN — ENOXAPARIN SODIUM 120 MG: 150 INJECTION SUBCUTANEOUS at 09:01

## 2023-09-25 RX ADMIN — BUDESONIDE 500 MCG: 0.5 SUSPENSION RESPIRATORY (INHALATION) at 18:24

## 2023-09-25 RX ADMIN — ENOXAPARIN SODIUM 120 MG: 150 INJECTION SUBCUTANEOUS at 21:30

## 2023-09-25 RX ADMIN — Medication 1 MCG/KG/HR: at 10:35

## 2023-09-25 RX ADMIN — Medication 1 MCG/KG/HR: at 20:00

## 2023-09-25 NOTE — ED NOTES
Following ativan and phenobarbital administration patient's O2 dropped to 89%. Patient placed on 4L NC and is now 95%.       Abbey Carlton  09/24/23 7546

## 2023-09-25 NOTE — ACP (ADVANCE CARE PLANNING)
Advance Care Planning   Healthcare Decision Maker:    Primary Decision Maker: Smith Brand Child - [phone]    Today we documented Decision Maker(s) consistent with ACP documents on file. Sammy Horton faxed [id number] documents- Cm placed in soft chart.          Electronically signed by Vivian Wiley RN-BC on 9/25/2023 at 11:13 AM

## 2023-09-25 NOTE — ED NOTES
Discussed with Dr Kassie Tejeda about patients phenobarbital order. Patient is still agitated and restless following IV ativan. Per Dr Kassie Tejeda, the oral phenobarbital will be changed to IV due to concern for patient not being able to swallow pills.       Shlomo Julian RN  09/24/23 1634

## 2023-09-25 NOTE — ACP (ADVANCE CARE PLANNING)
Advance Care Planning   Healthcare Decision Maker:    Primary Decision Maker: Harrison Felix Child - [phone]    Primary Decision Maker: Francisco Garcia - [phone]    Today we documented Decision Maker(s) consistent with Legal Next of Kin hierarchy.        Electronically signed by SIMEON Larsen on 9/25/2023 at 7:38 AM

## 2023-09-25 NOTE — PLAN OF CARE
Problem: Discharge Planning  Goal: Discharge to home or other facility with appropriate resources  Outcome: Progressing     Problem: Pain  Goal: Verbalizes/displays adequate comfort level or baseline comfort level  Outcome: Progressing     Problem: Safety - Adult  Goal: Free from fall injury  Outcome: Progressing     Problem: Skin/Tissue Integrity  Goal: Absence of new skin breakdown  Description: 1. Monitor for areas of redness and/or skin breakdown  2. Assess vascular access sites hourly  3. Every 4-6 hours minimum:  Change oxygen saturation probe site  4. Every 4-6 hours:  If on nasal continuous positive airway pressure, respiratory therapy assess nares and determine need for appliance change or resting period.   Outcome: Progressing     Problem: Neurosensory - Adult  Goal: Achieves stable or improved neurological status  Outcome: Progressing     Problem: Neurosensory - Adult  Goal: Absence of seizures  Outcome: Progressing     Problem: Neurosensory - Adult  Goal: Remains free of injury related to seizures activity  Outcome: Progressing     Problem: Neurosensory - Adult  Goal: Achieves maximal functionality and self care  Outcome: Progressing     Problem: Respiratory - Adult  Goal: Achieves optimal ventilation and oxygenation  Outcome: Progressing     Problem: Cardiovascular - Adult  Goal: Maintains optimal cardiac output and hemodynamic stability  Outcome: Progressing     Problem: Cardiovascular - Adult  Goal: Absence of cardiac dysrhythmias or at baseline  Outcome: Progressing     Problem: Musculoskeletal - Adult  Goal: Return ADL status to a safe level of function  Outcome: Progressing     Problem: Metabolic/Fluid and Electrolytes - Adult  Goal: Electrolytes maintained within normal limits  Outcome: Progressing     Problem: Metabolic/Fluid and Electrolytes - Adult  Goal: Hemodynamic stability and optimal renal function maintained  Outcome: Progressing

## 2023-09-25 NOTE — CARE COORDINATION
Case Management Assessment  Initial Evaluation    Date/Time of Evaluation: 9/25/2023 10:59 AM  Assessment Completed by: Gaby Valle RN    If patient is discharged prior to next notation, then this note serves as note for discharge by case management. Patient Name: Rinaldo Opitz                   YOB: 1955  Diagnosis: Elevated LFTs [R79.89]  Alcohol withdrawal syndrome with complication ([address]) [G13.953]  Alcohol withdrawal syndrome with perceptual disturbance ([address]) [F10.932]  Impending delirium tremens ([address]) [F10.239]                   Date / Time: 9/24/2023  2:45 PM    Patient Admission Status: Inpatient   Readmission Risk (Low < 19, Mod (19-27), High > 27): No data recorded  Current PCP: MARTY Schmid NP  PCP verified by CM?  Yes    Chart Reviewed: Yes      History Provided by: Child/Family Darryle Fick daughter)  Patient Orientation: Unable to Assess, Other (see comment) (etoh withdrawals- active- has precedex gtt, CIWA, Pheno)    Patient Cognition: Other (see comment) (active DT's)    Hospitalization in the last 30 days (Readmission):  No    Advance Directives:      Code Status: Limited   Patient's Primary Decision Maker is: Legal Next of [address] [place] [address] Darryle Fick to fax dpoa-hc listing her only)    Primary Decision Maker: Nichelle Carter Child - [phone]    Discharge Planning:    Patient lives with:   Type of Home:    Primary Care Giver: Self  Patient Support Systems include: Children     Current services prior to admission:  none            Current DME:  walker, cane,             Type of Home Care services:   none    ADLS  Prior functional level: Assistance with the following:, Other (see comment), Shopping (when he leaves his home he uses a walker- Darryle Ramana does grocery ordering and these are sent to his home)  Current functional level: Assistance with the following:, Bathing, Dressing, Toileting, Feeding, Cooking, Housework, Shopping, Mobility, Other (see comment) (Dt's etoh)      Family can

## 2023-09-25 NOTE — CONSULTS
Assessment and Plan   Alcohol Withdrawal Syndrome with delirium: last drink 2 days/ago  Elevated LFTs  Atrial Fibrillation chronic  Tobacco Abuse  ETOH abuse  CAD  COPD  Depression  Hypertension  Seizure  Morbid Obesity BMI 36.62    PLAN  Continue IV Phenobarbital  Continue CIWA protocol  Start Precadex for agitation  Continue IV thiamine 500 mg daily for 3 days followed by 100 mg daily  Continue IV fluids Normal saline 75 cc/hr  Seizure precaution  Hold Xarelto  Lovenox 120 mg BID SQ  Liver Ultrasound  Nicotine Patch 21 mg trans q24 hrs  Continue Fenofibrate, lisinopril, metoprolol buspirone  Continue Duo nebs  Protonix 40 mg daily  Continue Crestor, Zoloft  CMP, MG, Phos, CBC, coags AM  Pro BNP, lactic acid, lipids, NH4, Trop  Blood cultures, Sputum culture   MRSA, PCR  UA, urine legionella, strep  ECHO  Xifaxan 400 mg TID PO  Brovana, Pulmicort  DISPOSITON:  MICU  CODE STATUS:  LIMITED/NO INTUBATION    History of Present Illness:  Patient is a 80 y.o. male with the following medical Problems: atrial fibrillation, coronary artery disease, COPD, depression, hypertension He also has a Longstanding history of alcohol abuse resulting in a 32-day hospitalization for delirium, tremors in 2021 which a the time resulted in requiring a tracheotomy and PEG tube placement. Since then have been removed. He has been drinking about 1/5 of liquor per day\" sometimes more, sometimes less\". He states he quit drinking 2 weeks ago, bu his daughter reported that it was more likely 2 days ago, he was preparing to go visit his daughter. Patient is very tremulous, and states he was seeing many people in his house that he knew were not there, also c/o not being able to sleep. Daughter had called the patient and was found to be confused with hallucinations. ED work-up: Na 137, K 4.9, , CL 24, BUN 22, Creat 1.2, Anion Gap 12, Glucose 93, ALT 82, , BILI 1.3, HGB 12.8, HCT 39.8, , UDS -, CT Head -, US liver -.

## 2023-09-25 NOTE — ED NOTES
Patient still agitated following administration of ativan and phenobarbital. An additional order for phenobarbital obtained and will be administered. Dr Ramana Mcclain at bedside and aware of patient not being able to have oral medication.       Devyn Mckeon RN  09/24/23 2050

## 2023-09-26 LAB
ALBUMIN SERPL-MCNC: 3.4 G/DL (ref 3.5–5.2)
ALP SERPL-CCNC: 62 U/L (ref 40–129)
ALT SERPL-CCNC: 59 U/L (ref 0–40)
ANION GAP SERPL CALCULATED.3IONS-SCNC: 13 MMOL/L (ref 7–16)
AST SERPL-CCNC: 46 U/L (ref 0–39)
BASOPHILS # BLD: 0 K/UL (ref 0–0.2)
BASOPHILS NFR BLD: 0 % (ref 0–2)
BILIRUB SERPL-MCNC: 1.3 MG/DL (ref 0–1.2)
BUN SERPL-MCNC: 22 MG/DL (ref 6–23)
CALCIUM SERPL-MCNC: 8.7 MG/DL (ref 8.6–10.2)
CHLORIDE SERPL-SCNC: 99 MMOL/L (ref 98–107)
CO2 SERPL-SCNC: 21 MMOL/L (ref 22–29)
CREAT SERPL-MCNC: 0.9 MG/DL (ref 0.7–1.2)
EKG ATRIAL RATE: 88 BPM
EKG Q-T INTERVAL: 352 MS
EKG QRS DURATION: 92 MS
EKG QTC CALCULATION (BAZETT): 425 MS
EKG R AXIS: -14 DEGREES
EKG T AXIS: -156 DEGREES
EKG VENTRICULAR RATE: 88 BPM
EOSINOPHIL # BLD: 0 K/UL (ref 0.05–0.5)
EOSINOPHILS RELATIVE PERCENT: 0 % (ref 0–6)
ERYTHROCYTE [DISTWIDTH] IN BLOOD BY AUTOMATED COUNT: 15.9 % (ref 11.5–15)
GFR SERPL CREATININE-BSD FRML MDRD: >60 ML/MIN/1.73M2
GLUCOSE SERPL-MCNC: 120 MG/DL (ref 74–99)
HCT VFR BLD AUTO: 37 % (ref 37–54)
HGB BLD-MCNC: 11.9 G/DL (ref 12.5–16.5)
INR PPP: 1.4
LYMPHOCYTES NFR BLD: 0.4 K/UL (ref 1.5–4)
LYMPHOCYTES RELATIVE PERCENT: 6 % (ref 20–42)
MAGNESIUM SERPL-MCNC: 1.6 MG/DL (ref 1.6–2.6)
MCH RBC QN AUTO: 29 PG (ref 26–35)
MCHC RBC AUTO-ENTMCNC: 32.2 G/DL (ref 32–34.5)
MCV RBC AUTO: 90 FL (ref 80–99.9)
MICROORGANISM SPEC CULT: NORMAL
MONOCYTES NFR BLD: 0.34 K/UL (ref 0.1–0.95)
MONOCYTES NFR BLD: 5 % (ref 2–12)
NEUTROPHILS NFR BLD: 89 % (ref 43–80)
NEUTS SEG NFR BLD: 5.77 K/UL (ref 1.8–7.3)
PHOSPHATE SERPL-MCNC: 2.2 MG/DL (ref 2.5–4.5)
PLATELET # BLD AUTO: 160 K/UL (ref 130–450)
PMV BLD AUTO: 10.6 FL (ref 7–12)
POTASSIUM SERPL-SCNC: 4.4 MMOL/L (ref 3.5–5)
PROT SERPL-MCNC: 6.3 G/DL (ref 6.4–8.3)
PROTHROMBIN TIME: 15.9 SEC (ref 9.3–12.4)
RBC # BLD AUTO: 4.11 M/UL (ref 3.8–5.8)
RBC # BLD: NORMAL 10*6/UL
SODIUM SERPL-SCNC: 133 MMOL/L (ref 132–146)
SPECIMEN DESCRIPTION: NORMAL
WBC OTHER # BLD: 6.5 K/UL (ref 4.5–11.5)

## 2023-09-26 PROCEDURE — 6360000002 HC RX W HCPCS: Performed by: INTERNAL MEDICINE

## 2023-09-26 PROCEDURE — 85610 PROTHROMBIN TIME: CPT

## 2023-09-26 PROCEDURE — 2000000000 HC ICU R&B

## 2023-09-26 PROCEDURE — 83735 ASSAY OF MAGNESIUM: CPT

## 2023-09-26 PROCEDURE — 2500000003 HC RX 250 WO HCPCS

## 2023-09-26 PROCEDURE — 99291 CRITICAL CARE FIRST HOUR: CPT | Performed by: INTERNAL MEDICINE

## 2023-09-26 PROCEDURE — 80053 COMPREHEN METABOLIC PANEL: CPT

## 2023-09-26 PROCEDURE — C9113 INJ PANTOPRAZOLE SODIUM, VIA: HCPCS | Performed by: INTERNAL MEDICINE

## 2023-09-26 PROCEDURE — 85025 COMPLETE CBC W/AUTO DIFF WBC: CPT

## 2023-09-26 PROCEDURE — 94640 AIRWAY INHALATION TREATMENT: CPT

## 2023-09-26 PROCEDURE — 84100 ASSAY OF PHOSPHORUS: CPT

## 2023-09-26 PROCEDURE — 99232 SBSQ HOSP IP/OBS MODERATE 35: CPT | Performed by: INTERNAL MEDICINE

## 2023-09-26 PROCEDURE — 6370000000 HC RX 637 (ALT 250 FOR IP): Performed by: INTERNAL MEDICINE

## 2023-09-26 PROCEDURE — 6360000002 HC RX W HCPCS

## 2023-09-26 PROCEDURE — 94667 MNPJ CHEST WALL 1ST: CPT

## 2023-09-26 PROCEDURE — 6370000000 HC RX 637 (ALT 250 FOR IP)

## 2023-09-26 PROCEDURE — 2580000003 HC RX 258: Performed by: INTERNAL MEDICINE

## 2023-09-26 PROCEDURE — 2580000003 HC RX 258: Performed by: STUDENT IN AN ORGANIZED HEALTH CARE EDUCATION/TRAINING PROGRAM

## 2023-09-26 PROCEDURE — A4216 STERILE WATER/SALINE, 10 ML: HCPCS | Performed by: INTERNAL MEDICINE

## 2023-09-26 PROCEDURE — 93010 ELECTROCARDIOGRAM REPORT: CPT | Performed by: INTERNAL MEDICINE

## 2023-09-26 PROCEDURE — 2700000000 HC OXYGEN THERAPY PER DAY

## 2023-09-26 PROCEDURE — 2500000003 HC RX 250 WO HCPCS: Performed by: INTERNAL MEDICINE

## 2023-09-26 PROCEDURE — 94660 CPAP INITIATION&MGMT: CPT

## 2023-09-26 PROCEDURE — 6360000002 HC RX W HCPCS: Performed by: FAMILY MEDICINE

## 2023-09-26 RX ORDER — GABAPENTIN 300 MG/1
600 CAPSULE ORAL EVERY 8 HOURS
Status: DISCONTINUED | OUTPATIENT
Start: 2023-09-26 | End: 2023-09-26

## 2023-09-26 RX ORDER — GABAPENTIN 250 MG/5ML
600 SOLUTION ORAL EVERY 8 HOURS
Status: DISCONTINUED | OUTPATIENT
Start: 2023-09-26 | End: 2023-10-05

## 2023-09-26 RX ORDER — ACETYLCYSTEINE 100 MG/ML
4 SOLUTION ORAL; RESPIRATORY (INHALATION) EVERY 4 HOURS
Status: DISCONTINUED | OUTPATIENT
Start: 2023-09-26 | End: 2023-10-05

## 2023-09-26 RX ORDER — POTASSIUM CHLORIDE 7.45 MG/ML
10 INJECTION INTRAVENOUS PRN
Status: DISCONTINUED | OUTPATIENT
Start: 2023-09-26 | End: 2023-10-05

## 2023-09-26 RX ORDER — MAGNESIUM SULFATE IN WATER 40 MG/ML
2000 INJECTION, SOLUTION INTRAVENOUS PRN
Status: DISCONTINUED | OUTPATIENT
Start: 2023-09-26 | End: 2023-10-10 | Stop reason: HOSPADM

## 2023-09-26 RX ORDER — MAGNESIUM SULFATE IN WATER 40 MG/ML
2000 INJECTION, SOLUTION INTRAVENOUS ONCE
Status: COMPLETED | OUTPATIENT
Start: 2023-09-26 | End: 2023-09-26

## 2023-09-26 RX ORDER — GUAIFENESIN/DEXTROMETHORPHAN 100-10MG/5
10 SYRUP ORAL EVERY 6 HOURS
Status: DISCONTINUED | OUTPATIENT
Start: 2023-09-26 | End: 2023-10-05

## 2023-09-26 RX ORDER — POTASSIUM CHLORIDE 20 MEQ/1
40 TABLET, EXTENDED RELEASE ORAL PRN
Status: DISCONTINUED | OUTPATIENT
Start: 2023-09-26 | End: 2023-10-05

## 2023-09-26 RX ORDER — ENOXAPARIN SODIUM 150 MG/ML
1 INJECTION SUBCUTANEOUS 2 TIMES DAILY
Status: DISCONTINUED | OUTPATIENT
Start: 2023-09-26 | End: 2023-09-26

## 2023-09-26 RX ADMIN — Medication 1 MCG/KG/HR: at 03:08

## 2023-09-26 RX ADMIN — IPRATROPIUM BROMIDE AND ALBUTEROL SULFATE 1 DOSE: .5; 2.5 SOLUTION RESPIRATORY (INHALATION) at 05:37

## 2023-09-26 RX ADMIN — APIXABAN 5 MG: 5 TABLET, FILM COATED ORAL at 20:17

## 2023-09-26 RX ADMIN — Medication 1 MCG/KG/HR: at 20:16

## 2023-09-26 RX ADMIN — GABAPENTIN 400 MG: 400 CAPSULE ORAL at 02:00

## 2023-09-26 RX ADMIN — BUDESONIDE 500 MCG: 0.5 SUSPENSION RESPIRATORY (INHALATION) at 16:42

## 2023-09-26 RX ADMIN — IPRATROPIUM BROMIDE AND ALBUTEROL SULFATE 1 DOSE: .5; 2.5 SOLUTION RESPIRATORY (INHALATION) at 16:42

## 2023-09-26 RX ADMIN — PHENOBARBITAL SODIUM 32.5 MG: 65 INJECTION INTRAMUSCULAR; INTRAVENOUS at 13:15

## 2023-09-26 RX ADMIN — ACETYLCYSTEINE 400 MG: 100 INHALANT RESPIRATORY (INHALATION) at 16:42

## 2023-09-26 RX ADMIN — Medication 10 ML: at 20:16

## 2023-09-26 RX ADMIN — GUAIFENESIN AND DEXTROMETHORPHAN 10 ML: 100; 10 SYRUP ORAL at 05:30

## 2023-09-26 RX ADMIN — ACETYLCYSTEINE 400 MG: 100 INHALANT RESPIRATORY (INHALATION) at 13:08

## 2023-09-26 RX ADMIN — PHENOBARBITAL SODIUM 32.5 MG: 65 INJECTION INTRAMUSCULAR; INTRAVENOUS at 20:17

## 2023-09-26 RX ADMIN — Medication 10 ML: at 09:15

## 2023-09-26 RX ADMIN — SERTRALINE 100 MG: 50 TABLET, FILM COATED ORAL at 09:15

## 2023-09-26 RX ADMIN — Medication 1 MCG/KG/HR: at 09:52

## 2023-09-26 RX ADMIN — BUDESONIDE 500 MCG: 0.5 SUSPENSION RESPIRATORY (INHALATION) at 05:38

## 2023-09-26 RX ADMIN — ACETYLCYSTEINE 400 MG: 100 INHALANT RESPIRATORY (INHALATION) at 22:14

## 2023-09-26 RX ADMIN — PHENOBARBITAL SODIUM 32.5 MG: 65 INJECTION INTRAMUSCULAR; INTRAVENOUS at 09:14

## 2023-09-26 RX ADMIN — ENOXAPARIN SODIUM 135 MG: 150 INJECTION SUBCUTANEOUS at 09:12

## 2023-09-26 RX ADMIN — Medication 1 MCG/KG/HR: at 13:05

## 2023-09-26 RX ADMIN — ANTI-FUNGAL POWDER MICONAZOLE NITRATE TALC FREE: 1.42 POWDER TOPICAL at 09:13

## 2023-09-26 RX ADMIN — PHENOBARBITAL SODIUM 32.5 MG: 65 INJECTION INTRAMUSCULAR; INTRAVENOUS at 16:58

## 2023-09-26 RX ADMIN — ANTI-FUNGAL POWDER MICONAZOLE NITRATE TALC FREE: 1.42 POWDER TOPICAL at 20:17

## 2023-09-26 RX ADMIN — GUAIFENESIN AND DEXTROMETHORPHAN 10 ML: 100; 10 SYRUP ORAL at 11:45

## 2023-09-26 RX ADMIN — MAGNESIUM SULFATE HEPTAHYDRATE 2000 MG: 2 INJECTION, SOLUTION INTRAVENOUS at 11:55

## 2023-09-26 RX ADMIN — SODIUM PHOSPHATE, MONOBASIC, MONOHYDRATE AND SODIUM PHOSPHATE, DIBASIC, ANHYDROUS 15 MMOL: 276; 142 INJECTION, SOLUTION INTRAVENOUS at 12:00

## 2023-09-26 RX ADMIN — GUAIFENESIN AND DEXTROMETHORPHAN 10 ML: 100; 10 SYRUP ORAL at 16:58

## 2023-09-26 RX ADMIN — THIAMINE HYDROCHLORIDE 500 MG: 100 INJECTION, SOLUTION INTRAMUSCULAR; INTRAVENOUS at 17:42

## 2023-09-26 RX ADMIN — SODIUM CHLORIDE, PRESERVATIVE FREE 40 MG: 5 INJECTION INTRAVENOUS at 09:14

## 2023-09-26 RX ADMIN — IPRATROPIUM BROMIDE AND ALBUTEROL SULFATE 1 DOSE: .5; 2.5 SOLUTION RESPIRATORY (INHALATION) at 13:08

## 2023-09-26 RX ADMIN — IPRATROPIUM BROMIDE AND ALBUTEROL SULFATE 1 DOSE: .5; 2.5 SOLUTION RESPIRATORY (INHALATION) at 09:32

## 2023-09-26 RX ADMIN — GUAIFENESIN AND DEXTROMETHORPHAN 10 ML: 100; 10 SYRUP ORAL at 22:32

## 2023-09-26 RX ADMIN — SODIUM CHLORIDE, POTASSIUM CHLORIDE, SODIUM LACTATE AND CALCIUM CHLORIDE: 600; 310; 30; 20 INJECTION, SOLUTION INTRAVENOUS at 09:21

## 2023-09-26 RX ADMIN — Medication 1 MCG/KG/HR: at 16:55

## 2023-09-26 RX ADMIN — Medication 1 MCG/KG/HR: at 06:39

## 2023-09-26 RX ADMIN — Medication 1 MCG/KG/HR: at 23:52

## 2023-09-26 RX ADMIN — GABAPENTIN 600 MG: 250 SOLUTION ORAL at 16:57

## 2023-09-26 RX ADMIN — FOLIC ACID 1 MG: 5 INJECTION, SOLUTION INTRAMUSCULAR; INTRAVENOUS; SUBCUTANEOUS at 09:28

## 2023-09-26 RX ADMIN — AMLODIPINE BESYLATE 5 MG: 5 TABLET ORAL at 09:11

## 2023-09-26 RX ADMIN — GABAPENTIN 400 MG: 400 CAPSULE ORAL at 09:13

## 2023-09-26 NOTE — CARE COORDINATION
9/26/2023 ICU, bipap, precedex gtt- AMS. Daughter Marc Bragg would like patient to go to etoh rehab- pt must agree and be able to particpate. Pt does drive, [facility] [place] [facility]. Unclear transportation home if pt stays past the weekend. Pt changed from Xarelto to Eliquis- savings card in soft chart. Code status Limited all No's. Pts daughter provided dpoa-hc documents with a Living will and other such poa. Placed in soft chart. CM following.  Electronically signed by Marah Steward RN-BC on 9/26/2023 at 2:24 PM

## 2023-09-27 LAB
ALBUMIN SERPL-MCNC: 3.1 G/DL (ref 3.5–5.2)
ALP SERPL-CCNC: 50 U/L (ref 40–129)
ALT SERPL-CCNC: 37 U/L (ref 0–40)
ANION GAP SERPL CALCULATED.3IONS-SCNC: 9 MMOL/L (ref 7–16)
AST SERPL-CCNC: 22 U/L (ref 0–39)
BASOPHILS # BLD: 0 K/UL (ref 0–0.2)
BASOPHILS NFR BLD: 0 % (ref 0–2)
BILIRUB SERPL-MCNC: 1.8 MG/DL (ref 0–1.2)
BUN SERPL-MCNC: 18 MG/DL (ref 6–23)
CALCIUM SERPL-MCNC: 8.2 MG/DL (ref 8.6–10.2)
CHLORIDE SERPL-SCNC: 101 MMOL/L (ref 98–107)
CO2 SERPL-SCNC: 24 MMOL/L (ref 22–29)
CREAT SERPL-MCNC: 0.9 MG/DL (ref 0.7–1.2)
EOSINOPHIL # BLD: 0 K/UL (ref 0.05–0.5)
EOSINOPHILS RELATIVE PERCENT: 0 % (ref 0–6)
ERYTHROCYTE [DISTWIDTH] IN BLOOD BY AUTOMATED COUNT: 16.1 % (ref 11.5–15)
GFR SERPL CREATININE-BSD FRML MDRD: >60 ML/MIN/1.73M2
GLUCOSE SERPL-MCNC: 129 MG/DL (ref 74–99)
HCT VFR BLD AUTO: 33.9 % (ref 37–54)
HGB BLD-MCNC: 10.9 G/DL (ref 12.5–16.5)
LYMPHOCYTES NFR BLD: 0.75 K/UL (ref 1.5–4)
LYMPHOCYTES RELATIVE PERCENT: 12 % (ref 20–42)
MAGNESIUM SERPL-MCNC: 2 MG/DL (ref 1.6–2.6)
MCH RBC QN AUTO: 29.1 PG (ref 26–35)
MCHC RBC AUTO-ENTMCNC: 32.2 G/DL (ref 32–34.5)
MCV RBC AUTO: 90.4 FL (ref 80–99.9)
MICROORGANISM SPEC CULT: ABNORMAL
MONOCYTES NFR BLD: 0.27 K/UL (ref 0.1–0.95)
MONOCYTES NFR BLD: 4 % (ref 2–12)
MYELOCYTES ABSOLUTE COUNT: 0.05 K/UL
MYELOCYTES: 1 %
NEUTROPHILS NFR BLD: 83 % (ref 43–80)
NEUTS SEG NFR BLD: 5.12 K/UL (ref 1.8–7.3)
NUCLEATED RED BLOOD CELLS: 1 PER 100 WBC
PHOSPHATE SERPL-MCNC: 1.9 MG/DL (ref 2.5–4.5)
PLATELET # BLD AUTO: 149 K/UL (ref 130–450)
PMV BLD AUTO: 10.3 FL (ref 7–12)
POTASSIUM SERPL-SCNC: 4 MMOL/L (ref 3.5–5)
PROT SERPL-MCNC: 5.9 G/DL (ref 6.4–8.3)
RBC # BLD AUTO: 3.75 M/UL (ref 3.8–5.8)
RBC # BLD: ABNORMAL 10*6/UL
SODIUM SERPL-SCNC: 134 MMOL/L (ref 132–146)
SPECIMEN DESCRIPTION: ABNORMAL
WBC OTHER # BLD: 6.2 K/UL (ref 4.5–11.5)

## 2023-09-27 PROCEDURE — 2500000003 HC RX 250 WO HCPCS: Performed by: INTERNAL MEDICINE

## 2023-09-27 PROCEDURE — 6360000002 HC RX W HCPCS: Performed by: FAMILY MEDICINE

## 2023-09-27 PROCEDURE — 36415 COLL VENOUS BLD VENIPUNCTURE: CPT

## 2023-09-27 PROCEDURE — 80053 COMPREHEN METABOLIC PANEL: CPT

## 2023-09-27 PROCEDURE — 6360000002 HC RX W HCPCS: Performed by: INTERNAL MEDICINE

## 2023-09-27 PROCEDURE — 6370000000 HC RX 637 (ALT 250 FOR IP): Performed by: INTERNAL MEDICINE

## 2023-09-27 PROCEDURE — 97165 OT EVAL LOW COMPLEX 30 MIN: CPT | Performed by: OCCUPATIONAL THERAPIST

## 2023-09-27 PROCEDURE — 83735 ASSAY OF MAGNESIUM: CPT

## 2023-09-27 PROCEDURE — 99232 SBSQ HOSP IP/OBS MODERATE 35: CPT | Performed by: INTERNAL MEDICINE

## 2023-09-27 PROCEDURE — 6360000002 HC RX W HCPCS

## 2023-09-27 PROCEDURE — 84100 ASSAY OF PHOSPHORUS: CPT

## 2023-09-27 PROCEDURE — 94640 AIRWAY INHALATION TREATMENT: CPT

## 2023-09-27 PROCEDURE — 97530 THERAPEUTIC ACTIVITIES: CPT | Performed by: OCCUPATIONAL THERAPIST

## 2023-09-27 PROCEDURE — 94660 CPAP INITIATION&MGMT: CPT

## 2023-09-27 PROCEDURE — 2700000000 HC OXYGEN THERAPY PER DAY

## 2023-09-27 PROCEDURE — C9113 INJ PANTOPRAZOLE SODIUM, VIA: HCPCS | Performed by: INTERNAL MEDICINE

## 2023-09-27 PROCEDURE — 6370000000 HC RX 637 (ALT 250 FOR IP)

## 2023-09-27 PROCEDURE — 2500000003 HC RX 250 WO HCPCS

## 2023-09-27 PROCEDURE — 99291 CRITICAL CARE FIRST HOUR: CPT | Performed by: INTERNAL MEDICINE

## 2023-09-27 PROCEDURE — 2580000003 HC RX 258: Performed by: STUDENT IN AN ORGANIZED HEALTH CARE EDUCATION/TRAINING PROGRAM

## 2023-09-27 PROCEDURE — A4216 STERILE WATER/SALINE, 10 ML: HCPCS | Performed by: INTERNAL MEDICINE

## 2023-09-27 PROCEDURE — 2580000003 HC RX 258: Performed by: INTERNAL MEDICINE

## 2023-09-27 PROCEDURE — 85025 COMPLETE CBC W/AUTO DIFF WBC: CPT

## 2023-09-27 PROCEDURE — 2000000000 HC ICU R&B

## 2023-09-27 RX ADMIN — IPRATROPIUM BROMIDE AND ALBUTEROL SULFATE 1 DOSE: .5; 2.5 SOLUTION RESPIRATORY (INHALATION) at 09:10

## 2023-09-27 RX ADMIN — GABAPENTIN 600 MG: 250 SOLUTION ORAL at 09:02

## 2023-09-27 RX ADMIN — BUDESONIDE 500 MCG: 0.5 SUSPENSION RESPIRATORY (INHALATION) at 06:06

## 2023-09-27 RX ADMIN — ROSUVASTATIN CALCIUM 40 MG: 10 TABLET, COATED ORAL at 17:38

## 2023-09-27 RX ADMIN — GUAIFENESIN AND DEXTROMETHORPHAN 10 ML: 100; 10 SYRUP ORAL at 21:15

## 2023-09-27 RX ADMIN — Medication 10 ML: at 19:58

## 2023-09-27 RX ADMIN — Medication 1 MCG/KG/HR: at 06:34

## 2023-09-27 RX ADMIN — THIAMINE HYDROCHLORIDE 100 MG: 100 INJECTION, SOLUTION INTRAMUSCULAR; INTRAVENOUS at 18:00

## 2023-09-27 RX ADMIN — GUAIFENESIN AND DEXTROMETHORPHAN 10 ML: 100; 10 SYRUP ORAL at 11:27

## 2023-09-27 RX ADMIN — FOLIC ACID 1 MG: 5 INJECTION, SOLUTION INTRAMUSCULAR; INTRAVENOUS; SUBCUTANEOUS at 09:57

## 2023-09-27 RX ADMIN — IPRATROPIUM BROMIDE AND ALBUTEROL SULFATE 1 DOSE: .5; 2.5 SOLUTION RESPIRATORY (INHALATION) at 16:57

## 2023-09-27 RX ADMIN — Medication 1 MCG/KG/HR: at 13:30

## 2023-09-27 RX ADMIN — PHENOBARBITAL SODIUM 16.2 MG: 65 INJECTION INTRAMUSCULAR; INTRAVENOUS at 20:19

## 2023-09-27 RX ADMIN — GUAIFENESIN AND DEXTROMETHORPHAN 10 ML: 100; 10 SYRUP ORAL at 17:38

## 2023-09-27 RX ADMIN — ACETYLCYSTEINE 400 MG: 100 INHALANT RESPIRATORY (INHALATION) at 13:05

## 2023-09-27 RX ADMIN — Medication 1 MCG/KG/HR: at 23:59

## 2023-09-27 RX ADMIN — WATER 1000 MG: 1 INJECTION INTRAMUSCULAR; INTRAVENOUS; SUBCUTANEOUS at 11:27

## 2023-09-27 RX ADMIN — BUDESONIDE 500 MCG: 0.5 SUSPENSION RESPIRATORY (INHALATION) at 16:57

## 2023-09-27 RX ADMIN — SODIUM CHLORIDE, POTASSIUM CHLORIDE, SODIUM LACTATE AND CALCIUM CHLORIDE: 600; 310; 30; 20 INJECTION, SOLUTION INTRAVENOUS at 05:09

## 2023-09-27 RX ADMIN — ANTI-FUNGAL POWDER MICONAZOLE NITRATE TALC FREE: 1.42 POWDER TOPICAL at 20:13

## 2023-09-27 RX ADMIN — ACETYLCYSTEINE 400 MG: 100 INHALANT RESPIRATORY (INHALATION) at 16:57

## 2023-09-27 RX ADMIN — AMLODIPINE BESYLATE 5 MG: 5 TABLET ORAL at 08:56

## 2023-09-27 RX ADMIN — Medication 1 MCG/KG/HR: at 09:58

## 2023-09-27 RX ADMIN — PHENOBARBITAL SODIUM 32.5 MG: 65 INJECTION INTRAMUSCULAR; INTRAVENOUS at 08:56

## 2023-09-27 RX ADMIN — APIXABAN 5 MG: 5 TABLET, FILM COATED ORAL at 20:13

## 2023-09-27 RX ADMIN — ANTI-FUNGAL POWDER MICONAZOLE NITRATE TALC FREE: 1.42 POWDER TOPICAL at 08:56

## 2023-09-27 RX ADMIN — IPRATROPIUM BROMIDE AND ALBUTEROL SULFATE 1 DOSE: .5; 2.5 SOLUTION RESPIRATORY (INHALATION) at 13:04

## 2023-09-27 RX ADMIN — SERTRALINE 100 MG: 50 TABLET, FILM COATED ORAL at 08:56

## 2023-09-27 RX ADMIN — SODIUM CHLORIDE, PRESERVATIVE FREE 40 MG: 5 INJECTION INTRAVENOUS at 08:56

## 2023-09-27 RX ADMIN — GABAPENTIN 600 MG: 250 SOLUTION ORAL at 17:38

## 2023-09-27 RX ADMIN — ACETYLCYSTEINE 400 MG: 100 INHALANT RESPIRATORY (INHALATION) at 21:45

## 2023-09-27 RX ADMIN — GUAIFENESIN AND DEXTROMETHORPHAN 10 ML: 100; 10 SYRUP ORAL at 05:05

## 2023-09-27 RX ADMIN — ACETYLCYSTEINE 400 MG: 100 INHALANT RESPIRATORY (INHALATION) at 09:10

## 2023-09-27 RX ADMIN — ACETYLCYSTEINE 400 MG: 100 INHALANT RESPIRATORY (INHALATION) at 06:06

## 2023-09-27 RX ADMIN — LORAZEPAM 1 MG: 2 INJECTION INTRAMUSCULAR; INTRAVENOUS at 13:21

## 2023-09-27 RX ADMIN — Medication 1 MCG/KG/HR: at 02:59

## 2023-09-27 RX ADMIN — ACETYLCYSTEINE 400 MG: 100 INHALANT RESPIRATORY (INHALATION) at 01:32

## 2023-09-27 RX ADMIN — GABAPENTIN 600 MG: 250 SOLUTION ORAL at 00:30

## 2023-09-27 RX ADMIN — IPRATROPIUM BROMIDE AND ALBUTEROL SULFATE 1 DOSE: .5; 2.5 SOLUTION RESPIRATORY (INHALATION) at 06:06

## 2023-09-27 RX ADMIN — Medication 1 MCG/KG/HR: at 17:38

## 2023-09-27 RX ADMIN — BUSPIRONE HYDROCHLORIDE 10 MG: 5 TABLET ORAL at 20:12

## 2023-09-27 RX ADMIN — SODIUM PHOSPHATE, MONOBASIC, MONOHYDRATE AND SODIUM PHOSPHATE, DIBASIC, ANHYDROUS 15 MMOL: 142; 276 INJECTION, SOLUTION INTRAVENOUS at 05:17

## 2023-09-27 RX ADMIN — Medication 1 MCG/KG/HR: at 20:04

## 2023-09-27 RX ADMIN — APIXABAN 5 MG: 5 TABLET, FILM COATED ORAL at 08:56

## 2023-09-27 RX ADMIN — IPRATROPIUM BROMIDE AND ALBUTEROL SULFATE 1 DOSE: .5; 2.5 SOLUTION RESPIRATORY (INHALATION) at 21:45

## 2023-09-27 ASSESSMENT — PAIN SCALES - GENERAL
PAINLEVEL_OUTOF10: 0
PAINLEVEL_OUTOF10: 0

## 2023-09-27 NOTE — CARE COORDINATION
9/27/2023 ICU, AMS, IV precedex and phenobarbital. Pt on bipap overnight, ngt. Daughter Sherren Fears would like patient to go to etoh rehab- pt must agree and be able to particpate. Pt does drive, Sherren Fears [facility] [place]) is in [place] for a week. Cm following. Pt Limited code status- all NO's.  Electronically signed by Rebecca Snyder RN-BC on 9/27/2023 at 12:08 PM

## 2023-09-27 NOTE — PLAN OF CARE
Problem: Safety - Adult  Goal: Free from fall injury  9/27/2023 0043 by Shine Rodríguez RN  Outcome: Progressing     Problem: Skin/Tissue Integrity  Goal: Absence of new skin breakdown  Description: 1. Monitor for areas of redness and/or skin breakdown  2. Assess vascular access sites hourly  3. Every 4-6 hours minimum:  Change oxygen saturation probe site  4. Every 4-6 hours:  If on nasal continuous positive airway pressure, respiratory therapy assess nares and determine need for appliance change or resting period.   9/27/2023 0043 by Shine Rodríguez RN  Outcome: Progressing     Problem: Neurosensory - Adult  Goal: Achieves stable or improved neurological status  9/27/2023 0043 by Shine Rodríguez RN  Outcome: Progressing

## 2023-09-28 ENCOUNTER — APPOINTMENT (OUTPATIENT)
Dept: GENERAL RADIOLOGY | Age: 68
DRG: 896 | End: 2023-09-28
Payer: MEDICARE

## 2023-09-28 LAB
ALBUMIN SERPL-MCNC: 2.9 G/DL (ref 3.5–5.2)
ALP SERPL-CCNC: 49 U/L (ref 40–129)
ALT SERPL-CCNC: 24 U/L (ref 0–40)
ANION GAP SERPL CALCULATED.3IONS-SCNC: 11 MMOL/L (ref 7–16)
AST SERPL-CCNC: 17 U/L (ref 0–39)
BASOPHILS # BLD: 0.13 K/UL (ref 0–0.2)
BASOPHILS NFR BLD: 2 % (ref 0–2)
BILIRUB SERPL-MCNC: 1.7 MG/DL (ref 0–1.2)
BUN SERPL-MCNC: 16 MG/DL (ref 6–23)
CALCIUM SERPL-MCNC: 8.5 MG/DL (ref 8.6–10.2)
CHLORIDE SERPL-SCNC: 101 MMOL/L (ref 98–107)
CO2 SERPL-SCNC: 22 MMOL/L (ref 22–29)
CREAT SERPL-MCNC: 0.8 MG/DL (ref 0.7–1.2)
EOSINOPHIL # BLD: 0.06 K/UL (ref 0.05–0.5)
EOSINOPHILS RELATIVE PERCENT: 1 % (ref 0–6)
ERYTHROCYTE [DISTWIDTH] IN BLOOD BY AUTOMATED COUNT: 16.5 % (ref 11.5–15)
GFR SERPL CREATININE-BSD FRML MDRD: >60 ML/MIN/1.73M2
GLUCOSE SERPL-MCNC: 108 MG/DL (ref 74–99)
HCT VFR BLD AUTO: 32.2 % (ref 37–54)
HGB BLD-MCNC: 10.4 G/DL (ref 12.5–16.5)
LYMPHOCYTES NFR BLD: 0.25 K/UL (ref 1.5–4)
LYMPHOCYTES RELATIVE PERCENT: 4 % (ref 20–42)
MAGNESIUM SERPL-MCNC: 1.9 MG/DL (ref 1.6–2.6)
MCH RBC QN AUTO: 29.3 PG (ref 26–35)
MCHC RBC AUTO-ENTMCNC: 32.3 G/DL (ref 32–34.5)
MCV RBC AUTO: 90.7 FL (ref 80–99.9)
MONOCYTES NFR BLD: 0.44 K/UL (ref 0.1–0.95)
MONOCYTES NFR BLD: 6 % (ref 2–12)
NEUTROPHILS NFR BLD: 88 % (ref 43–80)
NEUTS SEG NFR BLD: 6.41 K/UL (ref 1.8–7.3)
PHOSPHATE SERPL-MCNC: 2.1 MG/DL (ref 2.5–4.5)
PLATELET # BLD AUTO: 150 K/UL (ref 130–450)
PMV BLD AUTO: 10 FL (ref 7–12)
POTASSIUM SERPL-SCNC: 4.2 MMOL/L (ref 3.5–5)
PROT SERPL-MCNC: 6 G/DL (ref 6.4–8.3)
RBC # BLD AUTO: 3.55 M/UL (ref 3.8–5.8)
RBC # BLD: ABNORMAL 10*6/UL
RBC # BLD: ABNORMAL 10*6/UL
SODIUM SERPL-SCNC: 134 MMOL/L (ref 132–146)
VIT B1 PYROPHOSHATE BLD-SCNC: 161 NMOL/L (ref 70–180)
WBC OTHER # BLD: 7.3 K/UL (ref 4.5–11.5)

## 2023-09-28 PROCEDURE — 0HBRXZZ EXCISION OF TOE NAIL, EXTERNAL APPROACH: ICD-10-PCS | Performed by: PODIATRIST

## 2023-09-28 PROCEDURE — 97161 PT EVAL LOW COMPLEX 20 MIN: CPT

## 2023-09-28 PROCEDURE — 94640 AIRWAY INHALATION TREATMENT: CPT

## 2023-09-28 PROCEDURE — 2580000003 HC RX 258: Performed by: INTERNAL MEDICINE

## 2023-09-28 PROCEDURE — 84100 ASSAY OF PHOSPHORUS: CPT

## 2023-09-28 PROCEDURE — 94660 CPAP INITIATION&MGMT: CPT

## 2023-09-28 PROCEDURE — 2580000003 HC RX 258

## 2023-09-28 PROCEDURE — 2500000003 HC RX 250 WO HCPCS

## 2023-09-28 PROCEDURE — 92610 EVALUATE SWALLOWING FUNCTION: CPT | Performed by: SPEECH-LANGUAGE PATHOLOGIST

## 2023-09-28 PROCEDURE — 6360000002 HC RX W HCPCS: Performed by: INTERNAL MEDICINE

## 2023-09-28 PROCEDURE — 6370000000 HC RX 637 (ALT 250 FOR IP): Performed by: INTERNAL MEDICINE

## 2023-09-28 PROCEDURE — 99291 CRITICAL CARE FIRST HOUR: CPT | Performed by: INTERNAL MEDICINE

## 2023-09-28 PROCEDURE — 6360000002 HC RX W HCPCS

## 2023-09-28 PROCEDURE — 85025 COMPLETE CBC W/AUTO DIFF WBC: CPT

## 2023-09-28 PROCEDURE — 2500000003 HC RX 250 WO HCPCS: Performed by: INTERNAL MEDICINE

## 2023-09-28 PROCEDURE — 2000000000 HC ICU R&B

## 2023-09-28 PROCEDURE — 99232 SBSQ HOSP IP/OBS MODERATE 35: CPT | Performed by: INTERNAL MEDICINE

## 2023-09-28 PROCEDURE — 97110 THERAPEUTIC EXERCISES: CPT

## 2023-09-28 PROCEDURE — 36415 COLL VENOUS BLD VENIPUNCTURE: CPT

## 2023-09-28 PROCEDURE — A4216 STERILE WATER/SALINE, 10 ML: HCPCS | Performed by: INTERNAL MEDICINE

## 2023-09-28 PROCEDURE — 2700000000 HC OXYGEN THERAPY PER DAY

## 2023-09-28 PROCEDURE — C9113 INJ PANTOPRAZOLE SODIUM, VIA: HCPCS | Performed by: INTERNAL MEDICINE

## 2023-09-28 PROCEDURE — 80053 COMPREHEN METABOLIC PANEL: CPT

## 2023-09-28 PROCEDURE — 6370000000 HC RX 637 (ALT 250 FOR IP)

## 2023-09-28 PROCEDURE — 83735 ASSAY OF MAGNESIUM: CPT

## 2023-09-28 PROCEDURE — 6360000002 HC RX W HCPCS: Performed by: FAMILY MEDICINE

## 2023-09-28 PROCEDURE — 2580000003 HC RX 258: Performed by: STUDENT IN AN ORGANIZED HEALTH CARE EDUCATION/TRAINING PROGRAM

## 2023-09-28 PROCEDURE — 71045 X-RAY EXAM CHEST 1 VIEW: CPT

## 2023-09-28 RX ORDER — MAGNESIUM SULFATE 1 G/100ML
1000 INJECTION INTRAVENOUS ONCE
Status: COMPLETED | OUTPATIENT
Start: 2023-09-28 | End: 2023-09-28

## 2023-09-28 RX ADMIN — ACETYLCYSTEINE 400 MG: 100 INHALANT RESPIRATORY (INHALATION) at 13:28

## 2023-09-28 RX ADMIN — GUAIFENESIN AND DEXTROMETHORPHAN 10 ML: 100; 10 SYRUP ORAL at 18:53

## 2023-09-28 RX ADMIN — IPRATROPIUM BROMIDE AND ALBUTEROL SULFATE 1 DOSE: .5; 2.5 SOLUTION RESPIRATORY (INHALATION) at 16:51

## 2023-09-28 RX ADMIN — APIXABAN 5 MG: 5 TABLET, FILM COATED ORAL at 09:00

## 2023-09-28 RX ADMIN — ACETAMINOPHEN 650 MG: 325 TABLET ORAL at 07:06

## 2023-09-28 RX ADMIN — BUDESONIDE 500 MCG: 0.5 SUSPENSION RESPIRATORY (INHALATION) at 16:51

## 2023-09-28 RX ADMIN — Medication 1 MCG/KG/HR: at 06:13

## 2023-09-28 RX ADMIN — ANTI-FUNGAL POWDER MICONAZOLE NITRATE TALC FREE: 1.42 POWDER TOPICAL at 08:32

## 2023-09-28 RX ADMIN — FENOFIBRATE 160 MG: 160 TABLET ORAL at 08:41

## 2023-09-28 RX ADMIN — GABAPENTIN 600 MG: 250 SOLUTION ORAL at 08:41

## 2023-09-28 RX ADMIN — BUSPIRONE HYDROCHLORIDE 10 MG: 5 TABLET ORAL at 08:29

## 2023-09-28 RX ADMIN — SODIUM CHLORIDE, POTASSIUM CHLORIDE, SODIUM LACTATE AND CALCIUM CHLORIDE: 600; 310; 30; 20 INJECTION, SOLUTION INTRAVENOUS at 02:06

## 2023-09-28 RX ADMIN — IPRATROPIUM BROMIDE AND ALBUTEROL SULFATE 1 DOSE: .5; 2.5 SOLUTION RESPIRATORY (INHALATION) at 05:30

## 2023-09-28 RX ADMIN — WATER 1000 MG: 1 INJECTION INTRAMUSCULAR; INTRAVENOUS; SUBCUTANEOUS at 11:45

## 2023-09-28 RX ADMIN — AMLODIPINE BESYLATE 5 MG: 5 TABLET ORAL at 08:29

## 2023-09-28 RX ADMIN — GUAIFENESIN AND DEXTROMETHORPHAN 10 ML: 100; 10 SYRUP ORAL at 21:15

## 2023-09-28 RX ADMIN — GUAIFENESIN AND DEXTROMETHORPHAN 10 ML: 100; 10 SYRUP ORAL at 11:06

## 2023-09-28 RX ADMIN — Medication 1 MCG/KG/HR: at 02:50

## 2023-09-28 RX ADMIN — ROSUVASTATIN CALCIUM 40 MG: 10 TABLET, COATED ORAL at 18:53

## 2023-09-28 RX ADMIN — ACETYLCYSTEINE 400 MG: 100 INHALANT RESPIRATORY (INHALATION) at 09:31

## 2023-09-28 RX ADMIN — MAGNESIUM SULFATE IN DEXTROSE 1000 MG: 10 INJECTION, SOLUTION INTRAVENOUS at 07:20

## 2023-09-28 RX ADMIN — BUSPIRONE HYDROCHLORIDE 10 MG: 5 TABLET ORAL at 21:15

## 2023-09-28 RX ADMIN — IPRATROPIUM BROMIDE AND ALBUTEROL SULFATE 1 DOSE: .5; 2.5 SOLUTION RESPIRATORY (INHALATION) at 01:33

## 2023-09-28 RX ADMIN — SODIUM CHLORIDE, PRESERVATIVE FREE 40 MG: 5 INJECTION INTRAVENOUS at 08:30

## 2023-09-28 RX ADMIN — ACETYLCYSTEINE 400 MG: 100 INHALANT RESPIRATORY (INHALATION) at 16:51

## 2023-09-28 RX ADMIN — GUAIFENESIN AND DEXTROMETHORPHAN 10 ML: 100; 10 SYRUP ORAL at 05:23

## 2023-09-28 RX ADMIN — Medication 10 ML: at 08:34

## 2023-09-28 RX ADMIN — ACETYLCYSTEINE 400 MG: 100 INHALANT RESPIRATORY (INHALATION) at 05:30

## 2023-09-28 RX ADMIN — BUDESONIDE 500 MCG: 0.5 SUSPENSION RESPIRATORY (INHALATION) at 05:30

## 2023-09-28 RX ADMIN — ANTI-FUNGAL POWDER MICONAZOLE NITRATE TALC FREE: 1.42 POWDER TOPICAL at 21:15

## 2023-09-28 RX ADMIN — ACETYLCYSTEINE 400 MG: 100 INHALANT RESPIRATORY (INHALATION) at 21:40

## 2023-09-28 RX ADMIN — IPRATROPIUM BROMIDE AND ALBUTEROL SULFATE 1 DOSE: .5; 2.5 SOLUTION RESPIRATORY (INHALATION) at 09:31

## 2023-09-28 RX ADMIN — SODIUM PHOSPHATE, MONOBASIC, MONOHYDRATE AND SODIUM PHOSPHATE, DIBASIC, ANHYDROUS 10 MMOL: 142; 276 INJECTION, SOLUTION INTRAVENOUS at 07:21

## 2023-09-28 RX ADMIN — FOLIC ACID 1 MG: 5 INJECTION, SOLUTION INTRAMUSCULAR; INTRAVENOUS; SUBCUTANEOUS at 09:12

## 2023-09-28 RX ADMIN — Medication 0.7 MCG/KG/HR: at 15:02

## 2023-09-28 RX ADMIN — GABAPENTIN 600 MG: 250 SOLUTION ORAL at 17:30

## 2023-09-28 RX ADMIN — APIXABAN 5 MG: 5 TABLET, FILM COATED ORAL at 21:15

## 2023-09-28 RX ADMIN — PHENOBARBITAL SODIUM 16.2 MG: 65 INJECTION INTRAMUSCULAR; INTRAVENOUS at 08:32

## 2023-09-28 RX ADMIN — IPRATROPIUM BROMIDE AND ALBUTEROL SULFATE 1 DOSE: .5; 2.5 SOLUTION RESPIRATORY (INHALATION) at 13:28

## 2023-09-28 RX ADMIN — GABAPENTIN 600 MG: 250 SOLUTION ORAL at 01:44

## 2023-09-28 RX ADMIN — Medication 10 ML: at 21:16

## 2023-09-28 RX ADMIN — SERTRALINE 100 MG: 50 TABLET, FILM COATED ORAL at 08:32

## 2023-09-28 RX ADMIN — Medication 10 ML: at 08:33

## 2023-09-28 RX ADMIN — THIAMINE HYDROCHLORIDE 100 MG: 100 INJECTION, SOLUTION INTRAMUSCULAR; INTRAVENOUS at 18:58

## 2023-09-28 RX ADMIN — Medication 0.7 MCG/KG/HR: at 20:16

## 2023-09-28 RX ADMIN — IPRATROPIUM BROMIDE AND ALBUTEROL SULFATE 1 DOSE: .5; 2.5 SOLUTION RESPIRATORY (INHALATION) at 21:39

## 2023-09-28 RX ADMIN — ACETYLCYSTEINE 400 MG: 100 INHALANT RESPIRATORY (INHALATION) at 01:33

## 2023-09-28 RX ADMIN — Medication 0.9 MCG/KG/HR: at 09:49

## 2023-09-28 RX ADMIN — SODIUM CHLORIDE, POTASSIUM CHLORIDE, SODIUM LACTATE AND CALCIUM CHLORIDE: 600; 310; 30; 20 INJECTION, SOLUTION INTRAVENOUS at 23:13

## 2023-09-28 ASSESSMENT — PAIN SCALES - GENERAL
PAINLEVEL_OUTOF10: 0
PAINLEVEL_OUTOF10: 0

## 2023-09-28 NOTE — CARE COORDINATION
9/28/2023 ICU, Bipap continuous, IV precedex and phenobarbital. Pt has [place] coverage- difficult to get LTACH loc approved. Pt will likely need SNF at discharge, possibly with etoh rehab if pt able to fully participate. Pt is Limited all NO's code status- No intubation. CM continues to follow.  Electronically signed by Dontae Kurtz RN-BC on 9/28/2023 at 11:23 AM

## 2023-09-28 NOTE — PLAN OF CARE
Problem: Pain  Goal: Verbalizes/displays adequate comfort level or baseline comfort level  Outcome: Progressing     Problem: Safety - Adult  Goal: Free from fall injury  Outcome: Progressing     Problem: Skin/Tissue Integrity  Goal: Absence of new skin breakdown  Description: 1. Monitor for areas of redness and/or skin breakdown  2. Assess vascular access sites hourly  3. Every 4-6 hours minimum:  Change oxygen saturation probe site  4. Every 4-6 hours:  If on nasal continuous positive airway pressure, respiratory therapy assess nares and determine need for appliance change or resting period.   Outcome: Progressing     Problem: Neurosensory - Adult  Goal: Achieves stable or improved neurological status  Outcome: Progressing     Problem: Respiratory - Adult  Goal: Achieves optimal ventilation and oxygenation  Outcome: Progressing

## 2023-09-28 NOTE — CONSULTS
Department of Podiatry   Consult Note        Reason for Consult:  Onychomycosis     CHIEF COMPLAINT:  Onychomycosis      HISTORY OF PRESENT ILLNESS:  Patient is a 80year old male that was consulted for toenail care. Patient was unable to provide adequate history but was able to state that he has not been able to cut his own nails. No other pedal complaints today. Medical History     Past Medical History:   Diagnosis Date    Alcohol abuse     Atrial fibrillation (HCC)     CAD (coronary artery disease)     COPD (chronic obstructive pulmonary disease) (HCC)     Depression     Fracture of unspecified phalanx of left middle finger, initial encounter for closed fracture     GERD (gastroesophageal reflux disease)     Hypertension     Seizure ([address])        Surgical History:  Past Surgical History:   Procedure Laterality Date    COLONOSCOPY      CORONARY ARTERY BYPASS GRAFT  3/2/05    x3: LIMA to LAD, SVG to RCA, SVG to diagonal    DIAGNOSTIC CARDIAC CATH LAB PROCEDURE  3/02/05    [facility]: Severe multivessel CAD in patient who presents with STEMI and total occlusion of diagonal branch. Significant disease of proximal LAD and severe disease of dominant RCA.      FINGER SURGERY Left 5/3/2019    CLOSED POSSIBLE LEFT MIDDLE FINGER OPEN REDUCTION INTERNAL FIXATION POSSIBLE PROXIMAL INTERPHALANGEAL JOINT ARTHROPLASTY   ++[facility] performed by Patricia Aburto MD at [address] N/A 10/20/2021    EGD WITH NG TUBE PLACEMENT **BEDSIDE** performed by Elton Schroeder MD at [address]      double    PAIN MANAGEMENT PROCEDURE N/A 8/2/2023    EPIDURAL STEROID INJECTION L5-L5 UNDER XRAY WITH TLN  **PLEASE KEEP LATE APPOINTMENT** performed by Mayco Bradley DO at [facility] 10/29/2021    TRACHEOTOMY AND PEG performed by Lynn Newman MD at [id number] [place] [address] 10/29/2021    EGD ESOPHAGOGASTRODUODENOSCOPY PEG TUBE INSERTION

## 2023-09-29 LAB
ALBUMIN SERPL-MCNC: 2.8 G/DL (ref 3.5–5.2)
ALP SERPL-CCNC: 69 U/L (ref 40–129)
ALT SERPL-CCNC: 25 U/L (ref 0–40)
ANION GAP SERPL CALCULATED.3IONS-SCNC: 11 MMOL/L (ref 7–16)
AST SERPL-CCNC: 24 U/L (ref 0–39)
BASOPHILS # BLD: 0.03 K/UL (ref 0–0.2)
BASOPHILS NFR BLD: 0 % (ref 0–2)
BILIRUB SERPL-MCNC: 1.5 MG/DL (ref 0–1.2)
BUN SERPL-MCNC: 14 MG/DL (ref 6–23)
CALCIUM SERPL-MCNC: 8.3 MG/DL (ref 8.6–10.2)
CHLORIDE SERPL-SCNC: 104 MMOL/L (ref 98–107)
CO2 SERPL-SCNC: 21 MMOL/L (ref 22–29)
CREAT SERPL-MCNC: 0.7 MG/DL (ref 0.7–1.2)
EOSINOPHIL # BLD: 0.09 K/UL (ref 0.05–0.5)
EOSINOPHILS RELATIVE PERCENT: 1 % (ref 0–6)
ERYTHROCYTE [DISTWIDTH] IN BLOOD BY AUTOMATED COUNT: 16.2 % (ref 11.5–15)
GFR SERPL CREATININE-BSD FRML MDRD: >60 ML/MIN/1.73M2
GLUCOSE SERPL-MCNC: 105 MG/DL (ref 74–99)
HCT VFR BLD AUTO: 31.5 % (ref 37–54)
HGB BLD-MCNC: 10 G/DL (ref 12.5–16.5)
IMM GRANULOCYTES # BLD AUTO: 0.06 K/UL (ref 0–0.58)
IMM GRANULOCYTES NFR BLD: 1 % (ref 0–5)
L PNEUMO1 AG UR QL IA.RAPID: NORMAL
LYMPHOCYTES NFR BLD: 0.61 K/UL (ref 1.5–4)
LYMPHOCYTES RELATIVE PERCENT: 7 % (ref 20–42)
MAGNESIUM SERPL-MCNC: 1.9 MG/DL (ref 1.6–2.6)
MCH RBC QN AUTO: 28.9 PG (ref 26–35)
MCHC RBC AUTO-ENTMCNC: 31.7 G/DL (ref 32–34.5)
MCV RBC AUTO: 91 FL (ref 80–99.9)
MONOCYTES NFR BLD: 0.69 K/UL (ref 0.1–0.95)
MONOCYTES NFR BLD: 8 % (ref 2–12)
NEUTROPHILS NFR BLD: 82 % (ref 43–80)
NEUTS SEG NFR BLD: 6.74 K/UL (ref 1.8–7.3)
PHOSPHATE SERPL-MCNC: 2.3 MG/DL (ref 2.5–4.5)
PLATELET # BLD AUTO: 162 K/UL (ref 130–450)
PMV BLD AUTO: 9.7 FL (ref 7–12)
POTASSIUM SERPL-SCNC: 4.2 MMOL/L (ref 3.5–5)
PROT SERPL-MCNC: 5.8 G/DL (ref 6.4–8.3)
RBC # BLD AUTO: 3.46 M/UL (ref 3.8–5.8)
SODIUM SERPL-SCNC: 136 MMOL/L (ref 132–146)
WBC OTHER # BLD: 8.2 K/UL (ref 4.5–11.5)

## 2023-09-29 PROCEDURE — 84100 ASSAY OF PHOSPHORUS: CPT

## 2023-09-29 PROCEDURE — 6370000000 HC RX 637 (ALT 250 FOR IP): Performed by: INTERNAL MEDICINE

## 2023-09-29 PROCEDURE — 94640 AIRWAY INHALATION TREATMENT: CPT

## 2023-09-29 PROCEDURE — 2500000003 HC RX 250 WO HCPCS: Performed by: INTERNAL MEDICINE

## 2023-09-29 PROCEDURE — 6360000002 HC RX W HCPCS

## 2023-09-29 PROCEDURE — 92526 ORAL FUNCTION THERAPY: CPT | Performed by: SPEECH-LANGUAGE PATHOLOGIST

## 2023-09-29 PROCEDURE — 6360000002 HC RX W HCPCS: Performed by: INTERNAL MEDICINE

## 2023-09-29 PROCEDURE — 83735 ASSAY OF MAGNESIUM: CPT

## 2023-09-29 PROCEDURE — 2500000003 HC RX 250 WO HCPCS

## 2023-09-29 PROCEDURE — 85025 COMPLETE CBC W/AUTO DIFF WBC: CPT

## 2023-09-29 PROCEDURE — 2000000000 HC ICU R&B

## 2023-09-29 PROCEDURE — 99232 SBSQ HOSP IP/OBS MODERATE 35: CPT | Performed by: INTERNAL MEDICINE

## 2023-09-29 PROCEDURE — 80053 COMPREHEN METABOLIC PANEL: CPT

## 2023-09-29 PROCEDURE — 2700000000 HC OXYGEN THERAPY PER DAY

## 2023-09-29 PROCEDURE — 36415 COLL VENOUS BLD VENIPUNCTURE: CPT

## 2023-09-29 PROCEDURE — 2580000003 HC RX 258: Performed by: INTERNAL MEDICINE

## 2023-09-29 PROCEDURE — 99291 CRITICAL CARE FIRST HOUR: CPT | Performed by: INTERNAL MEDICINE

## 2023-09-29 PROCEDURE — 97110 THERAPEUTIC EXERCISES: CPT

## 2023-09-29 PROCEDURE — C9113 INJ PANTOPRAZOLE SODIUM, VIA: HCPCS | Performed by: INTERNAL MEDICINE

## 2023-09-29 PROCEDURE — 6370000000 HC RX 637 (ALT 250 FOR IP)

## 2023-09-29 PROCEDURE — 94660 CPAP INITIATION&MGMT: CPT

## 2023-09-29 PROCEDURE — 2580000003 HC RX 258: Performed by: STUDENT IN AN ORGANIZED HEALTH CARE EDUCATION/TRAINING PROGRAM

## 2023-09-29 RX ORDER — VALPROIC ACID 250 MG/5ML
500 SOLUTION ORAL EVERY 6 HOURS SCHEDULED
Status: DISCONTINUED | OUTPATIENT
Start: 2023-09-29 | End: 2023-10-04

## 2023-09-29 RX ORDER — GAUZE BANDAGE 2" X 2"
200 BANDAGE TOPICAL DAILY
Status: DISCONTINUED | OUTPATIENT
Start: 2023-09-29 | End: 2023-10-05

## 2023-09-29 RX ORDER — TRAZODONE HYDROCHLORIDE 50 MG/1
50 TABLET ORAL NIGHTLY PRN
Status: DISCONTINUED | OUTPATIENT
Start: 2023-09-29 | End: 2023-10-10 | Stop reason: HOSPADM

## 2023-09-29 RX ADMIN — GABAPENTIN 600 MG: 250 SOLUTION ORAL at 08:45

## 2023-09-29 RX ADMIN — GUAIFENESIN AND DEXTROMETHORPHAN 10 ML: 100; 10 SYRUP ORAL at 22:19

## 2023-09-29 RX ADMIN — ACETYLCYSTEINE 400 MG: 100 INHALANT RESPIRATORY (INHALATION) at 16:22

## 2023-09-29 RX ADMIN — ACETYLCYSTEINE 400 MG: 100 INHALANT RESPIRATORY (INHALATION) at 01:19

## 2023-09-29 RX ADMIN — IPRATROPIUM BROMIDE AND ALBUTEROL SULFATE 1 DOSE: .5; 2.5 SOLUTION RESPIRATORY (INHALATION) at 20:31

## 2023-09-29 RX ADMIN — GUAIFENESIN AND DEXTROMETHORPHAN 10 ML: 100; 10 SYRUP ORAL at 17:21

## 2023-09-29 RX ADMIN — Medication 10 ML: at 08:45

## 2023-09-29 RX ADMIN — APIXABAN 5 MG: 5 TABLET, FILM COATED ORAL at 08:31

## 2023-09-29 RX ADMIN — ACETYLCYSTEINE 400 MG: 100 INHALANT RESPIRATORY (INHALATION) at 04:48

## 2023-09-29 RX ADMIN — FOLIC ACID 1 MG: 5 INJECTION, SOLUTION INTRAMUSCULAR; INTRAVENOUS; SUBCUTANEOUS at 08:55

## 2023-09-29 RX ADMIN — Medication 10 ML: at 19:59

## 2023-09-29 RX ADMIN — IPRATROPIUM BROMIDE AND ALBUTEROL SULFATE 1 DOSE: .5; 2.5 SOLUTION RESPIRATORY (INHALATION) at 08:50

## 2023-09-29 RX ADMIN — ANTI-FUNGAL POWDER MICONAZOLE NITRATE TALC FREE: 1.42 POWDER TOPICAL at 08:33

## 2023-09-29 RX ADMIN — Medication 0.6 MCG/KG/HR: at 10:34

## 2023-09-29 RX ADMIN — Medication 10 ML: at 09:31

## 2023-09-29 RX ADMIN — BUDESONIDE 500 MCG: 0.5 SUSPENSION RESPIRATORY (INHALATION) at 04:47

## 2023-09-29 RX ADMIN — THIAMINE HYDROCHLORIDE 100 MG: 100 INJECTION, SOLUTION INTRAMUSCULAR; INTRAVENOUS at 17:24

## 2023-09-29 RX ADMIN — IPRATROPIUM BROMIDE AND ALBUTEROL SULFATE 1 DOSE: .5; 2.5 SOLUTION RESPIRATORY (INHALATION) at 12:29

## 2023-09-29 RX ADMIN — ACETYLCYSTEINE 400 MG: 100 INHALANT RESPIRATORY (INHALATION) at 20:32

## 2023-09-29 RX ADMIN — VALPROIC ACID 500 MG: 250 SOLUTION ORAL at 17:22

## 2023-09-29 RX ADMIN — IPRATROPIUM BROMIDE AND ALBUTEROL SULFATE 1 DOSE: .5; 2.5 SOLUTION RESPIRATORY (INHALATION) at 04:47

## 2023-09-29 RX ADMIN — ROSUVASTATIN CALCIUM 40 MG: 10 TABLET, COATED ORAL at 17:21

## 2023-09-29 RX ADMIN — Medication 0.7 MCG/KG/HR: at 05:51

## 2023-09-29 RX ADMIN — IPRATROPIUM BROMIDE AND ALBUTEROL SULFATE 1 DOSE: .5; 2.5 SOLUTION RESPIRATORY (INHALATION) at 16:22

## 2023-09-29 RX ADMIN — SERTRALINE 100 MG: 50 TABLET, FILM COATED ORAL at 08:32

## 2023-09-29 RX ADMIN — GUAIFENESIN AND DEXTROMETHORPHAN 10 ML: 100; 10 SYRUP ORAL at 11:27

## 2023-09-29 RX ADMIN — GABAPENTIN 600 MG: 250 SOLUTION ORAL at 02:00

## 2023-09-29 RX ADMIN — Medication 200 MG: at 20:00

## 2023-09-29 RX ADMIN — AMLODIPINE BESYLATE 5 MG: 5 TABLET ORAL at 08:31

## 2023-09-29 RX ADMIN — FENOFIBRATE 160 MG: 160 TABLET ORAL at 08:58

## 2023-09-29 RX ADMIN — LORAZEPAM 1 MG: 2 INJECTION INTRAMUSCULAR; INTRAVENOUS at 23:21

## 2023-09-29 RX ADMIN — BUSPIRONE HYDROCHLORIDE 10 MG: 5 TABLET ORAL at 08:31

## 2023-09-29 RX ADMIN — SODIUM CHLORIDE, POTASSIUM CHLORIDE, SODIUM LACTATE AND CALCIUM CHLORIDE: 600; 310; 30; 20 INJECTION, SOLUTION INTRAVENOUS at 17:38

## 2023-09-29 RX ADMIN — ANTI-FUNGAL POWDER MICONAZOLE NITRATE TALC FREE: 1.42 POWDER TOPICAL at 20:01

## 2023-09-29 RX ADMIN — ACETYLCYSTEINE 400 MG: 100 INHALANT RESPIRATORY (INHALATION) at 08:50

## 2023-09-29 RX ADMIN — LORAZEPAM 1 MG: 2 INJECTION INTRAMUSCULAR; INTRAVENOUS at 11:26

## 2023-09-29 RX ADMIN — VALPROIC ACID 500 MG: 250 SOLUTION ORAL at 11:27

## 2023-09-29 RX ADMIN — BUSPIRONE HYDROCHLORIDE 10 MG: 5 TABLET ORAL at 20:01

## 2023-09-29 RX ADMIN — GABAPENTIN 600 MG: 250 SOLUTION ORAL at 17:22

## 2023-09-29 RX ADMIN — APIXABAN 5 MG: 5 TABLET, FILM COATED ORAL at 22:19

## 2023-09-29 RX ADMIN — IPRATROPIUM BROMIDE AND ALBUTEROL SULFATE 1 DOSE: .5; 2.5 SOLUTION RESPIRATORY (INHALATION) at 01:19

## 2023-09-29 RX ADMIN — ACETYLCYSTEINE 400 MG: 100 INHALANT RESPIRATORY (INHALATION) at 12:29

## 2023-09-29 RX ADMIN — GUAIFENESIN AND DEXTROMETHORPHAN 10 ML: 100; 10 SYRUP ORAL at 05:20

## 2023-09-29 RX ADMIN — BUDESONIDE 500 MCG: 0.5 SUSPENSION RESPIRATORY (INHALATION) at 16:22

## 2023-09-29 RX ADMIN — Medication 0.7 MCG/KG/HR: at 00:28

## 2023-09-29 RX ADMIN — WATER 1000 MG: 1 INJECTION INTRAMUSCULAR; INTRAVENOUS; SUBCUTANEOUS at 11:27

## 2023-09-29 RX ADMIN — SODIUM CHLORIDE, PRESERVATIVE FREE 40 MG: 5 INJECTION INTRAVENOUS at 08:32

## 2023-09-29 RX ADMIN — LORAZEPAM 1 MG: 2 INJECTION INTRAMUSCULAR; INTRAVENOUS at 18:21

## 2023-09-29 ASSESSMENT — PAIN SCALES - GENERAL: PAINLEVEL_OUTOF10: 0

## 2023-09-29 NOTE — CARE COORDINATION
9/29/2023 ICU, Precedex gtt- weaning down. Bipap at HS, Rocephin IV, Thiamine IV, prn phenobarbital- decreased need for this. Patients daughter reviewing for SNF choices- emailed to her per her request. She will be leaving tomorrow morning for her home in [place]- she is not planning on coming in today. Pt with [place] coverage generally LTACH's are difficult to get approved. PRECERT needed, signed CURTIS and HENS needed. Cm following.  Electronically signed by Chaz Caicedo RN-BC on 9/29/2023 at 10:49 AM

## 2023-09-29 NOTE — PLAN OF CARE
Problem: Discharge Planning  Goal: Discharge to home or other facility with appropriate resources  Outcome: Progressing     Problem: Pain  Goal: Verbalizes/displays adequate comfort level or baseline comfort level  9/29/2023 1616 by Mando Faustin RN  Outcome: Progressing     Problem: Safety - Adult  Goal: Free from fall injury  9/29/2023 1616 by Mando Faustin RN  Outcome: Progressing     Problem: Skin/Tissue Integrity  Goal: Absence of new skin breakdown  Description: 1. Monitor for areas of redness and/or skin breakdown  2. Assess vascular access sites hourly  3. Every 4-6 hours minimum:  Change oxygen saturation probe site  4. Every 4-6 hours:  If on nasal continuous positive airway pressure, respiratory therapy assess nares and determine need for appliance change or resting period.   Outcome: Progressing     Problem: Neurosensory - Adult  Goal: Achieves stable or improved neurological status  Outcome: Progressing     Problem: Neurosensory - Adult  Goal: Absence of seizures  Outcome: Progressing     Problem: Neurosensory - Adult  Goal: Remains free of injury related to seizures activity  Outcome: Progressing     Problem: Neurosensory - Adult  Goal: Achieves maximal functionality and self care  Outcome: Progressing     Problem: Respiratory - Adult  Goal: Achieves optimal ventilation and oxygenation  Outcome: Progressing  Flowsheets (Taken 9/29/2023 0800)  Achieves optimal ventilation and oxygenation: Assess for changes in respiratory status     Problem: Cardiovascular - Adult  Goal: Maintains optimal cardiac output and hemodynamic stability  Outcome: Progressing     Problem: Cardiovascular - Adult  Goal: Absence of cardiac dysrhythmias or at baseline  Outcome: Progressing     Problem: Musculoskeletal - Adult  Goal: Return ADL status to a safe level of function  Outcome: Progressing     Problem: Metabolic/Fluid and Electrolytes - Adult  Goal: Electrolytes maintained within normal limits  Outcome: Progressing

## 2023-09-30 ENCOUNTER — APPOINTMENT (OUTPATIENT)
Dept: GENERAL RADIOLOGY | Age: 68
DRG: 896 | End: 2023-09-30
Payer: MEDICARE

## 2023-09-30 LAB
ALBUMIN SERPL-MCNC: 3 G/DL (ref 3.5–5.2)
ALP SERPL-CCNC: 68 U/L (ref 40–129)
ALT SERPL-CCNC: 42 U/L (ref 0–40)
ANION GAP SERPL CALCULATED.3IONS-SCNC: 11 MMOL/L (ref 7–16)
AST SERPL-CCNC: 70 U/L (ref 0–39)
BASOPHILS # BLD: 0.03 K/UL (ref 0–0.2)
BASOPHILS NFR BLD: 0 % (ref 0–2)
BILIRUB SERPL-MCNC: 1.4 MG/DL (ref 0–1.2)
BUN SERPL-MCNC: 12 MG/DL (ref 6–23)
CALCIUM SERPL-MCNC: 8.6 MG/DL (ref 8.6–10.2)
CHLORIDE SERPL-SCNC: 100 MMOL/L (ref 98–107)
CO2 SERPL-SCNC: 23 MMOL/L (ref 22–29)
CREAT SERPL-MCNC: 0.8 MG/DL (ref 0.7–1.2)
EOSINOPHIL # BLD: 0.11 K/UL (ref 0.05–0.5)
EOSINOPHILS RELATIVE PERCENT: 1 % (ref 0–6)
ERYTHROCYTE [DISTWIDTH] IN BLOOD BY AUTOMATED COUNT: 16.7 % (ref 11.5–15)
GFR SERPL CREATININE-BSD FRML MDRD: >60 ML/MIN/1.73M2
GLUCOSE SERPL-MCNC: 100 MG/DL (ref 74–99)
HCT VFR BLD AUTO: 31 % (ref 37–54)
HGB BLD-MCNC: 10.4 G/DL (ref 12.5–16.5)
IMM GRANULOCYTES # BLD AUTO: 0.07 K/UL (ref 0–0.58)
IMM GRANULOCYTES NFR BLD: 1 % (ref 0–5)
LYMPHOCYTES NFR BLD: 0.85 K/UL (ref 1.5–4)
LYMPHOCYTES RELATIVE PERCENT: 8 % (ref 20–42)
MAGNESIUM SERPL-MCNC: 1.9 MG/DL (ref 1.6–2.6)
MCH RBC QN AUTO: 30.2 PG (ref 26–35)
MCHC RBC AUTO-ENTMCNC: 33.5 G/DL (ref 32–34.5)
MCV RBC AUTO: 90.1 FL (ref 80–99.9)
MICROORGANISM SPEC CULT: NORMAL
MICROORGANISM SPEC CULT: NORMAL
MONOCYTES NFR BLD: 0.86 K/UL (ref 0.1–0.95)
MONOCYTES NFR BLD: 8 % (ref 2–12)
NEUTROPHILS NFR BLD: 81 % (ref 43–80)
NEUTS SEG NFR BLD: 8.26 K/UL (ref 1.8–7.3)
PHOSPHATE SERPL-MCNC: 2.8 MG/DL (ref 2.5–4.5)
PLATELET # BLD AUTO: 267 K/UL (ref 130–450)
PMV BLD AUTO: 10.6 FL (ref 7–12)
POTASSIUM SERPL-SCNC: 3.9 MMOL/L (ref 3.5–5)
PROT SERPL-MCNC: 6.2 G/DL (ref 6.4–8.3)
RBC # BLD AUTO: 3.44 M/UL (ref 3.8–5.8)
SERVICE CMNT-IMP: NORMAL
SERVICE CMNT-IMP: NORMAL
SODIUM SERPL-SCNC: 134 MMOL/L (ref 132–146)
SPECIMEN DESCRIPTION: NORMAL
SPECIMEN DESCRIPTION: NORMAL
WBC OTHER # BLD: 10.2 K/UL (ref 4.5–11.5)

## 2023-09-30 PROCEDURE — 2500000003 HC RX 250 WO HCPCS: Performed by: INTERNAL MEDICINE

## 2023-09-30 PROCEDURE — 92610 EVALUATE SWALLOWING FUNCTION: CPT

## 2023-09-30 PROCEDURE — 80053 COMPREHEN METABOLIC PANEL: CPT

## 2023-09-30 PROCEDURE — 74018 RADEX ABDOMEN 1 VIEW: CPT

## 2023-09-30 PROCEDURE — 99232 SBSQ HOSP IP/OBS MODERATE 35: CPT | Performed by: INTERNAL MEDICINE

## 2023-09-30 PROCEDURE — 36415 COLL VENOUS BLD VENIPUNCTURE: CPT

## 2023-09-30 PROCEDURE — 6370000000 HC RX 637 (ALT 250 FOR IP): Performed by: INTERNAL MEDICINE

## 2023-09-30 PROCEDURE — 71045 X-RAY EXAM CHEST 1 VIEW: CPT

## 2023-09-30 PROCEDURE — 94660 CPAP INITIATION&MGMT: CPT

## 2023-09-30 PROCEDURE — 6360000002 HC RX W HCPCS: Performed by: INTERNAL MEDICINE

## 2023-09-30 PROCEDURE — 94640 AIRWAY INHALATION TREATMENT: CPT

## 2023-09-30 PROCEDURE — 83735 ASSAY OF MAGNESIUM: CPT

## 2023-09-30 PROCEDURE — 85025 COMPLETE CBC W/AUTO DIFF WBC: CPT

## 2023-09-30 PROCEDURE — 6360000002 HC RX W HCPCS

## 2023-09-30 PROCEDURE — 6370000000 HC RX 637 (ALT 250 FOR IP)

## 2023-09-30 PROCEDURE — 2000000000 HC ICU R&B

## 2023-09-30 PROCEDURE — 99291 CRITICAL CARE FIRST HOUR: CPT | Performed by: INTERNAL MEDICINE

## 2023-09-30 PROCEDURE — 2700000000 HC OXYGEN THERAPY PER DAY

## 2023-09-30 PROCEDURE — 2580000003 HC RX 258: Performed by: INTERNAL MEDICINE

## 2023-09-30 PROCEDURE — 84100 ASSAY OF PHOSPHORUS: CPT

## 2023-09-30 PROCEDURE — 2580000003 HC RX 258: Performed by: STUDENT IN AN ORGANIZED HEALTH CARE EDUCATION/TRAINING PROGRAM

## 2023-09-30 PROCEDURE — C9113 INJ PANTOPRAZOLE SODIUM, VIA: HCPCS | Performed by: INTERNAL MEDICINE

## 2023-09-30 RX ORDER — FUROSEMIDE 10 MG/ML
40 INJECTION INTRAMUSCULAR; INTRAVENOUS ONCE
Status: COMPLETED | OUTPATIENT
Start: 2023-09-30 | End: 2023-09-30

## 2023-09-30 RX ORDER — FUROSEMIDE 10 MG/ML
40 INJECTION INTRAMUSCULAR; INTRAVENOUS DAILY
Status: COMPLETED | OUTPATIENT
Start: 2023-09-30 | End: 2023-10-02

## 2023-09-30 RX ORDER — POTASSIUM CHLORIDE 750 MG/1
30 TABLET, EXTENDED RELEASE ORAL 2 TIMES DAILY
Status: DISCONTINUED | OUTPATIENT
Start: 2023-09-30 | End: 2023-09-30

## 2023-09-30 RX ORDER — MIDODRINE HYDROCHLORIDE 5 MG/1
10 TABLET ORAL
Status: DISCONTINUED | OUTPATIENT
Start: 2023-09-30 | End: 2023-09-30

## 2023-09-30 RX ADMIN — ACETYLCYSTEINE 400 MG: 100 INHALANT RESPIRATORY (INHALATION) at 09:30

## 2023-09-30 RX ADMIN — ROSUVASTATIN CALCIUM 40 MG: 10 TABLET, COATED ORAL at 16:57

## 2023-09-30 RX ADMIN — GUAIFENESIN AND DEXTROMETHORPHAN 10 ML: 100; 10 SYRUP ORAL at 21:07

## 2023-09-30 RX ADMIN — POTASSIUM BICARBONATE 25 MEQ: 978 TABLET, EFFERVESCENT ORAL at 16:57

## 2023-09-30 RX ADMIN — ACETYLCYSTEINE 400 MG: 100 INHALANT RESPIRATORY (INHALATION) at 05:26

## 2023-09-30 RX ADMIN — IPRATROPIUM BROMIDE AND ALBUTEROL SULFATE 1 DOSE: .5; 2.5 SOLUTION RESPIRATORY (INHALATION) at 09:30

## 2023-09-30 RX ADMIN — IPRATROPIUM BROMIDE AND ALBUTEROL SULFATE 1 DOSE: .5; 2.5 SOLUTION RESPIRATORY (INHALATION) at 00:36

## 2023-09-30 RX ADMIN — LORAZEPAM 1 MG: 2 INJECTION INTRAMUSCULAR; INTRAVENOUS at 03:45

## 2023-09-30 RX ADMIN — FENOFIBRATE 160 MG: 160 TABLET ORAL at 08:16

## 2023-09-30 RX ADMIN — WATER 1000 MG: 1 INJECTION INTRAMUSCULAR; INTRAVENOUS; SUBCUTANEOUS at 11:25

## 2023-09-30 RX ADMIN — Medication 10 ML: at 08:18

## 2023-09-30 RX ADMIN — VALPROIC ACID 500 MG: 250 SOLUTION ORAL at 05:25

## 2023-09-30 RX ADMIN — BUSPIRONE HYDROCHLORIDE 10 MG: 5 TABLET ORAL at 08:16

## 2023-09-30 RX ADMIN — APIXABAN 5 MG: 5 TABLET, FILM COATED ORAL at 08:16

## 2023-09-30 RX ADMIN — IPRATROPIUM BROMIDE AND ALBUTEROL SULFATE 1 DOSE: .5; 2.5 SOLUTION RESPIRATORY (INHALATION) at 21:46

## 2023-09-30 RX ADMIN — VALPROIC ACID 500 MG: 250 SOLUTION ORAL at 16:57

## 2023-09-30 RX ADMIN — ACETYLCYSTEINE 400 MG: 100 INHALANT RESPIRATORY (INHALATION) at 17:27

## 2023-09-30 RX ADMIN — Medication 10 ML: at 21:08

## 2023-09-30 RX ADMIN — ANTI-FUNGAL POWDER MICONAZOLE NITRATE TALC FREE: 1.42 POWDER TOPICAL at 08:17

## 2023-09-30 RX ADMIN — FOLIC ACID 1 MG: 5 INJECTION, SOLUTION INTRAMUSCULAR; INTRAVENOUS; SUBCUTANEOUS at 09:14

## 2023-09-30 RX ADMIN — GABAPENTIN 600 MG: 250 SOLUTION ORAL at 16:57

## 2023-09-30 RX ADMIN — BUSPIRONE HYDROCHLORIDE 10 MG: 5 TABLET ORAL at 21:07

## 2023-09-30 RX ADMIN — ANTI-FUNGAL POWDER MICONAZOLE NITRATE TALC FREE: 1.42 POWDER TOPICAL at 21:08

## 2023-09-30 RX ADMIN — ACETYLCYSTEINE 400 MG: 100 INHALANT RESPIRATORY (INHALATION) at 13:13

## 2023-09-30 RX ADMIN — IPRATROPIUM BROMIDE AND ALBUTEROL SULFATE 1 DOSE: .5; 2.5 SOLUTION RESPIRATORY (INHALATION) at 05:26

## 2023-09-30 RX ADMIN — AMLODIPINE BESYLATE 5 MG: 5 TABLET ORAL at 08:16

## 2023-09-30 RX ADMIN — IPRATROPIUM BROMIDE AND ALBUTEROL SULFATE 1 DOSE: .5; 2.5 SOLUTION RESPIRATORY (INHALATION) at 17:27

## 2023-09-30 RX ADMIN — GUAIFENESIN AND DEXTROMETHORPHAN 10 ML: 100; 10 SYRUP ORAL at 05:25

## 2023-09-30 RX ADMIN — FUROSEMIDE 40 MG: 10 INJECTION, SOLUTION INTRAMUSCULAR; INTRAVENOUS at 16:57

## 2023-09-30 RX ADMIN — BUDESONIDE 500 MCG: 0.5 SUSPENSION RESPIRATORY (INHALATION) at 05:26

## 2023-09-30 RX ADMIN — SODIUM CHLORIDE, PRESERVATIVE FREE 40 MG: 5 INJECTION INTRAVENOUS at 08:15

## 2023-09-30 RX ADMIN — Medication 10 ML: at 08:19

## 2023-09-30 RX ADMIN — SERTRALINE 100 MG: 50 TABLET, FILM COATED ORAL at 08:16

## 2023-09-30 RX ADMIN — ACETYLCYSTEINE 400 MG: 100 INHALANT RESPIRATORY (INHALATION) at 00:37

## 2023-09-30 RX ADMIN — GABAPENTIN 600 MG: 250 SOLUTION ORAL at 08:18

## 2023-09-30 RX ADMIN — ACETYLCYSTEINE 400 MG: 100 INHALANT RESPIRATORY (INHALATION) at 21:47

## 2023-09-30 RX ADMIN — IPRATROPIUM BROMIDE AND ALBUTEROL SULFATE 1 DOSE: .5; 2.5 SOLUTION RESPIRATORY (INHALATION) at 13:13

## 2023-09-30 RX ADMIN — VALPROIC ACID 500 MG: 250 SOLUTION ORAL at 11:25

## 2023-09-30 RX ADMIN — FUROSEMIDE 40 MG: 10 INJECTION, SOLUTION INTRAMUSCULAR; INTRAVENOUS at 11:25

## 2023-09-30 RX ADMIN — GUAIFENESIN AND DEXTROMETHORPHAN 10 ML: 100; 10 SYRUP ORAL at 11:25

## 2023-09-30 RX ADMIN — Medication 200 MG: at 08:16

## 2023-09-30 RX ADMIN — VALPROIC ACID 500 MG: 250 SOLUTION ORAL at 00:17

## 2023-09-30 RX ADMIN — GUAIFENESIN AND DEXTROMETHORPHAN 10 ML: 100; 10 SYRUP ORAL at 16:57

## 2023-09-30 RX ADMIN — METOPROLOL TARTRATE 25 MG: 25 TABLET, FILM COATED ORAL at 21:07

## 2023-09-30 RX ADMIN — GABAPENTIN 600 MG: 250 SOLUTION ORAL at 00:17

## 2023-09-30 RX ADMIN — APIXABAN 5 MG: 5 TABLET, FILM COATED ORAL at 21:06

## 2023-09-30 ASSESSMENT — PAIN SCALES - GENERAL: PAINLEVEL_OUTOF10: 0

## 2023-09-30 NOTE — PLAN OF CARE
Problem: Discharge Planning  Goal: Discharge to home or other facility with appropriate resources  Outcome: Progressing     Problem: Pain  Goal: Verbalizes/displays adequate comfort level or baseline comfort level  Outcome: Progressing     Problem: Safety - Adult  Goal: Free from fall injury  Outcome: Progressing     Problem: Skin/Tissue Integrity  Goal: Absence of new skin breakdown  Description: 1. Monitor for areas of redness and/or skin breakdown  2. Assess vascular access sites hourly  3. Every 4-6 hours minimum:  Change oxygen saturation probe site  4. Every 4-6 hours:  If on nasal continuous positive airway pressure, respiratory therapy assess nares and determine need for appliance change or resting period.   Outcome: Progressing     Problem: Neurosensory - Adult  Goal: Achieves stable or improved neurological status  Outcome: Progressing     Problem: Neurosensory - Adult  Goal: Absence of seizures  Outcome: Progressing     Problem: Neurosensory - Adult  Goal: Remains free of injury related to seizures activity  Outcome: Progressing     Problem: Neurosensory - Adult  Goal: Achieves maximal functionality and self care  Outcome: Progressing     Problem: Respiratory - Adult  Goal: Achieves optimal ventilation and oxygenation  Outcome: Progressing     Problem: Cardiovascular - Adult  Goal: Maintains optimal cardiac output and hemodynamic stability  Outcome: Progressing     Problem: Cardiovascular - Adult  Goal: Absence of cardiac dysrhythmias or at baseline  Outcome: Progressing     Problem: Musculoskeletal - Adult  Goal: Return ADL status to a safe level of function  Outcome: Progressing     Problem: Metabolic/Fluid and Electrolytes - Adult  Goal: Electrolytes maintained within normal limits  Outcome: Progressing     Problem: Metabolic/Fluid and Electrolytes - Adult  Goal: Hemodynamic stability and optimal renal function maintained  Outcome: Progressing     Problem: Nutrition Deficit:  Goal: Optimize

## 2023-10-01 ENCOUNTER — APPOINTMENT (OUTPATIENT)
Dept: GENERAL RADIOLOGY | Age: 68
DRG: 896 | End: 2023-10-01
Payer: MEDICARE

## 2023-10-01 PROBLEM — I50.33 ACUTE ON CHRONIC DIASTOLIC CONGESTIVE HEART FAILURE (HCC): Status: ACTIVE | Noted: 2023-10-01

## 2023-10-01 PROBLEM — E66.01 MORBID OBESITY (HCC): Status: ACTIVE | Noted: 2018-04-03

## 2023-10-01 PROBLEM — Z95.1 HX OF CABG: Status: ACTIVE | Noted: 2023-10-01

## 2023-10-01 LAB
ALBUMIN SERPL-MCNC: 2.7 G/DL (ref 3.5–5.2)
ALP SERPL-CCNC: 78 U/L (ref 40–129)
ALT SERPL-CCNC: 36 U/L (ref 0–40)
AMMONIA PLAS-SCNC: 71 UMOL/L (ref 16–60)
ANION GAP SERPL CALCULATED.3IONS-SCNC: 11 MMOL/L (ref 7–16)
AST SERPL-CCNC: 37 U/L (ref 0–39)
BASOPHILS # BLD: 0.04 K/UL (ref 0–0.2)
BASOPHILS NFR BLD: 0 % (ref 0–2)
BILIRUB SERPL-MCNC: 1.1 MG/DL (ref 0–1.2)
BNP SERPL-MCNC: 1643 PG/ML (ref 0–450)
BUN SERPL-MCNC: 14 MG/DL (ref 6–23)
CALCIUM SERPL-MCNC: 8.7 MG/DL (ref 8.6–10.2)
CHLORIDE SERPL-SCNC: 100 MMOL/L (ref 98–107)
CO2 SERPL-SCNC: 28 MMOL/L (ref 22–29)
CREAT SERPL-MCNC: 0.8 MG/DL (ref 0.7–1.2)
EOSINOPHIL # BLD: 0.12 K/UL (ref 0.05–0.5)
EOSINOPHILS RELATIVE PERCENT: 1 % (ref 0–6)
ERYTHROCYTE [DISTWIDTH] IN BLOOD BY AUTOMATED COUNT: 16.3 % (ref 11.5–15)
GFR SERPL CREATININE-BSD FRML MDRD: >60 ML/MIN/1.73M2
GLUCOSE SERPL-MCNC: 100 MG/DL (ref 74–99)
HCT VFR BLD AUTO: 31.1 % (ref 37–54)
HGB BLD-MCNC: 10.4 G/DL (ref 12.5–16.5)
IMM GRANULOCYTES # BLD AUTO: 0.11 K/UL (ref 0–0.58)
IMM GRANULOCYTES NFR BLD: 1 % (ref 0–5)
LYMPHOCYTES NFR BLD: 1.02 K/UL (ref 1.5–4)
LYMPHOCYTES RELATIVE PERCENT: 11 % (ref 20–42)
MAGNESIUM SERPL-MCNC: 1.9 MG/DL (ref 1.6–2.6)
MCH RBC QN AUTO: 30 PG (ref 26–35)
MCHC RBC AUTO-ENTMCNC: 33.4 G/DL (ref 32–34.5)
MCV RBC AUTO: 89.6 FL (ref 80–99.9)
MONOCYTES NFR BLD: 0.73 K/UL (ref 0.1–0.95)
MONOCYTES NFR BLD: 8 % (ref 2–12)
NEUTROPHILS NFR BLD: 78 % (ref 43–80)
NEUTS SEG NFR BLD: 7.29 K/UL (ref 1.8–7.3)
PHOSPHATE SERPL-MCNC: 2.6 MG/DL (ref 2.5–4.5)
PLATELET, FLUORESCENCE: 228 K/UL (ref 130–450)
PMV BLD AUTO: 10.6 FL (ref 7–12)
POTASSIUM SERPL-SCNC: 3.5 MMOL/L (ref 3.5–5)
PROT SERPL-MCNC: 6.1 G/DL (ref 6.4–8.3)
RBC # BLD AUTO: 3.47 M/UL (ref 3.8–5.8)
SODIUM SERPL-SCNC: 139 MMOL/L (ref 132–146)
WBC OTHER # BLD: 9.3 K/UL (ref 4.5–11.5)

## 2023-10-01 PROCEDURE — 6360000002 HC RX W HCPCS

## 2023-10-01 PROCEDURE — C9113 INJ PANTOPRAZOLE SODIUM, VIA: HCPCS | Performed by: INTERNAL MEDICINE

## 2023-10-01 PROCEDURE — 6370000000 HC RX 637 (ALT 250 FOR IP): Performed by: INTERNAL MEDICINE

## 2023-10-01 PROCEDURE — 2580000003 HC RX 258: Performed by: INTERNAL MEDICINE

## 2023-10-01 PROCEDURE — 2500000003 HC RX 250 WO HCPCS: Performed by: INTERNAL MEDICINE

## 2023-10-01 PROCEDURE — 83880 ASSAY OF NATRIURETIC PEPTIDE: CPT

## 2023-10-01 PROCEDURE — 36415 COLL VENOUS BLD VENIPUNCTURE: CPT

## 2023-10-01 PROCEDURE — A4216 STERILE WATER/SALINE, 10 ML: HCPCS | Performed by: INTERNAL MEDICINE

## 2023-10-01 PROCEDURE — 99223 1ST HOSP IP/OBS HIGH 75: CPT | Performed by: INTERNAL MEDICINE

## 2023-10-01 PROCEDURE — 6370000000 HC RX 637 (ALT 250 FOR IP)

## 2023-10-01 PROCEDURE — 94640 AIRWAY INHALATION TREATMENT: CPT

## 2023-10-01 PROCEDURE — 99232 SBSQ HOSP IP/OBS MODERATE 35: CPT | Performed by: INTERNAL MEDICINE

## 2023-10-01 PROCEDURE — 82140 ASSAY OF AMMONIA: CPT

## 2023-10-01 PROCEDURE — 85025 COMPLETE CBC W/AUTO DIFF WBC: CPT

## 2023-10-01 PROCEDURE — 83735 ASSAY OF MAGNESIUM: CPT

## 2023-10-01 PROCEDURE — 84100 ASSAY OF PHOSPHORUS: CPT

## 2023-10-01 PROCEDURE — 2060000000 HC ICU INTERMEDIATE R&B

## 2023-10-01 PROCEDURE — 94660 CPAP INITIATION&MGMT: CPT

## 2023-10-01 PROCEDURE — 71045 X-RAY EXAM CHEST 1 VIEW: CPT

## 2023-10-01 PROCEDURE — 6360000002 HC RX W HCPCS: Performed by: INTERNAL MEDICINE

## 2023-10-01 PROCEDURE — 80053 COMPREHEN METABOLIC PANEL: CPT

## 2023-10-01 PROCEDURE — 2700000000 HC OXYGEN THERAPY PER DAY

## 2023-10-01 PROCEDURE — 2580000003 HC RX 258: Performed by: STUDENT IN AN ORGANIZED HEALTH CARE EDUCATION/TRAINING PROGRAM

## 2023-10-01 RX ORDER — METOPROLOL TARTRATE 5 MG/5ML
2.5 INJECTION INTRAVENOUS EVERY 6 HOURS PRN
Status: DISCONTINUED | OUTPATIENT
Start: 2023-10-01 | End: 2023-10-10 | Stop reason: HOSPADM

## 2023-10-01 RX ORDER — ENOXAPARIN SODIUM 150 MG/ML
1 INJECTION SUBCUTANEOUS 2 TIMES DAILY
Status: DISCONTINUED | OUTPATIENT
Start: 2023-10-01 | End: 2023-10-04

## 2023-10-01 RX ORDER — SODIUM CHLORIDE, SODIUM LACTATE, POTASSIUM CHLORIDE, CALCIUM CHLORIDE 600; 310; 30; 20 MG/100ML; MG/100ML; MG/100ML; MG/100ML
INJECTION, SOLUTION INTRAVENOUS CONTINUOUS
Status: DISCONTINUED | OUTPATIENT
Start: 2023-10-01 | End: 2023-10-01

## 2023-10-01 RX ADMIN — ACETYLCYSTEINE 400 MG: 100 INHALANT RESPIRATORY (INHALATION) at 22:00

## 2023-10-01 RX ADMIN — IPRATROPIUM BROMIDE AND ALBUTEROL SULFATE 1 DOSE: .5; 2.5 SOLUTION RESPIRATORY (INHALATION) at 01:26

## 2023-10-01 RX ADMIN — GABAPENTIN 600 MG: 250 SOLUTION ORAL at 00:49

## 2023-10-01 RX ADMIN — IPRATROPIUM BROMIDE AND ALBUTEROL SULFATE 1 DOSE: .5; 2.5 SOLUTION RESPIRATORY (INHALATION) at 09:25

## 2023-10-01 RX ADMIN — VALPROIC ACID 500 MG: 250 SOLUTION ORAL at 00:49

## 2023-10-01 RX ADMIN — IPRATROPIUM BROMIDE AND ALBUTEROL SULFATE 1 DOSE: .5; 2.5 SOLUTION RESPIRATORY (INHALATION) at 22:00

## 2023-10-01 RX ADMIN — ENOXAPARIN SODIUM 150 MG: 150 INJECTION SUBCUTANEOUS at 20:37

## 2023-10-01 RX ADMIN — ANTI-FUNGAL POWDER MICONAZOLE NITRATE TALC FREE: 1.42 POWDER TOPICAL at 13:56

## 2023-10-01 RX ADMIN — VALPROIC ACID 500 MG: 250 SOLUTION ORAL at 05:25

## 2023-10-01 RX ADMIN — ANTI-FUNGAL POWDER MICONAZOLE NITRATE TALC FREE: 1.42 POWDER TOPICAL at 20:37

## 2023-10-01 RX ADMIN — GUAIFENESIN AND DEXTROMETHORPHAN 10 ML: 100; 10 SYRUP ORAL at 05:25

## 2023-10-01 RX ADMIN — IPRATROPIUM BROMIDE AND ALBUTEROL SULFATE 1 DOSE: .5; 2.5 SOLUTION RESPIRATORY (INHALATION) at 05:13

## 2023-10-01 RX ADMIN — IPRATROPIUM BROMIDE AND ALBUTEROL SULFATE 1 DOSE: .5; 2.5 SOLUTION RESPIRATORY (INHALATION) at 16:28

## 2023-10-01 RX ADMIN — ACETYLCYSTEINE 400 MG: 100 INHALANT RESPIRATORY (INHALATION) at 01:26

## 2023-10-01 RX ADMIN — ENOXAPARIN SODIUM 150 MG: 150 INJECTION SUBCUTANEOUS at 13:56

## 2023-10-01 RX ADMIN — BUDESONIDE 500 MCG: 0.5 SUSPENSION RESPIRATORY (INHALATION) at 16:29

## 2023-10-01 RX ADMIN — IPRATROPIUM BROMIDE AND ALBUTEROL SULFATE 1 DOSE: .5; 2.5 SOLUTION RESPIRATORY (INHALATION) at 12:53

## 2023-10-01 RX ADMIN — Medication 10 ML: at 20:37

## 2023-10-01 RX ADMIN — ACETYLCYSTEINE 400 MG: 100 INHALANT RESPIRATORY (INHALATION) at 12:53

## 2023-10-01 RX ADMIN — ACETYLCYSTEINE 400 MG: 100 INHALANT RESPIRATORY (INHALATION) at 16:29

## 2023-10-01 RX ADMIN — SODIUM CHLORIDE, PRESERVATIVE FREE 40 MG: 5 INJECTION INTRAVENOUS at 13:56

## 2023-10-01 RX ADMIN — FOLIC ACID 1 MG: 5 INJECTION, SOLUTION INTRAMUSCULAR; INTRAVENOUS; SUBCUTANEOUS at 16:42

## 2023-10-01 RX ADMIN — Medication 10 ML: at 13:57

## 2023-10-01 RX ADMIN — WATER 1000 MG: 1 INJECTION INTRAMUSCULAR; INTRAVENOUS; SUBCUTANEOUS at 11:50

## 2023-10-01 RX ADMIN — ACETYLCYSTEINE 400 MG: 100 INHALANT RESPIRATORY (INHALATION) at 09:25

## 2023-10-01 RX ADMIN — FUROSEMIDE 40 MG: 10 INJECTION, SOLUTION INTRAMUSCULAR; INTRAVENOUS at 13:56

## 2023-10-01 RX ADMIN — ACETYLCYSTEINE 400 MG: 100 INHALANT RESPIRATORY (INHALATION) at 05:13

## 2023-10-01 RX ADMIN — BUDESONIDE 500 MCG: 0.5 SUSPENSION RESPIRATORY (INHALATION) at 05:13

## 2023-10-01 RX ADMIN — Medication 10 ML: at 13:56

## 2023-10-01 ASSESSMENT — PAIN SCALES - GENERAL: PAINLEVEL_OUTOF10: 0

## 2023-10-01 NOTE — CONSULTS
INPATIENT CARDIOLOGY CONSULT    Name: Steven Stanley    Age: 80 y.o. Date of Admission: 9/24/2023  2:45 PM    Date of Service: 10/1/2023    Reason for Consultation: \"Severe left ventricular concentric hypertrophy noted EtOH cardiomyopathy? Referring Physician: Brisa Garza DO    Primary Cardiologist: Previously followed with Dr. Anna Bravo, seen by me inpatient 2021    History of Present Illness:   Steven Stanley is a 80 y.o. male who presented on 9/24/2023 for further evaluation of alcohol withdrawal.  He was admitted to the ICU where he was treated with phenobarbital and dexmedetomidine. Limited echo done during this hospitalization showed normal EF with LVH and I was consulted for the above reason. He was moved out of ICU yesterday. He remains tremulous and appears confused. He complains of chest pain \"for the past 4 years\". He has persistent atrial fibrillation and his rates are elevated. He complains of shortness of breath. He is a poor historian. He apparently pulled out his NG tube overnight. Review of Systems:   Unable to obtain    Past Medical History:  Past Medical History:   Diagnosis Date    Alcohol abuse     Atrial fibrillation (HCC)     CAD (coronary artery disease)     COPD (chronic obstructive pulmonary disease) (HCC)     Depression     Fracture of unspecified phalanx of left middle finger, initial encounter for closed fracture     GERD (gastroesophageal reflux disease)     Hypertension     Seizure ([address])        Past Surgical History:  Past Surgical History:   Procedure Laterality Date    COLONOSCOPY      CORONARY ARTERY BYPASS GRAFT  3/2/05    x3: LIMA to LAD, SVG to RCA, SVG to diagonal    DIAGNOSTIC CARDIAC CATH LAB PROCEDURE  3/02/05    [facility]: Severe multivessel CAD in patient who presents with STEMI and total occlusion of diagonal branch. Significant disease of proximal LAD and severe disease of dominant RCA.      FINGER SURGERY Left 5/3/2019    CLOSED POSSIBLE LEFT

## 2023-10-01 NOTE — PLAN OF CARE
Problem: Pain  Goal: Verbalizes/displays adequate comfort level or baseline comfort level  10/1/2023 0040 by Talib Courtney RN  Outcome: Progressing     Problem: Safety - Adult  Goal: Free from fall injury  10/1/2023 0040 by Talib Courtney RN  Outcome: Progressing     Problem: Skin/Tissue Integrity  Goal: Absence of new skin breakdown  Description: 1. Monitor for areas of redness and/or skin breakdown  2. Assess vascular access sites hourly  3. Every 4-6 hours minimum:  Change oxygen saturation probe site  4. Every 4-6 hours:  If on nasal continuous positive airway pressure, respiratory therapy assess nares and determine need for appliance change or resting period.   10/1/2023 0040 by Talib Courtney RN  Outcome: Progressing     Problem: Neurosensory - Adult  Goal: Absence of seizures  10/1/2023 0040 by Talib Courtney RN  Outcome: Progressing     Problem: Neurosensory - Adult  Goal: Remains free of injury related to seizures activity  10/1/2023 0040 by Talib Courtney RN  Outcome: Progressing     Problem: Neurosensory - Adult  Goal: Achieves maximal functionality and self care  9/30/2023 1605 by Mando Faustin RN  Outcome: Progressing     Problem: Respiratory - Adult  Goal: Achieves optimal ventilation and oxygenation  10/1/2023 0040 by Talib Courtney RN  Outcome: Progressing     Problem: Cardiovascular - Adult  Goal: Maintains optimal cardiac output and hemodynamic stability  10/1/2023 0040 by Talib Courtney RN  Outcome: Progressing

## 2023-10-01 NOTE — PLAN OF CARE
Problem: Discharge Planning  Goal: Discharge to home or other facility with appropriate resources  Outcome: Progressing     Problem: Pain  Goal: Verbalizes/displays adequate comfort level or baseline comfort level  10/1/2023 0631 by Dmitri Leslie RN  Outcome: Progressing     Problem: Safety - Adult  Goal: Free from fall injury  10/1/2023 0631 by Dmitri Leslie RN  Outcome: Progressing     Problem: Skin/Tissue Integrity  Goal: Absence of new skin breakdown  Description: 1. Monitor for areas of redness and/or skin breakdown  2. Assess vascular access sites hourly  3. Every 4-6 hours minimum:  Change oxygen saturation probe site  4. Every 4-6 hours:  If on nasal continuous positive airway pressure, respiratory therapy assess nares and determine need for appliance change or resting period.   10/1/2023 0631 by Dmitri Leslie RN  Outcome: Progressing     Problem: Neurosensory - Adult  Goal: Achieves stable or improved neurological status  10/1/2023 0631 by Dmitri Leslie RN  Outcome: Progressing     Problem: Neurosensory - Adult  Goal: Absence of seizures  10/1/2023 0631 by Dmitri Leslie RN  Outcome: Progressing     Problem: Neurosensory - Adult  Goal: Remains free of injury related to seizures activity  10/1/2023 0631 by Dmitri Leslie RN  Outcome: Progressing     Problem: Neurosensory - Adult  Goal: Achieves maximal functionality and self care  Outcome: Progressing     Problem: Respiratory - Adult  Goal: Achieves optimal ventilation and oxygenation  10/1/2023 0631 by Dmitri Leslie RN  Outcome: Progressing     Problem: Cardiovascular - Adult  Goal: Maintains optimal cardiac output and hemodynamic stability  10/1/2023 0631 by Dmitri Leslie RN  Outcome: Progressing     Problem: Cardiovascular - Adult  Goal: Absence of cardiac dysrhythmias or at baseline  10/1/2023 0631 by Dmitri Leslie RN  Outcome: Progressing     Problem: Musculoskeletal - Adult  Goal: Return ADL status to a safe level of function  Outcome: Progressing

## 2023-10-02 ENCOUNTER — APPOINTMENT (OUTPATIENT)
Dept: GENERAL RADIOLOGY | Age: 68
DRG: 896 | End: 2023-10-02
Payer: MEDICARE

## 2023-10-02 PROBLEM — F10.932 ALCOHOL WITHDRAWAL SYNDROME WITH PERCEPTUAL DISTURBANCE (HCC): Status: ACTIVE | Noted: 2023-10-02

## 2023-10-02 PROBLEM — G93.41 ACUTE METABOLIC ENCEPHALOPATHY: Status: ACTIVE | Noted: 2023-10-02

## 2023-10-02 LAB
ALBUMIN SERPL-MCNC: 2.9 G/DL (ref 3.5–5.2)
ALP SERPL-CCNC: 61 U/L (ref 40–129)
ALT SERPL-CCNC: 29 U/L (ref 0–40)
ANION GAP SERPL CALCULATED.3IONS-SCNC: 12 MMOL/L (ref 7–16)
AST SERPL-CCNC: 26 U/L (ref 0–39)
B.E.: 5.7 MMOL/L (ref -3–3)
B.E.: 6.2 MMOL/L (ref -3–3)
BASOPHILS # BLD: 0.04 K/UL (ref 0–0.2)
BASOPHILS NFR BLD: 1 % (ref 0–2)
BILIRUB SERPL-MCNC: 1 MG/DL (ref 0–1.2)
BUN SERPL-MCNC: 21 MG/DL (ref 6–23)
CALCIUM SERPL-MCNC: 9.2 MG/DL (ref 8.6–10.2)
CHLORIDE SERPL-SCNC: 99 MMOL/L (ref 98–107)
CO2 SERPL-SCNC: 29 MMOL/L (ref 22–29)
COHB: 1 % (ref 0–1.5)
COHB: 1.3 % (ref 0–1.5)
CREAT SERPL-MCNC: 0.8 MG/DL (ref 0.7–1.2)
CRITICAL: ABNORMAL
CRITICAL: ABNORMAL
DATE ANALYZED: ABNORMAL
DATE ANALYZED: ABNORMAL
DATE OF COLLECTION: ABNORMAL
DATE OF COLLECTION: ABNORMAL
EOSINOPHIL # BLD: 0.12 K/UL (ref 0.05–0.5)
EOSINOPHILS RELATIVE PERCENT: 2 % (ref 0–6)
ERYTHROCYTE [DISTWIDTH] IN BLOOD BY AUTOMATED COUNT: 16.3 % (ref 11.5–15)
GFR SERPL CREATININE-BSD FRML MDRD: >60 ML/MIN/1.73M2
GLUCOSE SERPL-MCNC: 94 MG/DL (ref 74–99)
HCO3: 29.5 MMOL/L (ref 22–26)
HCO3: 30.2 MMOL/L (ref 22–26)
HCT VFR BLD AUTO: 34.9 % (ref 37–54)
HGB BLD-MCNC: 10.9 G/DL (ref 12.5–16.5)
HHB: 5.3 % (ref 0–5)
HHB: 5.9 % (ref 0–5)
IMM GRANULOCYTES # BLD AUTO: 0.13 K/UL (ref 0–0.58)
IMM GRANULOCYTES NFR BLD: 2 % (ref 0–5)
LAB: ABNORMAL
LAB: ABNORMAL
LYMPHOCYTES NFR BLD: 1 K/UL (ref 1.5–4)
LYMPHOCYTES RELATIVE PERCENT: 15 % (ref 20–42)
Lab: 1310
Lab: 530
MAGNESIUM SERPL-MCNC: 2 MG/DL (ref 1.6–2.6)
MCH RBC QN AUTO: 28.5 PG (ref 26–35)
MCHC RBC AUTO-ENTMCNC: 31.2 G/DL (ref 32–34.5)
MCV RBC AUTO: 91.1 FL (ref 80–99.9)
METHB: 0.3 % (ref 0–1.5)
METHB: 0.3 % (ref 0–1.5)
MODE: ABNORMAL
MODE: ABNORMAL
MONOCYTES NFR BLD: 0.49 K/UL (ref 0.1–0.95)
MONOCYTES NFR BLD: 7 % (ref 2–12)
NEUTROPHILS NFR BLD: 74 % (ref 43–80)
NEUTS SEG NFR BLD: 5.04 K/UL (ref 1.8–7.3)
O2 CONTENT: 15.8 ML/DL
O2 CONTENT: 16.5 ML/DL
O2 SATURATION: 94 % (ref 92–98.5)
O2 SATURATION: 94.6 % (ref 92–98.5)
O2HB: 92.5 % (ref 94–97)
O2HB: 93.4 % (ref 94–97)
OPERATOR ID: 301
OPERATOR ID: 5523
PATIENT TEMP: 37 C
PATIENT TEMP: 37 C
PCO2: 37.6 MMHG (ref 35–45)
PCO2: 43.2 MMHG (ref 35–45)
PH BLOOD GAS: 7.46 (ref 7.35–7.45)
PH BLOOD GAS: 7.51 (ref 7.35–7.45)
PHOSPHATE SERPL-MCNC: 2.4 MG/DL (ref 2.5–4.5)
PLATELET # BLD AUTO: 250 K/UL (ref 130–450)
PMV BLD AUTO: 9.5 FL (ref 7–12)
PO2: 67.7 MMHG (ref 75–100)
PO2: 74.5 MMHG (ref 75–100)
POTASSIUM SERPL-SCNC: 3.4 MMOL/L (ref 3.5–5)
PROT SERPL-MCNC: 6.3 G/DL (ref 6.4–8.3)
RBC # BLD AUTO: 3.83 M/UL (ref 3.8–5.8)
SODIUM SERPL-SCNC: 140 MMOL/L (ref 132–146)
SOURCE, BLOOD GAS: ABNORMAL
SOURCE, BLOOD GAS: ABNORMAL
THB: 12.1 G/DL (ref 11.5–16.5)
THB: 12.5 G/DL (ref 11.5–16.5)
TIME ANALYZED: 1314
TIME ANALYZED: 531
WBC OTHER # BLD: 6.8 K/UL (ref 4.5–11.5)

## 2023-10-02 PROCEDURE — 6360000002 HC RX W HCPCS

## 2023-10-02 PROCEDURE — 94640 AIRWAY INHALATION TREATMENT: CPT

## 2023-10-02 PROCEDURE — 2580000003 HC RX 258: Performed by: STUDENT IN AN ORGANIZED HEALTH CARE EDUCATION/TRAINING PROGRAM

## 2023-10-02 PROCEDURE — 83735 ASSAY OF MAGNESIUM: CPT

## 2023-10-02 PROCEDURE — 84100 ASSAY OF PHOSPHORUS: CPT

## 2023-10-02 PROCEDURE — 80053 COMPREHEN METABOLIC PANEL: CPT

## 2023-10-02 PROCEDURE — A4216 STERILE WATER/SALINE, 10 ML: HCPCS | Performed by: INTERNAL MEDICINE

## 2023-10-02 PROCEDURE — 99233 SBSQ HOSP IP/OBS HIGH 50: CPT | Performed by: INTERNAL MEDICINE

## 2023-10-02 PROCEDURE — 71045 X-RAY EXAM CHEST 1 VIEW: CPT

## 2023-10-02 PROCEDURE — 6370000000 HC RX 637 (ALT 250 FOR IP): Performed by: INTERNAL MEDICINE

## 2023-10-02 PROCEDURE — 6360000002 HC RX W HCPCS: Performed by: INTERNAL MEDICINE

## 2023-10-02 PROCEDURE — 97530 THERAPEUTIC ACTIVITIES: CPT

## 2023-10-02 PROCEDURE — 85025 COMPLETE CBC W/AUTO DIFF WBC: CPT

## 2023-10-02 PROCEDURE — 82805 BLOOD GASES W/O2 SATURATION: CPT

## 2023-10-02 PROCEDURE — 92526 ORAL FUNCTION THERAPY: CPT

## 2023-10-02 PROCEDURE — 2060000000 HC ICU INTERMEDIATE R&B

## 2023-10-02 PROCEDURE — 36415 COLL VENOUS BLD VENIPUNCTURE: CPT

## 2023-10-02 PROCEDURE — 97110 THERAPEUTIC EXERCISES: CPT

## 2023-10-02 PROCEDURE — 2500000003 HC RX 250 WO HCPCS: Performed by: INTERNAL MEDICINE

## 2023-10-02 PROCEDURE — 2580000003 HC RX 258: Performed by: INTERNAL MEDICINE

## 2023-10-02 PROCEDURE — 2580000003 HC RX 258

## 2023-10-02 PROCEDURE — 2700000000 HC OXYGEN THERAPY PER DAY

## 2023-10-02 PROCEDURE — C9113 INJ PANTOPRAZOLE SODIUM, VIA: HCPCS | Performed by: INTERNAL MEDICINE

## 2023-10-02 PROCEDURE — 94660 CPAP INITIATION&MGMT: CPT

## 2023-10-02 RX ORDER — METOPROLOL TARTRATE 5 MG/5ML
5 INJECTION INTRAVENOUS EVERY 6 HOURS
Status: DISPENSED | OUTPATIENT
Start: 2023-10-02 | End: 2023-10-04

## 2023-10-02 RX ORDER — WATER 1000 ML/1000ML
INJECTION, SOLUTION INTRAVENOUS
Status: COMPLETED
Start: 2023-10-02 | End: 2023-10-02

## 2023-10-02 RX ORDER — ZIPRASIDONE MESYLATE 20 MG/ML
10 INJECTION, POWDER, LYOPHILIZED, FOR SOLUTION INTRAMUSCULAR ONCE
Status: COMPLETED | OUTPATIENT
Start: 2023-10-02 | End: 2023-10-02

## 2023-10-02 RX ADMIN — FOLIC ACID 1 MG: 5 INJECTION, SOLUTION INTRAMUSCULAR; INTRAVENOUS; SUBCUTANEOUS at 12:42

## 2023-10-02 RX ADMIN — IPRATROPIUM BROMIDE AND ALBUTEROL SULFATE 1 DOSE: .5; 2.5 SOLUTION RESPIRATORY (INHALATION) at 16:07

## 2023-10-02 RX ADMIN — IPRATROPIUM BROMIDE AND ALBUTEROL SULFATE 1 DOSE: .5; 2.5 SOLUTION RESPIRATORY (INHALATION) at 12:48

## 2023-10-02 RX ADMIN — WATER 10 ML: 1 INJECTION INTRAMUSCULAR; INTRAVENOUS; SUBCUTANEOUS at 15:50

## 2023-10-02 RX ADMIN — BUDESONIDE 500 MCG: 0.5 SUSPENSION RESPIRATORY (INHALATION) at 05:38

## 2023-10-02 RX ADMIN — ACETYLCYSTEINE 400 MG: 100 INHALANT RESPIRATORY (INHALATION) at 05:38

## 2023-10-02 RX ADMIN — ACETYLCYSTEINE 400 MG: 100 INHALANT RESPIRATORY (INHALATION) at 08:58

## 2023-10-02 RX ADMIN — BUDESONIDE 500 MCG: 0.5 SUSPENSION RESPIRATORY (INHALATION) at 16:07

## 2023-10-02 RX ADMIN — Medication 10 ML: at 10:39

## 2023-10-02 RX ADMIN — SODIUM CHLORIDE, PRESERVATIVE FREE 40 MG: 5 INJECTION INTRAVENOUS at 10:36

## 2023-10-02 RX ADMIN — IPRATROPIUM BROMIDE AND ALBUTEROL SULFATE 1 DOSE: .5; 2.5 SOLUTION RESPIRATORY (INHALATION) at 08:58

## 2023-10-02 RX ADMIN — ENOXAPARIN SODIUM 150 MG: 150 INJECTION SUBCUTANEOUS at 10:39

## 2023-10-02 RX ADMIN — Medication 10 ML: at 23:38

## 2023-10-02 RX ADMIN — IPRATROPIUM BROMIDE AND ALBUTEROL SULFATE 1 DOSE: .5; 2.5 SOLUTION RESPIRATORY (INHALATION) at 05:38

## 2023-10-02 RX ADMIN — ACETYLCYSTEINE 400 MG: 100 INHALANT RESPIRATORY (INHALATION) at 12:48

## 2023-10-02 RX ADMIN — ANTI-FUNGAL POWDER MICONAZOLE NITRATE TALC FREE: 1.42 POWDER TOPICAL at 10:34

## 2023-10-02 RX ADMIN — METOPROLOL TARTRATE 5 MG: 1 INJECTION, SOLUTION INTRAVENOUS at 10:40

## 2023-10-02 RX ADMIN — ZIPRASIDONE MESYLATE 10 MG: 20 INJECTION, POWDER, LYOPHILIZED, FOR SOLUTION INTRAMUSCULAR at 15:45

## 2023-10-02 RX ADMIN — METOPROLOL TARTRATE 5 MG: 1 INJECTION, SOLUTION INTRAVENOUS at 22:46

## 2023-10-02 RX ADMIN — IPRATROPIUM BROMIDE AND ALBUTEROL SULFATE 1 DOSE: .5; 2.5 SOLUTION RESPIRATORY (INHALATION) at 01:56

## 2023-10-02 RX ADMIN — WATER 1000 MG: 1 INJECTION INTRAMUSCULAR; INTRAVENOUS; SUBCUTANEOUS at 12:53

## 2023-10-02 RX ADMIN — ACETYLCYSTEINE 400 MG: 100 INHALANT RESPIRATORY (INHALATION) at 01:56

## 2023-10-02 RX ADMIN — ENOXAPARIN SODIUM 150 MG: 150 INJECTION SUBCUTANEOUS at 22:46

## 2023-10-02 RX ADMIN — ANTI-FUNGAL POWDER MICONAZOLE NITRATE TALC FREE: 1.42 POWDER TOPICAL at 23:38

## 2023-10-02 RX ADMIN — FUROSEMIDE 40 MG: 10 INJECTION, SOLUTION INTRAMUSCULAR; INTRAVENOUS at 10:34

## 2023-10-02 RX ADMIN — Medication 10 ML: at 23:39

## 2023-10-02 RX ADMIN — ACETYLCYSTEINE 400 MG: 100 INHALANT RESPIRATORY (INHALATION) at 16:07

## 2023-10-02 NOTE — CARE COORDINATION
10/2/23 1332 CM note: covid (-) 9/25/23, bl cx 9/25 (-), (+) UTI 9/25/23. IV rocephin, folic acid, protonix. PIV x 1. 3L nc vs bipap FIO2 40%. HX ETOH, COPD, and afib on xarelto pta- changed to eliquis. Tx from ICU 10/1. Pt had NG/TF; however pt pulled ng out. Speech consulted- awaiting their eval. Hx [facility]. Pt lives alone, drives, and DME includes ww. His daughter Lo Viramontes, who lives in [place], does his grocery shopping online for home delivery. Prior CM provided her with SNF lists to review. CM called Lo Viramontes and her first choice would be [facility] since he's been to this facility in the past. Referral given to [facility] liaison, to review. Lo Viramontes will review SNF list for back up choices. CM will follow.  Electronically signed by Rai Cunningham RN on 10/2/2023 at 2:00 PM

## 2023-10-02 NOTE — PLAN OF CARE
Problem: Discharge Planning  Goal: Discharge to home or other facility with appropriate resources  Outcome: Progressing     Problem: Pain  Goal: Verbalizes/displays adequate comfort level or baseline comfort level  Outcome: Adequate for Discharge     Problem: Safety - Adult  Goal: Free from fall injury  Outcome: Progressing     Problem: Skin/Tissue Integrity  Goal: Absence of new skin breakdown  Description: 1. Monitor for areas of redness and/or skin breakdown  2. Assess vascular access sites hourly  3. Every 4-6 hours minimum:  Change oxygen saturation probe site  4. Every 4-6 hours:  If on nasal continuous positive airway pressure, respiratory therapy assess nares and determine need for appliance change or resting period.   Outcome: Progressing     Problem: Neurosensory - Adult  Goal: Achieves stable or improved neurological status  Outcome: Progressing  Goal: Absence of seizures  Outcome: Progressing  Goal: Remains free of injury related to seizures activity  Outcome: Progressing  Goal: Achieves maximal functionality and self care  Outcome: Progressing     Problem: Respiratory - Adult  Goal: Achieves optimal ventilation and oxygenation  Outcome: Progressing     Problem: Cardiovascular - Adult  Goal: Maintains optimal cardiac output and hemodynamic stability  Outcome: Progressing  Goal: Absence of cardiac dysrhythmias or at baseline  Outcome: Progressing     Problem: Musculoskeletal - Adult  Goal: Return ADL status to a safe level of function  Outcome: Progressing     Problem: Metabolic/Fluid and Electrolytes - Adult  Goal: Electrolytes maintained within normal limits  Outcome: Progressing  Goal: Hemodynamic stability and optimal renal function maintained  Outcome: Progressing     Problem: Nutrition Deficit:  Goal: Optimize nutritional status  Outcome: Progressing

## 2023-10-03 ENCOUNTER — APPOINTMENT (OUTPATIENT)
Dept: GENERAL RADIOLOGY | Age: 68
DRG: 896 | End: 2023-10-03
Payer: MEDICARE

## 2023-10-03 LAB
ALBUMIN SERPL-MCNC: 2.9 G/DL (ref 3.5–5.2)
ALP SERPL-CCNC: 57 U/L (ref 40–129)
ALT SERPL-CCNC: 25 U/L (ref 0–40)
AMMONIA PLAS-SCNC: 50 UMOL/L (ref 16–60)
ANION GAP SERPL CALCULATED.3IONS-SCNC: 11 MMOL/L (ref 7–16)
AST SERPL-CCNC: 28 U/L (ref 0–39)
BASOPHILS # BLD: 0.05 K/UL (ref 0–0.2)
BASOPHILS NFR BLD: 1 % (ref 0–2)
BILIRUB SERPL-MCNC: 0.9 MG/DL (ref 0–1.2)
BUN SERPL-MCNC: 22 MG/DL (ref 6–23)
CALCIUM SERPL-MCNC: 8.9 MG/DL (ref 8.6–10.2)
CHLORIDE SERPL-SCNC: 101 MMOL/L (ref 98–107)
CO2 SERPL-SCNC: 30 MMOL/L (ref 22–29)
CREAT SERPL-MCNC: 0.9 MG/DL (ref 0.7–1.2)
EOSINOPHIL # BLD: 0.12 K/UL (ref 0.05–0.5)
EOSINOPHILS RELATIVE PERCENT: 2 % (ref 0–6)
ERYTHROCYTE [DISTWIDTH] IN BLOOD BY AUTOMATED COUNT: 16.2 % (ref 11.5–15)
GFR SERPL CREATININE-BSD FRML MDRD: >60 ML/MIN/1.73M2
GLUCOSE SERPL-MCNC: 101 MG/DL (ref 74–99)
HCT VFR BLD AUTO: 34.5 % (ref 37–54)
HGB BLD-MCNC: 10.9 G/DL (ref 12.5–16.5)
IMM GRANULOCYTES # BLD AUTO: 0.11 K/UL (ref 0–0.58)
IMM GRANULOCYTES NFR BLD: 2 % (ref 0–5)
LYMPHOCYTES NFR BLD: 0.99 K/UL (ref 1.5–4)
LYMPHOCYTES RELATIVE PERCENT: 19 % (ref 20–42)
MAGNESIUM SERPL-MCNC: 2.1 MG/DL (ref 1.6–2.6)
MCH RBC QN AUTO: 28.8 PG (ref 26–35)
MCHC RBC AUTO-ENTMCNC: 31.6 G/DL (ref 32–34.5)
MCV RBC AUTO: 91 FL (ref 80–99.9)
MONOCYTES NFR BLD: 0.47 K/UL (ref 0.1–0.95)
MONOCYTES NFR BLD: 9 % (ref 2–12)
NEUTROPHILS NFR BLD: 68 % (ref 43–80)
NEUTS SEG NFR BLD: 3.6 K/UL (ref 1.8–7.3)
PHOSPHATE SERPL-MCNC: 2.7 MG/DL (ref 2.5–4.5)
PLATELET # BLD AUTO: 272 K/UL (ref 130–450)
PMV BLD AUTO: 9.1 FL (ref 7–12)
POTASSIUM SERPL-SCNC: 3.2 MMOL/L (ref 3.5–5)
PROT SERPL-MCNC: 6 G/DL (ref 6.4–8.3)
RBC # BLD AUTO: 3.79 M/UL (ref 3.8–5.8)
SODIUM SERPL-SCNC: 142 MMOL/L (ref 132–146)
WBC OTHER # BLD: 5.3 K/UL (ref 4.5–11.5)

## 2023-10-03 PROCEDURE — 94660 CPAP INITIATION&MGMT: CPT

## 2023-10-03 PROCEDURE — C9113 INJ PANTOPRAZOLE SODIUM, VIA: HCPCS | Performed by: INTERNAL MEDICINE

## 2023-10-03 PROCEDURE — 2500000003 HC RX 250 WO HCPCS: Performed by: INTERNAL MEDICINE

## 2023-10-03 PROCEDURE — 83735 ASSAY OF MAGNESIUM: CPT

## 2023-10-03 PROCEDURE — 97530 THERAPEUTIC ACTIVITIES: CPT

## 2023-10-03 PROCEDURE — A4216 STERILE WATER/SALINE, 10 ML: HCPCS | Performed by: INTERNAL MEDICINE

## 2023-10-03 PROCEDURE — 6360000002 HC RX W HCPCS: Performed by: INTERNAL MEDICINE

## 2023-10-03 PROCEDURE — 84100 ASSAY OF PHOSPHORUS: CPT

## 2023-10-03 PROCEDURE — 36415 COLL VENOUS BLD VENIPUNCTURE: CPT

## 2023-10-03 PROCEDURE — 6360000002 HC RX W HCPCS

## 2023-10-03 PROCEDURE — 80053 COMPREHEN METABOLIC PANEL: CPT

## 2023-10-03 PROCEDURE — 85025 COMPLETE CBC W/AUTO DIFF WBC: CPT

## 2023-10-03 PROCEDURE — 99233 SBSQ HOSP IP/OBS HIGH 50: CPT | Performed by: INTERNAL MEDICINE

## 2023-10-03 PROCEDURE — 92526 ORAL FUNCTION THERAPY: CPT | Performed by: SPEECH-LANGUAGE PATHOLOGIST

## 2023-10-03 PROCEDURE — 6370000000 HC RX 637 (ALT 250 FOR IP): Performed by: INTERNAL MEDICINE

## 2023-10-03 PROCEDURE — 99232 SBSQ HOSP IP/OBS MODERATE 35: CPT | Performed by: INTERNAL MEDICINE

## 2023-10-03 PROCEDURE — 2580000003 HC RX 258: Performed by: INTERNAL MEDICINE

## 2023-10-03 PROCEDURE — 2700000000 HC OXYGEN THERAPY PER DAY

## 2023-10-03 PROCEDURE — 2060000000 HC ICU INTERMEDIATE R&B

## 2023-10-03 PROCEDURE — 82140 ASSAY OF AMMONIA: CPT

## 2023-10-03 PROCEDURE — 94640 AIRWAY INHALATION TREATMENT: CPT

## 2023-10-03 PROCEDURE — 71045 X-RAY EXAM CHEST 1 VIEW: CPT

## 2023-10-03 RX ORDER — POTASSIUM CHLORIDE 7.45 MG/ML
10 INJECTION INTRAVENOUS
Status: DISPENSED | OUTPATIENT
Start: 2023-10-03 | End: 2023-10-03

## 2023-10-03 RX ADMIN — IPRATROPIUM BROMIDE AND ALBUTEROL SULFATE 1 DOSE: .5; 2.5 SOLUTION RESPIRATORY (INHALATION) at 05:33

## 2023-10-03 RX ADMIN — SODIUM CHLORIDE, PRESERVATIVE FREE 40 MG: 5 INJECTION INTRAVENOUS at 10:11

## 2023-10-03 RX ADMIN — IPRATROPIUM BROMIDE AND ALBUTEROL SULFATE 1 DOSE: .5; 2.5 SOLUTION RESPIRATORY (INHALATION) at 18:28

## 2023-10-03 RX ADMIN — ACETYLCYSTEINE 400 MG: 100 INHALANT RESPIRATORY (INHALATION) at 09:15

## 2023-10-03 RX ADMIN — METOPROLOL TARTRATE 5 MG: 1 INJECTION, SOLUTION INTRAVENOUS at 21:06

## 2023-10-03 RX ADMIN — ACETYLCYSTEINE 400 MG: 100 INHALANT RESPIRATORY (INHALATION) at 05:33

## 2023-10-03 RX ADMIN — ACETYLCYSTEINE 400 MG: 100 INHALANT RESPIRATORY (INHALATION) at 18:28

## 2023-10-03 RX ADMIN — BUDESONIDE 500 MCG: 0.5 SUSPENSION RESPIRATORY (INHALATION) at 18:28

## 2023-10-03 RX ADMIN — POTASSIUM CHLORIDE 10 MEQ: 7.46 INJECTION, SOLUTION INTRAVENOUS at 13:59

## 2023-10-03 RX ADMIN — Medication 10 ML: at 21:07

## 2023-10-03 RX ADMIN — ENOXAPARIN SODIUM 150 MG: 150 INJECTION SUBCUTANEOUS at 21:06

## 2023-10-03 RX ADMIN — IPRATROPIUM BROMIDE AND ALBUTEROL SULFATE 1 DOSE: .5; 2.5 SOLUTION RESPIRATORY (INHALATION) at 22:28

## 2023-10-03 RX ADMIN — ACETYLCYSTEINE 400 MG: 100 INHALANT RESPIRATORY (INHALATION) at 22:28

## 2023-10-03 RX ADMIN — IPRATROPIUM BROMIDE AND ALBUTEROL SULFATE 1 DOSE: .5; 2.5 SOLUTION RESPIRATORY (INHALATION) at 09:15

## 2023-10-03 RX ADMIN — Medication 10 ML: at 10:11

## 2023-10-03 RX ADMIN — METOPROLOL TARTRATE 5 MG: 1 INJECTION, SOLUTION INTRAVENOUS at 03:55

## 2023-10-03 RX ADMIN — POTASSIUM CHLORIDE 10 MEQ: 7.46 INJECTION, SOLUTION INTRAVENOUS at 11:51

## 2023-10-03 RX ADMIN — METOPROLOL TARTRATE 5 MG: 1 INJECTION, SOLUTION INTRAVENOUS at 10:11

## 2023-10-03 RX ADMIN — ANTI-FUNGAL POWDER MICONAZOLE NITRATE TALC FREE: 1.42 POWDER TOPICAL at 10:11

## 2023-10-03 RX ADMIN — POTASSIUM CHLORIDE 10 MEQ: 7.46 INJECTION, SOLUTION INTRAVENOUS at 12:57

## 2023-10-03 RX ADMIN — ACETYLCYSTEINE 400 MG: 100 INHALANT RESPIRATORY (INHALATION) at 01:44

## 2023-10-03 RX ADMIN — FOLIC ACID 1 MG: 5 INJECTION, SOLUTION INTRAMUSCULAR; INTRAVENOUS; SUBCUTANEOUS at 10:15

## 2023-10-03 RX ADMIN — POTASSIUM CHLORIDE 10 MEQ: 7.46 INJECTION, SOLUTION INTRAVENOUS at 18:57

## 2023-10-03 RX ADMIN — IPRATROPIUM BROMIDE AND ALBUTEROL SULFATE 1 DOSE: .5; 2.5 SOLUTION RESPIRATORY (INHALATION) at 01:44

## 2023-10-03 RX ADMIN — ENOXAPARIN SODIUM 150 MG: 150 INJECTION SUBCUTANEOUS at 10:11

## 2023-10-03 RX ADMIN — BUDESONIDE 500 MCG: 0.5 SUSPENSION RESPIRATORY (INHALATION) at 05:33

## 2023-10-03 RX ADMIN — ANTI-FUNGAL POWDER MICONAZOLE NITRATE TALC FREE: 1.42 POWDER TOPICAL at 21:06

## 2023-10-03 RX ADMIN — WATER 1000 MG: 1 INJECTION INTRAMUSCULAR; INTRAVENOUS; SUBCUTANEOUS at 11:47

## 2023-10-03 NOTE — CARE COORDINATION
Referral was made to [facility] yesterday per daughter, Barbie's choice. SW spoke with Mckayla at [facility], she states she can follow for now but did not formally accept and won't until closer to DC. Notified daughter, Earnest Mckeon, she states she does have a [facility] list and will review for other choices in the event [facility] declines. Patient will NEED PRECERT for DC, as well as a HENS and CURTIS signed. SLP is working with patient and had recommended a Barium Swallow. Podiatry Consulted. Will follow.

## 2023-10-03 NOTE — CARE COORDINATION
LVM for dtr Carolyne Irene explaining IMM letter and requested a call back.  Electronically signed by Antonette Flores on 10/3/2023 at 9:44 AM

## 2023-10-04 ENCOUNTER — APPOINTMENT (OUTPATIENT)
Dept: GENERAL RADIOLOGY | Age: 68
DRG: 896 | End: 2023-10-04
Payer: MEDICARE

## 2023-10-04 LAB
ALBUMIN SERPL-MCNC: 3.1 G/DL (ref 3.5–5.2)
ALP SERPL-CCNC: 56 U/L (ref 40–129)
ALT SERPL-CCNC: 25 U/L (ref 0–40)
ANION GAP SERPL CALCULATED.3IONS-SCNC: 11 MMOL/L (ref 7–16)
AST SERPL-CCNC: 34 U/L (ref 0–39)
BASOPHILS # BLD: 0.05 K/UL (ref 0–0.2)
BASOPHILS NFR BLD: 1 % (ref 0–2)
BILIRUB SERPL-MCNC: 0.8 MG/DL (ref 0–1.2)
BUN SERPL-MCNC: 23 MG/DL (ref 6–23)
CALCIUM SERPL-MCNC: 9 MG/DL (ref 8.6–10.2)
CHLORIDE SERPL-SCNC: 106 MMOL/L (ref 98–107)
CO2 SERPL-SCNC: 29 MMOL/L (ref 22–29)
CREAT SERPL-MCNC: 0.9 MG/DL (ref 0.7–1.2)
EOSINOPHIL # BLD: 0.08 K/UL (ref 0.05–0.5)
EOSINOPHILS RELATIVE PERCENT: 1 % (ref 0–6)
ERYTHROCYTE [DISTWIDTH] IN BLOOD BY AUTOMATED COUNT: 16.4 % (ref 11.5–15)
GFR SERPL CREATININE-BSD FRML MDRD: >60 ML/MIN/1.73M2
GLUCOSE SERPL-MCNC: 111 MG/DL (ref 74–99)
HCT VFR BLD AUTO: 35.7 % (ref 37–54)
HGB BLD-MCNC: 11.1 G/DL (ref 12.5–16.5)
IMM GRANULOCYTES # BLD AUTO: 0.1 K/UL (ref 0–0.58)
IMM GRANULOCYTES NFR BLD: 2 % (ref 0–5)
LYMPHOCYTES NFR BLD: 1.08 K/UL (ref 1.5–4)
LYMPHOCYTES RELATIVE PERCENT: 20 % (ref 20–42)
MAGNESIUM SERPL-MCNC: 2.2 MG/DL (ref 1.6–2.6)
MCH RBC QN AUTO: 28.8 PG (ref 26–35)
MCHC RBC AUTO-ENTMCNC: 31.1 G/DL (ref 32–34.5)
MCV RBC AUTO: 92.5 FL (ref 80–99.9)
MONOCYTES NFR BLD: 0.41 K/UL (ref 0.1–0.95)
MONOCYTES NFR BLD: 7 % (ref 2–12)
NEUTROPHILS NFR BLD: 69 % (ref 43–80)
NEUTS SEG NFR BLD: 3.82 K/UL (ref 1.8–7.3)
PHOSPHATE SERPL-MCNC: 3.6 MG/DL (ref 2.5–4.5)
PLATELET # BLD AUTO: 311 K/UL (ref 130–450)
PMV BLD AUTO: 9.3 FL (ref 7–12)
POTASSIUM SERPL-SCNC: 3.5 MMOL/L (ref 3.5–5)
PROT SERPL-MCNC: 6.4 G/DL (ref 6.4–8.3)
RBC # BLD AUTO: 3.86 M/UL (ref 3.8–5.8)
SODIUM SERPL-SCNC: 146 MMOL/L (ref 132–146)
WBC OTHER # BLD: 5.5 K/UL (ref 4.5–11.5)

## 2023-10-04 PROCEDURE — 99232 SBSQ HOSP IP/OBS MODERATE 35: CPT | Performed by: INTERNAL MEDICINE

## 2023-10-04 PROCEDURE — 2500000003 HC RX 250 WO HCPCS: Performed by: INTERNAL MEDICINE

## 2023-10-04 PROCEDURE — 6360000002 HC RX W HCPCS: Performed by: INTERNAL MEDICINE

## 2023-10-04 PROCEDURE — 85025 COMPLETE CBC W/AUTO DIFF WBC: CPT

## 2023-10-04 PROCEDURE — 80053 COMPREHEN METABOLIC PANEL: CPT

## 2023-10-04 PROCEDURE — 6370000000 HC RX 637 (ALT 250 FOR IP)

## 2023-10-04 PROCEDURE — 94640 AIRWAY INHALATION TREATMENT: CPT

## 2023-10-04 PROCEDURE — 2580000003 HC RX 258: Performed by: INTERNAL MEDICINE

## 2023-10-04 PROCEDURE — 6370000000 HC RX 637 (ALT 250 FOR IP): Performed by: INTERNAL MEDICINE

## 2023-10-04 PROCEDURE — A4216 STERILE WATER/SALINE, 10 ML: HCPCS | Performed by: INTERNAL MEDICINE

## 2023-10-04 PROCEDURE — 6360000002 HC RX W HCPCS: Performed by: FAMILY MEDICINE

## 2023-10-04 PROCEDURE — 83735 ASSAY OF MAGNESIUM: CPT

## 2023-10-04 PROCEDURE — 92611 MOTION FLUOROSCOPY/SWALLOW: CPT | Performed by: SPEECH-LANGUAGE PATHOLOGIST

## 2023-10-04 PROCEDURE — 94660 CPAP INITIATION&MGMT: CPT

## 2023-10-04 PROCEDURE — 36415 COLL VENOUS BLD VENIPUNCTURE: CPT

## 2023-10-04 PROCEDURE — 2700000000 HC OXYGEN THERAPY PER DAY

## 2023-10-04 PROCEDURE — 92526 ORAL FUNCTION THERAPY: CPT | Performed by: SPEECH-LANGUAGE PATHOLOGIST

## 2023-10-04 PROCEDURE — 71045 X-RAY EXAM CHEST 1 VIEW: CPT

## 2023-10-04 PROCEDURE — 6360000002 HC RX W HCPCS

## 2023-10-04 PROCEDURE — 74230 X-RAY XM SWLNG FUNCJ C+: CPT

## 2023-10-04 PROCEDURE — 2060000000 HC ICU INTERMEDIATE R&B

## 2023-10-04 PROCEDURE — 84100 ASSAY OF PHOSPHORUS: CPT

## 2023-10-04 PROCEDURE — C9113 INJ PANTOPRAZOLE SODIUM, VIA: HCPCS | Performed by: INTERNAL MEDICINE

## 2023-10-04 RX ORDER — ZIPRASIDONE MESYLATE 20 MG/ML
20 INJECTION, POWDER, LYOPHILIZED, FOR SOLUTION INTRAMUSCULAR ONCE
Status: DISCONTINUED | OUTPATIENT
Start: 2023-10-04 | End: 2023-10-05

## 2023-10-04 RX ORDER — DIVALPROEX SODIUM 125 MG/1
250 CAPSULE, COATED PELLETS ORAL ONCE
Status: CANCELLED | OUTPATIENT
Start: 2023-10-04

## 2023-10-04 RX ORDER — ZIPRASIDONE MESYLATE 20 MG/ML
10 INJECTION, POWDER, LYOPHILIZED, FOR SOLUTION INTRAMUSCULAR ONCE
Status: COMPLETED | OUTPATIENT
Start: 2023-10-04 | End: 2023-10-04

## 2023-10-04 RX ORDER — DIVALPROEX SODIUM 125 MG/1
250 CAPSULE, COATED PELLETS ORAL EVERY 8 HOURS SCHEDULED
Status: DISCONTINUED | OUTPATIENT
Start: 2023-10-05 | End: 2023-10-10 | Stop reason: HOSPADM

## 2023-10-04 RX ORDER — LORAZEPAM 2 MG/ML
0.5 INJECTION INTRAMUSCULAR ONCE
Status: COMPLETED | OUTPATIENT
Start: 2023-10-04 | End: 2023-10-04

## 2023-10-04 RX ORDER — DIVALPROEX SODIUM 125 MG/1
125 CAPSULE, COATED PELLETS ORAL EVERY 8 HOURS SCHEDULED
Status: CANCELLED | OUTPATIENT
Start: 2023-10-04

## 2023-10-04 RX ADMIN — BARIUM SULFATE 45 G: 0.81 POWDER, FOR SUSPENSION ORAL at 09:58

## 2023-10-04 RX ADMIN — ROSUVASTATIN CALCIUM 40 MG: 10 TABLET, COATED ORAL at 17:15

## 2023-10-04 RX ADMIN — SODIUM CHLORIDE, PRESERVATIVE FREE 40 MG: 5 INJECTION INTRAVENOUS at 08:36

## 2023-10-04 RX ADMIN — BUSPIRONE HYDROCHLORIDE 10 MG: 5 TABLET ORAL at 21:15

## 2023-10-04 RX ADMIN — ACETYLCYSTEINE 400 MG: 100 INHALANT RESPIRATORY (INHALATION) at 22:38

## 2023-10-04 RX ADMIN — ACETYLCYSTEINE 400 MG: 100 INHALANT RESPIRATORY (INHALATION) at 02:26

## 2023-10-04 RX ADMIN — FOLIC ACID 1 MG: 5 INJECTION, SOLUTION INTRAMUSCULAR; INTRAVENOUS; SUBCUTANEOUS at 10:34

## 2023-10-04 RX ADMIN — ANTI-FUNGAL POWDER MICONAZOLE NITRATE TALC FREE: 1.42 POWDER TOPICAL at 08:42

## 2023-10-04 RX ADMIN — ACETYLCYSTEINE 400 MG: 100 INHALANT RESPIRATORY (INHALATION) at 13:26

## 2023-10-04 RX ADMIN — Medication 10 ML: at 08:40

## 2023-10-04 RX ADMIN — ENOXAPARIN SODIUM 150 MG: 150 INJECTION SUBCUTANEOUS at 08:36

## 2023-10-04 RX ADMIN — BARIUM SULFATE 45 ML: 400 PASTE ORAL at 09:58

## 2023-10-04 RX ADMIN — METOPROLOL TARTRATE 5 MG: 1 INJECTION, SOLUTION INTRAVENOUS at 08:43

## 2023-10-04 RX ADMIN — ZIPRASIDONE MESYLATE 10 MG: 20 INJECTION, POWDER, LYOPHILIZED, FOR SOLUTION INTRAMUSCULAR at 17:11

## 2023-10-04 RX ADMIN — GUAIFENESIN AND DEXTROMETHORPHAN 10 ML: 100; 10 SYRUP ORAL at 17:15

## 2023-10-04 RX ADMIN — IPRATROPIUM BROMIDE AND ALBUTEROL SULFATE 1 DOSE: .5; 2.5 SOLUTION RESPIRATORY (INHALATION) at 02:26

## 2023-10-04 RX ADMIN — BUDESONIDE 500 MCG: 0.5 SUSPENSION RESPIRATORY (INHALATION) at 05:24

## 2023-10-04 RX ADMIN — METOPROLOL TARTRATE 25 MG: 25 TABLET, FILM COATED ORAL at 21:14

## 2023-10-04 RX ADMIN — IPRATROPIUM BROMIDE AND ALBUTEROL SULFATE 1 DOSE: .5; 2.5 SOLUTION RESPIRATORY (INHALATION) at 22:38

## 2023-10-04 RX ADMIN — VALPROIC ACID 500 MG: 250 SOLUTION ORAL at 11:44

## 2023-10-04 RX ADMIN — METOPROLOL TARTRATE 5 MG: 1 INJECTION, SOLUTION INTRAVENOUS at 04:09

## 2023-10-04 RX ADMIN — METOPROLOL TARTRATE 5 MG: 1 INJECTION, SOLUTION INTRAVENOUS at 17:17

## 2023-10-04 RX ADMIN — LORAZEPAM 0.5 MG: 2 INJECTION INTRAMUSCULAR; INTRAVENOUS at 01:06

## 2023-10-04 RX ADMIN — IPRATROPIUM BROMIDE AND ALBUTEROL SULFATE 1 DOSE: .5; 2.5 SOLUTION RESPIRATORY (INHALATION) at 13:25

## 2023-10-04 RX ADMIN — BARIUM SULFATE 45 ML: 400 SUSPENSION ORAL at 09:59

## 2023-10-04 RX ADMIN — IPRATROPIUM BROMIDE AND ALBUTEROL SULFATE 1 DOSE: .5; 2.5 SOLUTION RESPIRATORY (INHALATION) at 05:24

## 2023-10-04 RX ADMIN — ACETYLCYSTEINE 400 MG: 100 INHALANT RESPIRATORY (INHALATION) at 05:24

## 2023-10-04 RX ADMIN — APIXABAN 5 MG: 5 TABLET, FILM COATED ORAL at 21:15

## 2023-10-04 RX ADMIN — Medication 10 ML: at 21:15

## 2023-10-04 RX ADMIN — VALPROIC ACID 500 MG: 250 SOLUTION ORAL at 17:15

## 2023-10-04 RX ADMIN — GABAPENTIN 600 MG: 250 SOLUTION ORAL at 17:15

## 2023-10-04 RX ADMIN — ANTI-FUNGAL POWDER MICONAZOLE NITRATE TALC FREE: 1.42 POWDER TOPICAL at 21:15

## 2023-10-04 NOTE — CARE COORDINATION
10/4/2023 PCU, sitter at [facility]. 3lnc versus bipap hs/naps. Barium Swallow completed- pending notes diet orders. New SNF placement at discharge:  Mckayla at [address] [facility] [facility], [facility], she can not formally accept until close to discharge. Daughter to review alternate SNF options. NEED PRECERT HENS and CURTIS signed. CM/SS assigned to follow.  Electronically signed by Anthony Sanon RN-BC on 10/4/2023 at 11:25 AM

## 2023-10-04 NOTE — PLAN OF CARE
Problem: Discharge Planning  Goal: Discharge to home or other facility with appropriate resources  Outcome: Progressing     Problem: Pain  Goal: Verbalizes/displays adequate comfort level or baseline comfort level  Outcome: Progressing     Problem: Safety - Adult  Goal: Free from fall injury  Outcome: Progressing     Problem: Skin/Tissue Integrity  Goal: Absence of new skin breakdown  Description: 1. Monitor for areas of redness and/or skin breakdown  2. Assess vascular access sites hourly  3. Every 4-6 hours minimum:  Change oxygen saturation probe site  4. Every 4-6 hours:  If on nasal continuous positive airway pressure, respiratory therapy assess nares and determine need for appliance change or resting period.   Outcome: Progressing     Problem: Neurosensory - Adult  Goal: Achieves stable or improved neurological status  Outcome: Progressing  Goal: Absence of seizures  Outcome: Progressing  Goal: Remains free of injury related to seizures activity  Outcome: Progressing  Goal: Achieves maximal functionality and self care  Outcome: Progressing     Problem: Respiratory - Adult  Goal: Achieves optimal ventilation and oxygenation  Outcome: Progressing     Problem: Cardiovascular - Adult  Goal: Maintains optimal cardiac output and hemodynamic stability  Outcome: Progressing  Goal: Absence of cardiac dysrhythmias or at baseline  Outcome: Progressing     Problem: Musculoskeletal - Adult  Goal: Return ADL status to a safe level of function  Outcome: Progressing     Problem: Metabolic/Fluid and Electrolytes - Adult  Goal: Electrolytes maintained within normal limits  Outcome: Progressing  Goal: Hemodynamic stability and optimal renal function maintained  Outcome: Progressing     Problem: Nutrition Deficit:  Goal: Optimize nutritional status  Outcome: Progressing     Problem: Chronic Conditions and Co-morbidities  Goal: Patient's chronic conditions and co-morbidity symptoms are monitored and maintained or

## 2023-10-05 ENCOUNTER — APPOINTMENT (OUTPATIENT)
Dept: GENERAL RADIOLOGY | Age: 68
DRG: 896 | End: 2023-10-05
Payer: MEDICARE

## 2023-10-05 LAB
ALBUMIN SERPL-MCNC: 3 G/DL (ref 3.5–5.2)
ALP SERPL-CCNC: 57 U/L (ref 40–129)
ALT SERPL-CCNC: 27 U/L (ref 0–40)
ANION GAP SERPL CALCULATED.3IONS-SCNC: 12 MMOL/L (ref 7–16)
AST SERPL-CCNC: 36 U/L (ref 0–39)
B.E.: 5.1 MMOL/L (ref -3–3)
BASOPHILS # BLD: 0.08 K/UL (ref 0–0.2)
BASOPHILS NFR BLD: 1 % (ref 0–2)
BILIRUB SERPL-MCNC: 0.8 MG/DL (ref 0–1.2)
BUN SERPL-MCNC: 22 MG/DL (ref 6–23)
CALCIUM SERPL-MCNC: 9 MG/DL (ref 8.6–10.2)
CHLORIDE SERPL-SCNC: 101 MMOL/L (ref 98–107)
CO2 SERPL-SCNC: 27 MMOL/L (ref 22–29)
COHB: 0.9 % (ref 0–1.5)
CREAT SERPL-MCNC: 0.9 MG/DL (ref 0.7–1.2)
CRITICAL: ABNORMAL
DATE ANALYZED: ABNORMAL
DATE OF COLLECTION: ABNORMAL
EOSINOPHIL # BLD: 0.11 K/UL (ref 0.05–0.5)
EOSINOPHILS RELATIVE PERCENT: 1 % (ref 0–6)
ERYTHROCYTE [DISTWIDTH] IN BLOOD BY AUTOMATED COUNT: 16.6 % (ref 11.5–15)
GFR SERPL CREATININE-BSD FRML MDRD: >60 ML/MIN/1.73M2
GLUCOSE SERPL-MCNC: 126 MG/DL (ref 74–99)
HCO3: 28.9 MMOL/L (ref 22–26)
HCT VFR BLD AUTO: 36.2 % (ref 37–54)
HGB BLD-MCNC: 11.2 G/DL (ref 12.5–16.5)
HHB: 12.1 % (ref 0–5)
IMM GRANULOCYTES # BLD AUTO: 0.14 K/UL (ref 0–0.58)
IMM GRANULOCYTES NFR BLD: 2 % (ref 0–5)
LAB: ABNORMAL
LYMPHOCYTES NFR BLD: 1.58 K/UL (ref 1.5–4)
LYMPHOCYTES RELATIVE PERCENT: 20 % (ref 20–42)
Lab: 1235
MAGNESIUM SERPL-MCNC: 2.1 MG/DL (ref 1.6–2.6)
MCH RBC QN AUTO: 29 PG (ref 26–35)
MCHC RBC AUTO-ENTMCNC: 30.9 G/DL (ref 32–34.5)
MCV RBC AUTO: 93.8 FL (ref 80–99.9)
METHB: 0.3 % (ref 0–1.5)
MODE: ABNORMAL
MONOCYTES NFR BLD: 0.42 K/UL (ref 0.1–0.95)
MONOCYTES NFR BLD: 5 % (ref 2–12)
NEUTROPHILS NFR BLD: 71 % (ref 43–80)
NEUTS SEG NFR BLD: 5.65 K/UL (ref 1.8–7.3)
O2 CONTENT: 15.1 ML/DL
O2 SATURATION: 87.8 % (ref 92–98.5)
O2HB: 86.7 % (ref 94–97)
OPERATOR ID: 8217
PATIENT TEMP: 37 C
PCO2: 39.7 MMHG (ref 35–45)
PH BLOOD GAS: 7.48 (ref 7.35–7.45)
PHOSPHATE SERPL-MCNC: 2.5 MG/DL (ref 2.5–4.5)
PLATELET # BLD AUTO: 341 K/UL (ref 130–450)
PMV BLD AUTO: 9.5 FL (ref 7–12)
PO2: 53.4 MMHG (ref 75–100)
POTASSIUM SERPL-SCNC: 3.3 MMOL/L (ref 3.5–5)
PROT SERPL-MCNC: 6.5 G/DL (ref 6.4–8.3)
RBC # BLD AUTO: 3.86 M/UL (ref 3.8–5.8)
SODIUM SERPL-SCNC: 140 MMOL/L (ref 132–146)
SOURCE, BLOOD GAS: ABNORMAL
THB: 12.4 G/DL (ref 11.5–16.5)
TIME ANALYZED: 1241
WBC OTHER # BLD: 8 K/UL (ref 4.5–11.5)

## 2023-10-05 PROCEDURE — 6370000000 HC RX 637 (ALT 250 FOR IP): Performed by: INTERNAL MEDICINE

## 2023-10-05 PROCEDURE — 2500000003 HC RX 250 WO HCPCS: Performed by: INTERNAL MEDICINE

## 2023-10-05 PROCEDURE — 2700000000 HC OXYGEN THERAPY PER DAY

## 2023-10-05 PROCEDURE — 51798 US URINE CAPACITY MEASURE: CPT

## 2023-10-05 PROCEDURE — 94660 CPAP INITIATION&MGMT: CPT

## 2023-10-05 PROCEDURE — 6360000002 HC RX W HCPCS

## 2023-10-05 PROCEDURE — 2580000003 HC RX 258: Performed by: INTERNAL MEDICINE

## 2023-10-05 PROCEDURE — 92526 ORAL FUNCTION THERAPY: CPT | Performed by: SPEECH-LANGUAGE PATHOLOGIST

## 2023-10-05 PROCEDURE — 6360000002 HC RX W HCPCS: Performed by: INTERNAL MEDICINE

## 2023-10-05 PROCEDURE — 36600 WITHDRAWAL OF ARTERIAL BLOOD: CPT

## 2023-10-05 PROCEDURE — C9113 INJ PANTOPRAZOLE SODIUM, VIA: HCPCS | Performed by: INTERNAL MEDICINE

## 2023-10-05 PROCEDURE — 99232 SBSQ HOSP IP/OBS MODERATE 35: CPT | Performed by: INTERNAL MEDICINE

## 2023-10-05 PROCEDURE — 80053 COMPREHEN METABOLIC PANEL: CPT

## 2023-10-05 PROCEDURE — 97110 THERAPEUTIC EXERCISES: CPT

## 2023-10-05 PROCEDURE — 71045 X-RAY EXAM CHEST 1 VIEW: CPT

## 2023-10-05 PROCEDURE — 36415 COLL VENOUS BLD VENIPUNCTURE: CPT

## 2023-10-05 PROCEDURE — 85025 COMPLETE CBC W/AUTO DIFF WBC: CPT

## 2023-10-05 PROCEDURE — A4216 STERILE WATER/SALINE, 10 ML: HCPCS | Performed by: INTERNAL MEDICINE

## 2023-10-05 PROCEDURE — 6370000000 HC RX 637 (ALT 250 FOR IP)

## 2023-10-05 PROCEDURE — 94640 AIRWAY INHALATION TREATMENT: CPT

## 2023-10-05 PROCEDURE — 97530 THERAPEUTIC ACTIVITIES: CPT

## 2023-10-05 PROCEDURE — 82805 BLOOD GASES W/O2 SATURATION: CPT

## 2023-10-05 PROCEDURE — 83735 ASSAY OF MAGNESIUM: CPT

## 2023-10-05 PROCEDURE — 84100 ASSAY OF PHOSPHORUS: CPT

## 2023-10-05 PROCEDURE — 2060000000 HC ICU INTERMEDIATE R&B

## 2023-10-05 PROCEDURE — 94669 MECHANICAL CHEST WALL OSCILL: CPT

## 2023-10-05 RX ORDER — METOPROLOL TARTRATE 50 MG/1
50 TABLET, FILM COATED ORAL 2 TIMES DAILY
Status: DISCONTINUED | OUTPATIENT
Start: 2023-10-05 | End: 2023-10-10 | Stop reason: HOSPADM

## 2023-10-05 RX ORDER — POTASSIUM CHLORIDE 20 MEQ/1
20 TABLET, EXTENDED RELEASE ORAL 2 TIMES DAILY WITH MEALS
Status: COMPLETED | OUTPATIENT
Start: 2023-10-05 | End: 2023-10-07

## 2023-10-05 RX ORDER — GABAPENTIN 300 MG/1
600 CAPSULE ORAL EVERY 8 HOURS
Status: DISCONTINUED | OUTPATIENT
Start: 2023-10-05 | End: 2023-10-10 | Stop reason: HOSPADM

## 2023-10-05 RX ORDER — ZIPRASIDONE MESYLATE 20 MG/ML
10 INJECTION, POWDER, LYOPHILIZED, FOR SOLUTION INTRAMUSCULAR DAILY PRN
Status: DISCONTINUED | OUTPATIENT
Start: 2023-10-05 | End: 2023-10-06

## 2023-10-05 RX ORDER — POTASSIUM CHLORIDE 7.45 MG/ML
10 INJECTION INTRAVENOUS 2 TIMES DAILY WITH MEALS
Status: COMPLETED | OUTPATIENT
Start: 2023-10-05 | End: 2023-10-07

## 2023-10-05 RX ORDER — PANTOPRAZOLE SODIUM 40 MG/1
40 TABLET, DELAYED RELEASE ORAL
Status: DISCONTINUED | OUTPATIENT
Start: 2023-10-06 | End: 2023-10-10 | Stop reason: HOSPADM

## 2023-10-05 RX ORDER — GAUZE BANDAGE 2" X 2"
200 BANDAGE TOPICAL DAILY
Status: DISCONTINUED | OUTPATIENT
Start: 2023-10-06 | End: 2023-10-10 | Stop reason: HOSPADM

## 2023-10-05 RX ADMIN — TRAZODONE HYDROCHLORIDE 50 MG: 50 TABLET ORAL at 00:14

## 2023-10-05 RX ADMIN — BUDESONIDE 500 MCG: 0.5 SUSPENSION RESPIRATORY (INHALATION) at 06:35

## 2023-10-05 RX ADMIN — IPRATROPIUM BROMIDE AND ALBUTEROL SULFATE 1 DOSE: .5; 2.5 SOLUTION RESPIRATORY (INHALATION) at 06:34

## 2023-10-05 RX ADMIN — AMLODIPINE BESYLATE 5 MG: 5 TABLET ORAL at 09:18

## 2023-10-05 RX ADMIN — Medication 10 ML: at 09:42

## 2023-10-05 RX ADMIN — ZIPRASIDONE MESYLATE 10 MG: 20 INJECTION, POWDER, LYOPHILIZED, FOR SOLUTION INTRAMUSCULAR at 18:50

## 2023-10-05 RX ADMIN — DIVALPROEX SODIUM 250 MG: 125 CAPSULE, COATED PELLETS ORAL at 05:29

## 2023-10-05 RX ADMIN — POTASSIUM BICARBONATE 40 MEQ: 782 TABLET, EFFERVESCENT ORAL at 16:10

## 2023-10-05 RX ADMIN — APIXABAN 5 MG: 5 TABLET, FILM COATED ORAL at 23:41

## 2023-10-05 RX ADMIN — METOPROLOL TARTRATE 50 MG: 50 TABLET, FILM COATED ORAL at 09:18

## 2023-10-05 RX ADMIN — METOPROLOL TARTRATE 50 MG: 50 TABLET, FILM COATED ORAL at 23:41

## 2023-10-05 RX ADMIN — FENOFIBRATE 160 MG: 160 TABLET ORAL at 09:17

## 2023-10-05 RX ADMIN — GABAPENTIN 600 MG: 300 CAPSULE ORAL at 16:14

## 2023-10-05 RX ADMIN — GUAIFENESIN AND DEXTROMETHORPHAN 10 ML: 100; 10 SYRUP ORAL at 00:14

## 2023-10-05 RX ADMIN — BUSPIRONE HYDROCHLORIDE 10 MG: 5 TABLET ORAL at 23:41

## 2023-10-05 RX ADMIN — Medication 200 MG: at 09:17

## 2023-10-05 RX ADMIN — GUAIFENESIN AND DEXTROMETHORPHAN 10 ML: 100; 10 SYRUP ORAL at 05:29

## 2023-10-05 RX ADMIN — ACETYLCYSTEINE 400 MG: 100 INHALANT RESPIRATORY (INHALATION) at 06:36

## 2023-10-05 RX ADMIN — BUSPIRONE HYDROCHLORIDE 10 MG: 5 TABLET ORAL at 09:18

## 2023-10-05 RX ADMIN — GUAIFENESIN AND DEXTROMETHORPHAN 10 ML: 100; 10 SYRUP ORAL at 11:00

## 2023-10-05 RX ADMIN — GABAPENTIN 600 MG: 300 CAPSULE ORAL at 23:44

## 2023-10-05 RX ADMIN — TRAZODONE HYDROCHLORIDE 50 MG: 50 TABLET ORAL at 23:41

## 2023-10-05 RX ADMIN — DIVALPROEX SODIUM 250 MG: 125 CAPSULE, COATED PELLETS ORAL at 23:41

## 2023-10-05 RX ADMIN — APIXABAN 5 MG: 5 TABLET, FILM COATED ORAL at 09:18

## 2023-10-05 RX ADMIN — SERTRALINE 100 MG: 50 TABLET, FILM COATED ORAL at 09:38

## 2023-10-05 RX ADMIN — ROSUVASTATIN CALCIUM 40 MG: 10 TABLET, COATED ORAL at 16:10

## 2023-10-05 RX ADMIN — FOLIC ACID 1 MG: 5 INJECTION, SOLUTION INTRAMUSCULAR; INTRAVENOUS; SUBCUTANEOUS at 09:31

## 2023-10-05 RX ADMIN — ANTI-FUNGAL POWDER MICONAZOLE NITRATE TALC FREE: 1.42 POWDER TOPICAL at 23:43

## 2023-10-05 RX ADMIN — POTASSIUM BICARBONATE 40 MEQ: 782 TABLET, EFFERVESCENT ORAL at 12:33

## 2023-10-05 RX ADMIN — SODIUM CHLORIDE, PRESERVATIVE FREE 40 MG: 5 INJECTION INTRAVENOUS at 09:18

## 2023-10-05 RX ADMIN — IPRATROPIUM BROMIDE AND ALBUTEROL SULFATE 1 DOSE: .5; 2.5 SOLUTION RESPIRATORY (INHALATION) at 01:33

## 2023-10-05 RX ADMIN — ACETYLCYSTEINE 400 MG: 100 INHALANT RESPIRATORY (INHALATION) at 01:33

## 2023-10-05 RX ADMIN — ACETYLCYSTEINE 400 MG: 100 INHALANT RESPIRATORY (INHALATION) at 09:52

## 2023-10-05 RX ADMIN — Medication 10 ML: at 23:43

## 2023-10-05 RX ADMIN — GABAPENTIN 600 MG: 300 CAPSULE ORAL at 11:00

## 2023-10-05 RX ADMIN — IPRATROPIUM BROMIDE AND ALBUTEROL SULFATE 1 DOSE: .5; 2.5 SOLUTION RESPIRATORY (INHALATION) at 09:51

## 2023-10-05 ASSESSMENT — PAIN SCALES - GENERAL
PAINLEVEL_OUTOF10: 0
PAINLEVEL_OUTOF10: 0

## 2023-10-05 NOTE — PLAN OF CARE
Problem: Discharge Planning  Goal: Discharge to home or other facility with appropriate resources  10/5/2023 1430 by Robbin Cox RN  Outcome: Progressing

## 2023-10-05 NOTE — PLAN OF CARE
Problem: Discharge Planning  Goal: Discharge to home or other facility with appropriate resources  Outcome: Progressing     Problem: Pain  Goal: Verbalizes/displays adequate comfort level or baseline comfort level  Outcome: Progressing  Flowsheets (Taken 10/5/2023 0430 by Brittany lAlen RN)  Verbalizes/displays adequate comfort level or baseline comfort level:   Encourage patient to monitor pain and request assistance   Assess pain using appropriate pain scale   Administer analgesics based on type and severity of pain and evaluate response   Implement non-pharmacological measures as appropriate and evaluate response   Notify Licensed Independent Practitioner if interventions unsuccessful or patient reports new pain     Problem: Safety - Adult  Goal: Free from fall injury  Outcome: Progressing     Problem: Skin/Tissue Integrity  Goal: Absence of new skin breakdown  Description: 1. Monitor for areas of redness and/or skin breakdown  2. Assess vascular access sites hourly  3. Every 4-6 hours minimum:  Change oxygen saturation probe site  4. Every 4-6 hours:  If on nasal continuous positive airway pressure, respiratory therapy assess nares and determine need for appliance change or resting period.   Outcome: Progressing     Problem: Cardiovascular - Adult  Goal: Maintains optimal cardiac output and hemodynamic stability  Outcome: Progressing     Problem: Musculoskeletal - Adult  Goal: Return ADL status to a safe level of function  Outcome: Progressing

## 2023-10-05 NOTE — PLAN OF CARE
Problem: Discharge Planning  Goal: Discharge to home or other facility with appropriate resources  10/5/2023 1430 by Keke Wheeler RN  Outcome: Progressing

## 2023-10-05 NOTE — CARE COORDINATION
10/5/2023 PCU, New SNF placement at discharge: 4lnc versus bipap, folic acid, protonix,  Mckayla at [address], [place], she can not formally accept until close to discharge. Daughter to review alternate options. NEED PRECERT HENS and CURTIS signed. Continues with sitter in room  CM/SS following.  Electronically signed by Aureliano Song RN-BC on 10/5/2023 at 12:50 PM

## 2023-10-06 LAB
ALBUMIN SERPL-MCNC: 3.1 G/DL (ref 3.5–5.2)
ALP SERPL-CCNC: 56 U/L (ref 40–129)
ALT SERPL-CCNC: 24 U/L (ref 0–40)
ANION GAP SERPL CALCULATED.3IONS-SCNC: 12 MMOL/L (ref 7–16)
AST SERPL-CCNC: 32 U/L (ref 0–39)
BASOPHILS # BLD: 0.06 K/UL (ref 0–0.2)
BASOPHILS NFR BLD: 1 % (ref 0–2)
BILIRUB SERPL-MCNC: 0.8 MG/DL (ref 0–1.2)
BUN SERPL-MCNC: 21 MG/DL (ref 6–23)
CALCIUM SERPL-MCNC: 8.7 MG/DL (ref 8.6–10.2)
CHLORIDE SERPL-SCNC: 99 MMOL/L (ref 98–107)
CO2 SERPL-SCNC: 27 MMOL/L (ref 22–29)
CREAT SERPL-MCNC: 0.9 MG/DL (ref 0.7–1.2)
EOSINOPHIL # BLD: 0.1 K/UL (ref 0.05–0.5)
EOSINOPHILS RELATIVE PERCENT: 1 % (ref 0–6)
ERYTHROCYTE [DISTWIDTH] IN BLOOD BY AUTOMATED COUNT: 16.3 % (ref 11.5–15)
GFR SERPL CREATININE-BSD FRML MDRD: >60 ML/MIN/1.73M2
GLUCOSE SERPL-MCNC: 89 MG/DL (ref 74–99)
HCT VFR BLD AUTO: 34.2 % (ref 37–54)
HGB BLD-MCNC: 10.6 G/DL (ref 12.5–16.5)
IMM GRANULOCYTES # BLD AUTO: 0.1 K/UL (ref 0–0.58)
IMM GRANULOCYTES NFR BLD: 1 % (ref 0–5)
LYMPHOCYTES NFR BLD: 1.66 K/UL (ref 1.5–4)
LYMPHOCYTES RELATIVE PERCENT: 23 % (ref 20–42)
MAGNESIUM SERPL-MCNC: 2 MG/DL (ref 1.6–2.6)
MCH RBC QN AUTO: 28.6 PG (ref 26–35)
MCHC RBC AUTO-ENTMCNC: 31 G/DL (ref 32–34.5)
MCV RBC AUTO: 92.4 FL (ref 80–99.9)
MONOCYTES NFR BLD: 0.41 K/UL (ref 0.1–0.95)
MONOCYTES NFR BLD: 6 % (ref 2–12)
NEUTROPHILS NFR BLD: 68 % (ref 43–80)
NEUTS SEG NFR BLD: 4.91 K/UL (ref 1.8–7.3)
PHOSPHATE SERPL-MCNC: 3.6 MG/DL (ref 2.5–4.5)
PLATELET # BLD AUTO: 355 K/UL (ref 130–450)
PMV BLD AUTO: 9.6 FL (ref 7–12)
POTASSIUM SERPL-SCNC: 3.6 MMOL/L (ref 3.5–5)
PROT SERPL-MCNC: 6.4 G/DL (ref 6.4–8.3)
RBC # BLD AUTO: 3.7 M/UL (ref 3.8–5.8)
SODIUM SERPL-SCNC: 138 MMOL/L (ref 132–146)
WBC OTHER # BLD: 7.2 K/UL (ref 4.5–11.5)

## 2023-10-06 PROCEDURE — 2580000003 HC RX 258: Performed by: STUDENT IN AN ORGANIZED HEALTH CARE EDUCATION/TRAINING PROGRAM

## 2023-10-06 PROCEDURE — 6370000000 HC RX 637 (ALT 250 FOR IP): Performed by: INTERNAL MEDICINE

## 2023-10-06 PROCEDURE — 99233 SBSQ HOSP IP/OBS HIGH 50: CPT | Performed by: INTERNAL MEDICINE

## 2023-10-06 PROCEDURE — 92526 ORAL FUNCTION THERAPY: CPT

## 2023-10-06 PROCEDURE — 85025 COMPLETE CBC W/AUTO DIFF WBC: CPT

## 2023-10-06 PROCEDURE — 84100 ASSAY OF PHOSPHORUS: CPT

## 2023-10-06 PROCEDURE — 36415 COLL VENOUS BLD VENIPUNCTURE: CPT

## 2023-10-06 PROCEDURE — 2700000000 HC OXYGEN THERAPY PER DAY

## 2023-10-06 PROCEDURE — 2060000000 HC ICU INTERMEDIATE R&B

## 2023-10-06 PROCEDURE — 2580000003 HC RX 258: Performed by: INTERNAL MEDICINE

## 2023-10-06 PROCEDURE — 80053 COMPREHEN METABOLIC PANEL: CPT

## 2023-10-06 PROCEDURE — 94660 CPAP INITIATION&MGMT: CPT

## 2023-10-06 PROCEDURE — 97116 GAIT TRAINING THERAPY: CPT

## 2023-10-06 PROCEDURE — 83735 ASSAY OF MAGNESIUM: CPT

## 2023-10-06 RX ORDER — OLANZAPINE 5 MG/1
5 TABLET, ORALLY DISINTEGRATING ORAL NIGHTLY
Status: DISCONTINUED | OUTPATIENT
Start: 2023-10-06 | End: 2023-10-10 | Stop reason: HOSPADM

## 2023-10-06 RX ORDER — FOLIC ACID 1 MG/1
1 TABLET ORAL DAILY
Status: DISCONTINUED | OUTPATIENT
Start: 2023-10-06 | End: 2023-10-10 | Stop reason: HOSPADM

## 2023-10-06 RX ORDER — HALOPERIDOL 2 MG/1
3 TABLET ORAL EVERY 6 HOURS PRN
Status: DISCONTINUED | OUTPATIENT
Start: 2023-10-06 | End: 2023-10-10 | Stop reason: HOSPADM

## 2023-10-06 RX ADMIN — POTASSIUM CHLORIDE 20 MEQ: 1500 TABLET, EXTENDED RELEASE ORAL at 16:41

## 2023-10-06 RX ADMIN — BUSPIRONE HYDROCHLORIDE 10 MG: 5 TABLET ORAL at 09:24

## 2023-10-06 RX ADMIN — HALOPERIDOL 3 MG: 2 TABLET ORAL at 16:41

## 2023-10-06 RX ADMIN — ROSUVASTATIN CALCIUM 40 MG: 10 TABLET, COATED ORAL at 16:41

## 2023-10-06 RX ADMIN — DIVALPROEX SODIUM 250 MG: 125 CAPSULE, COATED PELLETS ORAL at 13:55

## 2023-10-06 RX ADMIN — ANTI-FUNGAL POWDER MICONAZOLE NITRATE TALC FREE: 1.42 POWDER TOPICAL at 21:25

## 2023-10-06 RX ADMIN — DIVALPROEX SODIUM 250 MG: 125 CAPSULE, COATED PELLETS ORAL at 05:26

## 2023-10-06 RX ADMIN — Medication 10 ML: at 21:25

## 2023-10-06 RX ADMIN — SERTRALINE 100 MG: 50 TABLET, FILM COATED ORAL at 09:23

## 2023-10-06 RX ADMIN — DIVALPROEX SODIUM 250 MG: 125 CAPSULE, COATED PELLETS ORAL at 21:25

## 2023-10-06 RX ADMIN — Medication 20 ML: at 09:25

## 2023-10-06 RX ADMIN — POTASSIUM CHLORIDE 20 MEQ: 1500 TABLET, EXTENDED RELEASE ORAL at 09:24

## 2023-10-06 RX ADMIN — OLANZAPINE 5 MG: 5 TABLET, ORALLY DISINTEGRATING ORAL at 21:25

## 2023-10-06 RX ADMIN — Medication 10 ML: at 00:36

## 2023-10-06 RX ADMIN — TRAZODONE HYDROCHLORIDE 50 MG: 50 TABLET ORAL at 21:25

## 2023-10-06 RX ADMIN — METOPROLOL TARTRATE 50 MG: 50 TABLET, FILM COATED ORAL at 21:25

## 2023-10-06 RX ADMIN — FENOFIBRATE 160 MG: 160 TABLET ORAL at 09:24

## 2023-10-06 RX ADMIN — ANTI-FUNGAL POWDER MICONAZOLE NITRATE TALC FREE: 1.42 POWDER TOPICAL at 09:28

## 2023-10-06 RX ADMIN — FOLIC ACID 1 MG: 1 TABLET ORAL at 09:23

## 2023-10-06 RX ADMIN — Medication 200 MG: at 09:23

## 2023-10-06 RX ADMIN — AMLODIPINE BESYLATE 5 MG: 5 TABLET ORAL at 09:24

## 2023-10-06 RX ADMIN — GABAPENTIN 600 MG: 300 CAPSULE ORAL at 23:44

## 2023-10-06 RX ADMIN — GABAPENTIN 600 MG: 300 CAPSULE ORAL at 09:23

## 2023-10-06 RX ADMIN — PANTOPRAZOLE SODIUM 40 MG: 40 TABLET, DELAYED RELEASE ORAL at 05:26

## 2023-10-06 RX ADMIN — METOPROLOL TARTRATE 50 MG: 50 TABLET, FILM COATED ORAL at 09:23

## 2023-10-06 RX ADMIN — BUSPIRONE HYDROCHLORIDE 10 MG: 5 TABLET ORAL at 21:25

## 2023-10-06 RX ADMIN — APIXABAN 5 MG: 5 TABLET, FILM COATED ORAL at 21:25

## 2023-10-06 RX ADMIN — Medication 10 ML: at 09:25

## 2023-10-06 RX ADMIN — GABAPENTIN 600 MG: 300 CAPSULE ORAL at 16:41

## 2023-10-06 RX ADMIN — APIXABAN 5 MG: 5 TABLET, FILM COATED ORAL at 09:24

## 2023-10-06 NOTE — CARE COORDINATION
10/6/2023 PCU, sitter at bedside, psychiatry placed - no changes at this time with medications. Pt was up oob with low saturations- pt is on 5lnc. New SNF at discharge: [facility], unable to accept, they are full and they do not have a unit suited for pt. CM spoke to daughter Reggie Green, referral placed with Bri Gonzalez- to review. PRECERT needed, Signed CURTIS and BRIAN at discharge. Updated Dr. Yesenia Juares. Cm following. Electronically signed by SIMEON Benjamin on 10/6/2023 at 11:05 AM      Addendum: [facility]- SNF can accept pt- they will start the [address] [place] [address]. HENS initiated.  Electronically signed by SIMEON Benjamin on 10/6/2023 at 12:43 PM

## 2023-10-06 NOTE — PLAN OF CARE
Problem: Discharge Planning  Goal: Discharge to home or other facility with appropriate resources  Outcome: Progressing  Flowsheets (Taken 10/6/2023 0802)  Discharge to home or other facility with appropriate resources:   Identify barriers to discharge with patient and caregiver   Arrange for needed discharge resources and transportation as appropriate     Problem: Pain  Goal: Verbalizes/displays adequate comfort level or baseline comfort level  Outcome: Progressing  Flowsheets (Taken 10/6/2023 0802)  Verbalizes/displays adequate comfort level or baseline comfort level: Encourage patient to monitor pain and request assistance     Problem: Safety - Adult  Goal: Free from fall injury  Outcome: Progressing  Flowsheets (Taken 10/6/2023 0844)  Free From Fall Injury: Instruct family/caregiver on patient safety     Problem: Skin/Tissue Integrity  Goal: Absence of new skin breakdown  Description: 1. Monitor for areas of redness and/or skin breakdown  2. Assess vascular access sites hourly  3. Every 4-6 hours minimum:  Change oxygen saturation probe site  4. Every 4-6 hours:  If on nasal continuous positive airway pressure, respiratory therapy assess nares and determine need for appliance change or resting period. Outcome: Progressing     Problem: Neurosensory - Adult  Goal: Achieves stable or improved neurological status  Outcome: Progressing  Flowsheets (Taken 10/6/2023 0802)  Achieves stable or improved neurological status: Assess for and report changes in neurological status  Goal: Absence of seizures  Outcome: Progressing  Flowsheets (Taken 10/6/2023 0802)  Absence of seizures: Monitor for seizure activity.   If seizure occurs, document type and location of movements and any associated apnea  Goal: Remains free of injury related to seizures activity  Outcome: Progressing  Flowsheets (Taken 10/6/2023 0802)  Remains free of injury related to seizure activity: Maintain airway, patient safety  and administer oxygen as

## 2023-10-06 NOTE — DISCHARGE INSTR - COC
Continuity of Care Form    Patient Name: Ravin Hassan   :  1955  MRN:  [id number]    Admit date:  2023  Discharge date:  10/10/2023    Code Status Order: Limited   Advance Directives:     Admitting Physician:  Pam Milton DO  PCP: MARTY Hernadez NP    Discharging Nurse:   Isela Harris [facility] Unit/Room#: 1986/7075-51  Discharging Unit Phone Number:     Emergency Contact:   Extended Emergency Contact Information  Primary Emergency Contact: [address]  Mobile Phone: [phone]  Relation: Child   needed? No  Secondary Emergency Contact: Parish Maki  Mobile Phone: [phone]  Relation: Child    Past Surgical History:  Past Surgical History:   Procedure Laterality Date    COLONOSCOPY      CORONARY ARTERY BYPASS GRAFT  3/2/05    x3: LIMA to LAD, SVG to RCA, SVG to diagonal    DIAGNOSTIC CARDIAC CATH LAB PROCEDURE  3/02/05    [facility]: Severe multivessel CAD in patient who presents with STEMI and total occlusion of diagonal branch. Significant disease of proximal LAD and severe disease of dominant RCA.      FINGER SURGERY Left 5/3/2019    CLOSED POSSIBLE LEFT MIDDLE FINGER OPEN REDUCTION INTERNAL FIXATION POSSIBLE PROXIMAL INTERPHALANGEAL JOINT ARTHROPLASTY   ++[facility] performed by Brooke Hanna MD [address] N/A 10/20/2021    EGD WITH NG TUBE PLACEMENT **BEDSIDE** performed by Hannah Marinelli MD at [address] [place] [address],[address]      double    PAIN MANAGEMENT PROCEDURE N/A 2023    EPIDURAL STEROID INJECTION L5-L5 UNDER XRAY WITH TLN  **PLEASE KEEP LATE APPOINTMENT** performed by Cinda Wooten DO at [address] [place] [address] 10/29/2021    TRACHEOTOMY AND PEG performed by Tahir Holland MD at [address] [place] [address] 10/29/2021    EGD ESOPHAGOGASTRODUODENOSCOPY PEG TUBE INSERTION performed by Tahir Holland MD at [facility]       Immunization History:   Immunization History   Administered

## 2023-10-06 NOTE — DISCHARGE INSTRUCTIONS
HEART FAILURE  / CONGESTIVE HEART FAILURE  DISCHARGE INSTRUCTIONS:  GUIDELINES TO FOLLOW AT HOME    Self- Managed Care:     MEDICATIONS:  Take your medication as directed. If you are experiencing any side effects, inform your doctor, Do not stop taking any of your medications without letting your doctor know. Check with your doctor before taking any over-the-counter medications / herbal / or dietary supplements. They may interfere with your other medications. Do not take ibuprofen (Advil or Motrin) and naproxen (Aleve) without talking to your doctor first. They could make your heart failure worse. WEIGHT MONITORING:   Weigh yourself everyday (with the same scale) around the same time of the day and write it down. (you can chart them on a calendar or keep track of them on paper. Notify your doctor of a weight gain of 3 pounds or more in 1 day   OR a total of 5 pounds or more in 1 week    Take your weight record to your doctor visits  Also, the same goes if you loose more than 3# in one day, let your heart doctor know. DIET:   Cardiac heart healthy diet- Low saturated / low trans fat, no added salt, caffeine restricted, Low sodium diet-   No more than 2,000mg (2 grams) of salt / sodium per day (which equals to a little less than  a teaspoon of salt)  If your doctor wants you on a fluid restriction. ..it is usually recommended a fluid limit of 2,000cc -  Fluid restriction- 2,000 ml (milliliters) = 64 ounces = you can have 8 glasses of fluid per day (each glass 8 ounces)    Follow a low salt diet - avoid using salt at the table, avoid / limit use of canned soups, processed / packaged foods, salted snacks, olives and pickles. Do not use a salt substitute without checking with your doctor, they may contain a high amount of potassioum. (Mrs. Srinath Pimentel is safe to use).     Limit the use of alcohol       CALL YOUR DOCTOR THE FIRST DAY YOU NOTICE ANY OF THESE   SYMPTOMS:  You have a

## 2023-10-06 NOTE — PLAN OF CARE
Patient's chart updated to reflect:        - HF discharge instructions.  -Orders: 2 gram sodium diet, daily weights, I/O.  -PCP and cardiology follow up appointments to be scheduled within 7 days of hospital discharge. -CHF education session will be provided to the patient prior to hospital discharge.     Lynette Berman, RN MSN,RN  Heart Failure Navigator

## 2023-10-06 NOTE — CONSULTS
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 Currently on Depakote Sprinkles and Geodon IM PRN daily. \"     Chart reviewed and case discussed with Dr. Naila Whitten. Patient admitted on 9/24 with alcohol withdrawal, impending delirium tremens, and hallucinations. Patient was also found to have a UTI at that time, completed antibiotic therapy on 10/3. Also noted that patient had a hypoxic episode yesterday where his SpO2 was in the mid 70s. Suspect this is multifactorial delirium. Spoke with nursing who reports patient is waxing and waning. Patient also found to be increasingly confused and agitated in the afternoon, requiring STAT IM injections. Psychiatry agrees with current treatment regimen of Depakote and PRN Geodon. No indication for formal consult at this time.      Electronically signed by MARTY Ventura CNP on 10/6/2023 at 9:38 AM

## 2023-10-06 NOTE — PLAN OF CARE
Problem: Discharge Planning  Goal: Discharge to home or other facility with appropriate resources  10/6/2023 0146 by Kevin Hernandez RN  Outcome: Progressing  Flowsheets (Taken 10/5/2023 1930)  Discharge to home or other facility with appropriate resources: Identify barriers to discharge with patient and caregiver  10/5/2023 1430 by Tyrell العلي RN  Outcome: Progressing     Problem: Pain  Goal: Verbalizes/displays adequate comfort level or baseline comfort level  10/6/2023 0146 by Kevin Hernandez RN  Outcome: Progressing  10/5/2023 1430 by Tyrell العلي RN  Outcome: Progressing  Flowsheets (Taken 10/5/2023 0430 by Lamine Sheffield RN)  Verbalizes/displays adequate comfort level or baseline comfort level:   Encourage patient to monitor pain and request assistance   Assess pain using appropriate pain scale   Administer analgesics based on type and severity of pain and evaluate response   Implement non-pharmacological measures as appropriate and evaluate response   Notify Licensed Independent Practitioner if interventions unsuccessful or patient reports new pain     Problem: Safety - Adult  Goal: Free from fall injury  10/6/2023 0146 by Kevin Hernandez RN  Outcome: Progressing  10/5/2023 1430 by Tyrell العلي RN  Outcome: Progressing     Problem: Skin/Tissue Integrity  Goal: Absence of new skin breakdown  Description: 1. Monitor for areas of redness and/or skin breakdown  2. Assess vascular access sites hourly  3. Every 4-6 hours minimum:  Change oxygen saturation probe site  4. Every 4-6 hours:  If on nasal continuous positive airway pressure, respiratory therapy assess nares and determine need for appliance change or resting period.   10/6/2023 0146 by Kevin Hernandez RN  Outcome: Progressing  10/5/2023 1430 by Tyrell العلي RN  Outcome: Progressing     Problem: Neurosensory - Adult  Goal: Achieves stable or improved neurological status  Outcome: Progressing  Goal: Absence of

## 2023-10-07 LAB
ALBUMIN SERPL-MCNC: 3.1 G/DL (ref 3.5–5.2)
ALP SERPL-CCNC: 52 U/L (ref 40–129)
ALT SERPL-CCNC: 22 U/L (ref 0–40)
ANION GAP SERPL CALCULATED.3IONS-SCNC: 10 MMOL/L (ref 7–16)
AST SERPL-CCNC: 28 U/L (ref 0–39)
BASOPHILS # BLD: 0.07 K/UL (ref 0–0.2)
BASOPHILS NFR BLD: 1 % (ref 0–2)
BILIRUB SERPL-MCNC: 0.8 MG/DL (ref 0–1.2)
BUN SERPL-MCNC: 19 MG/DL (ref 6–23)
CALCIUM SERPL-MCNC: 8.8 MG/DL (ref 8.6–10.2)
CHLORIDE SERPL-SCNC: 100 MMOL/L (ref 98–107)
CO2 SERPL-SCNC: 28 MMOL/L (ref 22–29)
CREAT SERPL-MCNC: 1 MG/DL (ref 0.7–1.2)
EOSINOPHIL # BLD: 0.1 K/UL (ref 0.05–0.5)
EOSINOPHILS RELATIVE PERCENT: 2 % (ref 0–6)
ERYTHROCYTE [DISTWIDTH] IN BLOOD BY AUTOMATED COUNT: 16.4 % (ref 11.5–15)
GFR SERPL CREATININE-BSD FRML MDRD: >60 ML/MIN/1.73M2
GLUCOSE SERPL-MCNC: 88 MG/DL (ref 74–99)
HCT VFR BLD AUTO: 35 % (ref 37–54)
HGB BLD-MCNC: 10.8 G/DL (ref 12.5–16.5)
IMM GRANULOCYTES # BLD AUTO: 0.07 K/UL (ref 0–0.58)
IMM GRANULOCYTES NFR BLD: 1 % (ref 0–5)
LYMPHOCYTES NFR BLD: 1.28 K/UL (ref 1.5–4)
LYMPHOCYTES RELATIVE PERCENT: 20 % (ref 20–42)
MAGNESIUM SERPL-MCNC: 2.1 MG/DL (ref 1.6–2.6)
MCH RBC QN AUTO: 29.2 PG (ref 26–35)
MCHC RBC AUTO-ENTMCNC: 30.9 G/DL (ref 32–34.5)
MCV RBC AUTO: 94.6 FL (ref 80–99.9)
MONOCYTES NFR BLD: 0.39 K/UL (ref 0.1–0.95)
MONOCYTES NFR BLD: 6 % (ref 2–12)
NEUTROPHILS NFR BLD: 70 % (ref 43–80)
NEUTS SEG NFR BLD: 4.49 K/UL (ref 1.8–7.3)
PHOSPHATE SERPL-MCNC: 3.2 MG/DL (ref 2.5–4.5)
PLATELET # BLD AUTO: 364 K/UL (ref 130–450)
PMV BLD AUTO: 9.7 FL (ref 7–12)
POTASSIUM SERPL-SCNC: 3.8 MMOL/L (ref 3.5–5)
PROT SERPL-MCNC: 6.2 G/DL (ref 6.4–8.3)
RBC # BLD AUTO: 3.7 M/UL (ref 3.8–5.8)
SODIUM SERPL-SCNC: 138 MMOL/L (ref 132–146)
WBC OTHER # BLD: 6.4 K/UL (ref 4.5–11.5)

## 2023-10-07 PROCEDURE — 94660 CPAP INITIATION&MGMT: CPT

## 2023-10-07 PROCEDURE — 85025 COMPLETE CBC W/AUTO DIFF WBC: CPT

## 2023-10-07 PROCEDURE — 2060000000 HC ICU INTERMEDIATE R&B

## 2023-10-07 PROCEDURE — 2700000000 HC OXYGEN THERAPY PER DAY

## 2023-10-07 PROCEDURE — 6370000000 HC RX 637 (ALT 250 FOR IP): Performed by: INTERNAL MEDICINE

## 2023-10-07 PROCEDURE — 99232 SBSQ HOSP IP/OBS MODERATE 35: CPT | Performed by: INTERNAL MEDICINE

## 2023-10-07 PROCEDURE — 84100 ASSAY OF PHOSPHORUS: CPT

## 2023-10-07 PROCEDURE — 83735 ASSAY OF MAGNESIUM: CPT

## 2023-10-07 PROCEDURE — 2580000003 HC RX 258: Performed by: STUDENT IN AN ORGANIZED HEALTH CARE EDUCATION/TRAINING PROGRAM

## 2023-10-07 PROCEDURE — 80053 COMPREHEN METABOLIC PANEL: CPT

## 2023-10-07 PROCEDURE — 2580000003 HC RX 258: Performed by: INTERNAL MEDICINE

## 2023-10-07 PROCEDURE — 36415 COLL VENOUS BLD VENIPUNCTURE: CPT

## 2023-10-07 RX ADMIN — OLANZAPINE 5 MG: 5 TABLET, ORALLY DISINTEGRATING ORAL at 21:50

## 2023-10-07 RX ADMIN — DIVALPROEX SODIUM 250 MG: 125 CAPSULE, COATED PELLETS ORAL at 21:50

## 2023-10-07 RX ADMIN — Medication 200 MG: at 08:40

## 2023-10-07 RX ADMIN — METOPROLOL TARTRATE 50 MG: 50 TABLET, FILM COATED ORAL at 21:50

## 2023-10-07 RX ADMIN — HALOPERIDOL 3 MG: 2 TABLET ORAL at 15:08

## 2023-10-07 RX ADMIN — DIVALPROEX SODIUM 250 MG: 125 CAPSULE, COATED PELLETS ORAL at 05:45

## 2023-10-07 RX ADMIN — POTASSIUM CHLORIDE 20 MEQ: 1500 TABLET, EXTENDED RELEASE ORAL at 08:40

## 2023-10-07 RX ADMIN — Medication 10 ML: at 21:51

## 2023-10-07 RX ADMIN — AMLODIPINE BESYLATE 5 MG: 5 TABLET ORAL at 08:40

## 2023-10-07 RX ADMIN — PANTOPRAZOLE SODIUM 40 MG: 40 TABLET, DELAYED RELEASE ORAL at 05:45

## 2023-10-07 RX ADMIN — METOPROLOL TARTRATE 50 MG: 50 TABLET, FILM COATED ORAL at 08:40

## 2023-10-07 RX ADMIN — APIXABAN 5 MG: 5 TABLET, FILM COATED ORAL at 21:50

## 2023-10-07 RX ADMIN — BUSPIRONE HYDROCHLORIDE 10 MG: 5 TABLET ORAL at 21:50

## 2023-10-07 RX ADMIN — Medication 10 ML: at 22:19

## 2023-10-07 RX ADMIN — ROSUVASTATIN CALCIUM 40 MG: 10 TABLET, COATED ORAL at 17:39

## 2023-10-07 RX ADMIN — ANTI-FUNGAL POWDER MICONAZOLE NITRATE TALC FREE: 1.42 POWDER TOPICAL at 08:41

## 2023-10-07 RX ADMIN — BUSPIRONE HYDROCHLORIDE 10 MG: 5 TABLET ORAL at 08:40

## 2023-10-07 RX ADMIN — GABAPENTIN 600 MG: 300 CAPSULE ORAL at 23:47

## 2023-10-07 RX ADMIN — DIVALPROEX SODIUM 250 MG: 125 CAPSULE, COATED PELLETS ORAL at 15:08

## 2023-10-07 RX ADMIN — GABAPENTIN 600 MG: 300 CAPSULE ORAL at 15:08

## 2023-10-07 RX ADMIN — GABAPENTIN 600 MG: 300 CAPSULE ORAL at 08:40

## 2023-10-07 RX ADMIN — APIXABAN 5 MG: 5 TABLET, FILM COATED ORAL at 08:40

## 2023-10-07 RX ADMIN — TRAZODONE HYDROCHLORIDE 50 MG: 50 TABLET ORAL at 21:50

## 2023-10-07 RX ADMIN — SERTRALINE 100 MG: 50 TABLET, FILM COATED ORAL at 08:40

## 2023-10-07 RX ADMIN — FENOFIBRATE 160 MG: 160 TABLET ORAL at 08:40

## 2023-10-07 RX ADMIN — POTASSIUM CHLORIDE 20 MEQ: 1500 TABLET, EXTENDED RELEASE ORAL at 15:08

## 2023-10-07 RX ADMIN — FOLIC ACID 1 MG: 1 TABLET ORAL at 08:40

## 2023-10-07 RX ADMIN — Medication 10 ML: at 08:41

## 2023-10-07 RX ADMIN — Medication 10 ML: at 08:42

## 2023-10-07 NOTE — PLAN OF CARE
Problem: Discharge Planning  Goal: Discharge to home or other facility with appropriate resources  10/7/2023 0049 by Vladimir Coker RN  Outcome: Progressing  10/6/2023 1939 by Martin Christine RN  Outcome: Progressing  Flowsheets (Taken 10/6/2023 0802)  Discharge to home or other facility with appropriate resources:   Identify barriers to discharge with patient and caregiver   Arrange for needed discharge resources and transportation as appropriate     Problem: Pain  Goal: Verbalizes/displays adequate comfort level or baseline comfort level  10/7/2023 0049 by Vladimir Coker RN  Outcome: Progressing  10/6/2023 1939 by Martin Christine RN  Outcome: Progressing  Flowsheets (Taken 10/6/2023 0802)  Verbalizes/displays adequate comfort level or baseline comfort level: Encourage patient to monitor pain and request assistance     Problem: Safety - Adult  Goal: Free from fall injury  10/7/2023 0049 by Vladimir Coker RN  Outcome: Progressing  10/6/2023 1939 by Martin Christine RN  Outcome: Progressing  Flowsheets (Taken 10/6/2023 0844)  Free From Fall Injury: Instruct family/caregiver on patient safety     Problem: Skin/Tissue Integrity  Goal: Absence of new skin breakdown  Description: 1. Monitor for areas of redness and/or skin breakdown  2. Assess vascular access sites hourly  3. Every 4-6 hours minimum:  Change oxygen saturation probe site  4. Every 4-6 hours:  If on nasal continuous positive airway pressure, respiratory therapy assess nares and determine need for appliance change or resting period.   10/7/2023 0049 by Vladimir Coker RN  Outcome: Progressing  10/6/2023 1939 by Martin Christine RN  Outcome: Progressing     Problem: Neurosensory - Adult  Goal: Achieves stable or improved neurological status  10/7/2023 0049 by Vladimir Coker RN  Outcome: Progressing  10/6/2023 1939 by Martin Christine RN  Outcome: Progressing  Flowsheets (Taken 10/6/2023 0802)  Achieves stable or improved neurological status:

## 2023-10-08 PROCEDURE — 97535 SELF CARE MNGMENT TRAINING: CPT

## 2023-10-08 PROCEDURE — 99232 SBSQ HOSP IP/OBS MODERATE 35: CPT | Performed by: INTERNAL MEDICINE

## 2023-10-08 PROCEDURE — 2580000003 HC RX 258: Performed by: STUDENT IN AN ORGANIZED HEALTH CARE EDUCATION/TRAINING PROGRAM

## 2023-10-08 PROCEDURE — 94660 CPAP INITIATION&MGMT: CPT

## 2023-10-08 PROCEDURE — 6370000000 HC RX 637 (ALT 250 FOR IP): Performed by: INTERNAL MEDICINE

## 2023-10-08 PROCEDURE — 97530 THERAPEUTIC ACTIVITIES: CPT

## 2023-10-08 PROCEDURE — 2060000000 HC ICU INTERMEDIATE R&B

## 2023-10-08 PROCEDURE — 2580000003 HC RX 258: Performed by: INTERNAL MEDICINE

## 2023-10-08 PROCEDURE — 2700000000 HC OXYGEN THERAPY PER DAY

## 2023-10-08 RX ADMIN — ROSUVASTATIN CALCIUM 40 MG: 10 TABLET, COATED ORAL at 18:11

## 2023-10-08 RX ADMIN — FOLIC ACID 1 MG: 1 TABLET ORAL at 08:34

## 2023-10-08 RX ADMIN — Medication 10 ML: at 08:38

## 2023-10-08 RX ADMIN — GABAPENTIN 600 MG: 300 CAPSULE ORAL at 08:34

## 2023-10-08 RX ADMIN — Medication 200 MG: at 08:34

## 2023-10-08 RX ADMIN — AMLODIPINE BESYLATE 5 MG: 5 TABLET ORAL at 08:35

## 2023-10-08 RX ADMIN — GABAPENTIN 600 MG: 300 CAPSULE ORAL at 18:11

## 2023-10-08 RX ADMIN — BUSPIRONE HYDROCHLORIDE 10 MG: 5 TABLET ORAL at 19:56

## 2023-10-08 RX ADMIN — ANTI-FUNGAL POWDER MICONAZOLE NITRATE TALC FREE: 1.42 POWDER TOPICAL at 08:37

## 2023-10-08 RX ADMIN — BUSPIRONE HYDROCHLORIDE 10 MG: 5 TABLET ORAL at 08:34

## 2023-10-08 RX ADMIN — Medication 10 ML: at 08:35

## 2023-10-08 RX ADMIN — METOPROLOL TARTRATE 50 MG: 50 TABLET, FILM COATED ORAL at 08:35

## 2023-10-08 RX ADMIN — PANTOPRAZOLE SODIUM 40 MG: 40 TABLET, DELAYED RELEASE ORAL at 06:22

## 2023-10-08 RX ADMIN — APIXABAN 5 MG: 5 TABLET, FILM COATED ORAL at 19:55

## 2023-10-08 RX ADMIN — SERTRALINE 100 MG: 50 TABLET, FILM COATED ORAL at 08:35

## 2023-10-08 RX ADMIN — GABAPENTIN 600 MG: 300 CAPSULE ORAL at 23:47

## 2023-10-08 RX ADMIN — APIXABAN 5 MG: 5 TABLET, FILM COATED ORAL at 08:35

## 2023-10-08 RX ADMIN — DIVALPROEX SODIUM 250 MG: 125 CAPSULE, COATED PELLETS ORAL at 18:11

## 2023-10-08 RX ADMIN — OLANZAPINE 5 MG: 5 TABLET, ORALLY DISINTEGRATING ORAL at 19:56

## 2023-10-08 RX ADMIN — ANTI-FUNGAL POWDER MICONAZOLE NITRATE TALC FREE: 1.42 POWDER TOPICAL at 19:55

## 2023-10-08 RX ADMIN — DIVALPROEX SODIUM 250 MG: 125 CAPSULE, COATED PELLETS ORAL at 06:22

## 2023-10-08 RX ADMIN — DIVALPROEX SODIUM 250 MG: 125 CAPSULE, COATED PELLETS ORAL at 19:55

## 2023-10-08 RX ADMIN — METOPROLOL TARTRATE 50 MG: 50 TABLET, FILM COATED ORAL at 19:56

## 2023-10-08 RX ADMIN — FENOFIBRATE 160 MG: 160 TABLET ORAL at 08:35

## 2023-10-08 RX ADMIN — TRAZODONE HYDROCHLORIDE 50 MG: 50 TABLET ORAL at 19:56

## 2023-10-08 RX ADMIN — Medication 10 ML: at 19:55

## 2023-10-08 NOTE — PLAN OF CARE
Problem: Discharge Planning  Goal: Discharge to home or other facility with appropriate resources  Outcome: Progressing  Flowsheets (Taken 10/8/2023 0743)  Discharge to home or other facility with appropriate resources:   Identify barriers to discharge with patient and caregiver   Arrange for needed discharge resources and transportation as appropriate     Problem: Pain  Goal: Verbalizes/displays adequate comfort level or baseline comfort level  Outcome: Progressing  Flowsheets (Taken 10/8/2023 0743)  Verbalizes/displays adequate comfort level or baseline comfort level: Encourage patient to monitor pain and request assistance     Problem: Safety - Adult  Goal: Free from fall injury  Outcome: Progressing  Flowsheets (Taken 10/8/2023 1058)  Free From Fall Injury: Instruct family/caregiver on patient safety     Problem: Skin/Tissue Integrity  Goal: Absence of new skin breakdown  Description: 1. Monitor for areas of redness and/or skin breakdown  2. Assess vascular access sites hourly  3. Every 4-6 hours minimum:  Change oxygen saturation probe site  4. Every 4-6 hours:  If on nasal continuous positive airway pressure, respiratory therapy assess nares and determine need for appliance change or resting period. Outcome: Progressing     Problem: Neurosensory - Adult  Goal: Achieves stable or improved neurological status  Outcome: Progressing  Flowsheets (Taken 10/8/2023 0743)  Achieves stable or improved neurological status: Assess for and report changes in neurological status  Goal: Absence of seizures  Outcome: Progressing  Flowsheets (Taken 10/8/2023 0743)  Absence of seizures: Monitor for seizure activity.   If seizure occurs, document type and location of movements and any associated apnea  Goal: Remains free of injury related to seizures activity  Outcome: Progressing  Flowsheets (Taken 10/8/2023 0743)  Remains free of injury related to seizure activity: Maintain airway, patient safety  and administer oxygen as

## 2023-10-08 NOTE — PLAN OF CARE
Problem: Discharge Planning  Goal: Discharge to home or other facility with appropriate resources  10/8/2023 0046 by Suman Dang RN  Outcome: Progressing  10/8/2023 0044 by Suman Dang RN  Outcome: Progressing  10/7/2023 1755 by Coco Dennison RN  Outcome: Progressing  Flowsheets (Taken 10/7/2023 6497)  Discharge to home or other facility with appropriate resources:   Identify barriers to discharge with patient and caregiver   Arrange for needed discharge resources and transportation as appropriate     Problem: Pain  Goal: Verbalizes/displays adequate comfort level or baseline comfort level  10/8/2023 0046 by Suman Dang RN  Outcome: Progressing  10/8/2023 0044 by Suman Dang RN  Outcome: Progressing  10/7/2023 1755 by Coco Dennison RN  Outcome: Progressing     Problem: Safety - Adult  Goal: Free from fall injury  10/8/2023 0046 by Suman Dang RN  Outcome: Progressing  10/8/2023 0044 by Suman Dang RN  Outcome: Progressing  10/7/2023 1755 by Coco Dennison RN  Outcome: Progressing  Flowsheets (Taken 10/7/2023 3416)  Free From Fall Injury: Instruct family/caregiver on patient safety     Problem: Skin/Tissue Integrity  Goal: Absence of new skin breakdown  Description: 1. Monitor for areas of redness and/or skin breakdown  2. Assess vascular access sites hourly  3. Every 4-6 hours minimum:  Change oxygen saturation probe site  4. Every 4-6 hours:  If on nasal continuous positive airway pressure, respiratory therapy assess nares and determine need for appliance change or resting period.   10/8/2023 0046 by Suman Dang RN  Outcome: Progressing  10/8/2023 0044 by Suman Dang RN  Outcome: Progressing  10/7/2023 1755 by Coco Dennison RN  Outcome: Progressing     Problem: Neurosensory - Adult  Goal: Achieves stable or improved neurological status  10/8/2023 0046 by Suman Dang RN  Outcome: Progressing  10/8/2023 0044 by Suman Dang

## 2023-10-09 LAB
ALBUMIN SERPL-MCNC: 3 G/DL (ref 3.5–5.2)
ALP SERPL-CCNC: 48 U/L (ref 40–129)
ALT SERPL-CCNC: 16 U/L (ref 0–40)
ANION GAP SERPL CALCULATED.3IONS-SCNC: 10 MMOL/L (ref 7–16)
AST SERPL-CCNC: 17 U/L (ref 0–39)
BASOPHILS # BLD: 0.06 K/UL (ref 0–0.2)
BASOPHILS NFR BLD: 1 % (ref 0–2)
BILIRUB SERPL-MCNC: 0.6 MG/DL (ref 0–1.2)
BUN SERPL-MCNC: 14 MG/DL (ref 6–23)
CALCIUM SERPL-MCNC: 8.7 MG/DL (ref 8.6–10.2)
CHLORIDE SERPL-SCNC: 102 MMOL/L (ref 98–107)
CO2 SERPL-SCNC: 26 MMOL/L (ref 22–29)
CREAT SERPL-MCNC: 1 MG/DL (ref 0.7–1.2)
EOSINOPHIL # BLD: 0.1 K/UL (ref 0.05–0.5)
EOSINOPHILS RELATIVE PERCENT: 2 % (ref 0–6)
ERYTHROCYTE [DISTWIDTH] IN BLOOD BY AUTOMATED COUNT: 16.8 % (ref 11.5–15)
GFR SERPL CREATININE-BSD FRML MDRD: >60 ML/MIN/1.73M2
GLUCOSE SERPL-MCNC: 96 MG/DL (ref 74–99)
HCT VFR BLD AUTO: 33.3 % (ref 37–54)
HGB BLD-MCNC: 10.1 G/DL (ref 12.5–16.5)
IMM GRANULOCYTES # BLD AUTO: 0.07 K/UL (ref 0–0.58)
IMM GRANULOCYTES NFR BLD: 1 % (ref 0–5)
LYMPHOCYTES NFR BLD: 1.27 K/UL (ref 1.5–4)
LYMPHOCYTES RELATIVE PERCENT: 22 % (ref 20–42)
MCH RBC QN AUTO: 29.2 PG (ref 26–35)
MCHC RBC AUTO-ENTMCNC: 30.3 G/DL (ref 32–34.5)
MCV RBC AUTO: 96.2 FL (ref 80–99.9)
MONOCYTES NFR BLD: 0.43 K/UL (ref 0.1–0.95)
MONOCYTES NFR BLD: 8 % (ref 2–12)
NEUTROPHILS NFR BLD: 66 % (ref 43–80)
NEUTS SEG NFR BLD: 3.76 K/UL (ref 1.8–7.3)
PLATELET # BLD AUTO: 312 K/UL (ref 130–450)
PMV BLD AUTO: 10 FL (ref 7–12)
POTASSIUM SERPL-SCNC: 4.3 MMOL/L (ref 3.5–5)
PROT SERPL-MCNC: 6 G/DL (ref 6.4–8.3)
RBC # BLD AUTO: 3.46 M/UL (ref 3.8–5.8)
SODIUM SERPL-SCNC: 138 MMOL/L (ref 132–146)
WBC OTHER # BLD: 5.7 K/UL (ref 4.5–11.5)

## 2023-10-09 PROCEDURE — 36415 COLL VENOUS BLD VENIPUNCTURE: CPT

## 2023-10-09 PROCEDURE — 2580000003 HC RX 258: Performed by: INTERNAL MEDICINE

## 2023-10-09 PROCEDURE — 6370000000 HC RX 637 (ALT 250 FOR IP): Performed by: INTERNAL MEDICINE

## 2023-10-09 PROCEDURE — 92526 ORAL FUNCTION THERAPY: CPT | Performed by: SPEECH-LANGUAGE PATHOLOGIST

## 2023-10-09 PROCEDURE — 97110 THERAPEUTIC EXERCISES: CPT

## 2023-10-09 PROCEDURE — 85025 COMPLETE CBC W/AUTO DIFF WBC: CPT

## 2023-10-09 PROCEDURE — 97530 THERAPEUTIC ACTIVITIES: CPT

## 2023-10-09 PROCEDURE — 2060000000 HC ICU INTERMEDIATE R&B

## 2023-10-09 PROCEDURE — 99233 SBSQ HOSP IP/OBS HIGH 50: CPT | Performed by: INTERNAL MEDICINE

## 2023-10-09 PROCEDURE — 97116 GAIT TRAINING THERAPY: CPT

## 2023-10-09 PROCEDURE — 2700000000 HC OXYGEN THERAPY PER DAY

## 2023-10-09 PROCEDURE — 99232 SBSQ HOSP IP/OBS MODERATE 35: CPT | Performed by: INTERNAL MEDICINE

## 2023-10-09 PROCEDURE — 94660 CPAP INITIATION&MGMT: CPT

## 2023-10-09 PROCEDURE — 80053 COMPREHEN METABOLIC PANEL: CPT

## 2023-10-09 RX ORDER — AMLODIPINE BESYLATE 5 MG/1
5 TABLET ORAL DAILY
Qty: 30 TABLET | Refills: 0 | Status: CANCELLED | OUTPATIENT
Start: 2023-10-10

## 2023-10-09 RX ADMIN — HALOPERIDOL 3 MG: 2 TABLET ORAL at 20:39

## 2023-10-09 RX ADMIN — ANTI-FUNGAL POWDER MICONAZOLE NITRATE TALC FREE: 1.42 POWDER TOPICAL at 09:41

## 2023-10-09 RX ADMIN — Medication 200 MG: at 09:40

## 2023-10-09 RX ADMIN — AMLODIPINE BESYLATE 5 MG: 5 TABLET ORAL at 09:40

## 2023-10-09 RX ADMIN — APIXABAN 5 MG: 5 TABLET, FILM COATED ORAL at 20:44

## 2023-10-09 RX ADMIN — SERTRALINE 100 MG: 50 TABLET, FILM COATED ORAL at 09:40

## 2023-10-09 RX ADMIN — PANTOPRAZOLE SODIUM 40 MG: 40 TABLET, DELAYED RELEASE ORAL at 05:52

## 2023-10-09 RX ADMIN — FENOFIBRATE 160 MG: 160 TABLET ORAL at 09:39

## 2023-10-09 RX ADMIN — Medication 10 ML: at 09:41

## 2023-10-09 RX ADMIN — Medication 10 ML: at 20:47

## 2023-10-09 RX ADMIN — ROSUVASTATIN CALCIUM 40 MG: 10 TABLET, COATED ORAL at 17:01

## 2023-10-09 RX ADMIN — APIXABAN 5 MG: 5 TABLET, FILM COATED ORAL at 09:39

## 2023-10-09 RX ADMIN — GABAPENTIN 600 MG: 300 CAPSULE ORAL at 09:39

## 2023-10-09 RX ADMIN — GABAPENTIN 600 MG: 300 CAPSULE ORAL at 17:01

## 2023-10-09 RX ADMIN — TRAZODONE HYDROCHLORIDE 50 MG: 50 TABLET ORAL at 20:44

## 2023-10-09 RX ADMIN — METOPROLOL TARTRATE 50 MG: 50 TABLET, FILM COATED ORAL at 09:40

## 2023-10-09 RX ADMIN — FOLIC ACID 1 MG: 1 TABLET ORAL at 09:39

## 2023-10-09 RX ADMIN — DIVALPROEX SODIUM 250 MG: 125 CAPSULE, COATED PELLETS ORAL at 05:52

## 2023-10-09 RX ADMIN — DIVALPROEX SODIUM 250 MG: 125 CAPSULE, COATED PELLETS ORAL at 14:18

## 2023-10-09 RX ADMIN — BUSPIRONE HYDROCHLORIDE 10 MG: 5 TABLET ORAL at 09:39

## 2023-10-09 RX ADMIN — OLANZAPINE 5 MG: 5 TABLET, ORALLY DISINTEGRATING ORAL at 20:43

## 2023-10-09 RX ADMIN — METOPROLOL TARTRATE 50 MG: 50 TABLET, FILM COATED ORAL at 20:44

## 2023-10-09 RX ADMIN — DIVALPROEX SODIUM 250 MG: 125 CAPSULE, COATED PELLETS ORAL at 20:44

## 2023-10-09 RX ADMIN — BUSPIRONE HYDROCHLORIDE 10 MG: 5 TABLET ORAL at 20:43

## 2023-10-09 ASSESSMENT — PAIN DESCRIPTION - ORIENTATION: ORIENTATION: LOWER

## 2023-10-09 ASSESSMENT — PAIN SCALES - GENERAL: PAINLEVEL_OUTOF10: 5

## 2023-10-09 ASSESSMENT — PAIN DESCRIPTION - LOCATION
LOCATION: BACK
LOCATION: BUTTOCKS

## 2023-10-09 ASSESSMENT — PAIN DESCRIPTION - DESCRIPTORS: DESCRIPTORS: ACHING

## 2023-10-09 NOTE — PLAN OF CARE
Problem: Discharge Planning  Goal: Discharge to home or other facility with appropriate resources  10/9/2023 0026 by Vladimir Coker RN  Outcome: Progressing  Flowsheets (Taken 10/8/2023 1930)  Discharge to home or other facility with appropriate resources: Identify barriers to discharge with patient and caregiver  10/8/2023 1852 by Martin Christine RN  Outcome: Progressing  Flowsheets (Taken 10/8/2023 8783)  Discharge to home or other facility with appropriate resources:   Identify barriers to discharge with patient and caregiver   Arrange for needed discharge resources and transportation as appropriate     Problem: Pain  Goal: Verbalizes/displays adequate comfort level or baseline comfort level  10/9/2023 0026 by Vladimir Coker RN  Outcome: Progressing  10/8/2023 1852 by Martin Christine RN  Outcome: Progressing  Flowsheets (Taken 10/8/2023 0962)  Verbalizes/displays adequate comfort level or baseline comfort level: Encourage patient to monitor pain and request assistance     Problem: Safety - Adult  Goal: Free from fall injury  10/9/2023 0026 by Vladimir Coker RN  Outcome: Progressing  10/8/2023 1852 by Martin Christine RN  Outcome: Progressing  Flowsheets (Taken 10/8/2023 1058)  Free From Fall Injury: Instruct family/caregiver on patient safety     Problem: Skin/Tissue Integrity  Goal: Absence of new skin breakdown  Description: 1. Monitor for areas of redness and/or skin breakdown  2. Assess vascular access sites hourly  3. Every 4-6 hours minimum:  Change oxygen saturation probe site  4. Every 4-6 hours:  If on nasal continuous positive airway pressure, respiratory therapy assess nares and determine need for appliance change or resting period.   10/9/2023 0026 by Vladimir Coker RN  Outcome: Progressing  10/8/2023 1852 by Martin Christine RN  Outcome: Progressing     Problem: Neurosensory - Adult  Goal: Achieves stable or improved neurological status  10/9/2023 0026 by Vladimir Coker

## 2023-10-09 NOTE — CARE COORDINATION
10/9/2023 CM met with pt and he is pleasantly, intermittently confused. He stated he has 2 dogs and 2 cats and one cat is in his bed at [facility]. Pts cat that \"is in the bed is Pisspot\"- which daughter did verify that is his cats' name but he only has one cat and no dogs. APPROVED SNF [facility] [place] [facility] [place]- benny Юлияrhonda Richter-per Danae aguilar Signed CURTIS and BRIAN (initiated). Mikala Browning at [facility] about Potential for pt to return home per pcp- home oxygen ordered- however, [facility] [address] did not qualify for oxygen at discharge- pcp updated. pts daughter chose [facility] following for discharge date. . Cm will place referral to Adult Protection if pt discharges to home- due to safety concerns  Pending pcp final discharge destination determination. Electronically signed by Verencie Lee RN on 10/9/2023 at 12:41 PM      The Plan for Transition of Care is related to the following treatment goals: DME/HHC    The Patient and/or patient representative Seymour Holt- daughter was provided with a choice of provider and agrees   with the discharge plan. [x] Yes [] No    Freedom of choice list was provided with basic dialogue that supports the patient's individualized plan of care/goals, treatment preferences and shares the quality data associated with the providers.  [x] Yes [] No      Electronically signed by Verenice Lee RN-BC on 10/9/2023 at 12:49 PM

## 2023-10-09 NOTE — CARE COORDINATION
Patient qualifies for home oxygen as evidenced by home O2 study done by nursing as noted below: At rest was 92% on RA with a HR of 81. Walked 10 ft and dropped to 65%  with a HR of 126. Had the PT sit down and recovered to 90% on RA over 5 min. 2 L of oxygen was applied and recovered to 95% with a HR of 91 within two minutes. PT was too tired to walk but asked to wheel himself down the hallway and back. He stayed between 89 - 96 % on 2 L. HR fluctuated between .       Electronically signed by Abilio Miranda MD on 10/9/2023 at 8:55 AM

## 2023-10-10 VITALS
DIASTOLIC BLOOD PRESSURE: 67 MMHG | OXYGEN SATURATION: 98 % | HEART RATE: 64 BPM | SYSTOLIC BLOOD PRESSURE: 95 MMHG | TEMPERATURE: 98.2 F | HEIGHT: 72 IN | WEIGHT: 268.2 LBS | RESPIRATION RATE: 17 BRPM | BODY MASS INDEX: 36.33 KG/M2

## 2023-10-10 PROCEDURE — 99239 HOSP IP/OBS DSCHRG MGMT >30: CPT | Performed by: INTERNAL MEDICINE

## 2023-10-10 PROCEDURE — 94660 CPAP INITIATION&MGMT: CPT

## 2023-10-10 PROCEDURE — 6370000000 HC RX 637 (ALT 250 FOR IP): Performed by: INTERNAL MEDICINE

## 2023-10-10 PROCEDURE — 2700000000 HC OXYGEN THERAPY PER DAY

## 2023-10-10 PROCEDURE — 99232 SBSQ HOSP IP/OBS MODERATE 35: CPT | Performed by: INTERNAL MEDICINE

## 2023-10-10 PROCEDURE — 6360000002 HC RX W HCPCS: Performed by: INTERNAL MEDICINE

## 2023-10-10 RX ORDER — THIAMINE MONONITRATE (VIT B1) 100 MG
100 TABLET ORAL DAILY
Qty: 30 TABLET | Refills: 0 | Status: SHIPPED | OUTPATIENT
Start: 2023-10-10

## 2023-10-10 RX ORDER — FOLIC ACID 1 MG/1
1 TABLET ORAL DAILY
Qty: 30 TABLET | Refills: 0 | COMMUNITY
Start: 2023-10-10

## 2023-10-10 RX ORDER — ZIPRASIDONE MESYLATE 20 MG/ML
10 INJECTION, POWDER, LYOPHILIZED, FOR SOLUTION INTRAMUSCULAR EVERY 6 HOURS PRN
Status: DISCONTINUED | OUTPATIENT
Start: 2023-10-10 | End: 2023-10-10 | Stop reason: HOSPADM

## 2023-10-10 RX ORDER — BUSPIRONE HYDROCHLORIDE 10 MG/1
20 TABLET ORAL 2 TIMES DAILY
Qty: 60 TABLET | Refills: 0 | Status: SHIPPED | OUTPATIENT
Start: 2023-10-10

## 2023-10-10 RX ORDER — ZIPRASIDONE MESYLATE 20 MG/ML
10 INJECTION, POWDER, LYOPHILIZED, FOR SOLUTION INTRAMUSCULAR ONCE
Status: DISCONTINUED | OUTPATIENT
Start: 2023-10-10 | End: 2023-10-10

## 2023-10-10 RX ORDER — AMLODIPINE BESYLATE 5 MG/1
5 TABLET ORAL DAILY
Qty: 30 TABLET | Refills: 3 | Status: SHIPPED | OUTPATIENT
Start: 2023-10-10

## 2023-10-10 RX ADMIN — ZIPRASIDONE MESYLATE 10 MG: 20 INJECTION, POWDER, LYOPHILIZED, FOR SOLUTION INTRAMUSCULAR at 14:48

## 2023-10-10 RX ADMIN — SERTRALINE 100 MG: 50 TABLET, FILM COATED ORAL at 11:45

## 2023-10-10 RX ADMIN — BUSPIRONE HYDROCHLORIDE 10 MG: 5 TABLET ORAL at 11:45

## 2023-10-10 RX ADMIN — GABAPENTIN 600 MG: 300 CAPSULE ORAL at 00:18

## 2023-10-10 RX ADMIN — GABAPENTIN 600 MG: 300 CAPSULE ORAL at 11:45

## 2023-10-10 RX ADMIN — Medication 200 MG: at 11:45

## 2023-10-10 RX ADMIN — APIXABAN 5 MG: 5 TABLET, FILM COATED ORAL at 11:45

## 2023-10-10 RX ADMIN — DIVALPROEX SODIUM 250 MG: 125 CAPSULE, COATED PELLETS ORAL at 05:15

## 2023-10-10 RX ADMIN — PANTOPRAZOLE SODIUM 40 MG: 40 TABLET, DELAYED RELEASE ORAL at 05:15

## 2023-10-10 RX ADMIN — ANTI-FUNGAL POWDER MICONAZOLE NITRATE TALC FREE: 1.42 POWDER TOPICAL at 11:46

## 2023-10-10 RX ADMIN — FENOFIBRATE 160 MG: 160 TABLET ORAL at 11:45

## 2023-10-10 NOTE — PLAN OF CARE
Problem: Discharge Planning  Goal: Discharge to home or other facility with appropriate resources  Outcome: Completed  Flowsheets (Taken 10/10/2023 0800)  Discharge to home or other facility with appropriate resources:   Identify barriers to discharge with patient and caregiver   Arrange for needed discharge resources and transportation as appropriate     Problem: Pain  Goal: Verbalizes/displays adequate comfort level or baseline comfort level  Outcome: Completed  Flowsheets (Taken 10/10/2023 1142)  Verbalizes/displays adequate comfort level or baseline comfort level: Encourage patient to monitor pain and request assistance     Problem: Safety - Adult  Goal: Free from fall injury  Outcome: Completed  Flowsheets (Taken 10/10/2023 0800)  Free From Fall Injury: Instruct family/caregiver on patient safety     Problem: Skin/Tissue Integrity  Goal: Absence of new skin breakdown  Description: 1. Monitor for areas of redness and/or skin breakdown  2. Assess vascular access sites hourly  3. Every 4-6 hours minimum:  Change oxygen saturation probe site  4. Every 4-6 hours:  If on nasal continuous positive airway pressure, respiratory therapy assess nares and determine need for appliance change or resting period. Outcome: Completed     Problem: Neurosensory - Adult  Goal: Achieves stable or improved neurological status  Outcome: Completed  Flowsheets (Taken 10/10/2023 0800)  Achieves stable or improved neurological status: Assess for and report changes in neurological status  Goal: Absence of seizures  Outcome: Completed  Flowsheets (Taken 10/10/2023 0800)  Absence of seizures: Monitor for seizure activity.   If seizure occurs, document type and location of movements and any associated apnea  Goal: Remains free of injury related to seizures activity  Outcome: Completed  Flowsheets (Taken 10/10/2023 0800)  Remains free of injury related to seizure activity: Maintain airway, patient safety  and administer oxygen as Pt c/o low back pain and bilateral flank pain x's 1-2 weeks. Pt also c/o nausea. Pt states she has not had a period in over a 1 month.     Kelley Byrd RN  08/07/17 8462

## 2023-10-10 NOTE — PLAN OF CARE
Problem: Discharge Planning  Goal: Discharge to home or other facility with appropriate resources  Outcome: Completed  Flowsheets (Taken 10/10/2023 0800)  Discharge to home or other facility with appropriate resources:   Identify barriers to discharge with patient and caregiver   Arrange for needed discharge resources and transportation as appropriate     Problem: Pain  Goal: Verbalizes/displays adequate comfort level or baseline comfort level  Outcome: Completed  Flowsheets (Taken 10/10/2023 1142)  Verbalizes/displays adequate comfort level or baseline comfort level: Encourage patient to monitor pain and request assistance     Problem: Safety - Adult  Goal: Free from fall injury  Outcome: Completed  Flowsheets (Taken 10/10/2023 0800)  Free From Fall Injury: Instruct family/caregiver on patient safety     Problem: Skin/Tissue Integrity  Goal: Absence of new skin breakdown  Description: 1. Monitor for areas of redness and/or skin breakdown  2. Assess vascular access sites hourly  3. Every 4-6 hours minimum:  Change oxygen saturation probe site  4. Every 4-6 hours:  If on nasal continuous positive airway pressure, respiratory therapy assess nares and determine need for appliance change or resting period. Outcome: Completed     Problem: Neurosensory - Adult  Goal: Achieves stable or improved neurological status  Outcome: Completed  Flowsheets (Taken 10/10/2023 0800)  Achieves stable or improved neurological status: Assess for and report changes in neurological status  Goal: Absence of seizures  Outcome: Completed  Flowsheets (Taken 10/10/2023 0800)  Absence of seizures: Monitor for seizure activity.   If seizure occurs, document type and location of movements and any associated apnea  Goal: Remains free of injury related to seizures activity  Outcome: Completed  Flowsheets (Taken 10/10/2023 0800)  Remains free of injury related to seizure activity: Maintain airway, patient safety  and administer oxygen as

## 2023-10-10 NOTE — DISCHARGE SUMMARY
[facility] Physician Discharge Summary       MARTY Cowart - NP  [address] [facility] [phone]    Call today        Activity level: As tolerated    Diet: ADULT ORAL NUTRITION SUPPLEMENT; Lunch, Dinner; Frozen Oral Supplement  ADULT ORAL NUTRITION SUPPLEMENT; Breakfast; Standard High Calorie/High Protein Oral Supplement  ADULT DIET; Dysphagia - Soft and Bite Sized    Labs: Per primary care physician    Condition at discharge: Stable    Dispo: Home with home health care    Patient ID:  Sharad Phillips  [id number]  80 y.o.  1955    Admit date: 9/24/2023    Discharge date and time:  10/10/2023  8:20 AM    Admission Diagnoses: Principal Problem:    Alcohol withdrawal syndrome with complication [facility])  Active Problems:    Essential hypertension    Coronary artery disease involving native coronary artery of native heart without angina pectoris    Chronic obstructive pulmonary disease with acute exacerbation (HCC)    Morbid obesity ([address])    Persistent atrial fibrillation (HCC)    Tobacco abuse    Alcoholic liver disease ([address])    Impending delirium tremens ([address])    Elevated LFTs    Paroxysmal atrial fibrillation (HCC)    Acute alcohol abuse, with delirium ([address])    Acute respiratory failure with hypercapnia (HCC)    Hypertriglyceridemia    Anxiety disorder    Acute on chronic diastolic congestive heart failure (HCC)    Hx of CABG    Acute metabolic encephalopathy    Alcohol withdrawal syndrome with perceptual disturbance ([address])  Resolved Problems:    * No resolved hospital problems.  *      Discharge Diagnoses: Principal Problem:    Alcohol withdrawal syndrome with complication [facility])  Active Problems:    Essential hypertension    Coronary artery disease involving native coronary artery of native heart without angina pectoris    Chronic obstructive pulmonary disease with acute exacerbation (HCC)    Morbid obesity ([address])    Persistent atrial fibrillation ([address])

## 2023-10-10 NOTE — PLAN OF CARE
Patient seen and examined this morning, he seemed to demonstrate insight into his medical conditions and risks of going to. This afternoon, however, patient's been increasingly agitated and confused. States that he is currently in a nursing home, tried to redirect him and discussed that he is in the hospital, but he is very agitated. As things stand this afternoon, I do not believe patient has decision-making capacity at this time and therefore is not safe to go home. He is unable to articulate risks and benefits of going home versus going to skilled nursing facility for further care. Called patient's daughter, and she notes that even before hospitalization patient would often seem as if he is able to make decisions during shorter conversations, but for quite some time has been showing signs of confusion. She is his healthcare power of , and agrees with skilled nursing facility placement. We will proceed with skilled nursing facility placement. This will serve as addendum to discharge summary noted below. NOTE: Portions of this report were transcribed using voice recognition software. Every effort was made to ensure accuracy; however, inadvertent computerized transcription errors may be present.     Electronically signed by Marifer León DO on 10/10/2023 at 2:36 PM

## 2023-10-10 NOTE — CARE COORDINATION
10/10/23 1625 CM note: covid Z(-) 9/25/23, (+) UTI 9/25/23. 2L nc. Teleitter. Pt alert to self only, confused. Discharge order noted. Discharge plan is SNF at [address] [place] [address]. Per Rubin Riversison, Traceychon Parada has been obtained and good through Thursday. NEED CURTIS SIGNED. HENS completed. Arranged ambulance transport with [facility] for  at 4:30. Ambulance form on soft chart. Notified pts daughter/PODIANA Arana, pts RN Carrol Brown, charge nurse Marti Mathew, and [facility] of  time. [place] aware of pts current O2 needs at 2L nc.  Electronically signed by Reny Ye RN on 10/10/2023 at 4:37 PM

## 2024-04-13 ENCOUNTER — APPOINTMENT (OUTPATIENT)
Dept: CT IMAGING | Age: 69
DRG: 897 | End: 2024-04-13
Payer: MEDICARE

## 2024-04-13 ENCOUNTER — HOSPITAL ENCOUNTER (INPATIENT)
Age: 69
LOS: 4 days | Discharge: HOME | DRG: 897 | End: 2024-04-17
Attending: EMERGENCY MEDICINE | Admitting: INTERNAL MEDICINE
Payer: MEDICARE

## 2024-04-13 ENCOUNTER — APPOINTMENT (OUTPATIENT)
Dept: GENERAL RADIOLOGY | Age: 69
DRG: 897 | End: 2024-04-13
Payer: MEDICARE

## 2024-04-13 DIAGNOSIS — R44.3 HALLUCINATIONS: ICD-10-CM

## 2024-04-13 DIAGNOSIS — E86.0 DEHYDRATION: Primary | ICD-10-CM

## 2024-04-13 PROBLEM — E87.20 METABOLIC ACIDOSIS: Status: ACTIVE | Noted: 2024-04-13

## 2024-04-13 PROBLEM — F19.939 ACUTE DRUG WITHDRAWAL SYNDROME WITH COMPLICATION (HCC): Status: ACTIVE | Noted: 2024-04-13

## 2024-04-13 PROBLEM — N17.9 AKI (ACUTE KIDNEY INJURY) (HCC): Status: ACTIVE | Noted: 2024-04-13

## 2024-04-13 PROBLEM — I50.32 CHRONIC DIASTOLIC CHF (CONGESTIVE HEART FAILURE) (HCC): Status: ACTIVE | Noted: 2023-10-01

## 2024-04-13 LAB
ALBUMIN SERPL-MCNC: 4 G/DL (ref 3.5–5.2)
ALP SERPL-CCNC: 55 U/L (ref 40–129)
ALT SERPL-CCNC: 16 U/L (ref 0–40)
AMMONIA PLAS-SCNC: 37 UMOL/L (ref 16–60)
AMMONIA PLAS-SCNC: 38 UMOL/L (ref 16–60)
AMPHET UR QL SCN: NEGATIVE
ANION GAP SERPL CALCULATED.3IONS-SCNC: 13 MMOL/L (ref 7–16)
APAP SERPL-MCNC: <5 UG/ML (ref 10–30)
AST SERPL-CCNC: 22 U/L (ref 0–39)
BARBITURATES UR QL SCN: NEGATIVE
BASOPHILS # BLD: 0.05 K/UL (ref 0–0.2)
BASOPHILS NFR BLD: 1 % (ref 0–2)
BENZODIAZ UR QL: NEGATIVE
BILIRUB SERPL-MCNC: 0.7 MG/DL (ref 0–1.2)
BILIRUB UR QL STRIP: NEGATIVE
BUN SERPL-MCNC: 28 MG/DL (ref 6–23)
BUPRENORPHINE UR QL: NEGATIVE
CALCIUM SERPL-MCNC: 8.8 MG/DL (ref 8.6–10.2)
CANNABINOIDS UR QL SCN: NEGATIVE
CHLORIDE SERPL-SCNC: 103 MMOL/L (ref 98–107)
CLARITY UR: CLEAR
CO2 SERPL-SCNC: 18 MMOL/L (ref 22–29)
COCAINE UR QL SCN: NEGATIVE
COLOR UR: YELLOW
CREAT SERPL-MCNC: 1.3 MG/DL (ref 0.7–1.2)
CRITICAL NOTIFICATION DATE/TIME: NORMAL
EOSINOPHIL # BLD: 0.07 K/UL (ref 0.05–0.5)
EOSINOPHILS RELATIVE PERCENT: 1 % (ref 0–6)
ERYTHROCYTE [DISTWIDTH] IN BLOOD BY AUTOMATED COUNT: 18.6 % (ref 11.5–15)
ETHANOLAMINE SERPL-MCNC: <10 MG/DL
FENTANYL UR QL: NEGATIVE
GFR SERPL CREATININE-BSD FRML MDRD: 62 ML/MIN/1.73M2
GLUCOSE SERPL-MCNC: 90 MG/DL (ref 74–99)
GLUCOSE UR STRIP-MCNC: NEGATIVE MG/DL
HCT VFR BLD AUTO: 33.1 % (ref 37–54)
HGB BLD-MCNC: 10.3 G/DL (ref 12.5–16.5)
HGB UR QL STRIP.AUTO: NEGATIVE
IMM GRANULOCYTES # BLD AUTO: 0.04 K/UL (ref 0–0.58)
IMM GRANULOCYTES NFR BLD: 1 % (ref 0–5)
KETONES UR STRIP-MCNC: NEGATIVE MG/DL
LEUKOCYTE ESTERASE UR QL STRIP: NEGATIVE
LYMPHOCYTES NFR BLD: 0.87 K/UL (ref 1.5–4)
LYMPHOCYTES RELATIVE PERCENT: 12 % (ref 20–42)
MAGNESIUM SERPL-MCNC: 1.8 MG/DL (ref 1.6–2.6)
MCH RBC QN AUTO: 25.4 PG (ref 26–35)
MCHC RBC AUTO-ENTMCNC: 31.1 G/DL (ref 32–34.5)
MCV RBC AUTO: 81.7 FL (ref 80–99.9)
METHADONE UR QL: NEGATIVE
MONOCYTES NFR BLD: 0.48 K/UL (ref 0.1–0.95)
MONOCYTES NFR BLD: 7 % (ref 2–12)
NEGATIVE BASE EXCESS, ART: 5.6 MMOL/L
NEUTROPHILS NFR BLD: 79 % (ref 43–80)
NEUTS SEG NFR BLD: 5.82 K/UL (ref 1.8–7.3)
NITRITE UR QL STRIP: NEGATIVE
OPIATES UR QL SCN: NEGATIVE
OXYCODONE UR QL SCN: NEGATIVE
PCP UR QL SCN: NEGATIVE
PH UR STRIP: 6 [PH] (ref 5–9)
PLATELET # BLD AUTO: 177 K/UL (ref 130–450)
PMV BLD AUTO: 10.1 FL (ref 7–12)
POC HCO3: 22.1 MMOL/L (ref 22–26)
POC O2 SATURATION: 95 % (ref 92–98.5)
POC PCO2: 52 MM HG (ref 35–45)
POC PH: 7.24 (ref 7.35–7.45)
POC PO2: 90.1 MM HG (ref 60–80)
POTASSIUM SERPL-SCNC: 5.3 MMOL/L (ref 3.5–5)
PROT SERPL-MCNC: 6.9 G/DL (ref 6.4–8.3)
PROT UR STRIP-MCNC: NEGATIVE MG/DL
RBC # BLD AUTO: 4.05 M/UL (ref 3.8–5.8)
RBC #/AREA URNS HPF: ABNORMAL /HPF
SALICYLATES SERPL-MCNC: <0.3 MG/DL (ref 0–30)
SAMPLE SITE: ABNORMAL
SODIUM SERPL-SCNC: 134 MMOL/L (ref 132–146)
SP GR UR STRIP: 1.01 (ref 1–1.03)
T4 SERPL-MCNC: 6.9 UG/DL (ref 4.5–11.7)
TEST INFORMATION: NORMAL
TOXIC TRICYCLIC SC,BLOOD: NEGATIVE
TROPONIN I SERPL HS-MCNC: 22 NG/L (ref 0–11)
TROPONIN I SERPL HS-MCNC: 23 NG/L (ref 0–11)
TSH SERPL DL<=0.05 MIU/L-ACNC: 3.3 UIU/ML (ref 0.27–4.2)
UROBILINOGEN UR STRIP-ACNC: 2 EU/DL (ref 0–1)
WBC #/AREA URNS HPF: ABNORMAL /HPF
WBC OTHER # BLD: 7.3 K/UL (ref 4.5–11.5)

## 2024-04-13 PROCEDURE — 96372 THER/PROPH/DIAG INJ SC/IM: CPT

## 2024-04-13 PROCEDURE — 84436 ASSAY OF TOTAL THYROXINE: CPT

## 2024-04-13 PROCEDURE — 2700000000 HC OXYGEN THERAPY PER DAY

## 2024-04-13 PROCEDURE — 2580000003 HC RX 258: Performed by: INTERNAL MEDICINE

## 2024-04-13 PROCEDURE — 85025 COMPLETE CBC W/AUTO DIFF WBC: CPT

## 2024-04-13 PROCEDURE — 36600 WITHDRAWAL OF ARTERIAL BLOOD: CPT

## 2024-04-13 PROCEDURE — 94660 CPAP INITIATION&MGMT: CPT

## 2024-04-13 PROCEDURE — 71045 X-RAY EXAM CHEST 1 VIEW: CPT

## 2024-04-13 PROCEDURE — 70450 CT HEAD/BRAIN W/O DYE: CPT

## 2024-04-13 PROCEDURE — 80179 DRUG ASSAY SALICYLATE: CPT

## 2024-04-13 PROCEDURE — G0480 DRUG TEST DEF 1-7 CLASSES: HCPCS

## 2024-04-13 PROCEDURE — 94640 AIRWAY INHALATION TREATMENT: CPT

## 2024-04-13 PROCEDURE — 6360000002 HC RX W HCPCS: Performed by: INTERNAL MEDICINE

## 2024-04-13 PROCEDURE — 99285 EMERGENCY DEPT VISIT HI MDM: CPT

## 2024-04-13 PROCEDURE — 2580000003 HC RX 258

## 2024-04-13 PROCEDURE — 36415 COLL VENOUS BLD VENIPUNCTURE: CPT

## 2024-04-13 PROCEDURE — 6360000002 HC RX W HCPCS

## 2024-04-13 PROCEDURE — 82803 BLOOD GASES ANY COMBINATION: CPT

## 2024-04-13 PROCEDURE — 82140 ASSAY OF AMMONIA: CPT

## 2024-04-13 PROCEDURE — 1200000000 HC SEMI PRIVATE

## 2024-04-13 PROCEDURE — 99223 1ST HOSP IP/OBS HIGH 75: CPT | Performed by: INTERNAL MEDICINE

## 2024-04-13 PROCEDURE — 80307 DRUG TEST PRSMV CHEM ANLYZR: CPT

## 2024-04-13 PROCEDURE — 84443 ASSAY THYROID STIM HORMONE: CPT

## 2024-04-13 PROCEDURE — 6370000000 HC RX 637 (ALT 250 FOR IP)

## 2024-04-13 PROCEDURE — 93005 ELECTROCARDIOGRAM TRACING: CPT

## 2024-04-13 PROCEDURE — 80053 COMPREHEN METABOLIC PANEL: CPT

## 2024-04-13 PROCEDURE — 84484 ASSAY OF TROPONIN QUANT: CPT

## 2024-04-13 PROCEDURE — 94761 N-INVAS EAR/PLS OXIMETRY MLT: CPT

## 2024-04-13 PROCEDURE — 81001 URINALYSIS AUTO W/SCOPE: CPT

## 2024-04-13 PROCEDURE — 2580000003 HC RX 258: Performed by: EMERGENCY MEDICINE

## 2024-04-13 PROCEDURE — 80143 DRUG ASSAY ACETAMINOPHEN: CPT

## 2024-04-13 PROCEDURE — 83735 ASSAY OF MAGNESIUM: CPT

## 2024-04-13 RX ORDER — PHENOBARBITAL 32.4 MG/1
32.4 TABLET ORAL 2 TIMES DAILY
Status: DISCONTINUED | OUTPATIENT
Start: 2024-04-14 | End: 2024-04-13

## 2024-04-13 RX ORDER — GABAPENTIN 300 MG/1
600 CAPSULE ORAL 3 TIMES DAILY
Status: DISCONTINUED | OUTPATIENT
Start: 2024-04-13 | End: 2024-04-17 | Stop reason: HOSPADM

## 2024-04-13 RX ORDER — ONDANSETRON 4 MG/1
4 TABLET, ORALLY DISINTEGRATING ORAL EVERY 8 HOURS PRN
Status: DISCONTINUED | OUTPATIENT
Start: 2024-04-13 | End: 2024-04-17 | Stop reason: HOSPADM

## 2024-04-13 RX ORDER — POTASSIUM CHLORIDE 7.45 MG/ML
10 INJECTION INTRAVENOUS PRN
Status: DISCONTINUED | OUTPATIENT
Start: 2024-04-13 | End: 2024-04-17 | Stop reason: HOSPADM

## 2024-04-13 RX ORDER — SODIUM CHLORIDE, SODIUM LACTATE, POTASSIUM CHLORIDE, CALCIUM CHLORIDE 600; 310; 30; 20 MG/100ML; MG/100ML; MG/100ML; MG/100ML
INJECTION, SOLUTION INTRAVENOUS
Status: COMPLETED
Start: 2024-04-13 | End: 2024-04-13

## 2024-04-13 RX ORDER — ROSUVASTATIN CALCIUM 10 MG/1
40 TABLET, COATED ORAL EVERY EVENING
Status: DISCONTINUED | OUTPATIENT
Start: 2024-04-13 | End: 2024-04-17 | Stop reason: HOSPADM

## 2024-04-13 RX ORDER — ALBUTEROL SULFATE 2.5 MG/3ML
2.5 SOLUTION RESPIRATORY (INHALATION) 3 TIMES DAILY PRN
Status: DISCONTINUED | OUTPATIENT
Start: 2024-04-13 | End: 2024-04-17 | Stop reason: HOSPADM

## 2024-04-13 RX ORDER — LANOLIN ALCOHOL/MO/W.PET/CERES
100 CREAM (GRAM) TOPICAL ONCE
Status: DISCONTINUED | OUTPATIENT
Start: 2024-04-13 | End: 2024-04-13 | Stop reason: SDUPTHER

## 2024-04-13 RX ORDER — PHENOBARBITAL 32.4 MG/1
32.4 TABLET ORAL EVERY 6 HOURS PRN
Status: DISCONTINUED | OUTPATIENT
Start: 2024-04-13 | End: 2024-04-13

## 2024-04-13 RX ORDER — PHENOBARBITAL 32.4 MG/1
16.2 TABLET ORAL 2 TIMES DAILY
Status: DISCONTINUED | OUTPATIENT
Start: 2024-04-15 | End: 2024-04-13

## 2024-04-13 RX ORDER — ROSUVASTATIN CALCIUM 10 MG/1
40 TABLET, COATED ORAL EVERY EVENING
Status: DISCONTINUED | OUTPATIENT
Start: 2024-04-13 | End: 2024-04-13

## 2024-04-13 RX ORDER — PHENOBARBITAL SODIUM 65 MG/ML
32.5 INJECTION, SOLUTION INTRAMUSCULAR; INTRAVENOUS 4 TIMES DAILY
Status: COMPLETED | OUTPATIENT
Start: 2024-04-13 | End: 2024-04-14

## 2024-04-13 RX ORDER — ONDANSETRON 2 MG/ML
4 INJECTION INTRAMUSCULAR; INTRAVENOUS EVERY 6 HOURS PRN
Status: DISCONTINUED | OUTPATIENT
Start: 2024-04-13 | End: 2024-04-17 | Stop reason: HOSPADM

## 2024-04-13 RX ORDER — PHENOBARBITAL SODIUM 65 MG/ML
32.5 INJECTION, SOLUTION INTRAMUSCULAR; INTRAVENOUS EVERY 6 HOURS PRN
Status: DISPENSED | OUTPATIENT
Start: 2024-04-13 | End: 2024-04-15

## 2024-04-13 RX ORDER — THIAMINE HYDROCHLORIDE 100 MG/ML
250 INJECTION, SOLUTION INTRAMUSCULAR; INTRAVENOUS DAILY
Status: DISCONTINUED | OUTPATIENT
Start: 2024-04-16 | End: 2024-04-13 | Stop reason: CLARIF

## 2024-04-13 RX ORDER — PANTOPRAZOLE SODIUM 40 MG/1
40 TABLET, DELAYED RELEASE ORAL
Status: DISCONTINUED | OUTPATIENT
Start: 2024-04-14 | End: 2024-04-17 | Stop reason: HOSPADM

## 2024-04-13 RX ORDER — ACETAMINOPHEN 650 MG/1
650 SUPPOSITORY RECTAL EVERY 6 HOURS PRN
Status: DISCONTINUED | OUTPATIENT
Start: 2024-04-13 | End: 2024-04-17 | Stop reason: HOSPADM

## 2024-04-13 RX ORDER — PHENOBARBITAL SODIUM 65 MG/ML
16.2 INJECTION, SOLUTION INTRAMUSCULAR; INTRAVENOUS EVERY 6 HOURS PRN
Status: DISCONTINUED | OUTPATIENT
Start: 2024-04-15 | End: 2024-04-17 | Stop reason: HOSPADM

## 2024-04-13 RX ORDER — LORAZEPAM 1 MG/1
1 TABLET ORAL ONCE
Status: COMPLETED | OUTPATIENT
Start: 2024-04-13 | End: 2024-04-13

## 2024-04-13 RX ORDER — ACETAMINOPHEN 325 MG/1
650 TABLET ORAL EVERY 6 HOURS PRN
Status: DISCONTINUED | OUTPATIENT
Start: 2024-04-13 | End: 2024-04-17 | Stop reason: HOSPADM

## 2024-04-13 RX ORDER — PHENOBARBITAL SODIUM 65 MG/ML
32.5 INJECTION, SOLUTION INTRAMUSCULAR; INTRAVENOUS 2 TIMES DAILY
Status: COMPLETED | OUTPATIENT
Start: 2024-04-14 | End: 2024-04-15

## 2024-04-13 RX ORDER — MAGNESIUM SULFATE IN WATER 40 MG/ML
2000 INJECTION, SOLUTION INTRAVENOUS PRN
Status: DISCONTINUED | OUTPATIENT
Start: 2024-04-13 | End: 2024-04-17 | Stop reason: HOSPADM

## 2024-04-13 RX ORDER — SODIUM CHLORIDE 0.9 % (FLUSH) 0.9 %
10 SYRINGE (ML) INJECTION PRN
Status: DISCONTINUED | OUTPATIENT
Start: 2024-04-13 | End: 2024-04-17 | Stop reason: HOSPADM

## 2024-04-13 RX ORDER — SODIUM CHLORIDE, SODIUM LACTATE, POTASSIUM CHLORIDE, CALCIUM CHLORIDE 600; 310; 30; 20 MG/100ML; MG/100ML; MG/100ML; MG/100ML
INJECTION, SOLUTION INTRAVENOUS CONTINUOUS
Status: DISCONTINUED | OUTPATIENT
Start: 2024-04-13 | End: 2024-04-15

## 2024-04-13 RX ORDER — METOPROLOL SUCCINATE 50 MG/1
50 TABLET, EXTENDED RELEASE ORAL 2 TIMES DAILY
Status: DISCONTINUED | OUTPATIENT
Start: 2024-04-13 | End: 2024-04-17 | Stop reason: HOSPADM

## 2024-04-13 RX ORDER — FOLIC ACID 1 MG/1
1 TABLET ORAL DAILY
Status: DISCONTINUED | OUTPATIENT
Start: 2024-04-13 | End: 2024-04-17 | Stop reason: HOSPADM

## 2024-04-13 RX ORDER — 0.9 % SODIUM CHLORIDE 0.9 %
1000 INTRAVENOUS SOLUTION INTRAVENOUS ONCE
Status: COMPLETED | OUTPATIENT
Start: 2024-04-13 | End: 2024-04-13

## 2024-04-13 RX ORDER — METOPROLOL TARTRATE 1 MG/ML
5 INJECTION, SOLUTION INTRAVENOUS EVERY 6 HOURS PRN
Status: DISCONTINUED | OUTPATIENT
Start: 2024-04-13 | End: 2024-04-17 | Stop reason: HOSPADM

## 2024-04-13 RX ORDER — FENOFIBRATE 54 MG/1
54 TABLET ORAL DAILY
Status: DISCONTINUED | OUTPATIENT
Start: 2024-04-14 | End: 2024-04-17 | Stop reason: HOSPADM

## 2024-04-13 RX ORDER — PHENOBARBITAL 32.4 MG/1
32.4 TABLET ORAL 4 TIMES DAILY
Status: DISCONTINUED | OUTPATIENT
Start: 2024-04-13 | End: 2024-04-13

## 2024-04-13 RX ORDER — GAUZE BANDAGE 2" X 2"
100 BANDAGE TOPICAL ONCE
Status: COMPLETED | OUTPATIENT
Start: 2024-04-13 | End: 2024-04-13

## 2024-04-13 RX ORDER — ZIPRASIDONE MESYLATE 20 MG/ML
20 INJECTION, POWDER, LYOPHILIZED, FOR SOLUTION INTRAMUSCULAR ONCE
Status: COMPLETED | OUTPATIENT
Start: 2024-04-13 | End: 2024-04-13

## 2024-04-13 RX ORDER — PHENOBARBITAL SODIUM 65 MG/ML
16.2 INJECTION, SOLUTION INTRAMUSCULAR; INTRAVENOUS 2 TIMES DAILY
Status: COMPLETED | OUTPATIENT
Start: 2024-04-15 | End: 2024-04-16

## 2024-04-13 RX ORDER — BUSPIRONE HYDROCHLORIDE 10 MG/1
20 TABLET ORAL 2 TIMES DAILY
Status: DISCONTINUED | OUTPATIENT
Start: 2024-04-13 | End: 2024-04-17 | Stop reason: HOSPADM

## 2024-04-13 RX ORDER — POLYETHYLENE GLYCOL 3350 17 G/17G
17 POWDER, FOR SOLUTION ORAL DAILY PRN
Status: DISCONTINUED | OUTPATIENT
Start: 2024-04-13 | End: 2024-04-17 | Stop reason: HOSPADM

## 2024-04-13 RX ORDER — FOLIC ACID 1 MG/1
1 TABLET ORAL ONCE
Status: COMPLETED | OUTPATIENT
Start: 2024-04-13 | End: 2024-04-13

## 2024-04-13 RX ORDER — LORAZEPAM 1 MG/1
2 TABLET ORAL ONCE
Status: DISCONTINUED | OUTPATIENT
Start: 2024-04-13 | End: 2024-04-13

## 2024-04-13 RX ORDER — SODIUM CHLORIDE 9 MG/ML
INJECTION, SOLUTION INTRAVENOUS PRN
Status: DISCONTINUED | OUTPATIENT
Start: 2024-04-13 | End: 2024-04-17 | Stop reason: HOSPADM

## 2024-04-13 RX ORDER — M-VIT,TX,IRON,MINS/CALC/FOLIC 27MG-0.4MG
1 TABLET ORAL DAILY
Status: DISCONTINUED | OUTPATIENT
Start: 2024-04-13 | End: 2024-04-17 | Stop reason: HOSPADM

## 2024-04-13 RX ORDER — GAUZE BANDAGE 2" X 2"
100 BANDAGE TOPICAL DAILY
Status: DISCONTINUED | OUTPATIENT
Start: 2024-04-13 | End: 2024-04-17 | Stop reason: HOSPADM

## 2024-04-13 RX ORDER — AMLODIPINE BESYLATE 5 MG/1
5 TABLET ORAL DAILY
Status: DISCONTINUED | OUTPATIENT
Start: 2024-04-13 | End: 2024-04-17 | Stop reason: HOSPADM

## 2024-04-13 RX ORDER — MAGNESIUM HYDROXIDE/ALUMINUM HYDROXICE/SIMETHICONE 120; 1200; 1200 MG/30ML; MG/30ML; MG/30ML
30 SUSPENSION ORAL EVERY 6 HOURS PRN
Status: DISCONTINUED | OUTPATIENT
Start: 2024-04-13 | End: 2024-04-17 | Stop reason: HOSPADM

## 2024-04-13 RX ORDER — POTASSIUM CHLORIDE 20 MEQ/1
40 TABLET, EXTENDED RELEASE ORAL PRN
Status: DISCONTINUED | OUTPATIENT
Start: 2024-04-13 | End: 2024-04-17 | Stop reason: HOSPADM

## 2024-04-13 RX ORDER — PHENOBARBITAL 32.4 MG/1
16.2 TABLET ORAL EVERY 6 HOURS PRN
Status: DISCONTINUED | OUTPATIENT
Start: 2024-04-15 | End: 2024-04-13

## 2024-04-13 RX ORDER — WATER 10 ML/10ML
INJECTION INTRAMUSCULAR; INTRAVENOUS; SUBCUTANEOUS
Status: DISPENSED
Start: 2024-04-13 | End: 2024-04-14

## 2024-04-13 RX ORDER — SODIUM CHLORIDE 0.9 % (FLUSH) 0.9 %
5-40 SYRINGE (ML) INJECTION EVERY 12 HOURS SCHEDULED
Status: DISCONTINUED | OUTPATIENT
Start: 2024-04-13 | End: 2024-04-17 | Stop reason: HOSPADM

## 2024-04-13 RX ADMIN — SODIUM CHLORIDE 1000 ML: 9 INJECTION, SOLUTION INTRAVENOUS at 15:34

## 2024-04-13 RX ADMIN — SODIUM CHLORIDE, POTASSIUM CHLORIDE, SODIUM LACTATE AND CALCIUM CHLORIDE: 600; 310; 30; 20 INJECTION, SOLUTION INTRAVENOUS at 18:13

## 2024-04-13 RX ADMIN — ZIPRASIDONE MESYLATE 20 MG: 20 INJECTION, POWDER, LYOPHILIZED, FOR SOLUTION INTRAMUSCULAR at 13:09

## 2024-04-13 RX ADMIN — LORAZEPAM 1 MG: 1 TABLET ORAL at 13:09

## 2024-04-13 RX ADMIN — Medication 100 MG: at 11:44

## 2024-04-13 RX ADMIN — SODIUM CHLORIDE 1000 ML: 9 INJECTION, SOLUTION INTRAVENOUS at 09:54

## 2024-04-13 RX ADMIN — FOLIC ACID 1 MG: 1 TABLET ORAL at 11:44

## 2024-04-13 RX ADMIN — PHENOBARBITAL SODIUM 32.5 MG: 65 INJECTION, SOLUTION INTRAMUSCULAR; INTRAVENOUS at 20:21

## 2024-04-13 RX ADMIN — SODIUM CHLORIDE, POTASSIUM CHLORIDE, SODIUM LACTATE AND CALCIUM CHLORIDE: 600; 310; 30; 20 INJECTION, SOLUTION INTRAVENOUS at 20:25

## 2024-04-13 RX ADMIN — ALBUTEROL SULFATE 2.5 MG: 2.5 SOLUTION RESPIRATORY (INHALATION) at 19:08

## 2024-04-13 RX ADMIN — LORAZEPAM 1 MG: 1 TABLET ORAL at 09:51

## 2024-04-13 RX ADMIN — THIAMINE HYDROCHLORIDE 500 MG: 100 INJECTION, SOLUTION INTRAMUSCULAR; INTRAVENOUS at 16:00

## 2024-04-13 RX ADMIN — PHENOBARBITAL SODIUM 32.5 MG: 65 INJECTION, SOLUTION INTRAMUSCULAR; INTRAVENOUS at 17:57

## 2024-04-13 RX ADMIN — IPRATROPIUM BROMIDE 0.5 MG: 0.5 SOLUTION RESPIRATORY (INHALATION) at 19:08

## 2024-04-13 NOTE — ED PROVIDER NOTES
Heart rate regular, lungs are clear and equal.  Abdomen soft and nontender.  Trace pretibial edema with no particular large swelling and no unilateral leg swelling or calf pain.  Arms legs are well-perfused.       [NC]   0947 EKG atrial fibrillation rate 83, incomplete right bundle branch block.  Nonspecific ST and T wave changes with some PVCs.  Otherwise agree with resident. [NC]   0954 EKG Interpretation    Interpreted by emergency department physician    Rhythm: atrial fibrillation - controlled  Rate: 83  Axis: normal  Ectopy: premature ventricular contractions (unifocal)  Conduction: right bundle branch block (incomplete)  ST Segments: nonspecific changes  T Waves: no acute change  Q Waves: none    Clinical Impression: non-specific EKG    Saroj Ely MD   [HR]   1234 Related patient, patient tremors have been increasing.  Will be given another dose of Ativan. [HR]   1332 Patient was reevaluated.  Patient was seen walking in the emergency department.  Patient is actively hallucinating.  The patient is still alert and oriented x 4.  Patient was given a shot of Geodon.  Plans for admission as patient lives by himself, he has no family nearby.  I do believe the patient is a danger to himself and others based on patient hallucinating. [HR]   1427 [facility]. [HR]      ED Course User Index  [HR] Saroj Gaona MD  [NC] Augusto Cuello, DO       Medications   sodium chloride 0.9 % bolus 1,000 mL (1,000 mLs IntraVENous New Bag 4/13/24 1534)   sterile water injection (has no administration in time range)   thiamine (B-1) 500 mg in sodium chloride 0.9 % 100 mL IVPB (500 mg IntraVENous New Bag 4/13/24 1600)   thiamine (B-1) 250 mg in sodium chloride 0.9 % 100 mL IVPB (has no administration in time range)   sodium chloride 0.9 % bolus 1,000 mL (0 mLs IntraVENous Stopped 4/13/24 1557)   LORazepam (ATIVAN) tablet

## 2024-04-13 NOTE — DISCHARGE INSTRUCTIONS
[phone]      [facility]: [address], [place] [address]  What they offer: Serves breakfast and lunch daily, 7AM - 9AM and 11 AM - 1PM, (close Sundays)  Contact: [phone]; [address], [place] [address]  [employer]: [address], [place] [address]  What they offer: Hot evening meals  Phone Number: [phone]  Website: [employer]    [employer] FAMILY SERVICE:  What they offer: Emergency food assistance  Phone number: [phone]  Website: [employer]:  What they offer: Frozen meals for private pay, government funded programs and some insurances   Phone Number: [phone]  Website: [employer]  MEALS ON WHEELS ([facility]):  What they offer: Hot dinner and cold lunch Monday-Friday ($14/day); hot dinner Monday-Friday ($9/day)  Phone number: [phone]  [employer] NOURISH CARE:  What they offer: Delivers refrigerated meals to heat. Special diets. Private pay, government funded programs and some insurance plans.  Phone Number: [phone]  Website: [employer]  MOBILE MEALS OF [place] ([facility]):  What they offer: Monday-Friday delivered hot and cold meal, $6.00/day(must live in [facility])  Phone Number: [phone]    [facility] NEIGHBORS PROGRAM:  What they offer: Delivers hot meals to homebound individuals living in the [place] and [facility]. Monday-Friday. If eligible and funds available, two frozen meals for Saturday and Sunday.  Phone Number: [phone]  [facility] SERVICES-MEALS:  What they offer: Home delivered to [place] and [facility] residents. Donation for meals.   Phone Number: [phone]    [place] Transportation Resources*  (Call 211 if need more resources.)       [employer] (CARTS):  What they offer: Public transportation for all of [facility]. Call at least 24 hours in advance to make reservation.

## 2024-04-13 NOTE — H&P
Results   Component Value Date/Time    COLORU Yellow 04/13/2024 09:55 AM    PROTEINU NEGATIVE 04/13/2024 09:55 AM    PHUR 6.0 04/13/2024 09:55 AM    WBCUA 0 TO 5 04/13/2024 09:55 AM    RBCUA 0 TO 2 04/13/2024 09:55 AM    BACTERIA RARE 11/04/2021 04:10 PM    CLARITYU Clear 01/13/2022 12:00 PM    SPECGRAV 1.010 04/13/2024 09:55 AM    LEUKOCYTESUR NEGATIVE 04/13/2024 09:55 AM    UROBILINOGEN 2.0 04/13/2024 09:55 AM    BILIRUBINUR NEGATIVE 04/13/2024 09:55 AM    BLOODU Negative 01/13/2022 12:00 PM    GLUCOSEU NEGATIVE 04/13/2024 09:55 AM    AMORPHOUS MANY 10/13/2021 05:59 AM     ABG:    Lab Results   Component Value Date/Time    PH 7.480 10/05/2023 12:35 PM    PCO2 39.7 10/05/2023 12:35 PM    PO2 53.4 10/05/2023 12:35 PM    HCO3 28.9 10/05/2023 12:35 PM    BE 5.1 10/05/2023 12:35 PM    O2SAT 87.8 10/05/2023 12:35 PM     HgBA1c:    Lab Results   Component Value Date/Time    LABA1C 5.0 01/13/2022 12:00 PM     FLP:    Lab Results   Component Value Date/Time    TRIG 140 09/25/2023 04:39 AM    HDL 22 09/25/2023 04:39 AM    LDLCALC 142 01/13/2022 12:00 PM     TSH:    Lab Results   Component Value Date/Time    TSH 3.30 04/13/2024 09:27 AM       CT HEAD WO CONTRAST   Final Result   No acute intracranial abnormality.      Signs of developing deep white matter small vessel disease prominence of the   ventricles and sulci         XR CHEST PORTABLE   Final Result   1. Moderate generalized cardiomegaly with minimal pulmonary venous   hypertension.   2. No pleural fluid or pneumonia detected.             ASSESSMENT:      Principal Problem:    Acute alcohol abuse, with delirium ([facility])  Active Problems:    Essential hypertension    Coronary artery disease involving native coronary artery of native heart without angina pectoris    Morbid obesity ([facility])    Withdrawal symptoms, alcohol, with delirium ([facility])    Persistent atrial fibrillation ([facility])    Chronic diastolic CHF (congestive heart failure) (HCC)    GELY (acute kidney injury) (HCC)

## 2024-04-14 LAB
ANION GAP SERPL CALCULATED.3IONS-SCNC: 10 MMOL/L (ref 7–16)
BASOPHILS # BLD: 0.04 K/UL (ref 0–0.2)
BASOPHILS NFR BLD: 1 % (ref 0–2)
BUN SERPL-MCNC: 20 MG/DL (ref 6–23)
CALCIUM SERPL-MCNC: 9 MG/DL (ref 8.6–10.2)
CHLORIDE SERPL-SCNC: 109 MMOL/L (ref 98–107)
CO2 SERPL-SCNC: 21 MMOL/L (ref 22–29)
CREAT SERPL-MCNC: 1.1 MG/DL (ref 0.7–1.2)
EKG ATRIAL RATE: 47 BPM
EKG Q-T INTERVAL: 382 MS
EKG QRS DURATION: 98 MS
EKG QTC CALCULATION (BAZETT): 448 MS
EKG R AXIS: -14 DEGREES
EKG T AXIS: -124 DEGREES
EKG VENTRICULAR RATE: 83 BPM
EOSINOPHIL # BLD: 0.15 K/UL (ref 0.05–0.5)
EOSINOPHILS RELATIVE PERCENT: 2 % (ref 0–6)
ERYTHROCYTE [DISTWIDTH] IN BLOOD BY AUTOMATED COUNT: 18.6 % (ref 11.5–15)
GFR SERPL CREATININE-BSD FRML MDRD: 73 ML/MIN/1.73M2
GLUCOSE SERPL-MCNC: 71 MG/DL (ref 74–99)
HCT VFR BLD AUTO: 35.5 % (ref 37–54)
HGB BLD-MCNC: 10.8 G/DL (ref 12.5–16.5)
IMM GRANULOCYTES # BLD AUTO: 0.03 K/UL (ref 0–0.58)
IMM GRANULOCYTES NFR BLD: 1 % (ref 0–5)
LYMPHOCYTES NFR BLD: 0.79 K/UL (ref 1.5–4)
LYMPHOCYTES RELATIVE PERCENT: 13 % (ref 20–42)
MAGNESIUM SERPL-MCNC: 2.1 MG/DL (ref 1.6–2.6)
MCH RBC QN AUTO: 25.7 PG (ref 26–35)
MCHC RBC AUTO-ENTMCNC: 30.4 G/DL (ref 32–34.5)
MCV RBC AUTO: 84.3 FL (ref 80–99.9)
MONOCYTES NFR BLD: 0.43 K/UL (ref 0.1–0.95)
MONOCYTES NFR BLD: 7 % (ref 2–12)
NEUTROPHILS NFR BLD: 77 % (ref 43–80)
NEUTS SEG NFR BLD: 4.71 K/UL (ref 1.8–7.3)
PHOSPHATE SERPL-MCNC: 2.6 MG/DL (ref 2.5–4.5)
PLATELET # BLD AUTO: 165 K/UL (ref 130–450)
PMV BLD AUTO: 10.5 FL (ref 7–12)
POTASSIUM SERPL-SCNC: 5.1 MMOL/L (ref 3.5–5)
RBC # BLD AUTO: 4.21 M/UL (ref 3.8–5.8)
SODIUM SERPL-SCNC: 140 MMOL/L (ref 132–146)
WBC OTHER # BLD: 6.2 K/UL (ref 4.5–11.5)

## 2024-04-14 PROCEDURE — 93010 ELECTROCARDIOGRAM REPORT: CPT | Performed by: INTERNAL MEDICINE

## 2024-04-14 PROCEDURE — 6360000002 HC RX W HCPCS: Performed by: INTERNAL MEDICINE

## 2024-04-14 PROCEDURE — 2580000003 HC RX 258: Performed by: INTERNAL MEDICINE

## 2024-04-14 PROCEDURE — 99232 SBSQ HOSP IP/OBS MODERATE 35: CPT | Performed by: INTERNAL MEDICINE

## 2024-04-14 PROCEDURE — 84100 ASSAY OF PHOSPHORUS: CPT

## 2024-04-14 PROCEDURE — 6370000000 HC RX 637 (ALT 250 FOR IP): Performed by: INTERNAL MEDICINE

## 2024-04-14 PROCEDURE — 80048 BASIC METABOLIC PNL TOTAL CA: CPT

## 2024-04-14 PROCEDURE — 36415 COLL VENOUS BLD VENIPUNCTURE: CPT

## 2024-04-14 PROCEDURE — 85025 COMPLETE CBC W/AUTO DIFF WBC: CPT

## 2024-04-14 PROCEDURE — 2700000000 HC OXYGEN THERAPY PER DAY

## 2024-04-14 PROCEDURE — 83735 ASSAY OF MAGNESIUM: CPT

## 2024-04-14 PROCEDURE — 94660 CPAP INITIATION&MGMT: CPT

## 2024-04-14 PROCEDURE — 94640 AIRWAY INHALATION TREATMENT: CPT

## 2024-04-14 PROCEDURE — 1200000000 HC SEMI PRIVATE

## 2024-04-14 RX ORDER — RISPERIDONE 1 MG/1
0.5 TABLET ORAL DAILY
Status: DISCONTINUED | OUTPATIENT
Start: 2024-04-14 | End: 2024-04-16

## 2024-04-14 RX ORDER — RISPERIDONE 0.5 MG/1
0.25 TABLET ORAL 2 TIMES DAILY PRN
Status: DISCONTINUED | OUTPATIENT
Start: 2024-04-14 | End: 2024-04-16

## 2024-04-14 RX ORDER — NICOTINE 21 MG/24HR
1 PATCH, TRANSDERMAL 24 HOURS TRANSDERMAL DAILY
Status: DISCONTINUED | OUTPATIENT
Start: 2024-04-14 | End: 2024-04-17 | Stop reason: HOSPADM

## 2024-04-14 RX ADMIN — FOLIC ACID 1 MG: 1 TABLET ORAL at 09:36

## 2024-04-14 RX ADMIN — FENOFIBRATE 54 MG: 54 TABLET, FILM COATED ORAL at 09:36

## 2024-04-14 RX ADMIN — PHENOBARBITAL SODIUM 32.5 MG: 65 INJECTION INTRAMUSCULAR; INTRAVENOUS at 02:04

## 2024-04-14 RX ADMIN — SODIUM CHLORIDE, POTASSIUM CHLORIDE, SODIUM LACTATE AND CALCIUM CHLORIDE: 600; 310; 30; 20 INJECTION, SOLUTION INTRAVENOUS at 21:17

## 2024-04-14 RX ADMIN — SERTRALINE HYDROCHLORIDE 100 MG: 50 TABLET ORAL at 09:37

## 2024-04-14 RX ADMIN — GABAPENTIN 600 MG: 300 CAPSULE ORAL at 19:36

## 2024-04-14 RX ADMIN — BUSPIRONE HYDROCHLORIDE 20 MG: 10 TABLET ORAL at 19:37

## 2024-04-14 RX ADMIN — SODIUM CHLORIDE, POTASSIUM CHLORIDE, SODIUM LACTATE AND CALCIUM CHLORIDE: 600; 310; 30; 20 INJECTION, SOLUTION INTRAVENOUS at 11:36

## 2024-04-14 RX ADMIN — PHENOBARBITAL SODIUM 32.5 MG: 65 INJECTION, SOLUTION INTRAMUSCULAR; INTRAVENOUS at 12:44

## 2024-04-14 RX ADMIN — AMLODIPINE BESYLATE 5 MG: 5 TABLET ORAL at 09:37

## 2024-04-14 RX ADMIN — GABAPENTIN 600 MG: 300 CAPSULE ORAL at 14:52

## 2024-04-14 RX ADMIN — APIXABAN 5 MG: 5 TABLET, FILM COATED ORAL at 09:37

## 2024-04-14 RX ADMIN — METOPROLOL SUCCINATE 50 MG: 50 TABLET, EXTENDED RELEASE ORAL at 09:36

## 2024-04-14 RX ADMIN — IPRATROPIUM BROMIDE 0.5 MG: 0.5 SOLUTION RESPIRATORY (INHALATION) at 12:54

## 2024-04-14 RX ADMIN — IPRATROPIUM BROMIDE 0.5 MG: 0.5 SOLUTION RESPIRATORY (INHALATION) at 09:26

## 2024-04-14 RX ADMIN — RISPERIDONE 0.5 MG: 1 TABLET ORAL at 12:44

## 2024-04-14 RX ADMIN — IPRATROPIUM BROMIDE 0.5 MG: 0.5 SOLUTION RESPIRATORY (INHALATION) at 06:18

## 2024-04-14 RX ADMIN — RISPERIDONE 0.25 MG: 0.5 TABLET ORAL at 16:18

## 2024-04-14 RX ADMIN — PHENOBARBITAL SODIUM 32.5 MG: 65 INJECTION, SOLUTION INTRAMUSCULAR; INTRAVENOUS at 09:38

## 2024-04-14 RX ADMIN — Medication 1 TABLET: at 09:37

## 2024-04-14 RX ADMIN — GABAPENTIN 600 MG: 300 CAPSULE ORAL at 09:36

## 2024-04-14 RX ADMIN — SODIUM CHLORIDE, PRESERVATIVE FREE 10 ML: 5 INJECTION INTRAVENOUS at 09:47

## 2024-04-14 RX ADMIN — APIXABAN 5 MG: 5 TABLET, FILM COATED ORAL at 19:37

## 2024-04-14 RX ADMIN — THIAMINE HYDROCHLORIDE 500 MG: 100 INJECTION, SOLUTION INTRAMUSCULAR; INTRAVENOUS at 09:50

## 2024-04-14 RX ADMIN — Medication 100 MG: at 09:38

## 2024-04-14 RX ADMIN — BUSPIRONE HYDROCHLORIDE 20 MG: 10 TABLET ORAL at 09:37

## 2024-04-14 RX ADMIN — PHENOBARBITAL SODIUM 32.5 MG: 65 INJECTION INTRAMUSCULAR at 19:46

## 2024-04-14 RX ADMIN — METOPROLOL SUCCINATE 50 MG: 50 TABLET, EXTENDED RELEASE ORAL at 19:43

## 2024-04-14 RX ADMIN — IPRATROPIUM BROMIDE 0.5 MG: 0.5 SOLUTION RESPIRATORY (INHALATION) at 18:38

## 2024-04-14 RX ADMIN — ACETAMINOPHEN 650 MG: 325 TABLET ORAL at 14:52

## 2024-04-14 RX ADMIN — ROSUVASTATIN CALCIUM 40 MG: 10 TABLET, FILM COATED ORAL at 19:36

## 2024-04-14 ASSESSMENT — PAIN DESCRIPTION - LOCATION: LOCATION: BACK

## 2024-04-14 ASSESSMENT — PAIN SCALES - GENERAL: PAINLEVEL_OUTOF10: 5

## 2024-04-15 LAB
ANION GAP SERPL CALCULATED.3IONS-SCNC: 9 MMOL/L (ref 7–16)
BASOPHILS # BLD: 0.04 K/UL (ref 0–0.2)
BASOPHILS NFR BLD: 1 % (ref 0–2)
BUN SERPL-MCNC: 17 MG/DL (ref 6–23)
CALCIUM SERPL-MCNC: 8.7 MG/DL (ref 8.6–10.2)
CHLORIDE SERPL-SCNC: 105 MMOL/L (ref 98–107)
CO2 SERPL-SCNC: 22 MMOL/L (ref 22–29)
CREAT SERPL-MCNC: 1.1 MG/DL (ref 0.7–1.2)
EOSINOPHIL # BLD: 0.12 K/UL (ref 0.05–0.5)
EOSINOPHILS RELATIVE PERCENT: 2 % (ref 0–6)
ERYTHROCYTE [DISTWIDTH] IN BLOOD BY AUTOMATED COUNT: 18.5 % (ref 11.5–15)
GFR SERPL CREATININE-BSD FRML MDRD: 76 ML/MIN/1.73M2
GLUCOSE SERPL-MCNC: 96 MG/DL (ref 74–99)
HCT VFR BLD AUTO: 32.4 % (ref 37–54)
HGB BLD-MCNC: 10.1 G/DL (ref 12.5–16.5)
IMM GRANULOCYTES # BLD AUTO: <0.03 K/UL (ref 0–0.58)
IMM GRANULOCYTES NFR BLD: 0 % (ref 0–5)
LYMPHOCYTES NFR BLD: 0.86 K/UL (ref 1.5–4)
LYMPHOCYTES RELATIVE PERCENT: 16 % (ref 20–42)
MCH RBC QN AUTO: 26 PG (ref 26–35)
MCHC RBC AUTO-ENTMCNC: 31.2 G/DL (ref 32–34.5)
MCV RBC AUTO: 83.5 FL (ref 80–99.9)
MONOCYTES NFR BLD: 0.48 K/UL (ref 0.1–0.95)
MONOCYTES NFR BLD: 9 % (ref 2–12)
NEUTROPHILS NFR BLD: 72 % (ref 43–80)
NEUTS SEG NFR BLD: 3.93 K/UL (ref 1.8–7.3)
PLATELET # BLD AUTO: 156 K/UL (ref 130–450)
PMV BLD AUTO: 9.6 FL (ref 7–12)
POTASSIUM SERPL-SCNC: 4.5 MMOL/L (ref 3.5–5)
RBC # BLD AUTO: 3.88 M/UL (ref 3.8–5.8)
SODIUM SERPL-SCNC: 136 MMOL/L (ref 132–146)
WBC OTHER # BLD: 5.4 K/UL (ref 4.5–11.5)

## 2024-04-15 PROCEDURE — 6370000000 HC RX 637 (ALT 250 FOR IP): Performed by: INTERNAL MEDICINE

## 2024-04-15 PROCEDURE — 94640 AIRWAY INHALATION TREATMENT: CPT

## 2024-04-15 PROCEDURE — 97161 PT EVAL LOW COMPLEX 20 MIN: CPT | Performed by: PHYSICAL THERAPIST

## 2024-04-15 PROCEDURE — 6360000002 HC RX W HCPCS: Performed by: INTERNAL MEDICINE

## 2024-04-15 PROCEDURE — 99232 SBSQ HOSP IP/OBS MODERATE 35: CPT | Performed by: INTERNAL MEDICINE

## 2024-04-15 PROCEDURE — 2700000000 HC OXYGEN THERAPY PER DAY

## 2024-04-15 PROCEDURE — 94660 CPAP INITIATION&MGMT: CPT

## 2024-04-15 PROCEDURE — 80048 BASIC METABOLIC PNL TOTAL CA: CPT

## 2024-04-15 PROCEDURE — 97530 THERAPEUTIC ACTIVITIES: CPT | Performed by: PHYSICAL THERAPIST

## 2024-04-15 PROCEDURE — 85025 COMPLETE CBC W/AUTO DIFF WBC: CPT

## 2024-04-15 PROCEDURE — 36415 COLL VENOUS BLD VENIPUNCTURE: CPT

## 2024-04-15 PROCEDURE — 2580000003 HC RX 258: Performed by: INTERNAL MEDICINE

## 2024-04-15 PROCEDURE — 1200000000 HC SEMI PRIVATE

## 2024-04-15 RX ORDER — LISINOPRIL 20 MG/1
20 TABLET ORAL DAILY
Status: ON HOLD | COMMUNITY
End: 2024-04-17 | Stop reason: HOSPADM

## 2024-04-15 RX ADMIN — ALBUTEROL SULFATE 2.5 MG: 2.5 SOLUTION RESPIRATORY (INHALATION) at 09:39

## 2024-04-15 RX ADMIN — RISPERIDONE 0.5 MG: 1 TABLET ORAL at 08:19

## 2024-04-15 RX ADMIN — ALBUTEROL SULFATE 2.5 MG: 2.5 SOLUTION RESPIRATORY (INHALATION) at 06:22

## 2024-04-15 RX ADMIN — APIXABAN 5 MG: 5 TABLET, FILM COATED ORAL at 22:28

## 2024-04-15 RX ADMIN — SERTRALINE HYDROCHLORIDE 100 MG: 50 TABLET ORAL at 08:20

## 2024-04-15 RX ADMIN — IPRATROPIUM BROMIDE 0.5 MG: 0.5 SOLUTION RESPIRATORY (INHALATION) at 13:57

## 2024-04-15 RX ADMIN — BUSPIRONE HYDROCHLORIDE 20 MG: 10 TABLET ORAL at 22:28

## 2024-04-15 RX ADMIN — METOPROLOL SUCCINATE 50 MG: 50 TABLET, EXTENDED RELEASE ORAL at 08:19

## 2024-04-15 RX ADMIN — APIXABAN 5 MG: 5 TABLET, FILM COATED ORAL at 08:17

## 2024-04-15 RX ADMIN — AMLODIPINE BESYLATE 5 MG: 5 TABLET ORAL at 08:17

## 2024-04-15 RX ADMIN — FOLIC ACID 1 MG: 1 TABLET ORAL at 08:18

## 2024-04-15 RX ADMIN — Medication 1 TABLET: at 08:20

## 2024-04-15 RX ADMIN — SODIUM CHLORIDE, PRESERVATIVE FREE 10 ML: 5 INJECTION INTRAVENOUS at 22:32

## 2024-04-15 RX ADMIN — BUSPIRONE HYDROCHLORIDE 20 MG: 10 TABLET ORAL at 08:18

## 2024-04-15 RX ADMIN — Medication 100 MG: at 08:20

## 2024-04-15 RX ADMIN — PHENOBARBITAL SODIUM 16.2 MG: 65 INJECTION INTRAMUSCULAR; INTRAVENOUS at 22:30

## 2024-04-15 RX ADMIN — PHENOBARBITAL SODIUM 32.5 MG: 65 INJECTION INTRAMUSCULAR at 08:35

## 2024-04-15 RX ADMIN — METOPROLOL SUCCINATE 50 MG: 50 TABLET, EXTENDED RELEASE ORAL at 22:28

## 2024-04-15 RX ADMIN — ROSUVASTATIN CALCIUM 40 MG: 10 TABLET, FILM COATED ORAL at 22:28

## 2024-04-15 RX ADMIN — GABAPENTIN 600 MG: 300 CAPSULE ORAL at 22:27

## 2024-04-15 RX ADMIN — GABAPENTIN 600 MG: 300 CAPSULE ORAL at 08:18

## 2024-04-15 RX ADMIN — IPRATROPIUM BROMIDE 0.5 MG: 0.5 SOLUTION RESPIRATORY (INHALATION) at 09:39

## 2024-04-15 RX ADMIN — IPRATROPIUM BROMIDE 0.5 MG: 0.5 SOLUTION RESPIRATORY (INHALATION) at 18:38

## 2024-04-15 RX ADMIN — SODIUM CHLORIDE, POTASSIUM CHLORIDE, SODIUM LACTATE AND CALCIUM CHLORIDE: 600; 310; 30; 20 INJECTION, SOLUTION INTRAVENOUS at 06:27

## 2024-04-15 RX ADMIN — FENOFIBRATE 54 MG: 54 TABLET, FILM COATED ORAL at 08:18

## 2024-04-15 RX ADMIN — GABAPENTIN 600 MG: 300 CAPSULE ORAL at 14:15

## 2024-04-15 RX ADMIN — IPRATROPIUM BROMIDE 0.5 MG: 0.5 SOLUTION RESPIRATORY (INHALATION) at 06:24

## 2024-04-15 RX ADMIN — SODIUM CHLORIDE, PRESERVATIVE FREE 10 ML: 5 INJECTION INTRAVENOUS at 12:53

## 2024-04-15 RX ADMIN — ACETAMINOPHEN 650 MG: 325 TABLET ORAL at 22:28

## 2024-04-15 RX ADMIN — THIAMINE HYDROCHLORIDE 500 MG: 100 INJECTION, SOLUTION INTRAMUSCULAR; INTRAVENOUS at 12:54

## 2024-04-15 RX ADMIN — PANTOPRAZOLE SODIUM 40 MG: 40 TABLET, DELAYED RELEASE ORAL at 06:27

## 2024-04-15 ASSESSMENT — PAIN DESCRIPTION - LOCATION
LOCATION: BACK;NECK;KNEE
LOCATION: BACK

## 2024-04-15 ASSESSMENT — PAIN SCALES - GENERAL
PAINLEVEL_OUTOF10: 7
PAINLEVEL_OUTOF10: 8

## 2024-04-15 ASSESSMENT — PAIN DESCRIPTION - ORIENTATION: ORIENTATION: RIGHT

## 2024-04-15 ASSESSMENT — PAIN DESCRIPTION - DESCRIPTORS: DESCRIPTORS: THROBBING;DISCOMFORT

## 2024-04-15 NOTE — CARE COORDINATION
Case Management Assessment  Initial Evaluation    Date/Time of Evaluation: 4/15/2024 2:59 PM  Assessment Completed by: CLARY Davis    If patient is discharged prior to next notation, then this note serves as note for discharge by case management.    Patient Name: Dom Pérez                   YOB: 1955  Diagnosis: Hallucinations [R44.3]  Dehydration [E86.0]  Acute drug withdrawal syndrome with complication (HCC) [F19.939]                   Date / Time: 4/13/2024  8:29 AM    Patient Admission Status: Inpatient   Readmission Risk (Low < 19, Mod (19-27), High > 27): Readmission Risk Score: 14.9    Current PCP: Oscar Knutson APRN - NP  PCP verified by CM? Yes    Chart Reviewed: Yes      History Provided by: Patient, Medical Record  Patient Orientation: Alert and Oriented, Person, Place, Situation, Self    Patient Cognition: Alert    Hospitalization in the last 30 days (Readmission):  No    If yes, Readmission Assessment in CM Navigator will be completed.    Advance Directives:      Code Status: Limited   Patient's Primary Decision Maker is: Legal Next of Kin    Primary Decision Maker: Barbie Christopher - Child - [phone]    Discharge Planning:    Patient lives with:   Type of Home:    Primary Care Giver: Self  Patient Support Systems include: Children, Friends/Neighbors   Current Financial resources:    Current community resources:    Current services prior to admission:              Current DME:              Type of Home Care services:       ADLS  Prior functional level: Independent in ADLs/IADLs  Current functional level: Independent in ADLs/IADLs    PT AM-PAC: 18 /24  OT AM-PAC:   /24    Family can provide assistance at DC: No  Would you like Case Management to discuss the discharge plan with any other family members/significant others, and if so, who? No  Plans to Return to Present Housing: Yes  Other Identified Issues/Barriers to RETURNING to current housing: none  Potential

## 2024-04-15 NOTE — ACP (ADVANCE CARE PLANNING)
Advance Care Planning   Healthcare Decision Maker:    Primary Decision Maker: Barbie Christopher-POA - Child - [phone]    Click here to complete Healthcare Decision Makers including selection of the Healthcare Decision Maker Relationship (ie \"Primary\").  Today we documented Decision Maker(s) consistent with ACP documents on file.   Electronically signed by CLARY Davis on 4/15/2024 at 3:13 PM

## 2024-04-16 LAB
ANION GAP SERPL CALCULATED.3IONS-SCNC: 9 MMOL/L (ref 7–16)
BASOPHILS # BLD: 0.04 K/UL (ref 0–0.2)
BASOPHILS NFR BLD: 1 % (ref 0–2)
BUN SERPL-MCNC: 14 MG/DL (ref 6–23)
CALCIUM SERPL-MCNC: 9 MG/DL (ref 8.6–10.2)
CHLORIDE SERPL-SCNC: 105 MMOL/L (ref 98–107)
CO2 SERPL-SCNC: 23 MMOL/L (ref 22–29)
CREAT SERPL-MCNC: 1.1 MG/DL (ref 0.7–1.2)
EOSINOPHIL # BLD: 0.14 K/UL (ref 0.05–0.5)
EOSINOPHILS RELATIVE PERCENT: 2 % (ref 0–6)
ERYTHROCYTE [DISTWIDTH] IN BLOOD BY AUTOMATED COUNT: 18.4 % (ref 11.5–15)
GFR SERPL CREATININE-BSD FRML MDRD: 72 ML/MIN/1.73M2
GLUCOSE SERPL-MCNC: 110 MG/DL (ref 74–99)
HCT VFR BLD AUTO: 31.7 % (ref 37–54)
HGB BLD-MCNC: 9.9 G/DL (ref 12.5–16.5)
IMM GRANULOCYTES # BLD AUTO: 0.04 K/UL (ref 0–0.58)
IMM GRANULOCYTES NFR BLD: 1 % (ref 0–5)
LYMPHOCYTES NFR BLD: 0.82 K/UL (ref 1.5–4)
LYMPHOCYTES RELATIVE PERCENT: 9 % (ref 20–42)
MCH RBC QN AUTO: 25.7 PG (ref 26–35)
MCHC RBC AUTO-ENTMCNC: 31.2 G/DL (ref 32–34.5)
MCV RBC AUTO: 82.3 FL (ref 80–99.9)
MONOCYTES NFR BLD: 0.6 K/UL (ref 0.1–0.95)
MONOCYTES NFR BLD: 7 % (ref 2–12)
NEUTROPHILS NFR BLD: 81 % (ref 43–80)
NEUTS SEG NFR BLD: 7.14 K/UL (ref 1.8–7.3)
PLATELET # BLD AUTO: 171 K/UL (ref 130–450)
PMV BLD AUTO: 9.5 FL (ref 7–12)
POTASSIUM SERPL-SCNC: 4.6 MMOL/L (ref 3.5–5)
RBC # BLD AUTO: 3.85 M/UL (ref 3.8–5.8)
SODIUM SERPL-SCNC: 137 MMOL/L (ref 132–146)
WBC OTHER # BLD: 8.8 K/UL (ref 4.5–11.5)

## 2024-04-16 PROCEDURE — 94660 CPAP INITIATION&MGMT: CPT

## 2024-04-16 PROCEDURE — 1200000000 HC SEMI PRIVATE

## 2024-04-16 PROCEDURE — 2580000003 HC RX 258: Performed by: INTERNAL MEDICINE

## 2024-04-16 PROCEDURE — 36415 COLL VENOUS BLD VENIPUNCTURE: CPT

## 2024-04-16 PROCEDURE — 6370000000 HC RX 637 (ALT 250 FOR IP): Performed by: INTERNAL MEDICINE

## 2024-04-16 PROCEDURE — 6360000002 HC RX W HCPCS: Performed by: INTERNAL MEDICINE

## 2024-04-16 PROCEDURE — 97530 THERAPEUTIC ACTIVITIES: CPT

## 2024-04-16 PROCEDURE — 94640 AIRWAY INHALATION TREATMENT: CPT

## 2024-04-16 PROCEDURE — 97165 OT EVAL LOW COMPLEX 30 MIN: CPT

## 2024-04-16 PROCEDURE — 85025 COMPLETE CBC W/AUTO DIFF WBC: CPT

## 2024-04-16 PROCEDURE — 99233 SBSQ HOSP IP/OBS HIGH 50: CPT | Performed by: INTERNAL MEDICINE

## 2024-04-16 PROCEDURE — 80048 BASIC METABOLIC PNL TOTAL CA: CPT

## 2024-04-16 RX ORDER — RISPERIDONE 1 MG/1
0.5 TABLET ORAL 2 TIMES DAILY PRN
Status: DISCONTINUED | OUTPATIENT
Start: 2024-04-16 | End: 2024-04-17 | Stop reason: HOSPADM

## 2024-04-16 RX ORDER — CLONIDINE HYDROCHLORIDE 0.1 MG/1
0.1 TABLET ORAL 2 TIMES DAILY
Status: DISCONTINUED | OUTPATIENT
Start: 2024-04-16 | End: 2024-04-17 | Stop reason: HOSPADM

## 2024-04-16 RX ORDER — RISPERIDONE 1 MG/1
1 TABLET ORAL DAILY
Status: DISCONTINUED | OUTPATIENT
Start: 2024-04-16 | End: 2024-04-17 | Stop reason: HOSPADM

## 2024-04-16 RX ORDER — LANOLIN ALCOHOL/MO/W.PET/CERES
6 CREAM (GRAM) TOPICAL NIGHTLY
Status: DISCONTINUED | OUTPATIENT
Start: 2024-04-16 | End: 2024-04-17 | Stop reason: HOSPADM

## 2024-04-16 RX ADMIN — ACETAMINOPHEN 650 MG: 325 TABLET ORAL at 05:05

## 2024-04-16 RX ADMIN — BUSPIRONE HYDROCHLORIDE 20 MG: 10 TABLET ORAL at 21:52

## 2024-04-16 RX ADMIN — PHENOBARBITAL SODIUM 16.2 MG: 65 INJECTION INTRAMUSCULAR; INTRAVENOUS at 05:06

## 2024-04-16 RX ADMIN — ACETAMINOPHEN 650 MG: 325 TABLET ORAL at 22:07

## 2024-04-16 RX ADMIN — GABAPENTIN 600 MG: 300 CAPSULE ORAL at 13:46

## 2024-04-16 RX ADMIN — PANTOPRAZOLE SODIUM 40 MG: 40 TABLET, DELAYED RELEASE ORAL at 05:44

## 2024-04-16 RX ADMIN — RISPERIDONE 0.5 MG: 1 TABLET ORAL at 09:55

## 2024-04-16 RX ADMIN — SODIUM CHLORIDE, PRESERVATIVE FREE 10 ML: 5 INJECTION INTRAVENOUS at 22:08

## 2024-04-16 RX ADMIN — GABAPENTIN 600 MG: 300 CAPSULE ORAL at 09:57

## 2024-04-16 RX ADMIN — FOLIC ACID 1 MG: 1 TABLET ORAL at 09:55

## 2024-04-16 RX ADMIN — CLONIDINE HYDROCHLORIDE 0.1 MG: 0.1 TABLET ORAL at 10:07

## 2024-04-16 RX ADMIN — PHENOBARBITAL SODIUM 16.2 MG: 65 INJECTION INTRAMUSCULAR; INTRAVENOUS at 10:17

## 2024-04-16 RX ADMIN — BUSPIRONE HYDROCHLORIDE 20 MG: 10 TABLET ORAL at 09:58

## 2024-04-16 RX ADMIN — METOPROLOL SUCCINATE 50 MG: 50 TABLET, EXTENDED RELEASE ORAL at 21:53

## 2024-04-16 RX ADMIN — RISPERIDONE 1 MG: 1 TABLET ORAL at 10:10

## 2024-04-16 RX ADMIN — IPRATROPIUM BROMIDE 0.5 MG: 0.5 SOLUTION RESPIRATORY (INHALATION) at 06:28

## 2024-04-16 RX ADMIN — METOPROLOL SUCCINATE 50 MG: 50 TABLET, EXTENDED RELEASE ORAL at 09:55

## 2024-04-16 RX ADMIN — CLONIDINE HYDROCHLORIDE 0.1 MG: 0.1 TABLET ORAL at 22:07

## 2024-04-16 RX ADMIN — IPRATROPIUM BROMIDE 0.5 MG: 0.5 SOLUTION RESPIRATORY (INHALATION) at 13:25

## 2024-04-16 RX ADMIN — IPRATROPIUM BROMIDE 0.5 MG: 0.5 SOLUTION RESPIRATORY (INHALATION) at 18:38

## 2024-04-16 RX ADMIN — ROSUVASTATIN CALCIUM 40 MG: 10 TABLET, FILM COATED ORAL at 21:52

## 2024-04-16 RX ADMIN — SERTRALINE HYDROCHLORIDE 100 MG: 50 TABLET ORAL at 09:55

## 2024-04-16 RX ADMIN — Medication 6 MG: at 21:52

## 2024-04-16 RX ADMIN — APIXABAN 5 MG: 5 TABLET, FILM COATED ORAL at 09:56

## 2024-04-16 RX ADMIN — Medication 1 TABLET: at 09:55

## 2024-04-16 RX ADMIN — FENOFIBRATE 54 MG: 54 TABLET, FILM COATED ORAL at 09:55

## 2024-04-16 RX ADMIN — GABAPENTIN 600 MG: 300 CAPSULE ORAL at 21:52

## 2024-04-16 RX ADMIN — SODIUM CHLORIDE, PRESERVATIVE FREE 10 ML: 5 INJECTION INTRAVENOUS at 10:08

## 2024-04-16 RX ADMIN — Medication 100 MG: at 10:13

## 2024-04-16 RX ADMIN — ACETAMINOPHEN 650 MG: 325 TABLET ORAL at 09:53

## 2024-04-16 RX ADMIN — RISPERIDONE 0.5 MG: 1 TABLET ORAL at 21:52

## 2024-04-16 RX ADMIN — APIXABAN 5 MG: 5 TABLET, FILM COATED ORAL at 21:52

## 2024-04-16 RX ADMIN — AMLODIPINE BESYLATE 5 MG: 5 TABLET ORAL at 09:59

## 2024-04-16 ASSESSMENT — PAIN DESCRIPTION - DESCRIPTORS
DESCRIPTORS: DISCOMFORT;THROBBING
DESCRIPTORS: ACHING;BURNING
DESCRIPTORS: ACHING;DISCOMFORT
DESCRIPTORS: ACHING;DISCOMFORT

## 2024-04-16 ASSESSMENT — PAIN SCALES - GENERAL
PAINLEVEL_OUTOF10: 7
PAINLEVEL_OUTOF10: 3
PAINLEVEL_OUTOF10: 4
PAINLEVEL_OUTOF10: 5
PAINLEVEL_OUTOF10: 0
PAINLEVEL_OUTOF10: 3
PAINLEVEL_OUTOF10: 7
PAINLEVEL_OUTOF10: 3

## 2024-04-16 ASSESSMENT — PAIN DESCRIPTION - ORIENTATION
ORIENTATION: MID
ORIENTATION: RIGHT
ORIENTATION: MID

## 2024-04-16 ASSESSMENT — PAIN - FUNCTIONAL ASSESSMENT
PAIN_FUNCTIONAL_ASSESSMENT: ACTIVITIES ARE NOT PREVENTED
PAIN_FUNCTIONAL_ASSESSMENT: ACTIVITIES ARE NOT PREVENTED

## 2024-04-16 ASSESSMENT — PAIN DESCRIPTION - LOCATION
LOCATION: BACK
LOCATION: HEAD
LOCATION: BACK
LOCATION: BACK

## 2024-04-16 NOTE — CARE COORDINATION
Ss note:4/16/2024 2:02 PM follow up visit to room earlier today. Pt has telesitter, is alert/oriented times 3, nursing reports pt is on room air, notes reflect pt has a nebulizer at home from [facility], has ERS. Pt denies speaking to [facility], plan is to return home at discharge. Pt relays he will call a taxi or uber at discharge. PCP is Dr. Oscar Knutson. CLARY Grossman

## 2024-04-16 NOTE — PLAN OF CARE
Problem: Safety - Adult  Goal: Free from fall injury  4/16/2024 0222 by Huma Medina RN  Outcome: Progressing  4/15/2024 2105 by Eneida Nguyen RN  Outcome: Progressing     Problem: Pain  Goal: Verbalizes/displays adequate comfort level or baseline comfort level  4/16/2024 0222 by Huma Medina RN  Outcome: Progressing  4/15/2024 2105 by Eneida Nguyen RN  Outcome: Progressing

## 2024-04-16 NOTE — PLAN OF CARE
Problem: Safety - Adult  Goal: Free from fall injury  4/16/2024 0222 by Huma Medina, RN  Outcome: Progressing

## 2024-04-17 VITALS
OXYGEN SATURATION: 96 % | RESPIRATION RATE: 17 BRPM | SYSTOLIC BLOOD PRESSURE: 115 MMHG | HEART RATE: 88 BPM | DIASTOLIC BLOOD PRESSURE: 69 MMHG | WEIGHT: 281 LBS | BODY MASS INDEX: 38.11 KG/M2 | TEMPERATURE: 97.9 F

## 2024-04-17 PROCEDURE — 6370000000 HC RX 637 (ALT 250 FOR IP): Performed by: INTERNAL MEDICINE

## 2024-04-17 PROCEDURE — 2580000003 HC RX 258: Performed by: INTERNAL MEDICINE

## 2024-04-17 PROCEDURE — 94660 CPAP INITIATION&MGMT: CPT

## 2024-04-17 PROCEDURE — 97110 THERAPEUTIC EXERCISES: CPT

## 2024-04-17 PROCEDURE — 94640 AIRWAY INHALATION TREATMENT: CPT

## 2024-04-17 PROCEDURE — 6360000002 HC RX W HCPCS: Performed by: INTERNAL MEDICINE

## 2024-04-17 PROCEDURE — 97530 THERAPEUTIC ACTIVITIES: CPT

## 2024-04-17 RX ORDER — RISPERIDONE 1 MG/1
1 TABLET ORAL DAILY
Qty: 30 TABLET | Refills: 2 | Status: SHIPPED | OUTPATIENT
Start: 2024-04-18

## 2024-04-17 RX ORDER — SERTRALINE HYDROCHLORIDE 100 MG/1
100 TABLET, FILM COATED ORAL DAILY
Qty: 30 TABLET | Refills: 3 | Status: SHIPPED | OUTPATIENT
Start: 2024-04-18

## 2024-04-17 RX ORDER — M-VIT,TX,IRON,MINS/CALC/FOLIC 27MG-0.4MG
1 TABLET ORAL DAILY
Qty: 30 TABLET | Refills: 2 | Status: SHIPPED | OUTPATIENT
Start: 2024-04-18

## 2024-04-17 RX ORDER — METOPROLOL SUCCINATE 50 MG/1
50 TABLET, EXTENDED RELEASE ORAL 2 TIMES DAILY
Qty: 30 TABLET | Refills: 3 | Status: SHIPPED | OUTPATIENT
Start: 2024-04-17

## 2024-04-17 RX ORDER — AMLODIPINE BESYLATE 5 MG/1
5 TABLET ORAL DAILY
Qty: 30 TABLET | Refills: 3 | Status: SHIPPED | OUTPATIENT
Start: 2024-04-17

## 2024-04-17 RX ADMIN — BUSPIRONE HYDROCHLORIDE 20 MG: 10 TABLET ORAL at 10:00

## 2024-04-17 RX ADMIN — RISPERIDONE 1 MG: 1 TABLET ORAL at 10:02

## 2024-04-17 RX ADMIN — SERTRALINE HYDROCHLORIDE 100 MG: 50 TABLET ORAL at 09:58

## 2024-04-17 RX ADMIN — ACETAMINOPHEN 650 MG: 325 TABLET ORAL at 10:08

## 2024-04-17 RX ADMIN — SODIUM CHLORIDE, PRESERVATIVE FREE 10 ML: 5 INJECTION INTRAVENOUS at 10:01

## 2024-04-17 RX ADMIN — METOPROLOL SUCCINATE 50 MG: 50 TABLET, EXTENDED RELEASE ORAL at 09:57

## 2024-04-17 RX ADMIN — IPRATROPIUM BROMIDE 0.5 MG: 0.5 SOLUTION RESPIRATORY (INHALATION) at 05:27

## 2024-04-17 RX ADMIN — FENOFIBRATE 54 MG: 54 TABLET, FILM COATED ORAL at 10:00

## 2024-04-17 RX ADMIN — GABAPENTIN 600 MG: 300 CAPSULE ORAL at 09:58

## 2024-04-17 RX ADMIN — IPRATROPIUM BROMIDE 0.5 MG: 0.5 SOLUTION RESPIRATORY (INHALATION) at 09:30

## 2024-04-17 RX ADMIN — ALBUTEROL SULFATE 2.5 MG: 2.5 SOLUTION RESPIRATORY (INHALATION) at 05:28

## 2024-04-17 RX ADMIN — FOLIC ACID 1 MG: 1 TABLET ORAL at 09:59

## 2024-04-17 RX ADMIN — Medication 100 MG: at 09:58

## 2024-04-17 RX ADMIN — APIXABAN 5 MG: 5 TABLET, FILM COATED ORAL at 09:59

## 2024-04-17 RX ADMIN — CLONIDINE HYDROCHLORIDE 0.1 MG: 0.1 TABLET ORAL at 09:59

## 2024-04-17 RX ADMIN — PANTOPRAZOLE SODIUM 40 MG: 40 TABLET, DELAYED RELEASE ORAL at 07:02

## 2024-04-17 RX ADMIN — AMLODIPINE BESYLATE 5 MG: 5 TABLET ORAL at 10:00

## 2024-04-17 RX ADMIN — Medication 1 TABLET: at 09:57

## 2024-04-17 ASSESSMENT — PAIN DESCRIPTION - DESCRIPTORS
DESCRIPTORS: ACHING;SHARP;PRESSURE
DESCRIPTORS: DISCOMFORT;THROBBING;SORE

## 2024-04-17 ASSESSMENT — PAIN SCALES - GENERAL
PAINLEVEL_OUTOF10: 7
PAINLEVEL_OUTOF10: 7
PAINLEVEL_OUTOF10: 0

## 2024-04-17 ASSESSMENT — PAIN DESCRIPTION - LOCATION
LOCATION: BACK
LOCATION: BACK

## 2024-04-17 ASSESSMENT — PAIN - FUNCTIONAL ASSESSMENT: PAIN_FUNCTIONAL_ASSESSMENT: ACTIVITIES ARE NOT PREVENTED

## 2024-04-17 NOTE — DISCHARGE SUMMARY
Details   busPIRone (BUSPAR) 10 MG tablet Take 2 tablets by mouth 2 times daily  Qty: 60 tablet, Refills: 0      tiotropium (SPIRIVA RESPIMAT) 2.5 MCG/ACT AERS inhaler Inhale 2 puffs into the lungs daily  Qty: 1 each, Refills: 0      thiamine mononitrate (THIAMINE) 100 MG tablet Take 1 tablet by mouth daily  Qty: 30 tablet, Refills: 0      albuterol (PROVENTIL) (2.5 MG/3ML) 0.083% nebulizer solution Take 3 mLs by nebulization 3 times daily as needed for Wheezing      fenofibrate (TRIGLIDE) 160 MG tablet Take 1 tablet by mouth daily      rosuvastatin (CRESTOR) 40 MG tablet Take 1 tablet by mouth every evening      omeprazole (PRILOSEC) 20 MG delayed release capsule Take 1 capsule by mouth daily      gabapentin (NEURONTIN) 600 MG tablet Take 1 tablet by mouth 3 times daily.           STOP taking these medications       rivaroxaban (XARELTO) 20 MG TABS tablet Comments:   Reason for Stopping:         lisinopril (PRINIVIL;ZESTRIL) 20 MG tablet Comments:   Reason for Stopping:         folic acid (FOLVITE) 1 MG tablet Comments:   Reason for Stopping:         metoprolol tartrate (LOPRESSOR) 25 MG tablet Comments:   Reason for Stopping:             Activity: activity as tolerated  Diet: cardiac diet    Follow-up with 1wk PCP, behavioral health    Note that 32 minutes was spent in preparing discharge papers, discussing discharge with patient, staff, consultants, medication review, arranging follow up, etc.    Signed:  Estuardo Castle MD  4/17/2024  11:40 AM

## 2024-04-17 NOTE — PLAN OF CARE
Problem: Safety - Adult  Goal: Free from fall injury  4/17/2024 1553 by Ghislaine Bryant, RN  Outcome: Completed     Problem: Pain  Goal: Verbalizes/displays adequate comfort level or baseline comfort level  4/17/2024 1553 by Ghislaine Braynt, RN  Outcome: Completed

## 2024-04-18 NOTE — PROGRESS NOTES
Hospitalist Progress Note      Synopsis: Patient admitted on 4/13/2024 68 y.o. male patient of Oscar Trujillo history of alcohol dependence with previous withdrawal delirium, A-fib on Eliquis, hypertension, COPD, tobacco abuse who presents with altered mental status with visual hallucinations.  Patient states that he believes that there are people in front of the house trimming trees and has called the police multiple times to report incidents. Patient states that he has a history of alcohol use, but states that he has not drank any alcohol in the past 2 to 3 months. Patient was last admitted on 9/24/2023 for alcohol withdrawal symptoms. Pt denies uncontrolled pain. No chest pain, trouble breathing, vomiting, or diarrhea. No fevers or chills. Patient states  chronic medical problems are well controlled.  In ED patient is hypertensive and tachycardic.  Laboratory value patient reveals elevated creatinine of 1.3 with BUN 28, decreased bicarb at 18, increased potassium of 5.3.  CBC reveals mildly decreased hemoglobin 10.3 similar to previous.       ASSESSMENT:    Principal Problem:    Acute alcohol abuse, with delirium (HCC)  Active Problems:    Essential hypertension    Coronary artery disease involving native coronary artery of native heart without angina pectoris    Morbid obesity (HCC)    Withdrawal symptoms, alcohol, with delirium (HCC)    Persistent atrial fibrillation (HCC)    Chronic diastolic CHF (congestive heart failure) (HCC)    GELY (acute kidney injury) (HCC)    Metabolic acidosis  Resolved Problems:    * No resolved hospital problems. *       PLAN:    Alcohol withdrawal delirium with hallucinations   Alcohol dependence  Admit  CIWA  Phenobarb taper  Thiamine high-dose  Recommend peer support recovery attendance such as AA    Delirium with hallucinations and paranoia-- worse last night  Patient thought that kids were in his room stealing his stuff last night  Treat alcohol withdrawal as above  Increased 
    Pharmacy Discharge Reconciliation & Education    Counseled patient on the importance of adherence with new medications. Patient was by himself but provided my contact information if he wants his daughter to know as multiple medication changes.  Updated medication(s), amlodipine and metoprolol SUCCINATE replacing lisinopril and metoprolol TARTRATE, lower dose of sertraline (already has supply of 100 mg tablets at home) and new risperidone, thiamine, MVI, and apixaban replacing rivaroxaban as per insurance preference. Patient already worked with his [employer] to get Eliquis sent to his house as co-pay only $4.60 from new RX I was able to have his PCP write for yesterday since insurance preferred agent now. Aware this is a TWICE DAILY anticoagulant. He has a working nebulizer and albuterol PRN which he rarely uses. Discussed the purpose of each medication, as well as the dose, frequency, and common side effects/mitigation strategies.  All questions asked were answered or deferred to the inpatient team providers. Patient encouraged to reach out with any other additional questions or concerns that they may have.  Patient verbalized understanding.    Hetal Ahmadi, Jace 4/17/2024 1:00 PM  GAB: [phone]      
    Pharmacy Medication Reconciliation    The patient was interviewed regarding current PTA medication list, use and drug allergies. Patient was by himself. The patient was questioned regarding use of any other inhalers, topical products, over the counter medications, herbal medications, vitamin products or ophthalmic/nasal/otic medication use.      Allergy Update: Patient has no known allergies.    Recommendations/Findings/Discrepancies:   The following amendments were made to the patient's active medication list on file:   1) Additions: Xalreto - see below as now $126 for a 90 day supply as Eliquis preferred with his insurance   - Lisinopril - patient never stopped outpatient but will be stopped and changed to amlodipine on discharge as concern for hyperkalemia and slightly elevated creatinine on admission with last EF 65% in September 2023     2) Deletions: Eliquis and amlodipine - patient never changed from lisinopril and Eliquis that was changed back during admission in the Fall last year. Spoke with Dr Castle as patient's insurance now covers Eliquis. I was able to get a 90 day prescription with 1 refill from his CNP Oscar Knutson that is already filled at his [facility] for $4.60.     3. Additional information:  - Patient states has not been drinking but would need new prescriptions for thiamine and folic acid if to be continued at discharge   - Sertraline is being kept at 100 mg daily instead of 200 mg daily per provider preference on lower dosing and started on risperidone   - Provider prefers metoprolol succinate over tartrate which has been continued inpatient    Total number of discrepancies: 7    Source/s of information: [employer], patient, electronic medical record, PCP office    Thank you,  Hetal Ahmadi, PharmD 4/16/2024 9:39 AM  SJW: [phone]     
4 Eyes Skin Assessment     NAME:  Dom Pérez  YOB: 1955  MEDICAL RECORD NUMBER:  [id number]    The patient is being assessed for  Admission    I agree that at least one RN has performed a thorough Head to Toe Skin Assessment on the patient. ALL assessment sites listed below have been assessed.      Areas assessed by both nurses:    Head, Face, Ears, Shoulders, Back, Chest, Arms, Elbows, Hands, Sacrum. Buttock, Coccyx, Ischium, and Legs. Feet and Heels        Does the Patient have a Wound? No noted wound(s)Excoriation abd folds and ct area       David Prevention initiated by RN: No  Wound Care Orders initiated by RN: No    Pressure Injury (Stage 3,4, Unstageable, DTI, NWPT, and Complex wounds) if present, place Wound referral order by RN under : No    New Ostomies, if present place, Ostomy referral order under : No     Nurse 1 eSignature: Electronically signed by Jacy Manuel RN on 4/14/24 at 3:51 AM EDT    **SHARE this note so that the co-signing nurse can place an eSignature**    Nurse 2 eSignature: Electronically signed by Lakisha Collado RN on 4/14/24 at 4:02 AM EDT   
CLINICAL PHARMACY NOTE: MEDS TO BEDS    Total # of Prescriptions Filled: 4   The following medications were delivered to the patient:  Risperidone 1 mg  Amlodipine 5 mg  Thera-tabs  Metoprolol succinate er 50 mg    Additional Documentation:   Eliquis and sertraline too soon  
Patient refused bipap  
Patient refused to wear BIPAP  
Patient refusing bipap and oxygen intermittant desaturations  
Patient stated wanting no bed alarm, chair alarm or telesitter. Risks explained to patient that there is a risk for falls and injury if getting up unassisted, patient signed refusal of treatment form for alarms and telesitter. Call light placed within patients reach and encouraged use of call light.   
Physical Therapy    Physical Therapy Initial Evaluation/Plan of Care    Room #:  0423/0423-01  Patient Name: Dom Pérez  YOB: 1955  MRN: [id number]    Date of Service: 4/15/2024     Tentative placement recommendation: Home with no Physical Therapy needs vs home p.t. if d/c with o2  Equipment recommendation: None      Evaluating Physical Therapist: Lamonte Ramirez, PT  #[id number]      Specific Provider Orders/Date/Referring Provider :     PT eval and treat  Start:  04/15/24 0945,   End:  04/15/24 0945,   ONE TIME,   Standing Count:  1 Occurrences,   R       Estuardo Castle MD     Admitting Diagnosis:   Hallucinations [R44.3]  Dehydration [E86.0]  Acute drug withdrawal syndrome with complication (HCC) [F19.939]    Admitted with    above, altered mental status , co2 elevated  Surgery: none  Visit Diagnoses         Codes    Dehydration    -  Primary E86.0    Hallucinations     R44.3            Patient Active Problem List   Diagnosis    Essential hypertension    Coronary artery disease involving native coronary artery of native heart without angina pectoris    Atypical chest pain    Pure hypercholesterolemia    Pericardial effusion    Chronic obstructive pulmonary disease with acute exacerbation (HCC)    Morbid obesity (HCC)    Dislocation of finger    Closed dislocation of left middle finger    Seizures (HCC)    Withdrawal symptoms, alcohol, with delirium (HCC)    Altered mental status    Protruded lumbar disc    Lumbar radiculopathy    Alcohol withdrawal syndrome with complication (HCC)    Persistent atrial fibrillation (HCC)    Tobacco abuse    Alcoholic liver disease (HCC)    Impending delirium tremens (HCC)    Elevated LFTs    Paroxysmal atrial fibrillation (HCC)    Acute alcohol abuse, with delirium (HCC)    Acute respiratory failure with hypercapnia (HCC)    Hypertriglyceridemia    Anxiety disorder    Chronic diastolic CHF (congestive heart failure) (HCC)    Hx of CABG    Acute metabolic 
Physical Therapy    Physical Therapy Treatment Note/Plan of Care    Room #:  0423/0423-01  Patient Name: Dom Pérez  YOB: 1955  MRN: [id number]    Date of Service: 4/17/2024     Tentative placement recommendation: Home with no Physical Therapy needs vs home p.t. if d/c with o2  Equipment recommendation: None      Evaluating Physical Therapist: Lamonte Ramirez, PT  #[id number]      Specific Provider Orders/Date/Referring Provider :     PT eval and treat  Start:  04/15/24 0945,   End:  04/15/24 0945,   ONE TIME,   Standing Count:  1 Occurrences,   R       Estuardo Castle MD     Admitting Diagnosis:   Hallucinations [R44.3]  Dehydration [E86.0]  Acute drug withdrawal syndrome with complication (HCC) [F19.939]    Admitted with    above, altered mental status , co2 elevated  Surgery: none  Visit Diagnoses         Codes    Dehydration    -  Primary E86.0    Hallucinations     R44.3            Patient Active Problem List   Diagnosis    Essential hypertension    Coronary artery disease involving native coronary artery of native heart without angina pectoris    Atypical chest pain    Pure hypercholesterolemia    Pericardial effusion    Chronic obstructive pulmonary disease with acute exacerbation (HCC)    Morbid obesity (HCC)    Dislocation of finger    Closed dislocation of left middle finger    Seizures (HCC)    Withdrawal symptoms, alcohol, with delirium (HCC)    Altered mental status    Protruded lumbar disc    Lumbar radiculopathy    Alcohol withdrawal syndrome with complication (HCC)    Persistent atrial fibrillation (HCC)    Tobacco abuse    Alcoholic liver disease (HCC)    Impending delirium tremens (HCC)    Elevated LFTs    Paroxysmal atrial fibrillation (HCC)    Acute alcohol abuse, with delirium (HCC)    Acute respiratory failure with hypercapnia (HCC)    Hypertriglyceridemia    Anxiety disorder    Chronic diastolic CHF (congestive heart failure) (HCC)    Hx of CABG    Acute metabolic 
This RN supervisor called to bedside due to patient wondering in adjacent patient's rooms, verbally agressive and not easily redirected. Patient able to answer all orientation questions appropriately, although verbalizes that there have been \"children running around this room all night\" and \"my credit cards and $60 are scattered all over this room and out there\". Patient calm but argues that he is not hallucinating. Agrees to stay at bedside for safety and speak to staff appropriately.  
This RN unable to do database. Patient came sedated. Patient refused to wear Bipap all night.  
closely     Atrial fibrillation   rate control metoprolol  Anticoagulation apixaban     Chronic diastolic CHF-appears dry, monitor volume status, avoid volume overload     Diet: ADULT DIET; Regular; Low Fat/Low Chol/High Fiber/CHEPE  Code Status: Limited  PT/OT Eval Status:     DVT Prophylaxis:     Recommended disposition at discharge:      Subjective    Feeling better asking about discharge declining SNF no CP or SOB  No fever or chills   No uncontrolled pain  No vomiting or diarrhea   No events reported overnight     Exam:  /76   Pulse 89   Temp 97.5 °F (36.4 °C) (Axillary)   Resp 19   Wt 127.5 kg (281 lb)   SpO2 94%   BMI 38.11 kg/m²   General appearance: No apparent distress, appears stated age and cooperative.  HEENT: Pupils equal, round, and reactive to light. Conjunctivae/corneas clear.  Neck: Supple. No jugular venous distention. Trachea midline.  Respiratory:  Normal respiratory effort. Clear to auscultation, bilaterally without Rales/Wheezes/Rhonchi.  Cardiovascular: Regular rate and rhythm with normal S1/S2 without murmurs, rubs or gallops.  Abdomen: Soft, non-tender, non-distended with normal bowel sounds.  Musculoskeletal: No clubbing, cyanosis or edema bilaterally. Brisk capillary refill. 2+radial pulses.   Skin:  No rashes    Neurologic: awake, alert and following commands    Medications:  Reviewed    Infusion Medications    sodium chloride      lactated ringers IV soln 100 mL/hr at 04/15/24 0627     Scheduled Medications    risperiDONE  0.5 mg Oral Daily    nicotine  1 patch TransDERmal Daily    thiamine  500 mg IntraVENous Daily    [START ON 4/16/2024] thiamine (B-1) 250 mg in sodium chloride 0.9 % 100 mL IVPB  250 mg IntraVENous Q24H    amLODIPine  5 mg Oral Daily    apixaban  5 mg Oral BID    busPIRone  20 mg Oral BID    fenofibrate  54 mg Oral Daily    folic acid  1 mg Oral Daily    gabapentin  600 mg Oral TID    metoprolol succinate  50 mg Oral BID    pantoprazole  40 mg Oral QAM AC    
day    thiamine  100 mg Oral Daily    multivitamin  1 tablet Oral Daily    PHENobarbital  32.5 mg IntraVENous 4x daily    Followed by    PHENobarbital  32.5 mg IntraVENous BID    Followed by    [START ON 4/15/2024] PHENobarbital  16.2 mg IntraVENous BID    ipratropium  0.5 mg Nebulization 4x Daily RT    rosuvastatin  40 mg Oral QPM     PRN Meds: albuterol, sodium chloride flush, sodium chloride, potassium chloride **OR** potassium alternative oral replacement **OR** potassium chloride, magnesium sulfate, aluminum & magnesium hydroxide-simethicone, ondansetron **OR** ondansetron, polyethylene glycol, acetaminophen **OR** acetaminophen, PHENobarbital **FOLLOWED BY** [START ON 4/15/2024] PHENobarbital, metoprolol    I/O    Intake/Output Summary (Last 24 hours) at 4/14/2024 0918  Last data filed at 4/14/2024 0455  Gross per 24 hour   Intake --   Output 400 ml   Net -400 ml       Labs:   Recent Labs     04/13/24 0927 04/14/24 0434   WBC 7.3 6.2   HGB 10.3* 10.8*   HCT 33.1* 35.5*    165       Recent Labs     04/13/24  0927 04/14/24 0434    140   K 5.3* 5.1*    109*   CO2 18* 21*   BUN 28* 20   CREATININE 1.3* 1.1   CALCIUM 8.8 9.0   PHOS  --  2.6       Recent Labs     04/13/24 0927   PROT 6.9   ALKPHOS 55   ALT 16   AST 22   BILITOT 0.7       No results for input(s): \"INR\" in the last 72 hours.    No results for input(s): \"CKTOTAL\", \"TROPONINI\" in the last 72 hours.    Chronic labs:  Lab Results   Component Value Date    CHOL 90 09/25/2023    TRIG 140 09/25/2023    HDL 22 (L) 09/25/2023    LDLCALC 142 (H) 01/13/2022    TSH 3.30 04/13/2024    PSA 0.95 01/13/2022    INR 1.4 09/26/2023    LABA1C 5.0 01/13/2022       Radiology:  Imaging studies reviewed today.        +++++++++++++++++++++++++++++++++++++++++++++++++  Christopher Castle, MD   [facility].  +++++++++++++++++++++++++++++++++++++++++++++++++  NOTE: This report was transcribed using voice recognition software. Every effort 
times per day    thiamine  100 mg Oral Daily    multivitamin  1 tablet Oral Daily    ipratropium  0.5 mg Nebulization 4x Daily RT    rosuvastatin  40 mg Oral QPM     PRN Meds: risperiDONE, albuterol, sodium chloride flush, sodium chloride, potassium chloride **OR** potassium alternative oral replacement **OR** potassium chloride, magnesium sulfate, aluminum & magnesium hydroxide-simethicone, ondansetron **OR** ondansetron, polyethylene glycol, acetaminophen **OR** acetaminophen, [] PHENobarbital **FOLLOWED BY** PHENobarbital, metoprolol    I/O    Intake/Output Summary (Last 24 hours) at 2024 1138  Last data filed at 2024 1751  Gross per 24 hour   Intake 1240 ml   Output --   Net 1240 ml         Labs:   Recent Labs     04/15/24  0701 24  0550   WBC 5.4 8.8   HGB 10.1* 9.9*   HCT 32.4* 31.7*    171         Recent Labs     04/15/24  0701 24  0550    137   K 4.5 4.6    105   CO2 22 23   BUN 17 14   CREATININE 1.1 1.1   CALCIUM 8.7 9.0         No results for input(s): \"PROT\", \"ALB\", \"ALKPHOS\", \"ALT\", \"AST\", \"BILITOT\", \"AMYLASE\", \"LIPASE\" in the last 72 hours.      No results for input(s): \"INR\" in the last 72 hours.    No results for input(s): \"CKTOTAL\", \"TROPONINI\" in the last 72 hours.    Chronic labs:  Lab Results   Component Value Date    CHOL 90 2023    TRIG 140 2023    HDL 22 (L) 2023    LDLCALC 142 (H) 2022    TSH 3.30 2024    PSA 0.95 2022    INR 1.4 2023    LABA1C 5.0 2022       Radiology:  Imaging studies reviewed today.        +++++++++++++++++++++++++++++++++++++++++++++++++  Christopher Castle, MD   [facility].  +++++++++++++++++++++++++++++++++++++++++++++++++  NOTE: This report was transcribed using voice recognition software. Every effort was made to ensure accuracy; however, inadvertent computerized transcription errors may be present.      
walker, Cane    Prior Level of Function: Independent with ADLs , Independent with IADLs; ambulated Cane as needed    Driving: Yes  Occupation: No    Pain Level: 7/10 pain in lower back and neck; Nursing notified.      Cognition: A&O: 4/4; Follows Multiple step directions   Memory: good    Sequencing: good    Problem solving: good    Judgement/safety: good     [facility]       AM-PAC Daily Activity - Inpatient   How much help is needed for putting on and taking off regular lower body clothing?: A Little  How much help is needed for bathing (which includes washing, rinsing, drying)?: A Little  How much help is needed for toileting (which includes using toilet, bedpan, or urinal)?: A Little  How much help is needed for putting on and taking off regular upper body clothing?: None  How much help is needed for taking care of personal grooming?: A Little  How much help for eating meals?: None  [facility]: 20  AM-PAC Inpatient ADL T-Scale Score : 42.03  ADL Inpatient CMS 0-100% Score: 38.32  ADL Inpatient CMS G-Code Modifier : CJ     Functional Assessment:    Initial Eval Status  Date: 4/16/24 Treatment Status  Date: STGs = LTGs  Time frame: 10-14 days   Feeding Independent   Independent    Grooming Supervision   Independent    UB Dressing Independent   Independent    LB Dressing Supervision   Independent    Bathing Supervision  Independent    Toileting Supervision   Independent    Bed Mobility  Supine to sit: N/T   Sit to supine: N/T   Rolling:N/T    Seated in bedside chair    Supine to sit: Independent   Sit to supine: Independent   Rolling:Independent     Functional Transfers Supervision from bedside chair sit to stand.  Transfer training with verbal cues for hand placement throughout session to improve safety.   Independent    Functional Mobility Supervision with no AD; Unsteadiness home level distance due to SOB.  Independent    Balance Sitting:     Static: good     Dynamic: good   Standing: 
mobility while in hospital , safety , and O2 line management and safety      Patient response to education:   Pt verbalized understanding Pt demonstrated skill Pt requires further education in this area   Yes Partial Yes      Treatment:  Patient practiced and was instructed/facilitated in the following treatment: Patient    sitting in a chair at start of tx session.  Pt educated on pursed lip breathing, pacing, and safety. Pt stood, ambulated in the hallway, performed the stairs, needed a seated rest period due to shortness of breath, cues for pursed lip breathing, pt stood, ambulated back to the chair in his room.     Therapeutic Exercises:  not performed       At end of session, patient in chair with     call light and phone within reach,  all lines and tubes intact, nursing notified.      Patient would benefit from continued skilled Physical Therapy to improve functional independence and quality of life.         Patient's/ family goals   home    Time in 08:55  Time out  09:18    Total Treatment Time  23 minutes        CPT codes:    Therapeutic activities (00556)   23 minutes  2 unit(s)    Sidney Vidal  [facility]  LIC # [id number]

## 2024-05-12 ENCOUNTER — APPOINTMENT (OUTPATIENT)
Dept: ULTRASOUND IMAGING | Age: 69
End: 2024-05-12
Payer: MEDICARE

## 2024-05-12 ENCOUNTER — HOSPITAL ENCOUNTER (EMERGENCY)
Age: 69
Discharge: HOME | End: 2024-05-12
Attending: STUDENT IN AN ORGANIZED HEALTH CARE EDUCATION/TRAINING PROGRAM
Payer: MEDICARE

## 2024-05-12 VITALS
DIASTOLIC BLOOD PRESSURE: 82 MMHG | RESPIRATION RATE: 18 BRPM | BODY MASS INDEX: 36.62 KG/M2 | WEIGHT: 270 LBS | OXYGEN SATURATION: 96 % | HEART RATE: 92 BPM | SYSTOLIC BLOOD PRESSURE: 142 MMHG | TEMPERATURE: 98 F

## 2024-05-12 DIAGNOSIS — N50.89 SCROTAL SWELLING: Primary | ICD-10-CM

## 2024-05-12 LAB
ALBUMIN SERPL-MCNC: 4 G/DL (ref 3.5–5.2)
ALP SERPL-CCNC: 58 U/L (ref 40–129)
ALT SERPL-CCNC: 12 U/L (ref 0–40)
ANION GAP SERPL CALCULATED.3IONS-SCNC: 12 MMOL/L (ref 7–16)
AST SERPL-CCNC: 16 U/L (ref 0–39)
BASOPHILS # BLD: 0.07 K/UL (ref 0–0.2)
BASOPHILS NFR BLD: 1 % (ref 0–2)
BILIRUB SERPL-MCNC: 0.6 MG/DL (ref 0–1.2)
BILIRUB UR QL STRIP: NEGATIVE
BUN SERPL-MCNC: 36 MG/DL (ref 6–23)
CALCIUM SERPL-MCNC: 9 MG/DL (ref 8.6–10.2)
CHLORIDE SERPL-SCNC: 108 MMOL/L (ref 98–107)
CLARITY UR: CLEAR
CO2 SERPL-SCNC: 22 MMOL/L (ref 22–29)
COLOR UR: YELLOW
CREAT SERPL-MCNC: 1.1 MG/DL (ref 0.7–1.2)
EOSINOPHIL # BLD: 0.14 K/UL (ref 0.05–0.5)
EOSINOPHILS RELATIVE PERCENT: 2 % (ref 0–6)
EPI CELLS #/AREA URNS HPF: ABNORMAL /HPF
ERYTHROCYTE [DISTWIDTH] IN BLOOD BY AUTOMATED COUNT: 19.5 % (ref 11.5–15)
GFR, ESTIMATED: 71 ML/MIN/1.73M2
GLUCOSE SERPL-MCNC: 103 MG/DL (ref 74–99)
GLUCOSE UR STRIP-MCNC: NEGATIVE MG/DL
HCT VFR BLD AUTO: 34.2 % (ref 37–54)
HGB BLD-MCNC: 10.3 G/DL (ref 12.5–16.5)
HGB UR QL STRIP.AUTO: NEGATIVE
IMM GRANULOCYTES # BLD AUTO: 0.03 K/UL (ref 0–0.58)
IMM GRANULOCYTES NFR BLD: 0 % (ref 0–5)
KETONES UR STRIP-MCNC: NEGATIVE MG/DL
LEUKOCYTE ESTERASE UR QL STRIP: NEGATIVE
LYMPHOCYTES NFR BLD: 0.96 K/UL (ref 1.5–4)
LYMPHOCYTES RELATIVE PERCENT: 13 % (ref 20–42)
MCH RBC QN AUTO: 25.9 PG (ref 26–35)
MCHC RBC AUTO-ENTMCNC: 30.1 G/DL (ref 32–34.5)
MCV RBC AUTO: 85.9 FL (ref 80–99.9)
MONOCYTES NFR BLD: 0.73 K/UL (ref 0.1–0.95)
MONOCYTES NFR BLD: 10 % (ref 2–12)
NEUTROPHILS NFR BLD: 75 % (ref 43–80)
NEUTS SEG NFR BLD: 5.76 K/UL (ref 1.8–7.3)
NITRITE UR QL STRIP: NEGATIVE
PH UR STRIP: 5.5 [PH] (ref 5–9)
PLATELET # BLD AUTO: 242 K/UL (ref 130–450)
PMV BLD AUTO: 10 FL (ref 7–12)
POTASSIUM SERPL-SCNC: 4.9 MMOL/L (ref 3.5–5)
PROT SERPL-MCNC: 7.2 G/DL (ref 6.4–8.3)
PROT UR STRIP-MCNC: ABNORMAL MG/DL
RBC # BLD AUTO: 3.98 M/UL (ref 3.8–5.8)
RBC #/AREA URNS HPF: ABNORMAL /HPF
SODIUM SERPL-SCNC: 142 MMOL/L (ref 132–146)
SP GR UR STRIP: >1.03 (ref 1–1.03)
UROBILINOGEN UR STRIP-ACNC: 0.2 EU/DL (ref 0–1)
WBC #/AREA URNS HPF: ABNORMAL /HPF
WBC OTHER # BLD: 7.7 K/UL (ref 4.5–11.5)

## 2024-05-12 PROCEDURE — 99284 EMERGENCY DEPT VISIT MOD MDM: CPT

## 2024-05-12 PROCEDURE — 76870 US EXAM SCROTUM: CPT

## 2024-05-12 PROCEDURE — 80053 COMPREHEN METABOLIC PANEL: CPT

## 2024-05-12 PROCEDURE — 81001 URINALYSIS AUTO W/SCOPE: CPT

## 2024-05-12 PROCEDURE — 93975 VASCULAR STUDY: CPT

## 2024-05-12 PROCEDURE — 85025 COMPLETE CBC W/AUTO DIFF WBC: CPT

## 2024-05-12 RX ORDER — CLOTRIMAZOLE AND BETAMETHASONE DIPROPIONATE 10; .64 MG/G; MG/G
CREAM TOPICAL 2 TIMES DAILY
Qty: 45 G | Refills: 0 | Status: SHIPPED | OUTPATIENT
Start: 2024-05-12

## 2024-05-12 ASSESSMENT — PAIN SCALES - GENERAL: PAINLEVEL_OUTOF10: 6

## 2024-05-12 ASSESSMENT — PAIN DESCRIPTION - LOCATION: LOCATION: SCROTUM

## 2024-05-12 ASSESSMENT — PAIN - FUNCTIONAL ASSESSMENT: PAIN_FUNCTIONAL_ASSESSMENT: 0-10

## 2024-05-12 NOTE — ED PROVIDER NOTES
[facility] EMERGENCY DEPARTMENT  EMERGENCY DEPARTMENT ENCOUNTER    Pt Name: Dom Pérez  MRN: [id number]  Birthdate 1955  Date of evaluation: 5/12/2024  Provider: Faisal Sands MD  PCP: Oscar Knutson APRN - NP  Note Started: 11:15 AM EDT 5/12/24    HPI     Patient is a 68 y.o. male presents with a chief complaint of   Chief Complaint   Patient presents with    Testicle Swelling     Pt c/o scrotal swelling that started on Friday morning Pt states he was seen at [facility] and the nurse put a diaper on him which he believes caused the symptoms.    .    Patient presents for testicular swelling.  Patient stated that this started after he was wearing a diaper [facility].  Denies any chest pain or shortness of breath.  No fevers or chills.  Able to urinate.  Patient states that he feels like he got irritated after wearing the diaper.  Noted that there was strings and things on it but this has since improved.  No changes in bowel habits.  No shortness of breath when lying flat.  No increased leg swelling.    Nursing Notes were all reviewed and agreed with or any disagreements were addressed in the HPI.    History From: Patient    Review of Systems   Pertinent positives and negatives as per HPI.     Physical Exam  Vitals and nursing note reviewed.   Constitutional:       Appearance: He is well-developed.   HENT:      Head: Normocephalic and atraumatic.   Eyes:      Conjunctiva/sclera: Conjunctivae normal.   Cardiovascular:      Rate and Rhythm: Normal rate and regular rhythm.      Heart sounds: Normal heart sounds. No murmur heard.  Pulmonary:      Effort: Pulmonary effort is normal. No respiratory distress.      Breath sounds: Normal breath sounds. No wheezing or rales.   Abdominal:      General: Bowel sounds are normal.      Palpations: Abdomen is soft.      Tenderness: There is no abdominal tenderness. There is no guarding or rebound.   Genitourinary:     Comments:

## 2024-08-12 ENCOUNTER — HOSPITAL ENCOUNTER (OUTPATIENT)
Dept: WOUND CARE | Age: 69
Discharge: HOME | End: 2024-08-12
Payer: MEDICARE

## 2024-08-12 VITALS
HEART RATE: 80 BPM | WEIGHT: 260 LBS | HEIGHT: 72 IN | RESPIRATION RATE: 20 BRPM | SYSTOLIC BLOOD PRESSURE: 132 MMHG | BODY MASS INDEX: 35.21 KG/M2 | DIASTOLIC BLOOD PRESSURE: 74 MMHG | TEMPERATURE: 96.7 F

## 2024-08-12 DIAGNOSIS — I87.2 VENOUS STASIS DERMATITIS: Primary | ICD-10-CM

## 2024-08-12 PROCEDURE — 99213 OFFICE O/P EST LOW 20 MIN: CPT

## 2024-08-12 PROCEDURE — 11045 DBRDMT SUBQ TISS EACH ADDL: CPT

## 2024-08-12 PROCEDURE — 11042 DBRDMT SUBQ TIS 1ST 20SQCM/<: CPT

## 2024-08-12 PROCEDURE — 87077 CULTURE AEROBIC IDENTIFY: CPT

## 2024-08-12 PROCEDURE — 87205 SMEAR GRAM STAIN: CPT

## 2024-08-12 PROCEDURE — 87070 CULTURE OTHR SPECIMN AEROBIC: CPT

## 2024-08-12 RX ORDER — BACITRACIN ZINC AND POLYMYXIN B SULFATE 500; 1000 [USP'U]/G; [USP'U]/G
OINTMENT TOPICAL ONCE
OUTPATIENT
Start: 2024-08-12 | End: 2024-08-12

## 2024-08-12 RX ORDER — LIDOCAINE HYDROCHLORIDE 20 MG/ML
JELLY TOPICAL ONCE
OUTPATIENT
Start: 2024-08-12 | End: 2024-08-12

## 2024-08-12 RX ORDER — TRIAMCINOLONE ACETONIDE 1 MG/G
OINTMENT TOPICAL ONCE
OUTPATIENT
Start: 2024-08-12 | End: 2024-08-12

## 2024-08-12 RX ORDER — GINSENG 100 MG
CAPSULE ORAL ONCE
OUTPATIENT
Start: 2024-08-12 | End: 2024-08-12

## 2024-08-12 RX ORDER — CLOBETASOL PROPIONATE 0.5 MG/G
OINTMENT TOPICAL ONCE
OUTPATIENT
Start: 2024-08-12 | End: 2024-08-12

## 2024-08-12 RX ORDER — GENTAMICIN SULFATE 1 MG/G
OINTMENT TOPICAL ONCE
OUTPATIENT
Start: 2024-08-12 | End: 2024-08-12

## 2024-08-12 RX ORDER — BETAMETHASONE DIPROPIONATE 0.5 MG/G
CREAM TOPICAL ONCE
OUTPATIENT
Start: 2024-08-12 | End: 2024-08-12

## 2024-08-12 RX ORDER — LIDOCAINE HYDROCHLORIDE 40 MG/ML
SOLUTION TOPICAL ONCE
OUTPATIENT
Start: 2024-08-12 | End: 2024-08-12

## 2024-08-12 RX ORDER — IBUPROFEN 200 MG
TABLET ORAL ONCE
OUTPATIENT
Start: 2024-08-12 | End: 2024-08-12

## 2024-08-12 RX ORDER — LIDOCAINE 50 MG/G
OINTMENT TOPICAL ONCE
OUTPATIENT
Start: 2024-08-12 | End: 2024-08-12

## 2024-08-12 RX ORDER — SODIUM CHLOR/HYPOCHLOROUS ACID 0.033 %
SOLUTION, IRRIGATION IRRIGATION ONCE
OUTPATIENT
Start: 2024-08-12 | End: 2024-08-12

## 2024-08-12 RX ORDER — LIDOCAINE 40 MG/G
CREAM TOPICAL ONCE
OUTPATIENT
Start: 2024-08-12 | End: 2024-08-12

## 2024-08-12 ASSESSMENT — PAIN DESCRIPTION - DESCRIPTORS: DESCRIPTORS: SORE

## 2024-08-12 ASSESSMENT — PAIN SCALES - GENERAL: PAINLEVEL_OUTOF10: 8

## 2024-08-12 ASSESSMENT — PAIN DESCRIPTION - LOCATION: LOCATION: LEG

## 2024-08-12 NOTE — PROGRESS NOTES
[facility]  History and Physical/Consultation  Podiatry    Referring Physician : Oscar Knutson APRN - NP  Dom Pérez  MEDICAL RECORD NUMBER:  [id number]  AGE: 68 y.o.   GENDER: male  : 1955  EPISODE DATE:  2024  Subjective:     Chief Complaint   Patient presents with    Wound Check     Bilateral legs         HISTORY of PRESENT ILLNESS HPI     Dom Pérez is a 68 y.o. male who presents today for wound/ulcer evaluation.   History of Wound Context:  The patient has had a wound of bilateral lower legs which was first noted approximately >1 month.  This has been treated locally. On their initial visit to the wound healing center, 24 ,  the patient has noted that the wound has been improving.  The patient has had similar previous wounds in the past.      Pt is on abx at time of initial visit.      Wound/Ulcer Pain Timing/Severity: none  Quality of pain: sharp  Severity:  5 / 10   Modifying Factors: None  Associated Signs/Symptoms: edema    Ulcer Identification:  Ulcer Type: venous  Contributing Factors: venous stasis    Diabetic/Pressure/Non Pressure Ulcers onl y:  Ulcer: Non-Pressure ulcer, fat layer exposed    If patient has diabetic lower extremity wounds  Witt Classification of diabetic lower extremity wounds:    Grade Description   []  0 No open wound   [x]  1 Superficial ulcer involving the full skin thickness   []  2 Deep ulcer involves ligament, tendon, joint capsule, or fascia  No bone involvement or abscess presence   []  3 Deep Ulcer with abcess formation and/or osteomyelitis   []  4 Localized gangrene   []  5 Extensive gangrene of the foot     Wound: N/A        PAST MEDICAL HISTORY      Diagnosis Date    Alcohol abuse     Atrial fibrillation (HCC)     CAD (coronary artery disease)     COPD (chronic obstructive pulmonary disease) (HCC)     Depression     Fracture of unspecified phalanx of left middle finger, initial encounter for closed fracture     GERD

## 2024-08-12 NOTE — PLAN OF CARE
Problem: Chronic Conditions and Co-morbidities  Goal: Patient's chronic conditions and co-morbidity symptoms are monitored and maintained or improved  Outcome: Progressing     Problem: Pain  Goal: Verbalizes/displays adequate comfort level or baseline comfort level  Outcome: Progressing     Problem: Cognitive:  Goal: Knowledge of wound care  Description: Knowledge of wound care  Outcome: Progressing  Goal: Understands risk factors for wounds  Description: Understands risk factors for wounds  Outcome: Progressing     Problem: Wound:  Goal: Will show signs of wound healing; wound closure and no evidence of infection  Description: Will show signs of wound healing; wound closure and no evidence of infection  Outcome: Progressing     Problem: Arterial:  Goal: Optimize blood flow for wound healing  Description: Optimize blood flow for wound healing  Outcome: Progressing     Problem: Venous:  Goal: Signs of wound healing will improve  Description: Signs of wound healing will improve  Outcome: Progressing     Problem: Smoking cessation:  Goal: Ability to formulate a plan to maintain a tobacco-free life will be supported  Description: Ability to formulate a plan to maintain a tobacco-free life will be supported  Outcome: Progressing     Problem: Compression therapy:  Goal: Will be free from complications associated with compression therapy  Description: Will be free from complications associated with compression therapy  Outcome: Progressing     Problem: Falls - Risk of:  Goal: Will remain free from falls  Description: Will remain free from falls  Outcome: Progressing

## 2024-08-12 NOTE — PROGRESS NOTES
Wound Care Request for Home Health      Home Health Company:     [id number] [phone]    Ordering Center:     [address]   [place], [place] [address]  Telephone: [phone]       FAX [phone]    Patient Information:      Dom Geller  [address] [place] [address]   [phone]   : 1955  AGE: 68 y.o.     GENDER: male   EPISODE DATE: 2024    Insurance:      PRIMARY INSURANCE:  Plan: [employer] ESSENTIAL/PLUS  Coverage: BCBS MEDICARE  Effective Date: 2024  Group Number: [unfilled]  Subscriber Number: [id number] - (Medicare Managed)    Payer/Plan Subscr  Sex Relation Sub. Ins. ID Effective Group Num   1. BCBS MEDICARE* DOM GELLER 1955 Male Self [id number]                                    [id number]       Patient Wound Information:      Problem List Items Addressed This Visit          Other    Venous stasis dermatitis - Primary       Wound 24 Leg Right #1 circumfential (Active)   Wound Image    24 1317   Wound Etiology Venous 24 1317   Wound Length (cm) 21.3 cm 24 1317   Wound Width (cm) 36 cm 24 1317   Wound Depth (cm) 0.2 cm 24 1317   Wound Surface Area (cm^2) 766.8 cm^2 24 1317   Wound Volume (cm^3) 153.36 cm^3 24 1317   Post-Procedure Length (cm) 21.5 cm 24 1357   Post-Procedure Width (cm) 36.2 cm 24 1357   Post-Procedure Depth (cm) 0.3 cm 24 1357   Post-Procedure Surface Area (cm^2) 778.3 cm^2 24 1357   Post-Procedure Volume (cm^3) 233.49 cm^3 24 1357   Wound Assessment Fibrin;Slough;Pink/red 24 1317   Drainage Amount Large (50-75% saturated) 24 1317   Drainage Description Yellow;Serosanguinous;Brown 24 1317   Odor Moderate 24 1317   Claudine-wound Assessment Fragile 24 1317   Number of days: 0       Wound 24 Leg Left #2 circumfrential (Active)   Wound Image    24 1317

## 2024-08-12 NOTE — DISCHARGE INSTRUCTIONS
Visit Discharge/Physician Orders    Discharge condition: Stable    Assessment of pain at discharge:    Anesthetic used:     Discharge to: Home    Left via:Private automobile    Accompanied by: accompanied by self    ECF/HHA: referral sent to [facility]    Dressing Orders: Cleanse wound to bilateral lower leg with normal saline, apply ALGINATE to wound bed and cover with ABD pad and wrap with coban 2, change twice weekly (Mondays at clinic and Thursdays).    Treatment Orders:   8/12 Culture taken  EAT DIET WITH PROTEINS AND VITAMIN C  TAKE A MULTIVITAMIN DAILY IF NOT CONTRAINDICATED      [facility] followup visit ________1 week_____________________  (Please note your next appointment above and if you are unable to keep, kindly give a 24 hour notice. Thank you.)    Physician signature:__________________________      If you experience any of the following, please call the Wound Care Center during business hours:    * Increase in Pain  * Temperature over 101  * Increase in drainage from your wound  * Drainage with a foul odor  * Bleeding  * Increase in swelling  * Need for compression bandage changes due to slippage, breakthrough drainage.    If you need medical attention outside of the business hours of the Wound Care Centers please contact your PCP or go to the nearest emergency room.

## 2024-08-13 NOTE — DISCHARGE INSTRUCTIONS
Visit Discharge/Physician Orders     Discharge condition: Stable     Assessment of pain at discharge:     Anesthetic used:      Discharge to: Home     Left via:Private automobile     Accompanied by: accompanied by self     ECF/HHA: referral sent to [facility]     Dressing Orders: Cleanse wound to bilateral lower leg with normal saline, apply ALGINATE to wound bed and cover with ABD pad and wrap with coban 2 (profore lite in clinic), change twice weekly (Mondays at clinic and Thursdays).     Treatment Orders:  8/19 culture reviewed, antibitoic sent to [facility]- please  and take as ordered  EAT DIET WITH PROTEINS AND VITAMIN C  TAKE A MULTIVITAMIN DAILY IF NOT CONTRAINDICATED       [facility] followup visit ________1 week_____________________  (Please note your next appointment above and if you are unable to keep, kindly give a 24 hour notice. Thank you.)     Physician signature:__________________________        If you experience any of the following, please call the Wound Care Center during business hours:     * Increase in Pain  * Temperature over 101  * Increase in drainage from your wound  * Drainage with a foul odor  * Bleeding  * Increase in swelling  * Need for compression bandage changes due to slippage, breakthrough drainage.     If you need medical attention outside of the business hours of the Wound Care Centers please contact your PCP or go to the nearest emergency room.

## 2024-08-16 LAB
MICROORGANISM SPEC CULT: ABNORMAL
MICROORGANISM/AGENT SPEC: ABNORMAL
SERVICE CMNT-IMP: ABNORMAL
SPECIMEN DESCRIPTION: ABNORMAL

## 2024-08-19 ENCOUNTER — HOSPITAL ENCOUNTER (OUTPATIENT)
Dept: WOUND CARE | Age: 69
Discharge: HOME | End: 2024-08-19
Payer: MEDICARE

## 2024-08-19 VITALS
HEIGHT: 72 IN | TEMPERATURE: 96.8 F | HEART RATE: 72 BPM | SYSTOLIC BLOOD PRESSURE: 118 MMHG | DIASTOLIC BLOOD PRESSURE: 64 MMHG | BODY MASS INDEX: 35.21 KG/M2 | RESPIRATION RATE: 20 BRPM | WEIGHT: 260 LBS

## 2024-08-19 DIAGNOSIS — I87.2 VENOUS STASIS DERMATITIS: Primary | ICD-10-CM

## 2024-08-19 PROCEDURE — 11042 DBRDMT SUBQ TIS 1ST 20SQCM/<: CPT

## 2024-08-19 PROCEDURE — 11045 DBRDMT SUBQ TISS EACH ADDL: CPT

## 2024-08-19 RX ORDER — LIDOCAINE HYDROCHLORIDE 40 MG/ML
SOLUTION TOPICAL ONCE
OUTPATIENT
Start: 2024-08-19 | End: 2024-08-19

## 2024-08-19 RX ORDER — LIDOCAINE 50 MG/G
OINTMENT TOPICAL ONCE
OUTPATIENT
Start: 2024-08-19 | End: 2024-08-19

## 2024-08-19 RX ORDER — IBUPROFEN 200 MG
TABLET ORAL ONCE
OUTPATIENT
Start: 2024-08-19 | End: 2024-08-19

## 2024-08-19 RX ORDER — CLOBETASOL PROPIONATE 0.5 MG/G
OINTMENT TOPICAL ONCE
OUTPATIENT
Start: 2024-08-19 | End: 2024-08-19

## 2024-08-19 RX ORDER — SODIUM CHLOR/HYPOCHLOROUS ACID 0.033 %
SOLUTION, IRRIGATION IRRIGATION ONCE
OUTPATIENT
Start: 2024-08-19 | End: 2024-08-19

## 2024-08-19 RX ORDER — LIDOCAINE HYDROCHLORIDE 40 MG/ML
SOLUTION TOPICAL ONCE
Status: COMPLETED | OUTPATIENT
Start: 2024-08-19 | End: 2024-08-19

## 2024-08-19 RX ORDER — GINSENG 100 MG
CAPSULE ORAL ONCE
OUTPATIENT
Start: 2024-08-19 | End: 2024-08-19

## 2024-08-19 RX ORDER — BETAMETHASONE DIPROPIONATE 0.5 MG/G
CREAM TOPICAL ONCE
OUTPATIENT
Start: 2024-08-19 | End: 2024-08-19

## 2024-08-19 RX ORDER — SULFAMETHOXAZOLE AND TRIMETHOPRIM 800; 160 MG/1; MG/1
1 TABLET ORAL 2 TIMES DAILY
Qty: 20 TABLET | Refills: 0 | Status: SHIPPED | OUTPATIENT
Start: 2024-08-19 | End: 2024-08-29

## 2024-08-19 RX ORDER — LIDOCAINE HYDROCHLORIDE 20 MG/ML
JELLY TOPICAL ONCE
OUTPATIENT
Start: 2024-08-19 | End: 2024-08-19

## 2024-08-19 RX ORDER — BACITRACIN ZINC AND POLYMYXIN B SULFATE 500; 1000 [USP'U]/G; [USP'U]/G
OINTMENT TOPICAL ONCE
OUTPATIENT
Start: 2024-08-19 | End: 2024-08-19

## 2024-08-19 RX ORDER — GENTAMICIN SULFATE 1 MG/G
OINTMENT TOPICAL ONCE
OUTPATIENT
Start: 2024-08-19 | End: 2024-08-19

## 2024-08-19 RX ORDER — LIDOCAINE 40 MG/G
CREAM TOPICAL ONCE
OUTPATIENT
Start: 2024-08-19 | End: 2024-08-19

## 2024-08-19 RX ORDER — TRIAMCINOLONE ACETONIDE 1 MG/G
OINTMENT TOPICAL ONCE
OUTPATIENT
Start: 2024-08-19 | End: 2024-08-19

## 2024-08-19 RX ADMIN — LIDOCAINE HYDROCHLORIDE 40 ML: 40 SOLUTION TOPICAL at 09:49

## 2024-08-19 NOTE — PLAN OF CARE
Problem: Chronic Conditions and Co-morbidities  Goal: Patient's chronic conditions and co-morbidity symptoms are monitored and maintained or improved  Outcome: Progressing     Problem: Cognitive:  Goal: Knowledge of wound care  Description: Knowledge of wound care  Outcome: Progressing  Goal: Understands risk factors for wounds  Description: Understands risk factors for wounds  Outcome: Progressing     Problem: Wound:  Goal: Will show signs of wound healing; wound closure and no evidence of infection  Description: Will show signs of wound healing; wound closure and no evidence of infection  Outcome: Progressing     Problem: Venous:  Goal: Signs of wound healing will improve  Description: Signs of wound healing will improve  Outcome: Progressing     Problem: Compression therapy:  Goal: Will be free from complications associated with compression therapy  Description: Will be free from complications associated with compression therapy  Outcome: Progressing     Problem: Falls - Risk of:  Goal: Will remain free from falls  Description: Will remain free from falls  Outcome: Progressing

## 2024-08-19 NOTE — PROGRESS NOTES
Wound Healing Center  History and Physical/Consultation  Podiatry    Referring Physician : Oscar Knutson APRN - NP  Dom Pérez  MEDICAL RECORD NUMBER:  [id number]  AGE: 68 y.o.   GENDER: male  : 1955  EPISODE DATE:  2024  Subjective:     Chief Complaint   Patient presents with    Wound Check     Bilateral lower leg wounds         HISTORY of PRESENT ILLNESS HPI     Dom Pérez is a 68 y.o. male who presents today for wound/ulcer evaluation.   History of Wound Context:  The patient has had a wound of bilateral lower legs which was first noted approximately >1 month.  This has been treated locally. On their initial visit to the wound healing center, 24 ,  the patient has noted that the wound has been improving.  The patient has had similar previous wounds in the past.      Pt is on abx at time of initial visit.      Wound/Ulcer Pain Timing/Severity: none  Quality of pain: sharp  Severity:  5 / 10   Modifying Factors: None  Associated Signs/Symptoms: edema    Ulcer Identification:  Ulcer Type: venous  Contributing Factors: venous stasis    Diabetic/Pressure/Non Pressure Ulcers onl y:  Ulcer: Non-Pressure ulcer, fat layer exposed    24  Compression bandage continue  Rx PO Bactrim DS  Wounds improving  FU 1 week    If patient has diabetic lower extremity wounds  Witt Classification of diabetic lower extremity wounds:    Grade Description   []  0 No open wound   [x]  1 Superficial ulcer involving the full skin thickness   []  2 Deep ulcer involves ligament, tendon, joint capsule, or fascia  No bone involvement or abscess presence   []  3 Deep Ulcer with abcess formation and/or osteomyelitis   []  4 Localized gangrene   []  5 Extensive gangrene of the foot     Wound: N/A        PAST MEDICAL HISTORY      Diagnosis Date    Alcohol abuse     Atrial fibrillation (HCC)     CAD (coronary artery disease)     COPD (chronic obstructive pulmonary disease) (HCC)     Depression     Fracture of

## 2024-08-20 NOTE — DISCHARGE INSTRUCTIONS
Visit Discharge/Physician Orders     Discharge condition: Stable     Assessment of pain at discharge:     Anesthetic used:      Discharge to: Home     Left via:Private automobile     Accompanied by: accompanied by self     ECF/HHA: referral sent to [facility]     Dressing Orders: Cleanse wound to bilateral lower leg with normal saline, apply ALGINATE to wound bed and cover with ABD pad and wrap with coban 2 , change twice weekly (Mondays at clinic and Thursdays).   Cleanse wound to right second toe with normal saline, apply xeroform to wound bed and cover with ABD pad and roll gauze- adhere with tape, change daily.    Home health- please change Monday 9/2/24 d/t office being closed for holiday     Treatment Orders:  8/19 culture reviewed, antibitoic sent to [facility]- please  and take as ordered  EAT DIET WITH PROTEINS AND VITAMIN C  TAKE A MULTIVITAMIN DAILY IF NOT CONTRAINDICATED       [facility] followup visit ________2 weeks_____________________  (Please note your next appointment above and if you are unable to keep, kindly give a 24 hour notice. Thank you.)     Physician signature:__________________________        If you experience any of the following, please call the Wound Care Center during business hours:     * Increase in Pain  * Temperature over 101  * Increase in drainage from your wound  * Drainage with a foul odor  * Bleeding  * Increase in swelling  * Need for compression bandage changes due to slippage, breakthrough drainage.     If you need medical attention outside of the business hours of the Wound Care Centers please contact your PCP or go to the nearest emergency room.

## 2024-08-26 ENCOUNTER — HOSPITAL ENCOUNTER (OUTPATIENT)
Dept: WOUND CARE | Age: 69
Discharge: HOME | End: 2024-08-26
Attending: PODIATRIST
Payer: MEDICARE

## 2024-08-26 VITALS
DIASTOLIC BLOOD PRESSURE: 69 MMHG | WEIGHT: 260 LBS | HEIGHT: 72 IN | HEART RATE: 92 BPM | BODY MASS INDEX: 35.21 KG/M2 | TEMPERATURE: 97 F | SYSTOLIC BLOOD PRESSURE: 114 MMHG | RESPIRATION RATE: 20 BRPM

## 2024-08-26 DIAGNOSIS — I87.2 VENOUS STASIS DERMATITIS: Primary | ICD-10-CM

## 2024-08-26 PROCEDURE — 11042 DBRDMT SUBQ TIS 1ST 20SQCM/<: CPT

## 2024-08-26 PROCEDURE — 11045 DBRDMT SUBQ TISS EACH ADDL: CPT

## 2024-08-26 RX ORDER — LIDOCAINE HYDROCHLORIDE 40 MG/ML
SOLUTION TOPICAL ONCE
Status: COMPLETED | OUTPATIENT
Start: 2024-08-26 | End: 2024-08-26

## 2024-08-26 RX ORDER — LIDOCAINE 40 MG/G
CREAM TOPICAL ONCE
OUTPATIENT
Start: 2024-08-26 | End: 2024-08-26

## 2024-08-26 RX ORDER — MUPIROCIN 20 MG/G
OINTMENT TOPICAL ONCE
OUTPATIENT
Start: 2024-08-26 | End: 2024-08-26

## 2024-08-26 RX ORDER — CLOBETASOL PROPIONATE 0.5 MG/G
OINTMENT TOPICAL ONCE
OUTPATIENT
Start: 2024-08-26 | End: 2024-08-26

## 2024-08-26 RX ORDER — LIDOCAINE 50 MG/G
OINTMENT TOPICAL ONCE
OUTPATIENT
Start: 2024-08-26 | End: 2024-08-26

## 2024-08-26 RX ORDER — BETAMETHASONE DIPROPIONATE 0.5 MG/G
CREAM TOPICAL ONCE
OUTPATIENT
Start: 2024-08-26 | End: 2024-08-26

## 2024-08-26 RX ORDER — GENTAMICIN SULFATE 1 MG/G
OINTMENT TOPICAL ONCE
OUTPATIENT
Start: 2024-08-26 | End: 2024-08-26

## 2024-08-26 RX ORDER — LIDOCAINE HYDROCHLORIDE 40 MG/ML
SOLUTION TOPICAL ONCE
OUTPATIENT
Start: 2024-08-26 | End: 2024-08-26

## 2024-08-26 RX ORDER — LIDOCAINE HYDROCHLORIDE 20 MG/ML
JELLY TOPICAL ONCE
OUTPATIENT
Start: 2024-08-26 | End: 2024-08-26

## 2024-08-26 RX ORDER — BACITRACIN ZINC AND POLYMYXIN B SULFATE 500; 1000 [USP'U]/G; [USP'U]/G
OINTMENT TOPICAL ONCE
OUTPATIENT
Start: 2024-08-26 | End: 2024-08-26

## 2024-08-26 RX ORDER — GINSENG 100 MG
CAPSULE ORAL ONCE
OUTPATIENT
Start: 2024-08-26 | End: 2024-08-26

## 2024-08-26 RX ORDER — SODIUM CHLOR/HYPOCHLOROUS ACID 0.033 %
SOLUTION, IRRIGATION IRRIGATION ONCE
OUTPATIENT
Start: 2024-08-26 | End: 2024-08-26

## 2024-08-26 RX ORDER — TRIAMCINOLONE ACETONIDE 1 MG/G
OINTMENT TOPICAL ONCE
OUTPATIENT
Start: 2024-08-26 | End: 2024-08-26

## 2024-08-26 RX ORDER — NEOMYCIN/BACITRACIN/POLYMYXINB 3.5-400-5K
OINTMENT (GRAM) TOPICAL ONCE
OUTPATIENT
Start: 2024-08-26 | End: 2024-08-26

## 2024-08-26 RX ORDER — SILVER SULFADIAZINE 10 MG/G
CREAM TOPICAL ONCE
OUTPATIENT
Start: 2024-08-26 | End: 2024-08-26

## 2024-08-26 RX ADMIN — LIDOCAINE HYDROCHLORIDE 10 ML: 40 SOLUTION TOPICAL at 09:18

## 2024-08-26 ASSESSMENT — PAIN DESCRIPTION - ONSET: ONSET: ON-GOING

## 2024-08-26 ASSESSMENT — PAIN DESCRIPTION - PAIN TYPE: TYPE: CHRONIC PAIN

## 2024-08-26 ASSESSMENT — PAIN SCALES - GENERAL: PAINLEVEL_OUTOF10: 3

## 2024-08-26 ASSESSMENT — PAIN DESCRIPTION - DESCRIPTORS: DESCRIPTORS: BURNING

## 2024-08-26 ASSESSMENT — PAIN DESCRIPTION - ORIENTATION: ORIENTATION: LEFT

## 2024-08-26 ASSESSMENT — PAIN DESCRIPTION - LOCATION: LOCATION: LEG

## 2024-08-26 ASSESSMENT — PAIN DESCRIPTION - FREQUENCY: FREQUENCY: INTERMITTENT

## 2024-08-26 ASSESSMENT — PAIN - FUNCTIONAL ASSESSMENT: PAIN_FUNCTIONAL_ASSESSMENT: PREVENTS OR INTERFERES SOME ACTIVE ACTIVITIES AND ADLS

## 2024-08-26 NOTE — PROGRESS NOTES
Wound Healing Center  History and Physical/Consultation  Podiatry    Referring Physician : Oscar Knutson APRN - NP  Dom Pérez  MEDICAL RECORD NUMBER:  [id number]  AGE: 69 y.o.   GENDER: male  : 1955  EPISODE DATE:  2024  Subjective:     Chief Complaint   Patient presents with    Wound Check     Bilateral legs         HISTORY of PRESENT ILLNESS HPI     Dom Pérez is a 69 y.o. male who presents today for wound/ulcer evaluation.   History of Wound Context:  The patient has had a wound of bilateral lower legs which was first noted approximately >1 month.  This has been treated locally. On their initial visit to the wound healing center, 24 ,  the patient has noted that the wound has been improving.  The patient has had similar previous wounds in the past.      Pt is on abx at time of initial visit.    24 - coban 2 compression  Wounds improving  FU 2 weeks      Wound/Ulcer Pain Timing/Severity: none  Quality of pain: sharp  Severity:  5 / 10   Modifying Factors: None  Associated Signs/Symptoms: edema    Ulcer Identification:  Ulcer Type: venous  Contributing Factors: venous stasis    Diabetic/Pressure/Non Pressure Ulcers onl y:  Ulcer: Non-Pressure ulcer, fat layer exposed    24  Compression bandage continue  Rx PO Bactrim DS  Wounds improving  FU 1 week    If patient has diabetic lower extremity wounds  Witt Classification of diabetic lower extremity wounds:    Grade Description   []  0 No open wound   [x]  1 Superficial ulcer involving the full skin thickness   []  2 Deep ulcer involves ligament, tendon, joint capsule, or fascia  No bone involvement or abscess presence   []  3 Deep Ulcer with abcess formation and/or osteomyelitis   []  4 Localized gangrene   []  5 Extensive gangrene of the foot     Wound: N/A        PAST MEDICAL HISTORY      Diagnosis Date    Alcohol abuse     Atrial fibrillation (HCC)     CAD (coronary artery disease)     COPD (chronic obstructive  pain at discharge:     Anesthetic used:      Discharge to: Home     Left via:Private automobile     Accompanied by: accompanied by self     ECF/HHA: referral sent to [facility]     Dressing Orders: Cleanse wound to bilateral lower leg with normal saline, apply ALGINATE to wound bed and cover with ABD pad and wrap with coban 2 , change twice weekly (Mondays at clinic and Thursdays).   Cleanse wound to right second toe with normal saline, apply xeroform to wound bed and cover with ABD pad and roll gauze- adhere with tape, change daily.    Home health- please change Monday 9/2/24 d/t office being closed for holiday     Treatment Orders:  8/19 culture reviewed, antibitoic sent to [facility]- please  and take as ordered  EAT DIET WITH PROTEINS AND VITAMIN C  TAKE A MULTIVITAMIN DAILY IF NOT CONTRAINDICATED       [facility] followup visit ________2 weeks_____________________  (Please note your next appointment above and if you are unable to keep, kindly give a 24 hour notice. Thank you.)     Physician signature:__________________________        If you experience any of the following, please call the Wound Care Center during business hours:     * Increase in Pain  * Temperature over 101  * Increase in drainage from your wound  * Drainage with a foul odor  * Bleeding  * Increase in swelling  * Need for compression bandage changes due to slippage, breakthrough drainage.     If you need medical attention outside of the business hours of the Wound Care Centers please contact your PCP or go to the nearest emergency room.        Electronically signed by Dhaval Wynne DPM on 8/26/2024 at 9:46 AM

## 2024-08-26 NOTE — PLAN OF CARE
Problem: Chronic Conditions and Co-morbidities  Goal: Patient's chronic conditions and co-morbidity symptoms are monitored and maintained or improved  Outcome: Progressing     Problem: Cognitive:  Goal: Knowledge of wound care  Description: Knowledge of wound care  Outcome: Progressing  Goal: Understands risk factors for wounds  Description: Understands risk factors for wounds  Outcome: Progressing     Problem: Wound:  Goal: Will show signs of wound healing; wound closure and no evidence of infection  Description: Will show signs of wound healing; wound closure and no evidence of infection  Outcome: Progressing     Problem: Compression therapy:  Goal: Will be free from complications associated with compression therapy  Description: Will be free from complications associated with compression therapy  Outcome: Progressing     Problem: Falls - Risk of:  Goal: Will remain free from falls  Description: Will remain free from falls  Outcome: Progressing

## 2024-09-09 ENCOUNTER — HOSPITAL ENCOUNTER (OUTPATIENT)
Dept: WOUND CARE | Age: 69
Discharge: HOME | End: 2024-09-09
Attending: PODIATRIST
Payer: MEDICARE

## 2024-09-09 VITALS
RESPIRATION RATE: 20 BRPM | DIASTOLIC BLOOD PRESSURE: 60 MMHG | TEMPERATURE: 96 F | SYSTOLIC BLOOD PRESSURE: 116 MMHG | HEART RATE: 68 BPM

## 2024-09-09 DIAGNOSIS — I87.2 VENOUS STASIS DERMATITIS: Primary | ICD-10-CM

## 2024-09-09 DIAGNOSIS — L97.929 VENOUS STASIS ULCERS OF BOTH LOWER EXTREMITIES (HCC): ICD-10-CM

## 2024-09-09 DIAGNOSIS — L97.919 VENOUS STASIS ULCERS OF BOTH LOWER EXTREMITIES (HCC): ICD-10-CM

## 2024-09-09 DIAGNOSIS — I83.029 VENOUS STASIS ULCERS OF BOTH LOWER EXTREMITIES (HCC): ICD-10-CM

## 2024-09-09 DIAGNOSIS — I83.019 VENOUS STASIS ULCERS OF BOTH LOWER EXTREMITIES (HCC): ICD-10-CM

## 2024-09-09 PROCEDURE — 11042 DBRDMT SUBQ TIS 1ST 20SQCM/<: CPT | Performed by: NURSE PRACTITIONER

## 2024-09-09 PROCEDURE — 11042 DBRDMT SUBQ TIS 1ST 20SQCM/<: CPT

## 2024-09-09 RX ORDER — LIDOCAINE 50 MG/G
OINTMENT TOPICAL ONCE
OUTPATIENT
Start: 2024-09-09 | End: 2024-09-09

## 2024-09-09 RX ORDER — CLOBETASOL PROPIONATE 0.5 MG/G
OINTMENT TOPICAL ONCE
OUTPATIENT
Start: 2024-09-09 | End: 2024-09-09

## 2024-09-09 RX ORDER — BETAMETHASONE DIPROPIONATE 0.5 MG/G
CREAM TOPICAL ONCE
OUTPATIENT
Start: 2024-09-09 | End: 2024-09-09

## 2024-09-09 RX ORDER — LIDOCAINE HYDROCHLORIDE 40 MG/ML
SOLUTION TOPICAL ONCE
Status: DISCONTINUED | OUTPATIENT
Start: 2024-09-09 | End: 2024-09-10 | Stop reason: HOSPADM

## 2024-09-09 RX ORDER — TRIAMCINOLONE ACETONIDE 1 MG/G
OINTMENT TOPICAL ONCE
OUTPATIENT
Start: 2024-09-09 | End: 2024-09-09

## 2024-09-09 RX ORDER — GENTAMICIN SULFATE 1 MG/G
OINTMENT TOPICAL ONCE
OUTPATIENT
Start: 2024-09-09 | End: 2024-09-09

## 2024-09-09 RX ORDER — MUPIROCIN 20 MG/G
OINTMENT TOPICAL ONCE
OUTPATIENT
Start: 2024-09-09 | End: 2024-09-09

## 2024-09-09 RX ORDER — LIDOCAINE 40 MG/G
CREAM TOPICAL ONCE
OUTPATIENT
Start: 2024-09-09 | End: 2024-09-09

## 2024-09-09 RX ORDER — NEOMYCIN/BACITRACIN/POLYMYXINB 3.5-400-5K
OINTMENT (GRAM) TOPICAL ONCE
OUTPATIENT
Start: 2024-09-09 | End: 2024-09-09

## 2024-09-09 RX ORDER — GINSENG 100 MG
CAPSULE ORAL ONCE
OUTPATIENT
Start: 2024-09-09 | End: 2024-09-09

## 2024-09-09 RX ORDER — BACITRACIN ZINC AND POLYMYXIN B SULFATE 500; 1000 [USP'U]/G; [USP'U]/G
OINTMENT TOPICAL ONCE
OUTPATIENT
Start: 2024-09-09 | End: 2024-09-09

## 2024-09-09 RX ORDER — SILVER SULFADIAZINE 10 MG/G
CREAM TOPICAL ONCE
OUTPATIENT
Start: 2024-09-09 | End: 2024-09-09

## 2024-09-09 RX ORDER — LIDOCAINE HYDROCHLORIDE 20 MG/ML
JELLY TOPICAL ONCE
OUTPATIENT
Start: 2024-09-09 | End: 2024-09-09

## 2024-09-09 RX ORDER — LIDOCAINE HYDROCHLORIDE 40 MG/ML
SOLUTION TOPICAL ONCE
OUTPATIENT
Start: 2024-09-09 | End: 2024-09-09

## 2024-09-09 RX ORDER — SODIUM CHLOR/HYPOCHLOROUS ACID 0.033 %
SOLUTION, IRRIGATION IRRIGATION ONCE
OUTPATIENT
Start: 2024-09-09 | End: 2024-09-09

## 2024-09-16 ENCOUNTER — HOSPITAL ENCOUNTER (OUTPATIENT)
Dept: WOUND CARE | Age: 69
Discharge: HOME | End: 2024-09-16
Attending: PODIATRIST
Payer: MEDICARE

## 2024-09-16 VITALS
HEIGHT: 72 IN | WEIGHT: 260 LBS | BODY MASS INDEX: 35.21 KG/M2 | DIASTOLIC BLOOD PRESSURE: 72 MMHG | RESPIRATION RATE: 20 BRPM | SYSTOLIC BLOOD PRESSURE: 121 MMHG | TEMPERATURE: 96.5 F | HEART RATE: 79 BPM

## 2024-09-16 DIAGNOSIS — I87.2 VENOUS STASIS DERMATITIS: Primary | ICD-10-CM

## 2024-09-16 PROCEDURE — 11042 DBRDMT SUBQ TIS 1ST 20SQCM/<: CPT

## 2024-09-16 RX ORDER — LIDOCAINE HYDROCHLORIDE 20 MG/ML
JELLY TOPICAL ONCE
OUTPATIENT
Start: 2024-09-16 | End: 2024-09-16

## 2024-09-16 RX ORDER — NEOMYCIN/BACITRACIN/POLYMYXINB 3.5-400-5K
OINTMENT (GRAM) TOPICAL ONCE
OUTPATIENT
Start: 2024-09-16 | End: 2024-09-16

## 2024-09-16 RX ORDER — CLOBETASOL PROPIONATE 0.5 MG/G
OINTMENT TOPICAL ONCE
OUTPATIENT
Start: 2024-09-16 | End: 2024-09-16

## 2024-09-16 RX ORDER — GENTAMICIN SULFATE 1 MG/G
OINTMENT TOPICAL ONCE
OUTPATIENT
Start: 2024-09-16 | End: 2024-09-16

## 2024-09-16 RX ORDER — TRIAMCINOLONE ACETONIDE 1 MG/G
OINTMENT TOPICAL ONCE
OUTPATIENT
Start: 2024-09-16 | End: 2024-09-16

## 2024-09-16 RX ORDER — LIDOCAINE 40 MG/G
CREAM TOPICAL ONCE
OUTPATIENT
Start: 2024-09-16 | End: 2024-09-16

## 2024-09-16 RX ORDER — BETAMETHASONE DIPROPIONATE 0.5 MG/G
CREAM TOPICAL ONCE
OUTPATIENT
Start: 2024-09-16 | End: 2024-09-16

## 2024-09-16 RX ORDER — LIDOCAINE 50 MG/G
OINTMENT TOPICAL ONCE
OUTPATIENT
Start: 2024-09-16 | End: 2024-09-16

## 2024-09-16 RX ORDER — SILVER SULFADIAZINE 10 MG/G
CREAM TOPICAL ONCE
OUTPATIENT
Start: 2024-09-16 | End: 2024-09-16

## 2024-09-16 RX ORDER — BACITRACIN ZINC AND POLYMYXIN B SULFATE 500; 1000 [USP'U]/G; [USP'U]/G
OINTMENT TOPICAL ONCE
OUTPATIENT
Start: 2024-09-16 | End: 2024-09-16

## 2024-09-16 RX ORDER — LIDOCAINE HYDROCHLORIDE 40 MG/ML
SOLUTION TOPICAL ONCE
OUTPATIENT
Start: 2024-09-16 | End: 2024-09-16

## 2024-09-16 RX ORDER — SODIUM CHLOR/HYPOCHLOROUS ACID 0.033 %
SOLUTION, IRRIGATION IRRIGATION ONCE
OUTPATIENT
Start: 2024-09-16 | End: 2024-09-16

## 2024-09-16 RX ORDER — LIDOCAINE HYDROCHLORIDE 40 MG/ML
SOLUTION TOPICAL ONCE
Status: COMPLETED | OUTPATIENT
Start: 2024-09-16 | End: 2024-09-16

## 2024-09-16 RX ORDER — MUPIROCIN 20 MG/G
OINTMENT TOPICAL ONCE
OUTPATIENT
Start: 2024-09-16 | End: 2024-09-16

## 2024-09-16 RX ORDER — GINSENG 100 MG
CAPSULE ORAL ONCE
OUTPATIENT
Start: 2024-09-16 | End: 2024-09-16

## 2024-09-16 RX ADMIN — LIDOCAINE HYDROCHLORIDE 10 ML: 40 SOLUTION TOPICAL at 09:19

## 2024-09-26 NOTE — DISCHARGE INSTRUCTIONS
Visit Discharge/Physician Orders     Discharge condition: Stable     Assessment of pain at discharge:     Anesthetic used:      Discharge to: Home     Left via:Private automobile     Accompanied by: accompanied by self     ECF/HHA: referral sent to Center     Dressing Orders: healed- apply spandi      Treatment Orders:  8/19 culture reviewed, antibitoic sent to [facility]- please  and take as ordered  9/9 compression garment ordered  EAT DIET WITH PROTEINS AND VITAMIN C  TAKE A MULTIVITAMIN DAILY IF NOT CONTRAINDICATED       [facility] followup visit _______as needed_____________________  (Please note your next appointment above and if you are unable to keep, kindly give a 24 hour notice. Thank you.)     Physician signature:__________________________        If you experience any of the following, please call the Wound Care Center during business hours:     * Increase in Pain  * Temperature over 101  * Increase in drainage from your wound  * Drainage with a foul odor  * Bleeding  * Increase in swelling  * Need for compression bandage changes due to slippage, breakthrough drainage.     If you need medical attention outside of the business hours of the Wound Care Centers please contact your PCP or go to the nearest emergency room.

## 2024-09-30 ENCOUNTER — HOSPITAL ENCOUNTER (OUTPATIENT)
Dept: WOUND CARE | Age: 69
Discharge: HOME | End: 2024-09-30
Attending: PODIATRIST
Payer: MEDICARE

## 2024-09-30 VITALS
HEART RATE: 50 BPM | DIASTOLIC BLOOD PRESSURE: 85 MMHG | TEMPERATURE: 96.9 F | RESPIRATION RATE: 20 BRPM | BODY MASS INDEX: 35.21 KG/M2 | HEIGHT: 72 IN | WEIGHT: 260 LBS | SYSTOLIC BLOOD PRESSURE: 145 MMHG

## 2024-09-30 DIAGNOSIS — I87.2 VENOUS STASIS DERMATITIS: Primary | ICD-10-CM

## 2024-09-30 PROCEDURE — 99212 OFFICE O/P EST SF 10 MIN: CPT

## 2024-09-30 RX ORDER — MUPIROCIN 20 MG/G
OINTMENT TOPICAL ONCE
OUTPATIENT
Start: 2024-09-30 | End: 2024-09-30

## 2024-09-30 RX ORDER — SODIUM CHLOR/HYPOCHLOROUS ACID 0.033 %
SOLUTION, IRRIGATION IRRIGATION ONCE
OUTPATIENT
Start: 2024-09-30 | End: 2024-09-30

## 2024-09-30 RX ORDER — GINSENG 100 MG
CAPSULE ORAL ONCE
OUTPATIENT
Start: 2024-09-30 | End: 2024-09-30

## 2024-09-30 RX ORDER — LIDOCAINE 50 MG/G
OINTMENT TOPICAL ONCE
OUTPATIENT
Start: 2024-09-30 | End: 2024-09-30

## 2024-09-30 RX ORDER — SILVER SULFADIAZINE 10 MG/G
CREAM TOPICAL ONCE
OUTPATIENT
Start: 2024-09-30 | End: 2024-09-30

## 2024-09-30 RX ORDER — BETAMETHASONE DIPROPIONATE 0.5 MG/G
CREAM TOPICAL ONCE
OUTPATIENT
Start: 2024-09-30 | End: 2024-09-30

## 2024-09-30 RX ORDER — LIDOCAINE HYDROCHLORIDE 40 MG/ML
SOLUTION TOPICAL ONCE
OUTPATIENT
Start: 2024-09-30 | End: 2024-09-30

## 2024-09-30 RX ORDER — CLOBETASOL PROPIONATE 0.5 MG/G
OINTMENT TOPICAL ONCE
OUTPATIENT
Start: 2024-09-30 | End: 2024-09-30

## 2024-09-30 RX ORDER — LIDOCAINE HYDROCHLORIDE 20 MG/ML
JELLY TOPICAL ONCE
OUTPATIENT
Start: 2024-09-30 | End: 2024-09-30

## 2024-09-30 RX ORDER — LIDOCAINE 40 MG/G
CREAM TOPICAL ONCE
OUTPATIENT
Start: 2024-09-30 | End: 2024-09-30

## 2024-09-30 RX ORDER — NEOMYCIN/BACITRACIN/POLYMYXINB 3.5-400-5K
OINTMENT (GRAM) TOPICAL ONCE
OUTPATIENT
Start: 2024-09-30 | End: 2024-09-30

## 2024-09-30 RX ORDER — GENTAMICIN SULFATE 1 MG/G
OINTMENT TOPICAL ONCE
OUTPATIENT
Start: 2024-09-30 | End: 2024-09-30

## 2024-09-30 RX ORDER — TRIAMCINOLONE ACETONIDE 1 MG/G
OINTMENT TOPICAL ONCE
OUTPATIENT
Start: 2024-09-30 | End: 2024-09-30

## 2024-09-30 RX ORDER — BACITRACIN ZINC AND POLYMYXIN B SULFATE 500; 1000 [USP'U]/G; [USP'U]/G
OINTMENT TOPICAL ONCE
OUTPATIENT
Start: 2024-09-30 | End: 2024-09-30

## 2024-09-30 NOTE — PROGRESS NOTES
Wound Healing Center  History and Physical/Consultation  Podiatry    Referring Physician : Oscar Knutson APRN - NP  Dom Pérez  MEDICAL RECORD NUMBER:  [id number]  AGE: 69 y.o.   GENDER: male  : 1955  EPISODE DATE:  2024  Subjective:     Chief Complaint   Patient presents with    Wound Check     Both legs         HISTORY of PRESENT ILLNESS HPI     Dom Pérez is a 69 y.o. male who presents today for wound/ulcer evaluation.   History of Wound Context:  The patient has had a wound of bilateral lower legs which was first noted approximately >1 month.  This has been treated locally. On their initial visit to the wound healing center, 24 ,  the patient has noted that the wound has been improving.  The patient has had similar previous wounds in the past.      Pt is on abx at time of initial visit.    24 - coban 2 compression  Wounds improving  FU 2 weeks    24 - xeroform and compression bandage  FU 2 weeks  Wounds improving.    24 - wounds healed.  FU PRN  Compression garment      Wound/Ulcer Pain Timing/Severity: none  Quality of pain: sharp  Severity:  5 / 10   Modifying Factors: None  Associated Signs/Symptoms: edema    Ulcer Identification:  Ulcer Type: venous  Contributing Factors: venous stasis    Diabetic/Pressure/Non Pressure Ulcers onl y:  Ulcer: Non-Pressure ulcer, fat layer exposed    24  Compression bandage continue  Rx PO Bactrim DS  Wounds improving  FU 1 week    If patient has diabetic lower extremity wounds  Witt Classification of diabetic lower extremity wounds:    Grade Description   []  0 No open wound   [x]  1 Superficial ulcer involving the full skin thickness   []  2 Deep ulcer involves ligament, tendon, joint capsule, or fascia  No bone involvement or abscess presence   []  3 Deep Ulcer with abcess formation and/or osteomyelitis   []  4 Localized gangrene   []  5 Extensive gangrene of the foot     Wound: N/A        PAST MEDICAL HISTORY

## 2024-10-09 NOTE — ED NOTES
Problem: Adult Inpatient Plan of Care  Goal: Plan of Care Review  10/9/2024 0450 by Twila Diaz RN  Outcome: Progressing  10/8/2024 2316 by Twila Diaz RN  Outcome: Progressing  Goal: Patient-Specific Goal (Individualized)  10/9/2024 0450 by Twila Diaz RN  Outcome: Progressing  Flowsheets (Taken 10/9/2024 0450)  Patient/Family-Specific Goals (Include Timeframe): I want to breastfeed  10/8/2024 2316 by Twila Diaz RN  Outcome: Progressing  Goal: Absence of Hospital-Acquired Illness or Injury  10/9/2024 0450 by Twila Diaz RN  Outcome: Progressing  10/8/2024 2316 by Twila Diaz RN  Outcome: Progressing  Goal: Optimal Comfort and Wellbeing  10/9/2024 0450 by Twila Diaz RN  Outcome: Progressing  10/8/2024 2316 by Twila Diaz RN  Outcome: Progressing  Goal: Readiness for Transition of Care  10/9/2024 0450 by Twila Diaz RN  Outcome: Progressing  10/8/2024 2316 by Twila Diaz RN  Outcome: Progressing     Problem: Bariatric Environmental Safety  Goal: Safety Maintained with Care  10/9/2024 0450 by Twila Diaz RN  Outcome: Progressing  10/8/2024 2316 by Twila Diaz RN  Outcome: Progressing     Problem: Infection  Goal: Absence of Infection Signs and Symptoms  10/9/2024 0450 by Twila Diaz RN  Outcome: Progressing  10/8/2024 2316 by Twila Diaz RN  Outcome: Progressing     Problem:  Fall Injury Risk  Goal: Absence of Fall, Infant Drop and Related Injury  10/9/2024 0450 by Twila Diaz, RN  Outcome: Progressing  10/8/2024 2316 by Twila Diaz RN  Outcome: Progressing     Problem: Labor  Goal: Hemostasis  10/9/2024 0451 by Twila Diaz RN  Outcome: Met  10/9/2024 0450 by Twila Diaz RN  Outcome: Progressing  10/8/2024 2316 by Twila Diaz RN  Outcome: Progressing  Goal: Stable Fetal Wellbeing  10/9/2024 0451 by Twila Diaz RN  Outcome: Met  10/9/2024 0450 by Twila Diaz RN  Outcome: Progressing  10/8/2024 2316 by Twila Diaz, RN  Outcome:  Intubated 7.5 ETT OG placed foster is to SD clear yellow urine return approx 25 ml ABG sent upon completion intubation  100% peep 5 rate [address] [place] [address]  10/12/21 9906 Progressing  Goal: Effective Progression to Delivery  10/9/2024 0451 by Twila Diaz, RN  Outcome: Met  10/9/2024 0450 by Twila Diaz RN  Outcome: Progressing  10/8/2024 2316 by Twila Diaz RN  Outcome: Progressing  Goal: Absence of Infection Signs and Symptoms  10/9/2024 0451 by Twila Diaz, RN  Outcome: Met  10/9/2024 0450 by Twila Diaz RN  Outcome: Progressing  10/8/2024 2316 by Twila Diaz RN  Outcome: Progressing  Goal: Acceptable Pain Control  10/9/2024 0451 by Twila Diaz RN  Outcome: Met  10/9/2024 0450 by Twila Diaz RN  Outcome: Progressing  10/8/2024 2316 by Twila Diaz RN  Outcome: Progressing  Goal: Normal Uterine Contraction Pattern  10/9/2024 0451 by Twila Diaz, RN  Outcome: Met  10/9/2024 0450 by Twila Diaz, RN  Outcome: Progressing  10/8/2024 2316 by Twila Diaz, RN  Outcome: Progressing

## 2025-01-20 ENCOUNTER — HOSPITAL ENCOUNTER (OUTPATIENT)
Dept: WOUND CARE | Age: 70
Discharge: HOME | End: 2025-01-20
Payer: MEDICARE

## 2025-01-20 VITALS
BODY MASS INDEX: 37.25 KG/M2 | HEART RATE: 52 BPM | WEIGHT: 275 LBS | TEMPERATURE: 98.4 F | DIASTOLIC BLOOD PRESSURE: 78 MMHG | SYSTOLIC BLOOD PRESSURE: 147 MMHG | HEIGHT: 72 IN | RESPIRATION RATE: 18 BRPM

## 2025-01-20 DIAGNOSIS — I89.0 LYMPHEDEMA: ICD-10-CM

## 2025-01-20 DIAGNOSIS — I73.9 PAD (PERIPHERAL ARTERY DISEASE) (HCC): ICD-10-CM

## 2025-01-20 DIAGNOSIS — L97.922 CHRONIC ULCER OF LEFT LEG WITH FAT LAYER EXPOSED (HCC): Primary | ICD-10-CM

## 2025-01-20 PROCEDURE — 11042 DBRDMT SUBQ TIS 1ST 20SQCM/<: CPT

## 2025-01-20 PROCEDURE — 87070 CULTURE OTHR SPECIMN AEROBIC: CPT

## 2025-01-20 PROCEDURE — 87205 SMEAR GRAM STAIN: CPT

## 2025-01-20 PROCEDURE — 99213 OFFICE O/P EST LOW 20 MIN: CPT

## 2025-01-20 PROCEDURE — 87077 CULTURE AEROBIC IDENTIFY: CPT

## 2025-01-20 RX ORDER — NEOMYCIN/BACITRACIN/POLYMYXINB 3.5-400-5K
OINTMENT (GRAM) TOPICAL PRN
OUTPATIENT
Start: 2025-01-20

## 2025-01-20 RX ORDER — MUPIROCIN 20 MG/G
OINTMENT TOPICAL PRN
OUTPATIENT
Start: 2025-01-20

## 2025-01-20 RX ORDER — SODIUM CHLOR/HYPOCHLOROUS ACID 0.033 %
SOLUTION, IRRIGATION IRRIGATION PRN
OUTPATIENT
Start: 2025-01-20

## 2025-01-20 RX ORDER — DOXYCYCLINE HYCLATE 100 MG
100 TABLET ORAL 2 TIMES DAILY
Qty: 20 TABLET | Refills: 0 | Status: SHIPPED | OUTPATIENT
Start: 2025-01-20 | End: 2025-01-30

## 2025-01-20 RX ORDER — LIDOCAINE 40 MG/G
CREAM TOPICAL PRN
OUTPATIENT
Start: 2025-01-20

## 2025-01-20 RX ORDER — SILVER SULFADIAZINE 10 MG/G
CREAM TOPICAL PRN
OUTPATIENT
Start: 2025-01-20

## 2025-01-20 RX ORDER — TRIAMCINOLONE ACETONIDE 1 MG/G
OINTMENT TOPICAL PRN
OUTPATIENT
Start: 2025-01-20

## 2025-01-20 RX ORDER — LIDOCAINE 50 MG/G
OINTMENT TOPICAL PRN
OUTPATIENT
Start: 2025-01-20

## 2025-01-20 RX ORDER — BACITRACIN ZINC AND POLYMYXIN B SULFATE 500; 1000 [USP'U]/G; [USP'U]/G
OINTMENT TOPICAL PRN
OUTPATIENT
Start: 2025-01-20

## 2025-01-20 RX ORDER — LIDOCAINE HYDROCHLORIDE 20 MG/ML
JELLY TOPICAL PRN
OUTPATIENT
Start: 2025-01-20

## 2025-01-20 RX ORDER — GINSENG 100 MG
CAPSULE ORAL PRN
OUTPATIENT
Start: 2025-01-20

## 2025-01-20 RX ORDER — CLOBETASOL PROPIONATE 0.5 MG/G
OINTMENT TOPICAL PRN
OUTPATIENT
Start: 2025-01-20

## 2025-01-20 RX ORDER — GENTAMICIN SULFATE 1 MG/G
OINTMENT TOPICAL PRN
OUTPATIENT
Start: 2025-01-20

## 2025-01-20 RX ORDER — BETAMETHASONE DIPROPIONATE 0.5 MG/G
CREAM TOPICAL PRN
OUTPATIENT
Start: 2025-01-20

## 2025-01-20 RX ORDER — LIDOCAINE HYDROCHLORIDE 40 MG/ML
SOLUTION TOPICAL PRN
OUTPATIENT
Start: 2025-01-20

## 2025-01-20 NOTE — PLAN OF CARE
Problem: Cognitive:  Goal: Knowledge of wound care  Description: Knowledge of wound care  Outcome: Progressing  Goal: Understands risk factors for wounds  Description: Understands risk factors for wounds  Outcome: Progressing     Problem: Wound:  Goal: Will show signs of wound healing; wound closure and no evidence of infection  Description: Will show signs of wound healing; wound closure and no evidence of infection  Outcome: Progressing     Problem: Venous:  Goal: Signs of wound healing will improve  Description: Signs of wound healing will improve  Outcome: Progressing     Problem: Compression therapy:  Goal: Will be free from complications associated with compression therapy  Description: Will be free from complications associated with compression therapy  Outcome: Progressing     Problem: Falls - Risk of:  Goal: Will remain free from falls  Description: Will remain free from falls  Outcome: Progressing

## 2025-01-20 NOTE — PROGRESS NOTES
fracture     GERD (gastroesophageal reflux disease)     Hypertension     Seizure (HCC)      Past Surgical History:   Procedure Laterality Date    COLONOSCOPY      CORONARY ARTERY BYPASS GRAFT  3/2/05    x3: LIMA to LAD, SVG to RCA, SVG to diagonal    DIAGNOSTIC CARDIAC CATH LAB PROCEDURE  3/02/05    [facility]: Severe multivessel CAD in patient who presents with STEMI and total occlusion of diagonal branch. Significant disease of proximal LAD and severe disease of dominant RCA.     FINGER SURGERY Left 5/3/2019    CLOSED POSSIBLE LEFT MIDDLE FINGER OPEN REDUCTION INTERNAL FIXATION POSSIBLE PROXIMAL INTERPHALANGEAL JOINT ARTHROPLASTY   ++[facility]++ performed by Abraham Knapp MD at [facility] OR    GASTROSTOMY TUBE PLACEMENT N/A 10/20/2021    EGD WITH NG TUBE PLACEMENT **BEDSIDE** performed by RENETTA Nguyen MD at [facility] ENDOSCOPY    HERNIA REPAIR      double    PAIN MANAGEMENT PROCEDURE N/A 8/2/2023    EPIDURAL STEROID INJECTION L5-L5 UNDER XRAY WITH TLN  **PLEASE KEEP LATE APPOINTMENT** performed by Dia Vizcaino DO at [facility] OR    TRACHEOSTOMY N/A 10/29/2021    TRACHEOTOMY AND PEG performed by Ramsey Velez MD at [facility] OR    UPPER GASTROINTESTINAL ENDOSCOPY N/A 10/29/2021    EGD ESOPHAGOGASTRODUODENOSCOPY PEG TUBE INSERTION performed by Ramsey Velez MD at [facility] OR     History reviewed. No pertinent family history.  Social History     Tobacco Use    Smoking status: Every Day     Current packs/day: 1.00     Average packs/day: 1 pack/day for 45.0 years (45.0 ttl pk-yrs)     Types: Cigars, Cigarettes    Smokeless tobacco: Never    Tobacco comments:     SMOKES CIGARS    Vaping Use    Vaping status: Never Used   Substance Use Topics    Alcohol use: Yes     Comment: reports last drink 2 weeks ago, reports when he drinks he drinks 6-8 doubles/day    Drug use: No     No Known Allergies  Current Outpatient Medications on File Prior to Encounter   Medication Sig Dispense Refill    brexpiprazole (REXULTI) 1 MG TABS

## 2025-01-20 NOTE — DISCHARGE INSTRUCTIONS
Visit Discharge/Physician Orders    Discharge condition: Stable    Assessment of pain at discharge:    Anesthetic used:     Discharge to: Home    Left via:Private automobile    Accompanied by: accompanied by SELF    ECF/[facility]HA:     Dressing Orders:Cleanse wound to left lower leg with normal saline, apply xeroform to wound bed and cover with ABD pad and wrap with coban 2, change weekly.  Apply Aquaphor to dry skin. Apply spandi  to right leg    Treatment Orders:   1/20 Culture taken- antibiotic send to pharmacy    EAT DIET WITH PROTEINS AND VITAMIN C  TAKE A MULTIVITAMIN DAILY IF NOT CONTRAINDICATED      [facility] followup visit _____________________________  (Please note your next appointment above and if you are unable to keep, kindly give a 24 hour notice. Thank you.)    Physician signature:__________________________      If you experience any of the following, please call the Wound Care Center during business hours:    * Increase in Pain  * Temperature over 101  * Increase in drainage from your wound  * Drainage with a foul odor  * Bleeding  * Increase in swelling  * Need for compression bandage changes due to slippage, breakthrough drainage.    If you need medical attention outside of the business hours of the Wound Care Centers please contact your PCP or go to the nearest emergency room.

## 2025-01-23 LAB
MICROORGANISM SPEC CULT: ABNORMAL
MICROORGANISM SPEC CULT: ABNORMAL
MICROORGANISM/AGENT SPEC: ABNORMAL
SERVICE CMNT-IMP: ABNORMAL
SPECIMEN DESCRIPTION: ABNORMAL

## 2025-01-27 ENCOUNTER — HOSPITAL ENCOUNTER (OUTPATIENT)
Dept: WOUND CARE | Age: 70
Discharge: HOME | End: 2025-01-27
Attending: PODIATRIST
Payer: MEDICARE

## 2025-01-27 VITALS
SYSTOLIC BLOOD PRESSURE: 130 MMHG | HEART RATE: 68 BPM | HEIGHT: 72 IN | DIASTOLIC BLOOD PRESSURE: 74 MMHG | BODY MASS INDEX: 37.25 KG/M2 | RESPIRATION RATE: 18 BRPM | TEMPERATURE: 99.1 F | WEIGHT: 275 LBS

## 2025-01-27 DIAGNOSIS — L97.922 CHRONIC ULCER OF LEFT LEG WITH FAT LAYER EXPOSED (HCC): Primary | ICD-10-CM

## 2025-01-27 DIAGNOSIS — I89.0 LYMPHEDEMA: ICD-10-CM

## 2025-01-27 PROCEDURE — 99212 OFFICE O/P EST SF 10 MIN: CPT

## 2025-01-27 RX ORDER — GENTAMICIN SULFATE 1 MG/G
OINTMENT TOPICAL PRN
Status: CANCELLED | OUTPATIENT
Start: 2025-01-27

## 2025-01-27 RX ORDER — TRIAMCINOLONE ACETONIDE 1 MG/G
OINTMENT TOPICAL PRN
Status: CANCELLED | OUTPATIENT
Start: 2025-01-27

## 2025-01-27 RX ORDER — LIDOCAINE 40 MG/G
CREAM TOPICAL PRN
Status: CANCELLED | OUTPATIENT
Start: 2025-01-27

## 2025-01-27 RX ORDER — LIDOCAINE HYDROCHLORIDE 20 MG/ML
JELLY TOPICAL PRN
Status: CANCELLED | OUTPATIENT
Start: 2025-01-27

## 2025-01-27 RX ORDER — GINSENG 100 MG
CAPSULE ORAL PRN
Status: CANCELLED | OUTPATIENT
Start: 2025-01-27

## 2025-01-27 RX ORDER — NEOMYCIN/BACITRACIN/POLYMYXINB 3.5-400-5K
OINTMENT (GRAM) TOPICAL PRN
Status: CANCELLED | OUTPATIENT
Start: 2025-01-27

## 2025-01-27 RX ORDER — SODIUM CHLOR/HYPOCHLOROUS ACID 0.033 %
SOLUTION, IRRIGATION IRRIGATION PRN
Status: CANCELLED | OUTPATIENT
Start: 2025-01-27

## 2025-01-27 RX ORDER — CLOBETASOL PROPIONATE 0.5 MG/G
OINTMENT TOPICAL PRN
Status: CANCELLED | OUTPATIENT
Start: 2025-01-27

## 2025-01-27 RX ORDER — BACITRACIN ZINC AND POLYMYXIN B SULFATE 500; 1000 [USP'U]/G; [USP'U]/G
OINTMENT TOPICAL PRN
Status: CANCELLED | OUTPATIENT
Start: 2025-01-27

## 2025-01-27 RX ORDER — LIDOCAINE HYDROCHLORIDE 40 MG/ML
SOLUTION TOPICAL PRN
Status: CANCELLED | OUTPATIENT
Start: 2025-01-27

## 2025-01-27 RX ORDER — LIDOCAINE 50 MG/G
OINTMENT TOPICAL PRN
Status: CANCELLED | OUTPATIENT
Start: 2025-01-27

## 2025-01-27 RX ORDER — MUPIROCIN 20 MG/G
OINTMENT TOPICAL PRN
Status: CANCELLED | OUTPATIENT
Start: 2025-01-27

## 2025-01-27 RX ORDER — SILVER SULFADIAZINE 10 MG/G
CREAM TOPICAL PRN
Status: CANCELLED | OUTPATIENT
Start: 2025-01-27

## 2025-01-27 RX ORDER — BETAMETHASONE DIPROPIONATE 0.5 MG/G
CREAM TOPICAL PRN
Status: CANCELLED | OUTPATIENT
Start: 2025-01-27

## 2025-01-27 NOTE — PROGRESS NOTES
Wound Healing Center  History and Physical/Consultation  Podiatry    Referring Physician : Oscar Knutson APRN - NP  Dom Pérez  MEDICAL RECORD NUMBER:  [id number]  AGE: 69 y.o.   GENDER: male  : 1955  EPISODE DATE:  2025  Subjective:     Chief Complaint   Patient presents with    Wound Check     LEFT LOWER LEG         HISTORY of PRESENT ILLNESS HPI     Dom Pérez is a 69 y.o. male who presents today for wound/ulcer evaluation.   History of Wound Context:  The patient has had a wound of left leg which was first noted approximately > 1 week.  This has been treated with compression. On their initial visit to the wound healing center, 25 ,  the patient has noted that the wound has been improving.  The patient has had similar previous wounds in the past.      Pt is not on abx at time of initial visit.    25 - wound is healed  Compression bandage  FU as needed.      Wound/Ulcer Pain Timing/Severity: moderate  Quality of pain: N/A  Severity:  2 / 10   Modifying Factors: Pain worsens with walking  Associated Signs/Symptoms: edema    Ulcer Identification:  Ulcer Type: venous  Contributing Factors: edema, venous stasis, and lymphedema    Diabetic/Pressure/Non Pressure Ulcers onl y:  Ulcer: Non-Pressure ulcer, fat layer exposed    If patient has diabetic lower extremity wounds  Witt Classification of diabetic lower extremity wounds:    Grade Description   []  0 No open wound   [x]  1 Superficial ulcer involving the full skin thickness   []  2 Deep ulcer involves ligament, tendon, joint capsule, or fascia  No bone involvement or abscess presence   []  3 Deep Ulcer with abcess formation and/or osteomyelitis   []  4 Localized gangrene   []  5 Extensive gangrene of the foot     Wound: N/A        PAST MEDICAL HISTORY      Diagnosis Date    Alcohol abuse     Atrial fibrillation (HCC)     CAD (coronary artery disease)     COPD (chronic obstructive pulmonary disease) (HCC)     Depression

## 2025-01-27 NOTE — PLAN OF CARE
Problem: Cognitive:  Goal: Knowledge of wound care  Description: Knowledge of wound care  Outcome: Adequate for Discharge  Goal: Understands risk factors for wounds  Description: Understands risk factors for wounds  Outcome: Adequate for Discharge     Problem: Wound:  Goal: Will show signs of wound healing; wound closure and no evidence of infection  Description: Will show signs of wound healing; wound closure and no evidence of infection  Outcome: Adequate for Discharge     Problem: Venous:  Goal: Signs of wound healing will improve  Description: Signs of wound healing will improve  Outcome: Adequate for Discharge     Problem: Compression therapy:  Goal: Will be free from complications associated with compression therapy  Description: Will be free from complications associated with compression therapy  Outcome: Adequate for Discharge     Problem: Falls - Risk of:  Goal: Will remain free from falls  Description: Will remain free from falls  Outcome: Adequate for Discharge

## 2025-04-01 ENCOUNTER — OFFICE VISIT (OUTPATIENT)
Dept: NEUROSURGERY | Age: 70
End: 2025-04-01
Payer: MEDICARE

## 2025-04-01 VITALS — SYSTOLIC BLOOD PRESSURE: 143 MMHG | DIASTOLIC BLOOD PRESSURE: 93 MMHG

## 2025-04-01 DIAGNOSIS — M51.26 PROTRUDED LUMBAR DISC: Primary | Chronic | ICD-10-CM

## 2025-04-01 PROCEDURE — 3077F SYST BP >= 140 MM HG: CPT | Performed by: PHYSICIAN ASSISTANT

## 2025-04-01 PROCEDURE — 1125F AMNT PAIN NOTED PAIN PRSNT: CPT | Performed by: PHYSICIAN ASSISTANT

## 2025-04-01 PROCEDURE — 3080F DIAST BP >= 90 MM HG: CPT | Performed by: PHYSICIAN ASSISTANT

## 2025-04-01 PROCEDURE — 1159F MED LIST DOCD IN RCRD: CPT | Performed by: PHYSICIAN ASSISTANT

## 2025-04-01 PROCEDURE — 99203 OFFICE O/P NEW LOW 30 MIN: CPT | Performed by: PHYSICIAN ASSISTANT

## 2025-04-01 PROCEDURE — 1124F ACP DISCUSS-NO DSCNMKR DOCD: CPT | Performed by: PHYSICIAN ASSISTANT

## 2025-04-01 ASSESSMENT — ENCOUNTER SYMPTOMS
BACK PAIN: 1
ALLERGIC/IMMUNOLOGIC NEGATIVE: 1
EYES NEGATIVE: 1
RESPIRATORY NEGATIVE: 1
GASTROINTESTINAL NEGATIVE: 1

## 2025-04-01 NOTE — PROGRESS NOTES
Subjective   Patient ID: Dom Pérez is a 69 y.o. male.    Back Pain  This is a chronic problem. The current episode started more than 1 year ago. The problem occurs constantly. The problem has been gradually worsening since onset. The pain is present in the lumbar spine. The quality of the pain is described as aching. The pain is at a severity of 8/10. The symptoms are aggravated by twisting, standing and bending. Pertinent negatives include no bladder incontinence. He has tried NSAIDs, heat and ice (ADRY) for the symptoms. The treatment provided mild relief.       Review of Systems   Constitutional: Negative.    HENT: Negative.     Eyes: Negative.    Respiratory: Negative.     Cardiovascular: Negative.    Gastrointestinal: Negative.    Endocrine: Negative.    Genitourinary: Negative.  Negative for bladder incontinence.   Musculoskeletal:  Positive for back pain.   Skin: Negative.    Allergic/Immunologic: Negative.    Neurological: Negative.    Hematological: Negative.    Psychiatric/Behavioral: Negative.            Objective   Physical Exam  Constitutional:       Appearance: Normal appearance.   HENT:      Head: Normocephalic and atraumatic.      Nose: Nose normal.   Eyes:      Pupils: Pupils are equal, round, and reactive to light.   Pulmonary:      Effort: Pulmonary effort is normal.   Abdominal:      General: There is no distension.   Skin:     General: Skin is warm and dry.   Neurological:      Mental Status: He is alert.      GCS: GCS eye subscore is 4. GCS verbal subscore is 5. GCS motor subscore is 6.      Cranial Nerves: Cranial nerves 2-12 are intact.      Sensory: Sensation is intact.      Motor: Motor function is intact.      Gait: Gait abnormal.   Psychiatric:         Mood and Affect: Mood normal.            Assessment   69 year old male with low back pain for years that is severe.  He has not had relief with NSAIDS, or PT.      Plan   We will order lumbar MRI and x-rays.        Jaison ORTIZ

## 2025-04-22 ENCOUNTER — HOSPITAL ENCOUNTER (OUTPATIENT)
Dept: GENERAL RADIOLOGY | Age: 70
Discharge: HOME | End: 2025-04-24
Payer: MEDICARE

## 2025-04-22 ENCOUNTER — HOSPITAL ENCOUNTER (OUTPATIENT)
Dept: GENERAL RADIOLOGY | Age: 70
End: 2025-04-22
Payer: MEDICARE

## 2025-04-22 ENCOUNTER — HOSPITAL ENCOUNTER (OUTPATIENT)
Dept: MRI IMAGING | Age: 70
Discharge: HOME | End: 2025-04-24
Payer: MEDICARE

## 2025-04-22 DIAGNOSIS — M51.26 PROTRUDED LUMBAR DISC: Chronic | ICD-10-CM

## 2025-04-22 PROCEDURE — 72100 X-RAY EXAM L-S SPINE 2/3 VWS: CPT

## 2025-04-22 PROCEDURE — 72120 X-RAY BEND ONLY L-S SPINE: CPT

## 2025-04-22 PROCEDURE — 72148 MRI LUMBAR SPINE W/O DYE: CPT

## 2025-05-29 ENCOUNTER — OFFICE VISIT (OUTPATIENT)
Age: 70
End: 2025-05-29
Payer: MEDICARE

## 2025-05-29 VITALS — DIASTOLIC BLOOD PRESSURE: 63 MMHG | HEART RATE: 50 BPM | SYSTOLIC BLOOD PRESSURE: 123 MMHG

## 2025-05-29 DIAGNOSIS — M48.062 LUMBAR STENOSIS WITH NEUROGENIC CLAUDICATION: Primary | ICD-10-CM

## 2025-05-29 PROCEDURE — 1159F MED LIST DOCD IN RCRD: CPT | Performed by: NEUROLOGICAL SURGERY

## 2025-05-29 PROCEDURE — 3078F DIAST BP <80 MM HG: CPT | Performed by: NEUROLOGICAL SURGERY

## 2025-05-29 PROCEDURE — 1125F AMNT PAIN NOTED PAIN PRSNT: CPT | Performed by: NEUROLOGICAL SURGERY

## 2025-05-29 PROCEDURE — 3074F SYST BP LT 130 MM HG: CPT | Performed by: NEUROLOGICAL SURGERY

## 2025-05-29 PROCEDURE — 99213 OFFICE O/P EST LOW 20 MIN: CPT | Performed by: NEUROLOGICAL SURGERY

## 2025-05-29 PROCEDURE — 1124F ACP DISCUSS-NO DSCNMKR DOCD: CPT | Performed by: NEUROLOGICAL SURGERY

## 2025-05-29 NOTE — PROGRESS NOTES
Patient is here for follow up consult for: back and bilateral leg pain.  The pain is rated as 7/10 and it is interfering with his activities of daily living.     Physical exam  Alert and Oriented X3  PERRLA, EOMI  FRANCISCO 5/5 except 4/5 in LLE  Sensation intact to LT and PP  Reflexes are 2+ and symmetric    A/P: patient is here for follow up for: back pain.  His MRI shows severe foraminal stenosis at L5-S1.  He has failed physical therapy and epidural injections.  I am recommending an L5-S1 posterior lumbar interbody fusion.  He needs a fusion because in order to decompress his nerve roots, I need to remove over 50% of his facets bilaterally.  Risks, benefits and alternatives have been discussed.  He needs to quit smoking       Jerod Blue MD

## 2025-07-01 ENCOUNTER — TRANSCRIBE ORDERS (OUTPATIENT)
Dept: ADMINISTRATIVE | Age: 70
End: 2025-07-01

## 2025-07-01 DIAGNOSIS — Z12.2 ENCOUNTER FOR SCREENING FOR MALIGNANT NEOPLASM OF RESPIRATORY ORGANS: Primary | ICD-10-CM

## 2025-08-27 ENCOUNTER — HOSPITAL ENCOUNTER (OUTPATIENT)
Dept: CT IMAGING | Age: 70
Discharge: HOME | End: 2025-08-27
Payer: MEDICARE

## 2025-08-27 DIAGNOSIS — Z12.2 ENCOUNTER FOR SCREENING FOR MALIGNANT NEOPLASM OF RESPIRATORY ORGANS: ICD-10-CM

## 2025-08-27 PROCEDURE — 71271 CT THORAX LUNG CANCER SCR C-: CPT

## (undated) DEVICE — Device

## (undated) DEVICE — GLOVE ORANGE PI 8   MSG9080

## (undated) DEVICE — TUBING, SUCTION, 1/4" X 10', STRAIGHT: Brand: MEDLINE

## (undated) DEVICE — SET INSTR TRACH

## (undated) DEVICE — 12 ML SYRINGE,LUER-LOCK TIP: Brand: MONOJECT

## (undated) DEVICE — GOWN,SIRUS,FABRNF,XL,20/CS: Brand: MEDLINE

## (undated) DEVICE — COVER,LIGHT HANDLE,FLX,1/PK: Brand: MEDLINE INDUSTRIES, INC.

## (undated) DEVICE — PADDING,UNDERCAST,COTTON, 3X4YD STERILE: Brand: MEDLINE

## (undated) DEVICE — Device: Brand: DEFENDO VALVE AND CONNECTOR KIT

## (undated) DEVICE — APPLICATOR MEDICATED 26 CC SOLUTION HI LT ORNG CHLORAPREP

## (undated) DEVICE — YANKAUER,BULB TIP,W/O VENT,RIGID,STERILE: Brand: MEDLINE

## (undated) DEVICE — 4-PORT MANIFOLD: Brand: NEPTUNE 2

## (undated) DEVICE — STERILE POLYISOPRENE POWDER-FREE SURGICAL GLOVES: Brand: PROTEXIS

## (undated) DEVICE — TRAY ENDO ROOM FLEXIBLE BRONCH

## (undated) DEVICE — DOUBLE BASIN SET: Brand: MEDLINE INDUSTRIES, INC.

## (undated) DEVICE — SYRINGE MED 10ML TRNSLUC BRL PLUNG BLK MRK POLYPR CTRL

## (undated) DEVICE — Z CONVERTED USE 2275207 CLOTH PREP W7.5XL7.5IN 2% CHG SKIN ALC AND RNS FREE

## (undated) DEVICE — DRAPE CARM MINI FOR IMAG SYS INSIGHT FLROSCN

## (undated) DEVICE — TOWEL,OR,DSP,ST,BLUE,STD,6/PK,12PK/CS: Brand: MEDLINE

## (undated) DEVICE — MASK,FACE,MAXFLUIDPROTECT,SHIELD/ERLPS: Brand: MEDLINE

## (undated) DEVICE — SPONGE GZ 4IN 4IN 4 PLY N WVN AVANT

## (undated) DEVICE — CANNULA IV 18GA L15IN BLNT FILL LUERLOCK HUB MJCT

## (undated) DEVICE — GAUZE,SPONGE,4"X4",16PLY,XRAY,STRL,LF: Brand: MEDLINE

## (undated) DEVICE — SPONGE,PEANUT,XRAY,ST,SM,3/8",5/CARD: Brand: MEDLINE INDUSTRIES, INC.

## (undated) DEVICE — BNDG,ELSTC,MATRIX,STRL,2"X5YD,LF,HOOK&LP: Brand: MEDLINE

## (undated) DEVICE — SURGICAL PROCEDURE PACK HND

## (undated) DEVICE — INTENDED FOR TISSUE SEPARATION, AND OTHER PROCEDURES THAT REQUIRE A SHARP SURGICAL BLADE TO PUNCTURE OR CUT.: Brand: BARD-PARKER ® STAINLESS STEEL BLADES

## (undated) DEVICE — Z DISCONTINUED BY MEDLINE USE 2280062 TUBE TRACH SZ 8 L79MM OD12.2MM ID7.6MM CUF DISP INNR CANN

## (undated) DEVICE — CONTAINER SPEC COLL 960ML POLYPR TRIANG GRAD INTAKE/OUTPUT

## (undated) DEVICE — TRAY EPI SGL DOSE 18GA NDL CUST AULTMAN HOSP

## (undated) DEVICE — BLOCK BITE 60FR CAREGUARD

## (undated) DEVICE — PADDING UNDERCAST W4INXL4YD COT FBR LO LINTING WYTEX

## (undated) DEVICE — MEDI-VAC YANKAUER SUCTION HANDLE W/BULBOUS TIP: Brand: CARDINAL HEALTH

## (undated) DEVICE — CIAGLIA BLUE RHINO PERCUTANEOUS TRACHEOSTOMY INTRODUCER TRAY: Brand: CIAGLIA BLUE RHINO

## (undated) DEVICE — 2.0MM ROUND SOLID CARBIDE BUR MEDIUM

## (undated) DEVICE — ZIMMER® STERILE DISPOSABLE TOURNIQUET CUFF WITH PLC, DUAL PORT, SINGLE BLADDER, 18 IN. (46 CM)

## (undated) DEVICE — GOWN ISOLATN REG YEL M WT MULTIPLY SIDETIE LEV 2

## (undated) DEVICE — SPONGE,DRAIN,NONWVN,4"X4",6PLY,STRL,LF: Brand: MEDLINE

## (undated) DEVICE — ELECTRODE PT RET AD L9FT HI MOIST COND ADH HYDRGEL CORDED

## (undated) DEVICE — TUBE NG 18FR L48IN 2 LUMN W/ PREVENT ANTIREFLX FLTR FOR FLD

## (undated) DEVICE — NEEDLE HYPO 25GA L1.5IN BLU POLYPR HUB S STL REG BVL STR

## (undated) DEVICE — GLOVE ORANGE PI 7 1/2   MSG9075

## (undated) DEVICE — COVER,TABLE,44X90,STERILE: Brand: MEDLINE

## (undated) DEVICE — BLADE ES ELASTOMERIC COAT INSUL DURABLE BEND UPTO 90DEG

## (undated) DEVICE — CRADLE ARM W8.75XH12.5XL16IN FOAM SUPP ELEVATION VENT

## (undated) DEVICE — BANDAGE ADH W1XL3IN NAT FAB WVN FLX DURABLE N ADH PD SEAL

## (undated) DEVICE — 6 X 9  1.75MIL 4-WALL LABGUARD: Brand: MINIGRIP COMMERCIAL LLC

## (undated) DEVICE — PACK,LAPAROTOMY,NO GOWNS: Brand: MEDLINE

## (undated) DEVICE — BINDER ABD M/L H12IN FOR 46-62IN WHT 4 SLD PNL DSGN HOOP

## (undated) DEVICE — CATHETER SUCT TR FL TIP W/ O CTRL 10FR

## (undated) DEVICE — PENCIL ES L3M BTTN SWCH HOLSTER W/ BLDE ELECTRD EDGE

## (undated) DEVICE — MARKER,SKIN,WI/RULER AND LABELS: Brand: MEDLINE

## (undated) DEVICE — LUBRICANT SURG JELLY ST BACTER TUBE 4.25OZ

## (undated) DEVICE — Z DISCONTINUED APPLICATOR SURG PREP 0.35OZ 2% CHG 70% ISO ALC W/ HI LT

## (undated) DEVICE — GOWN,SIRUS,NONRNF,SETINSLV,XL,20/CS: Brand: MEDLINE

## (undated) DEVICE — NDL CNTR 40CT FM MAG: Brand: MEDLINE INDUSTRIES, INC.

## (undated) DEVICE — PRECISION THIN (9.0 X 0.38 X 25.0MM)

## (undated) DEVICE — BLADE CLIPPER GEN PURP NS

## (undated) DEVICE — SET SURG INSTR DISSECT

## (undated) DEVICE — KENDALL 450 SERIES MONITORING FOAM ELECTRODE - RECTANGULAR SHAPE ( 3/PK): Brand: KENDALL

## (undated) DEVICE — KIT BEDSIDE REVITAL OX 500ML

## (undated) DEVICE — MEDI-VAC NON-CONDUCTIVE SUCTION TUBING: Brand: CARDINAL HEALTH

## (undated) DEVICE — BLADE ES L6IN ELASTOMERIC COAT EXT DURABLE BEND UPTO 90DEG